# Patient Record
Sex: MALE | Race: BLACK OR AFRICAN AMERICAN | NOT HISPANIC OR LATINO | Employment: FULL TIME | URBAN - METROPOLITAN AREA
[De-identification: names, ages, dates, MRNs, and addresses within clinical notes are randomized per-mention and may not be internally consistent; named-entity substitution may affect disease eponyms.]

---

## 2019-08-12 ENCOUNTER — OFFICE VISIT (OUTPATIENT)
Dept: FAMILY MEDICINE CLINIC | Facility: CLINIC | Age: 44
End: 2019-08-12

## 2019-08-12 VITALS
SYSTOLIC BLOOD PRESSURE: 130 MMHG | BODY MASS INDEX: 30.54 KG/M2 | DIASTOLIC BLOOD PRESSURE: 70 MMHG | HEART RATE: 84 BPM | OXYGEN SATURATION: 96 % | WEIGHT: 194.6 LBS | TEMPERATURE: 97.4 F | HEIGHT: 67 IN

## 2019-08-12 DIAGNOSIS — E78.2 MULTIPLE-TYPE HYPERLIPIDEMIA: ICD-10-CM

## 2019-08-12 DIAGNOSIS — E11.42 TYPE 2 DIABETES MELLITUS WITH DIABETIC POLYNEUROPATHY, WITH LONG-TERM CURRENT USE OF INSULIN (HCC): Primary | ICD-10-CM

## 2019-08-12 DIAGNOSIS — N52.9 INABILITY TO ATTAIN ERECTION: ICD-10-CM

## 2019-08-12 DIAGNOSIS — Z79.4 TYPE 2 DIABETES MELLITUS WITH DIABETIC POLYNEUROPATHY, WITH LONG-TERM CURRENT USE OF INSULIN (HCC): Primary | ICD-10-CM

## 2019-08-12 DIAGNOSIS — I10 BENIGN ESSENTIAL HTN: ICD-10-CM

## 2019-08-12 DIAGNOSIS — G62.9 NEUROPATHY: ICD-10-CM

## 2019-08-12 PROBLEM — S04.60XA: Status: ACTIVE | Noted: 2019-08-12

## 2019-08-12 PROBLEM — E29.1 HYPOGONADISM IN MALE: Status: ACTIVE | Noted: 2019-08-12

## 2019-08-12 PROCEDURE — 99214 OFFICE O/P EST MOD 30 MIN: CPT | Performed by: FAMILY MEDICINE

## 2019-08-12 RX ORDER — LISINOPRIL 40 MG/1
40 TABLET ORAL DAILY
Refills: 1 | COMMUNITY
Start: 2019-06-15 | End: 2020-02-07 | Stop reason: SDUPTHER

## 2019-08-12 RX ORDER — METOPROLOL SUCCINATE 50 MG/1
50 TABLET, EXTENDED RELEASE ORAL DAILY
Qty: 90 TABLET | Refills: 3 | Status: SHIPPED | OUTPATIENT
Start: 2019-08-12 | End: 2020-02-07 | Stop reason: SDUPTHER

## 2019-08-12 RX ORDER — NEBIVOLOL HYDROCHLORIDE 5 MG/1
5 TABLET ORAL DAILY
Refills: 1 | COMMUNITY
Start: 2019-06-15 | End: 2019-08-12

## 2019-08-12 RX ORDER — ATORVASTATIN CALCIUM 40 MG/1
40 TABLET, FILM COATED ORAL DAILY
Qty: 90 TABLET | Refills: 3 | Status: SHIPPED | OUTPATIENT
Start: 2019-08-12 | End: 2020-02-07 | Stop reason: SDUPTHER

## 2019-08-12 NOTE — PROGRESS NOTES
"Subjective   Donnie Bishop is a 44 y.o. male.     Chief Complaint   Patient presents with   • Hypertension     follow up   • Diabetes     follow up        History of Present Illness   htn- doing well on meds, bystolic is too expensive.     hld- doing well on meds, no muscle aches    dm2- having some highs, fasting bs 250, taking insulin 70/30 45 units in the am, 15 units at lunch,  and 30 units at night. Sees endocrine and last saw them 2 months ago.     Hypogonadism/ed- stable off meds, pt not interested.     The following portions of the patient's history were reviewed and updated as appropriate: allergies, current medications, past family history, past medical history, past social history, past surgical history and problem list.    Past Medical History:   Diagnosis Date   • Diabetes mellitus (CMS/[facility])    • Hyperlipidemia    • Hypertension        No past surgical history on file.    No family history on file.    Social History     Socioeconomic History   • Marital status:      Spouse name: Not on file   • Number of children: Not on file   • Years of education: Not on file   • Highest education level: Not on file   Tobacco Use   • Smoking status: Never Smoker       Review of Systems   Respiratory: Negative for shortness of breath.    Cardiovascular: Negative for chest pain.       Objective   Visit Vitals  /70   Pulse 84   Temp 97.4 °F (36.3 °C) (Temporal)   Ht 170.2 cm (67\")   Wt 88.3 kg (194 lb 9.6 oz)   SpO2 96%   BMI 30.48 kg/m²     Body mass index is 30.48 kg/m².  Physical Exam   Constitutional: He is oriented to person, place, and time. He appears well-developed and well-nourished.   Cardiovascular: Normal rate, regular rhythm, normal heart sounds and intact distal pulses.   Pulmonary/Chest: Effort normal and breath sounds normal.   Musculoskeletal: Normal range of motion. He exhibits no edema.   Neurological: He is alert and oriented to person, place, and time.   Skin: Skin is warm and dry. "   Psychiatric: He has a normal mood and affect. His behavior is normal.         Assessment/Plan   Donnie was seen today for hypertension and diabetes.    Diagnoses and all orders for this visit:    Type 2 diabetes mellitus with diabetic polyneuropathy, with long-term current use of insulin (CMS/[facility])  -     Comprehensive Metabolic Panel  -     Lipid Panel  -     Hemoglobin A1c  -     Microalbumin / Creatinine Urine Ratio - Urine, Clean Catch  -     metFORMIN (GLUCOPHAGE) 1000 MG tablet; Take 1 tablet by mouth 2 (Two) Times a Day With Meals.    Neuropathy    Inability to attain erection    Benign essential HTN  -     metoprolol succinate XL (TOPROL XL) 50 MG 24 hr tablet; Take 1 tablet by mouth Daily.    Multiple-type hyperlipidemia  -     atorvastatin (LIPITOR) 40 MG tablet; Take 1 tablet by mouth Daily.             Normally follows with endocrine but having some high BS. Cont meds. R and B discussed. F/u in 3 month. Has 1.5 months of bystolic left.

## 2019-08-13 LAB
ALBUMIN SERPL-MCNC: 5.1 G/DL (ref 3.5–5.2)
ALBUMIN/CREAT UR: 10.8 MG/G CREAT (ref 0–30)
ALBUMIN/GLOB SERPL: 2.3 G/DL
ALP SERPL-CCNC: 49 U/L (ref 39–117)
ALT SERPL-CCNC: 17 U/L (ref 1–41)
AST SERPL-CCNC: 15 U/L (ref 1–40)
BILIRUB SERPL-MCNC: 1.4 MG/DL (ref 0.2–1.2)
BUN SERPL-MCNC: 11 MG/DL (ref 6–20)
BUN/CREAT SERPL: 11.3 (ref 7–25)
CALCIUM SERPL-MCNC: 9.8 MG/DL (ref 8.6–10.5)
CHLORIDE SERPL-SCNC: 93 MMOL/L (ref 98–107)
CHOLEST SERPL-MCNC: 150 MG/DL (ref 0–200)
CO2 SERPL-SCNC: 27.7 MMOL/L (ref 22–29)
CREAT SERPL-MCNC: 0.97 MG/DL (ref 0.76–1.27)
CREAT UR-MCNC: 40 MG/DL
GLOBULIN SER CALC-MCNC: 2.2 GM/DL
GLUCOSE SERPL-MCNC: 388 MG/DL (ref 65–99)
HBA1C MFR BLD: 8.4 % (ref 4.8–5.6)
HDLC SERPL-MCNC: 44 MG/DL (ref 40–60)
LDLC SERPL CALC-MCNC: 82 MG/DL (ref 0–100)
MICROALBUMIN UR-MCNC: 4.3 UG/ML
POTASSIUM SERPL-SCNC: 5.3 MMOL/L (ref 3.5–5.2)
PROT SERPL-MCNC: 7.3 G/DL (ref 6–8.5)
SODIUM SERPL-SCNC: 135 MMOL/L (ref 136–145)
TRIGL SERPL-MCNC: 118 MG/DL (ref 0–150)
VLDLC SERPL CALC-MCNC: 23.6 MG/DL

## 2019-08-16 ENCOUNTER — TELEPHONE (OUTPATIENT)
Dept: FAMILY MEDICINE CLINIC | Facility: CLINIC | Age: 44
End: 2019-08-16

## 2019-08-16 DIAGNOSIS — E11.42 TYPE 2 DIABETES MELLITUS WITH DIABETIC POLYNEUROPATHY, WITH LONG-TERM CURRENT USE OF INSULIN (HCC): Primary | ICD-10-CM

## 2019-08-16 DIAGNOSIS — G62.9 NEUROPATHY: ICD-10-CM

## 2019-08-16 DIAGNOSIS — Z79.4 TYPE 2 DIABETES MELLITUS WITH DIABETIC POLYNEUROPATHY, WITH LONG-TERM CURRENT USE OF INSULIN (HCC): Primary | ICD-10-CM

## 2019-08-16 RX ORDER — GABAPENTIN 400 MG/1
400 CAPSULE ORAL
Qty: 90 CAPSULE | Refills: 1 | Status: SHIPPED | OUTPATIENT
Start: 2019-08-16 | End: 2020-02-07 | Stop reason: SDUPTHER

## 2019-08-16 NOTE — TELEPHONE ENCOUNTER
Patient was in a week ago, he states you discussed refilling his Gabapentin 400 mg, he is taking it one time a day. it was not prescribed to [facility]. Please advise patient.

## 2020-02-07 ENCOUNTER — OFFICE VISIT (OUTPATIENT)
Dept: FAMILY MEDICINE CLINIC | Facility: CLINIC | Age: 45
End: 2020-02-07

## 2020-02-07 VITALS
DIASTOLIC BLOOD PRESSURE: 80 MMHG | WEIGHT: 203 LBS | SYSTOLIC BLOOD PRESSURE: 132 MMHG | HEART RATE: 99 BPM | BODY MASS INDEX: 31.86 KG/M2 | TEMPERATURE: 98.7 F | OXYGEN SATURATION: 95 % | HEIGHT: 67 IN

## 2020-02-07 DIAGNOSIS — Z79.4 TYPE 2 DIABETES MELLITUS WITH DIABETIC POLYNEUROPATHY, WITH LONG-TERM CURRENT USE OF INSULIN (HCC): Primary | ICD-10-CM

## 2020-02-07 DIAGNOSIS — E78.2 MULTIPLE-TYPE HYPERLIPIDEMIA: ICD-10-CM

## 2020-02-07 DIAGNOSIS — G62.9 NEUROPATHY: ICD-10-CM

## 2020-02-07 DIAGNOSIS — E29.1 HYPOGONADISM IN MALE: ICD-10-CM

## 2020-02-07 DIAGNOSIS — I10 BENIGN ESSENTIAL HTN: ICD-10-CM

## 2020-02-07 DIAGNOSIS — E11.42 TYPE 2 DIABETES MELLITUS WITH DIABETIC POLYNEUROPATHY, WITH LONG-TERM CURRENT USE OF INSULIN (HCC): Primary | ICD-10-CM

## 2020-02-07 PROCEDURE — 99214 OFFICE O/P EST MOD 30 MIN: CPT | Performed by: FAMILY MEDICINE

## 2020-02-07 RX ORDER — METOPROLOL SUCCINATE 50 MG/1
50 TABLET, EXTENDED RELEASE ORAL DAILY
Qty: 90 TABLET | Refills: 3 | Status: SHIPPED | OUTPATIENT
Start: 2020-02-07 | End: 2021-04-14

## 2020-02-07 RX ORDER — GABAPENTIN 400 MG/1
400 CAPSULE ORAL 3 TIMES DAILY
Qty: 270 CAPSULE | Refills: 1 | Status: SHIPPED | OUTPATIENT
Start: 2020-02-07 | End: 2020-10-09 | Stop reason: SDUPTHER

## 2020-02-07 RX ORDER — LISINOPRIL 40 MG/1
40 TABLET ORAL DAILY
Qty: 90 TABLET | Refills: 3 | Status: SHIPPED | OUTPATIENT
Start: 2020-02-07 | End: 2021-02-19

## 2020-02-07 RX ORDER — ATORVASTATIN CALCIUM 40 MG/1
40 TABLET, FILM COATED ORAL DAILY
Qty: 90 TABLET | Refills: 3 | Status: SHIPPED | OUTPATIENT
Start: 2020-02-07 | End: 2020-10-09 | Stop reason: SDUPTHER

## 2020-02-07 NOTE — PROGRESS NOTES
"Subjective   Donnie Bishop is a 44 y.o. male.     Chief Complaint   Patient presents with   • Diabetes   • Hypertension       History of Present Illness   htn- doing well on meds, bystolic is too expensive.     hld- doing well on meds, no muscle aches    dm2- having some highs, fasting bs 250, taking insulin 70/30 45 units in the am, 20 units at lunch,  and 50 units at night. Sees endocrine and will f/u with them soon. Needing gabapentin increased.     Hypogonadism/ed- stable off meds, pt not interested.     The following portions of the patient's history were reviewed and updated as appropriate: allergies, current medications, past family history, past medical history, past social history, past surgical history and problem list.    Past Medical History:   Diagnosis Date   • Acute maxillary sinusitis    • Acute otitis media    • Benign essential hypertension    • Bronchitis    • Cough    • Diabetes mellitus (CMS/HCC)    • Hyperlipidemia    • Hypertension    • Hypogonadism male    • Inability to attain erection    • Injury of acoustic nerve    • Myalgia    • Peripheral neuropathy    • Type II diabetes mellitus (CMS/HCC)    • Venous insufficiency    • Viral illness    • Vitamin D deficiency        No past surgical history on file.    Family History   Problem Relation Age of Onset   • Diabetes Mother        Social History     Socioeconomic History   • Marital status:      Spouse name: Not on file   • Number of children: Not on file   • Years of education: Not on file   • Highest education level: Not on file   Tobacco Use   • Smoking status: Never Smoker       Review of Systems   Respiratory: Negative for shortness of breath.    Cardiovascular: Negative for chest pain.       Objective   Visit Vitals  /80 (BP Location: Right arm, Patient Position: Sitting)   Pulse 99   Temp 98.7 °F (37.1 °C)   Ht 170.2 cm (67.01\")   Wt 92.1 kg (203 lb)   SpO2 95%   BMI 31.79 kg/m²     Body mass index is 31.79 kg/m².  Physical " Exam   Constitutional: He is oriented to person, place, and time. He appears well-developed and well-nourished.   Cardiovascular: Normal rate, regular rhythm, normal heart sounds and intact distal pulses.   Pulmonary/Chest: Effort normal and breath sounds normal.   Musculoskeletal: Normal range of motion. He exhibits no edema.   Neurological: He is alert and oriented to person, place, and time.   Skin: Skin is warm and dry.   Psychiatric: He has a normal mood and affect. His behavior is normal.         Assessment/Plan   Donnie was seen today for diabetes and hypertension.    Diagnoses and all orders for this visit:    Type 2 diabetes mellitus with diabetic polyneuropathy, with long-term current use of insulin (CMS/HCC)  -     gabapentin (NEURONTIN) 400 MG capsule; Take 1 capsule by mouth 3 (Three) Times a Day.    Hypogonadism in male    Benign essential HTN  -     metoprolol succinate XL (TOPROL XL) 50 MG 24 hr tablet; Take 1 tablet by mouth Daily.    Neuropathy  -     gabapentin (NEURONTIN) 400 MG capsule; Take 1 capsule by mouth 3 (Three) Times a Day.    Multiple-type hyperlipidemia  -     atorvastatin (LIPITOR) 40 MG tablet; Take 1 tablet by mouth Daily.    Other orders  -     lisinopril (PRINIVIL,ZESTRIL) 40 MG tablet; Take 1 tablet by mouth Daily.             f/u with endocrine. Cont meds. R and B discussed. F/u in 6 month. Increased gabapentin and can call in 2 weeks and if not better will give an extra pill at night.

## 2020-05-11 DIAGNOSIS — E11.42 TYPE 2 DIABETES MELLITUS WITH DIABETIC POLYNEUROPATHY, WITH LONG-TERM CURRENT USE OF INSULIN (HCC): ICD-10-CM

## 2020-05-11 DIAGNOSIS — Z79.4 TYPE 2 DIABETES MELLITUS WITH DIABETIC POLYNEUROPATHY, WITH LONG-TERM CURRENT USE OF INSULIN (HCC): ICD-10-CM

## 2020-05-26 ENCOUNTER — TELEMEDICINE (OUTPATIENT)
Dept: FAMILY MEDICINE CLINIC | Facility: CLINIC | Age: 45
End: 2020-05-26

## 2020-05-26 DIAGNOSIS — R05.9 COUGH: Primary | ICD-10-CM

## 2020-05-26 PROCEDURE — 99213 OFFICE O/P EST LOW 20 MIN: CPT | Performed by: FAMILY MEDICINE

## 2020-05-26 RX ORDER — AMOXICILLIN AND CLAVULANATE POTASSIUM 875; 125 MG/1; MG/1
1 TABLET, FILM COATED ORAL 2 TIMES DAILY
Qty: 20 TABLET | Refills: 0 | Status: SHIPPED | OUTPATIENT
Start: 2020-05-26 | End: 2020-10-09

## 2020-05-26 NOTE — PROGRESS NOTES
Subjective   Donnie Bishop is a 45 y.o. male.     Chief Complaint   Patient presents with   • URI       History of Present Illness   Mild cough and congestion with sinus pain and subj fever for one day. Good po.     The following portions of the patient's history were reviewed and updated as appropriate: allergies, current medications, past family history, past medical history, past social history, past surgical history and problem list.    Past Medical History:   Diagnosis Date   • Acute maxillary sinusitis    • Acute otitis media    • Benign essential hypertension    • Bronchitis    • Cough    • Diabetes mellitus (CMS/HCC)    • Hyperlipidemia    • Hypertension    • Hypogonadism male    • Inability to attain erection    • Injury of acoustic nerve    • Myalgia    • Peripheral neuropathy    • Type II diabetes mellitus (CMS/HCC)    • Venous insufficiency    • Viral illness    • Vitamin D deficiency        History reviewed. No pertinent surgical history.    Family History   Problem Relation Age of Onset   • Diabetes Mother        Social History     Socioeconomic History   • Marital status:      Spouse name: Not on file   • Number of children: Not on file   • Years of education: Not on file   • Highest education level: Not on file   Tobacco Use   • Smoking status: Never Smoker       Review of Systems   Respiratory: Negative for shortness of breath.        Objective   There were no vitals taken for this visit.  There is no height or weight on file to calculate BMI.  Physical Exam   Constitutional: He is oriented to person, place, and time. He appears well-developed and well-nourished. No distress.   Neurological: He is alert and oriented to person, place, and time.   Psychiatric: He has a normal mood and affect. His behavior is normal.         Assessment/Plan   Donnie was seen today for uri.    Diagnoses and all orders for this visit:    Cough  -     COVID-19,[facility] ROUTINE, NP/OP SWAB IN TRANSPORT MEDIA OR ESWAB 72  HR TAT - Swab, Nasopharynx; Future  -     amoxicillin-clavulanate (Augmentin) 875-125 MG per tablet; Take 1 tablet by mouth 2 (Two) Times a Day.        Consent to video and lasted 11 minutes.   Sinusitis vs. Covid. Discussed quarantine until results and Rest, fluids and follow up if worse or no better.

## 2020-10-09 ENCOUNTER — OFFICE VISIT (OUTPATIENT)
Dept: FAMILY MEDICINE CLINIC | Facility: CLINIC | Age: 45
End: 2020-10-09

## 2020-10-09 VITALS
OXYGEN SATURATION: 96 % | BODY MASS INDEX: 30.76 KG/M2 | TEMPERATURE: 98.7 F | SYSTOLIC BLOOD PRESSURE: 142 MMHG | DIASTOLIC BLOOD PRESSURE: 80 MMHG | HEIGHT: 67 IN | WEIGHT: 196 LBS | HEART RATE: 85 BPM

## 2020-10-09 DIAGNOSIS — E11.42 TYPE 2 DIABETES MELLITUS WITH DIABETIC POLYNEUROPATHY, WITH LONG-TERM CURRENT USE OF INSULIN (HCC): ICD-10-CM

## 2020-10-09 DIAGNOSIS — E11.40 TYPE 2 DIABETES MELLITUS WITH DIABETIC NEUROPATHY, WITH LONG-TERM CURRENT USE OF INSULIN (HCC): ICD-10-CM

## 2020-10-09 DIAGNOSIS — Z79.4 TYPE 2 DIABETES MELLITUS WITH DIABETIC NEUROPATHY, WITH LONG-TERM CURRENT USE OF INSULIN (HCC): ICD-10-CM

## 2020-10-09 DIAGNOSIS — Z79.4 TYPE 2 DIABETES MELLITUS WITH DIABETIC POLYNEUROPATHY, WITH LONG-TERM CURRENT USE OF INSULIN (HCC): ICD-10-CM

## 2020-10-09 DIAGNOSIS — G62.9 NEUROPATHY: ICD-10-CM

## 2020-10-09 DIAGNOSIS — I10 BENIGN ESSENTIAL HTN: Primary | ICD-10-CM

## 2020-10-09 DIAGNOSIS — E78.2 MULTIPLE-TYPE HYPERLIPIDEMIA: ICD-10-CM

## 2020-10-09 DIAGNOSIS — F41.9 ANXIETY: ICD-10-CM

## 2020-10-09 PROCEDURE — 99214 OFFICE O/P EST MOD 30 MIN: CPT | Performed by: FAMILY MEDICINE

## 2020-10-09 RX ORDER — METOPROLOL SUCCINATE 100 MG/1
100 TABLET, EXTENDED RELEASE ORAL DAILY
Qty: 90 TABLET | Refills: 1 | Status: CANCELLED | OUTPATIENT
Start: 2020-10-09

## 2020-10-09 RX ORDER — ERGOCALCIFEROL 1.25 MG/1
50000 CAPSULE ORAL WEEKLY
COMMUNITY
Start: 2020-09-07

## 2020-10-09 RX ORDER — ATORVASTATIN CALCIUM 40 MG/1
40 TABLET, FILM COATED ORAL DAILY
Qty: 90 TABLET | Refills: 3 | Status: SHIPPED | OUTPATIENT
Start: 2020-10-09 | End: 2021-03-08

## 2020-10-09 RX ORDER — GABAPENTIN 400 MG/1
400 CAPSULE ORAL 3 TIMES DAILY
Qty: 270 CAPSULE | Refills: 1 | Status: SHIPPED | OUTPATIENT
Start: 2020-10-09 | End: 2021-06-02

## 2020-10-09 NOTE — PROGRESS NOTES
Subjective   Donnie Bishop is a 45 y.o. male.     Chief Complaint   Patient presents with   • Hypertension       History of Present Illness   Hypertension- Taking meds and having some highs, thinks this is due to stress. Does not want to change medication now.     Hyperlipidemia- doing well on meds, no muscle aches    Diabetes- taking 45 units in am and 30 units at lunch, and 25 units before bed. Bs have been a little high. Has just seen endo and they have kept his medications the same. Rare lows.     Anxiety- started with separation from his wife. Not sleeping well. For one year. Trouble focusing.     The following portions of the patient's history were reviewed and updated as appropriate: allergies, current medications, past family history, past medical history, past social history, past surgical history and problem list.    Past Medical History:   Diagnosis Date   • Acute maxillary sinusitis    • Acute otitis media    • Benign essential hypertension    • Bronchitis    • Cough    • Diabetes mellitus (CMS/HCC)    • Hyperlipidemia    • Hypertension    • Hypogonadism male    • Inability to attain erection    • Injury of acoustic nerve    • Myalgia    • Peripheral neuropathy    • Type II diabetes mellitus (CMS/HCC)    • Venous insufficiency    • Viral illness    • Vitamin D deficiency        History reviewed. No pertinent surgical history.    Family History   Problem Relation Age of Onset   • Diabetes Mother        Social History     Socioeconomic History   • Marital status:      Spouse name: Not on file   • Number of children: Not on file   • Years of education: Not on file   • Highest education level: Not on file   Tobacco Use   • Smoking status: Never Smoker       Review of Systems   Constitutional: Negative for fever.   Respiratory: Negative for shortness of breath.        Objective   Visit Vitals  /80 (BP Location: Right arm, Patient Position: Sitting)   Pulse 85   Temp 98.7 °F (37.1 °C)   Ht 170.2 cm  "(67.01\")   Wt 88.9 kg (196 lb)   SpO2 96%   BMI 30.69 kg/m²     Body mass index is 30.69 kg/m².  Physical Exam  Constitutional:       Appearance: Normal appearance. He is well-developed.   Cardiovascular:      Rate and Rhythm: Normal rate and regular rhythm.      Heart sounds: Normal heart sounds.   Pulmonary:      Effort: Pulmonary effort is normal.      Breath sounds: Normal breath sounds.   Musculoskeletal: Normal range of motion.         General: No swelling.   Skin:     General: Skin is warm and dry.      Findings: No rash.   Neurological:      General: No focal deficit present.      Mental Status: He is alert and oriented to person, place, and time.   Psychiatric:         Mood and Affect: Mood normal.         Behavior: Behavior normal.           Assessment/Plan   Donnie was seen today for hypertension.    Diagnoses and all orders for this visit:    Benign essential HTN    Type 2 diabetes mellitus with diabetic neuropathy, with long-term current use of insulin (CMS/[facility])  -     Comprehensive Metabolic Panel  -     Lipid Panel  -     Hemoglobin A1c    Multiple-type hyperlipidemia  -     atorvastatin (LIPITOR) 40 MG tablet; Take 1 tablet by mouth Daily.    Anxiety  -     sertraline (Zoloft) 50 MG tablet; Take 1 tablet by mouth Daily.    Type 2 diabetes mellitus with diabetic polyneuropathy, with long-term current use of insulin (CMS/HCC)  -     gabapentin (NEURONTIN) 400 MG capsule; Take 1 capsule by mouth 3 (Three) Times a Day.    Neuropathy  -     gabapentin (NEURONTIN) 400 MG capsule; Take 1 capsule by mouth 3 (Three) Times a Day.        Risks and benefits discussed and f/u in 1 month. Cont kurt berrios.        "

## 2020-10-10 LAB
ALBUMIN SERPL-MCNC: 5 G/DL (ref 3.5–5.2)
ALBUMIN/GLOB SERPL: 2.9 G/DL
ALP SERPL-CCNC: 49 U/L (ref 39–117)
ALT SERPL-CCNC: 32 U/L (ref 1–41)
AST SERPL-CCNC: 31 U/L (ref 1–40)
BILIRUB SERPL-MCNC: 1.1 MG/DL (ref 0–1.2)
BUN SERPL-MCNC: 12 MG/DL (ref 6–20)
BUN/CREAT SERPL: 13.5 (ref 7–25)
CALCIUM SERPL-MCNC: 10.1 MG/DL (ref 8.6–10.5)
CHLORIDE SERPL-SCNC: 98 MMOL/L (ref 98–107)
CHOLEST SERPL-MCNC: 104 MG/DL (ref 0–200)
CO2 SERPL-SCNC: 28.4 MMOL/L (ref 22–29)
CREAT SERPL-MCNC: 0.89 MG/DL (ref 0.76–1.27)
GLOBULIN SER CALC-MCNC: 1.7 GM/DL
GLUCOSE SERPL-MCNC: 132 MG/DL (ref 65–99)
HBA1C MFR BLD: 8.8 % (ref 4.8–5.6)
HDLC SERPL-MCNC: 49 MG/DL (ref 40–60)
LDLC SERPL CALC-MCNC: 36 MG/DL (ref 0–100)
POTASSIUM SERPL-SCNC: 4 MMOL/L (ref 3.5–5.2)
PROT SERPL-MCNC: 6.7 G/DL (ref 6–8.5)
SODIUM SERPL-SCNC: 141 MMOL/L (ref 136–145)
TRIGL SERPL-MCNC: 103 MG/DL (ref 0–150)
VLDLC SERPL CALC-MCNC: 19 MG/DL (ref 5–40)

## 2020-11-06 ENCOUNTER — OFFICE VISIT (OUTPATIENT)
Dept: FAMILY MEDICINE CLINIC | Facility: CLINIC | Age: 45
End: 2020-11-06

## 2020-11-06 VITALS
HEART RATE: 87 BPM | HEIGHT: 67 IN | OXYGEN SATURATION: 98 % | SYSTOLIC BLOOD PRESSURE: 138 MMHG | WEIGHT: 190 LBS | BODY MASS INDEX: 29.82 KG/M2 | DIASTOLIC BLOOD PRESSURE: 84 MMHG | TEMPERATURE: 96.6 F

## 2020-11-06 DIAGNOSIS — I10 BENIGN ESSENTIAL HTN: Primary | ICD-10-CM

## 2020-11-06 DIAGNOSIS — F41.9 ANXIETY: ICD-10-CM

## 2020-11-06 PROCEDURE — 99213 OFFICE O/P EST LOW 20 MIN: CPT | Performed by: FAMILY MEDICINE

## 2020-11-06 NOTE — PROGRESS NOTES
Chief Complaint   Patient presents with   • Hypertension     follow up       Subjective   Donnie Bishop is a 45 y.o. male.     History of Present Illness   PT is here for follow up of anxiety and started zoloft last visit and states he is doing well with it.  He has no HI or SI or depression.  No SOB or palpitations.  Hypertension-no chest pain, blurry vision, headaches or palpitations.    The following portions of the patient's history were reviewed and updated as appropriate: allergies, current medications, past family history, past medical history, past social history, past surgical history and problem list.    Review of Systems   Constitutional: Negative for fatigue.   Eyes: Negative for blurred vision.   Respiratory: Negative for cough, chest tightness and shortness of breath.    Cardiovascular: Negative for chest pain, palpitations and leg swelling.   Neurological: Negative for dizziness, light-headedness and headache.       Patient Active Problem List   Diagnosis   • Multiple-type hyperlipidemia   • Type 2 diabetes mellitus with diabetic neuropathy, with long-term current use of insulin (CMS/[facility])   • Neuropathy   • Vitamin D deficiency   • Benign essential HTN   • Inability to attain erection   • Injury of acoustic nerve   • Hypogonadism in male   • Anxiety       No Known Allergies      Current Outpatient Medications:   •  atorvastatin (LIPITOR) 40 MG tablet, Take 1 tablet by mouth Daily., Disp: 90 tablet, Rfl: 3  •  gabapentin (NEURONTIN) 400 MG capsule, Take 1 capsule by mouth 3 (Three) Times a Day., Disp: 270 capsule, Rfl: 1  •  Insulin Aspart Prot & Aspart (NOVOLOG MIX 70/30 FLEXPEN SC), Inject  under the skin., Disp: , Rfl:   •  lisinopril (PRINIVIL,ZESTRIL) 40 MG tablet, Take 1 tablet by mouth Daily., Disp: 90 tablet, Rfl: 3  •  melatonin 5 MG tablet tablet, Take 5 mg by mouth., Disp: , Rfl:   •  metFORMIN (GLUCOPHAGE) 1000 MG tablet, TAKE 1 TABLET BY MOUTH TWO TIMES A DAY WITH MEALS, Disp: 180 tablet,  "Rfl: 0  •  metoprolol succinate XL (TOPROL XL) 50 MG 24 hr tablet, Take 1 tablet by mouth Daily., Disp: 90 tablet, Rfl: 3  •  sertraline (Zoloft) 50 MG tablet, Take 1 tablet by mouth Daily., Disp: 90 tablet, Rfl: 3  •  vitamin D (ERGOCALCIFEROL) 1.25 MG (12663 UT) capsule capsule, Take 50,000 Units by mouth 1 (One) Time Per Week., Disp: , Rfl:   •  RaNITidine HCl (RANITIDINE 75 PO), Take  by mouth., Disp: , Rfl:     Past Medical History:   Diagnosis Date   • Anxiety    • Cough    • Inability to attain erection    • Injury of acoustic nerve    • Myalgia    • Type II diabetes mellitus (CMS/HCC)    • Venous insufficiency    • Viral illness    • Vitamin D deficiency        History reviewed. No pertinent surgical history.    Family History   Problem Relation Age of Onset   • Diabetes Mother        Social History     Tobacco Use   • Smoking status: Never Smoker   • Smokeless tobacco: Never Used   Substance Use Topics   • Alcohol use: Never     Frequency: Never            Objective     Visit Vitals  /84 (BP Location: Right arm, Patient Position: Sitting, Cuff Size: Adult)   Pulse 87   Temp 96.6 °F (35.9 °C) (Infrared)   Ht 170.2 cm (67.01\")   Wt 86.2 kg (190 lb)   SpO2 98%   BMI 29.75 kg/m²       Physical Exam  Vitals signs and nursing note reviewed.   Constitutional:       Appearance: Normal appearance.   Cardiovascular:      Rate and Rhythm: Normal rate and regular rhythm.      Heart sounds: Normal heart sounds. No murmur. No friction rub.   Pulmonary:      Effort: Pulmonary effort is normal. No respiratory distress.      Breath sounds: Normal breath sounds. No stridor.   Neurological:      Mental Status: He is alert.         Lab Results   Component Value Date    BUN 12 10/09/2020    CREATININE 0.89 10/09/2020    EGFRIFNONA 93 10/09/2020    EGFRIFAFRI 113 10/09/2020    BCR 13.5 10/09/2020    K 4.0 10/09/2020    CO2 28.4 10/09/2020    CALCIUM 10.1 10/09/2020    PROTENTOTREF 6.7 10/09/2020    ALBUMIN 5.00 10/09/2020 "    LABIL2 2.9 10/09/2020    AST 31 10/09/2020    ALT 32 10/09/2020       No results found for: WBC, RBC, HGB, HCT, MCV, MCH, MCHC, RDW, RDWSD, MPV, PLT, NEUTRORELPCT, LYMPHORELPCT, MONORELPCT, EOSRELPCT, BASORELPCT, AUTOIGPER, NEUTROABS, LYMPHSABS, MONOSABS, EOSABS, BASOSABS, AUTOIGNUM, NRBC    Lab Results   Component Value Date    HGBA1C 8.80 (H) 10/09/2020       Lab Results   Component Value Date    LNSASPNR79 426 09/28/2020       No results found for: TSH    No results found for: CHOL  Lab Results   Component Value Date    TRIG 103 10/09/2020     Lab Results   Component Value Date    HDL 49 10/09/2020     Lab Results   Component Value Date    LDL 36 10/09/2020     Lab Results   Component Value Date    VLDL 19 10/09/2020     No results found for: LDLHDL      Procedures    Assessment/Plan   Problems Addressed this Visit        Cardiovascular and Mediastinum    Benign essential HTN - Primary       Other    Anxiety      Diagnoses       Codes Comments    Benign essential HTN    -  Primary ICD-10-CM: I10  ICD-9-CM: 401.1     Anxiety     ICD-10-CM: F41.9  ICD-9-CM: 300.00           No orders of the defined types were placed in this encounter.      Current Outpatient Medications   Medication Sig Dispense Refill   • atorvastatin (LIPITOR) 40 MG tablet Take 1 tablet by mouth Daily. 90 tablet 3   • gabapentin (NEURONTIN) 400 MG capsule Take 1 capsule by mouth 3 (Three) Times a Day. 270 capsule 1   • Insulin Aspart Prot & Aspart (NOVOLOG MIX 70/30 FLEXPEN SC) Inject  under the skin.     • lisinopril (PRINIVIL,ZESTRIL) 40 MG tablet Take 1 tablet by mouth Daily. 90 tablet 3   • melatonin 5 MG tablet tablet Take 5 mg by mouth.     • metFORMIN (GLUCOPHAGE) 1000 MG tablet TAKE 1 TABLET BY MOUTH TWO TIMES A DAY WITH MEALS 180 tablet 0   • metoprolol succinate XL (TOPROL XL) 50 MG 24 hr tablet Take 1 tablet by mouth Daily. 90 tablet 3   • sertraline (Zoloft) 50 MG tablet Take 1 tablet by mouth Daily. 90 tablet 3   • vitamin D  (ERGOCALCIFEROL) 1.25 MG (70890 UT) capsule capsule Take 50,000 Units by mouth 1 (One) Time Per Week.     • RaNITidine HCl (RANITIDINE 75 PO) Take  by mouth.       No current facility-administered medications for this visit.      Continue current plan and follow up as scheduled.    No follow-ups on file.    There are no Patient Instructions on file for this visit.

## 2021-01-03 DIAGNOSIS — E11.42 TYPE 2 DIABETES MELLITUS WITH DIABETIC POLYNEUROPATHY, WITH LONG-TERM CURRENT USE OF INSULIN (HCC): ICD-10-CM

## 2021-01-03 DIAGNOSIS — Z79.4 TYPE 2 DIABETES MELLITUS WITH DIABETIC POLYNEUROPATHY, WITH LONG-TERM CURRENT USE OF INSULIN (HCC): ICD-10-CM

## 2021-02-19 RX ORDER — LISINOPRIL 40 MG/1
TABLET ORAL
Qty: 90 TABLET | Refills: 3 | Status: SHIPPED | OUTPATIENT
Start: 2021-02-19 | End: 2021-06-04 | Stop reason: SDUPTHER

## 2021-03-06 DIAGNOSIS — E78.2 MULTIPLE-TYPE HYPERLIPIDEMIA: ICD-10-CM

## 2021-03-08 RX ORDER — ATORVASTATIN CALCIUM 40 MG/1
TABLET, FILM COATED ORAL
Qty: 90 TABLET | Refills: 3 | Status: SHIPPED | OUTPATIENT
Start: 2021-03-08

## 2021-04-02 ENCOUNTER — BULK ORDERING (OUTPATIENT)
Dept: CASE MANAGEMENT | Facility: OTHER | Age: 46
End: 2021-04-02

## 2021-04-02 DIAGNOSIS — Z23 IMMUNIZATION DUE: ICD-10-CM

## 2021-04-06 DIAGNOSIS — I10 BENIGN ESSENTIAL HTN: ICD-10-CM

## 2021-04-07 NOTE — TELEPHONE ENCOUNTER
LOV       11/6/20  NOV     No f/u on file  Labs     10/9/20    Last RF   2/7/21    HUB MAY RELAY MESSAGE TO PATIENT AND RESPOND.    PLEASE REPLY THAT PT HAS BEEN NOTIFIED.   Needs f/u for HTN / anxiety / DM  Left message to return call.

## 2021-04-14 RX ORDER — METOPROLOL SUCCINATE 50 MG/1
TABLET, EXTENDED RELEASE ORAL
Qty: 90 TABLET | Refills: 3 | Status: SHIPPED | OUTPATIENT
Start: 2021-04-14 | End: 2021-06-04 | Stop reason: SDUPTHER

## 2021-05-24 ENCOUNTER — HOSPITAL ENCOUNTER (EMERGENCY)
Facility: HOSPITAL | Age: 46
Discharge: HOME | End: 2021-05-24
Attending: EMERGENCY MEDICINE | Admitting: EMERGENCY MEDICINE

## 2021-05-24 VITALS
TEMPERATURE: 96.8 F | HEART RATE: 104 BPM | RESPIRATION RATE: 18 BRPM | OXYGEN SATURATION: 94 % | WEIGHT: 190 LBS | HEIGHT: 67 IN | BODY MASS INDEX: 29.82 KG/M2 | SYSTOLIC BLOOD PRESSURE: 151 MMHG | DIASTOLIC BLOOD PRESSURE: 90 MMHG

## 2021-05-24 DIAGNOSIS — Z86.39 HISTORY OF DIABETES MELLITUS: ICD-10-CM

## 2021-05-24 DIAGNOSIS — R11.2 NON-INTRACTABLE VOMITING WITH NAUSEA, UNSPECIFIED VOMITING TYPE: Primary | ICD-10-CM

## 2021-05-24 DIAGNOSIS — R51.9 ACUTE NONINTRACTABLE HEADACHE, UNSPECIFIED HEADACHE TYPE: ICD-10-CM

## 2021-05-24 LAB
ALBUMIN SERPL-MCNC: 5.1 G/DL (ref 3.5–5.2)
ALBUMIN/GLOB SERPL: 2 G/DL
ALP SERPL-CCNC: 49 U/L (ref 39–117)
ALT SERPL W P-5'-P-CCNC: 16 U/L (ref 1–41)
ANION GAP SERPL CALCULATED.3IONS-SCNC: 16.9 MMOL/L (ref 5–15)
AST SERPL-CCNC: 14 U/L (ref 1–40)
BASOPHILS # BLD AUTO: 0.05 10*3/MM3 (ref 0–0.2)
BASOPHILS NFR BLD AUTO: 0.4 % (ref 0–1.5)
BILIRUB SERPL-MCNC: 3.3 MG/DL (ref 0–1.2)
BILIRUB UR QL STRIP: NEGATIVE
BUN SERPL-MCNC: 11 MG/DL (ref 6–20)
BUN/CREAT SERPL: 12 (ref 7–25)
CALCIUM SPEC-SCNC: 10.2 MG/DL (ref 8.6–10.5)
CHLORIDE SERPL-SCNC: 95 MMOL/L (ref 98–107)
CLARITY UR: CLEAR
CO2 SERPL-SCNC: 22.1 MMOL/L (ref 22–29)
COLOR UR: YELLOW
CREAT SERPL-MCNC: 0.92 MG/DL (ref 0.76–1.27)
DEPRECATED RDW RBC AUTO: 37.9 FL (ref 37–54)
EOSINOPHIL # BLD AUTO: 0.03 10*3/MM3 (ref 0–0.4)
EOSINOPHIL NFR BLD AUTO: 0.2 % (ref 0.3–6.2)
ERYTHROCYTE [DISTWIDTH] IN BLOOD BY AUTOMATED COUNT: 11.7 % (ref 12.3–15.4)
GFR SERPL CREATININE-BSD FRML MDRD: 108 ML/MIN/1.73
GLOBULIN UR ELPH-MCNC: 2.5 GM/DL
GLUCOSE SERPL-MCNC: 129 MG/DL (ref 65–99)
GLUCOSE UR STRIP-MCNC: ABNORMAL MG/DL
HCT VFR BLD AUTO: 46.3 % (ref 37.5–51)
HGB BLD-MCNC: 16.3 G/DL (ref 13–17.7)
HGB UR QL STRIP.AUTO: NEGATIVE
HOLD SPECIMEN: NORMAL
HOLD SPECIMEN: NORMAL
IMM GRANULOCYTES # BLD AUTO: 0.07 10*3/MM3 (ref 0–0.05)
IMM GRANULOCYTES NFR BLD AUTO: 0.6 % (ref 0–0.5)
KETONES UR QL STRIP: ABNORMAL
LEUKOCYTE ESTERASE UR QL STRIP.AUTO: NEGATIVE
LIPASE SERPL-CCNC: 20 U/L (ref 13–60)
LYMPHOCYTES # BLD AUTO: 1.58 10*3/MM3 (ref 0.7–3.1)
LYMPHOCYTES NFR BLD AUTO: 12.6 % (ref 19.6–45.3)
MCH RBC QN AUTO: 31.5 PG (ref 26.6–33)
MCHC RBC AUTO-ENTMCNC: 35.2 G/DL (ref 31.5–35.7)
MCV RBC AUTO: 89.6 FL (ref 79–97)
MONOCYTES # BLD AUTO: 0.79 10*3/MM3 (ref 0.1–0.9)
MONOCYTES NFR BLD AUTO: 6.3 % (ref 5–12)
NEUTROPHILS NFR BLD AUTO: 10.06 10*3/MM3 (ref 1.7–7)
NEUTROPHILS NFR BLD AUTO: 79.9 % (ref 42.7–76)
NITRITE UR QL STRIP: NEGATIVE
NRBC BLD AUTO-RTO: 0 /100 WBC (ref 0–0.2)
PH UR STRIP.AUTO: <=5 [PH] (ref 5–8)
PLATELET # BLD AUTO: 431 10*3/MM3 (ref 140–450)
PMV BLD AUTO: 9.9 FL (ref 6–12)
POTASSIUM SERPL-SCNC: 4.5 MMOL/L (ref 3.5–5.2)
PROT SERPL-MCNC: 7.6 G/DL (ref 6–8.5)
PROT UR QL STRIP: ABNORMAL
RBC # BLD AUTO: 5.17 10*6/MM3 (ref 4.14–5.8)
SODIUM SERPL-SCNC: 134 MMOL/L (ref 136–145)
SP GR UR STRIP: 1.01 (ref 1–1.03)
UROBILINOGEN UR QL STRIP: ABNORMAL
WBC # BLD AUTO: 12.58 10*3/MM3 (ref 3.4–10.8)
WHOLE BLOOD HOLD SPECIMEN: NORMAL
WHOLE BLOOD HOLD SPECIMEN: NORMAL

## 2021-05-24 PROCEDURE — 99283 EMERGENCY DEPT VISIT LOW MDM: CPT

## 2021-05-24 PROCEDURE — 80053 COMPREHEN METABOLIC PANEL: CPT | Performed by: EMERGENCY MEDICINE

## 2021-05-24 PROCEDURE — 85025 COMPLETE CBC W/AUTO DIFF WBC: CPT | Performed by: EMERGENCY MEDICINE

## 2021-05-24 PROCEDURE — 96374 THER/PROPH/DIAG INJ IV PUSH: CPT

## 2021-05-24 PROCEDURE — 25010000002 KETOROLAC TROMETHAMINE PER 15 MG: Performed by: EMERGENCY MEDICINE

## 2021-05-24 PROCEDURE — 81003 URINALYSIS AUTO W/O SCOPE: CPT | Performed by: EMERGENCY MEDICINE

## 2021-05-24 PROCEDURE — 83690 ASSAY OF LIPASE: CPT | Performed by: EMERGENCY MEDICINE

## 2021-05-24 PROCEDURE — 96375 TX/PRO/DX INJ NEW DRUG ADDON: CPT

## 2021-05-24 PROCEDURE — 36415 COLL VENOUS BLD VENIPUNCTURE: CPT

## 2021-05-24 PROCEDURE — 25010000002 ONDANSETRON PER 1 MG: Performed by: EMERGENCY MEDICINE

## 2021-05-24 PROCEDURE — 96361 HYDRATE IV INFUSION ADD-ON: CPT

## 2021-05-24 RX ORDER — ONDANSETRON 2 MG/ML
4 INJECTION INTRAMUSCULAR; INTRAVENOUS ONCE
Status: COMPLETED | OUTPATIENT
Start: 2021-05-24 | End: 2021-05-24

## 2021-05-24 RX ORDER — KETOROLAC TROMETHAMINE 30 MG/ML
30 INJECTION, SOLUTION INTRAMUSCULAR; INTRAVENOUS ONCE
Status: COMPLETED | OUTPATIENT
Start: 2021-05-24 | End: 2021-05-24

## 2021-05-24 RX ORDER — ONDANSETRON 4 MG/1
4 TABLET, ORALLY DISINTEGRATING ORAL EVERY 8 HOURS PRN
Qty: 10 TABLET | Refills: 0 | Status: SHIPPED | OUTPATIENT
Start: 2021-05-24

## 2021-05-24 RX ORDER — SODIUM CHLORIDE 0.9 % (FLUSH) 0.9 %
10 SYRINGE (ML) INJECTION AS NEEDED
Status: DISCONTINUED | OUTPATIENT
Start: 2021-05-24 | End: 2021-05-25 | Stop reason: HOSPADM

## 2021-05-24 RX ADMIN — ONDANSETRON 4 MG: 2 INJECTION INTRAMUSCULAR; INTRAVENOUS at 19:52

## 2021-05-24 RX ADMIN — KETOROLAC TROMETHAMINE 30 MG: 30 INJECTION, SOLUTION INTRAMUSCULAR; INTRAVENOUS at 22:32

## 2021-05-24 RX ADMIN — SODIUM CHLORIDE 1000 ML: 9 INJECTION, SOLUTION INTRAVENOUS at 19:51

## 2021-05-24 NOTE — ED PROVIDER NOTES
EMERGENCY DEPARTMENT ENCOUNTER    Room Number:  17/17  Date seen:  5/25/2021  PCP: Juju Kennedy MD  Historian: Patient      HPI:  Chief Complaint: Vomiting  A complete HPI/ROS/PMH/PSH/SH/FH are unobtainable due to: Nothing  Context: Donnie Bishop is a 46 y.o. male who presents to the ED c/o vomiting that has been ongoing for the last 3 days.  Patient reports that anything he tries to eat or drink comes back up.  He reports some minimal lower abdominal pain that he attributes mostly to being hungry and not being able to eat.  He has been having regular bowel movements and passing gas.  He denies diarrhea.  No black or bloody stool.  He denies fever or chills.  He has had headache and also reports mild shortness of air.  He is a type II diabetic, diagnosed at age 20.  He denies prior diagnosis of gastroparesis.  He does not smoke or drink.  He denies history of prior abdominal surgeries.  He reports feeling dehydrated.            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      FAMILY HISTORY  Family History   Problem Relation Age of Onset   • Diabetes Mother          SOCIAL HISTORY  Social History     Socioeconomic History   • Marital status:      Spouse name: Not on file   • Number of children: Not on file    • Years of education: Not on file   • Highest education level: Not on file   Tobacco Use   • Smoking status: Never Smoker   • Smokeless tobacco: Never Used   Substance and Sexual Activity   • Alcohol use: Never   • Drug use: Never   • Sexual activity: Defer         ALLERGIES  Patient has no known allergies.        REVIEW OF SYSTEMS  Review of Systems   Review of all 14 systems is negative other than stated in the HPI above.      PHYSICAL EXAM  ED Triage Vitals [05/24/21 1708]   Temp Heart Rate Resp BP SpO2   96.8 °F (36 °C) 104 18 -- 94 %      Temp src Heart Rate Source Patient Position BP Location FiO2 (%)   -- Monitor -- -- --         GENERAL: Awake and alert, no acute distress  HENT: nares patent  EYES: no scleral icterus  CV: regular rhythm, normal rate  RESPIRATORY: normal effort  ABDOMEN: soft, nondistended, nontender throughout  MUSCULOSKELETAL: no deformity  NEURO: alert, moves all extremities, follows commands  PSYCH:  calm, cooperative  SKIN: warm, dry    Vital signs and nursing notes reviewed.          LAB RESULTS  Recent Results (from the past 24 hour(s))   Comprehensive Metabolic Panel    Collection Time: 05/24/21  6:38 PM    Specimen: Blood   Result Value Ref Range    Glucose 129 (H) 65 - 99 mg/dL    BUN 11 6 - 20 mg/dL    Creatinine 0.92 0.76 - 1.27 mg/dL    Sodium 134 (L) 136 - 145 mmol/L    Potassium 4.5 3.5 - 5.2 mmol/L    Chloride 95 (L) 98 - 107 mmol/L    CO2 22.1 22.0 - 29.0 mmol/L    Calcium 10.2 8.6 - 10.5 mg/dL    Total Protein 7.6 6.0 - 8.5 g/dL    Albumin 5.10 3.50 - 5.20 g/dL    ALT (SGPT) 16 1 - 41 U/L    AST (SGOT) 14 1 - 40 U/L    Alkaline Phosphatase 49 39 - 117 U/L    Total Bilirubin 3.3 (H) 0.0 - 1.2 mg/dL    eGFR  African Amer 108 >60 mL/min/1.73    Globulin 2.5 gm/dL    A/G Ratio 2.0 g/dL    BUN/Creatinine Ratio 12.0 7.0 - 25.0    Anion Gap 16.9 (H) 5.0 - 15.0 mmol/L   Lipase    Collection Time: 05/24/21  6:38 PM    Specimen: Blood   Result Value Ref Range    Lipase 20 13 - 60  U/L   Light Blue Top    Collection Time: 05/24/21  6:38 PM   Result Value Ref Range    Extra Tube hold for add-on    Green Top (Gel)    Collection Time: 05/24/21  6:38 PM   Result Value Ref Range    Extra Tube Hold for add-ons.    Lavender Top    Collection Time: 05/24/21  6:38 PM   Result Value Ref Range    Extra Tube hold for add-on    Gold Top - SST    Collection Time: 05/24/21  6:38 PM   Result Value Ref Range    Extra Tube Hold for add-ons.    CBC Auto Differential    Collection Time: 05/24/21  6:38 PM    Specimen: Blood   Result Value Ref Range    WBC 12.58 (H) 3.40 - 10.80 10*3/mm3    RBC 5.17 4.14 - 5.80 10*6/mm3    Hemoglobin 16.3 13.0 - 17.7 g/dL    Hematocrit 46.3 37.5 - 51.0 %    MCV 89.6 79.0 - 97.0 fL    MCH 31.5 26.6 - 33.0 pg    MCHC 35.2 31.5 - 35.7 g/dL    RDW 11.7 (L) 12.3 - 15.4 %    RDW-SD 37.9 37.0 - 54.0 fl    MPV 9.9 6.0 - 12.0 fL    Platelets 431 140 - 450 10*3/mm3    Neutrophil % 79.9 (H) 42.7 - 76.0 %    Lymphocyte % 12.6 (L) 19.6 - 45.3 %    Monocyte % 6.3 5.0 - 12.0 %    Eosinophil % 0.2 (L) 0.3 - 6.2 %    Basophil % 0.4 0.0 - 1.5 %    Immature Grans % 0.6 (H) 0.0 - 0.5 %    Neutrophils, Absolute 10.06 (H) 1.70 - 7.00 10*3/mm3    Lymphocytes, Absolute 1.58 0.70 - 3.10 10*3/mm3    Monocytes, Absolute 0.79 0.10 - 0.90 10*3/mm3    Eosinophils, Absolute 0.03 0.00 - 0.40 10*3/mm3    Basophils, Absolute 0.05 0.00 - 0.20 10*3/mm3    Immature Grans, Absolute 0.07 (H) 0.00 - 0.05 10*3/mm3    nRBC 0.0 0.0 - 0.2 /100 WBC   Urinalysis With Microscopic If Indicated (No Culture) - Urine, Clean Catch    Collection Time: 05/24/21  8:36 PM    Specimen: Urine, Clean Catch   Result Value Ref Range    Color, UA Yellow Yellow, Straw    Appearance, UA Clear Clear    pH, UA <=5.0 5.0 - 8.0    Specific Gravity, UA 1.015 1.005 - 1.030    Glucose,  mg/dL (1+) (A) Negative    Ketones, UA 80 mg/dL (3+) (A) Negative    Bilirubin, UA Negative Negative    Blood, UA Negative Negative    Protein, UA Trace (A)  Negative    Leuk Esterase, UA Negative Negative    Nitrite, UA Negative Negative    Urobilinogen, UA 1.0 E.U./dL 0.2 - 1.0 E.U./dL       Ordered the above labs and reviewed the results.        RADIOLOGY  No Radiology Exams Resulted Within Past 24 Hours    Ordered the above noted radiological studies. Reviewed by me in PACS.            PROCEDURES  Procedures              MEDICATIONS GIVEN IN ER  Medications   sodium chloride 0.9 % bolus 1,000 mL (0 mL Intravenous Stopped 5/24/21 2231)   ondansetron (ZOFRAN) injection 4 mg (4 mg Intravenous Given 5/24/21 1952)   ketorolac (TORADOL) injection 30 mg (30 mg Intravenous Given 5/24/21 2232)                   MEDICAL DECISION MAKING, PROGRESS, and CONSULTS    All labs have been independently reviewed by me.  All radiology studies have been reviewed by me and discussed with radiologist dictating the report.   EKG's independently viewed and interpreted by me.  Discussion below represents my analysis of pertinent findings related to patient's condition, differential diagnosis, treatment plan and final disposition.      Differential diagnosis includes but is not limited to:  Acute pancreatitis  Acute cholecystitis  Biliary colic  Gastroparesis  Small bowel obstruction  Gastroenteritis      ED Course as of May 25 0355   Mon May 24, 2021   1924 I have ordered labs including CBC, CMP, lipase for initial evaluation.  Also ordered normal saline bolus and IV Zofran.    [JR]   1946 ALT (SGPT): 16 [JR]   1946 AST (SGOT): 14 [JR]   1946 Anion Gap(!): 16.9 [JR]   1946 Total Bilirubin(!): 3.3 [JR]   1946 WBC(!): 12.58 [JR]   1946 Hemoglobin: 16.3 [JR]   1946 Lipase: 20 [JR]   2049 Patient reassessed.  He is resting comfortably, still complaining of mild headache.  I explained that his labs look reassuring at this time and considering his abdominal examination is benign, I do not necessarily think that a CT scan of the abdomen is warranted.  I discussed plan to provide Toradol for  headache, continue IV fluids, and to p.o. challenge.  Patient was provided a cup of ice water.    [JR]   2211 Patient reassessed.  He has tolerated p.o.  He has not yet received the Toradol but anticipate discharge home after Toradol administration with trial of Zofran as needed for nausea, oral hydration, close PCP follow-up.    [JR]   2257 Patient reassessed.  Headache has resolved after Toradol.  Return precautions again discussed.    [JR]      ED Course User Index  [JR] Rob Carbone MD            I wore an N95 mask, face shield, and gloves during this patient encounter.  Patient also wearing a surgical mask.  Hand hygeine performed before and after seeing the patient.    DIAGNOSIS  Final diagnoses:   Non-intractable vomiting with nausea, unspecified vomiting type   Acute nonintractable headache, unspecified headache type   History of diabetes mellitus         DISPOSITION  DISCHARGE    Patient discharged in stable condition.    Reviewed implications of results, diagnosis, meds, responsibility to follow up, warning signs and symptoms of possible worsening, potential complications and reasons to return to ER.    Patient/Family voiced understanding of above instructions.    Discussed plan for discharge, as there is no emergent indication for admission. Patient referred to primary care provider for BP management due to today's BP. Pt/family is agreeable and understands need for follow up and repeat testing.  Pt is aware that discharge does not mean that nothing is wrong but it indicates no emergency is present that requires admission and they must continue care with follow-up as given below or physician of their choice.     FOLLOW-UP  Juju Kennedy MD  [address] [id number] [phone]    Schedule an appointment as soon as possible for a visit            Medication List      New Prescriptions    ondansetron ODT 4 MG disintegrating tablet  Commonly known as:  ZOFRAN-ODT  Place 1 tablet on the tongue Every 8 (Eight) Hours As Needed for Nausea.           Where to Get Your Medications      These medications were sent to [id number] - [place], [place] - [address] - [phone]  - [phone] FX  [address], [place] [address]    Phone: [phone]   · ondansetron ODT 4 MG disintegrating tablet                   Latest Documented Vital Signs:  As of 03:55 EDT  BP- 151/90 HR- 104 Temp- 96.8 °F (36 °C) O2 sat- 94%        --    Please note that portions of this were completed with a voice recognition program.          Rob Carbone MD  05/25/21 0356

## 2021-05-24 NOTE — ED TRIAGE NOTES
Patient presents to er via private vehicle from home.  Patient is reporting vomiting for three days.  Patient was placed in face mask during first look triage.  Patient was wearing a face mask throughout encounter.  I wore personal protective equipment throughout the encounter.  Hand hygiene was performed before and after patient encounter.

## 2021-06-02 DIAGNOSIS — E11.42 TYPE 2 DIABETES MELLITUS WITH DIABETIC POLYNEUROPATHY, WITH LONG-TERM CURRENT USE OF INSULIN (HCC): ICD-10-CM

## 2021-06-02 DIAGNOSIS — G62.9 NEUROPATHY: ICD-10-CM

## 2021-06-02 DIAGNOSIS — Z79.4 TYPE 2 DIABETES MELLITUS WITH DIABETIC POLYNEUROPATHY, WITH LONG-TERM CURRENT USE OF INSULIN (HCC): ICD-10-CM

## 2021-06-02 RX ORDER — GABAPENTIN 400 MG/1
CAPSULE ORAL
Qty: 270 CAPSULE | Refills: 0 | Status: SHIPPED | OUTPATIENT
Start: 2021-06-02 | End: 2021-10-18

## 2021-06-04 ENCOUNTER — OFFICE VISIT (OUTPATIENT)
Dept: FAMILY MEDICINE CLINIC | Facility: CLINIC | Age: 46
End: 2021-06-04

## 2021-06-04 VITALS
TEMPERATURE: 97.9 F | BODY MASS INDEX: 26.06 KG/M2 | DIASTOLIC BLOOD PRESSURE: 78 MMHG | HEIGHT: 67 IN | WEIGHT: 166 LBS | SYSTOLIC BLOOD PRESSURE: 128 MMHG | OXYGEN SATURATION: 96 % | HEART RATE: 119 BPM

## 2021-06-04 DIAGNOSIS — F41.9 ANXIETY: ICD-10-CM

## 2021-06-04 DIAGNOSIS — I10 BENIGN ESSENTIAL HTN: Primary | ICD-10-CM

## 2021-06-04 DIAGNOSIS — E78.2 MULTIPLE-TYPE HYPERLIPIDEMIA: ICD-10-CM

## 2021-06-04 DIAGNOSIS — E11.40 TYPE 2 DIABETES MELLITUS WITH DIABETIC NEUROPATHY, WITH LONG-TERM CURRENT USE OF INSULIN (HCC): ICD-10-CM

## 2021-06-04 DIAGNOSIS — Z79.4 TYPE 2 DIABETES MELLITUS WITH DIABETIC NEUROPATHY, WITH LONG-TERM CURRENT USE OF INSULIN (HCC): ICD-10-CM

## 2021-06-04 PROCEDURE — 99214 OFFICE O/P EST MOD 30 MIN: CPT | Performed by: FAMILY MEDICINE

## 2021-06-04 RX ORDER — LISINOPRIL 40 MG/1
40 TABLET ORAL DAILY
Qty: 90 TABLET | Refills: 3 | Status: SHIPPED | OUTPATIENT
Start: 2021-06-04 | End: 2022-02-14 | Stop reason: SDUPTHER

## 2021-06-04 RX ORDER — VENLAFAXINE HYDROCHLORIDE 75 MG/1
75 CAPSULE, EXTENDED RELEASE ORAL DAILY
Qty: 90 CAPSULE | Refills: 3 | Status: SHIPPED | OUTPATIENT
Start: 2021-06-04 | End: 2022-02-11 | Stop reason: SDUPTHER

## 2021-06-04 RX ORDER — METOPROLOL SUCCINATE 50 MG/1
50 TABLET, EXTENDED RELEASE ORAL DAILY
Qty: 90 TABLET | Refills: 3 | Status: SHIPPED | OUTPATIENT
Start: 2021-06-04 | End: 2022-02-14 | Stop reason: SDUPTHER

## 2021-06-04 NOTE — PROGRESS NOTES
"Subjective   Donnie Bishop is a 46 y.o. male.     Chief Complaint   Patient presents with   • Hypertension     Blood pressure medicine has  and pharmacy is telling him he needs a new rx sent in.        hpi   Hypertension- Taking meds and doing well    Hyperlipidemia- doing well on meds, no muscle aches, gets labs with endo    Diabetes-  Has just seen endo and they have kept his medications the same. Rare lows. Has been working on weight loss.     Anxiety- stable. meds could help more and they cause him fatigue.     The following portions of the patient's history were reviewed and updated as appropriate: allergies, current medications, past family history, past medical history, past social history, past surgical history and problem list.    Past Medical History:   Diagnosis Date   • Anxiety    • Cough    • Inability to attain erection    • Injury of acoustic nerve    • Myalgia    • Type II diabetes mellitus (CMS/HCC)    • Venous insufficiency    • Viral illness    • Vitamin D deficiency        History reviewed. No pertinent surgical history.    Family History   Problem Relation Age of Onset   • Diabetes Mother        Social History     Socioeconomic History   • Marital status:      Spouse name: Not on file   • Number of children: Not on file   • Years of education: Not on file   • Highest education level: Not on file   Tobacco Use   • Smoking status: Never Smoker   • Smokeless tobacco: Never Used   Substance and Sexual Activity   • Alcohol use: Never   • Drug use: Never   • Sexual activity: Defer       Review of Systems   Constitutional: Negative for fever.   Respiratory: Negative for shortness of breath.        Objective   Visit Vitals  /78   Pulse 119   Temp 97.9 °F (36.6 °C)   Ht 170.2 cm (67\")   Wt 75.3 kg (166 lb)   SpO2 96%   BMI 26.00 kg/m²     Body mass index is 26 kg/m².  Physical Exam  Constitutional:       Appearance: Normal appearance. He is well-developed.   Cardiovascular:      Rate and " Rhythm: Normal rate and regular rhythm.      Heart sounds: Normal heart sounds.   Pulmonary:      Effort: Pulmonary effort is normal.      Breath sounds: Normal breath sounds.   Musculoskeletal:         General: No swelling. Normal range of motion.   Skin:     General: Skin is warm and dry.      Findings: No rash.   Neurological:      General: No focal deficit present.      Mental Status: He is alert and oriented to person, place, and time.   Psychiatric:         Mood and Affect: Mood normal.         Behavior: Behavior normal.           Assessment/Plan   Diagnoses and all orders for this visit:    1. Benign essential HTN (Primary)  -     metoprolol succinate XL (TOPROL-XL) 50 MG 24 hr tablet; Take 1 tablet by mouth Daily.  Dispense: 90 tablet; Refill: 3  -     lisinopril (PRINIVIL,ZESTRIL) 40 MG tablet; Take 1 tablet by mouth Daily.  Dispense: 90 tablet; Refill: 3    2. Multiple-type hyperlipidemia    3. Type 2 diabetes mellitus with diabetic neuropathy, with long-term current use of insulin (CMS/[facility])    4. Anxiety  -     venlafaxine XR (Effexor XR) 75 MG 24 hr capsule; Take 1 capsule by mouth Daily.  Dispense: 90 capsule; Refill: 3        Risks and benefits discussed and f/u in 1 month. Cont other meds and f/u with endo.

## 2021-06-15 ENCOUNTER — APPOINTMENT (OUTPATIENT)
Dept: GENERAL RADIOLOGY | Facility: HOSPITAL | Age: 46
End: 2021-06-15

## 2021-06-15 ENCOUNTER — HOSPITAL ENCOUNTER (EMERGENCY)
Facility: HOSPITAL | Age: 46
Discharge: HOME | End: 2021-06-15
Attending: EMERGENCY MEDICINE | Admitting: EMERGENCY MEDICINE

## 2021-06-15 VITALS
DIASTOLIC BLOOD PRESSURE: 78 MMHG | TEMPERATURE: 97.9 F | RESPIRATION RATE: 16 BRPM | HEART RATE: 93 BPM | OXYGEN SATURATION: 99 % | SYSTOLIC BLOOD PRESSURE: 114 MMHG

## 2021-06-15 DIAGNOSIS — R07.9 CHEST PAIN, UNSPECIFIED TYPE: Primary | ICD-10-CM

## 2021-06-15 LAB
ALBUMIN SERPL-MCNC: 4.2 G/DL (ref 3.5–5.2)
ALBUMIN/GLOB SERPL: 2.1 G/DL
ALP SERPL-CCNC: 41 U/L (ref 39–117)
ALT SERPL W P-5'-P-CCNC: 25 U/L (ref 1–41)
ANION GAP SERPL CALCULATED.3IONS-SCNC: 11.8 MMOL/L (ref 5–15)
AST SERPL-CCNC: 18 U/L (ref 1–40)
BASOPHILS # BLD AUTO: 0.05 10*3/MM3 (ref 0–0.2)
BASOPHILS NFR BLD AUTO: 0.4 % (ref 0–1.5)
BILIRUB SERPL-MCNC: 1.4 MG/DL (ref 0–1.2)
BUN SERPL-MCNC: 9 MG/DL (ref 6–20)
BUN/CREAT SERPL: 9.9 (ref 7–25)
CALCIUM SPEC-SCNC: 9.1 MG/DL (ref 8.6–10.5)
CHLORIDE SERPL-SCNC: 91 MMOL/L (ref 98–107)
CO2 SERPL-SCNC: 26.2 MMOL/L (ref 22–29)
CREAT SERPL-MCNC: 0.91 MG/DL (ref 0.76–1.27)
DEPRECATED RDW RBC AUTO: 38.4 FL (ref 37–54)
EOSINOPHIL # BLD AUTO: 0.03 10*3/MM3 (ref 0–0.4)
EOSINOPHIL NFR BLD AUTO: 0.2 % (ref 0.3–6.2)
ERYTHROCYTE [DISTWIDTH] IN BLOOD BY AUTOMATED COUNT: 11.5 % (ref 12.3–15.4)
GFR SERPL CREATININE-BSD FRML MDRD: 110 ML/MIN/1.73
GLOBULIN UR ELPH-MCNC: 2 GM/DL
GLUCOSE SERPL-MCNC: 339 MG/DL (ref 65–99)
HCT VFR BLD AUTO: 36.2 % (ref 37.5–51)
HGB BLD-MCNC: 12.1 G/DL (ref 13–17.7)
IMM GRANULOCYTES # BLD AUTO: 0.09 10*3/MM3 (ref 0–0.05)
IMM GRANULOCYTES NFR BLD AUTO: 0.7 % (ref 0–0.5)
LYMPHOCYTES # BLD AUTO: 1.55 10*3/MM3 (ref 0.7–3.1)
LYMPHOCYTES NFR BLD AUTO: 12.8 % (ref 19.6–45.3)
MCH RBC QN AUTO: 30.6 PG (ref 26.6–33)
MCHC RBC AUTO-ENTMCNC: 33.4 G/DL (ref 31.5–35.7)
MCV RBC AUTO: 91.6 FL (ref 79–97)
MONOCYTES # BLD AUTO: 0.72 10*3/MM3 (ref 0.1–0.9)
MONOCYTES NFR BLD AUTO: 5.9 % (ref 5–12)
NEUTROPHILS NFR BLD AUTO: 80 % (ref 42.7–76)
NEUTROPHILS NFR BLD AUTO: 9.67 10*3/MM3 (ref 1.7–7)
NRBC BLD AUTO-RTO: 0 /100 WBC (ref 0–0.2)
PLATELET # BLD AUTO: 346 10*3/MM3 (ref 140–450)
PMV BLD AUTO: 9.9 FL (ref 6–12)
POTASSIUM SERPL-SCNC: 4.5 MMOL/L (ref 3.5–5.2)
PROT SERPL-MCNC: 6.2 G/DL (ref 6–8.5)
QT INTERVAL: 350 MS
RBC # BLD AUTO: 3.95 10*6/MM3 (ref 4.14–5.8)
SODIUM SERPL-SCNC: 129 MMOL/L (ref 136–145)
TROPONIN T SERPL-MCNC: <0.01 NG/ML (ref 0–0.03)
TROPONIN T SERPL-MCNC: <0.01 NG/ML (ref 0–0.03)
WBC # BLD AUTO: 12.11 10*3/MM3 (ref 3.4–10.8)

## 2021-06-15 PROCEDURE — 85025 COMPLETE CBC W/AUTO DIFF WBC: CPT | Performed by: EMERGENCY MEDICINE

## 2021-06-15 PROCEDURE — 93005 ELECTROCARDIOGRAM TRACING: CPT | Performed by: EMERGENCY MEDICINE

## 2021-06-15 PROCEDURE — 84484 ASSAY OF TROPONIN QUANT: CPT | Performed by: PHYSICIAN ASSISTANT

## 2021-06-15 PROCEDURE — 93005 ELECTROCARDIOGRAM TRACING: CPT

## 2021-06-15 PROCEDURE — 71045 X-RAY EXAM CHEST 1 VIEW: CPT

## 2021-06-15 PROCEDURE — 93010 ELECTROCARDIOGRAM REPORT: CPT | Performed by: INTERNAL MEDICINE

## 2021-06-15 PROCEDURE — 80053 COMPREHEN METABOLIC PANEL: CPT | Performed by: EMERGENCY MEDICINE

## 2021-06-15 PROCEDURE — 84484 ASSAY OF TROPONIN QUANT: CPT | Performed by: EMERGENCY MEDICINE

## 2021-06-15 PROCEDURE — 99283 EMERGENCY DEPT VISIT LOW MDM: CPT

## 2021-06-15 NOTE — ED TRIAGE NOTES
Chest tightness and weakness and weakness x 40 minutes.  Hx anxiety    Patient was placed in face mask during first look triage.  Patient was wearing a face mask throughout encounter.  I wore personal protective equipment throughout the encounter.  Hand hygiene was performed before and after patient encounter.

## 2021-06-16 NOTE — ED NOTES
"Notes at 1730 he was watching tv, states \"I stood up and I just wasn't feeling right.\"  Patient noted chest tightness that lasted for about 40 minutes that was accompanied by shortness of breath.  Denies any chest pain at this time.  Denies any shortness of breath at rest.  Notes hx of anxiety.  ABC's intact.  VSS.  Ems gave sublingual nitro en route.  NSR.  Patient is alert and oriented x4. Notes increased stress, states \"I'm going through a divorce.\" Patient wearing mask, nurse wearing mask and protective eyewear during care and assessment.  Hand hygiene performed prior to and post care.      Rob Spann RN  06/15/21 2030    "

## 2021-06-16 NOTE — DISCHARGE INSTRUCTIONS
Return to the emergency department immediately should you have any further episodes of chest pain. Call [facility] first thing in the morning to schedule the first available appointment.

## 2021-06-16 NOTE — ED PROVIDER NOTES
EMERGENCY DEPARTMENT ENCOUNTER    Room Number:  43/43  Date of encounter:  6/17/2021  PCP: Juju Kennedy MD  Historian: Patient      HPI:  Chief Complaint: Chest pain  A complete HPI/ROS/PMH/PSH/SH/FH are unobtainable due to: Nothing    Context: Donnie Bishop is a very pleasant 46 y.o. male who presents to the ED c/o some chest pain that began at rest while sitting on his couch watching TV at approximately 5:30 PM this evening.  Patient states the pain was substernal described as a pressure that did not radiate.  He states he did feel somewhat short of breath with the episode of chest pain which lasted approximately 40 minutes but he denies nausea, vomiting, diaphoresis.  The pain was at its worse is described as a 5 out of 10 on the pain scale.  It started to get better when EMS arrived.  However, they did give the patient a nitro and he states that really seem to help with the shortness of breath and the pain completely subsided shortly thereafter.  He states he is going through divorce and is under a lot of stress and he states this could potentially just be a case of anxiety.    Reviewing patient's chart he has a past medical history of hyperlipidemia, diabetes mellitus, hypertension, anxiety.  He is a non-smoker and denies any family history of coronary artery disease.    Patient was last seen in this emergency department on 5/24/2021 for vomiting.      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      FAMILY HISTORY  Family History   Problem Relation Age of Onset   • Diabetes Mother          SOCIAL HISTORY  Social History     Socioeconomic History   • Marital status:      Spouse name: Not on file   • Number of children: Not on file   • Years of education: Not on file   • Highest education level: Not on file   Tobacco Use   • Smoking status: Never Smoker   • Smokeless tobacco: Never Used   Substance and Sexual Activity   • Alcohol use: Never   • Drug use: Never   • Sexual activity: Defer         ALLERGIES  Patient has no known allergies.        REVIEW OF SYSTEMS  Review of Systems   Constitutional: Negative.    HENT: Negative.    Respiratory: Positive for shortness of breath. Negative for cough.    Cardiovascular: Positive for chest pain. Negative for palpitations and leg swelling.   Gastrointestinal: Negative for abdominal pain.   Genitourinary: Negative.    Musculoskeletal: Negative.    Skin: Negative.    Neurological: Negative.         All systems reviewed and negative except for those discussed in HPI.       PHYSICAL EXAM    I have reviewed the triage vital signs and nursing notes.    ED Triage Vitals [06/15/21 1910]   Temp Heart Rate Resp BP SpO2   97.9 °F (36.6 °C) 100 16 145/87 97 %      Temp src Heart Rate Source Patient Position BP Location FiO2 (%)   Skin Monitor -- -- --       Physical Exam  GENERAL: WDWN male resting comfortably in no acute distress  HENT: nares patent  EYES: no scleral icterus  CV: regular rhythm, regular rate, no rubs or gallops, no unilateral leg swelling, palpable pedal pulses bilaterally  RESPIRATORY: normal effort, lungs CTA B  ABDOMEN: soft, nontender  MUSCULOSKELETAL: no deformity  NEURO: alert oriented x4, moves all extremities, follows commands  SKIN: warm, dry        LAB  RESULTS  No results found for this or any previous visit (from the past 24 hour(s)).    Ordered the above labs and independently reviewed the results.        RADIOLOGY  No Radiology Exams Resulted Within Past 24 Hours    I ordered the above noted radiological studies. Reviewed by me and discussed with radiologist.  See dictation for official radiology interpretation.      PROCEDURES    Procedures      MEDICATIONS GIVEN IN ER    Medications - No data to display      PROGRESS, DATA ANALYSIS, CONSULTS, AND MEDICAL DECISION MAKING    All labs have been independently reviewed by me.  All radiology studies have been reviewed by me and discussed with radiologist dictating the report.   EKG's independently viewed and interpreted by me.  Discussion below represents my analysis of pertinent findings related to patient's condition, differential diagnosis, treatment plan and final disposition.    DDx includes but is not limited to: ACS, PTX, PE, pneumonia, pneumomediastinum, chest wall pain, pleurisy, GI source of pain, anxiety.  Will order a CBC, CMP, troponin x2, chest x-ray to further evaluate the patient    ED Course as of Jun 17 0637   Tue Magdi 15, 2021   2133 EKG          EKG time: 1920  Rhythm/Rate: 93/Sinus  P waves and NC: Normal pr interval  QRS, axis: LVH   ST and T waves: Non-specific t wave cgs lateral leads     Interpreted Contemporaneously by me, independently viewed  -no prior ekg to compare    [RC]   2135 .heartscore    [RC]   2135  HEART SCORE    History Moderately suspicious (1)  ECG Nonspecific repol disturbance (1)  Age < or = 45 (0)  Risk factors > or = to 3 RF for atherosclerotic dx (2)  Troponin < or = Normal limit (0)    This patient's HEART score is 4    HEART Score Key:  Scores 0-3: 0.9-1.7% risk of adverse cardiac event. In the HEART Score study, these patients were discharged (0.99% in the retrospective study, 1.7% in the prospective study)  Scores 4-6: 12-16.6% risk of adverse cardiac event. In the  HEART Score study, these patients were admitted to the hospital. (11.6% retrospective, 16.6% prospective)  Scores ?7: 50-65% risk of adverse cardiac event. In the HEART Score study, these patients were candidates for early invasive measures. (65.2% retrospective, 50.1% prospective)       [RC]   2155 WBC(!): 12.11 [RC]   2156 RBC(!): 3.95 [RC]   2156 Hemoglobin(!): 12.1 [RC]   2156 Hematocrit(!): 36.2 [RC]   2156 Platelets: 346 [RC]   2156 Troponin T: <0.010 [RC]   2156 Glucose(!): 339 [RC]   2156 BUN: 9 [RC]   2156 Creatinine: 0.91 [RC]   2156 Sodium(!): 129 [RC]   2156 Potassium: 4.5 [RC]   2156 CO2: 26.2 [RC]   2156 Anion Gap: 11.8 [RC]   2228 The patient is very adamant about not being admitted at this time.  He states he would be more than willing to follow-up for further evaluation as soon as tomorrow or soon as cardiology can get him in.    Discussed the case with [facility] cardiology.  The patient has been pain-free since he has been in the emergency department.  If the second troponin is negative and the patient remains pain-free they agree to see the patient in close follow-up.  I will be sure to send a stat email to their office to ensure the patient seen in the next few days prior to discharge.  He is to return to the ER should he have any further chest pain.    [RC]   2308 Patient second troponin is negative and he remains pain-free. He wishes to go home and follow-up with cardiology at this time. He will return to the emergency department with any further episodes of chest pain.    [RC]      ED Course User Index  [RC] Juvenal Spring III, PA       Patient was placed in face mask in first look. Patient was wearing facemask when I entered the room and throughout our encounter. I wore full protective equipment throughout this patient encounter including a face mask, eye shield, gown and gloves. Hand hygiene was performed before donning protective equipment and after removal when leaving the  room.    AS OF 06:37 EDT VITALS:    BP - 114/78  HR - 93  TEMP - 97.9 °F (36.6 °C) (Skin)  O2 SATS - 99%        DIAGNOSIS  Final diagnoses:   Chest pain, unspecified type         DISPOSITION  DISCHARGE    Patient discharged in stable condition.    Reviewed implications of results, diagnosis, meds, responsibility to follow up, warning signs and symptoms of possible worsening, potential complications and reasons to return to ER.    Patient/Family voiced understanding of above instructions.    Discussed plan for discharge, as there is no emergent indication for admission. Patient referred to primary care provider for BP management due to today's BP. Pt/family is agreeable and understands need for follow up and repeat testing.  Pt is aware that discharge does not mean that nothing is wrong but it indicates no emergency is present that requires admission and they must continue care with follow-up as given below or physician of their choice.     FOLLOW-UP  [facility]  [address]  [place] [address] [phone]  Schedule an appointment as soon as possible for a visit   For further evaluation and treatment         Medication List      No changes were made to your prescriptions during this visit.                Juvenal Spring III, PA  06/16/21 2014       Juvenal Spring III, PA  06/17/21 0637

## 2021-06-16 NOTE — ED PROVIDER NOTES
Pt presents to the ED c/o  chest pain that began at rest earlier tonight described as a left-sided chest pressure without radiation, some minimal shortness of breath with it.  Pain is since resolved but no associated nausea, dizziness, diaphoresis.  EMS gave him a nitro but pain had already been improving by that point.  Admits to being under significant stress recently.  History of hyperlipidemia, diabetes, hypertension.     On exam,   General: Awake, alert, no acute distress, nontoxic, nondiaphoretic  HEENT: Mucous membranes moist, atraumatic, EOMI  Neck: Full ROM  Pulm: Symmetric chest rise, nonlabored, lungs CTAB  Cardiovascular: Regular rate and rhythm, intact distal pulses  GI: Soft, nontender, nondistended, no rebound, no guarding, bowel sounds present  MSK: Full ROM, no deformity, unable to reproduce chest pain with palpation to the chest wall  Skin: Warm, dry  Neuro: Alert and oriented x 3, GCS 15, moving all extremities, no focal deficits  Psych: Calm, cooperative      Surgical mask, protective eye goggles, and gloves used during this encounter. Patient in surgical mask.    EKG    EKG Time: 1920  Rhythm/Rate: Sinus rhythm with a rate of 93  Normal axis  Normal intervals  No acute ischemic changes  No STEMI     Interpreted Contemporaneously by me, independently viewed  No prior for comparison      Plan:   ED Course as of Magdi 15 2306   Tue Magdi 15, 2021   2133 EKG          EKG time: 1920  Rhythm/Rate: 93/Sinus  P waves and ME: Normal pr interval  QRS, axis: LVH   ST and T waves: Non-specific t wave cgs lateral leads     Interpreted Contemporaneously by me, independently viewed  -no prior ekg to compare    [RC]   2135 .heartscore    [RC]   2135  HEART SCORE    History Moderately suspicious (1)  ECG Nonspecific repol disturbance (1)  Age < or = 45 (0)  Risk factors > or = to 3 RF for atherosclerotic dx (2)  Troponin < or = Normal limit (0)    This patient's HEART score is 4    HEART Score Key:  Scores 0-3:  0.9-1.7% risk of adverse cardiac event. In the HEART Score study, these patients were discharged (0.99% in the retrospective study, 1.7% in the prospective study)  Scores 4-6: 12-16.6% risk of adverse cardiac event. In the HEART Score study, these patients were admitted to the hospital. (11.6% retrospective, 16.6% prospective)  Scores ?7: 50-65% risk of adverse cardiac event. In the HEART Score study, these patients were candidates for early invasive measures. (65.2% retrospective, 50.1% prospective)       [RC]   2155 WBC(!): 12.11 [RC]   2156 RBC(!): 3.95 [RC]   2156 Hemoglobin(!): 12.1 [RC]   2156 Hematocrit(!): 36.2 [RC]   2156 Platelets: 346 [RC]   2156 Troponin T: <0.010 [RC]   2156 Glucose(!): 339 [RC]   2156 BUN: 9 [RC]   2156 Creatinine: 0.91 [RC]   2156 Sodium(!): 129 [RC]   2156 Potassium: 4.5 [RC]   2156 CO2: 26.2 [RC]   2156 Anion Gap: 11.8 [RC]   2228 The patient is very adamant about not being admitted at this time.  He states he would be more than willing to follow-up for further evaluation as soon as tomorrow or soon as cardiology can get him in.    Discussed the case with [facility] cardiology.  The patient has been pain-free since he has been in the emergency department.  If the second troponin is negative and the patient remains pain-free they agree to see the patient in close follow-up.  I will be sure to send a stat email to their office to ensure the patient seen in the next few days prior to discharge.  He is to return to the ER should he have any further chest pain.    [RC]      ED Course User Index  [RC] Juvenal Spring III, PA     2 sets of troponin are negative, patient is pain-free, we have discussed with cardiology they are good with outpatient follow-up, patient is agreeable to this.  ED return for worsening symptoms as needed.     Attestation:  The ZACHARIAH and I have discussed this patient's history, physical exam, and treatment plan.  I have reviewed the documentation and  personally had a face to face interaction with the patient. I affirm the documentation and agree with the treatment and plan.  The attached note describes my personal findings.          Bassam Shah MD  06/15/21 2825

## 2021-07-02 DIAGNOSIS — Z79.4 TYPE 2 DIABETES MELLITUS WITH DIABETIC POLYNEUROPATHY, WITH LONG-TERM CURRENT USE OF INSULIN (HCC): ICD-10-CM

## 2021-07-02 DIAGNOSIS — E11.42 TYPE 2 DIABETES MELLITUS WITH DIABETIC POLYNEUROPATHY, WITH LONG-TERM CURRENT USE OF INSULIN (HCC): ICD-10-CM

## 2021-10-13 NOTE — ED NOTES
Pt unable to provide urine sample at this time.     Claudio Thomas  05/24/21 1914     normal appearance , without tenderness upon palpation , no deformities , trachea midline , Thyroid normal size , no thyroid nodules , no masses , no JVD , thyroid nontender

## 2021-10-17 DIAGNOSIS — E11.42 TYPE 2 DIABETES MELLITUS WITH DIABETIC POLYNEUROPATHY, WITH LONG-TERM CURRENT USE OF INSULIN (HCC): ICD-10-CM

## 2021-10-17 DIAGNOSIS — G62.9 NEUROPATHY: ICD-10-CM

## 2021-10-17 DIAGNOSIS — Z79.4 TYPE 2 DIABETES MELLITUS WITH DIABETIC POLYNEUROPATHY, WITH LONG-TERM CURRENT USE OF INSULIN (HCC): ICD-10-CM

## 2021-10-18 RX ORDER — GABAPENTIN 400 MG/1
CAPSULE ORAL
Qty: 270 CAPSULE | Refills: 0 | Status: SHIPPED | OUTPATIENT
Start: 2021-10-18 | End: 2022-02-11 | Stop reason: SDUPTHER

## 2022-02-09 ENCOUNTER — TELEPHONE (OUTPATIENT)
Dept: FAMILY MEDICINE CLINIC | Facility: CLINIC | Age: 47
End: 2022-02-09

## 2022-02-09 DIAGNOSIS — G62.9 NEUROPATHY: ICD-10-CM

## 2022-02-09 DIAGNOSIS — E11.42 TYPE 2 DIABETES MELLITUS WITH DIABETIC POLYNEUROPATHY, WITH LONG-TERM CURRENT USE OF INSULIN: ICD-10-CM

## 2022-02-09 DIAGNOSIS — F41.9 ANXIETY: ICD-10-CM

## 2022-02-09 DIAGNOSIS — Z79.4 TYPE 2 DIABETES MELLITUS WITH DIABETIC POLYNEUROPATHY, WITH LONG-TERM CURRENT USE OF INSULIN: ICD-10-CM

## 2022-02-09 NOTE — TELEPHONE ENCOUNTER
Caller: Charity Bishop    Relationship: Emergency Contact    Best call back number: [phone]     Requested Prescriptions:      gabapentin (NEURONTIN) 400 MG capsule      venlafaxine XR (Effexor XR) 75 MG 24 hr capsule     Pharmacy where request should be sent:    [facility]/pharmacy #[id number] - [place], [place] - [address] AT [facility] - [phone]  - [phone]   [phone]    Additional details provided by patient: PATIENT'S WIFE STATES PER PATIENT INSURANCE, PRESCRIPTIONS NEED TO BE 90 DAY SUPPLY.    Does the patient have less than a 3 day supply:  [x] Yes  [] No    Angely Adam, RegSched Rep   02/09/22 13:54 EST     PLEASE ADVISE.

## 2022-02-14 ENCOUNTER — OFFICE VISIT (OUTPATIENT)
Dept: FAMILY MEDICINE CLINIC | Facility: CLINIC | Age: 47
End: 2022-02-14

## 2022-02-14 ENCOUNTER — TELEPHONE (OUTPATIENT)
Dept: FAMILY MEDICINE CLINIC | Facility: CLINIC | Age: 47
End: 2022-02-14

## 2022-02-14 VITALS
OXYGEN SATURATION: 98 % | WEIGHT: 167.4 LBS | TEMPERATURE: 98.4 F | BODY MASS INDEX: 26.27 KG/M2 | HEIGHT: 67 IN | DIASTOLIC BLOOD PRESSURE: 70 MMHG | SYSTOLIC BLOOD PRESSURE: 110 MMHG

## 2022-02-14 DIAGNOSIS — F41.9 ANXIETY: ICD-10-CM

## 2022-02-14 DIAGNOSIS — E11.42 TYPE 2 DIABETES MELLITUS WITH DIABETIC POLYNEUROPATHY, WITH LONG-TERM CURRENT USE OF INSULIN: ICD-10-CM

## 2022-02-14 DIAGNOSIS — E55.9 VITAMIN D DEFICIENCY: ICD-10-CM

## 2022-02-14 DIAGNOSIS — E78.2 MULTIPLE-TYPE HYPERLIPIDEMIA: ICD-10-CM

## 2022-02-14 DIAGNOSIS — E11.40 TYPE 2 DIABETES MELLITUS WITH DIABETIC NEUROPATHY, WITH LONG-TERM CURRENT USE OF INSULIN: ICD-10-CM

## 2022-02-14 DIAGNOSIS — Z79.4 TYPE 2 DIABETES MELLITUS WITH DIABETIC POLYNEUROPATHY, WITH LONG-TERM CURRENT USE OF INSULIN: ICD-10-CM

## 2022-02-14 DIAGNOSIS — G62.9 NEUROPATHY: ICD-10-CM

## 2022-02-14 DIAGNOSIS — Z79.4 TYPE 2 DIABETES MELLITUS WITH DIABETIC NEUROPATHY, WITH LONG-TERM CURRENT USE OF INSULIN: ICD-10-CM

## 2022-02-14 DIAGNOSIS — I10 BENIGN ESSENTIAL HTN: Primary | ICD-10-CM

## 2022-02-14 DIAGNOSIS — M79.641 HAND PAIN, NOT ARTHRALGIA, RIGHT: ICD-10-CM

## 2022-02-14 PROCEDURE — 99214 OFFICE O/P EST MOD 30 MIN: CPT | Performed by: FAMILY MEDICINE

## 2022-02-14 RX ORDER — LISINOPRIL 40 MG/1
40 TABLET ORAL DAILY
Qty: 90 TABLET | Refills: 3 | Status: SHIPPED | OUTPATIENT
Start: 2022-02-14

## 2022-02-14 RX ORDER — METOPROLOL SUCCINATE 50 MG/1
50 TABLET, EXTENDED RELEASE ORAL DAILY
Qty: 90 TABLET | Refills: 3 | Status: SHIPPED | OUTPATIENT
Start: 2022-02-14

## 2022-02-14 RX ORDER — VENLAFAXINE HYDROCHLORIDE 75 MG/1
75 CAPSULE, EXTENDED RELEASE ORAL DAILY
Qty: 90 CAPSULE | Refills: 3 | Status: SHIPPED | OUTPATIENT
Start: 2022-02-14

## 2022-02-14 RX ORDER — GABAPENTIN 400 MG/1
400 CAPSULE ORAL 3 TIMES DAILY
Qty: 270 CAPSULE | Refills: 0 | Status: SHIPPED | OUTPATIENT
Start: 2022-02-14 | End: 2022-07-27

## 2022-02-14 NOTE — PROGRESS NOTES
"Subjective   Donnie Bishop is a 46 y.o. male.     Chief Complaint   Patient presents with   • Hypertension   • Med Refill   • Pain     right hand, thinks might be arthritis       hpi   Hypertension- Taking meds and doing well    Hyperlipidemia- doing well on meds, no muscle aches, gets labs with endo and sees them tomorrow    Diabetes-  Is seeing endo tomorrow. Rare lows. Has been working on weight loss. Meds with neuropathy    Anxiety- stable. meds help.     Right hand pain for a few months. No trauma. No numbness or tingling. Is not getting a good  2/2 the pain.     The following portions of the patient's history were reviewed and updated as appropriate: allergies, current medications, past family history, past medical history, past social history, past surgical history and problem list.    Past Medical History:   Diagnosis Date   • Anxiety    • Cough    • Inability to attain erection    • Injury of acoustic nerve    • Myalgia    • Type II diabetes mellitus (HCC)    • Venous insufficiency    • Viral illness    • Vitamin D deficiency        History reviewed. No pertinent surgical history.    Family History   Problem Relation Age of Onset   • Diabetes Mother        Social History     Socioeconomic History   • Marital status:    Tobacco Use   • Smoking status: Never Smoker   • Smokeless tobacco: Never Used   Substance and Sexual Activity   • Alcohol use: Never   • Drug use: Never   • Sexual activity: Defer       Review of Systems   Constitutional: Negative for fever.   Respiratory: Negative for shortness of breath.        Objective   Visit Vitals  /70 (BP Location: Left arm, Patient Position: Sitting)   Temp 98.4 °F (36.9 °C)   Ht 170.2 cm (67.01\")   Wt 75.9 kg (167 lb 6.4 oz)   BMI 26.21 kg/m²     Body mass index is 26.21 kg/m².  Physical Exam  Constitutional:       Appearance: Normal appearance. He is well-developed.   Cardiovascular:      Rate and Rhythm: Normal rate and regular rhythm.      Heart " sounds: Normal heart sounds.   Pulmonary:      Effort: Pulmonary effort is normal.      Breath sounds: Normal breath sounds.   Musculoskeletal:         General: No swelling. Normal range of motion.   Skin:     General: Skin is warm and dry.      Findings: No rash.   Neurological:      General: No focal deficit present.      Mental Status: He is alert and oriented to person, place, and time.   Psychiatric:         Mood and Affect: Mood normal.         Behavior: Behavior normal.           Assessment/Plan   Diagnoses and all orders for this visit:    1. Benign essential HTN (Primary)  -     lisinopril (PRINIVIL,ZESTRIL) 40 MG tablet; Take 1 tablet by mouth Daily.  Dispense: 90 tablet; Refill: 3  -     metoprolol succinate XL (TOPROL-XL) 50 MG 24 hr tablet; Take 1 tablet by mouth Daily.  Dispense: 90 tablet; Refill: 3    2. Multiple-type hyperlipidemia    3. Type 2 diabetes mellitus with diabetic neuropathy, with long-term current use of insulin ([facility])    4. Vitamin D deficiency    5. Anxiety    6. Neuropathy    7. Type 2 diabetes mellitus with diabetic polyneuropathy, with long-term current use of insulin (HCC)  -     metFORMIN (GLUCOPHAGE) 1000 MG tablet; Take 1 tablet by mouth 2 (Two) Times a Day With Meals.  Dispense: 180 tablet; Refill: 0    8. Hand pain, not arthralgia, right  -     Ambulatory Referral to Hand Surgery         Cont other meds and f/u with endo and in 6 months. Prashant.

## 2022-06-09 ENCOUNTER — TELEPHONE (OUTPATIENT)
Dept: FAMILY MEDICINE CLINIC | Facility: CLINIC | Age: 47
End: 2022-06-09

## 2022-06-09 NOTE — TELEPHONE ENCOUNTER
OK for HUB to read and schedule:    LMTCB-  Your provider will be out of the office on 8/15/22 & your appointment needs to be rescheduled.     Please call our office at [phone] to reschedule with one of the other providers or in October when Dr. Kennedy returns.

## 2022-07-26 DIAGNOSIS — E11.42 TYPE 2 DIABETES MELLITUS WITH DIABETIC POLYNEUROPATHY, WITH LONG-TERM CURRENT USE OF INSULIN: ICD-10-CM

## 2022-07-26 DIAGNOSIS — Z79.4 TYPE 2 DIABETES MELLITUS WITH DIABETIC POLYNEUROPATHY, WITH LONG-TERM CURRENT USE OF INSULIN: ICD-10-CM

## 2022-07-26 DIAGNOSIS — G62.9 NEUROPATHY: ICD-10-CM

## 2022-07-27 ENCOUNTER — TELEPHONE (OUTPATIENT)
Dept: FAMILY MEDICINE CLINIC | Facility: CLINIC | Age: 47
End: 2022-07-27

## 2022-07-27 RX ORDER — GABAPENTIN 400 MG/1
CAPSULE ORAL
Qty: 270 CAPSULE | Refills: 0 | Status: SHIPPED | OUTPATIENT
Start: 2022-07-27

## 2022-07-27 NOTE — TELEPHONE ENCOUNTER
OK for HUB to read and schedule:    LMTCB-  Please call our office to reschedule your upcoming appointment as your provider will not be in.

## 2022-07-27 NOTE — TELEPHONE ENCOUNTER
Rx Refill Note  Requested Prescriptions     Pending Prescriptions Disp Refills   • gabapentin (NEURONTIN) 400 MG capsule [Pharmacy Med Name: GABAPENTIN 400 MG CAPSULE] 270 capsule 0     Sig: TAKE 1 CAPSULE BY MOUTH THREE TIMES A DAY      Last office visit with prescribing clinician: 2/14/2022      Next office visit with prescribing clinician: 8/15/2022            Markel Hurtado, MARIBEL/FERCHO  07/27/22, 07:59 EDT

## 2022-11-22 ENCOUNTER — HOSPITAL ENCOUNTER (EMERGENCY)
Facility: HOSPITAL | Age: 47
Discharge: HOME | End: 2022-11-22
Attending: EMERGENCY MEDICINE | Admitting: EMERGENCY MEDICINE

## 2022-11-22 ENCOUNTER — APPOINTMENT (OUTPATIENT)
Dept: GENERAL RADIOLOGY | Facility: HOSPITAL | Age: 47
End: 2022-11-22

## 2022-11-22 VITALS
SYSTOLIC BLOOD PRESSURE: 123 MMHG | TEMPERATURE: 98.6 F | OXYGEN SATURATION: 98 % | RESPIRATION RATE: 16 BRPM | DIASTOLIC BLOOD PRESSURE: 82 MMHG | HEART RATE: 93 BPM
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
## 2022-11-22 DIAGNOSIS — S39.012A ACUTE MYOFASCIAL STRAIN OF LUMBAR REGION, INITIAL ENCOUNTER: Primary | ICD-10-CM

## 2022-11-22 DIAGNOSIS — S80.11XA CONTUSION OF RIGHT LOWER LEG, INITIAL ENCOUNTER: ICD-10-CM

## 2022-11-22 PROCEDURE — 99284 EMERGENCY DEPT VISIT MOD MDM: CPT

## 2022-11-22 PROCEDURE — 73590 X-RAY EXAM OF LOWER LEG: CPT

## 2022-11-22 PROCEDURE — 72110 X-RAY EXAM L-2 SPINE 4/>VWS: CPT

## 2022-11-22 RX ORDER — NAPROXEN 500 MG/1
500 TABLET ORAL 2 TIMES DAILY PRN
Qty: 20 TABLET | Refills: 0 | Status: SHIPPED | OUTPATIENT
Start: 2022-11-22

## 2022-11-22 RX ORDER — METAXALONE 800 MG/1
800 TABLET ORAL 3 TIMES DAILY
Qty: 15 TABLET | Refills: 0 | Status: SHIPPED | OUTPATIENT
Start: 2022-11-22

## 2022-11-22 RX ORDER — HYDROCODONE BITARTRATE AND ACETAMINOPHEN 7.5; 325 MG/1; MG/1
1 TABLET ORAL ONCE
Status: COMPLETED | OUTPATIENT
Start: 2022-11-22 | End: 2022-11-22

## 2022-11-22 RX ADMIN — HYDROCODONE BITARTRATE AND ACETAMINOPHEN 1 TABLET: 7.5; 325 TABLET ORAL at 08:12

## 2022-11-22 NOTE — DISCHARGE INSTRUCTIONS
Take medications as prescribed.  Return to the emergency department for worsening pain, chest pain, abdominal pain, trouble breathing, numbness/tingling/weakness in extremities, or other concern.
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
## 2023-02-24 DIAGNOSIS — F41.9 ANXIETY: ICD-10-CM

## 2023-02-27 RX ORDER — VENLAFAXINE HYDROCHLORIDE 75 MG/1
CAPSULE, EXTENDED RELEASE ORAL
Qty: 90 CAPSULE | Refills: 3 | OUTPATIENT
Start: 2023-02-27

## 2023-02-28 DIAGNOSIS — Z79.4 TYPE 2 DIABETES MELLITUS WITH DIABETIC POLYNEUROPATHY, WITH LONG-TERM CURRENT USE OF INSULIN: ICD-10-CM

## 2023-02-28 DIAGNOSIS — E11.42 TYPE 2 DIABETES MELLITUS WITH DIABETIC POLYNEUROPATHY, WITH LONG-TERM CURRENT USE OF INSULIN: ICD-10-CM

## 2023-02-28 DIAGNOSIS — G62.9 NEUROPATHY: ICD-10-CM

## 2023-02-28 RX ORDER — GABAPENTIN 400 MG/1
CAPSULE ORAL
Qty: 270 CAPSULE | OUTPATIENT
Start: 2023-02-28

## 2023-02-28 NOTE — TELEPHONE ENCOUNTER
LOV 2/14/2022    NOV NONE     LR 7/27/2022    PATIENT NEVER RETURNED FOR 6 MONTH FOLLOW UP IN AUGUST 2022

## 2023-09-12 ENCOUNTER — APPOINTMENT (OUTPATIENT)
Dept: GENERAL RADIOLOGY | Facility: HOSPITAL | Age: 48
End: 2023-09-12

## 2023-09-12 ENCOUNTER — HOSPITAL ENCOUNTER (INPATIENT)
Facility: HOSPITAL | Age: 48
LOS: 1 days | Discharge: HOME | End: 2023-09-15
Attending: EMERGENCY MEDICINE | Admitting: INTERNAL MEDICINE

## 2023-09-12 ENCOUNTER — APPOINTMENT (OUTPATIENT)
Dept: CARDIOLOGY | Facility: HOSPITAL | Age: 48
End: 2023-09-12

## 2023-09-12 ENCOUNTER — OFFICE VISIT (OUTPATIENT)
Dept: FAMILY MEDICINE CLINIC | Facility: CLINIC | Age: 48
End: 2023-09-12
Payer: COMMERCIAL

## 2023-09-12 VITALS
OXYGEN SATURATION: 97 % | SYSTOLIC BLOOD PRESSURE: 143 MMHG | TEMPERATURE: 97 F | HEART RATE: 116 BPM | DIASTOLIC BLOOD PRESSURE: 89 MMHG | WEIGHT: 175.2 LBS | BODY MASS INDEX: 27.43 KG/M2

## 2023-09-12 DIAGNOSIS — R77.8 ELEVATED TROPONIN: ICD-10-CM

## 2023-09-12 DIAGNOSIS — R73.9 HYPERGLYCEMIA: ICD-10-CM

## 2023-09-12 DIAGNOSIS — R07.9 CHEST PAIN, UNSPECIFIED TYPE: Primary | ICD-10-CM

## 2023-09-12 DIAGNOSIS — R79.89 ELEVATED TROPONIN: ICD-10-CM

## 2023-09-12 DIAGNOSIS — R07.89 ATYPICAL CHEST PAIN: ICD-10-CM

## 2023-09-12 DIAGNOSIS — I21.4 NSTEMI, INITIAL EPISODE OF CARE: ICD-10-CM

## 2023-09-12 DIAGNOSIS — R06.02 SHORTNESS OF BREATH: ICD-10-CM

## 2023-09-12 DIAGNOSIS — R05.9 COUGH, UNSPECIFIED TYPE: Primary | ICD-10-CM

## 2023-09-12 DIAGNOSIS — R93.89 ABNORMAL CXR: ICD-10-CM

## 2023-09-12 LAB
ALBUMIN SERPL-MCNC: 4.8 G/DL (ref 3.5–5.2)
ALBUMIN/GLOB SERPL: 2.4 G/DL
ALP SERPL-CCNC: 54 U/L (ref 39–117)
ALT SERPL W P-5'-P-CCNC: 25 U/L (ref 1–41)
AMPHET+METHAMPHET UR QL: NEGATIVE
ANION GAP SERPL CALCULATED.3IONS-SCNC: 14.5 MMOL/L (ref 5–15)
AORTIC DIMENSIONLESS INDEX: 0.8 (DI)
AST SERPL-CCNC: 22 U/L (ref 1–40)
BARBITURATES UR QL SCN: NEGATIVE
BASOPHILS # BLD AUTO: 0.04 10*3/MM3 (ref 0–0.2)
BASOPHILS NFR BLD AUTO: 0.5 % (ref 0–1.5)
BENZODIAZ UR QL SCN: NEGATIVE
BH CV ECHO LEFT VENTRICLE GLOBAL LONGITUDINAL STRAIN: -12.6 %
BH CV ECHO MEAS - AO MAX PG: 5.5 MMHG
BH CV ECHO MEAS - AO MEAN PG: 3 MMHG
BH CV ECHO MEAS - AO ROOT DIAM: 2.5 CM
BH CV ECHO MEAS - AO V2 MAX: 117 CM/SEC
BH CV ECHO MEAS - AO V2 VTI: 16.8 CM
BH CV ECHO MEAS - AVA(I,D): 2.7 CM2
BH CV ECHO MEAS - EDV(CUBED): 85.2 ML
BH CV ECHO MEAS - EDV(MOD-SP2): 153 ML
BH CV ECHO MEAS - EDV(MOD-SP4): 153 ML
BH CV ECHO MEAS - EF(MOD-BP): 35.3 %
BH CV ECHO MEAS - EF(MOD-SP2): 34.6 %
BH CV ECHO MEAS - EF(MOD-SP4): 36.6 %
BH CV ECHO MEAS - ESV(CUBED): 51.5 ML
BH CV ECHO MEAS - ESV(MOD-SP2): 100 ML
BH CV ECHO MEAS - ESV(MOD-SP4): 97 ML
BH CV ECHO MEAS - FS: 15.5 %
BH CV ECHO MEAS - IVS/LVPW: 1 CM
BH CV ECHO MEAS - IVSD: 1.5 CM
BH CV ECHO MEAS - LAT PEAK E' VEL: 6 CM/SEC
BH CV ECHO MEAS - LV DIASTOLIC VOL/BSA (35-75): 80.5 CM2
BH CV ECHO MEAS - LV MASS(C)D: 266.9 GRAMS
BH CV ECHO MEAS - LV MAX PG: 3.4 MMHG
BH CV ECHO MEAS - LV MEAN PG: 2 MMHG
BH CV ECHO MEAS - LV SYSTOLIC VOL/BSA (12-30): 51 CM2
BH CV ECHO MEAS - LV V1 MAX: 92.4 CM/SEC
BH CV ECHO MEAS - LV V1 VTI: 14 CM
BH CV ECHO MEAS - LVIDD: 4.4 CM
BH CV ECHO MEAS - LVIDS: 3.7 CM
BH CV ECHO MEAS - LVOT AREA: 3.2 CM2
BH CV ECHO MEAS - LVOT DIAM: 2.02 CM
BH CV ECHO MEAS - LVPWD: 1.5 CM
BH CV ECHO MEAS - MED PEAK E' VEL: 5.5 CM/SEC
BH CV ECHO MEAS - MR MAX PG: 115.6 MMHG
BH CV ECHO MEAS - MR MAX VEL: 537.5 CM/SEC
BH CV ECHO MEAS - MV A DUR: 0.06 SEC
BH CV ECHO MEAS - MV A MAX VEL: 57.4 CM/SEC
BH CV ECHO MEAS - MV DEC SLOPE: 747.6 CM/SEC2
BH CV ECHO MEAS - MV DEC TIME: 167 MSEC
BH CV ECHO MEAS - MV E MAX VEL: 84 CM/SEC
BH CV ECHO MEAS - MV E/A: 1.46
BH CV ECHO MEAS - MV MAX PG: 3.8 MMHG
BH CV ECHO MEAS - MV MEAN PG: 2.5 MMHG
BH CV ECHO MEAS - MV P1/2T: 40.2 MSEC
BH CV ECHO MEAS - MV V2 VTI: 14.4 CM
BH CV ECHO MEAS - MVA(P1/2T): 5.5 CM2
BH CV ECHO MEAS - MVA(VTI): 3.1 CM2
BH CV ECHO MEAS - PA ACC TIME: 0.09 SEC
BH CV ECHO MEAS - PA V2 MAX: 111.3 CM/SEC
BH CV ECHO MEAS - PI END-D VEL: 157.5 CM/SEC
BH CV ECHO MEAS - PULM A REVS DUR: 0.15 SEC
BH CV ECHO MEAS - PULM A REVS VEL: 35.5 CM/SEC
BH CV ECHO MEAS - PULM DIAS VEL: 60.7 CM/SEC
BH CV ECHO MEAS - PULM S/D: 1.17
BH CV ECHO MEAS - PULM SYS VEL: 70.9 CM/SEC
BH CV ECHO MEAS - RAP SYSTOLE: 3 MMHG
BH CV ECHO MEAS - RV MAX PG: 1.38 MMHG
BH CV ECHO MEAS - RV V1 MAX: 58.7 CM/SEC
BH CV ECHO MEAS - RV V1 VTI: 11.6 CM
BH CV ECHO MEAS - RVSP: 39.3 MMHG
BH CV ECHO MEAS - SI(MOD-SP2): 27.9 ML/M2
BH CV ECHO MEAS - SI(MOD-SP4): 29.5 ML/M2
BH CV ECHO MEAS - SV(LVOT): 45 ML
BH CV ECHO MEAS - SV(MOD-SP2): 53 ML
BH CV ECHO MEAS - SV(MOD-SP4): 56 ML
BH CV ECHO MEAS - TAPSE (>1.6): 1.75 CM
BH CV ECHO MEAS - TR MAX PG: 36.3 MMHG
BH CV ECHO MEAS - TR MAX VEL: 301.3 CM/SEC
BH CV ECHO MEASUREMENTS AVERAGE E/E' RATIO: 14.61
BH CV XLRA - RV BASE: 3.4 CM
BH CV XLRA - TDI S': 9.9 CM/SEC
BILIRUB SERPL-MCNC: 1.3 MG/DL (ref 0–1.2)
BUN SERPL-MCNC: 9 MG/DL (ref 6–20)
BUN/CREAT SERPL: 10.5 (ref 7–25)
CALCIUM SPEC-SCNC: 9.9 MG/DL (ref 8.6–10.5)
CANNABINOIDS SERPL QL: POSITIVE
CHLORIDE SERPL-SCNC: 97 MMOL/L (ref 98–107)
CHOLEST SERPL-MCNC: 184 MG/DL (ref 0–200)
CO2 SERPL-SCNC: 23.5 MMOL/L (ref 22–29)
COCAINE UR QL: NEGATIVE
CREAT SERPL-MCNC: 0.86 MG/DL (ref 0.76–1.27)
D DIMER PPP FEU-MCNC: <0.27 MCGFEU/ML (ref 0–0.5)
DEPRECATED RDW RBC AUTO: 38.4 FL (ref 37–54)
EGFRCR SERPLBLD CKD-EPI 2021: 108.1 ML/MIN/1.73
EOSINOPHIL # BLD AUTO: 0.04 10*3/MM3 (ref 0–0.4)
EOSINOPHIL NFR BLD AUTO: 0.5 % (ref 0.3–6.2)
ERYTHROCYTE [DISTWIDTH] IN BLOOD BY AUTOMATED COUNT: 11.6 % (ref 12.3–15.4)
EXPIRATION DATE: NORMAL
FENTANYL UR-MCNC: NEGATIVE NG/ML
FLUAV AG UPPER RESP QL IA.RAPID: NOT DETECTED
FLUBV AG UPPER RESP QL IA.RAPID: NOT DETECTED
GEN 5 2HR TROPONIN T REFLEX: 73 NG/L
GLOBULIN UR ELPH-MCNC: 2 GM/DL
GLUCOSE SERPL-MCNC: 283 MG/DL (ref 65–99)
HBA1C MFR BLD: 8.7 % (ref 4.8–5.6)
HCT VFR BLD AUTO: 38.7 % (ref 37.5–51)
HDLC SERPL-MCNC: 63 MG/DL (ref 40–60)
HGB BLD-MCNC: 12.9 G/DL (ref 13–17.7)
HOLD SPECIMEN: NORMAL
HOLD SPECIMEN: NORMAL
IMM GRANULOCYTES # BLD AUTO: 0.01 10*3/MM3 (ref 0–0.05)
IMM GRANULOCYTES NFR BLD AUTO: 0.1 % (ref 0–0.5)
INTERNAL CONTROL: NORMAL
LDLC SERPL CALC-MCNC: 99 MG/DL (ref 0–100)
LDLC/HDLC SERPL: 1.52 {RATIO}
LEFT ATRIUM VOLUME INDEX: 26.3 ML/M2
LYMPHOCYTES # BLD AUTO: 1.93 10*3/MM3 (ref 0.7–3.1)
LYMPHOCYTES NFR BLD AUTO: 25 % (ref 19.6–45.3)
Lab: NORMAL
MCH RBC QN AUTO: 30.4 PG (ref 26.6–33)
MCHC RBC AUTO-ENTMCNC: 33.3 G/DL (ref 31.5–35.7)
MCV RBC AUTO: 91.3 FL (ref 79–97)
METHADONE UR QL SCN: NEGATIVE
MONOCYTES # BLD AUTO: 0.42 10*3/MM3 (ref 0.1–0.9)
MONOCYTES NFR BLD AUTO: 5.4 % (ref 5–12)
NEUTROPHILS NFR BLD AUTO: 5.27 10*3/MM3 (ref 1.7–7)
NEUTROPHILS NFR BLD AUTO: 68.5 % (ref 42.7–76)
NRBC BLD AUTO-RTO: 0 /100 WBC (ref 0–0.2)
NT-PROBNP SERPL-MCNC: 1142 PG/ML (ref 0–450)
OPIATES UR QL: NEGATIVE
OXYCODONE UR QL SCN: NEGATIVE
PLATELET # BLD AUTO: 419 10*3/MM3 (ref 140–450)
PMV BLD AUTO: 10.2 FL (ref 6–12)
POTASSIUM SERPL-SCNC: 4.9 MMOL/L (ref 3.5–5.2)
PROT SERPL-MCNC: 6.8 G/DL (ref 6–8.5)
QT INTERVAL: 332 MS
QTC INTERVAL: 458 MS
RBC # BLD AUTO: 4.24 10*6/MM3 (ref 4.14–5.8)
SARS-COV-2 AG UPPER RESP QL IA.RAPID: NOT DETECTED
SODIUM SERPL-SCNC: 135 MMOL/L (ref 136–145)
TRIGL SERPL-MCNC: 126 MG/DL (ref 0–150)
TROPONIN T DELTA: 8 NG/L
TROPONIN T SERPL HS-MCNC: 65 NG/L
VLDLC SERPL-MCNC: 22 MG/DL (ref 5–40)
WBC NRBC COR # BLD: 7.71 10*3/MM3 (ref 3.4–10.8)
WHOLE BLOOD HOLD COAG: NORMAL
WHOLE BLOOD HOLD SPECIMEN: NORMAL

## 2023-09-12 PROCEDURE — 99214 OFFICE O/P EST MOD 30 MIN: CPT | Performed by: NURSE PRACTITIONER

## 2023-09-12 PROCEDURE — G0378 HOSPITAL OBSERVATION PER HR: HCPCS

## 2023-09-12 PROCEDURE — 93306 TTE W/DOPPLER COMPLETE: CPT

## 2023-09-12 PROCEDURE — 80307 DRUG TEST PRSMV CHEM ANLYZR: CPT | Performed by: NURSE PRACTITIONER

## 2023-09-12 PROCEDURE — 25510000001 PERFLUTREN (DEFINITY) 8.476 MG IN SODIUM CHLORIDE (PF) 0.9 % 10 ML INJECTION: Performed by: NURSE PRACTITIONER

## 2023-09-12 PROCEDURE — 93356 MYOCRD STRAIN IMG SPCKL TRCK: CPT | Performed by: INTERNAL MEDICINE

## 2023-09-12 PROCEDURE — 93356 MYOCRD STRAIN IMG SPCKL TRCK: CPT

## 2023-09-12 PROCEDURE — 84484 ASSAY OF TROPONIN QUANT: CPT | Performed by: EMERGENCY MEDICINE

## 2023-09-12 PROCEDURE — 87428 SARSCOV & INF VIR A&B AG IA: CPT | Performed by: NURSE PRACTITIONER

## 2023-09-12 PROCEDURE — 83880 ASSAY OF NATRIURETIC PEPTIDE: CPT | Performed by: EMERGENCY MEDICINE

## 2023-09-12 PROCEDURE — 80053 COMPREHEN METABOLIC PANEL: CPT | Performed by: EMERGENCY MEDICINE

## 2023-09-12 PROCEDURE — 25010000002 FUROSEMIDE PER 20 MG: Performed by: EMERGENCY MEDICINE

## 2023-09-12 PROCEDURE — 93000 ELECTROCARDIOGRAM COMPLETE: CPT | Performed by: NURSE PRACTITIONER

## 2023-09-12 PROCEDURE — 71045 X-RAY EXAM CHEST 1 VIEW: CPT

## 2023-09-12 PROCEDURE — 36415 COLL VENOUS BLD VENIPUNCTURE: CPT

## 2023-09-12 PROCEDURE — 85379 FIBRIN DEGRADATION QUANT: CPT | Performed by: EMERGENCY MEDICINE

## 2023-09-12 PROCEDURE — 93010 ELECTROCARDIOGRAM REPORT: CPT | Performed by: INTERNAL MEDICINE

## 2023-09-12 PROCEDURE — 80061 LIPID PANEL: CPT | Performed by: NURSE PRACTITIONER

## 2023-09-12 PROCEDURE — 93306 TTE W/DOPPLER COMPLETE: CPT | Performed by: INTERNAL MEDICINE

## 2023-09-12 PROCEDURE — 93005 ELECTROCARDIOGRAM TRACING: CPT | Performed by: EMERGENCY MEDICINE

## 2023-09-12 PROCEDURE — 93005 ELECTROCARDIOGRAM TRACING: CPT

## 2023-09-12 PROCEDURE — 99285 EMERGENCY DEPT VISIT HI MDM: CPT

## 2023-09-12 PROCEDURE — 85025 COMPLETE CBC W/AUTO DIFF WBC: CPT | Performed by: EMERGENCY MEDICINE

## 2023-09-12 PROCEDURE — 83036 HEMOGLOBIN GLYCOSYLATED A1C: CPT | Performed by: NURSE PRACTITIONER

## 2023-09-12 RX ORDER — SODIUM CHLORIDE 0.9 % (FLUSH) 0.9 %
10 SYRINGE (ML) INJECTION AS NEEDED
Status: DISCONTINUED | OUTPATIENT
Start: 2023-09-12 | End: 2023-09-15 | Stop reason: HOSPADM

## 2023-09-12 RX ORDER — NITROGLYCERIN 0.4 MG/1
0.4 TABLET SUBLINGUAL
Status: DISCONTINUED | OUTPATIENT
Start: 2023-09-12 | End: 2023-09-15 | Stop reason: HOSPADM

## 2023-09-12 RX ORDER — ASPIRIN 81 MG/1
81 TABLET ORAL DAILY
Status: DISCONTINUED | OUTPATIENT
Start: 2023-09-13 | End: 2023-09-15 | Stop reason: HOSPADM

## 2023-09-12 RX ORDER — BISACODYL 5 MG/1
5 TABLET, DELAYED RELEASE ORAL DAILY PRN
Status: DISCONTINUED | OUTPATIENT
Start: 2023-09-12 | End: 2023-09-15 | Stop reason: HOSPADM

## 2023-09-12 RX ORDER — LISINOPRIL 20 MG/1
40 TABLET ORAL DAILY
Status: DISCONTINUED | OUTPATIENT
Start: 2023-09-13 | End: 2023-09-13

## 2023-09-12 RX ORDER — INSULIN LISPRO 100 [IU]/ML
2-9 INJECTION, SOLUTION INTRAVENOUS; SUBCUTANEOUS
Status: DISCONTINUED | OUTPATIENT
Start: 2023-09-12 | End: 2023-09-15 | Stop reason: HOSPADM

## 2023-09-12 RX ORDER — NICOTINE POLACRILEX 4 MG
15 LOZENGE BUCCAL
Status: DISCONTINUED | OUTPATIENT
Start: 2023-09-12 | End: 2023-09-15 | Stop reason: HOSPADM

## 2023-09-12 RX ORDER — METOPROLOL SUCCINATE 50 MG/1
50 TABLET, EXTENDED RELEASE ORAL DAILY
Status: DISCONTINUED | OUTPATIENT
Start: 2023-09-13 | End: 2023-09-13

## 2023-09-12 RX ORDER — SODIUM CHLORIDE 9 MG/ML
40 INJECTION, SOLUTION INTRAVENOUS AS NEEDED
Status: DISCONTINUED | OUTPATIENT
Start: 2023-09-12 | End: 2023-09-13

## 2023-09-12 RX ORDER — ONDANSETRON 4 MG/1
4 TABLET, ORALLY DISINTEGRATING ORAL EVERY 8 HOURS PRN
Status: DISCONTINUED | OUTPATIENT
Start: 2023-09-12 | End: 2023-09-15 | Stop reason: HOSPADM

## 2023-09-12 RX ORDER — ASPIRIN 81 MG/1
324 TABLET, CHEWABLE ORAL ONCE
Status: COMPLETED | OUTPATIENT
Start: 2023-09-12 | End: 2023-09-12

## 2023-09-12 RX ORDER — FUROSEMIDE 10 MG/ML
80 INJECTION INTRAMUSCULAR; INTRAVENOUS ONCE
Status: COMPLETED | OUTPATIENT
Start: 2023-09-12 | End: 2023-09-12

## 2023-09-12 RX ORDER — ATORVASTATIN CALCIUM 20 MG/1
40 TABLET, FILM COATED ORAL DAILY
Status: DISCONTINUED | OUTPATIENT
Start: 2023-09-13 | End: 2023-09-15 | Stop reason: HOSPADM

## 2023-09-12 RX ORDER — VENLAFAXINE HYDROCHLORIDE 75 MG/1
75 CAPSULE, EXTENDED RELEASE ORAL DAILY
Status: DISCONTINUED | OUTPATIENT
Start: 2023-09-13 | End: 2023-09-15 | Stop reason: HOSPADM

## 2023-09-12 RX ORDER — ACETAMINOPHEN 325 MG/1
650 TABLET ORAL EVERY 6 HOURS PRN
Status: DISCONTINUED | OUTPATIENT
Start: 2023-09-12 | End: 2023-09-13 | Stop reason: SDUPTHER

## 2023-09-12 RX ORDER — ASPIRIN 81 MG/1
324 TABLET, CHEWABLE ORAL ONCE
Status: DISCONTINUED | OUTPATIENT
Start: 2023-09-12 | End: 2023-09-15 | Stop reason: HOSPADM

## 2023-09-12 RX ORDER — BISACODYL 10 MG
10 SUPPOSITORY, RECTAL RECTAL DAILY PRN
Status: DISCONTINUED | OUTPATIENT
Start: 2023-09-12 | End: 2023-09-15 | Stop reason: HOSPADM

## 2023-09-12 RX ORDER — METAXALONE 800 MG/1
800 TABLET ORAL 3 TIMES DAILY
Status: DISCONTINUED | OUTPATIENT
Start: 2023-09-12 | End: 2023-09-13

## 2023-09-12 RX ORDER — IBUPROFEN 600 MG/1
1 TABLET ORAL
Status: DISCONTINUED | OUTPATIENT
Start: 2023-09-12 | End: 2023-09-15 | Stop reason: HOSPADM

## 2023-09-12 RX ORDER — AMOXICILLIN 250 MG
2 CAPSULE ORAL 2 TIMES DAILY
Status: DISCONTINUED | OUTPATIENT
Start: 2023-09-12 | End: 2023-09-15 | Stop reason: HOSPADM

## 2023-09-12 RX ORDER — DEXTROSE MONOHYDRATE 25 G/50ML
25 INJECTION, SOLUTION INTRAVENOUS
Status: DISCONTINUED | OUTPATIENT
Start: 2023-09-12 | End: 2023-09-15 | Stop reason: HOSPADM

## 2023-09-12 RX ORDER — CHOLECALCIFEROL (VITAMIN D3) 125 MCG
5 CAPSULE ORAL NIGHTLY
Status: DISCONTINUED | OUTPATIENT
Start: 2023-09-12 | End: 2023-09-15 | Stop reason: HOSPADM

## 2023-09-12 RX ORDER — SODIUM CHLORIDE 0.9 % (FLUSH) 0.9 %
10 SYRINGE (ML) INJECTION EVERY 12 HOURS SCHEDULED
Status: DISCONTINUED | OUTPATIENT
Start: 2023-09-12 | End: 2023-09-15 | Stop reason: HOSPADM

## 2023-09-12 RX ORDER — POLYETHYLENE GLYCOL 3350 17 G/17G
17 POWDER, FOR SOLUTION ORAL DAILY PRN
Status: DISCONTINUED | OUTPATIENT
Start: 2023-09-12 | End: 2023-09-15 | Stop reason: HOSPADM

## 2023-09-12 RX ORDER — GABAPENTIN 400 MG/1
400 CAPSULE ORAL 3 TIMES DAILY
Status: DISCONTINUED | OUTPATIENT
Start: 2023-09-12 | End: 2023-09-13

## 2023-09-12 RX ADMIN — METAXALONE 800 MG: 800 TABLET ORAL at 20:06

## 2023-09-12 RX ADMIN — GABAPENTIN 400 MG: 400 CAPSULE ORAL at 20:05

## 2023-09-12 RX ADMIN — Medication 10 ML: at 20:07

## 2023-09-12 RX ADMIN — FUROSEMIDE 80 MG: 20 INJECTION, SOLUTION INTRAMUSCULAR; INTRAVENOUS at 14:43

## 2023-09-12 RX ADMIN — ASPIRIN 324 MG: 81 TABLET, CHEWABLE ORAL at 14:43

## 2023-09-12 RX ADMIN — ACETAMINOPHEN 650 MG: 325 TABLET ORAL at 20:05

## 2023-09-12 RX ADMIN — Medication 5 MG: at 20:05

## 2023-09-12 RX ADMIN — PERFLUTREN 2 ML: 6.52 INJECTION, SUSPENSION INTRAVENOUS at 19:31

## 2023-09-12 NOTE — ED PROVIDER NOTES
EMERGENCY DEPARTMENT ENCOUNTER  Room Number:    Date of encounter:  2023  PCP: Juju Kennedy MD  Patient Care Team:  Juju Kennedy MD as PCP - General (Family Medicine)     HPI:  Context: Donnie Bishop is a 48 y.o. male who presents to the ED c/o chief complaint of chest pain and shortness of breath.  Patient reports he has been having symptoms for last week and a half.  Patient reports shortness of breath is constant, worse with exertion.  Patient complains of diffuse chest pressure brought on by exertion, relieved with rest.  Patient denies any radiation to his neck or extremities, no associated nausea vomiting or diaphoresis.  Patient denies any abdominal pain, eating drinking normally, no cough or upper respiratory symptoms, no fevers.  Patient denies any cardiac history, does have a history of hypertension hyperlipidemia and diabetes, patient is non-smoker, denies family history of first-degree relative with MI but reports that his mother  of a stroke.No history of DVT or PE.  No recent hemoptysis.  No unilateral leg swelling.  The patient is not being treated for a malignancy.  Patient denies any recent major trauma, surgery, immobilization.    MEDICAL HISTORY REVIEW  Reviewed in EPIC    PAST MEDICAL HISTORY  Active Ambulatory Problems     Diagnosis Date Noted    Multiple-type hyperlipidemia     Type 2 diabetes mellitus with diabetic neuropathy, with long-term current use of insulin     Neuropathy 2014    Vitamin D deficiency 2015    Benign essential HTN 2014    Inability to attain erection 2019    Injury of acoustic nerve 2019    Hypogonadism in male 2019    Anxiety      Resolved Ambulatory Problems     Diagnosis Date Noted    No Resolved Ambulatory Problems     Past Medical History:   Diagnosis Date    Cough     Myalgia     Type II diabetes mellitus     Venous insufficiency     Viral illness        PAST SURGICAL HISTORY  No past surgical history on  file.    FAMILY HISTORY  Family History   Problem Relation Age of Onset    Diabetes Mother        SOCIAL HISTORY  Social History     Socioeconomic History    Marital status:    Tobacco Use    Smoking status: Never    Smokeless tobacco: Never   Substance and Sexual Activity    Alcohol use: Never    Drug use: Never    Sexual activity: Defer       ALLERGIES  Patient has no known allergies.    The patient's allergies have been reviewed    REVIEW OF SYSTEMS  All systems reviewed and negative except for those discussed in HPI.     PHYSICAL EXAM  I have reviewed the triage vital signs and nursing notes.  ED Triage Vitals [09/12/23 1301]   Temp Heart Rate Resp BP SpO2   97.6 °F (36.4 °C) 116 18 -- 98 %      Temp src Heart Rate Source Patient Position BP Location FiO2 (%)   Tympanic -- -- -- --       General: No acute distress.  HENT: NCAT, PERRL, Nares patent.  Eyes: no scleral icterus.  Neck: trachea midline, no ROM limitations.  CV: regular rhythm, regular rate.  Respiratory: normal effort, CTAB.  Abdomen: soft, nondistended, NTTP, no rebound tenderness, no guarding or rigidity.  Musculoskeletal: no deformity.  Neuro: alert, moves all extremities, follows commands.  Skin: warm, dry.Bilateral lower extremities: No edema, no redness warmth or skin changes, no palpable cords, negative Homans.      LAB RESULTS  Recent Results (from the past 24 hour(s))   POCT SARS-CoV-2 Antigen DALLIN + Flu    Collection Time: 09/12/23 12:04 PM    Specimen: Swab   Result Value Ref Range    SARS Antigen Not Detected Not Detected, Presumptive Negative    Influenza A Antigen DALLIN Not Detected Not Detected    Influenza B Antigen DALLIN Not Detected Not Detected    Internal Control Passed Passed    Lot Number [id number]     Expiration Date 04/18/2024    ECG 12 Lead Chest Pain    Collection Time: 09/12/23  1:08 PM   Result Value Ref Range    QT Interval 332 ms    QTC Interval 458 ms   D-dimer, Quantitative    Collection Time: 09/12/23  1:18 PM     Specimen: Blood   Result Value Ref Range    D-Dimer, Quantitative <0.27 0.00 - 0.50 MCGFEU/mL   Comprehensive Metabolic Panel    Collection Time: 09/12/23  1:18 PM    Specimen: Blood   Result Value Ref Range    Glucose 283 (H) 65 - 99 mg/dL    BUN 9 6 - 20 mg/dL    Creatinine 0.86 0.76 - 1.27 mg/dL    Sodium 135 (L) 136 - 145 mmol/L    Potassium 4.9 3.5 - 5.2 mmol/L    Chloride 97 (L) 98 - 107 mmol/L    CO2 23.5 22.0 - 29.0 mmol/L    Calcium 9.9 8.6 - 10.5 mg/dL    Total Protein 6.8 6.0 - 8.5 g/dL    Albumin 4.8 3.5 - 5.2 g/dL    ALT (SGPT) 25 1 - 41 U/L    AST (SGOT) 22 1 - 40 U/L    Alkaline Phosphatase 54 39 - 117 U/L    Total Bilirubin 1.3 (H) 0.0 - 1.2 mg/dL    Globulin 2.0 gm/dL    A/G Ratio 2.4 g/dL    BUN/Creatinine Ratio 10.5 7.0 - 25.0    Anion Gap 14.5 5.0 - 15.0 mmol/L    eGFR 108.1 >60.0 mL/min/1.73   High Sensitivity Troponin T    Collection Time: 09/12/23  1:18 PM    Specimen: Blood   Result Value Ref Range    HS Troponin T 65 (C) <15 ng/L   Green Top (Gel)    Collection Time: 09/12/23  1:18 PM   Result Value Ref Range    Extra Tube Hold for add-ons.    Lavender Top    Collection Time: 09/12/23  1:18 PM   Result Value Ref Range    Extra Tube hold for add-on    Gold Top - SST    Collection Time: 09/12/23  1:18 PM   Result Value Ref Range    Extra Tube Hold for add-ons.    Light Blue Top    Collection Time: 09/12/23  1:18 PM   Result Value Ref Range    Extra Tube Hold for add-ons.    CBC Auto Differential    Collection Time: 09/12/23  1:18 PM    Specimen: Blood   Result Value Ref Range    WBC 7.71 3.40 - 10.80 10*3/mm3    RBC 4.24 4.14 - 5.80 10*6/mm3    Hemoglobin 12.9 (L) 13.0 - 17.7 g/dL    Hematocrit 38.7 37.5 - 51.0 %    MCV 91.3 79.0 - 97.0 fL    MCH 30.4 26.6 - 33.0 pg    MCHC 33.3 31.5 - 35.7 g/dL    RDW 11.6 (L) 12.3 - 15.4 %    RDW-SD 38.4 37.0 - 54.0 fl    MPV 10.2 6.0 - 12.0 fL    Platelets 419 140 - 450 10*3/mm3    Neutrophil % 68.5 42.7 - 76.0 %    Lymphocyte % 25.0 19.6 - 45.3 %     Monocyte % 5.4 5.0 - 12.0 %    Eosinophil % 0.5 0.3 - 6.2 %    Basophil % 0.5 0.0 - 1.5 %    Immature Grans % 0.1 0.0 - 0.5 %    Neutrophils, Absolute 5.27 1.70 - 7.00 10*3/mm3    Lymphocytes, Absolute 1.93 0.70 - 3.10 10*3/mm3    Monocytes, Absolute 0.42 0.10 - 0.90 10*3/mm3    Eosinophils, Absolute 0.04 0.00 - 0.40 10*3/mm3    Basophils, Absolute 0.04 0.00 - 0.20 10*3/mm3    Immature Grans, Absolute 0.01 0.00 - 0.05 10*3/mm3    nRBC 0.0 0.0 - 0.2 /100 WBC       I ordered the above labs and reviewed the results.    RADIOLOGY  XR Chest 1 View    Result Date: 9/12/2023  XR CHEST 1 VW-  HISTORY: Male who is 48 years-old, chest pain  TECHNIQUE: Frontal view of the chest  COMPARISON: 6/15/2021  FINDINGS: The cardiac silhouette is enlarged suggesting cardiomegaly and/or pericardial effusion. Hazy bilateral opacities suggest edema and/or pneumonia, follow-up recommended. No large pleural effusion. No pneumothorax. No acute osseous process.      As described.  This report was finalized on 9/12/2023 2:27 PM by Dr. Hubert Reid M.D.       I ordered the above noted radiological studies. I reviewed the images and results. I agree with the radiologist interpretation.    PROCEDURES  Procedures    MEDICATIONS GIVEN IN ER  Medications   sodium chloride 0.9 % flush 10 mL (has no administration in time range)   aspirin chewable tablet 324 mg (has no administration in time range)   furosemide (LASIX) injection 80 mg (has no administration in time range)       PROGRESS, DATA ANALYSIS, CONSULTS, AND MEDICAL DECISION MAKING  A complete history and physical exam have been performed.  All available laboratory and imaging results have been reviewed by myself prior to disposition.    MDM    After the initial H&P, I discussed pertinent information from history and physical exam with patient/family.  Discussed differential diagnosis.  Discussed plan for ED evaluation/workup/treatment.  All questions answered.  Patient/family is  agreeable with plan.  ED Course as of 09/12/23 1437   Tue Sep 12, 2023   1306 My differential diagnosis for chest pain includes but is not limited to:  Muscle strain, costochondritis, myositis, pleurisy, rib fracture, intercostal neuritis, herpes zoster, tumor, myocardial infarction, coronary syndrome, unstable angina, angina, aortic dissection, mitral valve prolapse, pericarditis, palpitations, pulmonary embolus, pneumonia, pneumothorax, lung cancer, GERD, esophagitis, esophageal spasm     [JG]   1306 My differential diagnosis for dyspnea includes but is not limited to:  Asthma, COPD, pneumonia, pulmonary embolus, acute respiratory distress syndrome, pneumothorax, pleural effusion, pulmonary fibrosis, congestive heart failure, myocardial infarction, DKA, uremia, acidosis, sepsis, anemia, drug related, hyperventilation, CNS disease     [JG]   1313 EKG independently viewed and contemporaneously interpreted by ED physician. Time: 1308.  Rate 114.  Interpretation: Sinus tachycardia, normal axis, normal QRS, no acute ST changes. [JG]   1415 Reviewed chest x-ray in PACS, patient has pulmonary edema per my read. [JG]   1415 Wells low risk, dimer negative, PE ruled out. [JG]   1415 High-sensitivity troponin elevated at 65, EKG shows no acute findings.  Patient has been having symptoms for extended amount of time, this does not seem consistent with ACS, I am suspicious of new onset heart failure, obtaining repeat troponin to ensure it is not uptrending. [JG]   1416 HEART SCORE:    History #0   (Highly suspicious 2, Moderately suspicious 1, Slightly or non-suspicious 0)    ECG #0  (Significant ST depression 2,  Nonspecific repol disturbance 1, Normal 0)    Age #1  (> or = 65 2, 46-65 1,  < or = 45 0)    Risk factors #2  (hypercholesterolemia, HTN, DM, smoking, pos fam hx, obesity)  (> or = to 3 RF 2, 1 or 2 1, No risk factors 0)    Troponin #3  (> or = 3x normal limit 2, 1-3x normal limit 1, < or = Normal limit 0)    HEART  Score Key:  Scores 0-3: 0.9-1.7% risk of adverse cardiac event. In the HEART Score study, these patients were discharged (0.99% in the retrospective study, 1.7% in the prospective study)  Scores 4-6: 12-16.6% risk of adverse cardiac event. In the HEART Score study, these patients were admitted to the hospital. (11.6% retrospective, 16.6% prospective)  Scores =7: 50-65% risk of adverse cardiac event. In the HEART Score study, these patients were candidates for early invasive measures. (65.2% retrospective, 50.1% prospective)      This patient's HEART score is 6     [JG]   1420 I reviewed patient's primary care office visit note from today, patient was complaining of shortness of breath for weeks.  Patient was tested for COVID and flu which was negative, had EKG, sent to the emergency department for evaluation. [JG]   1425 Patient reassessed.  Discussed ED findings, differential diagnosis, and the need for admission for evaluation/treatment.  They are agreeable to admission and all questions were answered.     [JG]   1430 Phone call with [facility], ZACHARIAH with [employer].  Discussed the patient, relevant history, exam, diagnostics, ED findings/progress, and concerns. They agree to admit the patient to telemetry observation under Dr. Oconnor. Care assumed by the admitting physician at this time.     [JG]      ED Course User Index  [JG] Kunal Soriano MD       AS OF 14:37 EDT VITALS:    BP - 148/100  HR - 108  TEMP - 97.6 °F (36.4 °C) (Tympanic)  O2 SATS - 97%    DIAGNOSIS  Final diagnoses:   Chest pain, unspecified type   Elevated troponin   Abnormal CXR   Hyperglycemia         DISPOSITION  ADMISSION    Discussed treatment plan and reason for admission with pt/family and admitting physician.  Pt/family voiced understanding of the plan for admission for further testing/treatment as needed.        Kunal Soriano MD  09/12/23 8758

## 2023-09-12 NOTE — H&P
[facility]   HISTORY AND PHYSICAL    Patient Name: Donnie Bishop  : 1975  MRN: [id number]  Primary Care Physician:  Juju Kennedy MD  Date of admission: 2023    Subjective   Subjective     Chief Complaint:   Chief Complaint   Patient presents with    Chest Pain         HPI:    Donnie Bishop is a pleasant afebrile ambulatory 48 y.o. male with a past medical history of type 2 diabetes, hypertension, and hyperlipidemia.    He presents to the emergency department [facility] today with complaint of shortness of breath.  He has been admitted to the ED observation unit for further testing and evaluation.    Patient describes worsening shortness of breath for the last week.  He was seen by his primary care provider and sent to the ER for further testing and evaluation.  He denies any known history of congestive heart failure.  He denies ever being prescribed a diuretic.    Patient states he developed some chest pain today, this was alleviated after being given a dose of Lasix in the emergency department.  He endorses orthopnea and shortness of breath.  He denies any fevers or chills.  States he has been out of his lisinopril and gabapentin.      Review of Systems   All systems were reviewed and negative except for: Chest pain, shortness of breath    Personal History     Past Medical History:   Diagnosis Date    Anxiety     Cough     Inability to attain erection     Injury of acoustic nerve     Myalgia     Type II diabetes mellitus     Venous insufficiency     Viral illness     Vitamin D deficiency        History reviewed. No pertinent surgical history.    Family History: family history includes Diabetes in his mother. Otherwise pertinent FHx was reviewed and not pertinent to current issue.    Social History:  reports that he has never smoked. He has never used smokeless tobacco. He reports that he does not drink alcohol and does not use drugs.    Home Medications:  atorvastatin, gabapentin,  insulin aspart prot & aspart, lisinopril, melatonin, metFORMIN, metaxalone, metoprolol succinate XL, naproxen, ondansetron ODT, raNITIdine HCl, venlafaxine XR, and vitamin D    Allergies:  No Known Allergies    Objective   Objective     Vitals:   Temp:  [97 °F (36.1 °C)-97.6 °F (36.4 °C)] 97.6 °F (36.4 °C)  Heart Rate:  [100-116] 100  Resp:  [18] 18  BP: (143-171)/() 156/107  Physical Exam    Constitutional: Awake, alert   Eyes: PERRLA, sclerae anicteric, no conjunctival injection   HENT: NCAT, mucous membranes moist   Neck: Supple, no thyromegaly, no lymphadenopathy, trachea midline   Respiratory: Breath sounds diminished to bilateral lower lobes otherwise clear to auscultation bilaterally, nonlabored respirations    Cardiovascular: RRR, no murmurs, rubs, or gallops, palpable pedal pulses bilaterally   Gastrointestinal: Positive bowel sounds, soft, nontender, nondistended   Musculoskeletal: No bilateral ankle edema, no clubbing or cyanosis to extremities   Psychiatric: Appropriate affect, cooperative   Neurologic: Oriented x 3, strength symmetric in all extremities, Cranial Nerves grossly intact to confrontation, speech clear   Skin: No rashes     Result Review    Result Review:  I have personally reviewed the results from the time of this admission to 9/12/2023 16:49 EDT and agree with these findings:  [x]  Laboratory list / accordion  []  Microbiology  [x]  Radiology  [x]  EKG/Telemetry   []  Cardiology/Vascular   []  Pathology  []  Old records  []  Other:  Most notable findings include: High-sensitivity troponin 65, 73, proBNP 1142, blood glucose 283, chest x-ray shows cardiomegaly with hazy bilateral opacities suggesting edema without evidence of pleural effusion or pneumothorax.      Assessment & Plan   Assessment / Plan     Brief Patient Summary:  Donnie Bishop is a 48 y.o. male who is being evaluated for chest pain and shortness of breath    Active Hospital Problems:  Active Hospital Problems    Diagnosis      **Chest pain      Plan:     Chest pain  Consult to cardiology  High sensitivity troponin 65, 73  ProBNP 1,142  Echocardiogram pending  Cardiac diet, NPO after midnight  Negative D-dimer  Telemetry  Lasix 80mg IV X 1 in the ED    Hypertension  Vitals every 4 hours  Continue home medications    Type 2 Diabetes  Accuchecks QAC & QHS  Hold metformin  Moderate correctional insulin protocol initiated  A1c pending    DVT prophylaxis:  No DVT prophylaxis order currently exists.    CODE STATUS:    Level Of Support Discussed With: Patient  Code Status (Patient has no pulse and is not breathing): CPR (Attempt to Resuscitate)  Medical Interventions (Patient has pulse or is breathing): Full Support    Admission Status:  I believe this patient meets observation status.    Electronically signed by MICHELLE Crump, 09/12/23, 4:49 PM EDT.      79 minutes has been spent by [facility] providers in the care of this patient while under observation status      I have worn appropriate PPE during this patient encounter, sanitized my hands both with entering and exiting patient's room.

## 2023-09-12 NOTE — Clinical Note
Hemostasis started on the right radial artery. Manual pressure applied to vessel. Manual pressure was held by . Manual pressure was held for 5 min. Hemostasis achieved successfully. Closure device additional comment: Tensoplast.

## 2023-09-12 NOTE — PLAN OF CARE
Goal Outcome Evaluation: Patient admitted with chest pain and SOB.  No complaints of chest pain since arriving to the unit.  Patient received lasix in the ER and has had 1200mL output since arriving to Obs.  Cardiology consulted.  Currently getting ECHO.  Will continue to monitor.

## 2023-09-12 NOTE — PROGRESS NOTES
"Chief Complaint  Shortness of Breath (For the past 2 weeks after exertion or exercise) and Cough    Subjective        Donnie Bishop presents to [facility]  History of Present Illness  Patient states he has noticed he has been short of breath for a few weeks. Activity does worsen but has a mild amount of trouble at rest. Some noted mild chest pain at all times, doesn't worsen with activity. Denies radiating pain to jaw or left arm. Some dry cough. Denies fever. Denies swelling in the ankles. Does not remember a family history of heart attacks or CAD. Has never seen a heart doctor before.   Objective   Vital Signs:  /89 (BP Location: Left arm, Patient Position: Sitting, Cuff Size: Adult)   Pulse 116   Temp 97 °F (36.1 °C) (Temporal)   Wt 79.5 kg (175 lb 3.2 oz)   SpO2 97%   BMI 27.43 kg/m²   Estimated body mass index is 27.43 kg/m² as calculated from the following:    Height as of 7/12/23: 170.2 cm (67.01\").    Weight as of this encounter: 79.5 kg (175 lb 3.2 oz).             Physical Exam  Constitutional:       Appearance: Normal appearance.   HENT:      Head: Normocephalic.   Cardiovascular:      Rate and Rhythm: Regular rhythm. Tachycardia present.   Pulmonary:      Effort: Pulmonary effort is normal.      Breath sounds: Normal breath sounds.   Musculoskeletal:         General: Normal range of motion.      Cervical back: Normal range of motion.   Skin:     General: Skin is warm and dry.   Neurological:      General: No focal deficit present.      Mental Status: He is alert and oriented to person, place, and time.   Psychiatric:         Mood and Affect: Mood normal.      Result Review :              ECG 12 Lead    Date/Time: 9/12/2023 12:11 PM  Performed by: Katelyn Feng APRN  Authorized by: Katelyn Feng APRN   Comparison: not compared with previous ECG   Rhythm: sinus tachycardia  Rate: tachycardic  Other findings: non-specific ST-T wave changes    Clinical " impression: abnormal EKG  Comments: Sent to ER for further evaluation          Assessment and Plan   Diagnoses and all orders for this visit:    1. Cough, unspecified type (Primary)  -     POCT SARS-CoV-2 Antigen DALLIN + Flu    2. Shortness of breath    3. Atypical chest pain  -     ECG 12 Lead    Sent to ER for further evaluation.          Follow Up   Return if symptoms worsen or fail to improve.  Patient was given instructions and counseling regarding his condition or for health maintenance advice. Please see specific information pulled into the AVS if appropriate.

## 2023-09-13 PROBLEM — I21.4 NSTEMI, INITIAL EPISODE OF CARE: Status: ACTIVE | Noted: 2023-09-12

## 2023-09-13 PROBLEM — I50.9 NEW ONSET OF CONGESTIVE HEART FAILURE: Status: ACTIVE | Noted: 2023-09-13

## 2023-09-13 LAB
ANION GAP SERPL CALCULATED.3IONS-SCNC: 13 MMOL/L (ref 5–15)
BUN SERPL-MCNC: 14 MG/DL (ref 6–20)
BUN/CREAT SERPL: 16.5 (ref 7–25)
CALCIUM SPEC-SCNC: 9.6 MG/DL (ref 8.6–10.5)
CHLORIDE SERPL-SCNC: 98 MMOL/L (ref 98–107)
CO2 SERPL-SCNC: 26 MMOL/L (ref 22–29)
CREAT SERPL-MCNC: 0.85 MG/DL (ref 0.76–1.27)
DEPRECATED RDW RBC AUTO: 39.5 FL (ref 37–54)
EGFRCR SERPLBLD CKD-EPI 2021: 108.5 ML/MIN/1.73
ERYTHROCYTE [DISTWIDTH] IN BLOOD BY AUTOMATED COUNT: 11.8 % (ref 12.3–15.4)
GLUCOSE BLDC GLUCOMTR-MCNC: 137 MG/DL (ref 70–130)
GLUCOSE BLDC GLUCOMTR-MCNC: 148 MG/DL (ref 70–130)
GLUCOSE BLDC GLUCOMTR-MCNC: 168 MG/DL (ref 70–130)
GLUCOSE BLDC GLUCOMTR-MCNC: 316 MG/DL (ref 70–130)
GLUCOSE BLDC GLUCOMTR-MCNC: 402 MG/DL (ref 70–130)
GLUCOSE BLDC GLUCOMTR-MCNC: 409 MG/DL (ref 70–130)
GLUCOSE SERPL-MCNC: 179 MG/DL (ref 65–99)
HCT VFR BLD AUTO: 37.8 % (ref 37.5–51)
HGB BLD-MCNC: 12.7 G/DL (ref 13–17.7)
MAGNESIUM SERPL-MCNC: 2 MG/DL (ref 1.6–2.6)
MCH RBC QN AUTO: 30.6 PG (ref 26.6–33)
MCHC RBC AUTO-ENTMCNC: 33.6 G/DL (ref 31.5–35.7)
MCV RBC AUTO: 91.1 FL (ref 79–97)
PLATELET # BLD AUTO: 393 10*3/MM3 (ref 140–450)
PMV BLD AUTO: 10.6 FL (ref 6–12)
POTASSIUM SERPL-SCNC: 4.4 MMOL/L (ref 3.5–5.2)
QT INTERVAL: 341 MS
QTC INTERVAL: 447 MS
RBC # BLD AUTO: 4.15 10*6/MM3 (ref 4.14–5.8)
SODIUM SERPL-SCNC: 137 MMOL/L (ref 136–145)
TROPONIN T SERPL HS-MCNC: 60 NG/L
WBC NRBC COR # BLD: 7.6 10*3/MM3 (ref 3.4–10.8)

## 2023-09-13 PROCEDURE — 63710000001 INSULIN GLARGINE PER 5 UNITS: Performed by: INTERNAL MEDICINE

## 2023-09-13 PROCEDURE — 25010000002 HEPARIN (PORCINE) PER 1000 UNITS: Performed by: INTERNAL MEDICINE

## 2023-09-13 PROCEDURE — 82948 REAGENT STRIP/BLOOD GLUCOSE: CPT

## 2023-09-13 PROCEDURE — 99204 OFFICE O/P NEW MOD 45 MIN: CPT | Performed by: INTERNAL MEDICINE

## 2023-09-13 PROCEDURE — 80048 BASIC METABOLIC PNL TOTAL CA: CPT | Performed by: NURSE PRACTITIONER

## 2023-09-13 PROCEDURE — C1894 INTRO/SHEATH, NON-LASER: HCPCS | Performed by: INTERNAL MEDICINE

## 2023-09-13 PROCEDURE — G0378 HOSPITAL OBSERVATION PER HR: HCPCS

## 2023-09-13 PROCEDURE — 84484 ASSAY OF TROPONIN QUANT: CPT | Performed by: NURSE PRACTITIONER

## 2023-09-13 PROCEDURE — B2111ZZ FLUOROSCOPY OF MULTIPLE CORONARY ARTERIES USING LOW OSMOLAR CONTRAST: ICD-10-PCS | Performed by: INTERNAL MEDICINE

## 2023-09-13 PROCEDURE — C1769 GUIDE WIRE: HCPCS | Performed by: INTERNAL MEDICINE

## 2023-09-13 PROCEDURE — 83735 ASSAY OF MAGNESIUM: CPT | Performed by: NURSE PRACTITIONER

## 2023-09-13 PROCEDURE — 93458 L HRT ARTERY/VENTRICLE ANGIO: CPT | Performed by: INTERNAL MEDICINE

## 2023-09-13 PROCEDURE — 25510000001 IOPAMIDOL PER 1 ML: Performed by: INTERNAL MEDICINE

## 2023-09-13 PROCEDURE — 93010 ELECTROCARDIOGRAM REPORT: CPT | Performed by: INTERNAL MEDICINE

## 2023-09-13 PROCEDURE — B2151ZZ FLUOROSCOPY OF LEFT HEART USING LOW OSMOLAR CONTRAST: ICD-10-PCS | Performed by: INTERNAL MEDICINE

## 2023-09-13 PROCEDURE — 25010000002 KETOROLAC TROMETHAMINE PER 15 MG: Performed by: NURSE PRACTITIONER

## 2023-09-13 PROCEDURE — 4A023N7 MEASUREMENT OF CARDIAC SAMPLING AND PRESSURE, LEFT HEART, PERCUTANEOUS APPROACH: ICD-10-PCS | Performed by: INTERNAL MEDICINE

## 2023-09-13 PROCEDURE — 85027 COMPLETE CBC AUTOMATED: CPT | Performed by: NURSE PRACTITIONER

## 2023-09-13 PROCEDURE — 63710000001 INSULIN LISPRO (HUMAN) PER 5 UNITS: Performed by: INTERNAL MEDICINE

## 2023-09-13 PROCEDURE — 25010000002 FENTANYL CITRATE (PF) 50 MCG/ML SOLUTION: Performed by: INTERNAL MEDICINE

## 2023-09-13 PROCEDURE — 25010000002 MIDAZOLAM PER 1 MG: Performed by: INTERNAL MEDICINE

## 2023-09-13 PROCEDURE — 25010000002 FUROSEMIDE PER 20 MG: Performed by: INTERNAL MEDICINE

## 2023-09-13 RX ORDER — SPIRONOLACTONE 25 MG/1
25 TABLET ORAL DAILY
Status: DISCONTINUED | OUTPATIENT
Start: 2023-09-13 | End: 2023-09-15 | Stop reason: HOSPADM

## 2023-09-13 RX ORDER — FUROSEMIDE 10 MG/ML
80 INJECTION INTRAMUSCULAR; INTRAVENOUS EVERY 12 HOURS
Status: DISCONTINUED | OUTPATIENT
Start: 2023-09-13 | End: 2023-09-15 | Stop reason: HOSPADM

## 2023-09-13 RX ORDER — GABAPENTIN 400 MG/1
400 CAPSULE ORAL EVERY 12 HOURS SCHEDULED
Status: DISCONTINUED | OUTPATIENT
Start: 2023-09-13 | End: 2023-09-15 | Stop reason: HOSPADM

## 2023-09-13 RX ORDER — MIDAZOLAM HYDROCHLORIDE 1 MG/ML
INJECTION INTRAMUSCULAR; INTRAVENOUS
Status: DISCONTINUED | OUTPATIENT
Start: 2023-09-13 | End: 2023-09-13 | Stop reason: HOSPADM

## 2023-09-13 RX ORDER — ACETAMINOPHEN 325 MG/1
650 TABLET ORAL EVERY 4 HOURS PRN
Status: DISCONTINUED | OUTPATIENT
Start: 2023-09-13 | End: 2023-09-15 | Stop reason: HOSPADM

## 2023-09-13 RX ORDER — VERAPAMIL HYDROCHLORIDE 2.5 MG/ML
INJECTION, SOLUTION INTRAVENOUS
Status: DISCONTINUED | OUTPATIENT
Start: 2023-09-13 | End: 2023-09-13 | Stop reason: HOSPADM

## 2023-09-13 RX ORDER — CARVEDILOL 25 MG/1
25 TABLET ORAL EVERY 12 HOURS SCHEDULED
Status: DISCONTINUED | OUTPATIENT
Start: 2023-09-13 | End: 2023-09-15 | Stop reason: HOSPADM

## 2023-09-13 RX ORDER — FUROSEMIDE 40 MG/1
40 TABLET ORAL DAILY
Status: DISCONTINUED | OUTPATIENT
Start: 2023-09-13 | End: 2023-09-13

## 2023-09-13 RX ORDER — NITROGLYCERIN 0.4 MG/1
0.4 TABLET SUBLINGUAL
Status: DISCONTINUED | OUTPATIENT
Start: 2023-09-13 | End: 2023-09-15 | Stop reason: HOSPADM

## 2023-09-13 RX ORDER — SODIUM CHLORIDE 9 MG/ML
INJECTION, SOLUTION INTRAVENOUS
Status: COMPLETED | OUTPATIENT
Start: 2023-09-13 | End: 2023-09-13

## 2023-09-13 RX ORDER — FUROSEMIDE 40 MG/1
40 TABLET ORAL
Status: DISCONTINUED | OUTPATIENT
Start: 2023-09-13 | End: 2023-09-13

## 2023-09-13 RX ORDER — LIDOCAINE HYDROCHLORIDE 20 MG/ML
INJECTION, SOLUTION INFILTRATION; PERINEURAL
Status: DISCONTINUED | OUTPATIENT
Start: 2023-09-13 | End: 2023-09-13 | Stop reason: HOSPADM

## 2023-09-13 RX ORDER — FENTANYL CITRATE 50 UG/ML
INJECTION, SOLUTION INTRAMUSCULAR; INTRAVENOUS
Status: DISCONTINUED | OUTPATIENT
Start: 2023-09-13 | End: 2023-09-13 | Stop reason: HOSPADM

## 2023-09-13 RX ORDER — KETOROLAC TROMETHAMINE 15 MG/ML
15 INJECTION, SOLUTION INTRAMUSCULAR; INTRAVENOUS ONCE AS NEEDED
Status: COMPLETED | OUTPATIENT
Start: 2023-09-13 | End: 2023-09-13

## 2023-09-13 RX ORDER — HEPARIN SODIUM 1000 [USP'U]/ML
INJECTION, SOLUTION INTRAVENOUS; SUBCUTANEOUS
Status: DISCONTINUED | OUTPATIENT
Start: 2023-09-13 | End: 2023-09-13 | Stop reason: HOSPADM

## 2023-09-13 RX ADMIN — Medication 10 ML: at 08:56

## 2023-09-13 RX ADMIN — FUROSEMIDE 80 MG: 10 INJECTION, SOLUTION INTRAMUSCULAR; INTRAVENOUS at 22:16

## 2023-09-13 RX ADMIN — Medication 5 MG: at 22:16

## 2023-09-13 RX ADMIN — SENNOSIDES AND DOCUSATE SODIUM 2 TABLET: 50; 8.6 TABLET ORAL at 22:18

## 2023-09-13 RX ADMIN — FUROSEMIDE 80 MG: 10 INJECTION, SOLUTION INTRAMUSCULAR; INTRAVENOUS at 14:16

## 2023-09-13 RX ADMIN — CARVEDILOL 25 MG: 25 TABLET, FILM COATED ORAL at 22:17

## 2023-09-13 RX ADMIN — INSULIN GLARGINE 30 UNITS: 100 INJECTION, SOLUTION SUBCUTANEOUS at 22:18

## 2023-09-13 RX ADMIN — CARVEDILOL 25 MG: 25 TABLET, FILM COATED ORAL at 14:20

## 2023-09-13 RX ADMIN — LISINOPRIL 40 MG: 20 TABLET ORAL at 08:52

## 2023-09-13 RX ADMIN — INSULIN LISPRO 8 UNITS: 100 INJECTION, SOLUTION INTRAVENOUS; SUBCUTANEOUS at 16:09

## 2023-09-13 RX ADMIN — ACETAMINOPHEN 650 MG: 325 TABLET, FILM COATED ORAL at 22:42

## 2023-09-13 RX ADMIN — ASPIRIN 81 MG: 81 TABLET, COATED ORAL at 08:52

## 2023-09-13 RX ADMIN — GABAPENTIN 400 MG: 400 CAPSULE ORAL at 22:16

## 2023-09-13 RX ADMIN — METAXALONE 800 MG: 800 TABLET ORAL at 08:53

## 2023-09-13 RX ADMIN — VENLAFAXINE HYDROCHLORIDE 75 MG: 75 CAPSULE, EXTENDED RELEASE ORAL at 08:52

## 2023-09-13 RX ADMIN — ATORVASTATIN CALCIUM 40 MG: 20 TABLET, FILM COATED ORAL at 08:52

## 2023-09-13 RX ADMIN — GABAPENTIN 400 MG: 400 CAPSULE ORAL at 08:53

## 2023-09-13 RX ADMIN — KETOROLAC TROMETHAMINE 15 MG: 15 INJECTION, SOLUTION INTRAMUSCULAR; INTRAVENOUS at 05:18

## 2023-09-13 RX ADMIN — INSULIN LISPRO 7 UNITS: 100 INJECTION, SOLUTION INTRAVENOUS; SUBCUTANEOUS at 18:25

## 2023-09-13 RX ADMIN — Medication 10 ML: at 22:23

## 2023-09-13 RX ADMIN — SPIRONOLACTONE 25 MG: 25 TABLET ORAL at 14:20

## 2023-09-13 RX ADMIN — SENNOSIDES AND DOCUSATE SODIUM 2 TABLET: 50; 8.6 TABLET ORAL at 08:53

## 2023-09-13 NOTE — PROGRESS NOTES
.ED OBSERVATION PROGRESS/DISCHARGE SUMMARY    Date of Admission: 9/12/2023   LOS: 0 days   PCP: Juju Kennedy MD    Subjective   Resting comfortably throughout the night and in no acute distress.  Denies chest pain or palpitation    Hospital Outcome:   48-year-old male was seen and examined at bedside following admission to the observation unit due to chest pain.  Laboratory evaluation in the ED shows high-sensitivity troponin 65, 73 with a delta of 8, BNP 1142, creatinine 1.86, and negative dimer.EKG shows sinus tach without any evidence of ischemia.  Chest x-ray shows cardiomegaly and hazy bilateral opacities concerning for edema.    Patient received Lasix and aspirin in the ED.  Echocardiogram shows EF 35%, LV global longitudinal strain -12.6%, moderately decreased LV systolic function and grade 2 LV diastolic function.  Cardiology consulted and recommends heart cath today and will transfer care.       ROS:  General: no fevers, chills  Respiratory: no cough, dyspnea  Cardiovascular: no chest pain, palpitations  Abdomen: No abdominal pain, nausea, vomiting, or diarrhea  Neurologic: No focal weakness    Objective   Physical Exam:  I have reviewed the vital signs.  Temp:  [97 °F (36.1 °C)-98.4 °F (36.9 °C)] 98.4 °F (36.9 °C)  Heart Rate:  [100-116] 102  Resp:  [18] 18  BP: (125-171)/() 125/92  General Appearance:    Alert, cooperative, no distress  Head:    Normocephalic, atraumatic  Eyes:    Sclerae anicteric  Neck:   Supple, no mass  Lungs: Clear to auscultation bilaterally, respirations unlabored  Heart: Regular rate and rhythm, S1 and S2 normal, no murmur, rub or gallop  Abdomen:  Soft, non-tender, bowel sounds active, nondistended  Extremities: No clubbing, cyanosis, or edema to lower extremities  Pulses:  2+ and symmetric in distal lower extremities  Skin: No rashes   Neurologic: Oriented x3, Normal strength to extremities    Results Review:    I have reviewed the labs, radiology results and  diagnostic studies.    Results from last 7 days   Lab Units 09/12/23  1318   WBC 10*3/mm3 7.71   HEMOGLOBIN g/dL 12.9*   HEMATOCRIT % 38.7   PLATELETS 10*3/mm3 419     Results from last 7 days   Lab Units 09/12/23  1318   SODIUM mmol/L 135*   POTASSIUM mmol/L 4.9   CHLORIDE mmol/L 97*   CO2 mmol/L 23.5   BUN mg/dL 9   CREATININE mg/dL 0.86   CALCIUM mg/dL 9.9   BILIRUBIN mg/dL 1.3*   ALK PHOS U/L 54   ALT (SGPT) U/L 25   AST (SGOT) U/L 22   GLUCOSE mg/dL 283*     Imaging Results (Last 24 Hours)       Procedure Component Value Units Date/Time    XR Chest 1 View [[id number]] Collected: 09/12/23 1425     Updated: 09/12/23 1430    Narrative:      XR CHEST 1 VW-     HISTORY: Male who is 48 years-old, chest pain     TECHNIQUE: Frontal view of the chest     COMPARISON: 6/15/2021     FINDINGS: The cardiac silhouette is enlarged suggesting cardiomegaly  and/or pericardial effusion. Hazy bilateral opacities suggest edema  and/or pneumonia, follow-up recommended. No large pleural effusion. No  pneumothorax. No acute osseous process.       Impression:      As described.     This report was finalized on 9/12/2023 2:27 PM by Dr. Hubert Reid M.D.               I have reviewed the medications.  ---------------------------------------------------------------------------------------------  Assessment & Plan   Assessment/Problem List    Chest pain      Plan:  Chest pain  Consult to cardiology, to cath lab today  High sensitivity troponin 65, 73  ProBNP 1,142  Echocardiogram shows EF 35%, LV global longitudinal strain -12.6%, moderately decreased LV systolic function and grade 2 LV diastolic function,  Cardiac diet, NPO after midnight  Negative D-dimer  Telemetry  Lasix 80mg IV X 1 in the ED     Hypertension  Vitals every 4 hours  Continue home medications     Type 2 Diabetes  Accuchecks QAC & QHS  Hold metformin  Moderate correctional insulin protocol initiated  A1c 8.70       Disposition: admitted to cardiology    Follow-up  after Discharge: PCP and cardiology    This note will serve as a progress and transfer note.     Lisa Sanford, APRN 09/13/23 00:28 EDT    I have worn appropriate PPE during this patient encounter, sanitized my hands both with entering and exiting patient's room.      53 minutes has been spent by [facility] providers in the care of this patient while under observation status

## 2023-09-13 NOTE — PLAN OF CARE
Goal Outcome Evaluation:   Patient admitted for chest pain.   Outcome summary: Patient vitals stable overnight. AO x4, room air, up ad maria luisa, strict I&O. NPO since midnight. Troponin 65 then 73 then 60 this AM. Sinus tach. Cardiology to see today.

## 2023-09-13 NOTE — CONSULTS
Patient Name: Donnie Bishop  :1975  48 y.o.    Date of Admission: 2023  Date of Consultation:  23  Encounter Provider: Kaylee Kay MD  Place of Service: [facility] CARDIOLOGY  Referring Provider: Kel Oconnor MD  Patient Care Team:  Juju Kennedy MD as PCP - General (Family Medicine)      Chief complaint: chest pain shortness of breath cardiomyopathy    History of Present Illness:  48-year-old man with a history of hypertension,Hyperlipidemia, depression, insulin-dependent diabetes, poorly controlled last A1c 8.7%.  Presented to his PCP with 1 week of exertional dyspnea and chest heaviness.  Transferred to the emergency room.  EKG shows sinus tachycardia.  Troponins abnormal, 65/73/60.  BNP 1142.  X-ray of pulmonary edema.  This morning patient is comfortable.  Was given IV Lasix 80.  Ins and outs -1.5 L.  Breathing has improved.  No lower extremity edema.  Does complain of exertional dyspnea without orthopnea or PND.  Complains of heaviness with exertion in the chest which resolves with rest.  Symptoms all started in the last 7 to 10 days.  No preceding illness of diarrhea, GI upset, fevers or chills.  Does endorse a lot of stress as he is recently gone through divorce.  Has 2 children, ages 11 and 22.  He is a non-smoker.  Mother  of a stroke.  Echocardiogram was performed last night.  Shows systolic dysfunction, EF 30 to 35%, moderate LVH, global hypokinesis, grade 2 diastolic dysfunction, mildly reduced RV systolic function, no valvular disease.      Past Medical History:   Diagnosis Date    Anxiety     Cough     Inability to attain erection     Injury of acoustic nerve     Myalgia     Type II diabetes mellitus     Venous insufficiency     Viral illness     Vitamin D deficiency        History reviewed. No pertinent surgical history.      Prior to Admission medications    Medication Sig Start Date End Date Taking? Authorizing Provider   atorvastatin  (LIPITOR) 40 MG tablet TAKE 1 TABLET BY MOUTH EVERY DAY 3/8/21  Yes Juju Kennedy MD   gabapentin (NEURONTIN) 400 MG capsule TAKE 1 CAPSULE BY MOUTH THREE TIMES A DAY 7/27/22  Yes Cortez Baldwin MD   lisinopril (PRINIVIL,ZESTRIL) 40 MG tablet Take 1 tablet by mouth Daily. 7/12/23  Yes Juju Kennedy MD   melatonin 5 MG tablet tablet Take 1 tablet by mouth.   Yes Saud Rutherford MD   metaxalone (SKELAXIN) 800 MG tablet Take 1 tablet by mouth 3 (Three) Times a Day. 11/22/22  Yes Kel Oconnor MD   metFORMIN (GLUCOPHAGE) 1000 MG tablet Take 1 tablet by mouth 2 (Two) Times a Day With Meals. 7/12/23  Yes Juju Kennedy MD   metoprolol succinate XL (TOPROL-XL) 50 MG 24 hr tablet Take 1 tablet by mouth Daily. 2/14/22  Yes Juju Kennedy MD   naproxen (EC NAPROSYN) 500 MG EC tablet Take 1 tablet by mouth 2 (Two) Times a Day As Needed for Mild Pain. 11/22/22  Yes Kel Oconnor MD   ondansetron ODT (ZOFRAN-ODT) 4 MG disintegrating tablet Place 1 tablet on the tongue Every 8 (Eight) Hours As Needed for Nausea. 5/24/21  Yes Rob Carbone MD   RaNITidine HCl (RANITIDINE 75 PO) Take  by mouth.   Yes Saud Rutherford MD   venlafaxine XR (Effexor XR) 75 MG 24 hr capsule Take 1 capsule by mouth Daily. 7/12/23  Yes Juju Kennedy MD   vitamin D (ERGOCALCIFEROL) 1.25 MG (99993 UT) capsule capsule Take 1 capsule by mouth 1 (One) Time Per Week. 9/7/20  Yes Saud Rutherford MD   insulin aspart prot & aspart (NovoLOG Mix 70/30 FlexPen) (70-30) 100 UNIT/ML suspension pen-injector injection Inject 0.2 mL under the skin into the appropriate area as directed 2 (Two) Times a Day Before Meals. 7/14/23   Juju Kennedy MD       No Known Allergies    Social History     Socioeconomic History    Marital status:    Tobacco Use    Smoking status: Never    Smokeless tobacco: Never   Vaping Use    Vaping Use: Never used   Substance and Sexual Activity    Alcohol use: Never     Drug use: Never    Sexual activity: Defer       Family History   Problem Relation Age of Onset    Diabetes Mother        REVIEW OF SYSTEMS:   All systems reviewed.  Pertinent positives identified in HPI.  All other systems are negative.      Objective:     Vitals:    09/12/23 2018 09/12/23 2356 09/13/23 0438 09/13/23 0807   BP: (!) 145/101 125/92 (!) 149/115 (!) 146/109   BP Location: Left arm Right arm Left arm Right arm   Patient Position: Sitting Lying Sitting Lying   Pulse: 112 102 111 98   Resp: 18 18 18 16   Temp: 98.1 °F (36.7 °C) 98.4 °F (36.9 °C) 98.2 °F (36.8 °C) 97.6 °F (36.4 °C)   TempSrc: Oral Oral Oral Oral   SpO2: 100% 96% 99% 100%   Weight:       Height:         Body mass index is 27.1 kg/m².    General Appearance:    Alert, cooperative, in no acute distress.  Apple shaped   Head:    Normocephalic, without obvious abnormality, atraumatic   Eyes:            Lids and lashes normal, conjunctivae and sclerae normal, no icterus, no pallor, corneas clear, PERRLA   Ears:    Ears appear intact with no abnormalities noted   Throat:   No oral lesions, no thrush, oral mucosa moist   Neck:   No adenopathy, supple, trachea midline, no thyromegaly, no carotid bruit, no JVD   Back:     No kyphosis present, no scoliosis present, no skin lesions, erythema or scars, no tenderness to percussion or palpation, range of motion normal   Lungs:   Rales bilaterally respirations regular, even and unlabored    Heart:    Regular rhythm and normal rate, normal S1 and S2, no murmur, no gallop, no rub, no click   Chest Wall:    No abnormalities observed   Abdomen:     Normal bowel sounds, no masses, no organomegaly, soft, nontender, nondistended, no guarding, no rebound  tenderness   Extremities:   Moves all extremities well, no edema, no cyanosis, no redness   Pulses:   Pulses palpable and equal bilaterally. Normal radial, carotid, femoral, dorsalis pedis and posterior tibial pulses bilaterally. Normal abdominal aorta    Skin:  Psychiatric:   No bleeding, bruising or rash    Alert and oriented x 3, normal mood and affect   Lab Review:     Results from last 7 days   Lab Units 09/13/23  0437 09/12/23  1318   SODIUM mmol/L 137 135*   POTASSIUM mmol/L 4.4 4.9   CHLORIDE mmol/L 98 97*   CO2 mmol/L 26.0 23.5   BUN mg/dL 14 9   CREATININE mg/dL 0.85 0.86   CALCIUM mg/dL 9.6 9.9   BILIRUBIN mg/dL  --  1.3*   ALK PHOS U/L  --  54   ALT (SGPT) U/L  --  25   AST (SGOT) U/L  --  22   GLUCOSE mg/dL 179* 283*     Results from last 7 days   Lab Units 09/13/23  0437 09/12/23  1537 09/12/23  1318   HSTROP T ng/L 60* 73* 65*     Results from last 7 days   Lab Units 09/13/23  0437   WBC 10*3/mm3 7.60   HEMOGLOBIN g/dL 12.7*   HEMATOCRIT % 37.8   PLATELETS 10*3/mm3 393         Results from last 7 days   Lab Units 09/13/23  0437   MAGNESIUM mg/dL 2.0     Results from last 7 days   Lab Units 09/12/23  1318   CHOLESTEROL mg/dL 184   TRIGLYCERIDES mg/dL 126   HDL CHOL mg/dL 63*   LDL CHOL mg/dL 99               I personally viewed and interpreted the patient's EKG/Telemetry data.        Assessment and Plan:       1.  Non-STEMI, etiology unclear.  Type I versus type II due to cardiomyopathy and hypertension chest pain, minimally elevated troponins, EKG without ischemic changes.  Several risk factors, insulin-dependent diabetes, hypertension, hyperlipidemia, family history of vascular disease.  Cardiac catheterization today.  2.  New cardiomyopathy, etiology unclear.  Start GDMT today.  3.  Severe hypertension: Increase medications as needed.    Kaylee Kay MD  09/13/23  09:20 EDT

## 2023-09-13 NOTE — PLAN OF CARE
Goal Outcome Evaluation:  Plan of Care Reviewed With: patient        Progress: improving  Outcome Evaluation: Radial site is soft dry and intact. IV lasix given and coreg and aldactone added. Ambulating to bathroom with some SOA. On room air. Blood sugars high. [facility] consulted for management. Awaiting to be seen.

## 2023-09-14 LAB
ANION GAP SERPL CALCULATED.3IONS-SCNC: 12.9 MMOL/L (ref 5–15)
BUN SERPL-MCNC: 16 MG/DL (ref 6–20)
BUN/CREAT SERPL: 16.8 (ref 7–25)
CALCIUM SPEC-SCNC: 9.6 MG/DL (ref 8.6–10.5)
CHLORIDE SERPL-SCNC: 92 MMOL/L (ref 98–107)
CO2 SERPL-SCNC: 30.1 MMOL/L (ref 22–29)
CREAT SERPL-MCNC: 0.95 MG/DL (ref 0.76–1.27)
EGFRCR SERPLBLD CKD-EPI 2021: 100 ML/MIN/1.73
GLUCOSE BLDC GLUCOMTR-MCNC: 149 MG/DL (ref 70–130)
GLUCOSE BLDC GLUCOMTR-MCNC: 182 MG/DL (ref 70–130)
GLUCOSE BLDC GLUCOMTR-MCNC: 270 MG/DL (ref 70–130)
GLUCOSE BLDC GLUCOMTR-MCNC: 280 MG/DL (ref 70–130)
GLUCOSE SERPL-MCNC: 292 MG/DL (ref 65–99)
POTASSIUM SERPL-SCNC: 4.5 MMOL/L (ref 3.5–5.2)
SODIUM SERPL-SCNC: 135 MMOL/L (ref 136–145)
TSH SERPL DL<=0.05 MIU/L-ACNC: 1.02 UIU/ML (ref 0.27–4.2)

## 2023-09-14 PROCEDURE — 25010000002 FUROSEMIDE PER 20 MG: Performed by: INTERNAL MEDICINE

## 2023-09-14 PROCEDURE — 63710000001 INSULIN LISPRO (HUMAN) PER 5 UNITS: Performed by: INTERNAL MEDICINE

## 2023-09-14 PROCEDURE — 84443 ASSAY THYROID STIM HORMONE: CPT | Performed by: INTERNAL MEDICINE

## 2023-09-14 PROCEDURE — 82948 REAGENT STRIP/BLOOD GLUCOSE: CPT

## 2023-09-14 PROCEDURE — 80048 BASIC METABOLIC PNL TOTAL CA: CPT | Performed by: NURSE PRACTITIONER

## 2023-09-14 PROCEDURE — 63710000001 INSULIN GLARGINE PER 5 UNITS: Performed by: INTERNAL MEDICINE

## 2023-09-14 PROCEDURE — 99232 SBSQ HOSP IP/OBS MODERATE 35: CPT | Performed by: NURSE PRACTITIONER

## 2023-09-14 RX ADMIN — EMPAGLIFLOZIN 10 MG: 10 TABLET, FILM COATED ORAL at 09:01

## 2023-09-14 RX ADMIN — CARVEDILOL 25 MG: 25 TABLET, FILM COATED ORAL at 09:02

## 2023-09-14 RX ADMIN — Medication 10 ML: at 09:02

## 2023-09-14 RX ADMIN — ACETAMINOPHEN 650 MG: 325 TABLET, FILM COATED ORAL at 02:15

## 2023-09-14 RX ADMIN — FUROSEMIDE 80 MG: 10 INJECTION, SOLUTION INTRAMUSCULAR; INTRAVENOUS at 12:25

## 2023-09-14 RX ADMIN — CARVEDILOL 25 MG: 25 TABLET, FILM COATED ORAL at 21:29

## 2023-09-14 RX ADMIN — VENLAFAXINE HYDROCHLORIDE 75 MG: 75 CAPSULE, EXTENDED RELEASE ORAL at 12:37

## 2023-09-14 RX ADMIN — SPIRONOLACTONE 25 MG: 25 TABLET ORAL at 12:25

## 2023-09-14 RX ADMIN — SENNOSIDES AND DOCUSATE SODIUM 2 TABLET: 50; 8.6 TABLET ORAL at 09:01

## 2023-09-14 RX ADMIN — INSULIN LISPRO 6 UNITS: 100 INJECTION, SOLUTION INTRAVENOUS; SUBCUTANEOUS at 22:26

## 2023-09-14 RX ADMIN — FUROSEMIDE 80 MG: 10 INJECTION, SOLUTION INTRAMUSCULAR; INTRAVENOUS at 22:27

## 2023-09-14 RX ADMIN — INSULIN LISPRO 2 UNITS: 100 INJECTION, SOLUTION INTRAVENOUS; SUBCUTANEOUS at 16:55

## 2023-09-14 RX ADMIN — INSULIN GLARGINE 30 UNITS: 100 INJECTION, SOLUTION SUBCUTANEOUS at 22:27

## 2023-09-14 RX ADMIN — ACETAMINOPHEN 650 MG: 325 TABLET, FILM COATED ORAL at 12:37

## 2023-09-14 RX ADMIN — ATORVASTATIN CALCIUM 40 MG: 20 TABLET, FILM COATED ORAL at 09:01

## 2023-09-14 RX ADMIN — ASPIRIN 81 MG: 81 TABLET, COATED ORAL at 09:01

## 2023-09-14 RX ADMIN — GABAPENTIN 400 MG: 400 CAPSULE ORAL at 21:28

## 2023-09-14 RX ADMIN — INSULIN LISPRO 6 UNITS: 100 INJECTION, SOLUTION INTRAVENOUS; SUBCUTANEOUS at 12:25

## 2023-09-14 RX ADMIN — Medication 10 ML: at 22:27

## 2023-09-14 RX ADMIN — Medication 5 MG: at 21:29

## 2023-09-14 RX ADMIN — GABAPENTIN 400 MG: 400 CAPSULE ORAL at 09:01

## 2023-09-14 NOTE — PROGRESS NOTES
Name: Donnie Bishop ADMIT: 2023   : 1975  PCP: Juju Kennedy MD    MRN: [id number] LOS: 0 days   AGE/SEX: 48 y.o. male  ROOM: [facility]   Subjective   Chief Complaint   Patient presents with    Chest Pain     S/p LHC yesterday  No significant CAD  Dyspnea fair  BG 100s-200s  On insulin         Objective   Vital Signs  Temp:  [97.6 °F (36.4 °C)-98.7 °F (37.1 °C)] 97.6 °F (36.4 °C)  Heart Rate:  [81-99] 82  Resp:  [14-16] 16  BP: (105-134)/(81-96) 134/96  SpO2:  [95 %-100 %] 100 %  on   ;   Device (Oxygen Therapy): room air  Body mass index is 27.1 kg/m².    Physical Exam  HENT:      Head: Normocephalic and atraumatic.   Eyes:      General: No scleral icterus.  Cardiovascular:      Rate and Rhythm: Normal rate and regular rhythm.      Heart sounds: Normal heart sounds.   Pulmonary:      Effort: Pulmonary effort is normal. No respiratory distress.      Breath sounds: Normal breath sounds.   Abdominal:      General: There is no distension.      Palpations: Abdomen is soft.      Tenderness: There is no abdominal tenderness.   Musculoskeletal:      Cervical back: Neck supple.   Neurological:      Mental Status: He is alert.   Psychiatric:         Behavior: Behavior normal.       Results Review:       I reviewed the patient's new clinical results.  Results from last 7 days   Lab Units 23  0437 23  1318   WBC 10*3/mm3 7.60 7.71   HEMOGLOBIN g/dL 12.7* 12.9*   PLATELETS 10*3/mm3 393 419     Results from last 7 days   Lab Units 23  0900 23  0437 23  1318   SODIUM mmol/L 135* 137 135*   POTASSIUM mmol/L 4.5 4.4 4.9   CHLORIDE mmol/L 92* 98 97*   CO2 mmol/L 30.1* 26.0 23.5   BUN mg/dL 16 14 9   CREATININE mg/dL 0.95 0.85 0.86   GLUCOSE mg/dL 292* 179* 283*   Estimated Creatinine Clearance: 107.9 mL/min (by C-G formula based on SCr of 0.95 mg/dL).  Results from last 7 days   Lab Units 23  1318   ALBUMIN g/dL 4.8   BILIRUBIN mg/dL 1.3*   ALK PHOS U/L 54   AST (SGOT) U/L 22   ALT  (SGPT) U/L 25     Results from last 7 days   Lab Units 09/14/23  0900 09/13/23  0437 09/12/23  1318   CALCIUM mg/dL 9.6 9.6 9.9   ALBUMIN g/dL  --   --  4.8   MAGNESIUM mg/dL  --  2.0  --          Coag     HbA1C   Lab Results   Component Value Date    HGBA1C 8.70 (H) 09/12/2023    HGBA1C 10.40 (H) 07/12/2023    HGBA1C 8.2 (H) 02/01/2021     Infection     Radiology(recent) XR Chest 1 View    Result Date: 9/12/2023  As described.  This report was finalized on 9/12/2023 2:27 PM by Dr. Hubert Reid M.D.       HS Troponin T   Date Value Ref Range Status   09/13/2023 60 (C) <15 ng/L Final     No components found for: TSH;2    aspirin, 324 mg, Oral, Once   And  aspirin, 81 mg, Oral, Daily  atorvastatin, 40 mg, Oral, Daily  carvedilol, 25 mg, Oral, Q12H  empagliflozin, 10 mg, Oral, Daily  furosemide, 80 mg, Intravenous, Q12H  gabapentin, 400 mg, Oral, Q12H  insulin glargine, 30 Units, Subcutaneous, Nightly  insulin lispro, 2-9 Units, Subcutaneous, 4x Daily AC & at Bedtime  melatonin, 5 mg, Oral, Nightly  senna-docusate sodium, 2 tablet, Oral, BID  sodium chloride, 10 mL, Intravenous, Q12H  spironolactone, 25 mg, Oral, Daily  venlafaxine XR, 75 mg, Oral, Daily       Diet: Regular/House Diet, Diabetic Diets, Cardiac Diets; Healthy Heart (2-3 Na+); Consistent Carbohydrate; Texture: Regular Texture (IDDSI 7); Fluid Consistency: Thin (IDDSI 0)      Assessment & Plan      Active Hospital Problems    Diagnosis  POA    **Chest pain [R07.9]  Yes    New onset of congestive heart failure [I50.9]  Yes    NSTEMI, initial episode of care [I21.4]  Yes    Multiple-type hyperlipidemia [E78.2]  Yes    Type 2 diabetes mellitus with diabetic neuropathy, with long-term current use of insulin [E11.40, Z79.4]  Not Applicable    Neuropathy [G62.9]  Yes      Resolved Hospital Problems   No resolved problems to display.       DM- A1C 8.7. States was on metformin at home and 70/30 15 units qAM and 10  units qPM. DM educator to see. Likely will  plan on increasing this at discharge. On lantus 30 units and SSI here continue to monitor and adjust  Agents for HTN adjusted  Agents for NICM adjusted additionally by Cardiology. BMP in AM  On chronic gabapentin.       DW staff  Reviewed records       Lev Min MD  [facility]  09/14/23  13:23 EDT

## 2023-09-14 NOTE — NURSING NOTE
"Diabetes Education  Assessment/Teaching    Patient Name:  Donnie Bishop  YOB: 1975  MRN: [id number]  Admit Date:  9/12/2023      Assessment Date:  9/14/2023  Flowsheet Row Most Recent Value   General Information     Referral From: MD order. Meet with 47 y/o at bedside for initial assessment of DM ed needs.    Height 170.2 cm (67\")   Height Method Stated   Weight 78.5 kg (173 lb)   Weight Method Bed scale   Diabetes History    What type of diabetes do you have? Type 2   Have you had high blood sugar? (>140mg/dl) Yes. Current A1c 8.7   Do you have any diabetes complications? -- s/p mi.   Education Preferences    Barriers to Learning -- pt sitting up eating from lunch tray at this time.   Assessment Topics    Taking Medication - Assessment -- Pt reports taking 70/30 insulin via pen at home. Pt states that his MD ordered 70/30.    Healthy Coping - Assessment Competent   DM Goals    Contact Plan Follow-up medical care            Flowsheet Row Most Recent Value   DM Education Needs    Meter Has own   Medication Insulin via pen   Healthy Coping Appropriate   Discharge Plan Home, Follow-up with MD   Teaching Method Discussion   Patient Response Verbalized understanding        Other Comments:    Electronically signed by:  Makenna Randall, RN, BSN, [facility]  09/14/23 16:08 EDT  "

## 2023-09-14 NOTE — PLAN OF CARE
Goal Outcome Evaluation:  Plan of Care Reviewed With: patient        Progress: improving  Outcome Evaluation: Pt s/p cardiac catheterization, with no interventions. Right radial site to gauze & tegaderm, pulses palpable. No complaints of chest pain, IV Lasix, output documented in flowsheet. Received Lantus & slidding scale for hyperglycemia. Right eye droop appreciated, wears glasses. Pt is also hard of hearing, face him when talking. No other complaints at this time. Will continue to  monitor

## 2023-09-14 NOTE — PAYOR COMM NOTE
"Donnie Ornelas (48 y.o. Male)                             ATTENTION; INITIAL CLINICALS PENDING CASE REF #[id number]                            REPLY TO [facility] DEPT  [phone] OR CALL   ELAN GARRETT LPN                              OBSERVATION TO INPATIENT ADMISSION .               Date of Birth   1975    Social Security Number       Address   [address] [address]    Home Phone   [phone]    MRN   [id number]       Confucianist   Advent    Marital Status                               Admission Date   9/12/23    Admission Type   Emergency    Admitting Provider   Kaylee Kay MD    Attending Provider   Kaylee Kay MD    Department, Room/Bed   [facility], 3115/1       Discharge Date       Discharge Disposition       Discharge Destination                                 Attending Provider: Kaylee Kay MD    Allergies: No Known Allergies    Isolation: None   Infection: None   Code Status: CPR    Ht: 170.2 cm (67\")   Wt: 78.5 kg (173 lb)    Admission Cmt: None   Principal Problem: Chest pain [R07.9]                   Active Insurance as of 9/12/2023       Primary Coverage       Payor Plan Insurance Group Employer/Plan Group    [employer]HEM BLUE CROSS AN[employer]M BLUE CROSS BLUE SHIELD PPO [id number]       Payor Plan Address Payor Plan Phone Number Payor Plan Fax Number Effective Dates    [address] [phone]  1/1/2019 - None Entered    [place] [address]         Subscriber Name Subscriber Birth Date Member ID       DONNIE ORNELAS 1975 [id number]                     Emergency Contacts        (Rel.) Home Phone Work Phone Mobile Phone    [employer] (Brother) -- -- [phone]                 History & Physical        Randee Short APRN at 09/12/23 1649       Attestation signed by Kel Oconnor MD at 09/12/23 2021    MD ATTESTATION NOTE    The ZACHARIAH and I have discussed this patient's history, physical exam, and treatment plan.  I have " reviewed the documentation and personally had a face to face interaction with the patient. I affirm the documentation and agree with the treatment and plan.  The attached note describes my personal findings.      I provided a substantive portion of the care of the patient.  I personally performed the physical exam in its entirety, and below are my findings.      Brief HPI: Patient being admitted for further evaluation of chest pain and shortness of breath.  Patient complains of constant shortness of breath for the past several days.  Also complains of intermittent chest pain that is worse with exertion.  Chest pain does not radiate.  Denies nausea, vomiting, or sweating.  Patient has a history of hypertension, hyperlipidemia, and diabetes.  Denies history of CAD, tobacco use, PE, or family history of CAD.  ED work-up reviewed.  Chest x-ray showed bilateral opacities suggesting edema and/or pneumonia..  D-dimer is negative.  Troponin was 65.  proBNP was elevated EKG did not show any acute ischemic changes.    PHYSICAL EXAM  ED Triage Vitals   Temp Heart Rate Resp BP SpO2   09/12/23 1301 09/12/23 1301 09/12/23 1301 09/12/23 1309 09/12/23 1301   97.6 °F (36.4 °C) 116 18 (!) 171/119 98 %      Temp src Heart Rate Source Patient Position BP Location FiO2 (%)   09/12/23 1301 09/12/23 1627 09/12/23 1627 09/12/23 1627 --   Tympanic Monitor Lying Right arm        GENERAL: Awake, alert, oriented x3.  Well-developed, well-nourished male.  Resting comfortably in no acute distress  HENT: nares patent  EYES: no scleral icterus  CV: regular rhythm, normal rate  RESPIRATORY: normal effort, clear to auscultation bilaterally  ABDOMEN: soft, nontender  MUSCULOSKELETAL: Extremities are nontender.  No calf tenderness.  No pedal edema  NEURO: alert, moves all extremities, follows commands  PSYCH:  calm, cooperative  SKIN: warm, dry    Vital signs and nursing notes reviewed.        Plan:     Chest pain: Patient has multiple risk factors  for CAD.  He is currently chest pain-free.  Troponin is elevated.  He likely has some degree of heart failure as well.  He was given a dose of IV Lasix in the ED--> cardiac monitor.  Trend troponin.  Echocardiogram is pending.  Consult cardiology.                     [facility]   HISTORY AND PHYSICAL    Patient Name: Donnie Bishop  : 1975  MRN: [id number]  Primary Care Physician:  Juju Kennedy MD  Date of admission: 2023    Subjective   Subjective     Chief Complaint:   Chief Complaint   Patient presents with    Chest Pain         HPI:    Donnie Bishop is a pleasant afebrile ambulatory 48 y.o. male with a past medical history of type 2 diabetes, hypertension, and hyperlipidemia.    He presents to the emergency department [facility] today with complaint of shortness of breath.  He has been admitted to the ED observation unit for further testing and evaluation.    Patient describes worsening shortness of breath for the last week.  He was seen by his primary care provider and sent to the ER for further testing and evaluation.  He denies any known history of congestive heart failure.  He denies ever being prescribed a diuretic.    Patient states he developed some chest pain today, this was alleviated after being given a dose of Lasix in the emergency department.  He endorses orthopnea and shortness of breath.  He denies any fevers or chills.  States he has been out of his lisinopril and gabapentin.      Review of Systems   All systems were reviewed and negative except for: Chest pain, shortness of breath    Personal History     Past Medical History:   Diagnosis Date    Anxiety     Cough     Inability to attain erection     Injury of acoustic nerve     Myalgia     Type II diabetes mellitus     Venous insufficiency     Viral illness     Vitamin D deficiency        History reviewed. No pertinent surgical history.    Family History: family history includes Diabetes in his mother. Otherwise  pertinent FHx was reviewed and not pertinent to current issue.    Social History:  reports that he has never smoked. He has never used smokeless tobacco. He reports that he does not drink alcohol and does not use drugs.    Home Medications:  atorvastatin, gabapentin, insulin aspart prot & aspart, lisinopril, melatonin, metFORMIN, metaxalone, metoprolol succinate XL, naproxen, ondansetron ODT, raNITIdine HCl, venlafaxine XR, and vitamin D    Allergies:  No Known Allergies    Objective   Objective     Vitals:   Temp:  [97 °F (36.1 °C)-97.6 °F (36.4 °C)] 97.6 °F (36.4 °C)  Heart Rate:  [100-116] 100  Resp:  [18] 18  BP: (143-171)/() 156/107  Physical Exam    Constitutional: Awake, alert   Eyes: PERRLA, sclerae anicteric, no conjunctival injection   HENT: NCAT, mucous membranes moist   Neck: Supple, no thyromegaly, no lymphadenopathy, trachea midline   Respiratory: Breath sounds diminished to bilateral lower lobes otherwise clear to auscultation bilaterally, nonlabored respirations    Cardiovascular: RRR, no murmurs, rubs, or gallops, palpable pedal pulses bilaterally   Gastrointestinal: Positive bowel sounds, soft, nontender, nondistended   Musculoskeletal: No bilateral ankle edema, no clubbing or cyanosis to extremities   Psychiatric: Appropriate affect, cooperative   Neurologic: Oriented x 3, strength symmetric in all extremities, Cranial Nerves grossly intact to confrontation, speech clear   Skin: No rashes     Result Review    Result Review:  I have personally reviewed the results from the time of this admission to 9/12/2023 16:49 EDT and agree with these findings:  [x]  Laboratory list / accordion  []  Microbiology  [x]  Radiology  [x]  EKG/Telemetry   []  Cardiology/Vascular   []  Pathology  []  Old records  []  Other:  Most notable findings include: High-sensitivity troponin 65, 73, proBNP 1142, blood glucose 283, chest x-ray shows cardiomegaly with hazy bilateral opacities suggesting edema without  evidence of pleural effusion or pneumothorax.      Assessment & Plan   Assessment / Plan     Brief Patient Summary:  Donnie Bishop is a 48 y.o. male who is being evaluated for chest pain and shortness of breath    Active Hospital Problems:  Active Hospital Problems    Diagnosis     **Chest pain      Plan:     Chest pain  Consult to cardiology  High sensitivity troponin 65, 73  ProBNP 1,142  Echocardiogram pending  Cardiac diet, NPO after midnight  Negative D-dimer  Telemetry  Lasix 80mg IV X 1 in the ED    Hypertension  Vitals every 4 hours  Continue home medications    Type 2 Diabetes  Accuchecks QAC & QHS  Hold metformin  Moderate correctional insulin protocol initiated  A1c pending    DVT prophylaxis:  No DVT prophylaxis order currently exists.    CODE STATUS:    Level Of Support Discussed With: Patient  Code Status (Patient has no pulse and is not breathing): CPR (Attempt to Resuscitate)  Medical Interventions (Patient has pulse or is breathing): Full Support    Admission Status:  I believe this patient meets observation status.    Electronically signed by MICHELLE Crump, 09/12/23, 4:49 PM EDT.      79 minutes has been spent by [facility] providers in the care of this patient while under observation status      I have worn appropriate PPE during this patient encounter, sanitized my hands both with entering and exiting patient's room.             Electronically signed by Kel Oconnor MD at 09/12/23 2021          Emergency Department Notes        Kunal Soriano MD at 09/12/23 1306           EMERGENCY DEPARTMENT ENCOUNTER  Room Number:  14/14  Date of encounter:  9/12/2023  PCP: Juju Kennedy MD  Patient Care Team:  Juju Kennedy MD as PCP - General (Family Medicine)     HPI:  Context: Donnie Bishop is a 48 y.o. male who presents to the ED c/o chief complaint of chest pain and shortness of breath.  Patient reports he has been having symptoms for last week and a  half.  Patient reports shortness of breath is constant, worse with exertion.  Patient complains of diffuse chest pressure brought on by exertion, relieved with rest.  Patient denies any radiation to his neck or extremities, no associated nausea vomiting or diaphoresis.  Patient denies any abdominal pain, eating drinking normally, no cough or upper respiratory symptoms, no fevers.  Patient denies any cardiac history, does have a history of hypertension hyperlipidemia and diabetes, patient is non-smoker, denies family history of first-degree relative with MI but reports that his mother  of a stroke.No history of DVT or PE.  No recent hemoptysis.  No unilateral leg swelling.  The patient is not being treated for a malignancy.  Patient denies any recent major trauma, surgery, immobilization.    MEDICAL HISTORY REVIEW  Reviewed in EPIC    PAST MEDICAL HISTORY  Active Ambulatory Problems     Diagnosis Date Noted    Multiple-type hyperlipidemia     Type 2 diabetes mellitus with diabetic neuropathy, with long-term current use of insulin     Neuropathy 2014    Vitamin D deficiency 2015    Benign essential HTN 2014    Inability to attain erection 2019    Injury of acoustic nerve 2019    Hypogonadism in male 2019    Anxiety      Resolved Ambulatory Problems     Diagnosis Date Noted    No Resolved Ambulatory Problems     Past Medical History:   Diagnosis Date    Cough     Myalgia     Type II diabetes mellitus     Venous insufficiency     Viral illness        PAST SURGICAL HISTORY  No past surgical history on file.    FAMILY HISTORY  Family History   Problem Relation Age of Onset    Diabetes Mother        SOCIAL HISTORY  Social History     Socioeconomic History    Marital status:    Tobacco Use    Smoking status: Never    Smokeless tobacco: Never   Substance and Sexual Activity    Alcohol use: Never    Drug use: Never    Sexual activity: Defer       ALLERGIES  Patient has no known  allergies.    The patient's allergies have been reviewed    REVIEW OF SYSTEMS  All systems reviewed and negative except for those discussed in HPI.     PHYSICAL EXAM  I have reviewed the triage vital signs and nursing notes.  ED Triage Vitals [09/12/23 1301]   Temp Heart Rate Resp BP SpO2   97.6 °F (36.4 °C) 116 18 -- 98 %      Temp src Heart Rate Source Patient Position BP Location FiO2 (%)   Tympanic -- -- -- --       General: No acute distress.  HENT: NCAT, PERRL, Nares patent.  Eyes: no scleral icterus.  Neck: trachea midline, no ROM limitations.  CV: regular rhythm, regular rate.  Respiratory: normal effort, CTAB.  Abdomen: soft, nondistended, NTTP, no rebound tenderness, no guarding or rigidity.  Musculoskeletal: no deformity.  Neuro: alert, moves all extremities, follows commands.  Skin: warm, dry.Bilateral lower extremities: No edema, no redness warmth or skin changes, no palpable cords, negative Homans.      LAB RESULTS  Recent Results (from the past 24 hour(s))   POCT SARS-CoV-2 Antigen DALLIN + Flu    Collection Time: 09/12/23 12:04 PM    Specimen: Swab   Result Value Ref Range    SARS Antigen Not Detected Not Detected, Presumptive Negative    Influenza A Antigen DALLIN Not Detected Not Detected    Influenza B Antigen DALLIN Not Detected Not Detected    Internal Control Passed Passed    Lot Number [id number]     Expiration Date 04/18/2024    ECG 12 Lead Chest Pain    Collection Time: 09/12/23  1:08 PM   Result Value Ref Range    QT Interval 332 ms    QTC Interval 458 ms   D-dimer, Quantitative    Collection Time: 09/12/23  1:18 PM    Specimen: Blood   Result Value Ref Range    D-Dimer, Quantitative <0.27 0.00 - 0.50 MCGFEU/mL   Comprehensive Metabolic Panel    Collection Time: 09/12/23  1:18 PM    Specimen: Blood   Result Value Ref Range    Glucose 283 (H) 65 - 99 mg/dL    BUN 9 6 - 20 mg/dL    Creatinine 0.86 0.76 - 1.27 mg/dL    Sodium 135 (L) 136 - 145 mmol/L    Potassium 4.9 3.5 - 5.2 mmol/L    Chloride 97 (L)  98 - 107 mmol/L    CO2 23.5 22.0 - 29.0 mmol/L    Calcium 9.9 8.6 - 10.5 mg/dL    Total Protein 6.8 6.0 - 8.5 g/dL    Albumin 4.8 3.5 - 5.2 g/dL    ALT (SGPT) 25 1 - 41 U/L    AST (SGOT) 22 1 - 40 U/L    Alkaline Phosphatase 54 39 - 117 U/L    Total Bilirubin 1.3 (H) 0.0 - 1.2 mg/dL    Globulin 2.0 gm/dL    A/G Ratio 2.4 g/dL    BUN/Creatinine Ratio 10.5 7.0 - 25.0    Anion Gap 14.5 5.0 - 15.0 mmol/L    eGFR 108.1 >60.0 mL/min/1.73   High Sensitivity Troponin T    Collection Time: 09/12/23  1:18 PM    Specimen: Blood   Result Value Ref Range    HS Troponin T 65 (C) <15 ng/L   Green Top (Gel)    Collection Time: 09/12/23  1:18 PM   Result Value Ref Range    Extra Tube Hold for add-ons.    Lavender Top    Collection Time: 09/12/23  1:18 PM   Result Value Ref Range    Extra Tube hold for add-on    Gold Top - SST    Collection Time: 09/12/23  1:18 PM   Result Value Ref Range    Extra Tube Hold for add-ons.    Light Blue Top    Collection Time: 09/12/23  1:18 PM   Result Value Ref Range    Extra Tube Hold for add-ons.    CBC Auto Differential    Collection Time: 09/12/23  1:18 PM    Specimen: Blood   Result Value Ref Range    WBC 7.71 3.40 - 10.80 10*3/mm3    RBC 4.24 4.14 - 5.80 10*6/mm3    Hemoglobin 12.9 (L) 13.0 - 17.7 g/dL    Hematocrit 38.7 37.5 - 51.0 %    MCV 91.3 79.0 - 97.0 fL    MCH 30.4 26.6 - 33.0 pg    MCHC 33.3 31.5 - 35.7 g/dL    RDW 11.6 (L) 12.3 - 15.4 %    RDW-SD 38.4 37.0 - 54.0 fl    MPV 10.2 6.0 - 12.0 fL    Platelets 419 140 - 450 10*3/mm3    Neutrophil % 68.5 42.7 - 76.0 %    Lymphocyte % 25.0 19.6 - 45.3 %    Monocyte % 5.4 5.0 - 12.0 %    Eosinophil % 0.5 0.3 - 6.2 %    Basophil % 0.5 0.0 - 1.5 %    Immature Grans % 0.1 0.0 - 0.5 %    Neutrophils, Absolute 5.27 1.70 - 7.00 10*3/mm3    Lymphocytes, Absolute 1.93 0.70 - 3.10 10*3/mm3    Monocytes, Absolute 0.42 0.10 - 0.90 10*3/mm3    Eosinophils, Absolute 0.04 0.00 - 0.40 10*3/mm3    Basophils, Absolute 0.04 0.00 - 0.20 10*3/mm3    Immature  Grans, Absolute 0.01 0.00 - 0.05 10*3/mm3    nRBC 0.0 0.0 - 0.2 /100 WBC       I ordered the above labs and reviewed the results.    RADIOLOGY  XR Chest 1 View    Result Date: 9/12/2023  XR CHEST 1 VW-  HISTORY: Male who is 48 years-old, chest pain  TECHNIQUE: Frontal view of the chest  COMPARISON: 6/15/2021  FINDINGS: The cardiac silhouette is enlarged suggesting cardiomegaly and/or pericardial effusion. Hazy bilateral opacities suggest edema and/or pneumonia, follow-up recommended. No large pleural effusion. No pneumothorax. No acute osseous process.      As described.  This report was finalized on 9/12/2023 2:27 PM by Dr. Hubert Reid M.D.       I ordered the above noted radiological studies. I reviewed the images and results. I agree with the radiologist interpretation.    PROCEDURES  Procedures    MEDICATIONS GIVEN IN ER  Medications   sodium chloride 0.9 % flush 10 mL (has no administration in time range)   aspirin chewable tablet 324 mg (has no administration in time range)   furosemide (LASIX) injection 80 mg (has no administration in time range)       PROGRESS, DATA ANALYSIS, CONSULTS, AND MEDICAL DECISION MAKING  A complete history and physical exam have been performed.  All available laboratory and imaging results have been reviewed by myself prior to disposition.    MDM    After the initial H&P, I discussed pertinent information from history and physical exam with patient/family.  Discussed differential diagnosis.  Discussed plan for ED evaluation/workup/treatment.  All questions answered.  Patient/family is agreeable with plan.  ED Course as of 09/12/23 1437   Tue Sep 12, 2023   1306 My differential diagnosis for chest pain includes but is not limited to:  Muscle strain, costochondritis, myositis, pleurisy, rib fracture, intercostal neuritis, herpes zoster, tumor, myocardial infarction, coronary syndrome, unstable angina, angina, aortic dissection, mitral valve prolapse, pericarditis,  palpitations, pulmonary embolus, pneumonia, pneumothorax, lung cancer, GERD, esophagitis, esophageal spasm     [JG]   1306 My differential diagnosis for dyspnea includes but is not limited to:  Asthma, COPD, pneumonia, pulmonary embolus, acute respiratory distress syndrome, pneumothorax, pleural effusion, pulmonary fibrosis, congestive heart failure, myocardial infarction, DKA, uremia, acidosis, sepsis, anemia, drug related, hyperventilation, CNS disease     [JG]   1313 EKG independently viewed and contemporaneously interpreted by ED physician. Time: 1308.  Rate 114.  Interpretation: Sinus tachycardia, normal axis, normal QRS, no acute ST changes. [JG]   1415 Reviewed chest x-ray in PACS, patient has pulmonary edema per my read. [JG]   1415 Wells low risk, dimer negative, PE ruled out. [JG]   1415 High-sensitivity troponin elevated at 65, EKG shows no acute findings.  Patient has been having symptoms for extended amount of time, this does not seem consistent with ACS, I am suspicious of new onset heart failure, obtaining repeat troponin to ensure it is not uptrending. [JG]   1416 HEART SCORE:    History #0   (Highly suspicious 2, Moderately suspicious 1, Slightly or non-suspicious 0)    ECG #0  (Significant ST depression 2,  Nonspecific repol disturbance 1, Normal 0)    Age #1  (> or = 65 2, 46-65 1,  < or = 45 0)    Risk factors #2  (hypercholesterolemia, HTN, DM, smoking, pos fam hx, obesity)  (> or = to 3 RF 2, 1 or 2 1, No risk factors 0)    Troponin #3  (> or = 3x normal limit 2, 1-3x normal limit 1, < or = Normal limit 0)    HEART Score Key:  Scores 0-3: 0.9-1.7% risk of adverse cardiac event. In the HEART Score study, these patients were discharged (0.99% in the retrospective study, 1.7% in the prospective study)  Scores 4-6: 12-16.6% risk of adverse cardiac event. In the HEART Score study, these patients were admitted to the hospital. (11.6% retrospective, 16.6% prospective)  Scores =7: 50-65% risk of  adverse cardiac event. In the HEART Score study, these patients were candidates for early invasive measures. (65.2% retrospective, 50.1% prospective)      This patient's HEART score is 6     [JG]   1423 I reviewed patient's primary care office visit note from today, patient was complaining of shortness of breath for weeks.  Patient was tested for COVID and flu which was negative, had EKG, sent to the emergency department for evaluation. [JG]   1423 Patient reassessed.  Discussed ED findings, differential diagnosis, and the need for admission for evaluation/treatment.  They are agreeable to admission and all questions were answered.     [JG]   1437 Phone call with [employer], ZACHARIAH with [employer].  Discussed the patient, relevant history, exam, diagnostics, ED findings/progress, and concerns. They agree to admit the patient to telemetry observation under Dr. Oconnor. Care assumed by the admitting physician at this time.     [JG]      ED Course User Index  [JG] Kunal Soriano MD       AS OF 14:37 EDT VITALS:    BP - 148/100  HR - 108  TEMP - 97.6 °F (36.4 °C) (Tympanic)  O2 SATS - 97%    DIAGNOSIS  Final diagnoses:   Chest pain, unspecified type   Elevated troponin   Abnormal CXR   Hyperglycemia         DISPOSITION  ADMISSION    Discussed treatment plan and reason for admission with pt/family and admitting physician.  Pt/family voiced understanding of the plan for admission for further testing/treatment as needed.        Kunal Soriano MD  09/12/23 1437      Electronically signed by Kunal Soriano MD at 09/12/23 1437       Frankie Benson RN at 09/12/23 1301          Patient to ED c/o shortness of breath and chest pain x week and a half.     Electronically signed by Frankie Benson RN at 09/12/23 1301       Vital Signs (last 3 days)       Date/Time Temp Temp src Pulse Resp BP Patient Position SpO2    09/14/23 1237 -- -- 82 -- 134/96 -- --    09/14/23 1150 97.6 (36.4) Oral -- 16 134/96 Sitting --    09/14/23 0901 -- --  81 -- -- -- --    09/14/23 0755 98.5 (36.9) Oral -- 16 127/89 Sitting 100    09/14/23 0345 97.9 (36.6) Oral 81 16 105/81 Lying 95    09/13/23 2354 98.5 (36.9) Oral 88 14 127/95 Lying 97    09/13/23 2217 -- -- 90 -- 126/93 -- --    09/13/23 1955 98.7 (37.1) Oral 89 16 129/94 Lying 98    09/13/23 1619 98.2 (36.8) Oral 99 16 134/86 Sitting 98    09/13/23 1300 -- -- 101 -- 131/91 -- 90    09/13/23 1245 -- -- 101 -- 145/96 -- 90    09/13/23 1230 -- -- 102 -- 140/96 -- 92    09/13/23 1215 -- -- 98 -- 146/102 -- 92    09/13/23 1206 -- -- 101 -- 147/100 Lying --    09/13/23 1205 98 (36.7) Oral 104 16 150/101 Lying --    09/13/23 1145 -- -- 103 15 133/111 -- 100    09/13/23 1130 -- -- 99 16 144/112 -- 100    09/13/23 1115 -- -- 99 17 145/100 -- 99    09/13/23 1100 -- -- 98 14 146/102 -- 100    09/13/23 1045 -- -- 100 15 137/97 -- 85     SpO2: spo2 dipped to low 80s while patient sleeping, O2 applied and back in high 90's at 09/13/23 1045    09/13/23 1041 -- -- 101 16 142/98 -- 95    09/13/23 10:30:08 -- -- -- 15 -- -- --    09/13/23 10:25:10 -- -- -- 13 -- -- --    09/13/23 10:20:19 -- -- -- 10 -- -- --    09/13/23 10:14:43 -- -- -- 14 -- -- --    09/13/23 10:09:56 -- -- -- 12 -- -- --    09/13/23 10:04:54 -- -- -- 9 -- -- --    09/13/23 10:02:25 -- -- -- -- -- -- 90    09/13/23 10:00:05 -- -- -- 14 -- -- --    09/13/23 0955 -- -- -- 16 -- -- --    09/13/23 0807 97.6 (36.4) Oral 98 16 146/109  Lying 100    BP: rn notified at 09/13/23 0807    09/13/23 0438 98.2 (36.8) Oral 111 18 149/115 Sitting 99    09/12/23 2356 98.4 (36.9) Oral 102 18 125/92 Lying 96    09/12/23 2018 98.1 (36.7) Oral 112 18 145/101 Sitting 100    09/12/23 1627 97.6 (36.4) Oral 100 18 156/107 Lying 99    09/12/23 1332 -- -- 108 -- -- -- 97    09/12/23 1331 -- -- -- -- 148/100 -- --    09/12/23 1309 -- -- -- -- 171/119 -- --    09/12/23 1301 97.6 (36.4) Tympanic 116 18 -- -- 98          Oxygen Therapy (last 3 days)       Date/Time SpO2 Device (Oxygen  Therapy) Flow (L/min) Oxygen Concentration (%) ETCO2 (mmHg)    09/14/23 1234 -- room air -- -- --    09/14/23 1150 -- room air -- -- --    09/14/23 0901 -- room air -- -- --    09/14/23 0755 100 room air -- -- --    09/14/23 0403 -- room air -- -- --    09/14/23 0345 95 room air -- -- --    09/14/23 0003 -- room air -- -- --    09/13/23 2354 97 room air -- -- --    09/13/23 2007 -- room air -- -- --    09/13/23 1955 98 room air -- -- --    09/13/23 1619 98 -- -- -- --    09/13/23 1300 90 -- -- -- --    09/13/23 1245 90 -- -- -- --    09/13/23 1230 92 -- -- -- --    09/13/23 1215 92 -- -- -- --    09/13/23 1145 100 nasal cannula 2 -- --    09/13/23 1130 100 nasal cannula 2 -- --    09/13/23 1115 99 nasal cannula 2 -- --    09/13/23 1100 100 nasal cannula 2 -- --    09/13/23 1045 85 nasal cannula 2 -- --    09/13/23 1041 95 room air -- -- --    09/13/23 10:02:25 90 nasal cannula with ETCO2 3 -- --    09/13/23 0807 100 room air -- -- --    09/13/23 0438 99 room air -- -- --    09/13/23 0030 -- room air -- -- --    09/12/23 2356 96 room air -- -- --    09/12/23 2018 100 room air -- -- --    09/12/23 2011 -- room air -- -- --    09/12/23 1627 99 room air -- -- --    09/12/23 1332 97 -- -- -- --    09/12/23 1301 98 room air -- -- --          Facility-Administered Medications as of 9/14/2023   Medication Dose Route Frequency Provider Last Rate Last Admin    acetaminophen (TYLENOL) tablet 650 mg  650 mg Oral Q4H PRN Kaylee Kay MD   650 mg at 09/14/23 1237    [COMPLETED] aspirin chewable tablet 324 mg  324 mg Oral Once Kunal Soriano MD   324 mg at 09/12/23 1443    aspirin chewable tablet 324 mg  324 mg Oral Once Kaylee Kay MD        And    aspirin EC tablet 81 mg  81 mg Oral Daily Kaylee Kay MD   81 mg at 09/14/23 0901    atorvastatin (LIPITOR) tablet 40 mg  40 mg Oral Daily Kaylee Kay MD   40 mg at 09/14/23 0901    sennosides-docusate (PERICOLACE) 8.6-50 MG per tablet 2 tablet  2 tablet Oral BID Eliza  MD Kaylee   2 tablet at 09/14/23 0901    And    polyethylene glycol (MIRALAX) packet 17 g  17 g Oral Daily PRN Kaylee Kay MD        And    bisacodyl (DULCOLAX) EC tablet 5 mg  5 mg Oral Daily PRN Kaylee Kay MD        And    bisacodyl (DULCOLAX) suppository 10 mg  10 mg Rectal Daily PRN Kaylee Kay MD        carvedilol (COREG) tablet 25 mg  25 mg Oral Q12H Kaylee Kay MD   25 mg at 09/14/23 0902    dextrose (D50W) (25 g/50 mL) IV injection 25 g  25 g Intravenous Q15 Min PRN Kaylee Kay MD        dextrose (GLUTOSE) oral gel 15 g  15 g Oral Q15 Min PRN Kaylee Kay MD        empagliflozin (JARDIANCE) tablet 10 mg  10 mg Oral Daily Kaylee Kay MD   10 mg at 09/14/23 0901    [COMPLETED] furosemide (LASIX) injection 80 mg  80 mg Intravenous Once Kunal Soriano MD   80 mg at 09/12/23 1443    furosemide (LASIX) injection 80 mg  80 mg Intravenous Q12H Kaylee Kay MD   80 mg at 09/14/23 1225    gabapentin (NEURONTIN) capsule 400 mg  400 mg Oral Q12H Kaylee Kay MD   400 mg at 09/14/23 0901    glucagon (GLUCAGEN) injection 1 mg  1 mg Intramuscular Q15 Min PRN Kaylee Kay MD        insulin glargine (LANTUS, SEMGLEE) injection 30 Units  30 Units Subcutaneous Nightly Divina Flores MD   30 Units at 09/13/23 2218    insulin lispro (HUMALOG/ADMELOG) injection 2-9 Units  2-9 Units Subcutaneous 4x Daily AC & at Bedtime Kaylee Kay MD   6 Units at 09/14/23 1225    [COMPLETED] ketorolac (TORADOL) injection 15 mg  15 mg Intravenous Once PRN Lisa Sanford APRN   15 mg at 09/13/23 0518    melatonin tablet 5 mg  5 mg Oral Nightly Kaylee Kay MD   5 mg at 09/13/23 0664    nitroglycerin (NITROSTAT) SL tablet 0.4 mg  0.4 mg Sublingual Q5 Min PRN Kaylee Kay MD        nitroglycerin (NITROSTAT) SL tablet 0.4 mg  0.4 mg Sublingual Q5 Min PRN Kaylee Kay MD        ondansetron ODT (ZOFRAN-ODT) disintegrating tablet 4 mg  4 mg Oral Q8H PRN Kaylee Kay MD        [COMPLETED] perflutren  (DEFINITY) 8.476 mg in Sodium Chloride (PF) 0.9 % 10 mL injection  2 mL Intravenous Once in imaging Randee Short, APRN   2 mL at 09/12/23 1931    sodium chloride 0.9 % flush 10 mL  10 mL Intravenous PRN Kaylee Kay MD        sodium chloride 0.9 % flush 10 mL  10 mL Intravenous Q12H Kaylee Kay MD   10 mL at 09/14/23 0902    sodium chloride 0.9 % flush 10 mL  10 mL Intravenous PRN Kaylee Kay MD        [COMPLETED] sodium chloride 0.9 % infusion    Code / Trauma / Sedation Continuous Med Kaylee Kay MD   Stopped at 09/13/23 1645    spironolactone (ALDACTONE) tablet 25 mg  25 mg Oral Daily Kaylee Kay MD   25 mg at 09/14/23 1225    venlafaxine XR (EFFEXOR-XR) 24 hr capsule 75 mg  75 mg Oral Daily Kaylee Kay MD   75 mg at 09/14/23 1237     Orders (last 72 hrs)        Start     Ordered    09/15/23 0600  Basic Metabolic Panel  Morning Draw         09/14/23 1326    09/14/23 1319  Inpatient Admission  Once         09/14/23 1319    09/14/23 1058  POC Glucose Once  PROCEDURE ONCE        Comments: Complete no more than 45 minutes prior to patient eating      09/14/23 1051    09/14/23 0953  Basic Metabolic Panel  Once         09/14/23 0953    09/14/23 0951  Daily Weights  Daily       09/14/23 0950    09/14/23 0830  TSH Rfx On Abnormal To Free T4  Once         09/14/23 0829    09/14/23 0618  POC Glucose Once  PROCEDURE ONCE        Comments: Complete no more than 45 minutes prior to patient eating      09/14/23 0616    09/13/23 2100  insulin glargine (LANTUS, SEMGLEE) injection 30 Units  Nightly         09/13/23 1819 09/13/23 2031  POC Glucose Once  PROCEDURE ONCE        Comments: Complete no more than 45 minutes prior to patient eating      09/13/23 2029 09/13/23 1933  Diabetes Educator Consult  Once        Provider:  (Not yet assigned)    09/13/23 1933    09/13/23 1812  POC Glucose Once  PROCEDURE ONCE        Comments: Complete no more than 45 minutes prior to patient eating      09/13/23 5085     09/13/23 1800  furosemide (LASIX) tablet 40 mg  2 Times Daily (Diuretics),   Status:  Discontinued         09/13/23 0925    09/13/23 1607  Inpatient Hospitalist Consult  Once        Specialty:  Hospitalist  Provider:  Steven Rivera MD    09/13/23 1607    09/13/23 1605  gabapentin (NEURONTIN) capsule 400 mg  Every 12 Hours Scheduled         09/13/23 1605    09/13/23 1555  POC Glucose Once  PROCEDURE ONCE        Comments: Complete no more than 45 minutes prior to patient eating      09/13/23 1552    09/13/23 1555  POC Glucose Once  PROCEDURE ONCE        Comments: Complete no more than 45 minutes prior to patient eating      09/13/23 1553    09/13/23 1424  Diet: Regular/House Diet, Diabetic Diets, Cardiac Diets; Healthy Heart (2-3 Na+); Consistent Carbohydrate; Texture: Regular Texture (IDDSI 7); Fluid Consistency: Thin (IDDSI 0)  Diet Effective Now         09/13/23 1423    09/13/23 1200  Strict Intake & Output  Every 4 Hours       09/13/23 1035    09/13/23 1200  Incentive Spirometry  Every 2 Hours While Awake       09/13/23 1035    09/13/23 1100  empagliflozin (JARDIANCE) tablet 10 mg  Daily         09/13/23 0925    09/13/23 1037  furosemide (LASIX) injection 80 mg  Every 12 Hours         09/13/23 1035    09/13/23 1037  spironolactone (ALDACTONE) tablet 25 mg  Daily         09/13/23 1035    09/13/23 1035  Vital Signs, Site Check & Distal Extremity Check for Warmth, Color, Sensation & Pulses  Per Order Details         09/13/23 1035    09/13/23 1035  Continuous Cardiac Monitoring  Continuous        Comments: Follow Standing Orders As Outlined in Process Instructions (Open Order Report to View Full Instructions)    09/13/23 1035    09/13/23 1035  Telemetry - Maintain IV Access  Continuous         09/13/23 1035    09/13/23 1035  Telemetry - Place Orders & Notify Provider of Results When Patient Experiences Acute Chest Pain, Dysrhythmia or Respiratory Distress  Until Discontinued         09/13/23 1035    09/13/23 1035   May Be Off Telemetry for Tests  Continuous         09/13/23 1035    09/13/23 1035  Encourage Fluids  Until Discontinued         09/13/23 1035    09/13/23 1035  Continuous Pulse Oximetry  Continuous         09/13/23 1035    09/13/23 1035  Advance Diet As Tolerated -  Until Discontinued         09/13/23 1035    09/13/23 1035  Notify MD if platelet count is less than 100,000, is less than 1/2 baseline, or if Hgb drops by more than 3mg/dl.  Until Discontinued         09/13/23 1035    09/13/23 1035  Notify MD of hypotension (SBP less than 95), bleeding, or dysrythmia and follow Sheath Removal Policy if needed.  Continuous         09/13/23 1035    09/13/23 1035  Hold metFORMIN (GLUCOPHAGE) for 48 Hours  Continuous         09/13/23 1035    09/13/23 1035  Remove Radial Pressure Device / Dressing  Once         09/13/23 1035 09/13/23 1035  Vital Signs & Reverse Carlos Alberto's Test (While Radial Compression Device in Place)  Per Order Details        Comments: Every 15 Minutes x4, Every 30 Minutes x4, & Every 1 Hour x2    09/13/23 1035    09/13/23 1035  Keep Affected Arm Straight & Elevated  Continuous         09/13/23 1035    09/13/23 1035  Activity As Tolerated  Until Discontinued         09/13/23 1035 09/13/23 1035  Diet: Regular/House Diet; Texture: Regular Texture (IDDSI 7); Fluid Consistency: Thin (IDDSI 0)  Diet Effective Now,   Status:  Canceled         09/13/23 1035    09/13/23 1034  acetaminophen (TYLENOL) tablet 650 mg  Every 4 Hours PRN         09/13/23 1035    09/13/23 1034  nitroglycerin (NITROSTAT) SL tablet 0.4 mg  Every 5 Minutes PRN         09/13/23 1035    09/13/23 1026  iopamidol (ISOVUE-370) 76 % injection  Code / Trauma / Sedation Medication,   Status:  Discontinued         09/13/23 1026    09/13/23 1015  carvedilol (COREG) tablet 25 mg  Every 12 Hours Scheduled         09/13/23 0925 09/13/23 1013  nitroGLYCERIN 200 mcg/mL 30 mL syringe  Code / Trauma / Sedation Medication,   Status:  Discontinued          09/13/23 1013    09/13/23 1012  verapamil (ISOPTIN) injection  Code / Trauma / Sedation Medication,   Status:  Discontinued         09/13/23 1013    09/13/23 1012  heparin (porcine) injection  Code / Trauma / Sedation Medication,   Status:  Discontinued         09/13/23 1012    09/13/23 1011  lidocaine (XYLOCAINE) 2% injection  Code / Trauma / Sedation Medication,   Status:  Discontinued         09/13/23 1011    09/13/23 0958  fentaNYL citrate (PF) (SUBLIMAZE) injection  Code / Trauma / Sedation Medication,   Status:  Discontinued         09/13/23 0958    09/13/23 0958  midazolam (VERSED) injection  Code / Trauma / Sedation Medication,   Status:  Discontinued         09/13/23 0958    09/13/23 0956  sodium chloride 0.9 % infusion  Code / Trauma / Sedation Continuous Med         09/13/23 0957    09/13/23 0902  Cardiac Catheterization/Vascular Study  Once         09/13/23 0901    09/13/23 0900  aspirin EC tablet 81 mg  Daily        See Hyperspace for full Linked Orders Report.    09/12/23 1438    09/13/23 0900  atorvastatin (LIPITOR) tablet 40 mg  Daily         09/12/23 1746    09/13/23 0900  lisinopril (PRINIVIL,ZESTRIL) tablet 40 mg  Daily,   Status:  Discontinued         09/12/23 1746    09/13/23 0900  venlafaxine XR (EFFEXOR-XR) 24 hr capsule 75 mg  Daily         09/12/23 1746    09/13/23 0900  metoprolol succinate XL (TOPROL-XL) 24 hr tablet 50 mg  Daily,   Status:  Discontinued         09/12/23 1746    09/13/23 0900  furosemide (LASIX) tablet 40 mg  Daily,   Status:  Discontinued         09/13/23 0650    09/13/23 0844  Transfer Patient  Once         09/13/23 0843    09/13/23 0844  Initiate Observation Status  Once         09/13/23 0843    09/13/23 0842  Case Request Cath Lab: Left Heart Cath  Once         09/13/23 0841    09/13/23 0749  POC Glucose Once  PROCEDURE ONCE        Comments: Complete no more than 45 minutes prior to patient eating      09/13/23 0746    09/13/23 0600  Basic Metabolic Panel  Morning  Draw         09/12/23 1438    09/13/23 0600  CBC (No Diff)  Morning Draw         09/12/23 1438    09/13/23 0600  High Sensitivity Troponin T  Morning Draw         09/12/23 1438    09/13/23 0600  Magnesium  Morning Draw         09/12/23 1438    09/13/23 0500  ECG 12 Lead Chest Pain  Tomorrow AM,   Status:  Canceled         09/12/23 1438    09/13/23 0447  ketorolac (TORADOL) injection 15 mg  Once As Needed         09/13/23 0448    09/13/23 0033  POC Glucose Once  PROCEDURE ONCE        Comments: Complete no more than 45 minutes prior to patient eating      09/13/23 0031    09/13/23 0001  NPO Diet NPO Type: Strict NPO  Diet Effective Midnight,   Status:  Canceled         09/12/23 1438    09/13/23 0001  NPO Diet NPO Type: Sips with Meds  Diet Effective Midnight,   Status:  Canceled         09/12/23 1747    09/12/23 2200  POC Glucose 4x Daily Before Meals & at Bedtime  4 Times Daily Before Meals & at Bedtime      Comments: Complete no more than 45 minutes prior to patient eating      09/12/23 1746    09/12/23 2100  sennosides-docusate (PERICOLACE) 8.6-50 MG per tablet 2 tablet  2 Times Daily        See Hyperspace for full Linked Orders Report.    09/12/23 1438    09/12/23 2100  gabapentin (NEURONTIN) capsule 400 mg  3 Times Daily,   Status:  Discontinued         09/12/23 1746 09/12/23 2100  melatonin tablet 5 mg  Nightly         09/12/23 1746    09/12/23 2100  metaxalone (SKELAXIN) tablet 800 mg  3 Times Daily,   Status:  Discontinued         09/12/23 1746    09/12/23 2030  perflutren (DEFINITY) 8.476 mg in Sodium Chloride (PF) 0.9 % 10 mL injection  Once in Imaging         09/12/23 1931 09/12/23 1951  acetaminophen (TYLENOL) tablet 650 mg  Every 6 Hours PRN,   Status:  Discontinued         09/12/23 1951 09/12/23 1909  Urine Drug Screen - Urine, Clean Catch  Once         09/12/23 1908 09/12/23 1845  insulin lispro (HUMALOG/ADMELOG) injection 2-9 Units  4 Times Daily Before Meals & Nightly         09/12/23 5484     09/12/23 1749  Diet: Cardiac Diets, Fluid Restriction (240 mL/tray) Diets; Healthy Heart (2-3 Na+); 1500 mL/day; Texture: Regular Texture (IDDSI 7); Fluid Consistency: Thin (IDDSI 0)  Diet Effective Now,   Status:  Canceled         09/12/23 1748    09/12/23 1747  Hemoglobin A1c  Once         09/12/23 1746    09/12/23 1746  glucagon (GLUCAGEN) injection 1 mg  Every 15 Minutes PRN         09/12/23 1746    09/12/23 1745  dextrose (GLUTOSE) oral gel 15 g  Every 15 Minutes PRN         09/12/23 1746    09/12/23 1745  dextrose (D50W) (25 g/50 mL) IV injection 25 g  Every 15 Minutes PRN         09/12/23 1746    09/12/23 1743  ondansetron ODT (ZOFRAN-ODT) disintegrating tablet 4 mg  Every 8 Hours PRN         09/12/23 1746    09/12/23 1654  Code Status and Medical Interventions:  Continuous         09/12/23 1653    09/12/23 1600  Strict Intake & Output  Every Hour       09/12/23 1535    09/12/23 1518  High Sensitivity Troponin T 2Hr  PROCEDURE ONCE         09/12/23 1359    09/12/23 1454  sodium chloride 0.9 % flush 10 mL  Every 12 Hours Scheduled         09/12/23 1438    09/12/23 1454  aspirin chewable tablet 324 mg  Once        See Hyperspace for full Linked Orders Report.    09/12/23 1438    09/12/23 1439  Initiate ED Observation Status  Once         09/12/23 1438    09/12/23 1438  BNP  Once         09/12/23 1437    09/12/23 1438  Vital Signs Every 15 Minutes Until Stable, Then Every 4 Hours  Continuous         09/12/23 1438    09/12/23 1438  Continuous Cardiac Monitoring  Continuous,   Status:  Canceled        Comments: Follow Standing Orders As Outlined in Process Instructions (Open Order Report to View Full Instructions)    09/12/23 1438    09/12/23 1438  Telemetry - Maintain IV Access  Continuous,   Status:  Canceled         09/12/23 1438    09/12/23 1438  Telemetry - Place Orders & Notify Provider of Results When Patient Experiences Acute Chest Pain, Dysrhythmia or Respiratory Distress  Until Discontinued          09/12/23 1438    09/12/23 1438  May Be Off Telemetry for Tests  Continuous         09/12/23 1438    09/12/23 1438  Pulse Oximetry, Continuous  Continuous,   Status:  Canceled         09/12/23 1438    09/12/23 1438  ECG 12 Lead Chest Pain  Now Then Every 6 Hours,   Status:  Canceled       09/12/23 1438    09/12/23 1438  Adult Transthoracic Echo Complete W/ Cont if Necessary Per Protocol  Once         09/12/23 1438    09/12/23 1438  Notify Physician For Unrelieved Chest Pain  Until Discontinued         09/12/23 1438    09/12/23 1438  Intake & Output  Every Shift       09/12/23 1438    09/12/23 1438  Weigh Patient  Once         09/12/23 1438    09/12/23 1438  Lipid Panel  Once         09/12/23 1438    09/12/23 1438  Nurse to Order Troponin As Needed For Unrelieved or New Chest Pain  Continuous        Comments: Nurse to Order Troponin As Needed For Unrelieved or New Chest Pain    09/12/23 1438    09/12/23 1438  Inpatient Cardiology Consult  Once        Specialty:  Cardiology  Provider:  Sam Vasquez III, MD    09/12/23 1438    09/12/23 1438  Insert Peripheral IV  Once         09/12/23 1438    09/12/23 1438  Saline Lock & Maintain IV Access  Continuous,   Status:  Canceled         09/12/23 1438    09/12/23 1438  Diet: Cardiac Diets; Healthy Heart (2-3 Na+); Texture: Regular Texture (IDDSI 7); Fluid Consistency: Thin (IDDSI 0)  Diet Effective Now,   Status:  Canceled         09/12/23 1438    09/12/23 1437  sodium chloride 0.9 % flush 10 mL  As Needed         09/12/23 1438    09/12/23 1437  sodium chloride 0.9 % infusion 40 mL  As Needed,   Status:  Discontinued         09/12/23 1438    09/12/23 1437  polyethylene glycol (MIRALAX) packet 17 g  Daily PRN        See Hyperspace for full Linked Orders Report.    09/12/23 1438    09/12/23 1437  bisacodyl (DULCOLAX) EC tablet 5 mg  Daily PRN        See Hyperspace for full Linked Orders Report.    09/12/23 1438    09/12/23 1437  bisacodyl (DULCOLAX) suppository 10 mg  Daily PRN         [facility].    09/12/23 1438    09/12/23 1437  nitroglycerin (NITROSTAT) SL tablet 0.4 mg  Every 5 Minutes PRN         09/12/23 1438    09/12/23 1432  aspirin chewable tablet 324 mg  Once         09/12/23 1416    09/12/23 1432  furosemide (LASIX) injection 80 mg  Once         09/12/23 1416    09/12/23 1308  D-dimer, Quantitative  Once         09/12/23 1307    09/12/23 1308  Cardiac Monitoring  Continuous,   Status:  Canceled        Comments: Follow Standing Orders As Outlined in Process Instructions (Open Order Report to View Full Instructions)    09/12/23 1307    09/12/23 1308  Continuous Pulse Oximetry  Continuous,   Status:  Canceled         09/12/23 1307    09/12/23 1308  Vital Signs  Per Hospital Policy         09/12/23 1307    09/12/23 1308  ECG 12 Lead Chest Pain  Once         09/12/23 1307    09/12/23 1308  XR Chest 1 View  1 Time Imaging         09/12/23 1307    09/12/23 1308  Insert Peripheral IV  Once         09/12/23 1307    09/12/23 1308  [place] Draw  Once         09/12/23 1307    09/12/23 1308  CBC & Differential  Once         09/12/23 1307    09/12/23 1308  Comprehensive Metabolic Panel  Once         09/12/23 1307    09/12/23 1308  High Sensitivity Troponin T  Once         09/12/23 1307    09/12/23 1308  Green Top (Gel)  PROCEDURE ONCE         09/12/23 1307    09/12/23 1308  Lavender Top  PROCEDURE ONCE         09/12/23 1307    09/12/23 1308  Gold Top - SST  PROCEDURE ONCE         09/12/23 1307    09/12/23 1308  Light Blue Top  PROCEDURE ONCE         09/12/23 1307    09/12/23 1308  CBC Auto Differential  PROCEDURE ONCE         09/12/23 1307    09/12/23 1307  ECG 12 Lead Chest Pain  As Needed,   Status:  Canceled      Comments: Persistent, Unrelieved or Recurrent Chest Pain    09/12/23 1307    09/12/23 1307  sodium chloride 0.9 % flush 10 mL  As Needed         09/12/23 1307    09/12/23 1304  ECG 12 Lead Chest Pain  Once         09/12/23 1303    Unscheduled   Oxygen Therapy- Nasal Cannula; Titrate 1-6 LPM Per SpO2; 90 - 95%  Continuous PRN       23 1307    Unscheduled  ECG 12 Lead Chest Pain  As Needed      Comments: Nurse to Release ECG Order for Unrelieved or New Chest Pain    23 1438    Unscheduled  Follow Hypoglycemia Standing Orders For Blood Glucose <70 & Notify Provider of Treatment  As Needed      Comments: Follow Hypoglycemia Orders As Outlined in Process Instructions (Open Order Report to View Full Instructions)  Notify Provider Any Time Hypoglycemia Treatment is Administered    23 1746    Unscheduled  Change Site Dressing  As Needed       23 1035    Unscheduled  Oxygen Therapy- Nasal Cannula; Titrate 1-6 LPM Per SpO2; 90 - 95%  Continuous PRN       23 1035    Pending  Describe MI Type  Continuous         Pending    Pending  Describe MI Type  Continuous         Pending    Pending  Heart Failure Cause: Idiopathic Dilated Cardiomyopathy (DCM)  Once         Pending    --  SCANNED - TELEMETRY           23 0000    --  SCANNED - TELEMETRY           23 0000    --  SCANNED - TELEMETRY           23 0000    --  SCANNED - TELEMETRY           23 0000    --  SCANNED - TELEMETRY           23 0000                     Physician Progress Notes (last 7 days)        Lev Min MD at 23 1323              Name: Donnie Bishop ADMIT: 2023   : 1975  PCP: Juju Kennedy MD    MRN: [id number] LOS: 0 days   AGE/SEX: 48 y.o. male  ROOM: [facility]   Subjective   Chief Complaint   Patient presents with    Chest Pain     S/p LHC yesterday  No significant CAD  Dyspnea fair  BG 100s-200s  On insulin         Objective   Vital Signs  Temp:  [97.6 °F (36.4 °C)-98.7 °F (37.1 °C)] 97.6 °F (36.4 °C)  Heart Rate:  [81-99] 82  Resp:  [14-16] 16  BP: (105-134)/(81-96) 134/96  SpO2:  [95 %-100 %] 100 %  on   ;   Device (Oxygen Therapy): room air  Body mass index is 27.1 kg/m².    Physical Exam  HENT:      Head:  Normocephalic and atraumatic.   Eyes:      General: No scleral icterus.  Cardiovascular:      Rate and Rhythm: Normal rate and regular rhythm.      Heart sounds: Normal heart sounds.   Pulmonary:      Effort: Pulmonary effort is normal. No respiratory distress.      Breath sounds: Normal breath sounds.   Abdominal:      General: There is no distension.      Palpations: Abdomen is soft.      Tenderness: There is no abdominal tenderness.   Musculoskeletal:      Cervical back: Neck supple.   Neurological:      Mental Status: He is alert.   Psychiatric:         Behavior: Behavior normal.       Results Review:       I reviewed the patient's new clinical results.  Results from last 7 days   Lab Units 09/13/23  0437 09/12/23  1318   WBC 10*3/mm3 7.60 7.71   HEMOGLOBIN g/dL 12.7* 12.9*   PLATELETS 10*3/mm3 393 419     Results from last 7 days   Lab Units 09/14/23  0900 09/13/23  0437 09/12/23  1318   SODIUM mmol/L 135* 137 135*   POTASSIUM mmol/L 4.5 4.4 4.9   CHLORIDE mmol/L 92* 98 97*   CO2 mmol/L 30.1* 26.0 23.5   BUN mg/dL 16 14 9   CREATININE mg/dL 0.95 0.85 0.86   GLUCOSE mg/dL 292* 179* 283*   Estimated Creatinine Clearance: 107.9 mL/min (by C-G formula based on SCr of 0.95 mg/dL).  Results from last 7 days   Lab Units 09/12/23  1318   ALBUMIN g/dL 4.8   BILIRUBIN mg/dL 1.3*   ALK PHOS U/L 54   AST (SGOT) U/L 22   ALT (SGPT) U/L 25     Results from last 7 days   Lab Units 09/14/23  0900 09/13/23  0437 09/12/23  1318   CALCIUM mg/dL 9.6 9.6 9.9   ALBUMIN g/dL  --   --  4.8   MAGNESIUM mg/dL  --  2.0  --          Coag     HbA1C   Lab Results   Component Value Date    HGBA1C 8.70 (H) 09/12/2023    HGBA1C 10.40 (H) 07/12/2023    HGBA1C 8.2 (H) 02/01/2021     Infection     Radiology(recent) XR Chest 1 View    Result Date: 9/12/2023  As described.  This report was finalized on 9/12/2023 2:27 PM by Dr. Hubert Reid M.D.       HS Troponin T   Date Value Ref Range Status   09/13/2023 60 (C) <15 ng/L Final     No  components found for: TSH;2    aspirin, 324 mg, Oral, Once   And  aspirin, 81 mg, Oral, Daily  atorvastatin, 40 mg, Oral, Daily  carvedilol, 25 mg, Oral, Q12H  empagliflozin, 10 mg, Oral, Daily  furosemide, 80 mg, Intravenous, Q12H  gabapentin, 400 mg, Oral, Q12H  insulin glargine, 30 Units, Subcutaneous, Nightly  insulin lispro, 2-9 Units, Subcutaneous, 4x Daily AC & at Bedtime  melatonin, 5 mg, Oral, Nightly  senna-docusate sodium, 2 tablet, Oral, BID  sodium chloride, 10 mL, Intravenous, Q12H  spironolactone, 25 mg, Oral, Daily  venlafaxine XR, 75 mg, Oral, Daily       Diet: Regular/House Diet, Diabetic Diets, Cardiac Diets; Healthy Heart (2-3 Na+); Consistent Carbohydrate; Texture: Regular Texture (IDDSI 7); Fluid Consistency: Thin (IDDSI 0)      Assessment & Plan     Active Hospital Problems    Diagnosis  POA    **Chest pain [R07.9]  Yes    New onset of congestive heart failure [I50.9]  Yes    NSTEMI, initial episode of care [I21.4]  Yes    Multiple-type hyperlipidemia [E78.2]  Yes    Type 2 diabetes mellitus with diabetic neuropathy, with long-term current use of insulin [E11.40, Z79.4]  Not Applicable    Neuropathy [G62.9]  Yes      Resolved Hospital Problems   No resolved problems to display.       DM- A1C 8.7. States was on metformin at home and 70/30 15 units qAM and 10  units qPM. DM educator to see. Likely will plan on increasing this at discharge. On lantus 30 units and SSI here continue to monitor and adjust  Agents for HTN adjusted  Agents for NICM adjusted additionally by Cardiology. BMP in AM  On chronic gabapentin.        staff  Reviewed records       Lev Min MD  [facility]  23  13:23 EDT    Electronically signed by Lev Min MD at 23 1325       Heike Ward APRN at 23 1247              Hospital Follow Up    LOS:  LOS: 0 days   Patient Name: Donnie Bishop  Age/Sex: 48 y.o. male  : 1975  MRN: [id number]    Day of Service:  09/14/23   Length of Stay: 0  Encounter Provider: MICHELLE Lei  Place of Service: [facility] CARDIOLOGY  Patient Care Team:  Juju Kennedy MD as PCP - General (Family Medicine)    Subjective:     Chief Complaint: CHF/Elevated troponin- chest pain    Interval History: Breathing and feeling much better today    Objective:     Objective:  Temp:  [97.6 °F (36.4 °C)-98.7 °F (37.1 °C)] 97.6 °F (36.4 °C)  Heart Rate:  [] 82  Resp:  [14-16] 16  BP: (105-134)/(81-96) 134/96     Intake/Output Summary (Last 24 hours) at 9/14/2023 1247  Last data filed at 9/14/2023 1151  Gross per 24 hour   Intake 1047 ml   Output 3520 ml   Net -2473 ml     Body mass index is 27.1 kg/m².      09/12/23  1627 09/12/23  1930   Weight: 78.5 kg (173 lb) 78.5 kg (173 lb)     Weight change:     Physical Exam:   General Appearance:    Awake alert and oriented in no acute distress.   Color:  Skin:  Neuro:  HEENT:    Lungs:     Pink  Warm and dry  No focal, motor or sensory deficits  Neck supple, pupils equal, round and reactive. No JVD, No Bruit  Clear to auscultation,respirations regular, even and                  unlabored    Heart:    Regular rate and rhythm, S1 and S2, no murmur, no gallop, no rub. No edema, DP/PT pulses are 2+. Right radial cath site stable.   Chest Wall:    No abnormalities observed   Abdomen:     Normal bowel sounds, no masses, no organomegaly, soft        non-tender, non-distended, no guarding, no ascites noted   Extremities:   Moves all extremities well, no edema, no cyanosis, no redness       Lab Review:   Results from last 7 days   Lab Units 09/14/23  0900 09/13/23  0437 09/12/23  1318   SODIUM mmol/L 135* 137 135*   POTASSIUM mmol/L 4.5 4.4 4.9   CHLORIDE mmol/L 92* 98 97*   CO2 mmol/L 30.1* 26.0 23.5   BUN mg/dL 16 14 9   CREATININE mg/dL 0.95 0.85 0.86   GLUCOSE mg/dL 292* 179* 283*   CALCIUM mg/dL 9.6 9.6 9.9   AST (SGOT) U/L  --   --  22   ALT (SGPT) U/L  --   --  25      Results from last 7 days   Lab Units 09/13/23  0437 09/12/23  1537 09/12/23  1318   HSTROP T ng/L 60* 73* 65*     Results from last 7 days   Lab Units 09/13/23  0437 09/12/23  1318   WBC 10*3/mm3 7.60 7.71   HEMOGLOBIN g/dL 12.7* 12.9*   HEMATOCRIT % 37.8 38.7   PLATELETS 10*3/mm3 393 419         Results from last 7 days   Lab Units 09/13/23  0437   MAGNESIUM mg/dL 2.0     Results from last 7 days   Lab Units 09/12/23  1318   CHOLESTEROL mg/dL 184   TRIGLYCERIDES mg/dL 126   HDL CHOL mg/dL 63*     Results from last 7 days   Lab Units 09/12/23  1318   PROBNP pg/mL 1,142.0*     Results from last 7 days   Lab Units 09/14/23  0900   TSH uIU/mL 1.020     I reviewed the patient's new clinical results.  I personally viewed and interpreted the patient's EKG  Current Medications:   Scheduled Meds:aspirin, 324 mg, Oral, Once   And  aspirin, 81 mg, Oral, Daily  atorvastatin, 40 mg, Oral, Daily  carvedilol, 25 mg, Oral, Q12H  empagliflozin, 10 mg, Oral, Daily  furosemide, 80 mg, Intravenous, Q12H  gabapentin, 400 mg, Oral, Q12H  insulin glargine, 30 Units, Subcutaneous, Nightly  insulin lispro, 2-9 Units, Subcutaneous, 4x Daily AC & at Bedtime  melatonin, 5 mg, Oral, Nightly  senna-docusate sodium, 2 tablet, Oral, BID  sodium chloride, 10 mL, Intravenous, Q12H  spironolactone, 25 mg, Oral, Daily  venlafaxine XR, 75 mg, Oral, Daily      Continuous Infusions:     Allergies:  No Known Allergies    Assessment:   Elevated troponin- in the setting of CHF- Normal coronaries on cardiac cath  Nonischemic CM- On GDMT with Coreg and aldactone. Start entresto tomorrow to allow ACEI to clear.   Acute Systolic CHF due to dilated cardiomyopathy- volume status improving. Labs today are stable. LVEDP was elevated yesterday. Continue diuretics one more day.   Type 2 diabetes Mellitus- on insulin therapy in hospital. Oral metformin at home  Essential HTN- BP stable on current regimen      Plan:           Heike Ward,  MICHELLE  09/14/23  12:47 EDT  Electronically signed by MICHELLE Lei, 09/14/23, 12:47 PM EDT.      Electronically signed by Heike Ward APRN at 09/14/23 1252       Randee ShortMICHELLE at 09/13/23 0028       Attestation signed by Jonas Morales MD at 09/13/23 0857    I have reviewed this documentation and agree.  MD ATTESTATION NOTE    The ZACHARIAH and I have discussed this patient's history, physical exam, and treatment plan.  I have reviewed the documentation and personally had a face to face interaction with the patient. I affirm the documentation and agree with the treatment and plan.  The attached note describes my personal findings.      I personally performed the physical exam in its entirety, and below are my findings.      Brief HPI: This is a 48-year-old male with history of hypertension hyperlipidemia diabetes who presented to the emergency department yesterday with exertional shortness of breath that is been occurring for about a week and 1/2 to 2 weeks.  He is also had some episodes of chest discomfort.  He was admitted to the observation unit for further evaluation and work-up.  The pain is not definitively associated with exertion or with deep breathing.  He has had no nausea vomiting or diarrhea.  Patient has not been 100% compliant with all of his medicines including his blood pressure medicines.  He was started on Lasix in the emergency department and has had good urine output.  Throughout the evening he has felt better and he has had no orthopnea last night and is breathing better.  Denies any complaints this morning.    General : This is a male that appears chronically ill and frail and appears older than his stated age.  Patient is awake alert and oriented.  He is in no acute cardiovascular or respiratory distress.  HEENT: NCAT  CV: Heart is regular with subtle soft systolic murmur grade 1 out of 6.  Respiratory: No respiratory distress.  Patient has some decreased breath sounds in the  bases mildly.  Abd: Soft and nontender  Ext: No acute abnormalities.  Intact distal pulses that are equal strong symmetric  Skin: No rash  Neuro: No acute lateralizing deficits.  Psych: Normal mood and affect    I have reviewed the patient's vital signs, lab work, EKG and imaging.    Plan:  #1 exertional shortness of breath and chest pain: Patient has an elevation in his troponin but they have been relatively stable.  His delta has been 8.  He does have elevation in BNP and chest x-ray is concerning for congestive heart failure.  The echo has been reviewed and he does have a decreased EF.  Signs and symptoms very consistent with new onset congestive heart failure.  Cardiology has seen the patient and the patient's is going to go to cardiac cath in the near future.  He is remained asymptomatic throughout the night.  Informed the patient of the plan.  All questions answered at this time.        Note Disclaimer: At [facility], we believe that sharing information builds trust and better relationships. You are receiving this note because you recently visited [facility]. It is possible you will see health information before a provider has talked with you about it. This kind of information can be easy to misunderstand. To help you fully understand what it means for your health, we urge you to discuss this note with your provider.                  .ED OBSERVATION PROGRESS/DISCHARGE SUMMARY    Date of Admission: 9/12/2023   LOS: 0 days   PCP: Juju Kennedy MD    Subjective   Resting comfortably throughout the night and in no acute distress.  Denies chest pain or palpitation    Hospital Outcome:   48-year-old male was seen and examined at bedside following admission to the observation unit due to chest pain.  Laboratory evaluation in the ED shows high-sensitivity troponin 65, 73 with a delta of 8, BNP 1142, creatinine 1.86, and negative dimer.EKG shows sinus tach without any evidence of ischemia.  Chest x-ray  shows cardiomegaly and hazy bilateral opacities concerning for edema.    Patient received Lasix and aspirin in the ED.  Echocardiogram shows EF 35%, LV global longitudinal strain -12.6%, moderately decreased LV systolic function and grade 2 LV diastolic function.  Cardiology consulted and recommends heart cath today and will transfer care.       ROS:  General: no fevers, chills  Respiratory: no cough, dyspnea  Cardiovascular: no chest pain, palpitations  Abdomen: No abdominal pain, nausea, vomiting, or diarrhea  Neurologic: No focal weakness    Objective   Physical Exam:  I have reviewed the vital signs.  Temp:  [97 °F (36.1 °C)-98.4 °F (36.9 °C)] 98.4 °F (36.9 °C)  Heart Rate:  [100-116] 102  Resp:  [18] 18  BP: (125-171)/() 125/92  General Appearance:    Alert, cooperative, no distress  Head:    Normocephalic, atraumatic  Eyes:    Sclerae anicteric  Neck:   Supple, no mass  Lungs: Clear to auscultation bilaterally, respirations unlabored  Heart: Regular rate and rhythm, S1 and S2 normal, no murmur, rub or gallop  Abdomen:  Soft, non-tender, bowel sounds active, nondistended  Extremities: No clubbing, cyanosis, or edema to lower extremities  Pulses:  2+ and symmetric in distal lower extremities  Skin: No rashes   Neurologic: Oriented x3, Normal strength to extremities    Results Review:    I have reviewed the labs, radiology results and diagnostic studies.    Results from last 7 days   Lab Units 09/12/23  1318   WBC 10*3/mm3 7.71   HEMOGLOBIN g/dL 12.9*   HEMATOCRIT % 38.7   PLATELETS 10*3/mm3 419     Results from last 7 days   Lab Units 09/12/23  1318   SODIUM mmol/L 135*   POTASSIUM mmol/L 4.9   CHLORIDE mmol/L 97*   CO2 mmol/L 23.5   BUN mg/dL 9   CREATININE mg/dL 0.86   CALCIUM mg/dL 9.9   BILIRUBIN mg/dL 1.3*   ALK PHOS U/L 54   ALT (SGPT) U/L 25   AST (SGOT) U/L 22   GLUCOSE mg/dL 283*     Imaging Results (Last 24 Hours)       Procedure Component Value Units Date/Time    XR Chest 1 View [[id number]]  Collected: 09/12/23 1425     Updated: 09/12/23 1430    Narrative:      XR CHEST 1 VW-     HISTORY: Male who is 48 years-old, chest pain     TECHNIQUE: Frontal view of the chest     COMPARISON: 6/15/2021     FINDINGS: The cardiac silhouette is enlarged suggesting cardiomegaly  and/or pericardial effusion. Hazy bilateral opacities suggest edema  and/or pneumonia, follow-up recommended. No large pleural effusion. No  pneumothorax. No acute osseous process.       Impression:      As described.     This report was finalized on 9/12/2023 2:27 PM by Dr. Hubert Reid M.D.               I have reviewed the medications.  ---------------------------------------------------------------------------------------------  Assessment & Plan   Assessment/Problem List    Chest pain      Plan:  Chest pain  Consult to cardiology, to cath lab today  High sensitivity troponin 65, 73  ProBNP 1,142  Echocardiogram shows EF 35%, LV global longitudinal strain -12.6%, moderately decreased LV systolic function and grade 2 LV diastolic function,  Cardiac diet, NPO after midnight  Negative D-dimer  Telemetry  Lasix 80mg IV X 1 in the ED     Hypertension  Vitals every 4 hours  Continue home medications     Type 2 Diabetes  Accuchecks QAC & QHS  Hold metformin  Moderate correctional insulin protocol initiated  A1c 8.70       Disposition: admitted to cardiology    Follow-up after Discharge: PCP and cardiology    This note will serve as a progress and transfer note.     Lisa Sanford, MICHELLE 09/13/23 00:28 EDT    I have worn appropriate PPE during this patient encounter, sanitized my hands both with entering and exiting patient's room.      53 minutes has been spent by [facility] providers in the care of this patient while under observation status             Electronically signed by Jonas Morales MD at 09/13/23 0889            [facility] CATH LAB  [address]  [id number]  [phone]            Donnie Bishop  Cardiac Catheterization/Vascular Study  Order# [id number]  Reading physician: Kaylee Kay MD Ordering physician: Kaylee Kay MD Study date: 23      Procedures    Coronary angiography   Left Heart Cath        Patient Information    Patient Name  Donnie Bishop MRN  [id number] Legal Sex  Male  (Age)  1975 (48 y.o.)     Race Ethnicity Encounter Category   Black or  Not  or  Emergency        ISCV PACS Images     Show images for Cardiac Catheterization/Vascular Study       Printable Vessel Diagram    Printable Vessel Diagram       Physicians    Panel Physicians Referring Physician Case Authorizing Physician   Kaylee Kay MD (Primary) Kel Oconnor MD Ummat, Bianca, MD       Indications    NSTEMI, initial episode of care [I21.4 (ICD-10-CM)]             Conclusion         Normal coronary arteries    Substantially elevated LVEDP    Recommendations: Aggressive up titration of guideline directed medical therapy and diuresis.  Presumably cardiomyopathy is related to uncontrolled hypertension     INDICATION FOR PROCEDURE: Pleasant 48-year-old man with a week of exertional dyspnea and chest pressure.  Found to have new cardiomyopathy.     PROCEDURE PERFORMED:   1. Ultrasound guided arterial access,   2. Coronary Angiography  3. Left heart catheterization      PROCEDURE COMMENTS:   After informed consent was obtained, the patient was prepped and draped in the usual manner.  Moderate sedation was administered.  Lidocaine was infused in the right wrist for anesthesia.  Access was obtained in the right radial artery using micropuncture technique, ultrasound guidance and a 5/6 Slender Grenadian sheath was inserted.  Coronary angiography was performed using 5 Grenadian Tiger catheters.  Left heart catheterization was performed using a 5 Grenadian pigtail catheter.   A TR band was used for hemostasis     HEMODYNAMICS:  LV: 140/30  LVEDP:  25-30  AORTIC: 140/100        CORONARY ANGIOGRAPHY:  LEFT MAIN: Large-caliber, short, essentially 2 separate ostium.  LAD: The LAD is a large caliber vessel.  Normal in the proximal segment.  Gives rise to medium caliber, branching diagonal which is normal.  The LAD mid -distal is normal.  RAMUS: Absent  CIRCUMFLEX: Large-caliber, dominant circumflex.  Normal.  Gives rise to 3 small OM vessels which are normal.  Ongoing left PDA is small to medium caliber normal.  RCA: Small, nondominant, normal                      Procedure Narrative    Risks, benefits and alternatives were discussed with the patient and/or family. Plan is for moderate sedation. An immediate assessment was done prior to the administration of moderate sedation. Under my direct supervision, intravenous moderate sedation sedation was administered during the course of this procedure with continuous monitoring of hemodynamic parameters and level of consciousness by an independent trained observer. Less than 20 mL of estimated blood loss during the case. No specimen was collected during the case.       Time Under Physician Observation    Name Panel Role Time Period   Kaylee Kay MD Panel 1 Primary 9/13/2023 0957 - 9/13/2023 1026      Total Sedation Time    Moderate sedation event time was not documented.                     Vessel Access    A 6 fr sheath was  inserted with ultrasound guidance into the right radial artery. Sheath insertion not delayed.           Sheath Disposition    Hemostasis started on the right radial artery. Manual pressure applied to vessel. Manual pressure was held by . Manual pressure was held for 5 min. Hemostasis achieved successfully. Closure device additional comment: Tensoplast.                          Radiation     Event Details User   10:24 AM Radiation Tracking Panel 1: Kaylee Kay MD  Cumulative Air Kerma (mGy) = 311.000; Fluoro Time (min) = 5.4; DAP (mGy-cm2) = 68679.000 TJ        Total Contrast                  Consult Notes (last 7 days)        Kaylee Kay MD at 23 0920              Patient Name: Donnie Bishop  :1975  48 y.o.    Date of Admission: 2023  Date of Consultation:  23  Encounter Provider: Kaylee Kay MD  Place of Service: [facility] CARDIOLOGY  Referring Provider: Kel Oconnor MD  Patient Care Team:  Juju Kennedy MD as PCP - General (Family Medicine)      Chief complaint: chest pain shortness of breath cardiomyopathy    History of Present Illness:  48-year-old man with a history of hypertension,Hyperlipidemia, depression, insulin-dependent diabetes, poorly controlled last A1c 8.7%.  Presented to his PCP with 1 week of exertional dyspnea and chest heaviness.  Transferred to the emergency room.  EKG shows sinus tachycardia.  Troponins abnormal, 65/73/60.  BNP 1142.  X-ray of pulmonary edema.  This morning patient is comfortable.  Was given IV Lasix 80.  Ins and outs -1.5 L.  Breathing has improved.  No lower extremity edema.  Does complain of exertional dyspnea without orthopnea or PND.  Complains of heaviness with exertion in the chest which resolves with rest.  Symptoms all started in the last 7 to 10 days.  No preceding illness of diarrhea, GI upset, fevers or chills.  Does endorse a lot of stress as he is recently gone through divorce.  Has 2 children, ages 11 and 22.  He is a non-smoker.  Mother  of a stroke.  Echocardiogram was performed last night.  Shows systolic dysfunction, EF 30 to 35%, moderate LVH, global hypokinesis, grade 2 diastolic dysfunction, mildly reduced RV systolic function, no valvular disease.      Past Medical History:   Diagnosis Date    Anxiety     Cough     Inability to attain erection     Injury of acoustic nerve     Myalgia     Type II diabetes mellitus     Venous insufficiency     Viral illness     Vitamin D deficiency        History reviewed. No pertinent surgical history.      Prior to Admission medications     Medication Sig Start Date End Date Taking? Authorizing Provider   atorvastatin (LIPITOR) 40 MG tablet TAKE 1 TABLET BY MOUTH EVERY DAY 3/8/21  Yes Juju Kennedy MD   gabapentin (NEURONTIN) 400 MG capsule TAKE 1 CAPSULE BY MOUTH THREE TIMES A DAY 7/27/22  Yes Cortez Baldwin MD   lisinopril (PRINIVIL,ZESTRIL) 40 MG tablet Take 1 tablet by mouth Daily. 7/12/23  Yes Juju Kennedy MD   melatonin 5 MG tablet tablet Take 1 tablet by mouth.   Yes Saud Rutherford MD   metaxalone (SKELAXIN) 800 MG tablet Take 1 tablet by mouth 3 (Three) Times a Day. 11/22/22  Yes Kel Oconnor MD   metFORMIN (GLUCOPHAGE) 1000 MG tablet Take 1 tablet by mouth 2 (Two) Times a Day With Meals. 7/12/23  Yes Juju Kennedy MD   metoprolol succinate XL (TOPROL-XL) 50 MG 24 hr tablet Take 1 tablet by mouth Daily. 2/14/22  Yes Juju Kennedy MD   naproxen (EC NAPROSYN) 500 MG EC tablet Take 1 tablet by mouth 2 (Two) Times a Day As Needed for Mild Pain. 11/22/22  Yes Kel Oconnor MD   ondansetron ODT (ZOFRAN-ODT) 4 MG disintegrating tablet Place 1 tablet on the tongue Every 8 (Eight) Hours As Needed for Nausea. 5/24/21  Yes Rob Carbone MD   RaNITidine HCl (RANITIDINE 75 PO) Take  by mouth.   Yes Saud Rutherford MD   venlafaxine XR (Effexor XR) 75 MG 24 hr capsule Take 1 capsule by mouth Daily. 7/12/23  Yes Juju Kennedy MD   vitamin D (ERGOCALCIFEROL) 1.25 MG (94078 UT) capsule capsule Take 1 capsule by mouth 1 (One) Time Per Week. 9/7/20  Yes Saud Rutherford MD   insulin aspart prot & aspart (NovoLOG Mix 70/30 FlexPen) (70-30) 100 UNIT/ML suspension pen-injector injection Inject 0.2 mL under the skin into the appropriate area as directed 2 (Two) Times a Day Before Meals. 7/14/23   Juju Kennedy MD       No Known Allergies    Social History     Socioeconomic History    Marital status:    Tobacco Use    Smoking status: Never    Smokeless tobacco: Never   Vaping Use     Vaping Use: Never used   Substance and Sexual Activity    Alcohol use: Never    Drug use: Never    Sexual activity: Defer       Family History   Problem Relation Age of Onset    Diabetes Mother        REVIEW OF SYSTEMS:   All systems reviewed.  Pertinent positives identified in HPI.  All other systems are negative.      Objective:     Vitals:    09/12/23 2018 09/12/23 2356 09/13/23 0438 09/13/23 0807   BP: (!) 145/101 125/92 (!) 149/115 (!) 146/109   BP Location: Left arm Right arm Left arm Right arm   Patient Position: Sitting Lying Sitting Lying   Pulse: 112 102 111 98   Resp: 18 18 18 16   Temp: 98.1 °F (36.7 °C) 98.4 °F (36.9 °C) 98.2 °F (36.8 °C) 97.6 °F (36.4 °C)   TempSrc: Oral Oral Oral Oral   SpO2: 100% 96% 99% 100%   Weight:       Height:         Body mass index is 27.1 kg/m².    General Appearance:    Alert, cooperative, in no acute distress.  Apple shaped   Head:    Normocephalic, without obvious abnormality, atraumatic   Eyes:            Lids and lashes normal, conjunctivae and sclerae normal, no icterus, no pallor, corneas clear, PERRLA   Ears:    Ears appear intact with no abnormalities noted   Throat:   No oral lesions, no thrush, oral mucosa moist   Neck:   No adenopathy, supple, trachea midline, no thyromegaly, no carotid bruit, no JVD   Back:     No kyphosis present, no scoliosis present, no skin lesions, erythema or scars, no tenderness to percussion or palpation, range of motion normal   Lungs:   Rales bilaterally respirations regular, even and unlabored    Heart:    Regular rhythm and normal rate, normal S1 and S2, no murmur, no gallop, no rub, no click   Chest Wall:    No abnormalities observed   Abdomen:     Normal bowel sounds, no masses, no organomegaly, soft, nontender, nondistended, no guarding, no rebound  tenderness   Extremities:   Moves all extremities well, no edema, no cyanosis, no redness   Pulses:   Pulses palpable and equal bilaterally. Normal radial, carotid, femoral, dorsalis  pedis and posterior tibial pulses bilaterally. Normal abdominal aorta   Skin:  Psychiatric:   No bleeding, bruising or rash    Alert and oriented x 3, normal mood and affect   Lab Review:     Results from last 7 days   Lab Units 09/13/23  0437 09/12/23  1318   SODIUM mmol/L 137 135*   POTASSIUM mmol/L 4.4 4.9   CHLORIDE mmol/L 98 97*   CO2 mmol/L 26.0 23.5   BUN mg/dL 14 9   CREATININE mg/dL 0.85 0.86   CALCIUM mg/dL 9.6 9.9   BILIRUBIN mg/dL  --  1.3*   ALK PHOS U/L  --  54   ALT (SGPT) U/L  --  25   AST (SGOT) U/L  --  22   GLUCOSE mg/dL 179* 283*     Results from last 7 days   Lab Units 09/13/23  0437 09/12/23  1537 09/12/23  1318   HSTROP T ng/L 60* 73* 65*     Results from last 7 days   Lab Units 09/13/23  0437   WBC 10*3/mm3 7.60   HEMOGLOBIN g/dL 12.7*   HEMATOCRIT % 37.8   PLATELETS 10*3/mm3 393         Results from last 7 days   Lab Units 09/13/23  0437   MAGNESIUM mg/dL 2.0     Results from last 7 days   Lab Units 09/12/23  1318   CHOLESTEROL mg/dL 184   TRIGLYCERIDES mg/dL 126   HDL CHOL mg/dL 63*   LDL CHOL mg/dL 99               I personally viewed and interpreted the patient's EKG/Telemetry data.        Assessment and Plan:       1.  Non-STEMI, etiology unclear.  Type I versus type II due to cardiomyopathy and hypertension chest pain, minimally elevated troponins, EKG without ischemic changes.  Several risk factors, insulin-dependent diabetes, hypertension, hyperlipidemia, family history of vascular disease.  Cardiac catheterization today.  2.  New cardiomyopathy, etiology unclear.  Start GDMT today.  3.  Severe hypertension: Increase medications as needed.    Kaylee Kay MD  09/13/23  09:20 EDT                    Electronically signed by Kaylee Kay MD at 09/13/23 0924       Stingl, Divina Marinelli MD at 09/13/23 2029        Consult Orders    1. Inpatient Hospitalist Consult [[id number]] ordered by Kaylee Kay MD at 09/13/23 1607                 CONSULT NOTE    INTERNAL MEDICINE   [facility]    [facility]       Patient Identification:  Name: Donnie Bishop  Age: 48 y.o.  Sex: male  :  1975  MRN: [id number]             Date of Consultation:  23          Primary Care Physician: Juju Kennedy MD                               Requesting Physician: dr gabriel  Reason for Consultation:diabetes management    Chief Complaint:  48 year old gentleman with a history of diabetes; he had a cardiac cath done earlier today; we are asked to see him regarding medical management    History of Present Illness:   As above      Past Medical History:  Past Medical History:   Diagnosis Date    Anxiety     Cough     Inability to attain erection     Injury of acoustic nerve     Myalgia     Type II diabetes mellitus     Venous insufficiency     Viral illness     Vitamin D deficiency      Past Surgical History:  History reviewed. No pertinent surgical history.   Home Meds:  Medications Prior to Admission   Medication Sig Dispense Refill Last Dose    atorvastatin (LIPITOR) 40 MG tablet TAKE 1 TABLET BY MOUTH EVERY DAY 90 tablet 3 2023    gabapentin (NEURONTIN) 400 MG capsule TAKE 1 CAPSULE BY MOUTH THREE TIMES A  capsule 0 Past Month    lisinopril (PRINIVIL,ZESTRIL) 40 MG tablet Take 1 tablet by mouth Daily. 90 tablet 3 2023    melatonin 5 MG tablet tablet Take 1 tablet by mouth.   2023    metaxalone (SKELAXIN) 800 MG tablet Take 1 tablet by mouth 3 (Three) Times a Day. 15 tablet 0 2023    metFORMIN (GLUCOPHAGE) 1000 MG tablet Take 1 tablet by mouth 2 (Two) Times a Day With Meals. 180 tablet 3 2023    metoprolol succinate XL (TOPROL-XL) 50 MG 24 hr tablet Take 1 tablet by mouth Daily. 90 tablet 3 2023    naproxen (EC NAPROSYN) 500 MG EC tablet Take 1 tablet by mouth 2 (Two) Times a Day As Needed for Mild Pain. 20 tablet 0 Past Week    ondansetron ODT (ZOFRAN-ODT) 4 MG disintegrating tablet Place 1 tablet on the tongue Every 8 (Eight) Hours As Needed for Nausea. 10 tablet 0 Past  Week    RaNITidine HCl (RANITIDINE 75 PO) Take  by mouth.   9/12/2023    venlafaxine XR (Effexor XR) 75 MG 24 hr capsule Take 1 capsule by mouth Daily. 90 capsule 3 9/12/2023    vitamin D (ERGOCALCIFEROL) 1.25 MG (73970 UT) capsule capsule Take 1 capsule by mouth 1 (One) Time Per Week.   9/12/2023    insulin aspart prot & aspart (NovoLOG Mix 70/30 FlexPen) (70-30) 100 UNIT/ML suspension pen-injector injection Inject 0.2 mL under the skin into the appropriate area as directed 2 (Two) Times a Day Before Meals. 12 mL 1      Current Meds:     Current Facility-Administered Medications:     acetaminophen (TYLENOL) tablet 650 mg, 650 mg, Oral, Q4H PRN, Kaylee Kay MD    aspirin chewable tablet 324 mg, 324 mg, Oral, Once **AND** aspirin EC tablet 81 mg, 81 mg, Oral, Daily, Kaylee Kay MD, 81 mg at 09/13/23 0852    atorvastatin (LIPITOR) tablet 40 mg, 40 mg, Oral, Daily, Kaylee Kay MD, 40 mg at 09/13/23 0852    sennosides-docusate (PERICOLACE) 8.6-50 MG per tablet 2 tablet, 2 tablet, Oral, BID, 2 tablet at 09/13/23 0853 **AND** polyethylene glycol (MIRALAX) packet 17 g, 17 g, Oral, Daily PRN **AND** bisacodyl (DULCOLAX) EC tablet 5 mg, 5 mg, Oral, Daily PRN **AND** bisacodyl (DULCOLAX) suppository 10 mg, 10 mg, Rectal, Daily PRN, Kaylee Kay MD    carvedilol (COREG) tablet 25 mg, 25 mg, Oral, Q12H, Kaylee Kay MD, 25 mg at 09/13/23 1420    dextrose (D50W) (25 g/50 mL) IV injection 25 g, 25 g, Intravenous, Q15 Min PRN, Kaylee Kay MD    dextrose (GLUTOSE) oral gel 15 g, 15 g, Oral, Q15 Min PRN, Kaylee Kay MD    empagliflozin (JARDIANCE) tablet 10 mg, 10 mg, Oral, Daily, Kaylee Kay MD    furosemide (LASIX) injection 80 mg, 80 mg, Intravenous, Q12H, Kaylee Kay MD, 80 mg at 09/13/23 1416    gabapentin (NEURONTIN) capsule 400 mg, 400 mg, Oral, Q12H, Kaylee Kay MD    glucagon (GLUCAGEN) injection 1 mg, 1 mg, Intramuscular, Q15 Min PRN, Kaylee Kay MD    insulin glargine (LANTUS, SEMGLEE)  injection 30 Units, 30 Units, Subcutaneous, Nightly, StinglDivina MD    insulin lispro (HUMALOG/ADMELOG) injection 2-9 Units, 2-9 Units, Subcutaneous, 4x Daily AC & at Bedtime, Kaylee Kay MD, 7 Units at 09/13/23 1825    melatonin tablet 5 mg, 5 mg, Oral, Nightly, Kaylee Kay MD, 5 mg at 09/12/23 2005    nitroglycerin (NITROSTAT) SL tablet 0.4 mg, 0.4 mg, Sublingual, Q5 Min PRN, Kaylee Kay MD    nitroglycerin (NITROSTAT) SL tablet 0.4 mg, 0.4 mg, Sublingual, Q5 Min PRN, Kaylee Kay MD    ondansetron ODT (ZOFRAN-ODT) disintegrating tablet 4 mg, 4 mg, Oral, Q8H PRN, Kaylee Kay MD    sodium chloride 0.9 % flush 10 mL, 10 mL, Intravenous, PRN, Kaylee Kay MD    sodium chloride 0.9 % flush 10 mL, 10 mL, Intravenous, Q12H, Kaylee Kay MD, 10 mL at 09/13/23 0856    sodium chloride 0.9 % flush 10 mL, 10 mL, Intravenous, PRN, Kaylee Kay MD    spironolactone (ALDACTONE) tablet 25 mg, 25 mg, Oral, Daily, Kaylee Kay MD, 25 mg at 09/13/23 1420    venlafaxine XR (EFFEXOR-XR) 24 hr capsule 75 mg, 75 mg, Oral, Daily, Kaylee Kay MD, 75 mg at 09/13/23 0852  Allergies:  No Known Allergies  Social History:   Social History     Socioeconomic History    Marital status:    Tobacco Use    Smoking status: Never    Smokeless tobacco: Never   Vaping Use    Vaping Use: Never used   Substance and Sexual Activity    Alcohol use: Never    Drug use: Never    Sexual activity: Defer     Family History:  Family History   Problem Relation Age of Onset    Diabetes Mother           Review of Systems  See history of present illness and past medical history.  Patient denies headache, dizziness, syncope, falls, trauma, change in vision, change in hearing, change in taste, changes in weight, changes in appetite, focal weakness, numbness, or paresthesia.  Patient denies  PND, cough, sinus pressure, rhinorrhea, epistaxis, hemoptysis, nausea, vomiting,hematemesis, diarrhea, constipation or hematochezia.  "Denies cold or heat intolerance, polydipsia, polyuria, polyphagia. Denies hematuria, pyuria, dysuria, hesitancy, frequency or urgency. Denies consumption of raw and under cooked meats foods or change in water source.  Denies fever, chills, sweats, night sweats.   .      Vitals:   /94 (BP Location: Left arm, Patient Position: Lying)   Pulse 89   Temp 98.7 °F (37.1 °C) (Oral)   Resp 16   Ht 170.2 cm (67\")   Wt 78.5 kg (173 lb)   SpO2 98%   BMI 27.10 kg/m²   I/O:   Intake/Output Summary (Last 24 hours) at 9/13/2023 2029  Last data filed at 9/13/2023 1914  Gross per 24 hour   Intake 687 ml   Output 1225 ml   Net -538 ml     Exam:  General Appearance:    Alert, cooperative, no distress, appears stated age   Head:    Normocephalic, without obvious abnormality, atraumatic   Eyes:    PERRL, conjunctivae/corneas clear, EOM's intact, both eyes   Ears:    Normal external ear canals, both ears   Nose:   Nares normal, septum midline, mucosa normal, no drainage    or sinus tenderness   Throat:   Lips, tongue, gums normal; oral mucosa pink and moist   Neck:   Supple, symmetrical, trachea midline, no adenopathy;     thyroid:  no enlargement/tenderness/nodules; no carotid    bruit or JVD   Back:     Symmetric, no curvature, ROM normal, no CVA tenderness   Lungs:     Decreased breath sounds bilaterally, respirations unlabored   Chest Wall:    No tenderness or deformity    Heart:    Regular rate and rhythm, S1 and S2 normal, no murmur, rub   or gallop   Abdomen:     Soft, nontender, bowel sounds active all four quadrants,     no masses, no hepatomegaly, no splenomegaly   Extremities:   Extremities normal, atraumatic, no cyanosis or edema                Data Review:  Labs in chart were reviewed.  WBC   Date Value Ref Range Status   09/13/2023 7.60 3.40 - 10.80 10*3/mm3 Final     Hemoglobin   Date Value Ref Range Status   09/13/2023 12.7 (L) 13.0 - 17.7 g/dL Final     Hematocrit   Date Value Ref Range Status   09/13/2023 " 37.8 37.5 - 51.0 % Final     Platelets   Date Value Ref Range Status   09/13/2023 393 140 - 450 10*3/mm3 Final     Sodium   Date Value Ref Range Status   09/13/2023 137 136 - 145 mmol/L Final     Potassium   Date Value Ref Range Status   09/13/2023 4.4 3.5 - 5.2 mmol/L Final     Chloride   Date Value Ref Range Status   09/13/2023 98 98 - 107 mmol/L Final     CO2   Date Value Ref Range Status   09/13/2023 26.0 22.0 - 29.0 mmol/L Final     BUN   Date Value Ref Range Status   09/13/2023 14 6 - 20 mg/dL Final     Creatinine   Date Value Ref Range Status   09/13/2023 0.85 0.76 - 1.27 mg/dL Final     Glucose   Date Value Ref Range Status   09/13/2023 179 (H) 65 - 99 mg/dL Final         Results from last 7 days   Lab Units 09/12/23  1318   HEMOGLOBIN A1C % 8.70*       Imaging Results (Last 7 Days)       Procedure Component Value Units Date/Time    XR Chest 1 View [[id number]] Collected: 09/12/23 1425     Updated: 09/12/23 1430    Narrative:      XR CHEST 1 VW-     HISTORY: Male who is 48 years-old, chest pain     TECHNIQUE: Frontal view of the chest     COMPARISON: 6/15/2021     FINDINGS: The cardiac silhouette is enlarged suggesting cardiomegaly  and/or pericardial effusion. Hazy bilateral opacities suggest edema  and/or pneumonia, follow-up recommended. No large pleural effusion. No  pneumothorax. No acute osseous process.       Impression:      As described.     This report was finalized on 9/12/2023 2:27 PM by Dr. Hubert Reid M.D.             Past Medical History:   Diagnosis Date    Anxiety     Cough     Inability to attain erection     Injury of acoustic nerve     Myalgia     Type II diabetes mellitus     Venous insufficiency     Viral illness     Vitamin D deficiency        Assessment:  Active Hospital Problems    Diagnosis  POA    **Chest pain [R07.9]  Yes    New onset of congestive heart failure [I50.9]  Yes    NSTEMI, initial episode of care [I21.4]  Unknown      Resolved Hospital Problems   No resolved  problems to display.   Diabetes with hyperglycemia  Dyspnea      Plan:  Add lantus  Ask the diabetes educator to see him  Continue sliding scale  Hgba1c indicates suboptimal control  Dw patient and family  Notes and labs reviewed  Thanks for involving us in his care    Divina Flores MD   9/13/2023  20:29 EDT      Electronically signed by Divina Flores MD at 09/13/23 2034

## 2023-09-14 NOTE — PROGRESS NOTES
Hospital Follow Up    LOS:  LOS: 0 days   Patient Name: Donnie Bishop  Age/Sex: 48 y.o. male  : 1975  MRN: [id number]    Day of Service: 23   Length of Stay: 0  Encounter Provider: MICHELLE Lei  Place of Service: [facility] CARDIOLOGY  Patient Care Team:  Juju Kennedy MD as PCP - General (Family Medicine)    Subjective:     Chief Complaint: CHF/Elevated troponin- chest pain    Interval History: Breathing and feeling much better today    Objective:     Objective:  Temp:  [97.6 °F (36.4 °C)-98.7 °F (37.1 °C)] 97.6 °F (36.4 °C)  Heart Rate:  [] 82  Resp:  [14-16] 16  BP: (105-134)/(81-96) 134/96     Intake/Output Summary (Last 24 hours) at 2023 1247  Last data filed at 2023 1151  Gross per 24 hour   Intake 1047 ml   Output 3520 ml   Net -2473 ml     Body mass index is 27.1 kg/m².      23  1627 23  1930   Weight: 78.5 kg (173 lb) 78.5 kg (173 lb)     Weight change:     Physical Exam:   General Appearance:    Awake alert and oriented in no acute distress.   Color:  Skin:  Neuro:  HEENT:    Lungs:     Pink  Warm and dry  No focal, motor or sensory deficits  Neck supple, pupils equal, round and reactive. No JVD, No Bruit  Clear to auscultation,respirations regular, even and                  unlabored    Heart:    Regular rate and rhythm, S1 and S2, no murmur, no gallop, no rub. No edema, DP/PT pulses are 2+. Right radial cath site stable.   Chest Wall:    No abnormalities observed   Abdomen:     Normal bowel sounds, no masses, no organomegaly, soft        non-tender, non-distended, no guarding, no ascites noted   Extremities:   Moves all extremities well, no edema, no cyanosis, no redness       Lab Review:   Results from last 7 days   Lab Units 23  0900 23  0437 23  1318   SODIUM mmol/L 135* 137 135*   POTASSIUM mmol/L 4.5 4.4 4.9   CHLORIDE mmol/L 92* 98 97*   CO2 mmol/L 30.1* 26.0 23.5   BUN mg/dL 16 14 9   CREATININE  mg/dL 0.95 0.85 0.86   GLUCOSE mg/dL 292* 179* 283*   CALCIUM mg/dL 9.6 9.6 9.9   AST (SGOT) U/L  --   --  22   ALT (SGPT) U/L  --   --  25     Results from last 7 days   Lab Units 09/13/23  0437 09/12/23  1537 09/12/23  1318   HSTROP T ng/L 60* 73* 65*     Results from last 7 days   Lab Units 09/13/23  0437 09/12/23  1318   WBC 10*3/mm3 7.60 7.71   HEMOGLOBIN g/dL 12.7* 12.9*   HEMATOCRIT % 37.8 38.7   PLATELETS 10*3/mm3 393 419         Results from last 7 days   Lab Units 09/13/23  0437   MAGNESIUM mg/dL 2.0     Results from last 7 days   Lab Units 09/12/23  1318   CHOLESTEROL mg/dL 184   TRIGLYCERIDES mg/dL 126   HDL CHOL mg/dL 63*     Results from last 7 days   Lab Units 09/12/23  1318   PROBNP pg/mL 1,142.0*     Results from last 7 days   Lab Units 09/14/23  0900   TSH uIU/mL 1.020     I reviewed the patient's new clinical results.  I personally viewed and interpreted the patient's EKG  Current Medications:   Scheduled Meds:aspirin, 324 mg, Oral, Once   And  aspirin, 81 mg, Oral, Daily  atorvastatin, 40 mg, Oral, Daily  carvedilol, 25 mg, Oral, Q12H  empagliflozin, 10 mg, Oral, Daily  furosemide, 80 mg, Intravenous, Q12H  gabapentin, 400 mg, Oral, Q12H  insulin glargine, 30 Units, Subcutaneous, Nightly  insulin lispro, 2-9 Units, Subcutaneous, 4x Daily AC & at Bedtime  melatonin, 5 mg, Oral, Nightly  senna-docusate sodium, 2 tablet, Oral, BID  sodium chloride, 10 mL, Intravenous, Q12H  spironolactone, 25 mg, Oral, Daily  venlafaxine XR, 75 mg, Oral, Daily      Continuous Infusions:     Allergies:  No Known Allergies    Assessment:   Elevated troponin- in the setting of CHF- Normal coronaries on cardiac cath  Nonischemic CM- On GDMT with Coreg and aldactone. Start entresto tomorrow to allow ACEI to clear.   Acute Systolic CHF due to dilated cardiomyopathy- volume status improving. Labs today are stable. LVEDP was elevated yesterday. Continue diuretics one more day.   Type 2 diabetes Mellitus- on insulin therapy  in hospital. Oral metformin at home  Essential HTN- BP stable on current regimen      Plan:           MICHELLE Lei  09/14/23  12:47 EDT  Electronically signed by MICHELLE Lei, 09/14/23, 12:47 PM EDT.

## 2023-09-14 NOTE — PLAN OF CARE
Goal Outcome Evaluation:              Outcome Evaluation: NSR on monitor, room air, blood presures 118-140s/ 60-90s. IV lasix with adeqaute urine output. Fall percautions in place. No complaints of chest pain or shortness of air. Care ongoing.

## 2023-09-14 NOTE — CONSULTS
CONSULT NOTE    INTERNAL MEDICINE   [facility]       Patient Identification:  Name: Donnie Bihsop  Age: 48 y.o.  Sex: male  :  1975  MRN: [id number]             Date of Consultation:  23          Primary Care Physician: Juju Kennedy MD                               Requesting Physician: dr gabriel  Reason for Consultation:diabetes management    Chief Complaint:  48 year old gentleman with a history of diabetes; he had a cardiac cath done earlier today; we are asked to see him regarding medical management    History of Present Illness:   As above      Past Medical History:  Past Medical History:   Diagnosis Date    Anxiety     Cough     Inability to attain erection     Injury of acoustic nerve     Myalgia     Type II diabetes mellitus     Venous insufficiency     Viral illness     Vitamin D deficiency      Past Surgical History:  History reviewed. No pertinent surgical history.   Home Meds:  Medications Prior to Admission   Medication Sig Dispense Refill Last Dose    atorvastatin (LIPITOR) 40 MG tablet TAKE 1 TABLET BY MOUTH EVERY DAY 90 tablet 3 2023    gabapentin (NEURONTIN) 400 MG capsule TAKE 1 CAPSULE BY MOUTH THREE TIMES A  capsule 0 Past Month    lisinopril (PRINIVIL,ZESTRIL) 40 MG tablet Take 1 tablet by mouth Daily. 90 tablet 3 2023    melatonin 5 MG tablet tablet Take 1 tablet by mouth.   2023    metaxalone (SKELAXIN) 800 MG tablet Take 1 tablet by mouth 3 (Three) Times a Day. 15 tablet 0 2023    metFORMIN (GLUCOPHAGE) 1000 MG tablet Take 1 tablet by mouth 2 (Two) Times a Day With Meals. 180 tablet 3 2023    metoprolol succinate XL (TOPROL-XL) 50 MG 24 hr tablet Take 1 tablet by mouth Daily. 90 tablet 3 2023    naproxen (EC NAPROSYN) 500 MG EC tablet Take 1 tablet by mouth 2 (Two) Times a Day As Needed for Mild Pain. 20 tablet 0 Past Week    ondansetron ODT (ZOFRAN-ODT) 4 MG disintegrating tablet Place 1 tablet on the tongue Every 8  (Eight) Hours As Needed for Nausea. 10 tablet 0 Past Week    RaNITidine HCl (RANITIDINE 75 PO) Take  by mouth.   9/12/2023    venlafaxine XR (Effexor XR) 75 MG 24 hr capsule Take 1 capsule by mouth Daily. 90 capsule 3 9/12/2023    vitamin D (ERGOCALCIFEROL) 1.25 MG (02279 UT) capsule capsule Take 1 capsule by mouth 1 (One) Time Per Week.   9/12/2023    insulin aspart prot & aspart (NovoLOG Mix 70/30 FlexPen) (70-30) 100 UNIT/ML suspension pen-injector injection Inject 0.2 mL under the skin into the appropriate area as directed 2 (Two) Times a Day Before Meals. 12 mL 1      Current Meds:     Current Facility-Administered Medications:     acetaminophen (TYLENOL) tablet 650 mg, 650 mg, Oral, Q4H PRN, Kaylee Kay MD    aspirin chewable tablet 324 mg, 324 mg, Oral, Once **AND** aspirin EC tablet 81 mg, 81 mg, Oral, Daily, Kaylee Kay MD, 81 mg at 09/13/23 0852    atorvastatin (LIPITOR) tablet 40 mg, 40 mg, Oral, Daily, Kaylee Kay MD, 40 mg at 09/13/23 0852    sennosides-docusate (PERICOLACE) 8.6-50 MG per tablet 2 tablet, 2 tablet, Oral, BID, 2 tablet at 09/13/23 0853 **AND** polyethylene glycol (MIRALAX) packet 17 g, 17 g, Oral, Daily PRN **AND** bisacodyl (DULCOLAX) EC tablet 5 mg, 5 mg, Oral, Daily PRN **AND** bisacodyl (DULCOLAX) suppository 10 mg, 10 mg, Rectal, Daily PRN, Kaylee Kay MD    carvedilol (COREG) tablet 25 mg, 25 mg, Oral, Q12H, Kaylee Kay MD, 25 mg at 09/13/23 1420    dextrose (D50W) (25 g/50 mL) IV injection 25 g, 25 g, Intravenous, Q15 Min PRN, Kaylee Kay MD    dextrose (GLUTOSE) oral gel 15 g, 15 g, Oral, Q15 Min PRN, Kaylee Kay MD    empagliflozin (JARDIANCE) tablet 10 mg, 10 mg, Oral, Daily, Kaylee Kay MD    furosemide (LASIX) injection 80 mg, 80 mg, Intravenous, Q12H, Kaylee Kay MD, 80 mg at 09/13/23 1416    gabapentin (NEURONTIN) capsule 400 mg, 400 mg, Oral, Q12H, Kaylee Kay MD    glucagon (GLUCAGEN) injection 1 mg, 1 mg, Intramuscular, Q15 Min PRN, Eliza  MD Kaylee    insulin glargine (LANTUS, SEMGLEE) injection 30 Units, 30 Units, Subcutaneous, Nightly, StinglDivina MD    insulin lispro (HUMALOG/ADMELOG) injection 2-9 Units, 2-9 Units, Subcutaneous, 4x Daily AC & at Bedtime, Kaylee Kay MD, 7 Units at 09/13/23 1825    melatonin tablet 5 mg, 5 mg, Oral, Nightly, Kaylee Kay MD, 5 mg at 09/12/23 2005    nitroglycerin (NITROSTAT) SL tablet 0.4 mg, 0.4 mg, Sublingual, Q5 Min PRN, Kaylee Kay MD    nitroglycerin (NITROSTAT) SL tablet 0.4 mg, 0.4 mg, Sublingual, Q5 Min PRN, Kaylee Kay MD    ondansetron ODT (ZOFRAN-ODT) disintegrating tablet 4 mg, 4 mg, Oral, Q8H PRN, Kaylee Kay MD    sodium chloride 0.9 % flush 10 mL, 10 mL, Intravenous, PRN, Kaylee Kay MD    sodium chloride 0.9 % flush 10 mL, 10 mL, Intravenous, Q12H, Kaylee Kay MD, 10 mL at 09/13/23 0856    sodium chloride 0.9 % flush 10 mL, 10 mL, Intravenous, PRN, Kaylee Kay MD    spironolactone (ALDACTONE) tablet 25 mg, 25 mg, Oral, Daily, Kaylee Kay MD, 25 mg at 09/13/23 1420    venlafaxine XR (EFFEXOR-XR) 24 hr capsule 75 mg, 75 mg, Oral, Daily, Kaylee Kay MD, 75 mg at 09/13/23 0852  Allergies:  No Known Allergies  Social History:   Social History     Socioeconomic History    Marital status:    Tobacco Use    Smoking status: Never    Smokeless tobacco: Never   Vaping Use    Vaping Use: Never used   Substance and Sexual Activity    Alcohol use: Never    Drug use: Never    Sexual activity: Defer     Family History:  Family History   Problem Relation Age of Onset    Diabetes Mother           Review of Systems  See history of present illness and past medical history.  Patient denies headache, dizziness, syncope, falls, trauma, change in vision, change in hearing, change in taste, changes in weight, changes in appetite, focal weakness, numbness, or paresthesia.  Patient denies  PND, cough, sinus pressure, rhinorrhea, epistaxis, hemoptysis, nausea,  "vomiting,hematemesis, diarrhea, constipation or hematochezia. Denies cold or heat intolerance, polydipsia, polyuria, polyphagia. Denies hematuria, pyuria, dysuria, hesitancy, frequency or urgency. Denies consumption of raw and under cooked meats foods or change in water source.  Denies fever, chills, sweats, night sweats.   .      Vitals:   /94 (BP Location: Left arm, Patient Position: Lying)   Pulse 89   Temp 98.7 °F (37.1 °C) (Oral)   Resp 16   Ht 170.2 cm (67\")   Wt 78.5 kg (173 lb)   SpO2 98%   BMI 27.10 kg/m²   I/O:   Intake/Output Summary (Last 24 hours) at 9/13/2023 2029  Last data filed at 9/13/2023 1914  Gross per 24 hour   Intake 687 ml   Output 1225 ml   Net -538 ml     Exam:  General Appearance:    Alert, cooperative, no distress, appears stated age   Head:    Normocephalic, without obvious abnormality, atraumatic   Eyes:    PERRL, conjunctivae/corneas clear, EOM's intact, both eyes   Ears:    Normal external ear canals, both ears   Nose:   Nares normal, septum midline, mucosa normal, no drainage    or sinus tenderness   Throat:   Lips, tongue, gums normal; oral mucosa pink and moist   Neck:   Supple, symmetrical, trachea midline, no adenopathy;     thyroid:  no enlargement/tenderness/nodules; no carotid    bruit or JVD   Back:     Symmetric, no curvature, ROM normal, no CVA tenderness   Lungs:     Decreased breath sounds bilaterally, respirations unlabored   Chest Wall:    No tenderness or deformity    Heart:    Regular rate and rhythm, S1 and S2 normal, no murmur, rub   or gallop   Abdomen:     Soft, nontender, bowel sounds active all four quadrants,     no masses, no hepatomegaly, no splenomegaly   Extremities:   Extremities normal, atraumatic, no cyanosis or edema                Data Review:  Labs in chart were reviewed.  WBC   Date Value Ref Range Status   09/13/2023 7.60 3.40 - 10.80 10*3/mm3 Final     Hemoglobin   Date Value Ref Range Status   09/13/2023 12.7 (L) 13.0 - 17.7 g/dL " Final     Hematocrit   Date Value Ref Range Status   09/13/2023 37.8 37.5 - 51.0 % Final     Platelets   Date Value Ref Range Status   09/13/2023 393 140 - 450 10*3/mm3 Final     Sodium   Date Value Ref Range Status   09/13/2023 137 136 - 145 mmol/L Final     Potassium   Date Value Ref Range Status   09/13/2023 4.4 3.5 - 5.2 mmol/L Final     Chloride   Date Value Ref Range Status   09/13/2023 98 98 - 107 mmol/L Final     CO2   Date Value Ref Range Status   09/13/2023 26.0 22.0 - 29.0 mmol/L Final     BUN   Date Value Ref Range Status   09/13/2023 14 6 - 20 mg/dL Final     Creatinine   Date Value Ref Range Status   09/13/2023 0.85 0.76 - 1.27 mg/dL Final     Glucose   Date Value Ref Range Status   09/13/2023 179 (H) 65 - 99 mg/dL Final         Results from last 7 days   Lab Units 09/12/23  1318   HEMOGLOBIN A1C % 8.70*       Imaging Results (Last 7 Days)       Procedure Component Value Units Date/Time    XR Chest 1 View [[id number]] Collected: 09/12/23 1425     Updated: 09/12/23 1430    Narrative:      XR CHEST 1 VW-     HISTORY: Male who is 48 years-old, chest pain     TECHNIQUE: Frontal view of the chest     COMPARISON: 6/15/2021     FINDINGS: The cardiac silhouette is enlarged suggesting cardiomegaly  and/or pericardial effusion. Hazy bilateral opacities suggest edema  and/or pneumonia, follow-up recommended. No large pleural effusion. No  pneumothorax. No acute osseous process.       Impression:      As described.     This report was finalized on 9/12/2023 2:27 PM by Dr. Hubert Reid M.D.             Past Medical History:   Diagnosis Date    Anxiety     Cough     Inability to attain erection     Injury of acoustic nerve     Myalgia     Type II diabetes mellitus     Venous insufficiency     Viral illness     Vitamin D deficiency        Assessment:  Active Hospital Problems    Diagnosis  POA    **Chest pain [R07.9]  Yes    New onset of congestive heart failure [I50.9]  Yes    NSTEMI, initial episode of care  [I21.4]  Unknown      Resolved Hospital Problems   No resolved problems to display.   Diabetes with hyperglycemia  Dyspnea      Plan:  Add lantus  Ask the diabetes educator to see him  Continue sliding scale  Hgba1c indicates suboptimal control  Dw patient and family  Notes and labs reviewed  Thanks for involving us in his care    Divina Flores MD   9/13/2023  20:29 EDT

## 2023-09-15 ENCOUNTER — READMISSION MANAGEMENT (OUTPATIENT)
Dept: CALL CENTER | Facility: HOSPITAL | Age: 48
End: 2023-09-15
Payer: COMMERCIAL

## 2023-09-15 VITALS
OXYGEN SATURATION: 99 % | SYSTOLIC BLOOD PRESSURE: 95 MMHG | DIASTOLIC BLOOD PRESSURE: 66 MMHG | WEIGHT: 158 LBS | BODY MASS INDEX: 24.8 KG/M2 | RESPIRATION RATE: 20 BRPM | HEART RATE: 75 BPM | HEIGHT: 67 IN | TEMPERATURE: 97.8 F

## 2023-09-15 LAB
ANION GAP SERPL CALCULATED.3IONS-SCNC: 14 MMOL/L (ref 5–15)
BUN SERPL-MCNC: 22 MG/DL (ref 6–20)
BUN/CREAT SERPL: 18.6 (ref 7–25)
CALCIUM SPEC-SCNC: 10.1 MG/DL (ref 8.6–10.5)
CHLORIDE SERPL-SCNC: 89 MMOL/L (ref 98–107)
CO2 SERPL-SCNC: 32 MMOL/L (ref 22–29)
CREAT SERPL-MCNC: 1.18 MG/DL (ref 0.76–1.27)
EGFRCR SERPLBLD CKD-EPI 2021: 77.1 ML/MIN/1.73
GLUCOSE BLDC GLUCOMTR-MCNC: 260 MG/DL (ref 70–130)
GLUCOSE BLDC GLUCOMTR-MCNC: 288 MG/DL (ref 70–130)
GLUCOSE SERPL-MCNC: 285 MG/DL (ref 65–99)
POTASSIUM SERPL-SCNC: 3.9 MMOL/L (ref 3.5–5.2)
SODIUM SERPL-SCNC: 135 MMOL/L (ref 136–145)

## 2023-09-15 PROCEDURE — 99239 HOSP IP/OBS DSCHRG MGMT >30: CPT | Performed by: NURSE PRACTITIONER

## 2023-09-15 PROCEDURE — 25010000002 FUROSEMIDE PER 20 MG: Performed by: INTERNAL MEDICINE

## 2023-09-15 PROCEDURE — 63710000001 INSULIN LISPRO (HUMAN) PER 5 UNITS: Performed by: INTERNAL MEDICINE

## 2023-09-15 PROCEDURE — 80048 BASIC METABOLIC PNL TOTAL CA: CPT | Performed by: INTERNAL MEDICINE

## 2023-09-15 PROCEDURE — 82948 REAGENT STRIP/BLOOD GLUCOSE: CPT

## 2023-09-15 RX ORDER — INSULIN LISPRO 100 [IU]/ML
4 INJECTION, SOLUTION INTRAVENOUS; SUBCUTANEOUS
Status: DISCONTINUED | OUTPATIENT
Start: 2023-09-15 | End: 2023-09-15 | Stop reason: HOSPADM

## 2023-09-15 RX ORDER — CARVEDILOL 25 MG/1
25 TABLET ORAL EVERY 12 HOURS SCHEDULED
Qty: 60 TABLET | Refills: 11 | Status: SHIPPED | OUTPATIENT
Start: 2023-09-15

## 2023-09-15 RX ORDER — SPIRONOLACTONE 25 MG/1
25 TABLET ORAL DAILY
Qty: 30 TABLET | Refills: 11 | Status: SHIPPED | OUTPATIENT
Start: 2023-09-16

## 2023-09-15 RX ORDER — ASPIRIN 81 MG/1
324 TABLET, CHEWABLE ORAL ONCE
Qty: 4 TABLET | Refills: 0 | Status: SHIPPED | OUTPATIENT
Start: 2023-09-15 | End: 2023-09-15

## 2023-09-15 RX ORDER — ASPIRIN 81 MG/1
81 TABLET ORAL DAILY
Qty: 30 TABLET | Refills: 11 | Status: SHIPPED | OUTPATIENT
Start: 2023-09-16

## 2023-09-15 RX ADMIN — EMPAGLIFLOZIN 10 MG: 10 TABLET, FILM COATED ORAL at 09:25

## 2023-09-15 RX ADMIN — GABAPENTIN 400 MG: 400 CAPSULE ORAL at 09:24

## 2023-09-15 RX ADMIN — ASPIRIN 81 MG: 81 TABLET, COATED ORAL at 09:25

## 2023-09-15 RX ADMIN — INSULIN LISPRO 6 UNITS: 100 INJECTION, SOLUTION INTRAVENOUS; SUBCUTANEOUS at 06:33

## 2023-09-15 RX ADMIN — CARVEDILOL 25 MG: 25 TABLET, FILM COATED ORAL at 09:25

## 2023-09-15 RX ADMIN — FUROSEMIDE 80 MG: 10 INJECTION, SOLUTION INTRAMUSCULAR; INTRAVENOUS at 09:26

## 2023-09-15 RX ADMIN — ATORVASTATIN CALCIUM 40 MG: 20 TABLET, FILM COATED ORAL at 09:24

## 2023-09-15 RX ADMIN — SPIRONOLACTONE 25 MG: 25 TABLET ORAL at 09:24

## 2023-09-15 RX ADMIN — INSULIN LISPRO 4 UNITS: 100 INJECTION, SOLUTION INTRAVENOUS; SUBCUTANEOUS at 09:25

## 2023-09-15 NOTE — PROGRESS NOTES
Name: Donnie Bishop ADMIT: 2023   : 1975  PCP: Juju Kennedy MD    MRN: [id number] LOS: 1 days   AGE/SEX: 48 y.o. male  ROOM: [facility]   Subjective   Chief Complaint   Patient presents with    Chest Pain     S/p LHC 2 days ago  No significant CAD  Dyspnea improved  BG 200s  On insulin here as well as outpatient         Objective   Vital Signs  Temp:  [97.1 °F (36.2 °C)-98.3 °F (36.8 °C)] 97.9 °F (36.6 °C)  Heart Rate:  [74-82] 75  Resp:  [16-20] 20  BP: (108-134)/(61-96) 108/61  SpO2:  [95 %-99 %] 99 %  on   ;   Device (Oxygen Therapy): room air  Body mass index is 24.75 kg/m².    Physical Exam  HENT:      Head: Normocephalic and atraumatic.   Eyes:      General: No scleral icterus.  Cardiovascular:      Rate and Rhythm: Normal rate and regular rhythm.      Heart sounds: Normal heart sounds.   Pulmonary:      Effort: Pulmonary effort is normal. No respiratory distress.      Breath sounds: Normal breath sounds.   Abdominal:      General: There is no distension.      Palpations: Abdomen is soft.      Tenderness: There is no abdominal tenderness.   Musculoskeletal:      Cervical back: Neck supple.   Neurological:      Mental Status: He is alert.   Psychiatric:         Behavior: Behavior normal.       Results Review:       I reviewed the patient's new clinical results.  Results from last 7 days   Lab Units 23  0437 23  1318   WBC 10*3/mm3 7.60 7.71   HEMOGLOBIN g/dL 12.7* 12.9*   PLATELETS 10*3/mm3 393 419       Results from last 7 days   Lab Units 09/15/23  0310 23  0900 23  0437 23  1318   SODIUM mmol/L 135* 135* 137 135*   POTASSIUM mmol/L 3.9 4.5 4.4 4.9   CHLORIDE mmol/L 89* 92* 98 97*   CO2 mmol/L 32.0* 30.1* 26.0 23.5   BUN mg/dL 22* 16 14 9   CREATININE mg/dL 1.18 0.95 0.85 0.86   GLUCOSE mg/dL 285* 292* 179* 283*     Estimated Creatinine Clearance: 79.3 mL/min (by C-G formula based on SCr of 1.18 mg/dL).  Results from last 7 days   Lab Units 23  1318   ALBUMIN  g/dL 4.8   BILIRUBIN mg/dL 1.3*   ALK PHOS U/L 54   AST (SGOT) U/L 22   ALT (SGPT) U/L 25       Results from last 7 days   Lab Units 09/15/23  0310 09/14/23  0900 09/13/23  0437 09/12/23  1318   CALCIUM mg/dL 10.1 9.6 9.6 9.9   ALBUMIN g/dL  --   --   --  4.8   MAGNESIUM mg/dL  --   --  2.0  --            Coag     HbA1C   Lab Results   Component Value Date    HGBA1C 8.70 (H) 09/12/2023    HGBA1C 10.40 (H) 07/12/2023    HGBA1C 8.2 (H) 02/01/2021     Infection     Radiology(recent) No radiology results for the last day    HS Troponin T   Date Value Ref Range Status   09/13/2023 60 (C) <15 ng/L Final     No components found for: TSH;2    aspirin, 324 mg, Oral, Once   And  aspirin, 81 mg, Oral, Daily  atorvastatin, 40 mg, Oral, Daily  carvedilol, 25 mg, Oral, Q12H  empagliflozin, 10 mg, Oral, Daily  furosemide, 80 mg, Intravenous, Q12H  gabapentin, 400 mg, Oral, Q12H  insulin glargine, 30 Units, Subcutaneous, Nightly  insulin lispro, 2-9 Units, Subcutaneous, 4x Daily AC & at Bedtime  insulin lispro, 4 Units, Subcutaneous, TID With Meals  melatonin, 5 mg, Oral, Nightly  senna-docusate sodium, 2 tablet, Oral, BID  sodium chloride, 10 mL, Intravenous, Q12H  spironolactone, 25 mg, Oral, Daily  venlafaxine XR, 75 mg, Oral, Daily       Diet: Regular/House Diet, Diabetic Diets, Cardiac Diets; Healthy Heart (2-3 Na+); Consistent Carbohydrate; Texture: Regular Texture (IDDSI 7); Fluid Consistency: Thin (IDDSI 0)      Assessment & Plan      Active Hospital Problems    Diagnosis  POA    **Chest pain [R07.9]  Yes    New onset of congestive heart failure [I50.9]  Yes    NSTEMI, initial episode of care [I21.4]  Yes    Multiple-type hyperlipidemia [E78.2]  Yes    Type 2 diabetes mellitus with diabetic neuropathy, with long-term current use of insulin [E11.40, Z79.4]  Not Applicable    Neuropathy [G62.9]  Yes      Resolved Hospital Problems   No resolved problems to display.       DM- A1C 8.7. States was on metformin at home and 70/30  15 units qAM and 10  units qPM. DM educator has seen. will plan on increasing this at discharge (can message Dr. Min to increase on dc med rec when he is to be dcd). On lantus 30 units and SSI here, will add additional lispro with meals for the time being. continue to monitor and adjust  Agents for HTN adjusted  Agents for NICM adjusted additionally by Cardiology.   On chronic gabapentin.       DW staff  Reviewed records       Lev Min MD  [facility]  09/15/23  13:23 EDT

## 2023-09-15 NOTE — PROGRESS NOTES
[facility] Clinical Pharmacy Services: Pharmacy Education - Heart Failure Medications    Donnie Bishop was counseled over all medications related to their heart failure diagnosis.      Counseling points included the following:  Explained indication of each medication, and patient's need for these medications.  Went over dosing and frequency of each medication.  Discussed any special administration, storage, or monitoring instructions with medications.  Discussed all important drug interactions, including over-the-counter medications and supplements.  Explained possible side effects for each medication.  Instructed the patient not to begin or discontinue any medications without informing his/her physician/pharmacist.  Addressed any specific questions the patient had in regards to their medication.    Medications included aspirin, carvedilol, empagliflozin, Entresto, and spironolactone.      Donnie Bishop expressed understanding and had no further questions.    Katharine Arnold, Pharm.D., [facility]   Clinical Pharmacist   Phone Extension #[phone]

## 2023-09-15 NOTE — PLAN OF CARE
Goal Outcome Evaluation:  Plan of Care Reviewed With: patient        Progress: improving  Outcome Evaluation: A/O, no complaints of pain, will desat while sleeping, requires 2L NC, SR on the monitor, VSS, assessment ongoing.

## 2023-09-16 NOTE — OUTREACH NOTE
Prep Survey      Flowsheet Row Responses   [facility] facility patient discharged from? [place]   Is LACE score < 7 ? No   Eligibility [facility]   Date of Admission 09/12/23   Date of Discharge 09/15/23   Discharge Disposition Home or Self Care   Discharge diagnosis Chest pain-left heart cath   Does the patient have one of the following disease processes/diagnoses(primary or secondary)? Other   Does the patient have Home health ordered? No   Is there a DME ordered? No   Prep survey completed? Yes            ELIZ A - Registered Nurse

## 2023-09-17 NOTE — PAYOR COMM NOTE
"Donnie Ornelas (48 y.o. Male)                                     ATTENTION; DISCHARGE CASE REF # [id number]         Date of Birth   1975    Social Security Number       Address   [address] [place] [address]    Home Phone   [phone]    MRN   [id number]       Sikhism   Faith    Marital Status                               Admission Date   9/12/23    Admission Type   Emergency    Admitting Provider   Kaylee Lancaster MD    Attending Provider       [facility]       Discharge Date   9/15/2023    Discharge Disposition   Home or Self Care    Discharge Destination                                 Attending Provider: (none)   Allergies: No Known Allergies    Isolation: None   Infection: None   Code Status: Prior    Ht: 170.2 cm (67\")   Wt: 71.7 kg (158 lb)    Admission Cmt: None   Principal Problem: Chest pain [R07.9]                   Active Insurance as of 9/12/2023       Primary Coverage       Payor Plan Insurance Group Employer/Plan Group    ANTHEM BLUE CROSS ANTHEM BLUE CROSS BLUE SHIELD PPO [id number]       Payor Plan Address Payor Plan Phone Number Payor Plan Fax Number Effective Dates    [address] [phone]  1/1/2019 - None Entered    [place] [address]         Subscriber Name Subscriber Birth Date Member ID       DONNIE ORNELAS 1975 [id number]                     Emergency Contacts        (Rel.) Home Phone Work Phone Mobile Phone    Murphy Padilla (Brother) -- -- [phone]              Discharge Order (From admission, onward)       Start     Ordered    09/15/23 0945  Discharge patient  Once        Expected Discharge Date: 09/15/23   Discharge Disposition: Home or Self Care   Physician of Record for Attribution - Please select from Treatment Team: KAYLEE LANCASTER [[id number]]   Review needed by CMO to determine Physician of Record: No      Question Answer Comment   Physician of Record for Attribution - Please select " from Treatment Team DEE LANCASTER    Review needed by CMO to determine Physician of Record No        09/15/23 0948

## 2023-09-18 ENCOUNTER — TRANSITIONAL CARE MANAGEMENT TELEPHONE ENCOUNTER (OUTPATIENT)
Dept: CALL CENTER | Facility: HOSPITAL | Age: 48
End: 2023-09-18
Payer: COMMERCIAL

## 2023-09-18 NOTE — OUTREACH NOTE
Call Center TCM Note      Flowsheet Row Responses   [facility] patient discharged from? [place]   Does the patient have one of the following disease processes/diagnoses(primary or secondary)? Other   TCM attempt successful? Yes   Call start time 1227   Call end time 1235   Discharge diagnosis Chest pain-left heart cath   Person spoke with today (if not patient) and relationship Patient   Meds reviewed with patient/caregiver? Yes   Does the patient have all medications ordered at discharge? Yes   Is the patient taking all medications as directed (includes completed medication regime)? Yes   Comments PCP Dr Kennedy. Patient needs a one week HFU appt with PCP. Patient cancelled appt that was for today,  he reports that he had to work. Encouraged patient to contact office to reschedule,  no appts showing available with PCP by this nurse.   Does the patient have an appointment with their PCP within 7-14 days of discharge? No appointments available   Nursing Interventions Routed TCM call to PCP office, PCP office requested to make appointment - message sent   Has home health visited the patient within 72 hours of discharge? N/A   Psychosocial issues? No   Did the patient receive a copy of their discharge instructions? Yes   Nursing interventions Reviewed instructions with patient   What is the patient's perception of their health status since discharge? Improving   Is the patient/caregiver able to teach back signs and symptoms related to disease process for when to call PCP? Yes   Is the patient/caregiver able to teach back signs and symptoms related to disease process for when to call 911? Yes   Is the patient/caregiver able to teach back the hierarchy of who to call/visit for symptoms/problems? PCP, Specialist, Home health nurse, Urgent Care, ED, 911 Yes   If the patient is a current smoker, are they able to teach back resources for cessation? Not a smoker   TCM call completed? Yes   Call end time 1235   Would  this patient benefit from a Referral to [facility] Social Work? No   Is the patient interested in additional calls from an ambulatory ? No            Bere Miner RN    9/18/2023, 12:36 EDT

## 2023-09-19 NOTE — DISCHARGE SUMMARY
Patient Name: Donnie Bishop  :1975  48 y.o.    Date of Admit: 2023  Date of Discharge:  2023    Discharge Diagnosis:  Type 2 NSTEMI- in the setting of CHF- normal coronaries on cardiac cath  NICM on GDMT  Acute systolic CHF due to dilated cardiomyopathy  Type 2 diabetes mellitus on insulin therapy  Essential HTN      Hospital Course:   Donnie Bishop is a 48-year-old female with history of diabetes, hypertension and hyperlipidemia.  He came to the emergency room with chest pain and shortness of breath.  His diabetes has been poorly controlled and last A1c was 8.7.  EKG showed some sinus tachycardia he had some flat elevation in troponin.  Chest x-ray also showed pulmonary edema.  We gave him some Lasix echocardiogram showed a EF of 30 to 35% with moderate LVH, global hypokinesis, grade 2 diastolic dysfunction and mild RV dysfunction.  He was admitted for further evaluation.  Due to elevation in troponin, low EF and chest pain we took him for cardiac cath.  The left main was short with separate ostia.  The LAD was large and normal diagonal was normal.  The circumflex was large and dominant with 3 small OM vessels no coronary disease he had a small nondominant RCA.  His LVEDP was elevated at 25 to 30 mmHg and we put him on some scheduled diuretic therapy.  He had previously been on ACE inhibitor so we stopped that in anticipation to switch him over to Entresto.  He was put on guideline directed therapy with carvedilol Jardiance and Aldactone.  He tolerated this rated this well he diuresed well and was started on Entresto on discharge.  Also of note he was followed by the hospitalist for diabetes management.  He is going to follow-up with us in a week.      Procedures Performed  Procedure(s):  Left Heart Cath  Coronary angiography       Consults       Date and Time Order Name Status Description    2023  4:07 PM Inpatient Hospitalist Consult Completed             Pertinent Test Results:   Results from  last 7 days   Lab Units 09/15/23  0310 09/13/23  0437 09/12/23  1318   SODIUM mmol/L 135*   < > 135*   POTASSIUM mmol/L 3.9   < > 4.9   CHLORIDE mmol/L 89*   < > 97*   CO2 mmol/L 32.0*   < > 23.5   BUN mg/dL 22*   < > 9   CREATININE mg/dL 1.18   < > 0.86   CALCIUM mg/dL 10.1   < > 9.9   BILIRUBIN mg/dL  --   --  1.3*   ALK PHOS U/L  --   --  54   ALT (SGPT) U/L  --   --  25   AST (SGOT) U/L  --   --  22   GLUCOSE mg/dL 285*   < > 283*    < > = values in this interval not displayed.     Results from last 7 days   Lab Units 09/13/23  0437 09/12/23  1537 09/12/23  1318   HSTROP T ng/L 60* 73* 65*     @LABRCNT(bnp)@  Results from last 7 days   Lab Units 09/13/23  0437   WBC 10*3/mm3 7.60   HEMOGLOBIN g/dL 12.7*   HEMATOCRIT % 37.8   PLATELETS 10*3/mm3 393         Results from last 7 days   Lab Units 09/13/23  0437   MAGNESIUM mg/dL 2.0     Results from last 7 days   Lab Units 09/12/23  1318   CHOLESTEROL mg/dL 184   TRIGLYCERIDES mg/dL 126   HDL CHOL mg/dL 63*   LDL CHOL mg/dL 99       Condition on Discharge: stable    Discharge Medications     Discharge Medications        New Medications        Instructions Start Date   Aspirin Low Dose 81 MG EC tablet  Generic drug: aspirin   81 mg, Oral, Daily      carvedilol 25 MG tablet  Commonly known as: COREG   25 mg, Oral, Every 12 Hours Scheduled      Entresto 24-26 MG tablet  Generic drug: sacubitril-valsartan   1 tablet, Oral, 2 Times Daily      Jardiance 10 MG tablet tablet  Generic drug: empagliflozin   10 mg, Oral, Daily      spironolactone 25 MG tablet  Commonly known as: ALDACTONE   25 mg, Oral, Daily             Changes to Medications        Instructions Start Date   NovoLOG Mix 70/30 FlexPen (70-30) 100 UNIT/ML suspension pen-injector injection  Generic drug: insulin aspart prot & aspart  What changed:   how much to take  how to take this  when to take this  additional instructions   20 Units, Subcutaneous, 2 Times Daily Before Meals             Continue These  Medications        Instructions Start Date   atorvastatin 40 MG tablet  Commonly known as: LIPITOR   TAKE 1 TABLET BY MOUTH EVERY DAY      gabapentin 400 MG capsule  Commonly known as: NEURONTIN   TAKE 1 CAPSULE BY MOUTH THREE TIMES A DAY      melatonin 5 MG tablet tablet   5 mg, Oral      metFORMIN 1000 MG tablet  Commonly known as: GLUCOPHAGE   1,000 mg, Oral, 2 Times Daily With Meals      venlafaxine XR 75 MG 24 hr capsule  Commonly known as: Effexor XR   75 mg, Oral, Daily      vitamin D 1.25 MG (82536 UT) capsule capsule  Commonly known as: ERGOCALCIFEROL   50,000 Units, Oral, Weekly             Stop These Medications      lisinopril 40 MG tablet  Commonly known as: PRINIVIL,ZESTRIL     metaxalone 800 MG tablet  Commonly known as: SKELAXIN     metoprolol succinate XL 50 MG 24 hr tablet  Commonly known as: TOPROL-XL     naproxen 500 MG EC tablet  Commonly known as: EC NAPROSYN     ondansetron ODT 4 MG disintegrating tablet  Commonly known as: ZOFRAN-ODT     RANITIDINE 75 PO              Discharge Diet:   Diet Instructions    Diabetic diet           Activity at Discharge:   Activity Instructions      Activity as tolerated           Discharge disposition: home    Follow-up Appointments  Future Appointments   Date Time Provider Department Center   1/12/2024  1:45 PM Juju Kennedy MD [id number]         Test Results Pending at Discharge       MICHELLE Lei, [facility]  09/19/23  12:32 EDT    Time: Discharge 40 min  Electronically signed by MICHELLE Lei, 09/19/23, 12:32 PM EDT.

## 2023-09-26 ENCOUNTER — READMISSION MANAGEMENT (OUTPATIENT)
Dept: CALL CENTER | Facility: HOSPITAL | Age: 48
End: 2023-09-26
Payer: COMMERCIAL

## 2023-09-26 NOTE — OUTREACH NOTE
Medical Week 2 Survey      Flowsheet Row Responses   [facility] patient discharged from? [place]   Does the patient have one of the following disease processes/diagnoses(primary or secondary)? Other   Week 2 attempt successful? Yes   Call start time 1629   Discharge diagnosis Chest pain-left heart cath   Call end time 1632   Meds reviewed with patient/caregiver? Yes   Is the patient taking all medications as directed (includes completed medication regime)? Yes   Does the patient have a primary care provider?  Yes   Does the patient have an appointment with their PCP within 7 days of discharge? Greater than 7 days   Nursing Interventions Educated patient on importance of making appointment, Advised patient to make appointment   Has the patient kept scheduled appointments due by today? Yes   Comments States it is in a week with PCP.   Has home health visited the patient within 72 hours of discharge? N/A   Psychosocial issues? No   Did the patient receive a copy of their discharge instructions? Yes   Nursing interventions Reviewed instructions with patient   What is the patient's perception of their health status since discharge? Improving   Is the patient/caregiver able to teach back signs and symptoms related to disease process for when to call PCP? Yes   Is the patient/caregiver able to teach back signs and symptoms related to disease process for when to call 911? Yes   Is the patient/caregiver able to teach back the hierarchy of who to call/visit for symptoms/problems? PCP, Specialist, Home health nurse, Urgent Care, ED, 911 Yes   Week 2 Call Completed? Yes   Wrap up additional comments Advised Pt to speak with PCP about cardio referral.   Call end time 1632            TY VANEGAS - Registered Nurse

## 2023-10-05 ENCOUNTER — OFFICE VISIT (OUTPATIENT)
Age: 48
End: 2023-10-05
Payer: COMMERCIAL

## 2023-10-05 ENCOUNTER — READMISSION MANAGEMENT (OUTPATIENT)
Dept: CALL CENTER | Facility: HOSPITAL | Age: 48
End: 2023-10-05
Payer: COMMERCIAL

## 2023-10-05 VITALS
HEART RATE: 92 BPM | OXYGEN SATURATION: 98 % | HEIGHT: 67 IN | SYSTOLIC BLOOD PRESSURE: 139 MMHG | WEIGHT: 165 LBS | DIASTOLIC BLOOD PRESSURE: 82 MMHG | BODY MASS INDEX: 25.9 KG/M2

## 2023-10-05 DIAGNOSIS — I42.0 DILATED CARDIOMYOPATHY: Primary | ICD-10-CM

## 2023-10-05 RX ORDER — INSULIN ASPART 100 [IU]/ML
5 INJECTION, SOLUTION INTRAVENOUS; SUBCUTANEOUS 3 TIMES DAILY
COMMUNITY
Start: 2023-08-07

## 2023-10-05 NOTE — PROGRESS NOTES
Subjective:     Encounter Date:10/05/2023      Patient ID: Donnie Bishop is a 48 y.o. male.    Chief Complaint: NICM  HPI:   47 yo man with a history of NICM diagnosed in September 2023. He was started on guideline directed medical therapy for an echo of 35%  Today he returns for follow up. He has not had further dyspnea or orthopnea. LE edema has improved. He is not measuring vitals or weights at home. He does feel anxious, which he attributes to his ongoing divorce.   He works at [employer], feels slow but doesn't have a particularly taxing job physically.     The following portions of the patient's history were reviewed and updated as appropriate: allergies, current medications, past family history, past medical history, past social history, past surgical history and problem list.     REVIEW OF SYSTEMS:   All systems reviewed.  Pertinent positives identified in HPI.  All other systems are negative.    Past Medical History:   Diagnosis Date    Anxiety     Cough     Inability to attain erection     Injury of acoustic nerve     Myalgia     Type II diabetes mellitus     Venous insufficiency     Viral illness     Vitamin D deficiency        Family History   Problem Relation Age of Onset    Diabetes Mother        Social History     Socioeconomic History    Marital status:    Tobacco Use    Smoking status: Never    Smokeless tobacco: Never   Vaping Use    Vaping Use: Never used   Substance and Sexual Activity    Alcohol use: Never    Drug use: Never    Sexual activity: Defer       No Known Allergies    Past Surgical History:   Procedure Laterality Date    CARDIAC CATHETERIZATION N/A 9/13/2023    Procedure: Left Heart Cath;  Surgeon: Kaylee Kay MD;  Location: [facility] CATH INVASIVE LOCATION;  Service: Cardiology;  Laterality: N/A;    CARDIAC CATHETERIZATION N/A 9/13/2023    Procedure: Coronary angiography;  Surgeon: Kaylee Kay MD;  Location: [facility] CATH INVASIVE LOCATION;  Service: Cardiology;  Laterality: N/A;        Procedures       Objective:         PHYSICAL EXAM:  GEN: VSS, no distress,   Eyes: normal sclera, normal lids and lashes  HENT: moist mucus membranes,   Respiratory: CTAB, no rales or wheezes  CV: RRR, no murmurs, , +2 DP and 2+ carotid pulses b/l  GI: NABS, soft,  Nontender, nondistended  MSK: no edema, no scoliosis or kyphosis  Skin: no rash, warm, dry  Heme/Lymph: no bruising or bleeding  Psych: organized thought, normal behavior and affect  Neuro: Cranial nerves grossly intact, Alert and Oriented x 3.         Assessment:          Diagnosis Plan   1. Dilated cardiomyopathy               Plan:       NICM: ED 30-35%, unclear etiology. Uptitrate meds based on BP report next week. NYHA I-II symptoms. Weight today is about 165lb.     Dr. Kennedy, thank you very much for referring this kind patient to me. Please call me with any questions or concerns. I will see the patient again in the office in 3 months.        Kaylee Kay MD  10/05/23  [facility]    Outpatient Encounter Medications as of 10/5/2023   Medication Sig Dispense Refill    aspirin 81 MG EC tablet Take 1 tablet by mouth Daily. 30 tablet 11    atorvastatin (LIPITOR) 40 MG tablet TAKE 1 TABLET BY MOUTH EVERY DAY 90 tablet 3    carvedilol (COREG) 25 MG tablet Take 1 tablet by mouth Every 12 (Twelve) Hours. 60 tablet 11    empagliflozin (JARDIANCE) 10 MG tablet tablet Take 1 tablet by mouth Daily. 30 tablet 11    gabapentin (NEURONTIN) 400 MG capsule TAKE 1 CAPSULE BY MOUTH THREE TIMES A  capsule 0    insulin aspart prot & aspart (NovoLOG Mix 70/30 FlexPen) (70-30) 100 UNIT/ML suspension pen-injector injection Inject 0.2 mL under the skin into the appropriate area as directed 2 (Two) Times a Day Before Meals. 12 mL 1    melatonin 5 MG tablet tablet Take 1 tablet by mouth.      metFORMIN (GLUCOPHAGE) 1000 MG tablet Take 1 tablet by mouth 2 (Two) Times a Day With Meals. 180 tablet 3    sacubitril-valsartan (ENTRESTO) 24-26 MG  tablet Take 1 tablet by mouth 2 (Two) Times a Day. 60 tablet 11    spironolactone (ALDACTONE) 25 MG tablet Take 1 tablet by mouth Daily. 30 tablet 11    venlafaxine XR (Effexor XR) 75 MG 24 hr capsule Take 1 capsule by mouth Daily. 90 capsule 3    vitamin D (ERGOCALCIFEROL) 1.25 MG (79612 UT) capsule capsule Take 1 capsule by mouth 1 (One) Time Per Week.       No facility-administered encounter medications on file as of 10/5/2023.

## 2023-10-05 NOTE — OUTREACH NOTE
Medical Week 3 Survey      Flowsheet Row Responses   [facility] patient discharged from? [place]   Does the patient have one of the following disease processes/diagnoses(primary or secondary)? Other   Week 3 attempt successful? No   Unsuccessful attempts Attempt 1  [at an appt with cardiology]            Tsering NIEVES - Registered Nurse

## 2023-10-10 ENCOUNTER — READMISSION MANAGEMENT (OUTPATIENT)
Dept: CALL CENTER | Facility: HOSPITAL | Age: 48
End: 2023-10-10
Payer: COMMERCIAL

## 2023-10-10 NOTE — OUTREACH NOTE
Medical Week 3 Survey      Flowsheet Row Responses   [facility] patient discharged from? [place]   Does the patient have one of the following disease processes/diagnoses(primary or secondary)? Other   Week 3 attempt successful? No   Unsuccessful attempts Attempt 2            Fidel NESS - Registered Nurse

## 2023-12-17 ENCOUNTER — HOSPITAL ENCOUNTER (EMERGENCY)
Facility: HOSPITAL | Age: 48
Discharge: HOME | End: 2023-12-17
Attending: EMERGENCY MEDICINE | Admitting: EMERGENCY MEDICINE
Payer: COMMERCIAL

## 2023-12-17 ENCOUNTER — APPOINTMENT (OUTPATIENT)
Dept: GENERAL RADIOLOGY | Facility: HOSPITAL | Age: 48
End: 2023-12-17
Payer: COMMERCIAL

## 2023-12-17 ENCOUNTER — APPOINTMENT (OUTPATIENT)
Dept: CT IMAGING | Facility: HOSPITAL | Age: 48
End: 2023-12-17
Payer: COMMERCIAL

## 2023-12-17 VITALS
HEART RATE: 97 BPM | DIASTOLIC BLOOD PRESSURE: 81 MMHG | BODY MASS INDEX: 25.88 KG/M2 | RESPIRATION RATE: 18 BRPM | WEIGHT: 164.9 LBS | TEMPERATURE: 98.2 F | HEIGHT: 67 IN | OXYGEN SATURATION: 99 % | SYSTOLIC BLOOD PRESSURE: 148 MMHG

## 2023-12-17 DIAGNOSIS — R06.02 SHORTNESS OF BREATH: Primary | ICD-10-CM

## 2023-12-17 DIAGNOSIS — F41.9 ANXIETY: ICD-10-CM

## 2023-12-17 LAB
ALBUMIN SERPL-MCNC: 5.2 G/DL (ref 3.5–5.2)
ALBUMIN/GLOB SERPL: 2.4 G/DL
ALP SERPL-CCNC: 55 U/L (ref 39–117)
ALT SERPL W P-5'-P-CCNC: 14 U/L (ref 1–41)
ANION GAP SERPL CALCULATED.3IONS-SCNC: 17 MMOL/L (ref 5–15)
AST SERPL-CCNC: 17 U/L (ref 1–40)
B PARAPERT DNA SPEC QL NAA+PROBE: NOT DETECTED
B PERT DNA SPEC QL NAA+PROBE: NOT DETECTED
BASOPHILS # BLD AUTO: 0.03 10*3/MM3 (ref 0–0.2)
BASOPHILS NFR BLD AUTO: 0.3 % (ref 0–1.5)
BILIRUB SERPL-MCNC: 2.1 MG/DL (ref 0–1.2)
BUN SERPL-MCNC: 13 MG/DL (ref 6–20)
BUN/CREAT SERPL: 13 (ref 7–25)
C PNEUM DNA NPH QL NAA+NON-PROBE: NOT DETECTED
CALCIUM SPEC-SCNC: 10.4 MG/DL (ref 8.6–10.5)
CHLORIDE SERPL-SCNC: 97 MMOL/L (ref 98–107)
CO2 SERPL-SCNC: 25 MMOL/L (ref 22–29)
CREAT SERPL-MCNC: 1 MG/DL (ref 0.76–1.27)
DEPRECATED RDW RBC AUTO: 41 FL (ref 37–54)
EGFRCR SERPLBLD CKD-EPI 2021: 94 ML/MIN/1.73
EOSINOPHIL # BLD AUTO: 0.07 10*3/MM3 (ref 0–0.4)
EOSINOPHIL NFR BLD AUTO: 0.7 % (ref 0.3–6.2)
ERYTHROCYTE [DISTWIDTH] IN BLOOD BY AUTOMATED COUNT: 12.7 % (ref 12.3–15.4)
FLUAV SUBTYP SPEC NAA+PROBE: NOT DETECTED
FLUBV RNA ISLT QL NAA+PROBE: NOT DETECTED
GLOBULIN UR ELPH-MCNC: 2.2 GM/DL
GLUCOSE SERPL-MCNC: 200 MG/DL (ref 65–99)
HADV DNA SPEC NAA+PROBE: NOT DETECTED
HCOV 229E RNA SPEC QL NAA+PROBE: NOT DETECTED
HCOV HKU1 RNA SPEC QL NAA+PROBE: NOT DETECTED
HCOV NL63 RNA SPEC QL NAA+PROBE: NOT DETECTED
HCOV OC43 RNA SPEC QL NAA+PROBE: NOT DETECTED
HCT VFR BLD AUTO: 44.7 % (ref 37.5–51)
HGB BLD-MCNC: 14.7 G/DL (ref 13–17.7)
HMPV RNA NPH QL NAA+NON-PROBE: NOT DETECTED
HOLD SPECIMEN: NORMAL
HOLD SPECIMEN: NORMAL
HPIV1 RNA ISLT QL NAA+PROBE: NOT DETECTED
HPIV2 RNA SPEC QL NAA+PROBE: NOT DETECTED
HPIV3 RNA NPH QL NAA+PROBE: NOT DETECTED
HPIV4 P GENE NPH QL NAA+PROBE: NOT DETECTED
IMM GRANULOCYTES # BLD AUTO: 0.02 10*3/MM3 (ref 0–0.05)
IMM GRANULOCYTES NFR BLD AUTO: 0.2 % (ref 0–0.5)
LYMPHOCYTES # BLD AUTO: 2.15 10*3/MM3 (ref 0.7–3.1)
LYMPHOCYTES NFR BLD AUTO: 21.5 % (ref 19.6–45.3)
M PNEUMO IGG SER IA-ACNC: NOT DETECTED
MCH RBC QN AUTO: 29.2 PG (ref 26.6–33)
MCHC RBC AUTO-ENTMCNC: 32.9 G/DL (ref 31.5–35.7)
MCV RBC AUTO: 88.7 FL (ref 79–97)
MONOCYTES # BLD AUTO: 0.57 10*3/MM3 (ref 0.1–0.9)
MONOCYTES NFR BLD AUTO: 5.7 % (ref 5–12)
NEUTROPHILS NFR BLD AUTO: 7.18 10*3/MM3 (ref 1.7–7)
NEUTROPHILS NFR BLD AUTO: 71.6 % (ref 42.7–76)
NRBC BLD AUTO-RTO: 0 /100 WBC (ref 0–0.2)
NT-PROBNP SERPL-MCNC: 142 PG/ML (ref 0–450)
PLATELET # BLD AUTO: 423 10*3/MM3 (ref 140–450)
PMV BLD AUTO: 9.8 FL (ref 6–12)
POTASSIUM SERPL-SCNC: 4 MMOL/L (ref 3.5–5.2)
PROT SERPL-MCNC: 7.4 G/DL (ref 6–8.5)
RBC # BLD AUTO: 5.04 10*6/MM3 (ref 4.14–5.8)
RHINOVIRUS RNA SPEC NAA+PROBE: NOT DETECTED
RSV RNA NPH QL NAA+NON-PROBE: NOT DETECTED
SARS-COV-2 RNA NPH QL NAA+NON-PROBE: NOT DETECTED
SODIUM SERPL-SCNC: 139 MMOL/L (ref 136–145)
TROPONIN T SERPL HS-MCNC: 38 NG/L
TROPONIN T SERPL HS-MCNC: 44 NG/L
WBC NRBC COR # BLD AUTO: 10.02 10*3/MM3 (ref 3.4–10.8)
WHOLE BLOOD HOLD COAG: NORMAL
WHOLE BLOOD HOLD SPECIMEN: NORMAL

## 2023-12-17 PROCEDURE — 71045 X-RAY EXAM CHEST 1 VIEW: CPT

## 2023-12-17 PROCEDURE — 25810000003 SODIUM CHLORIDE 0.9 % SOLUTION: Performed by: PHYSICIAN ASSISTANT

## 2023-12-17 PROCEDURE — 93005 ELECTROCARDIOGRAM TRACING: CPT

## 2023-12-17 PROCEDURE — 80053 COMPREHEN METABOLIC PANEL: CPT

## 2023-12-17 PROCEDURE — 84484 ASSAY OF TROPONIN QUANT: CPT

## 2023-12-17 PROCEDURE — 85025 COMPLETE CBC W/AUTO DIFF WBC: CPT

## 2023-12-17 PROCEDURE — 0202U NFCT DS 22 TRGT SARS-COV-2: CPT | Performed by: PHYSICIAN ASSISTANT

## 2023-12-17 PROCEDURE — 83880 ASSAY OF NATRIURETIC PEPTIDE: CPT

## 2023-12-17 PROCEDURE — 71275 CT ANGIOGRAPHY CHEST: CPT

## 2023-12-17 PROCEDURE — 99285 EMERGENCY DEPT VISIT HI MDM: CPT

## 2023-12-17 PROCEDURE — 93005 ELECTROCARDIOGRAM TRACING: CPT | Performed by: EMERGENCY MEDICINE

## 2023-12-17 PROCEDURE — 36415 COLL VENOUS BLD VENIPUNCTURE: CPT

## 2023-12-17 PROCEDURE — 84484 ASSAY OF TROPONIN QUANT: CPT | Performed by: PHYSICIAN ASSISTANT

## 2023-12-17 PROCEDURE — 25510000001 IOPAMIDOL PER 1 ML: Performed by: EMERGENCY MEDICINE

## 2023-12-17 RX ORDER — SODIUM CHLORIDE 0.9 % (FLUSH) 0.9 %
10 SYRINGE (ML) INJECTION AS NEEDED
Status: DISCONTINUED | OUTPATIENT
Start: 2023-12-17 | End: 2023-12-18 | Stop reason: HOSPADM

## 2023-12-17 RX ADMIN — IOPAMIDOL 95 ML: 755 INJECTION, SOLUTION INTRAVENOUS at 20:59

## 2023-12-17 RX ADMIN — SODIUM CHLORIDE 500 ML: 9 INJECTION, SOLUTION INTRAVENOUS at 21:50

## 2023-12-18 LAB
QT INTERVAL: 327 MS
QTC INTERVAL: 445 MS

## 2023-12-18 NOTE — DISCHARGE INSTRUCTIONS
Continue with all your current medications.  Follow-up with your family physician next available appointment for repeat evaluation.  Follow-up with your cardiologist as needed.  Return to the ER with development of chest pain, fever, should she develop any new symptoms we did not address at today's visit, or should your shortness of breath worsen.

## 2023-12-18 NOTE — ED PROVIDER NOTES
MD ATTESTATION NOTE    The ZACHARIAH and I have discussed this patient's history, physical exam, and treatment plan.  I have reviewed the documentation and personally had a face to face interaction with the patient. I affirm the documentation and agree with the treatment and plan.  The attached note describes my personal findings.      I provided a substantive portion of the care of the patient.  I personally performed the physical exam in its entirety, and below are my findings.      Brief HPI: Patient presents for evaluation of chest tightness.  Patient has history of congestive heart failure.  Had cardiac catheterization in September that showed no coronary disease.  Patient states he has been feeling bad for the last couple days.  Has had no vomiting or diarrhea.  No fevers or chills.    PHYSICAL EXAM  ED Triage Vitals   Temp Heart Rate Resp BP SpO2   12/17/23 1755 12/17/23 1755 12/17/23 1755 12/17/23 1806 12/17/23 1755   98.2 °F (36.8 °C) (!) 132 20 152/99 100 %      Temp src Heart Rate Source Patient Position BP Location FiO2 (%)   -- 12/17/23 2030 12/17/23 1806 12/17/23 1806 --    Monitor Lying Left arm          GENERAL: no acute distress  HENT: nares patent  EYES: no scleral icterus  CV: regular rhythm, tachycardic  RESPIRATORY: normal effort  ABDOMEN: soft  MUSCULOSKELETAL: no deformity  NEURO: alert, moves all extremities, follows commands  PSYCH:  calm, cooperative  SKIN: warm, dry    Vital signs and nursing notes reviewed.        Plan: Lab evaluation, serial troponin       Sylvain Ventura MD  12/17/23 2041

## 2023-12-18 NOTE — ED PROVIDER NOTES
EMERGENCY DEPARTMENT ENCOUNTER    Room Number:  08/08  PCP: Juju Kennedy MD      HPI:  Chief Complaint: Shortness of breath  A complete HPI/ROS/PMH/PSH/SH/FH are unobtainable due to: Nothing  Context: Donnie Bishop is a 48 y.o. male with a significant past medical history of CHF, NSTEMI, insulin-dependent type 2 diabetes mellitus, NSAID who presents to the ED c/o shortness of breath.  Patient states he is felt generally fatigued all weekend long.  Today around 4 or 4:30 PM he began to feel more short of breath than normal.  He states he has a history of CHF and anxiety.  He is unsure if his shortness of breath is more anxiety related or if his symptoms are secondary to the CHF.  He does state he took a p.o. benzodiazepine just prior to arrival and is feeling somewhat better.  He denies chest pain, palpitation, fever, chills but again states he has felt excessively fatigued over the past 2 to 3 days.  Patient does appear to be taking 25 mg of spironolactone but does not have Lasix or loop diuretic listed in his medications.      Reviewing the patient's chart and a note dictated by MICHELLE Ward he appeared to be admitted to the hospital September 12 through September 15, 2023.  He was admitted for chest pain.  It is documented he had some flat elevated troponins.  Chest x-ray showed pulmonary edema.  He was treated with Lasix.  Echocardiogram showed an EF of 30 to 35%, moderate LVH, global hypokinesis, grade 2 days [place] dysfunction, mild RV dysfunction.  Patient had a cardiac cath.  The main was short with separate ostia, LAD was large and normal, circumflex large with 3 small OM vessels and no CAD.  There was a small nondominant RCA.  Patient's ACE inhibitor was stopped.  He was diuresed with Lasix.  He was to be switched to Entresto.  His diabetes is uncontrolled and he was to be placed on [place] carvedilol, Jardiance, Aldactone.  He tolerated treatment well and was discharged home.  At discharge his home  medications were to be Entresto 24-26 mg twice daily, Jardiance 10 mg daily, spironolactone 25 mg qd, carvedilol 25 mg twice daily, ASA 81 mg daily.    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    Hypogonadism in male 08/12/2019    Anxiety     Chest pain 09/12/2023    New onset of congestive heart failure 09/13/2023    NSTEMI, initial episode of care 09/12/2023     Resolved Ambulatory Problems     Diagnosis Date Noted    No Resolved Ambulatory Problems     Past Medical History:   Diagnosis Date    Cough     Myalgia     Type II diabetes mellitus     Venous insufficiency     Viral illness          PAST SURGICAL HISTORY  Past Surgical History:   Procedure Laterality Date    CARDIAC CATHETERIZATION N/A 9/13/2023    Procedure: Left Heart Cath;  Surgeon: Kaylee Kay MD;  Location: [facility] CATH INVASIVE LOCATION;  Service: Cardiology;  Laterality: N/A;    CARDIAC CATHETERIZATION N/A 9/13/2023    Procedure: Coronary angiography;  Surgeon: Kaylee Kay MD;  Location:  [facility] CATH INVASIVE LOCATION;  Service: Cardiology;  Laterality: N/A;         FAMILY HISTORY  Family History   Problem Relation Age of Onset    Diabetes Mother          SOCIAL HISTORY  Social History     Socioeconomic History    Marital status:    Tobacco Use    Smoking status: Never    Smokeless tobacco: Never   Vaping Use    Vaping Use: Never used   Substance and Sexual Activity    Alcohol use: Never    Drug use: Never    Sexual activity: Defer         ALLERGIES  Patient has no known allergies.        REVIEW OF SYSTEMS  Review of Systems     All systems reviewed and negative except for those discussed in HPI.        PHYSICAL EXAM  ED Triage Vitals   Temp Heart Rate Resp BP SpO2   12/17/23 1755 12/17/23 1755 12/17/23 1755 12/17/23 1806 12/17/23 1755   98.2 °F (36.8 °C) (!) 132 20 152/99 100 %      Temp src Heart Rate Source Patient Position BP Location FiO2 (%)   -- -- 12/17/23 1806 12/17/23 1806 --     Lying Left arm        Physical Exam      GENERAL: WDWN male, no acute distress  HENT: nares patent  EYES: no scleral icterus  CV: regular rhythm, normal rate, normal S1-S2, no unilateral leg swelling or pedal edema  RESPIRATORY: normal effort, lungs CTAB  ABDOMEN: soft  MUSCULOSKELETAL: no deformity  NEURO: alert, moves all extremities, follows commands  PSYCH:  calm, cooperative  SKIN: warm, dry    Vital signs and nursing notes reviewed.          LAB RESULTS  Recent Results (from the past 24 hour(s))   ECG 12 Lead ED Triage Standing Order; SOA    Collection Time: 12/17/23  5:59 PM   Result Value Ref Range    QT Interval 327 ms    QTC Interval 445 ms   Comprehensive Metabolic Panel    Collection Time: 12/17/23  6:07 PM    Specimen: Arm, Right; Blood   Result Value Ref Range    Glucose 200 (H) 65 - 99 mg/dL    BUN 13 6 - 20 mg/dL    Creatinine 1.00 0.76 - 1.27 mg/dL    Sodium 139 136 - 145 mmol/L    Potassium 4.0 3.5 - 5.2 mmol/L    Chloride 97 (L) 98 - 107 mmol/L    CO2 25.0 22.0 - 29.0 mmol/L    Calcium 10.4 8.6 - 10.5 mg/dL    Total Protein 7.4 6.0 - 8.5 g/dL    Albumin 5.2 3.5 - 5.2 g/dL    ALT (SGPT) 14 1 - 41 U/L    AST (SGOT) 17 1 - 40 U/L    Alkaline Phosphatase 55 39 - 117 U/L    Total Bilirubin 2.1 (H) 0.0 - 1.2 mg/dL    Globulin 2.2 gm/dL    A/G Ratio 2.4 g/dL    BUN/Creatinine Ratio 13.0 7.0 - 25.0    Anion Gap 17.0 (H) 5.0 - 15.0 mmol/L    eGFR 94.0 >60.0 mL/min/1.73   BNP    Collection Time: 12/17/23  6:07 PM    Specimen: Arm, Right; Blood   Result Value Ref Range    proBNP 142.0 0.0 - 450.0 pg/mL   Single High Sensitivity Troponin T    Collection Time: 12/17/23  6:07 PM    Specimen: Arm, Right; Blood   Result  Value Ref Range    HS Troponin T 44 (H) <22 ng/L   Green Top (Gel)    Collection Time: 12/17/23  6:07 PM   Result Value Ref Range    Extra Tube Hold for add-ons.    Lavender Top    Collection Time: 12/17/23  6:07 PM   Result Value Ref Range    Extra Tube hold for add-on    Gold Top - SST    Collection Time: 12/17/23  6:07 PM   Result Value Ref Range    Extra Tube Hold for add-ons.    Light Blue Top    Collection Time: 12/17/23  6:07 PM   Result Value Ref Range    Extra Tube Hold for add-ons.    CBC Auto Differential    Collection Time: 12/17/23  6:07 PM    Specimen: Arm, Right; Blood   Result Value Ref Range    WBC 10.02 3.40 - 10.80 10*3/mm3    RBC 5.04 4.14 - 5.80 10*6/mm3    Hemoglobin 14.7 13.0 - 17.7 g/dL    Hematocrit 44.7 37.5 - 51.0 %    MCV 88.7 79.0 - 97.0 fL    MCH 29.2 26.6 - 33.0 pg    MCHC 32.9 31.5 - 35.7 g/dL    RDW 12.7 12.3 - 15.4 %    RDW-SD 41.0 37.0 - 54.0 fl    MPV 9.8 6.0 - 12.0 fL    Platelets 423 140 - 450 10*3/mm3    Neutrophil % 71.6 42.7 - 76.0 %    Lymphocyte % 21.5 19.6 - 45.3 %    Monocyte % 5.7 5.0 - 12.0 %    Eosinophil % 0.7 0.3 - 6.2 %    Basophil % 0.3 0.0 - 1.5 %    Immature Grans % 0.2 0.0 - 0.5 %    Neutrophils, Absolute 7.18 (H) 1.70 - 7.00 10*3/mm3    Lymphocytes, Absolute 2.15 0.70 - 3.10 10*3/mm3    Monocytes, Absolute 0.57 0.10 - 0.90 10*3/mm3    Eosinophils, Absolute 0.07 0.00 - 0.40 10*3/mm3    Basophils, Absolute 0.03 0.00 - 0.20 10*3/mm3    Immature Grans, Absolute 0.02 0.00 - 0.05 10*3/mm3    nRBC 0.0 0.0 - 0.2 /100 WBC   Respiratory Panel PCR w/COVID-19(SARS-CoV-2) [facility] In-House, NP Swab in UTM/VTM, 2 HR TAT - Swab, Nasopharynx    Collection Time: 12/17/23  8:28 PM    Specimen: Nasopharynx; Swab   Result Value Ref Range    ADENOVIRUS, PCR Not Detected Not Detected    Coronavirus 229E Not Detected Not Detected    Coronavirus HKU1 Not Detected Not Detected    Coronavirus NL63 Not Detected Not Detected    Coronavirus OC43 Not Detected Not Detected     COVID19 Not Detected Not Detected - Ref. Range    Human Metapneumovirus Not Detected Not Detected    Human Rhinovirus/Enterovirus Not Detected Not Detected    Influenza A PCR Not Detected Not Detected    Influenza B PCR Not Detected Not Detected    Parainfluenza Virus 1 Not Detected Not Detected    Parainfluenza Virus 2 Not Detected Not Detected    Parainfluenza Virus 3 Not Detected Not Detected    Parainfluenza Virus 4 Not Detected Not Detected    RSV, PCR Not Detected Not Detected    Bordetella pertussis pcr Not Detected Not Detected    Bordetella parapertussis PCR Not Detected Not Detected    Chlamydophila pneumoniae PCR Not Detected Not Detected    Mycoplasma pneumo by PCR Not Detected Not Detected   High Sensitivity Troponin T    Collection Time: 12/17/23  8:34 PM    Specimen: Blood   Result Value Ref Range    HS Troponin T 38 (H) <22 ng/L       Ordered the above labs and reviewed the results.        RADIOLOGY  CT Angiogram Chest    Result Date: 12/17/2023  CT ANGIOGRAM CHEST-  Radiation dose reduction techniques were utilized, including automated exposure control and exposure modulation based on body size.  Clinical: Shortness of breath and tachycardia  COMPARISON: None  CT scan of the chest performed with intravenous contrast, pulmonary embolus protocol to include coronal, sagittal and three-dimensional reconstruction.   FINDINGS: 1. No aortic aneurysm, dissection or pulmonary embolus. Cardiac size within normal limits. No pericardial abnormality. The esophagus is satisfactory in course and caliber. No mediastinal or hilar mass/adenopathy.  2. No pleural effusion, pulmonary edema or lung consolidation/infiltrate. There is combination of scattered as well as clustered micronodules demonstrated along the periphery of the left upper lobe, see images 46 through 60. The configuration suggests a bronchiolar infectious/inflammatory process. Short-term follow-up is recommended in 6-8 weeks. The right lung is clear.   3. Limited images through the upper abdomen are unremarkable.    This report was finalized on 12/17/2023 9:21 PM by Dr. Xavier Lema M.D on Workstation: [id number]      XR Chest 1 View    Result Date: 12/17/2023  XR CHEST 1 VW-  Clinical: Shortness of breath  COMPARISON 9/12/2023  FINDINGS: Cardiac size within normal limits, no mediastinal or hilar abnormality seen. The lungs are clear.  CONCLUSION: No active disease of the chest  This report was finalized on 12/17/2023 7:35 PM by Dr. Xavier Lema M.D on Workstation: [id number]       Ordered the above noted radiological studies. Reviewed by me in PACS.          PROCEDURES  Procedures          MEDICATIONS GIVEN IN ER  Medications   sodium chloride 0.9 % flush 10 mL (has no administration in time range)   sodium chloride 0.9 % bolus 500 mL (500 mL Intravenous New Bag 12/17/23 2150)   iopamidol (ISOVUE-370) 76 % injection 100 mL (95 mL Intravenous Given 12/17/23 2059)         MEDICAL DECISION MAKING, PROGRESS, and CONSULTS    Discussion below represents my analysis of pertinent findings related to patient's condition, differential diagnosis, treatment plan and final disposition.      Orders placed during this visit:  Orders Placed This Encounter   Procedures    Respiratory Panel PCR w/COVID-19(SARS-CoV-2) [facility] In-House, NP Swab in UTM/VTM, 2 HR TAT - Swab, Nasopharynx    XR Chest 1 View    CT Angiogram Chest    [place] Draw    Comprehensive Metabolic Panel    BNP    Single High Sensitivity Troponin T    CBC Auto Differential    High Sensitivity Troponin T    High Sensitivity Troponin T 2Hr    NPO Diet NPO Type: Strict NPO    Undress & Gown    Vital Signs    Pulse Oximetry, Continuous    Oxygen Therapy- Nasal Cannula; Titrate 1-6 LPM Per SpO2; 90 - 95%    ECG 12 Lead ED Triage Standing Order; SOA    Insert Peripheral IV    CBC & Differential    Green Top (Gel)    Lavender Top    Gold Top - SST    Light Blue Top         Additional sources:  -  Discussed/obtained information from independent historians: None available  Additional information was obtained to confirm the patient's history.    - External (non-ED) record review: As above            - Chronic or social conditions impacting care: None        Differential diagnosis:    CHF, COVID-19, other viral illness, anxiety, ACS, PTX, PE.  Will obtain CBC, CMP, troponin x 2, BNP, respiratory panel, chest x-ray, EKG to initiate evaluation        Independent interpretation of labs, radiology studies, and discussions with consultants:  ED Course as of 12/17/23 2201   Sun Dec 17, 2023   2022 I viewed the patient's chest x-ray and see no acute process.  This is confirmed with the radiologist official read. [RC]   2027 HS Troponin T(!): 44  This appears chronic and lower than the patient's last several troponins.  Will however obtain a second to ensure stable. [RC]   2027 Glucose(!): 200 [RC]   2027 CO2: 25.0 [RC]   2027 Anion Gap(!): 17.0 [RC]   2027 BUN: 13 [RC]   2027 Creatinine: 1.00 [RC]   2027 eGFR: 94.0 [RC]   2028 proBNP: 142.0 [RC]   2157 My individual interpretation of the patient's CT angiogram of the chest is no saddle PE.  Official CT angiogram was read as follows:FINDINGS:  1. No aortic aneurysm, dissection or pulmonary embolus. Cardiac size  within normal limits. No pericardial abnormality. The esophagus is  satisfactory in course and caliber. No mediastinal or hilar  mass/adenopathy.     2. No pleural effusion, pulmonary edema or lung  consolidation/infiltrate. There is combination of scattered as well as  clustered micronodules demonstrated along the periphery of the left  upper lobe, see images 46 through 60. The configuration suggests a  bronchiolar infectious/inflammatory process. Short-term follow-up is  recommended in 6-8 weeks. The right lung is clear.     3. Limited images through the upper abdomen are unremarkable.           This report was finalized on 12/17/2023 9:21 PM by Dr. Park  AMBAR Lema on Workstation: [id number]      [RC]   8921 Patient well-appearing.  Patient states his symptoms improved with a p.o. benzodiazepine prior to arrival.  Suspect this was a panic attack.  Workup negative.  Will DC at this time and ensure the patient follows up closely with his PCP. [RC]      ED Course User Index  [RC] Juvenal Spring III, PA               DIAGNOSIS  Final diagnoses:   Shortness of breath   Anxiety         DISPOSITION  Discharge      Latest Documented Vital Signs:  As of 22:01 EST  BP- 148/81 HR- 99 Temp- 98.2 °F (36.8 °C) O2 sat- 100%      --    Please note that portions of this were completed with a voice recognition program.       Note Disclaimer: At [facility], we believe that sharing information builds trust and better relationships. You are receiving this note because you are receiving care at [facility] or recently visited. It is possible you will see health information before a provider has talked with you about it. This kind of information can be easy to misunderstand. To help you fully understand what it means for your health, we urge you to discuss this note with your provider.         Juvenal Spring III, PA  12/17/23 6184

## 2023-12-26 ENCOUNTER — APPOINTMENT (OUTPATIENT)
Dept: GENERAL RADIOLOGY | Facility: HOSPITAL | Age: 48
End: 2023-12-26
Payer: COMMERCIAL

## 2023-12-26 ENCOUNTER — APPOINTMENT (OUTPATIENT)
Dept: CT IMAGING | Facility: HOSPITAL | Age: 48
End: 2023-12-26
Payer: COMMERCIAL

## 2023-12-26 ENCOUNTER — HOSPITAL ENCOUNTER (OUTPATIENT)
Facility: HOSPITAL | Age: 48
Setting detail: OBSERVATION
Discharge: HOME | End: 2023-12-27
Attending: STUDENT IN AN ORGANIZED HEALTH CARE EDUCATION/TRAINING PROGRAM | Admitting: EMERGENCY MEDICINE
Payer: COMMERCIAL

## 2023-12-26 DIAGNOSIS — Z79.4 TYPE 2 DIABETES MELLITUS WITH DIABETIC POLYNEUROPATHY, WITH LONG-TERM CURRENT USE OF INSULIN: ICD-10-CM

## 2023-12-26 DIAGNOSIS — E11.42 TYPE 2 DIABETES MELLITUS WITH DIABETIC POLYNEUROPATHY, WITH LONG-TERM CURRENT USE OF INSULIN: ICD-10-CM

## 2023-12-26 DIAGNOSIS — R07.9 CHEST PAIN, UNSPECIFIED TYPE: Primary | ICD-10-CM

## 2023-12-26 DIAGNOSIS — R00.0 SINUS TACHYCARDIA: ICD-10-CM

## 2023-12-26 DIAGNOSIS — N17.9 AKI (ACUTE KIDNEY INJURY): ICD-10-CM

## 2023-12-26 DIAGNOSIS — G62.9 NEUROPATHY: ICD-10-CM

## 2023-12-26 DIAGNOSIS — E78.2 MULTIPLE-TYPE HYPERLIPIDEMIA: ICD-10-CM

## 2023-12-26 DIAGNOSIS — K52.9 GASTROENTERITIS: ICD-10-CM

## 2023-12-26 LAB
ALBUMIN SERPL-MCNC: 4.9 G/DL (ref 3.5–5.2)
ALBUMIN/GLOB SERPL: 2 G/DL
ALP SERPL-CCNC: 55 U/L (ref 39–117)
ALT SERPL W P-5'-P-CCNC: 9 U/L (ref 1–41)
ANION GAP SERPL CALCULATED.3IONS-SCNC: 21.4 MMOL/L (ref 5–15)
AST SERPL-CCNC: 11 U/L (ref 1–40)
B PARAPERT DNA SPEC QL NAA+PROBE: NOT DETECTED
B PERT DNA SPEC QL NAA+PROBE: NOT DETECTED
BASOPHILS # BLD AUTO: 0.04 10*3/MM3 (ref 0–0.2)
BASOPHILS NFR BLD AUTO: 0.4 % (ref 0–1.5)
BILIRUB SERPL-MCNC: 1.4 MG/DL (ref 0–1.2)
BUN SERPL-MCNC: 21 MG/DL (ref 6–20)
BUN/CREAT SERPL: 11.4 (ref 7–25)
C PNEUM DNA NPH QL NAA+NON-PROBE: NOT DETECTED
CALCIUM SPEC-SCNC: 10.5 MG/DL (ref 8.6–10.5)
CHLORIDE SERPL-SCNC: 91 MMOL/L (ref 98–107)
CO2 SERPL-SCNC: 21.6 MMOL/L (ref 22–29)
CREAT SERPL-MCNC: 1.85 MG/DL (ref 0.76–1.27)
DEPRECATED RDW RBC AUTO: 41.2 FL (ref 37–54)
EGFRCR SERPLBLD CKD-EPI 2021: 44.9 ML/MIN/1.73
EOSINOPHIL # BLD AUTO: 0.07 10*3/MM3 (ref 0–0.4)
EOSINOPHIL NFR BLD AUTO: 0.7 % (ref 0.3–6.2)
ERYTHROCYTE [DISTWIDTH] IN BLOOD BY AUTOMATED COUNT: 12.8 % (ref 12.3–15.4)
FLUAV SUBTYP SPEC NAA+PROBE: NOT DETECTED
FLUBV RNA ISLT QL NAA+PROBE: NOT DETECTED
GEN 5 2HR TROPONIN T REFLEX: 57 NG/L
GLOBULIN UR ELPH-MCNC: 2.4 GM/DL
GLUCOSE SERPL-MCNC: 56 MG/DL (ref 65–99)
HADV DNA SPEC NAA+PROBE: NOT DETECTED
HCOV 229E RNA SPEC QL NAA+PROBE: NOT DETECTED
HCOV HKU1 RNA SPEC QL NAA+PROBE: NOT DETECTED
HCOV NL63 RNA SPEC QL NAA+PROBE: NOT DETECTED
HCOV OC43 RNA SPEC QL NAA+PROBE: NOT DETECTED
HCT VFR BLD AUTO: 42.1 % (ref 37.5–51)
HGB BLD-MCNC: 14.8 G/DL (ref 13–17.7)
HMPV RNA NPH QL NAA+NON-PROBE: NOT DETECTED
HOLD SPECIMEN: NORMAL
HOLD SPECIMEN: NORMAL
HPIV1 RNA ISLT QL NAA+PROBE: NOT DETECTED
HPIV2 RNA SPEC QL NAA+PROBE: NOT DETECTED
HPIV3 RNA NPH QL NAA+PROBE: NOT DETECTED
HPIV4 P GENE NPH QL NAA+PROBE: NOT DETECTED
IMM GRANULOCYTES # BLD AUTO: 0.07 10*3/MM3 (ref 0–0.05)
IMM GRANULOCYTES NFR BLD AUTO: 0.7 % (ref 0–0.5)
LYMPHOCYTES # BLD AUTO: 3.72 10*3/MM3 (ref 0.7–3.1)
LYMPHOCYTES NFR BLD AUTO: 35.9 % (ref 19.6–45.3)
M PNEUMO IGG SER IA-ACNC: NOT DETECTED
MCH RBC QN AUTO: 31.1 PG (ref 26.6–33)
MCHC RBC AUTO-ENTMCNC: 35.2 G/DL (ref 31.5–35.7)
MCV RBC AUTO: 88.4 FL (ref 79–97)
MONOCYTES # BLD AUTO: 0.68 10*3/MM3 (ref 0.1–0.9)
MONOCYTES NFR BLD AUTO: 6.6 % (ref 5–12)
NEUTROPHILS NFR BLD AUTO: 5.77 10*3/MM3 (ref 1.7–7)
NEUTROPHILS NFR BLD AUTO: 55.7 % (ref 42.7–76)
NRBC BLD AUTO-RTO: 0 /100 WBC (ref 0–0.2)
PLATELET # BLD AUTO: 396 10*3/MM3 (ref 140–450)
PMV BLD AUTO: 9.8 FL (ref 6–12)
POTASSIUM SERPL-SCNC: 4.3 MMOL/L (ref 3.5–5.2)
PROT SERPL-MCNC: 7.3 G/DL (ref 6–8.5)
QT INTERVAL: 288 MS
QTC INTERVAL: 404 MS
RBC # BLD AUTO: 4.76 10*6/MM3 (ref 4.14–5.8)
RHINOVIRUS RNA SPEC NAA+PROBE: NOT DETECTED
RSV RNA NPH QL NAA+NON-PROBE: NOT DETECTED
SARS-COV-2 RNA NPH QL NAA+NON-PROBE: NOT DETECTED
SODIUM SERPL-SCNC: 134 MMOL/L (ref 136–145)
TROPONIN T DELTA: -12 NG/L
TROPONIN T SERPL HS-MCNC: 69 NG/L
WBC NRBC COR # BLD AUTO: 10.35 10*3/MM3 (ref 3.4–10.8)
WHOLE BLOOD HOLD COAG: NORMAL
WHOLE BLOOD HOLD SPECIMEN: NORMAL

## 2023-12-26 PROCEDURE — 0202U NFCT DS 22 TRGT SARS-COV-2: CPT | Performed by: STUDENT IN AN ORGANIZED HEALTH CARE EDUCATION/TRAINING PROGRAM

## 2023-12-26 PROCEDURE — 93010 ELECTROCARDIOGRAM REPORT: CPT | Performed by: INTERNAL MEDICINE

## 2023-12-26 PROCEDURE — 84484 ASSAY OF TROPONIN QUANT: CPT

## 2023-12-26 PROCEDURE — G0378 HOSPITAL OBSERVATION PER HR: HCPCS

## 2023-12-26 PROCEDURE — 85025 COMPLETE CBC W/AUTO DIFF WBC: CPT

## 2023-12-26 PROCEDURE — 71045 X-RAY EXAM CHEST 1 VIEW: CPT

## 2023-12-26 PROCEDURE — 36415 COLL VENOUS BLD VENIPUNCTURE: CPT

## 2023-12-26 PROCEDURE — 99285 EMERGENCY DEPT VISIT HI MDM: CPT

## 2023-12-26 PROCEDURE — 25510000001 IOPAMIDOL PER 1 ML: Performed by: STUDENT IN AN ORGANIZED HEALTH CARE EDUCATION/TRAINING PROGRAM

## 2023-12-26 PROCEDURE — 71275 CT ANGIOGRAPHY CHEST: CPT

## 2023-12-26 PROCEDURE — 93005 ELECTROCARDIOGRAM TRACING: CPT

## 2023-12-26 PROCEDURE — 25810000003 LACTATED RINGERS SOLUTION: Performed by: STUDENT IN AN ORGANIZED HEALTH CARE EDUCATION/TRAINING PROGRAM

## 2023-12-26 PROCEDURE — 80053 COMPREHEN METABOLIC PANEL: CPT

## 2023-12-26 PROCEDURE — 25810000003 LACTATED RINGERS PER 1000 ML

## 2023-12-26 RX ORDER — ONDANSETRON 4 MG/1
4 TABLET, ORALLY DISINTEGRATING ORAL EVERY 6 HOURS PRN
Status: DISCONTINUED | OUTPATIENT
Start: 2023-12-26 | End: 2023-12-27 | Stop reason: HOSPADM

## 2023-12-26 RX ORDER — AMOXICILLIN 250 MG
2 CAPSULE ORAL 2 TIMES DAILY
Status: DISCONTINUED | OUTPATIENT
Start: 2023-12-26 | End: 2023-12-27 | Stop reason: HOSPADM

## 2023-12-26 RX ORDER — VENLAFAXINE HYDROCHLORIDE 75 MG/1
75 CAPSULE, EXTENDED RELEASE ORAL DAILY
Status: DISCONTINUED | OUTPATIENT
Start: 2023-12-27 | End: 2023-12-27 | Stop reason: HOSPADM

## 2023-12-26 RX ORDER — ACETAMINOPHEN 325 MG/1
650 TABLET ORAL EVERY 4 HOURS PRN
Status: DISCONTINUED | OUTPATIENT
Start: 2023-12-26 | End: 2023-12-27 | Stop reason: HOSPADM

## 2023-12-26 RX ORDER — SODIUM CHLORIDE 0.9 % (FLUSH) 0.9 %
10 SYRINGE (ML) INJECTION AS NEEDED
Status: DISCONTINUED | OUTPATIENT
Start: 2023-12-26 | End: 2023-12-27 | Stop reason: HOSPADM

## 2023-12-26 RX ORDER — SODIUM CHLORIDE 9 MG/ML
40 INJECTION, SOLUTION INTRAVENOUS AS NEEDED
Status: DISCONTINUED | OUTPATIENT
Start: 2023-12-26 | End: 2023-12-27 | Stop reason: HOSPADM

## 2023-12-26 RX ORDER — NITROGLYCERIN 0.4 MG/1
0.4 TABLET SUBLINGUAL
Status: DISCONTINUED | OUTPATIENT
Start: 2023-12-26 | End: 2023-12-27 | Stop reason: HOSPADM

## 2023-12-26 RX ORDER — ASPIRIN 325 MG
325 TABLET ORAL ONCE
Status: DISCONTINUED | OUTPATIENT
Start: 2023-12-26 | End: 2023-12-27 | Stop reason: HOSPADM

## 2023-12-26 RX ORDER — ONDANSETRON 2 MG/ML
4 INJECTION INTRAMUSCULAR; INTRAVENOUS EVERY 6 HOURS PRN
Status: DISCONTINUED | OUTPATIENT
Start: 2023-12-26 | End: 2023-12-27 | Stop reason: HOSPADM

## 2023-12-26 RX ORDER — CHOLECALCIFEROL (VITAMIN D3) 125 MCG
5 CAPSULE ORAL NIGHTLY PRN
Status: DISCONTINUED | OUTPATIENT
Start: 2023-12-26 | End: 2023-12-27 | Stop reason: HOSPADM

## 2023-12-26 RX ORDER — SODIUM CHLORIDE, SODIUM LACTATE, POTASSIUM CHLORIDE, CALCIUM CHLORIDE 600; 310; 30; 20 MG/100ML; MG/100ML; MG/100ML; MG/100ML
50 INJECTION, SOLUTION INTRAVENOUS CONTINUOUS
Status: DISCONTINUED | OUTPATIENT
Start: 2023-12-26 | End: 2023-12-27 | Stop reason: HOSPADM

## 2023-12-26 RX ORDER — BISACODYL 5 MG/1
5 TABLET, DELAYED RELEASE ORAL DAILY PRN
Status: DISCONTINUED | OUTPATIENT
Start: 2023-12-26 | End: 2023-12-27 | Stop reason: HOSPADM

## 2023-12-26 RX ORDER — ATORVASTATIN CALCIUM 20 MG/1
40 TABLET, FILM COATED ORAL DAILY
Status: DISCONTINUED | OUTPATIENT
Start: 2023-12-27 | End: 2023-12-27 | Stop reason: HOSPADM

## 2023-12-26 RX ORDER — POLYETHYLENE GLYCOL 3350 17 G/17G
17 POWDER, FOR SOLUTION ORAL DAILY PRN
Status: DISCONTINUED | OUTPATIENT
Start: 2023-12-26 | End: 2023-12-27 | Stop reason: HOSPADM

## 2023-12-26 RX ORDER — GABAPENTIN 400 MG/1
400 CAPSULE ORAL 3 TIMES DAILY
Status: DISCONTINUED | OUTPATIENT
Start: 2023-12-27 | End: 2023-12-27 | Stop reason: HOSPADM

## 2023-12-26 RX ORDER — SODIUM CHLORIDE 0.9 % (FLUSH) 0.9 %
10 SYRINGE (ML) INJECTION EVERY 12 HOURS SCHEDULED
Status: DISCONTINUED | OUTPATIENT
Start: 2023-12-26 | End: 2023-12-27 | Stop reason: HOSPADM

## 2023-12-26 RX ORDER — BISACODYL 10 MG
10 SUPPOSITORY, RECTAL RECTAL DAILY PRN
Status: DISCONTINUED | OUTPATIENT
Start: 2023-12-26 | End: 2023-12-27 | Stop reason: HOSPADM

## 2023-12-26 RX ORDER — SPIRONOLACTONE 25 MG/1
25 TABLET ORAL DAILY
Status: DISCONTINUED | OUTPATIENT
Start: 2023-12-27 | End: 2023-12-27 | Stop reason: HOSPADM

## 2023-12-26 RX ADMIN — Medication 5 MG: at 23:46

## 2023-12-26 RX ADMIN — Medication 10 ML: at 23:22

## 2023-12-26 RX ADMIN — GABAPENTIN 400 MG: 400 CAPSULE ORAL at 23:46

## 2023-12-26 RX ADMIN — IOPAMIDOL 95 ML: 755 INJECTION, SOLUTION INTRAVENOUS at 19:37

## 2023-12-26 RX ADMIN — SODIUM CHLORIDE, POTASSIUM CHLORIDE, SODIUM LACTATE AND CALCIUM CHLORIDE 100 ML/HR: 600; 310; 30; 20 INJECTION, SOLUTION INTRAVENOUS at 23:21

## 2023-12-26 RX ADMIN — SODIUM CHLORIDE, POTASSIUM CHLORIDE, SODIUM LACTATE AND CALCIUM CHLORIDE 500 ML: 600; 310; 30; 20 INJECTION, SOLUTION INTRAVENOUS at 18:55

## 2023-12-26 NOTE — ED TRIAGE NOTES
Pt reports chest pain/soa that started today, describes pain as soreness, denies cough/congestion, soa is worse with exertion

## 2023-12-27 ENCOUNTER — READMISSION MANAGEMENT (OUTPATIENT)
Dept: CALL CENTER | Facility: HOSPITAL | Age: 48
End: 2023-12-27
Payer: COMMERCIAL

## 2023-12-27 VITALS
WEIGHT: 157 LBS | OXYGEN SATURATION: 100 % | HEIGHT: 67 IN | BODY MASS INDEX: 24.64 KG/M2 | RESPIRATION RATE: 18 BRPM | HEART RATE: 92 BPM | SYSTOLIC BLOOD PRESSURE: 118 MMHG | TEMPERATURE: 97.8 F | DIASTOLIC BLOOD PRESSURE: 64 MMHG

## 2023-12-27 LAB
ANION GAP SERPL CALCULATED.3IONS-SCNC: 15.8 MMOL/L (ref 5–15)
BUN SERPL-MCNC: 19 MG/DL (ref 6–20)
BUN/CREAT SERPL: 14.7 (ref 7–25)
CALCIUM SPEC-SCNC: 9.4 MG/DL (ref 8.6–10.5)
CHLORIDE SERPL-SCNC: 94 MMOL/L (ref 98–107)
CO2 SERPL-SCNC: 23.2 MMOL/L (ref 22–29)
CREAT SERPL-MCNC: 1.29 MG/DL (ref 0.76–1.27)
DEPRECATED RDW RBC AUTO: 41.9 FL (ref 37–54)
EGFRCR SERPLBLD CKD-EPI 2021: 69.3 ML/MIN/1.73
ERYTHROCYTE [DISTWIDTH] IN BLOOD BY AUTOMATED COUNT: 12.8 % (ref 12.3–15.4)
GLUCOSE BLDC GLUCOMTR-MCNC: 167 MG/DL (ref 70–130)
GLUCOSE BLDC GLUCOMTR-MCNC: 261 MG/DL (ref 70–130)
GLUCOSE SERPL-MCNC: 183 MG/DL (ref 65–99)
HBA1C MFR BLD: 9.8 % (ref 4.8–5.6)
HCT VFR BLD AUTO: 38.3 % (ref 37.5–51)
HGB BLD-MCNC: 13.2 G/DL (ref 13–17.7)
MCH RBC QN AUTO: 30.8 PG (ref 26.6–33)
MCHC RBC AUTO-ENTMCNC: 34.5 G/DL (ref 31.5–35.7)
MCV RBC AUTO: 89.5 FL (ref 79–97)
PLATELET # BLD AUTO: 349 10*3/MM3 (ref 140–450)
PMV BLD AUTO: 11.3 FL (ref 6–12)
POTASSIUM SERPL-SCNC: 4.2 MMOL/L (ref 3.5–5.2)
QT INTERVAL: 346 MS
QTC INTERVAL: 440 MS
RBC # BLD AUTO: 4.28 10*6/MM3 (ref 4.14–5.8)
SODIUM SERPL-SCNC: 133 MMOL/L (ref 136–145)
TROPONIN T SERPL HS-MCNC: 37 NG/L
WBC NRBC COR # BLD AUTO: 7.32 10*3/MM3 (ref 3.4–10.8)

## 2023-12-27 PROCEDURE — 63710000001 INSULIN ISOPHANE & REGULAR PER 5 UNITS

## 2023-12-27 PROCEDURE — 82948 REAGENT STRIP/BLOOD GLUCOSE: CPT

## 2023-12-27 PROCEDURE — 93010 ELECTROCARDIOGRAM REPORT: CPT | Performed by: INTERNAL MEDICINE

## 2023-12-27 PROCEDURE — 83036 HEMOGLOBIN GLYCOSYLATED A1C: CPT

## 2023-12-27 PROCEDURE — 80048 BASIC METABOLIC PNL TOTAL CA: CPT

## 2023-12-27 PROCEDURE — 93005 ELECTROCARDIOGRAM TRACING: CPT

## 2023-12-27 PROCEDURE — 85027 COMPLETE CBC AUTOMATED: CPT

## 2023-12-27 PROCEDURE — 99213 OFFICE O/P EST LOW 20 MIN: CPT | Performed by: STUDENT IN AN ORGANIZED HEALTH CARE EDUCATION/TRAINING PROGRAM

## 2023-12-27 PROCEDURE — 84484 ASSAY OF TROPONIN QUANT: CPT

## 2023-12-27 PROCEDURE — G0378 HOSPITAL OBSERVATION PER HR: HCPCS

## 2023-12-27 RX ORDER — NICOTINE POLACRILEX 4 MG
15 LOZENGE BUCCAL
Status: DISCONTINUED | OUTPATIENT
Start: 2023-12-27 | End: 2023-12-27 | Stop reason: HOSPADM

## 2023-12-27 RX ORDER — VENLAFAXINE HYDROCHLORIDE 75 MG/1
75 CAPSULE, EXTENDED RELEASE ORAL DAILY
Qty: 30 CAPSULE | Refills: 0 | Status: SHIPPED | OUTPATIENT
Start: 2023-12-28 | End: 2023-12-27

## 2023-12-27 RX ORDER — IBUPROFEN 600 MG/1
1 TABLET ORAL
Status: DISCONTINUED | OUTPATIENT
Start: 2023-12-27 | End: 2023-12-27 | Stop reason: HOSPADM

## 2023-12-27 RX ORDER — INSULIN LISPRO 100 [IU]/ML
2-7 INJECTION, SOLUTION INTRAVENOUS; SUBCUTANEOUS
Status: DISCONTINUED | OUTPATIENT
Start: 2023-12-27 | End: 2023-12-27 | Stop reason: HOSPADM

## 2023-12-27 RX ORDER — DEXTROSE MONOHYDRATE 25 G/50ML
25 INJECTION, SOLUTION INTRAVENOUS
Status: DISCONTINUED | OUTPATIENT
Start: 2023-12-27 | End: 2023-12-27 | Stop reason: HOSPADM

## 2023-12-27 RX ORDER — ATORVASTATIN CALCIUM 40 MG/1
40 TABLET, FILM COATED ORAL DAILY
Qty: 90 TABLET | Refills: 3 | Status: SHIPPED | OUTPATIENT
Start: 2023-12-27

## 2023-12-27 RX ORDER — ONDANSETRON 8 MG/1
8 TABLET, ORALLY DISINTEGRATING ORAL EVERY 8 HOURS PRN
Qty: 15 TABLET | Refills: 0 | Status: SHIPPED | OUTPATIENT
Start: 2023-12-27 | End: 2024-01-01

## 2023-12-27 RX ORDER — CHOLECALCIFEROL (VITAMIN D3) 125 MCG
5 CAPSULE ORAL NIGHTLY PRN
Qty: 30 TABLET | Refills: 0 | Status: SHIPPED | OUTPATIENT
Start: 2023-12-27

## 2023-12-27 RX ADMIN — SACUBITRIL AND VALSARTAN 1 TABLET: 24; 26 TABLET, FILM COATED ORAL at 10:00

## 2023-12-27 RX ADMIN — Medication 10 ML: at 10:01

## 2023-12-27 RX ADMIN — INSULIN HUMAN 20 UNITS: 100 INJECTION, SUSPENSION SUBCUTANEOUS at 10:00

## 2023-12-27 RX ADMIN — GABAPENTIN 400 MG: 400 CAPSULE ORAL at 10:02

## 2023-12-27 RX ADMIN — SACUBITRIL AND VALSARTAN 1 TABLET: 24; 26 TABLET, FILM COATED ORAL at 00:30

## 2023-12-27 RX ADMIN — EMPAGLIFLOZIN 10 MG: 10 TABLET, FILM COATED ORAL at 10:00

## 2023-12-27 RX ADMIN — VENLAFAXINE HYDROCHLORIDE 75 MG: 75 CAPSULE, EXTENDED RELEASE ORAL at 10:00

## 2023-12-27 RX ADMIN — ACETAMINOPHEN 650 MG: 325 TABLET, FILM COATED ORAL at 05:10

## 2023-12-27 NOTE — PLAN OF CARE
Goal Outcome Evaluation:      Patient admitted to the observation unit for treatment of chest pain and generalized fatigue. Vss. No complaints of chest pain overnight. Cardiology consulting. Will continue to monitor for any changes

## 2023-12-27 NOTE — ED NOTES
Nursing report ED to floor  Donnie Bishop  48 y.o.  male    HPI :   Chief Complaint   Patient presents with    Chest Pain       Admitting doctor:   Isaac Benson MD    Admitting diagnosis:   There were no encounter diagnoses.    Code status:   Current Code Status       Date Active Code Status Order ID Comments User Context       12/26/2023 2231 CPR (Attempt to Resuscitate) [id number]  Anyi Haas APRN ED        Question Answer    Code Status (Patient has no pulse and is not breathing) CPR (Attempt to Resuscitate)    Medical Interventions (Patient has pulse or is breathing) Full Support                    Allergies:   Patient has no known allergies.    Isolation:   Enhanced Droplet/Contact     Intake and Output    Intake/Output Summary (Last 24 hours) at 12/26/2023 2238  Last data filed at 12/26/2023 2010  Gross per 24 hour   Intake 500 ml   Output --   Net 500 ml       Weight:       12/26/23  1801   Weight: 79.4 kg (175 lb)       Most recent vitals:   Vitals:    12/26/23 2131 12/26/23 2132 12/26/23 2201 12/26/23 2202   BP: 98/56  103/63    Patient Position:       Pulse:  107  110   Resp:       Temp:       TempSrc:       SpO2:  95%  97%   Weight:       Height:           Active LDAs/IV Access:   Lines, Drains & Airways       Active LDAs       Name Placement date Placement time Site Days    Peripheral IV 12/26/23 1820 Right Antecubital 12/26/23  1820  Antecubital  less than 1                    Labs (abnormal labs have a star):   Labs Reviewed   COMPREHENSIVE METABOLIC PANEL - Abnormal; Notable for the following components:       Result Value    Glucose 56 (*)     BUN 21 (*)     Creatinine 1.85 (*)     Sodium 134 (*)     Chloride 91 (*)     CO2 21.6 (*)     Total Bilirubin 1.4 (*)     Anion Gap 21.4 (*)     eGFR 44.9 (*)     All other components within normal limits    Narrative:     GFR Normal >60  Chronic Kidney Disease <60  Kidney Failure <15     TROPONIN - Abnormal; Notable for the following components:    HS  Troponin T 69 (*)     All other components within normal limits    Narrative:     High Sensitive Troponin T Reference Range:  <14.0 ng/L- Negative Female for AMI  <22.0 ng/L- Negative Male for AMI  >=14 - Abnormal Female indicating possible myocardial injury.  >=22 - Abnormal Male indicating possible myocardial injury.   Clinicians would have to utilize clinical acumen, EKG, Troponin, and serial changes to determine if it is an Acute Myocardial Infarction or myocardial injury due to an underlying chronic condition.        CBC WITH AUTO DIFFERENTIAL - Abnormal; Notable for the following components:    Immature Grans % 0.7 (*)     Lymphocytes, Absolute 3.72 (*)     Immature Grans, Absolute 0.07 (*)     All other components within normal limits   HIGH SENSITIVITIY TROPONIN T 2HR - Abnormal; Notable for the following components:    HS Troponin T 57 (*)     Troponin T Delta -12 (*)     All other components within normal limits    Narrative:     High Sensitive Troponin T Reference Range:  <14.0 ng/L- Negative Female for AMI  <22.0 ng/L- Negative Male for AMI  >=14 - Abnormal Female indicating possible myocardial injury.  >=22 - Abnormal Male indicating possible myocardial injury.   Clinicians would have to utilize clinical acumen, EKG, Troponin, and serial changes to determine if it is an Acute Myocardial Infarction or myocardial injury due to an underlying chronic condition.        RESPIRATORY PANEL PCR W/ COVID-19 (SARS-COV-2), NP SWAB IN UTM/VTP, 2 HR TAT   RAINBOW DRAW    Narrative:     The following orders were created for panel order [place] Draw.  Procedure                               Abnormality         Status                     ---------                               -----------         ------                     Green Top (Gel)[[id number]]                                  Final result               Lavender Top[[id number]]                                     Final result               Gold Top - SST[[id number]]                                    Final result               Light Blue Top[[id number]]                                   Final result                 Please view results for these tests on the individual orders.   CBC (NO DIFF)   BASIC METABOLIC PANEL   TROPONIN   CBC AND DIFFERENTIAL    Narrative:     The following orders were created for panel order CBC & Differential.  Procedure                               Abnormality         Status                     ---------                               -----------         ------                     CBC Auto Differential[[id number]]        Abnormal            Final result                 Please view results for these tests on the individual orders.   GREEN TOP   LAVENDER TOP   GOLD TOP - SST   LIGHT BLUE TOP       EKG:   ECG 12 Lead ED Triage Standing Order; Chest Pain   Final Result   HEART RATE= 118  bpm   RR Interval= 508  ms   PA Interval= 118  ms   P Horizontal Axis= 45  deg   P Front Axis= 56  deg   QRSD Interval= 94  ms   QT Interval= 288  ms   QTcB= 404  ms   QRS Axis= 16  deg   T Wave Axis= 251  deg   - ABNORMAL ECG -   Sinus tachycardia   LVH with secondary repolarization abnormality   Since prior tracing  T ABNORMALITIES ARE MORE PRONOUNCED   Electronically Signed By: Troy Coats ([facility]) 26-Dec-2023 18:38:58   Date and Time of Study: 2023-12-26 16:43:35          Meds given in ED:   Medications   sodium chloride 0.9 % flush 10 mL (has no administration in time range)   aspirin tablet 325 mg (325 mg Oral Not Given 12/26/23 1922)   sodium chloride 0.9 % flush 10 mL (has no administration in time range)   sodium chloride 0.9 % flush 10 mL (has no administration in time range)   sodium chloride 0.9 % infusion 40 mL (has no administration in time range)   sennosides-docusate (PERICOLACE) 8.6-50 MG per tablet 2 tablet (has no administration in time range)     And   polyethylene glycol (MIRALAX) packet 17 g (has no administration in time range)     And   bisacodyl  (DULCOLAX) EC tablet 5 mg (has no administration in time range)     And   bisacodyl (DULCOLAX) suppository 10 mg (has no administration in time range)   ondansetron ODT (ZOFRAN-ODT) disintegrating tablet 4 mg (has no administration in time range)     Or   ondansetron (ZOFRAN) injection 4 mg (has no administration in time range)   nitroglycerin (NITROSTAT) SL tablet 0.4 mg (has no administration in time range)   acetaminophen (TYLENOL) tablet 650 mg (has no administration in time range)   lactated ringers infusion (has no administration in time range)   lactated ringers bolus 500 mL (0 mL Intravenous Stopped 12/26/23 2010)   iopamidol (ISOVUE-370) 76 % injection 100 mL (95 mL Intravenous Given 12/26/23 1937)       Imaging results:  No radiology results for the last day    Ambulatory status:   - standby assist    Social issues:   Social History     Socioeconomic History    Marital status:    Tobacco Use    Smoking status: Never    Smokeless tobacco: Never   Vaping Use    Vaping Use: Never used   Substance and Sexual Activity    Alcohol use: Never    Drug use: Never    Sexual activity: Defer       NIH Stroke Scale:       Edis León RN  12/26/23 22:38 EST

## 2023-12-27 NOTE — ED PROVIDER NOTES
EMERGENCY DEPARTMENT ENCOUNTER    Room Number:  106/1  PCP: Juju Kennedy MD  History obtained from: Patient      HPI:  Chief Complaint: Chest pain  A complete HPI/ROS/PMH/PSH/SH/FH are unobtainable due to: Not applicable  Context: Donnie Bishop is a 48 y.o. male who presents to the ED c/o chest pain.  Started earlier today at work.  Associated shortness of breath.  Vitals anxiety, took his medication and waited however pain did not improve.  Ongoing for several hours prior to arrival.  Feels like he may be dehydrated.  No nausea or vomiting.            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    Hypogonadism in male 08/12/2019    Anxiety     Chest pain 09/12/2023    New onset of congestive heart failure 09/13/2023    NSTEMI, initial episode of care 09/12/2023     Resolved Ambulatory Problems     Diagnosis Date Noted    No Resolved Ambulatory Problems     Past Medical History:   Diagnosis Date    Cough     Myalgia     Type II diabetes mellitus     Venous insufficiency     Viral illness          PAST SURGICAL HISTORY  Past Surgical History:   Procedure Laterality Date    CARDIAC CATHETERIZATION N/A 9/13/2023    Procedure: Left Heart Cath;  Surgeon: Kaylee Kay MD;  Location: [facility] CATH INVASIVE LOCATION;  Service: Cardiology;  Laterality: N/A;    CARDIAC CATHETERIZATION N/A 9/13/2023    Procedure: Coronary angiography;  Surgeon: Kaylee Kay MD;  Location: [facility] CATH INVASIVE LOCATION;  Service: Cardiology;  Laterality: N/A;         FAMILY HISTORY  Family History   Problem Relation Age of Onset    Diabetes Mother          SOCIAL HISTORY  Social History     Socioeconomic History     Marital status:    Tobacco Use    Smoking status: Never    Smokeless tobacco: Never   Vaping Use    Vaping Use: Never used   Substance and Sexual Activity    Alcohol use: Never    Drug use: Never    Sexual activity: Defer         ALLERGIES  Patient has no known allergies.        REVIEW OF SYSTEMS    As per HPI      PHYSICAL EXAM  ED Triage Vitals   Temp Heart Rate Resp BP SpO2   12/26/23 1637 12/26/23 1637 12/26/23 1637 12/26/23 1801 12/26/23 1637   97.1 °F (36.2 °C) (!) 134 24 (!) 89/65 97 %      Temp src Heart Rate Source Patient Position BP Location FiO2 (%)   12/26/23 1637 12/26/23 1637 12/26/23 1814 12/26/23 2316 --   Tympanic Monitor Lying Right arm        Physical Exam  Constitutional:       General: He is not in acute distress.  HENT:      Head: Normocephalic and atraumatic.   Cardiovascular:      Rate and Rhythm: Regular rhythm. Tachycardia present.   Pulmonary:      Effort: No respiratory distress.   Abdominal:      General: There is no distension.      Tenderness: There is no abdominal tenderness.   Musculoskeletal:         General: No swelling or deformity.   Skin:     General: Skin is warm and dry.   Neurological:      General: No focal deficit present.      Mental Status: He is alert. Mental status is at baseline.           Vital signs and nursing notes reviewed.          LAB RESULTS  Recent Results (from the past 24 hour(s))   ECG 12 Lead ED Triage Standing Order; Chest Pain    Collection Time: 12/26/23  4:43 PM   Result Value Ref Range    QT Interval 288 ms    QTC Interval 404 ms   Comprehensive Metabolic Panel    Collection Time: 12/26/23  4:47 PM    Specimen: Arm, Right; Blood   Result Value Ref Range    Glucose 56 (L) 65 - 99 mg/dL    BUN 21 (H) 6 - 20 mg/dL    Creatinine 1.85 (H) 0.76 - 1.27 mg/dL    Sodium 134 (L) 136 - 145 mmol/L    Potassium 4.3 3.5 - 5.2 mmol/L    Chloride 91 (L) 98 - 107 mmol/L    CO2 21.6 (L) 22.0 - 29.0 mmol/L    Calcium 10.5 8.6 - 10.5 mg/dL    Total Protein 7.3  6.0 - 8.5 g/dL    Albumin 4.9 3.5 - 5.2 g/dL    ALT (SGPT) 9 1 - 41 U/L    AST (SGOT) 11 1 - 40 U/L    Alkaline Phosphatase 55 39 - 117 U/L    Total Bilirubin 1.4 (H) 0.0 - 1.2 mg/dL    Globulin 2.4 gm/dL    A/G Ratio 2.0 g/dL    BUN/Creatinine Ratio 11.4 7.0 - 25.0    Anion Gap 21.4 (H) 5.0 - 15.0 mmol/L    eGFR 44.9 (L) >60.0 mL/min/1.73   High Sensitivity Troponin T    Collection Time: 12/26/23  4:47 PM    Specimen: Arm, Right; Blood   Result Value Ref Range    HS Troponin T 69 (C) <22 ng/L   Green Top (Gel)    Collection Time: 12/26/23  4:47 PM   Result Value Ref Range    Extra Tube Hold for add-ons.    Lavender Top    Collection Time: 12/26/23  4:47 PM   Result Value Ref Range    Extra Tube hold for add-on    Gold Top - SST    Collection Time: 12/26/23  4:47 PM   Result Value Ref Range    Extra Tube Hold for add-ons.    Light Blue Top    Collection Time: 12/26/23  4:47 PM   Result Value Ref Range    Extra Tube Hold for add-ons.    CBC Auto Differential    Collection Time: 12/26/23  4:47 PM    Specimen: Arm, Right; Blood   Result Value Ref Range    WBC 10.35 3.40 - 10.80 10*3/mm3    RBC 4.76 4.14 - 5.80 10*6/mm3    Hemoglobin 14.8 13.0 - 17.7 g/dL    Hematocrit 42.1 37.5 - 51.0 %    MCV 88.4 79.0 - 97.0 fL    MCH 31.1 26.6 - 33.0 pg    MCHC 35.2 31.5 - 35.7 g/dL    RDW 12.8 12.3 - 15.4 %    RDW-SD 41.2 37.0 - 54.0 fl    MPV 9.8 6.0 - 12.0 fL    Platelets 396 140 - 450 10*3/mm3    Neutrophil % 55.7 42.7 - 76.0 %    Lymphocyte % 35.9 19.6 - 45.3 %    Monocyte % 6.6 5.0 - 12.0 %    Eosinophil % 0.7 0.3 - 6.2 %    Basophil % 0.4 0.0 - 1.5 %    Immature Grans % 0.7 (H) 0.0 - 0.5 %    Neutrophils, Absolute 5.77 1.70 - 7.00 10*3/mm3    Lymphocytes, Absolute 3.72 (H) 0.70 - 3.10 10*3/mm3    Monocytes, Absolute 0.68 0.10 - 0.90 10*3/mm3    Eosinophils, Absolute 0.07 0.00 - 0.40 10*3/mm3    Basophils, Absolute 0.04 0.00 - 0.20 10*3/mm3    Immature Grans, Absolute 0.07 (H) 0.00 - 0.05 10*3/mm3    nRBC 0.0 0.0 - 0.2 /100  WBC   High Sensitivity Troponin T 2Hr    Collection Time: 12/26/23  6:51 PM    Specimen: Blood   Result Value Ref Range    HS Troponin T 57 (C) <22 ng/L    Troponin T Delta -12 (L) >=-4 - <+4 ng/L   Respiratory Panel PCR w/COVID-19(SARS-CoV-2) [facility] In-House, NP Swab in UTM/VTM, 2 HR TAT - Swab, Nasopharynx    Collection Time: 12/26/23 10:27 PM    Specimen: Nasopharynx; Swab   Result Value Ref Range    ADENOVIRUS, PCR Not Detected Not Detected    Coronavirus 229E Not Detected Not Detected    Coronavirus HKU1 Not Detected Not Detected    Coronavirus NL63 Not Detected Not Detected    Coronavirus OC43 Not Detected Not Detected    COVID19 Not Detected Not Detected - Ref. Range    Human Metapneumovirus Not Detected Not Detected    Human Rhinovirus/Enterovirus Not Detected Not Detected    Influenza A PCR Not Detected Not Detected    Influenza B PCR Not Detected Not Detected    Parainfluenza Virus 1 Not Detected Not Detected    Parainfluenza Virus 2 Not Detected Not Detected    Parainfluenza Virus 3 Not Detected Not Detected    Parainfluenza Virus 4 Not Detected Not Detected    RSV, PCR Not Detected Not Detected    Bordetella pertussis pcr Not Detected Not Detected    Bordetella parapertussis PCR Not Detected Not Detected    Chlamydophila pneumoniae PCR Not Detected Not Detected    Mycoplasma pneumo by PCR Not Detected Not Detected       Ordered the above labs and reviewed the results.        RADIOLOGY  CT Angiogram Chest    Result Date: 12/26/2023  CT ANGIOGRAM CHEST-  Radiation dose reduction techniques were utilized, including automated exposure control and exposure modulation based on body size.  CT scan of the chest performed with intravenous contrast, pulmonary embolus protocol to include coronal, sagittal and three-dimensional reconstruction. Clinical: Chest pain and shortness of breath  COMPARISON examination 12/17/2023   FINDINGS: 1. Contour and caliber of the aorta is normal, no dissection nor  aneurysm. No pulmonary embolus.  2. Cardiac size within normal limits no pericardial abnormality. The esophagus is satisfactory in appearance. No mediastinal or hilar mass/lymphadenopathy.  3. Again demonstrated left upper lobe micronodules without interval change. Follow-up as previously described. Lungs otherwise clear. No pleural effusion or vascular congestion seen. Limited images through the upper abdomen demonstrate a moderately distended fluid-filled stomach.  CONCLUSION: Stable CT scan of the chest similar to 12/17/2023.     This report was finalized on 12/26/2023 8:14 PM by Dr. Xavier Lema M.D on Workstation: [id number]      XR Chest 1 View    Result Date: 12/26/2023  XR CHEST 1 VW-  Clinical: Chest pain protocol  COMPARISON examination dated 12/17/2023  FINDINGS: Cardiac size within normal limits. The mediastinum is stable. Bilateral first rib and calcification again seen. Calcified benign granuloma right upper lobe. Likely calcified benign granuloma in the left suprahilar region, similar to previous examinations dating back to 2001. Lungs clear, no acute pulmonary process has developed. No effusion or edema seen.  CONCLUSION: No active disease of the chest  This report was finalized on 12/26/2023 6:10 PM by Dr. Xavier Lema M.D on Workstation: [id number]       Ordered the above noted radiological studies. Reviewed by me in PACS.              MEDICATIONS GIVEN IN ER  Medications   sodium chloride 0.9 % flush 10 mL (has no administration in time range)   aspirin tablet 325 mg (325 mg Oral Not Given 12/26/23 1922)   sodium chloride 0.9 % flush 10 mL (10 mL Intravenous Given 12/26/23 2322)   sodium chloride 0.9 % flush 10 mL (has no administration in time range)   sodium chloride 0.9 % infusion 40 mL (has no administration in time range)   sennosides-docusate (PERICOLACE) 8.6-50 MG per tablet 2 tablet (2 tablets Oral Not Given 12/26/23 2322)     And   polyethylene glycol (MIRALAX) packet 17 g (has no  administration in time range)     And   bisacodyl (DULCOLAX) EC tablet 5 mg (has no administration in time range)     And   bisacodyl (DULCOLAX) suppository 10 mg (has no administration in time range)   ondansetron ODT (ZOFRAN-ODT) disintegrating tablet 4 mg (has no administration in time range)     Or   ondansetron (ZOFRAN) injection 4 mg (has no administration in time range)   nitroglycerin (NITROSTAT) SL tablet 0.4 mg (has no administration in time range)   acetaminophen (TYLENOL) tablet 650 mg (has no administration in time range)   lactated ringers infusion (100 mL/hr Intravenous New Bag 12/26/23 2321)   atorvastatin (LIPITOR) tablet 40 mg (has no administration in time range)   empagliflozin (JARDIANCE) tablet 10 mg (has no administration in time range)   gabapentin (NEURONTIN) capsule 400 mg (has no administration in time range)   melatonin tablet 5 mg (has no administration in time range)   sacubitril-valsartan (ENTRESTO) 24-26 MG tablet 1 tablet (has no administration in time range)   spironolactone (ALDACTONE) tablet 25 mg (has no administration in time range)   venlafaxine XR (EFFEXOR-XR) 24 hr capsule 75 mg (has no administration in time range)   insulin NPH-insulin regular (humuLIN 70/30,novoLIN 70/30) injection 20 Units (has no administration in time range)   lactated ringers bolus 500 mL (0 mL Intravenous Stopped 12/26/23 2010)   iopamidol (ISOVUE-370) 76 % injection 100 mL (95 mL Intravenous Given 12/26/23 1937)               MEDICAL DECISION MAKING, PROGRESS, and CONSULTS    MDM: Patient presented emergency department with chest pain, unclear etiology.  Tachycardic on arrival, low blood pressure.  Improved with IV fluid resuscitation.  Labs otherwise significant for kidney injury, otherwise largely reassuring.  Troponin decreased on second test.  Previous coronary catheterization without evidence of obstructive coronary disease.  Suspect some symptoms may be secondary to overdiuresis, discussed with  cardiology, agree with rehydration and admission for observation.  Admitted to the observation unit.    All labs have been independently reviewed by me.  All radiology studies have been reviewed by me and I have also reviewed the radiology report.   EKG's independently viewed and interpreted by me.  Discussion below represents my analysis of pertinent findings related to patient's condition, differential diagnosis, treatment plan and final disposition.      Additional sources:  - Discussed/ obtained information from independent historians:      - External (non-ED) record review: Reviewed catheterization from September of this year, no obstructive coronary disease.    - Chronic or social conditions impacting care: Nonischemic cardiomyopathy    - Shared decision making: Discussed plan for admission, patient agrees      Orders placed during this visit:  Orders Placed This Encounter   Procedures    Respiratory Panel PCR w/COVID-19(SARS-CoV-2) [facility] In-House, NP Swab in UTM/VTM, 2 HR TAT - Swab, Nasopharynx    XR Chest 1 View    CT Angiogram Chest    [place] Draw    Comprehensive Metabolic Panel    High Sensitivity Troponin T    CBC Auto Differential    High Sensitivity Troponin T 2Hr    CBC (No Diff)    Basic Metabolic Panel    High Sensitivity Troponin T    NPO Diet NPO Type: Sips with Meds    Diet: Cardiac Diets; Healthy Heart (2-3 Na+); Texture: Regular Texture (IDDSI 7); Fluid Consistency: Thin (IDDSI 0)    Undress & Gown    Intake & Output    Weigh Patient    Oral Care    Telemetry - Maintain IV Access    Telemetry - Place Orders & Notify Provider of Results When Patient Experiences Acute Chest Pain, Dysrhythmia or Respiratory Distress    May Be Off Telemetry for Tests    Vital Signs    Pulse Oximetry, Continuous    Up With Assistance    Code Status and Medical Interventions:    Cardiology (on-call MD unless specified)    Inpatient Cardiology Consult    Oxygen Therapy- Nasal Cannula; Titrate 1-6  LPM Per SpO2; 90 - 95%    ECG 12 Lead ED Triage Standing Order; Chest Pain    ECG 12 Lead ED Triage Standing Order; Chest Pain    ECG 12 Lead Chest Pain    Insert Peripheral IV    Insert Peripheral IV    Initiate ED Observation Status    CBC & Differential    Green Top (Gel)    Lavender Top    Gold Top - SST    Light Blue Top         Additional orders considered but not ordered:  Considered additional cardiac testing however low suspicion for acute coronary syndrome        Differential diagnosis includes but is not limited to:    Acute coronary syndrome, vasospasm, heart failure exacerbation, shock, pulmonary embolism, aortic dissection      Independent interpretation of labs, radiology studies, and discussions with consultants:  ED Course as of 12/26/23 2328   Tue Dec 26, 2023   1816 HS Troponin T(!!): 69 [FS]   1816 Creatinine(!): 1.85 [FS]   1816 Heart Rate(!): 134 [FS]   1816 BP(!): 89/65 [FS]   1816 EKG interpreted myself:  Sinus tachycardia rate of 118, LVH with associated repolarization abnormality. [FS]   2022 CT PE interpreted myself:  No pulmonary embolism or aortic abnormality [FS]   2224 Heart Rate(!): 134 [FS]      ED Course User Index  [FS] Manohar Moreno MD           DIAGNOSIS  Final diagnoses:   Chest pain, unspecified type   TENISHA (acute kidney injury)   Sinus tachycardia         DISPOSITION  Admitted to observation        Latest Documented Vital Signs:  As of 23:28 EST  BP- 116/68 HR- 110 Temp- 98.4 °F (36.9 °C) (Oral) O2 sat- 98%              --    Please note that portions of this were completed with a voice recognition program.       Note Disclaimer: At [facility], we believe that sharing information builds trust and better relationships. You are receiving this note because you are receiving care at [facility] or recently visited. It is possible you will see health information before a provider has talked with you about it. This kind of information can be easy to misunderstand. To help  you fully understand what it means for your health, we urge you to discuss this note with your provider.             Manohar Moreno MD  12/26/23 [id number]

## 2023-12-27 NOTE — CONSULTS
Date of Hospital Visit: 23  Encounter Provider: Poncho Hope MD  Place of Service: [facility] CARDIOLOGY  Patient Name: Donnie Bishop  :1975  Referral Provider: Isaac Benson MD    Chief complaint  Chest pain    History of Present Illness    48-year-old man with diabetes, hypertension, hyperlipidemia, HFrEF secondary to nonischemic cardiomyopathy who presented with chest pain. On arrival to the ED, he was tachycardic and hypotensive to 89/65. High-sensitivity troponin 69 -> 57 -> 37 in the setting of TENISHA with creatinine 1.85 from baseline of 0.85-1.  CTA chest negative for PE. EKG normal sinus rhythm with no acute ST-T changes.  He received 500 cc LR and his creatinine has improved.  He no longer complains of chest pain.    Echocardiogram 2023 with EF 35%, grade 2 diastolic dysfunction, mild MR.  Left heart catheterization in September was normal.    Past Medical History:   Diagnosis Date    Anxiety     Cough     Inability to attain erection     Injury of acoustic nerve     Myalgia     Type II diabetes mellitus     Venous insufficiency     Viral illness     Vitamin D deficiency        Past Surgical History:   Procedure Laterality Date    CARDIAC CATHETERIZATION N/A 2023    Procedure: Left Heart Cath;  Surgeon: Kaylee Kay MD;  Location: [facility] CATH INVASIVE LOCATION;  Service: Cardiology;  Laterality: N/A;    CARDIAC CATHETERIZATION N/A 2023    Procedure: Coronary angiography;  Surgeon: Kaylee Kay MD;  Location:  [facility] CATH INVASIVE LOCATION;  Service: Cardiology;  Laterality: N/A;       Medications Prior to Admission   Medication Sig Dispense Refill Last Dose    atorvastatin (LIPITOR) 40 MG tablet TAKE 1 TABLET BY MOUTH EVERY DAY 90 tablet 3 2023 at 0800    carvedilol (COREG) 25 MG tablet Take 1 tablet by mouth Every 12 (Twelve) Hours. 60 tablet 11 2023 at 2100    empagliflozin (JARDIANCE) 10 MG tablet tablet Take 1 tablet by mouth Daily.  30 tablet 11 Past Month    gabapentin (NEURONTIN) 400 MG capsule TAKE 1 CAPSULE BY MOUTH THREE TIMES A  capsule 0 Past Month    insulin aspart (NovoLOG FlexPen) 100 UNIT/ML solution pen-injector sc pen Inject 5 Units under the skin into the appropriate area as directed 3 (Three) Times a Day.   2023 at 1700    insulin aspart prot & aspart (NovoLOG Mix 70/30 FlexPen) (70-30) 100 UNIT/ML suspension pen-injector injection Inject 0.2 mL under the skin into the appropriate area as directed 2 (Two) Times a Day Before Meals. 12 mL 1 2023    melatonin 5 MG tablet tablet Take 1 tablet by mouth.   Past Month    metFORMIN (GLUCOPHAGE) 1000 MG tablet Take 1 tablet by mouth 2 (Two) Times a Day With Meals. 180 tablet 3 2023 at 0800    [] ondansetron ODT (ZOFRAN-ODT) 8 MG disintegrating tablet Place 1 tablet on the tongue Every 8 (Eight) Hours As Needed for Nausea for up to 5 days. 15 tablet 0 Past Month    sacubitril-valsartan (ENTRESTO) 24-26 MG tablet Take 1 tablet by mouth 2 (Two) Times a Day. 60 tablet 11 2023 at 2100    spironolactone (ALDACTONE) 25 MG tablet Take 1 tablet by mouth Daily. 30 tablet 11 2023 at 2100    venlafaxine XR (Effexor XR) 75 MG 24 hr capsule Take 1 capsule by mouth Daily. 90 capsule 3 Past Month    vitamin D (ERGOCALCIFEROL) 1.25 MG (39918 UT) capsule capsule Take 1 capsule by mouth 1 (One) Time Per Week.   Past Week       Current Meds  Scheduled Meds:aspirin, 325 mg, Oral, Once  atorvastatin, 40 mg, Oral, Daily  empagliflozin, 10 mg, Oral, Daily  gabapentin, 400 mg, Oral, TID  insulin lispro, 2-7 Units, Subcutaneous, 4x Daily AC & at Bedtime  insulin NPH-insulin regular, 20 Units, Subcutaneous, BID With Meals  sacubitril-valsartan, 1 tablet, Oral, BID  senna-docusate sodium, 2 tablet, Oral, BID  sodium chloride, 10 mL, Intravenous, Q12H  [Held by provider] spironolactone, 25 mg, Oral, Daily  venlafaxine XR, 75 mg, Oral, Daily      Continuous  "Infusions:lactated ringers, 50 mL/hr, Last Rate: 50 mL/hr (12/27/23 0510)      PRN Meds:.  acetaminophen    senna-docusate sodium **AND** polyethylene glycol **AND** bisacodyl **AND** bisacodyl    dextrose    dextrose    glucagon (human recombinant)    melatonin    nitroglycerin    ondansetron ODT **OR** ondansetron    sodium chloride    sodium chloride    sodium chloride    Allergies as of 12/26/2023    (No Known Allergies)       Social History     Socioeconomic History    Marital status:    Tobacco Use    Smoking status: Never    Smokeless tobacco: Never   Vaping Use    Vaping Use: Never used   Substance and Sexual Activity    Alcohol use: Never    Drug use: Never    Sexual activity: Defer       Family History   Problem Relation Age of Onset    Diabetes Mother        REVIEW OF SYSTEMS:   12 point ROS was performed and is negative except as outlined in HPI          Objective:   Temp:  [97.1 °F (36.2 °C)-98.6 °F (37 °C)] 97.8 °F (36.6 °C)  Heart Rate:  [] 92  Resp:  [18-24] 18  BP: ()/(56-71) 118/64  Body mass index is 24.59 kg/m².  Flowsheet Rows      Flowsheet Row First Filed Value   Admission Height 170.2 cm (67\") Documented at 12/26/2023 1801   Admission Weight 79.4 kg (175 lb) Documented at 12/26/2023 1801          Vitals:    12/27/23 0811   BP: 118/64   Pulse: 92   Resp: 18   Temp: 97.8 °F (36.6 °C)   SpO2:        GEN: no distress, alert and oriented  HEENT: NACT, EOMI, moist mucous membranes  Lungs: CTAB, no wheezes, rales or rhonchi  CV: normal rate, regular rhythm, normal S1, S2, no murmurs, +2 radial pulses b/l, no carotid bruit  Abdomen: soft, nontender, nondistended, NABS  Extremities: no edema  Skin: no rash, warm, dry  Heme/Lymph: no bruising  Psych: organized thought, normal behavior and affect      Lab Review:   Results from last 7 days   Lab Units 12/27/23  0404 12/26/23  1647   SODIUM mmol/L 133* 134*   POTASSIUM mmol/L 4.2 4.3   CHLORIDE mmol/L 94* 91*   CO2 mmol/L 23.2 21.6* "   BUN mg/dL 19 21*   CREATININE mg/dL 1.29* 1.85*   CALCIUM mg/dL 9.4 10.5   BILIRUBIN mg/dL  --  1.4*   ALK PHOS U/L  --  55   ALT (SGPT) U/L  --  9   AST (SGOT) U/L  --  11   GLUCOSE mg/dL 183* 56*     Results from last 7 days   Lab Units 12/27/23  0404 12/26/23  1851 12/26/23  1647   HSTROP T ng/L 37* 57* 69*     Results from last 7 days   Lab Units 12/27/23  0404 12/26/23  1647   WBC 10*3/mm3 7.32 10.35   HEMOGLOBIN g/dL 13.2 14.8   HEMATOCRIT % 38.3 42.1   PLATELETS 10*3/mm3 349 396                 I personally viewed and interpreted the patient's EKG/Telemetry data)      Chest pain    Assessment and Plan:  #Chest pain  #Shortness of breath  #TENISHA  #Diabetes  #Hypertension  #Hyperlipidemia  #HFrEF secondary to nonischemic cardiomyopathy    48-year-old man with diabetes, hypertension, hyperlipidemia, HFrEF secondary to nonischemic cardiomyopathy who presented with chest pain, found to have elevated troponins in the setting of TENISHA.  He received IV fluids and his TENISHA has improved.  His symptoms have also improved.  Unclear etiology of his symptoms.  I do not think his LV troponins secondary to ACS.  He had a normal left heart catheterization 3 months ago.  Would not pursue further cardiac testing at this time.  With his back to his heart failure, he appears well compensated and is not overloaded at this time.  He can be discharged home with outpatient follow-up with his cardiologist.    Poncho Moreno MD, [facility]  12/27/23  08:17 EST.

## 2023-12-27 NOTE — PLAN OF CARE
Goal Outcome Evaluation:   Pt d/c via private vehicle. IV removed. Belongings returned. Follow up instructions given. No further questions/complaints at this time.

## 2023-12-27 NOTE — H&P
[facility]   HISTORY AND PHYSICAL    Patient Name: Donnie Bishop  : 1975  MRN: [id number]  Primary Care Physician:  Juju Kennedy MD  Date of admission: 2023    Subjective   Subjective     Chief Complaint:   Chief Complaint   Patient presents with    Chest Pain         HPI:    Donnie Bishop is a 48 y.o. male, with a past medical history including, but not limited to, anxiety, diabetes, venous insufficiency,  congestive heart failure, and NSTEMI, presented to the emergency department with a complaint of chest pain and shortness of breath.  He states he was at work today when he began having midsternal nonradiating chest pain accompanied by shortness of breath.  He states that he was seen at the urgent care on  and diagnosed with gastroenteritis and since that time he has not felt well and has not been eating, drinking, or taking his medication as he should.  In the emergency department high-sensitivity troponins were 69, 57, delta -12, BUN 21, creatinine 1.85, GFR 44.9.  EKG shows sinus tachycardia with a rate of 118.  Chest x-ray shows no active disease in the chest.  CTA chest shows a contour and caliber of the aorta is normal, no dissection or aneurysm, no pulmonary embolism.  Respiratory viral panel PCR is negative.  Patient remain n.p.o. after midnight for cardiology consult in the a.m.    Review of Systems   All systems were reviewed and negative except for: What was mentioned above in the HPI    Personal History     Past Medical History:   Diagnosis Date    Anxiety     Cough     Inability to attain erection     Injury of acoustic nerve     Myalgia     Type II diabetes mellitus     Venous insufficiency     Viral illness     Vitamin D deficiency        Past Surgical History:   Procedure Laterality Date    CARDIAC CATHETERIZATION N/A 2023    Procedure: Left Heart Cath;  Surgeon: Kaylee Kay MD;  Location: [facility] CATH INVASIVE LOCATION;  Service: Cardiology;  Laterality: N/A;     CARDIAC CATHETERIZATION N/A 9/13/2023    Procedure: Coronary angiography;  Surgeon: Kaylee Kay MD;  Location:  [facility] CATH INVASIVE LOCATION;  Service: Cardiology;  Laterality: N/A;       Family History: family history includes Diabetes in his mother. Otherwise pertinent FHx was reviewed and not pertinent to current issue.    Social History:  reports that he has never smoked. He has never used smokeless tobacco. He reports that he does not drink alcohol and does not use drugs.    Home Medications:  atorvastatin, carvedilol, empagliflozin, gabapentin, insulin aspart, insulin aspart prot & aspart, melatonin, metFORMIN, ondansetron ODT, sacubitril-valsartan, spironolactone, venlafaxine XR, and vitamin D    Allergies:  No Known Allergies    Objective   Objective     Vitals:   Temp:  [97.1 °F (36.2 °C)] 97.1 °F (36.2 °C)  Heart Rate:  [104-134] 110  Resp:  [24] 24  BP: ()/(56-71) 103/63  Physical Exam    Constitutional: Awake, alert   Eyes: PERRLA, sclerae anicteric, no conjunctival injection   HENT: NCAT, mucous membranes moist   Neck: Supple, no thyromegaly, no lymphadenopathy, trachea midline   Respiratory: Clear to auscultation bilaterally, nonlabored respirations    Cardiovascular: RRR, no murmurs, rubs, or gallops, palpable pedal pulses bilaterally   Gastrointestinal: Positive bowel sounds, soft, nontender, nondistended   Musculoskeletal: No bilateral ankle edema, no clubbing or cyanosis to extremities   Psychiatric: Appropriate affect, cooperative   Neurologic: Oriented x 3, strength symmetric in all extremities, speech clear       Result Review    Result Review:  I have personally reviewed the results from the time of this admission to 12/26/2023 22:31 EST and agree with these findings:  [x]  Laboratory list / accordion  []  Microbiology  [x]  Radiology  [x]  EKG/Telemetry   []  Cardiology/Vascular   []  Pathology  [x]  Old records  []  Other:      Assessment & Plan   Assessment / Plan     Brief Patient  Summary:  Donnie Bishop is a 48 y.o. male who was admitted to the observation unit for further evaluation and treatment of his chest pain.    Active Hospital Problems:  Active Hospital Problems    Diagnosis     **Chest pain      Plan:   Chest pain   -Cardiac monitoring  -Vital signs every 4 hours   -Cardiology consult   -High-sensitivity troponin 69, 57, delta -12  -EKG-sinus tachycardia  -N.p.o. after midnight   -Hold Coreg    TENISHA  -LR at 50 ML/hr  -Repeat labs in a.m.  -Avoid nephrotoxic medications of possible creatinine 1.85, baseline appears to be 1.0  -Hold spironolactone    Diabetes  -Accu-Cheks AC  -Adult subcutaneous insulin management-low dose  -Hemoglobin A1c -pending    Mechanical DVT prophylaxis orders are present.    CODE STATUS:    Code Status (Patient has no pulse and is not breathing): CPR (Attempt to Resuscitate)  Medical Interventions (Patient has pulse or is breathing): Full Support    Admission Status:  I believe this patient meets observation status.    78 minutes have been spent by [facility] providers in the care of this patient while under observation status.      Appropriate PPE worn during patient encounter.  Hand hygeine performed before and after seeing the patient.      Electronically signed by MICHELLE Diaz, 12/26/23, 10:31 PM EST.

## 2023-12-27 NOTE — DISCHARGE SUMMARY
ED OBSERVATION PROGRESS/DISCHARGE SUMMARY    Date of Admission: 12/26/2023   LOS: 0 days   PCP: Juju Kennedy MD    Final Diagnosis hypertension and chest pain       Subjective     Hospital Outcome: PT is a 48 yr old AA male that presented to ER yesterday for hypertension and chest pain.   Patient was at work when he had sudden onset of chest pain.  He described to be stabbing in center of his chest.  He also describes being diaphoretic.    He was admitted to the Obs unit for observation and additional workup. His troponin improved, upon arrival his troponin was 57 and decreased to 37.  Additionally his EKG showed no ST elevation or concerning blocks.  He had no chest pain overnight and no SOA. Cardiology saw pt and are comfortable with patient being discharged and following up with PCP.    Patient feels that his chest pain is contributed to stress and notices that it typically only occurs while at work.    ROS:  General: no fevers, chills  Respiratory: no cough, dyspnea  Cardiovascular: no chest pain, palpitations  Abdomen: No abdominal pain, nausea, vomiting, or diarrhea  Neurologic: No focal weakness    Objective   Physical Exam:  I have reviewed the vital signs.  Temp:  [97.1 °F (36.2 °C)-98.6 °F (37 °C)] 97.8 °F (36.6 °C)  Heart Rate:  [] 92  Resp:  [18-24] 18  BP: ()/(56-71) 118/64  General Appearance:    Alert, cooperative, no distress  Head:    Normocephalic, atraumatic  Eyes:    Sclerae anicteric  Neck:   Supple, no mass  Lungs: Clear to auscultation bilaterally, respirations unlabored  Heart: Regular rate and rhythm, S1 and S2 normal, no murmur, rub or gallop  Abdomen:  Soft, non-tender, bowel sounds active, nondistended  Extremities: No clubbing, cyanosis, or edema to lower extremities  Pulses:  2+ and symmetric in distal lower extremities  Skin: No rashes   Neurologic: Oriented x3, Normal strength to extremities    Results Review:    I have reviewed the labs, radiology results and  diagnostic studies.    Results from last 7 days   Lab Units 12/27/23  0404   WBC 10*3/mm3 7.32   HEMOGLOBIN g/dL 13.2   HEMATOCRIT % 38.3   PLATELETS 10*3/mm3 349     Results from last 7 days   Lab Units 12/27/23  0404 12/26/23  1647   SODIUM mmol/L 133* 134*   POTASSIUM mmol/L 4.2 4.3   CHLORIDE mmol/L 94* 91*   CO2 mmol/L 23.2 21.6*   BUN mg/dL 19 21*   CREATININE mg/dL 1.29* 1.85*   CALCIUM mg/dL 9.4 10.5   BILIRUBIN mg/dL  --  1.4*   ALK PHOS U/L  --  55   ALT (SGPT) U/L  --  9   AST (SGOT) U/L  --  11   GLUCOSE mg/dL 183* 56*     Imaging Results (Last 24 Hours)       Procedure Component Value Units Date/Time    CT Angiogram Chest [[id number]] Collected: 12/26/23 2008     Updated: 12/26/23 2017    Narrative:      CT ANGIOGRAM CHEST-     Radiation dose reduction techniques were utilized, including automated  exposure control and exposure modulation based on body size.     CT scan of the chest performed with intravenous contrast, pulmonary  embolus protocol to include coronal, sagittal and three-dimensional  reconstruction.  Clinical: Chest pain and shortness of breath     COMPARISON examination 12/17/2023        FINDINGS:  1. Contour and caliber of the aorta is normal, no dissection nor  aneurysm. No pulmonary embolus.     2. Cardiac size within normal limits no pericardial abnormality. The  esophagus is satisfactory in appearance. No mediastinal or hilar  mass/lymphadenopathy.     3. Again demonstrated left upper lobe micronodules without interval  change. Follow-up as previously described. Lungs otherwise clear. No  pleural effusion or vascular congestion seen. Limited images through the  upper abdomen demonstrate a moderately distended fluid-filled stomach.     CONCLUSION: Stable CT scan of the chest similar to 12/17/2023.              This report was finalized on 12/26/2023 8:14 PM by Dr. Xavier Lema M.D on Workstation: [id number]       XR Chest 1 View [[id number]] Collected: 12/26/23 1805     Updated:  12/26/23 1813    Narrative:      XR CHEST 1 VW-     Clinical: Chest pain protocol     COMPARISON examination dated 12/17/2023     FINDINGS: Cardiac size within normal limits. The mediastinum is stable.  Bilateral first rib and calcification again seen. Calcified benign  granuloma right upper lobe. Likely calcified benign granuloma in the  left suprahilar region, similar to previous examinations dating back to  2001. Lungs clear, no acute pulmonary process has developed. No effusion  or edema seen.     CONCLUSION: No active disease of the chest     This report was finalized on 12/26/2023 6:10 PM by Dr. Xavier Lema M.D on Workstation: [id number]               I have reviewed the medications.  ---------------------------------------------------------------------------------------------  Assessment & Plan   Assessment/Problem List    Chest pain      Plan: Pt observed over night and had no chest pain, SOA, and BP was within acceptable limits with most recent being 116/68. He was cleared by cardiology and plan is to d/c have follow up with PCP for BP control     Disposition: home    Follow-up after Discharge: Patient has to follow-up with his, Dr. Kennedy, and continue all previous home medications.     This note will serve as a discharge.     Bere Dawson, APRN 12/27/23 10:00 EST    I have worn appropriate PPE during this patient encounter, sanitized my hands both with entering and exiting patient's room.      40 minutes has been spent by [facility] providers in the care of this patient while under observation status

## 2023-12-27 NOTE — OUTREACH NOTE
Prep Survey      Flowsheet Row Responses   [facility] facility patient discharged from? [facility]   Is LACE score < 7 ? No   Eligibility [facility]   Date of Admission 12/26/23   Date of Discharge 12/27/23   Discharge Disposition Home or Self Care   Discharge diagnosis chest pain   Does the patient have one of the following disease processes/diagnoses(primary or secondary)? Other   Does the patient have Home health ordered? No   Is there a DME ordered? No   Prep survey completed? Yes            Dayan MORALES - Registered Nurse

## 2023-12-27 NOTE — PROGRESS NOTES
Pt admitted to the observation unit from the emergency department for chest tightness and shortness of breath that started yesterday.  Today feeling much improved without chest pain or shortness of breath.     On exam,   General: No acute distress, nontoxic  HEENT: Mucous membranes moist, atraumatic, EOMI  Neck: Full ROM  Pulm: Symmetric chest rise, nonlabored, lungs CTAB  Cardiovascular: Regular rate and rhythm, intact distal pulses, no peripheral edema  GI: Soft, nontender, nondistended, no rebound, no guarding, bowel sounds present  MSK: Full ROM, no deformity  Skin: Warm, dry  Neuro: Awake, alert, oriented x 4, GCS 15, moving all extremities, no focal deficits  Psych: Calm, cooperative          Plan: Labs today show improving troponin, renal insufficiency with creatinine 1.85 yesterday is improved to 1.29 today.  Suspect component of dehydration as he seems improved after fluid resuscitation.  CT of the chest performed yesterday in the ER was unremarkable with no emergent findings.  Cardiology has evaluated and are comfortable with discharge.  Plan for outpatient follow-up, ED return for worsening symptoms as needed.         Attestation:  The ZACHARIAH and I have discussed this patient's history, physical exam, and treatment plan.  I have reviewed the documentation and personally had a face to face interaction with the patient. I affirm the documentation and agree with the treatment and plan.  The attached note describes my personal findings.

## 2023-12-28 ENCOUNTER — TRANSITIONAL CARE MANAGEMENT TELEPHONE ENCOUNTER (OUTPATIENT)
Dept: CALL CENTER | Facility: HOSPITAL | Age: 48
End: 2023-12-28
Payer: COMMERCIAL

## 2023-12-28 NOTE — OUTREACH NOTE
Call Center TCM Note      Flowsheet Row Responses   [facility] patient discharged from? [facility]   Does the patient have one of the following disease processes/diagnoses(primary or secondary)? Other   TCM attempt successful? No   Unsuccessful attempts Attempt 1            Tsering Peter RN    12/28/2023, 13:27 EST

## 2023-12-28 NOTE — OUTREACH NOTE
Call Center TCM Note      Flowsheet Row Responses   [facility] patient discharged from? [place]   Does the patient have one of the following disease processes/diagnoses(primary or secondary)? Other   TCM attempt successful? No   Unsuccessful attempts Attempt 2            Tsering Peter RN    12/28/2023, 16:19 EST

## 2023-12-29 ENCOUNTER — TRANSITIONAL CARE MANAGEMENT TELEPHONE ENCOUNTER (OUTPATIENT)
Dept: CALL CENTER | Facility: HOSPITAL | Age: 48
End: 2023-12-29
Payer: COMMERCIAL

## 2023-12-29 NOTE — OUTREACH NOTE
Call Center TCM Note      Flowsheet Row Responses   [facility] patient discharged from? [facility]   Does the patient have one of the following disease processes/diagnoses(primary or secondary)? Other   TCM attempt successful? No   Unsuccessful attempts Attempt 3   Call Status Left message   Does the patient have an appointment with their PCP within 7-14 days of discharge? No   Nursing Interventions Routed TCM call to PCP office            Noelle Rm RN    12/29/2023, 12:11 EST

## 2024-01-12 ENCOUNTER — OFFICE VISIT (OUTPATIENT)
Dept: FAMILY MEDICINE CLINIC | Facility: CLINIC | Age: 49
End: 2024-01-12
Payer: COMMERCIAL

## 2024-01-12 VITALS
TEMPERATURE: 98.2 F | DIASTOLIC BLOOD PRESSURE: 90 MMHG | SYSTOLIC BLOOD PRESSURE: 142 MMHG | HEIGHT: 67 IN | HEART RATE: 101 BPM | OXYGEN SATURATION: 98 % | BODY MASS INDEX: 26.06 KG/M2 | WEIGHT: 166 LBS

## 2024-01-12 DIAGNOSIS — I10 BENIGN ESSENTIAL HTN: ICD-10-CM

## 2024-01-12 DIAGNOSIS — E78.2 MULTIPLE-TYPE HYPERLIPIDEMIA: Primary | ICD-10-CM

## 2024-01-12 DIAGNOSIS — I50.9 NEW ONSET OF CONGESTIVE HEART FAILURE: ICD-10-CM

## 2024-01-12 DIAGNOSIS — Z79.4 TYPE 2 DIABETES MELLITUS WITH DIABETIC POLYNEUROPATHY, WITH LONG-TERM CURRENT USE OF INSULIN: ICD-10-CM

## 2024-01-12 DIAGNOSIS — E11.40 TYPE 2 DIABETES MELLITUS WITH DIABETIC NEUROPATHY, WITH LONG-TERM CURRENT USE OF INSULIN: ICD-10-CM

## 2024-01-12 DIAGNOSIS — E11.42 TYPE 2 DIABETES MELLITUS WITH DIABETIC POLYNEUROPATHY, WITH LONG-TERM CURRENT USE OF INSULIN: ICD-10-CM

## 2024-01-12 DIAGNOSIS — R07.89 OTHER CHEST PAIN: ICD-10-CM

## 2024-01-12 DIAGNOSIS — Z79.4 TYPE 2 DIABETES MELLITUS WITH DIABETIC NEUROPATHY, WITH LONG-TERM CURRENT USE OF INSULIN: ICD-10-CM

## 2024-01-12 DIAGNOSIS — E55.9 VITAMIN D DEFICIENCY: ICD-10-CM

## 2024-01-12 DIAGNOSIS — F41.9 ANXIETY: ICD-10-CM

## 2024-01-12 RX ORDER — SPIRONOLACTONE 25 MG/1
25 TABLET ORAL DAILY
Qty: 30 TABLET | Refills: 11 | Status: SHIPPED | OUTPATIENT
Start: 2024-01-12

## 2024-01-12 RX ORDER — LISINOPRIL 40 MG/1
TABLET ORAL
Status: CANCELLED | OUTPATIENT
Start: 2024-01-12

## 2024-01-12 RX ORDER — HYDROXYZINE HYDROCHLORIDE 10 MG/1
10 TABLET, FILM COATED ORAL EVERY 8 HOURS PRN
Qty: 90 TABLET | Refills: 5 | Status: SHIPPED | OUTPATIENT
Start: 2024-01-12

## 2024-01-12 RX ORDER — INSULIN LISPRO 100 [IU]/ML
5 INJECTION, SOLUTION INTRAVENOUS; SUBCUTANEOUS 3 TIMES DAILY
COMMUNITY
Start: 2023-11-29

## 2024-01-12 RX ORDER — CARVEDILOL 25 MG/1
25 TABLET ORAL EVERY 12 HOURS SCHEDULED
Qty: 60 TABLET | Refills: 11 | Status: SHIPPED | OUTPATIENT
Start: 2024-01-12

## 2024-01-12 RX ORDER — VENLAFAXINE HYDROCHLORIDE 150 MG/1
150 CAPSULE, EXTENDED RELEASE ORAL DAILY
Qty: 90 CAPSULE | Refills: 1 | Status: SHIPPED | OUTPATIENT
Start: 2024-01-12

## 2024-01-12 RX ORDER — LISINOPRIL 40 MG/1
TABLET ORAL
COMMUNITY
Start: 2024-01-08 | End: 2024-01-12 | Stop reason: ALTCHOICE

## 2024-01-12 RX ORDER — ATORVASTATIN CALCIUM 40 MG/1
40 TABLET, FILM COATED ORAL DAILY
Qty: 90 TABLET | Refills: 3 | Status: SHIPPED | OUTPATIENT
Start: 2024-01-12

## 2024-01-12 NOTE — PROGRESS NOTES
"Subjective   Donnie Bishop is a 48 y.o. male.     Chief Complaint   Patient presents with    Hyperlipidemia    Hypertension    Med Refill     Patient states he's on gabapentin and needs a refill?       History of Present Illness   2 weeks ago patient went to the hospital for hypertension and chest pain.  He was admitted to the observational unit.  Records reviewed.  His troponin decreased from being initially elevated.  His EKG showed no ST elevation or concerning blocks.  His chest pain resolved.  Cardiology saw him and felt comfortable saying that his chest pain was contributed to stress and did not have to do with his heart or heart attack.  He has had a cardiac cath this year. He does have diagnosis of CHF now and follows with cardiology. He cannot remember their name.     Hypertension- Taking meds and doing well at home. Just had caffeine before he came in today.      Hyperlipidemia- doing well on meds, no muscle aches,      Diabetes-  Rare lows. Has been working on weight loss. Following with endocrine. BS \"not doing well.\" He has follow up with them but he is unsure when. Is getting gabapentin from them.      Anxiety- stable. meds help some but could be better.     Reviewed labs from [facility] and the hospital.     The following portions of the patient's history were reviewed and updated as appropriate: allergies, current medications, past family history, past medical history, past social history, past surgical history and problem list.    Past Medical History:   Diagnosis Date    Anxiety     Cough     Inability to attain erection     Injury of acoustic nerve     Myalgia     Type II diabetes mellitus     Venous insufficiency     Viral illness     Vitamin D deficiency        Past Surgical History:   Procedure Laterality Date    CARDIAC CATHETERIZATION N/A 9/13/2023    Procedure: Left Heart Cath;  Surgeon: Kaylee Kay MD;  Location: [facility] INVASIVE LOCATION;  Service: Cardiology;  Laterality: N/A;    CARDIAC " "CATHETERIZATION N/A 9/13/2023    Procedure: Coronary angiography;  Surgeon: Kaylee Kay MD;  Location: [facility] INVASIVE LOCATION;  Service: Cardiology;  Laterality: N/A;       Family History   Problem Relation Age of Onset    Diabetes Mother        Social History     Socioeconomic History    Marital status:    Tobacco Use    Smoking status: Never    Smokeless tobacco: Never   Vaping Use    Vaping Use: Never used   Substance and Sexual Activity    Alcohol use: Never    Drug use: Never    Sexual activity: Defer       Review of Systems   Constitutional:  Negative for fever.   Respiratory:  Negative for shortness of breath.    Cardiovascular:  Negative for chest pain.       Objective   Visit Vitals  /90 (BP Location: Left arm, Patient Position: Sitting, Cuff Size: Adult)   Pulse 101   Temp 98.2 °F (36.8 °C)   Ht 170.2 cm (67.01\")   Wt 75.3 kg (166 lb)   SpO2 98%   BMI 25.99 kg/m²     Body mass index is 25.99 kg/m².  Physical Exam  Constitutional:       Appearance: Normal appearance. He is well-developed.   Cardiovascular:      Rate and Rhythm: Normal rate and regular rhythm.      Heart sounds: Normal heart sounds.   Pulmonary:      Effort: Pulmonary effort is normal.      Breath sounds: Normal breath sounds.   Musculoskeletal:         General: No swelling. Normal range of motion.   Skin:     General: Skin is warm and dry.      Findings: No rash.   Neurological:      General: No focal deficit present.      Mental Status: He is alert and oriented to person, place, and time.   Psychiatric:         Mood and Affect: Mood normal.         Behavior: Behavior normal.           Assessment & Plan   Diagnoses and all orders for this visit:    1. Multiple-type hyperlipidemia (Primary)  -     atorvastatin (LIPITOR) 40 MG tablet; Take 1 tablet by mouth Daily.  Dispense: 90 tablet; Refill: 3    2. Benign essential HTN    3. Other chest pain  -     spironolactone (ALDACTONE) 25 MG tablet; Take 1 tablet by mouth " Daily.  Dispense: 30 tablet; Refill: 11  -     carvedilol (COREG) 25 MG tablet; Take 1 tablet by mouth Every 12 (Twelve) Hours.  Dispense: 60 tablet; Refill: 11  -     sacubitril-valsartan (ENTRESTO) 24-26 MG tablet; Take 1 tablet by mouth 2 (Two) Times a Day.  Dispense: 60 tablet; Refill: 11    4. New onset of congestive heart failure  -     spironolactone (ALDACTONE) 25 MG tablet; Take 1 tablet by mouth Daily.  Dispense: 30 tablet; Refill: 11  -     carvedilol (COREG) 25 MG tablet; Take 1 tablet by mouth Every 12 (Twelve) Hours.  Dispense: 60 tablet; Refill: 11  -     sacubitril-valsartan (ENTRESTO) 24-26 MG tablet; Take 1 tablet by mouth 2 (Two) Times a Day.  Dispense: 60 tablet; Refill: 11  -     empagliflozin (JARDIANCE) 10 MG tablet tablet; Take 1 tablet by mouth Daily.  Dispense: 30 tablet; Refill: 11    5. Type 2 diabetes mellitus with diabetic neuropathy, with long-term current use of insulin  -     empagliflozin (JARDIANCE) 10 MG tablet tablet; Take 1 tablet by mouth Daily.  Dispense: 30 tablet; Refill: 11    6. Vitamin D deficiency    7. Anxiety  -     venlafaxine XR (EFFEXOR-XR) 150 MG 24 hr capsule; Take 1 capsule by mouth Daily.  Dispense: 90 capsule; Refill: 1  -     hydrOXYzine (ATARAX) 10 MG tablet; Take 1 tablet by mouth Every 8 (Eight) Hours As Needed for Anxiety.  Dispense: 90 tablet; Refill: 5    8. Type 2 diabetes mellitus with diabetic polyneuropathy, with long-term current use of insulin  -     metFORMIN (GLUCOPHAGE) 1000 MG tablet; Take 1 tablet by mouth 2 (Two) Times a Day With Meals.  Dispense: 180 tablet; Refill: 3        F/U with cardiology and endocrine and discussed importance. Wrote out meds as he should take them for the pt. F/U in 1-2 months. Increased venlafaxine and added in hydroxyzine. Discussed risks and benefits. Discussed therapist but pt not ready to go.

## 2024-01-19 DIAGNOSIS — I50.9 NEW ONSET OF CONGESTIVE HEART FAILURE: ICD-10-CM

## 2024-01-19 DIAGNOSIS — R07.89 OTHER CHEST PAIN: ICD-10-CM

## 2024-01-19 DIAGNOSIS — E11.40 TYPE 2 DIABETES MELLITUS WITH DIABETIC NEUROPATHY, WITH LONG-TERM CURRENT USE OF INSULIN: ICD-10-CM

## 2024-01-19 DIAGNOSIS — Z79.4 TYPE 2 DIABETES MELLITUS WITH DIABETIC NEUROPATHY, WITH LONG-TERM CURRENT USE OF INSULIN: ICD-10-CM

## 2024-01-19 RX ORDER — CHOLECALCIFEROL (VITAMIN D3) 125 MCG
5 CAPSULE ORAL NIGHTLY PRN
Qty: 90 TABLET | Refills: 1 | Status: SHIPPED | OUTPATIENT
Start: 2024-01-19

## 2024-01-19 RX ORDER — SPIRONOLACTONE 25 MG/1
25 TABLET ORAL DAILY
Qty: 90 TABLET | Refills: 1 | Status: SHIPPED | OUTPATIENT
Start: 2024-01-19

## 2024-01-19 RX ORDER — CARVEDILOL 25 MG/1
25 TABLET ORAL EVERY 12 HOURS SCHEDULED
Qty: 180 TABLET | Refills: 1 | Status: SHIPPED | OUTPATIENT
Start: 2024-01-19

## 2024-02-09 ENCOUNTER — OFFICE VISIT (OUTPATIENT)
Dept: FAMILY MEDICINE CLINIC | Facility: CLINIC | Age: 49
End: 2024-02-09
Payer: COMMERCIAL

## 2024-02-09 VITALS
SYSTOLIC BLOOD PRESSURE: 110 MMHG | HEART RATE: 90 BPM | OXYGEN SATURATION: 100 % | TEMPERATURE: 96.6 F | WEIGHT: 166.8 LBS | HEIGHT: 67 IN | DIASTOLIC BLOOD PRESSURE: 82 MMHG | BODY MASS INDEX: 26.18 KG/M2

## 2024-02-09 DIAGNOSIS — Z12.11 COLON CANCER SCREENING: ICD-10-CM

## 2024-02-09 DIAGNOSIS — F41.9 ANXIETY: Primary | ICD-10-CM

## 2024-02-09 DIAGNOSIS — I10 BENIGN ESSENTIAL HTN: ICD-10-CM

## 2024-02-09 DIAGNOSIS — Z23 IMMUNIZATION DUE: ICD-10-CM

## 2024-02-09 DIAGNOSIS — R07.89 OTHER CHEST PAIN: ICD-10-CM

## 2024-02-09 NOTE — PROGRESS NOTES
Subjective   Donnie Bishop is a 48 y.o. male.     Chief Complaint   Patient presents with    Anxiety       History of Present Illness   Anxiety- one month ago we increased his venlafaxine and added in hydroxyzine. Pt was not ready to go to a therapist yet. He has barely had any anxiety since the last time he saw me. Moods are doing well. Sleeping well and energy is better. His work has noticed how much better he has been.     I could convince him to get some healthcare maintenance today.     Chest pains resolved now anxiety is treated.     Htn- well controlled now that anxiety is controlled.     The following portions of the patient's history were reviewed and updated as appropriate: allergies, current medications, past family history, past medical history, past social history, past surgical history and problem list.    Past Medical History:   Diagnosis Date    Anxiety     Cough     Inability to attain erection     Injury of acoustic nerve     Myalgia     Type II diabetes mellitus     Venous insufficiency     Viral illness     Vitamin D deficiency        Past Surgical History:   Procedure Laterality Date    CARDIAC CATHETERIZATION N/A 9/13/2023    Procedure: Left Heart Cath;  Surgeon: Kaylee Kay MD;  Location: [facility] INVASIVE LOCATION;  Service: Cardiology;  Laterality: N/A;    CARDIAC CATHETERIZATION N/A 9/13/2023    Procedure: Coronary angiography;  Surgeon: Kaylee Kay MD;  Location: [facility] INVASIVE LOCATION;  Service: Cardiology;  Laterality: N/A;       Family History   Problem Relation Age of Onset    Diabetes Mother        Social History     Socioeconomic History    Marital status:    Tobacco Use    Smoking status: Never    Smokeless tobacco: Never   Vaping Use    Vaping Use: Never used   Substance and Sexual Activity    Alcohol use: Never    Drug use: Never    Sexual activity: Defer       Review of Systems   Respiratory:  Negative for shortness of breath.    Cardiovascular:  Negative for  "chest pain.       Objective   Visit Vitals  /82 (BP Location: Left arm, Patient Position: Sitting, Cuff Size: Adult)   Pulse 90   Temp 96.6 °F (35.9 °C)   Ht 170.2 cm (67.01\")   Wt 75.7 kg (166 lb 12.8 oz)   SpO2 100%   BMI 26.12 kg/m²     Body mass index is 26.12 kg/m².  Physical Exam  Constitutional:       Appearance: Normal appearance. He is well-developed.   Cardiovascular:      Rate and Rhythm: Normal rate and regular rhythm.      Heart sounds: Normal heart sounds.   Pulmonary:      Effort: Pulmonary effort is normal.      Breath sounds: Normal breath sounds.   Musculoskeletal:         General: No swelling. Normal range of motion.   Skin:     General: Skin is warm and dry.      Findings: No rash.   Neurological:      General: No focal deficit present.      Mental Status: He is alert and oriented to person, place, and time.   Psychiatric:         Mood and Affect: Mood normal.         Behavior: Behavior normal.           Assessment & Plan   Diagnoses and all orders for this visit:    1. Anxiety (Primary)    2. Immunization due  -     COVID-19 F23 (Pfizer) 12yrs+ (COMIRNATY)  -     Fluzone (or Fluarix & Flulaval for VFC) >6mos  -     Pneumococcal Conjugate Vaccine 20-Valent All    3. Colon cancer screening  -     Ambulatory Referral For Screening Colonoscopy    4. Other chest pain    5. Benign essential HTN        Cont medication and f/u in 6 months.        "

## 2024-04-27 DIAGNOSIS — F41.9 ANXIETY: ICD-10-CM

## 2024-04-29 RX ORDER — HYDROXYZINE HYDROCHLORIDE 10 MG/1
10 TABLET, FILM COATED ORAL EVERY 8 HOURS PRN
Qty: 270 TABLET | Refills: 1 | Status: SHIPPED | OUTPATIENT
Start: 2024-04-29

## 2024-04-29 RX ORDER — VENLAFAXINE HYDROCHLORIDE 75 MG/1
75 CAPSULE, EXTENDED RELEASE ORAL DAILY
Qty: 90 CAPSULE | Refills: 3 | OUTPATIENT
Start: 2024-04-29

## 2024-04-29 RX ORDER — VENLAFAXINE HYDROCHLORIDE 150 MG/1
150 CAPSULE, EXTENDED RELEASE ORAL DAILY
Qty: 90 CAPSULE | Refills: 1 | Status: SHIPPED | OUTPATIENT
Start: 2024-04-29

## 2024-04-29 NOTE — TELEPHONE ENCOUNTER
LOV             2/9/2024   NOV             8/9/2024   Last RF        1/12/24  Protocol       NOT MET    BRANDON Darby/LMR

## 2024-05-01 RX ORDER — LISINOPRIL 40 MG/1
40 TABLET ORAL DAILY
Qty: 90 TABLET | Refills: 2 | OUTPATIENT
Start: 2024-05-01

## 2024-07-15 ENCOUNTER — OFFICE VISIT (OUTPATIENT)
Dept: FAMILY MEDICINE CLINIC | Facility: CLINIC | Age: 49
End: 2024-07-15
Payer: COMMERCIAL

## 2024-07-15 VITALS
SYSTOLIC BLOOD PRESSURE: 118 MMHG | WEIGHT: 166 LBS | HEIGHT: 67 IN | DIASTOLIC BLOOD PRESSURE: 78 MMHG | RESPIRATION RATE: 18 BRPM | OXYGEN SATURATION: 98 % | HEART RATE: 88 BPM | TEMPERATURE: 98 F | BODY MASS INDEX: 26.06 KG/M2

## 2024-07-15 DIAGNOSIS — E13.9 DIABETES MELLITUS TYPE 1.5, MANAGED AS TYPE 1: Primary | ICD-10-CM

## 2024-07-15 DIAGNOSIS — F41.9 ANXIETY: ICD-10-CM

## 2024-07-15 DIAGNOSIS — I50.9 NEW ONSET OF CONGESTIVE HEART FAILURE: ICD-10-CM

## 2024-07-15 DIAGNOSIS — I10 BENIGN ESSENTIAL HTN: ICD-10-CM

## 2024-07-15 DIAGNOSIS — G62.9 NEUROPATHY: ICD-10-CM

## 2024-07-15 PROCEDURE — 99213 OFFICE O/P EST LOW 20 MIN: CPT

## 2024-07-15 NOTE — PROGRESS NOTES
"Chief Complaint  Diabetes and Anxiety    Subjective        History of Present Illness    Dm/neuropathy  Chronic, ongoing, poorly controlled problem.  Current medication regimen metformin, jardiance, novolog, novolin.  Last saw Endocrinology 06/20/24; A1c 11.3    Anxiety  Chronic, ongoing, fairly controlled problem.  Current medication regimen Effexor, hydroxyzine.    Htn/HF  Chronic, ongoing, fairly controlled problem.  Current medication regimen coreg, entresto, aldactone.  Reports he does not see a cardiologist.  BP Readings from Last 3 Encounters:   07/15/24 118/78   06/05/24 114/76   02/09/24 110/82     Wt Readings from Last 3 Encounters:   07/15/24 75.3 kg (166 lb)   06/05/24 76.7 kg (169 lb)   02/09/24 75.7 kg (166 lb 12.8 oz)     Requesting intermittent FMLA for [employer] d/t his chronic conditions. 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 Increased physical work makes his blood sugars drop, he becomes lightheaded, and very weak. Unable to have frequent breaks when working. Sees PCP every 3-6 months, endocrinologist every 3-4.  Intermittent time away from work starting 07/09/24-07/09/25  Missing work 2 days or more a month.        Objective   Vital Signs:  /78 (BP Location: Right arm, Patient Position: Sitting, Cuff Size: Adult)   Pulse 88   Temp 98 °F (36.7 °C) (Temporal)   Resp 18   Ht 170.2 cm (67.01\")   Wt 75.3 kg (166 lb)   SpO2 98%   BMI 25.99 kg/m²   Estimated body mass index is 25.99 kg/m² as calculated from the following:    Height as of this encounter: 170.2 cm (67.01\").    Weight as of this encounter: 75.3 kg (166 lb).               Physical Exam  Vitals reviewed.   Constitutional:       General: He is not in acute distress.     Appearance: He is obese. He is ill-appearing.   HENT:      Head: Normocephalic and atraumatic.      Mouth/Throat:      Mouth: Mucous membranes are moist.      Pharynx: No oropharyngeal exudate or " posterior oropharyngeal erythema.   Eyes:      Conjunctiva/sclera: Conjunctivae normal.      Pupils: Pupils are equal, round, and reactive to light.   Cardiovascular:      Rate and Rhythm: Normal rate and regular rhythm.      Pulses: Normal pulses.      Heart sounds: No murmur heard.     No gallop.   Pulmonary:      Effort: Pulmonary effort is normal. No respiratory distress.      Breath sounds: Normal breath sounds. No wheezing.   Abdominal:      General: Bowel sounds are normal. There is no distension.      Palpations: Abdomen is soft.      Tenderness: There is no abdominal tenderness.   Musculoskeletal:         General: Normal range of motion.      Cervical back: Normal range of motion and neck supple. No tenderness.      Right lower leg: No edema.      Left lower leg: No edema.   Skin:     General: Skin is warm and dry.   Neurological:      Mental Status: He is alert and oriented to person, place, and time. Mental status is at baseline.   Psychiatric:         Mood and Affect: Mood normal.        Result Review :                     Assessment and Plan     Diagnoses and all orders for this visit:    1. Diabetes mellitus type 1.5, managed as type 1 (Primary)    2. Benign essential HTN    3. Anxiety    4. Neuropathy    5. New onset of congestive heart failure      Requesting intermittent FMLA for [employer] d/t his chronic conditions. 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 Increased physical work makes his blood sugars drop, he becomes lightheaded, and very weak. Unable to have frequent breaks when working. Sees PCP every 3-6 months, endocrinologist every 3-4.  Intermittent time away from work starting 07/09/24-07/09/25.  Missing work 2 days or more a month.  FMLA paperwork completed. Follow up with specialists as scheduled.  Encouraged to see his cardiologist.       Follow Up     Return if symptoms worsen or fail to improve.  Patient was given  instructions and counseling regarding his condition or for health maintenance advice. Please see specific information pulled into the AVS if appropriate.

## 2024-08-09 ENCOUNTER — OFFICE VISIT (OUTPATIENT)
Dept: FAMILY MEDICINE CLINIC | Facility: CLINIC | Age: 49
End: 2024-08-09
Payer: COMMERCIAL

## 2024-08-09 VITALS
WEIGHT: 171.6 LBS | TEMPERATURE: 97.5 F | OXYGEN SATURATION: 98 % | BODY MASS INDEX: 26.93 KG/M2 | DIASTOLIC BLOOD PRESSURE: 70 MMHG | SYSTOLIC BLOOD PRESSURE: 120 MMHG | HEART RATE: 98 BPM | HEIGHT: 67 IN

## 2024-08-09 DIAGNOSIS — I10 BENIGN ESSENTIAL HTN: ICD-10-CM

## 2024-08-09 DIAGNOSIS — E11.40 TYPE 2 DIABETES MELLITUS WITH DIABETIC NEUROPATHY, WITH LONG-TERM CURRENT USE OF INSULIN: ICD-10-CM

## 2024-08-09 DIAGNOSIS — Z79.4 TYPE 2 DIABETES MELLITUS WITH DIABETIC NEUROPATHY, WITH LONG-TERM CURRENT USE OF INSULIN: ICD-10-CM

## 2024-08-09 DIAGNOSIS — F41.9 ANXIETY: ICD-10-CM

## 2024-08-09 DIAGNOSIS — E78.2 MULTIPLE-TYPE HYPERLIPIDEMIA: Primary | ICD-10-CM

## 2024-08-09 DIAGNOSIS — E55.9 VITAMIN D DEFICIENCY: ICD-10-CM

## 2024-08-09 DIAGNOSIS — R07.89 OTHER CHEST PAIN: ICD-10-CM

## 2024-08-09 DIAGNOSIS — I50.9 NEW ONSET OF CONGESTIVE HEART FAILURE: ICD-10-CM

## 2024-08-09 PROCEDURE — 99214 OFFICE O/P EST MOD 30 MIN: CPT | Performed by: FAMILY MEDICINE

## 2024-08-09 RX ORDER — CARVEDILOL 25 MG/1
25 TABLET ORAL EVERY 12 HOURS SCHEDULED
Qty: 180 TABLET | Refills: 1 | Status: SHIPPED | OUTPATIENT
Start: 2024-08-09

## 2024-08-09 RX ORDER — ATORVASTATIN CALCIUM 40 MG/1
40 TABLET, FILM COATED ORAL DAILY
Qty: 90 TABLET | Refills: 3 | Status: SHIPPED | OUTPATIENT
Start: 2024-08-09

## 2024-08-09 RX ORDER — VENLAFAXINE HYDROCHLORIDE 150 MG/1
150 CAPSULE, EXTENDED RELEASE ORAL DAILY
Qty: 90 CAPSULE | Refills: 1 | Status: SHIPPED | OUTPATIENT
Start: 2024-08-09

## 2024-08-09 NOTE — PROGRESS NOTES
Subjective   Donnie Bishop is a 49 y.o. male.     Chief Complaint   Patient presents with    Follow-up       History of Present Illness   Anxiety- He has barely had any anxiety since the last time he saw me. Moods are doing well. Sleeping well and energy is better. His work has noticed how much better he has been. Pt is wanting a therapist.     Htn- well controlled now that anxiety is controlled.     Hld- taking meds, labs utd, no SE.     Dm2- taking meds, BS fasting have been running 100-120. Only has a low bs with physical activity. Following with endocrine. Starting to have tingling in his feet. It is not painful-just irritating.     The following portions of the patient's history were reviewed and updated as appropriate: allergies, current medications, past family history, past medical history, past social history, past surgical history and problem list.    Past Medical History:   Diagnosis Date    Anxiety     Cough     Inability to attain erection     Injury of acoustic nerve     Myalgia     Type II diabetes mellitus     Venous insufficiency     Viral illness     Vitamin D deficiency        Past Surgical History:   Procedure Laterality Date    CARDIAC CATHETERIZATION N/A 9/13/2023    Procedure: Left Heart Cath;  Surgeon: Kaylee Kay MD;  Location: [facility] INVASIVE LOCATION;  Service: Cardiology;  Laterality: N/A;    CARDIAC CATHETERIZATION N/A 9/13/2023    Procedure: Coronary angiography;  Surgeon: Kaylee Kay MD;  Location: [facility] INVASIVE LOCATION;  Service: Cardiology;  Laterality: N/A;       Family History   Problem Relation Age of Onset    Diabetes Mother        Social History     Socioeconomic History    Marital status:    Tobacco Use    Smoking status: Never    Smokeless tobacco: Never   Vaping Use    Vaping status: Never Used   Substance and Sexual Activity    Alcohol use: Never    Drug use: Never    Sexual activity: Defer       Review of Systems   Respiratory:  Negative for shortness  "of breath.    Cardiovascular:  Negative for chest pain.       Objective   Visit Vitals  /70 (BP Location: Left arm, Patient Position: Sitting, Cuff Size: Adult)   Pulse 98   Temp 97.5 °F (36.4 °C)   Ht 170.2 cm (67.01\")   Wt 77.8 kg (171 lb 9.6 oz)   SpO2 98%   BMI 26.87 kg/m²       Body mass index is 26.87 kg/m².  Physical Exam  Constitutional:       Appearance: Normal appearance. He is well-developed.   Cardiovascular:      Rate and Rhythm: Normal rate and regular rhythm.      Heart sounds: Normal heart sounds.   Pulmonary:      Effort: Pulmonary effort is normal.      Breath sounds: Normal breath sounds.   Musculoskeletal:         General: No swelling. Normal range of motion.   Skin:     General: Skin is warm and dry.      Findings: No rash.   Neurological:      General: No focal deficit present.      Mental Status: He is alert and oriented to person, place, and time.   Psychiatric:         Mood and Affect: Mood normal.         Behavior: Behavior normal.           Assessment & Plan   Diagnoses and all orders for this visit:    1. Multiple-type hyperlipidemia (Primary)  -     atorvastatin (LIPITOR) 40 MG tablet; Take 1 tablet by mouth Daily.  Dispense: 90 tablet; Refill: 3    2. Benign essential HTN    3. Vitamin D deficiency    4. Anxiety  -     venlafaxine XR (EFFEXOR-XR) 150 MG 24 hr capsule; Take 1 capsule by mouth Daily.  Dispense: 90 capsule; Refill: 1  -     Ambulatory Referral to Behavioral Health    5. Type 2 diabetes mellitus with diabetic neuropathy, with long-term current use of insulin    6. Other chest pain  -     carvedilol (COREG) 25 MG tablet; Take 1 tablet by mouth Every 12 (Twelve) Hours.  Dispense: 180 tablet; Refill: 1  -     sacubitril-valsartan (ENTRESTO) 24-26 MG tablet; Take 1 tablet by mouth 2 (Two) Times a Day.  Dispense: 180 tablet; Refill: 1    7. New onset of congestive heart failure  -     carvedilol (COREG) 25 MG tablet; Take 1 tablet by mouth Every 12 (Twelve) Hours.  " Dispense: 180 tablet; Refill: 1  -     sacubitril-valsartan (ENTRESTO) 24-26 MG tablet; Take 1 tablet by mouth 2 (Two) Times a Day.  Dispense: 180 tablet; Refill: 1          Cont medication and f/u in 6 months.

## 2024-08-31 ENCOUNTER — APPOINTMENT (OUTPATIENT)
Dept: GENERAL RADIOLOGY | Facility: HOSPITAL | Age: 49
End: 2024-08-31
Payer: COMMERCIAL

## 2024-08-31 ENCOUNTER — APPOINTMENT (OUTPATIENT)
Dept: CT IMAGING | Facility: HOSPITAL | Age: 49
End: 2024-08-31
Payer: COMMERCIAL

## 2024-08-31 ENCOUNTER — HOSPITAL ENCOUNTER (EMERGENCY)
Facility: HOSPITAL | Age: 49
Discharge: HOME | End: 2024-08-31
Attending: EMERGENCY MEDICINE
Payer: COMMERCIAL

## 2024-08-31 VITALS
OXYGEN SATURATION: 98 % | BODY MASS INDEX: 26.68 KG/M2 | HEART RATE: 92 BPM | SYSTOLIC BLOOD PRESSURE: 130 MMHG | TEMPERATURE: 97.6 F | RESPIRATION RATE: 15 BRPM | WEIGHT: 170 LBS | HEIGHT: 67 IN | DIASTOLIC BLOOD PRESSURE: 85 MMHG

## 2024-08-31 DIAGNOSIS — K29.00 ACUTE GASTRITIS, PRESENCE OF BLEEDING UNSPECIFIED, UNSPECIFIED GASTRITIS TYPE: Primary | ICD-10-CM

## 2024-08-31 LAB
ALBUMIN SERPL-MCNC: 4.7 G/DL (ref 3.5–5.2)
ALBUMIN/GLOB SERPL: 1.8 G/DL
ALP SERPL-CCNC: 53 U/L (ref 39–117)
ALT SERPL W P-5'-P-CCNC: 16 U/L (ref 1–41)
ANION GAP SERPL CALCULATED.3IONS-SCNC: 11.7 MMOL/L (ref 5–15)
AST SERPL-CCNC: 17 U/L (ref 1–40)
BASOPHILS # BLD AUTO: 0.05 10*3/MM3 (ref 0–0.2)
BASOPHILS NFR BLD AUTO: 0.4 % (ref 0–1.5)
BILIRUB SERPL-MCNC: 1.1 MG/DL (ref 0–1.2)
BILIRUB UR QL STRIP: NEGATIVE
BUN SERPL-MCNC: 12 MG/DL (ref 6–20)
BUN/CREAT SERPL: 10.3 (ref 7–25)
CALCIUM SPEC-SCNC: 10 MG/DL (ref 8.6–10.5)
CHLORIDE SERPL-SCNC: 101 MMOL/L (ref 98–107)
CLARITY UR: CLEAR
CO2 SERPL-SCNC: 26.3 MMOL/L (ref 22–29)
COLOR UR: YELLOW
CREAT SERPL-MCNC: 1.17 MG/DL (ref 0.76–1.27)
DEPRECATED RDW RBC AUTO: 41.3 FL (ref 37–54)
EGFRCR SERPLBLD CKD-EPI 2021: 77.4 ML/MIN/1.73
EOSINOPHIL # BLD AUTO: 0.09 10*3/MM3 (ref 0–0.4)
EOSINOPHIL NFR BLD AUTO: 0.7 % (ref 0.3–6.2)
ERYTHROCYTE [DISTWIDTH] IN BLOOD BY AUTOMATED COUNT: 12.1 % (ref 12.3–15.4)
GEN 5 2HR TROPONIN T REFLEX: 24 NG/L
GLOBULIN UR ELPH-MCNC: 2.6 GM/DL
GLUCOSE BLDC GLUCOMTR-MCNC: 231 MG/DL (ref 70–130)
GLUCOSE SERPL-MCNC: 241 MG/DL (ref 65–99)
GLUCOSE UR STRIP-MCNC: ABNORMAL MG/DL
HCT VFR BLD AUTO: 37.1 % (ref 37.5–51)
HGB BLD-MCNC: 12.6 G/DL (ref 13–17.7)
HGB UR QL STRIP.AUTO: NEGATIVE
HOLD SPECIMEN: NORMAL
HOLD SPECIMEN: NORMAL
IMM GRANULOCYTES # BLD AUTO: 0.05 10*3/MM3 (ref 0–0.05)
IMM GRANULOCYTES NFR BLD AUTO: 0.4 % (ref 0–0.5)
KETONES UR QL STRIP: NEGATIVE
LEUKOCYTE ESTERASE UR QL STRIP.AUTO: NEGATIVE
LIPASE SERPL-CCNC: 36 U/L (ref 13–60)
LYMPHOCYTES # BLD AUTO: 1.47 10*3/MM3 (ref 0.7–3.1)
LYMPHOCYTES NFR BLD AUTO: 12.2 % (ref 19.6–45.3)
MAGNESIUM SERPL-MCNC: 2 MG/DL (ref 1.6–2.6)
MCH RBC QN AUTO: 31.7 PG (ref 26.6–33)
MCHC RBC AUTO-ENTMCNC: 34 G/DL (ref 31.5–35.7)
MCV RBC AUTO: 93.5 FL (ref 79–97)
MONOCYTES # BLD AUTO: 0.48 10*3/MM3 (ref 0.1–0.9)
MONOCYTES NFR BLD AUTO: 4 % (ref 5–12)
NEUTROPHILS NFR BLD AUTO: 82.3 % (ref 42.7–76)
NEUTROPHILS NFR BLD AUTO: 9.9 10*3/MM3 (ref 1.7–7)
NITRITE UR QL STRIP: NEGATIVE
NRBC BLD AUTO-RTO: 0 /100 WBC (ref 0–0.2)
NT-PROBNP SERPL-MCNC: <36 PG/ML (ref 0–450)
PH UR STRIP.AUTO: 8 [PH] (ref 5–8)
PLATELET # BLD AUTO: 374 10*3/MM3 (ref 140–450)
PMV BLD AUTO: 9.8 FL (ref 6–12)
POTASSIUM SERPL-SCNC: 3.7 MMOL/L (ref 3.5–5.2)
PROT SERPL-MCNC: 7.3 G/DL (ref 6–8.5)
PROT UR QL STRIP: NEGATIVE
QT INTERVAL: 374 MS
QTC INTERVAL: 426 MS
RBC # BLD AUTO: 3.97 10*6/MM3 (ref 4.14–5.8)
SODIUM SERPL-SCNC: 139 MMOL/L (ref 136–145)
SP GR UR STRIP: 1.02 (ref 1–1.03)
TROPONIN T DELTA: -1 NG/L
TROPONIN T SERPL HS-MCNC: 25 NG/L
UROBILINOGEN UR QL STRIP: ABNORMAL
WBC NRBC COR # BLD AUTO: 12.04 10*3/MM3 (ref 3.4–10.8)
WHOLE BLOOD HOLD COAG: NORMAL
WHOLE BLOOD HOLD SPECIMEN: NORMAL

## 2024-08-31 PROCEDURE — 83735 ASSAY OF MAGNESIUM: CPT | Performed by: EMERGENCY MEDICINE

## 2024-08-31 PROCEDURE — 25810000003 SODIUM CHLORIDE 0.9 % SOLUTION: Performed by: EMERGENCY MEDICINE

## 2024-08-31 PROCEDURE — 93005 ELECTROCARDIOGRAM TRACING: CPT | Performed by: EMERGENCY MEDICINE

## 2024-08-31 PROCEDURE — 25010000002 MORPHINE PER 10 MG: Performed by: EMERGENCY MEDICINE

## 2024-08-31 PROCEDURE — 84484 ASSAY OF TROPONIN QUANT: CPT | Performed by: EMERGENCY MEDICINE

## 2024-08-31 PROCEDURE — 25010000002 HYDROMORPHONE PER 4 MG: Performed by: EMERGENCY MEDICINE

## 2024-08-31 PROCEDURE — 25010000002 ONDANSETRON PER 1 MG: Performed by: EMERGENCY MEDICINE

## 2024-08-31 PROCEDURE — 71045 X-RAY EXAM CHEST 1 VIEW: CPT

## 2024-08-31 PROCEDURE — 99285 EMERGENCY DEPT VISIT HI MDM: CPT

## 2024-08-31 PROCEDURE — 74177 CT ABD & PELVIS W/CONTRAST: CPT

## 2024-08-31 PROCEDURE — 36415 COLL VENOUS BLD VENIPUNCTURE: CPT

## 2024-08-31 PROCEDURE — 96375 TX/PRO/DX INJ NEW DRUG ADDON: CPT

## 2024-08-31 PROCEDURE — 25510000001 IOPAMIDOL PER 1 ML: Performed by: EMERGENCY MEDICINE

## 2024-08-31 PROCEDURE — 82948 REAGENT STRIP/BLOOD GLUCOSE: CPT

## 2024-08-31 PROCEDURE — 96374 THER/PROPH/DIAG INJ IV PUSH: CPT

## 2024-08-31 PROCEDURE — 81003 URINALYSIS AUTO W/O SCOPE: CPT | Performed by: EMERGENCY MEDICINE

## 2024-08-31 PROCEDURE — 93010 ELECTROCARDIOGRAM REPORT: CPT | Performed by: INTERNAL MEDICINE

## 2024-08-31 PROCEDURE — 83690 ASSAY OF LIPASE: CPT | Performed by: EMERGENCY MEDICINE

## 2024-08-31 PROCEDURE — 80053 COMPREHEN METABOLIC PANEL: CPT | Performed by: EMERGENCY MEDICINE

## 2024-08-31 PROCEDURE — 85025 COMPLETE CBC W/AUTO DIFF WBC: CPT | Performed by: EMERGENCY MEDICINE

## 2024-08-31 PROCEDURE — 83880 ASSAY OF NATRIURETIC PEPTIDE: CPT | Performed by: EMERGENCY MEDICINE

## 2024-08-31 PROCEDURE — 71275 CT ANGIOGRAPHY CHEST: CPT

## 2024-08-31 RX ORDER — ONDANSETRON 2 MG/ML
4 INJECTION INTRAMUSCULAR; INTRAVENOUS ONCE
Status: COMPLETED | OUTPATIENT
Start: 2024-08-31 | End: 2024-08-31

## 2024-08-31 RX ORDER — ASPIRIN 325 MG
325 TABLET ORAL ONCE
Status: DISCONTINUED | OUTPATIENT
Start: 2024-08-31 | End: 2024-08-31 | Stop reason: HOSPADM

## 2024-08-31 RX ORDER — IOPAMIDOL 755 MG/ML
100 INJECTION, SOLUTION INTRAVASCULAR
Status: COMPLETED | OUTPATIENT
Start: 2024-08-31 | End: 2024-08-31

## 2024-08-31 RX ORDER — SODIUM CHLORIDE 0.9 % (FLUSH) 0.9 %
10 SYRINGE (ML) INJECTION AS NEEDED
Status: DISCONTINUED | OUTPATIENT
Start: 2024-08-31 | End: 2024-08-31 | Stop reason: HOSPADM

## 2024-08-31 RX ORDER — MORPHINE SULFATE 2 MG/ML
4 INJECTION, SOLUTION INTRAMUSCULAR; INTRAVENOUS ONCE
Status: COMPLETED | OUTPATIENT
Start: 2024-08-31 | End: 2024-08-31

## 2024-08-31 RX ORDER — HYDROMORPHONE HYDROCHLORIDE 1 MG/ML
0.5 INJECTION, SOLUTION INTRAMUSCULAR; INTRAVENOUS; SUBCUTANEOUS ONCE
Status: COMPLETED | OUTPATIENT
Start: 2024-08-31 | End: 2024-08-31

## 2024-08-31 RX ADMIN — ONDANSETRON 4 MG: 2 INJECTION, SOLUTION INTRAMUSCULAR; INTRAVENOUS at 00:44

## 2024-08-31 RX ADMIN — MORPHINE SULFATE 4 MG: 2 INJECTION, SOLUTION INTRAMUSCULAR; INTRAVENOUS at 00:44

## 2024-08-31 RX ADMIN — SODIUM CHLORIDE 500 ML: 9 INJECTION, SOLUTION INTRAVENOUS at 00:43

## 2024-08-31 RX ADMIN — IOPAMIDOL 85 ML: 755 INJECTION, SOLUTION INTRAVENOUS at 02:51

## 2024-08-31 RX ADMIN — HYDROMORPHONE HYDROCHLORIDE 0.5 MG: 1 INJECTION, SOLUTION INTRAMUSCULAR; INTRAVENOUS; SUBCUTANEOUS at 02:19

## 2024-08-31 NOTE — ED PROVIDER NOTES
EMERGENCY DEPARTMENT ENCOUNTER    Room Number:  13/13  PCP: Juju Kennedy MD  Patient Care Team:  Juju Kennedy MD as PCP - General (Family Medicine)   Independent Historians: Patient    HPI:  Chief Complaint: Abdominal pain    A complete HPI/ROS/PMH/PSH/SH/FH are unobtainable due to: None    Chronic or social conditions impacting patient care (Social Determinants of Health): None  (Financial Resource Strain / Food Insecurity / Transportation Needs / Physical Activity / Stress / Social Connections / Intimate Partner Violence / Housing Stability)    Context: Donnie Bishop is a 49 y.o. male who presents to the ED c/o acute epigastric pain that started about 2 hours ago.  States radiates up into his chest.  States has takes his breath away.  Patient is very uncomfortable appearing.  Denies any similar pain in the past.  Also reports nausea but no vomiting.  No fevers or chills.    Review of prior external notes (non-ED) -and- Review of prior external test results outside of this encounter: Patient is cardiac catheterization 9/13/2023 normal coronary arteries with elevated LVEDP.    Prescription drug monitoring program review:         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     Resolved Ambulatory Problems     Diagnosis Date Noted    No Resolved Ambulatory Problems     Past Medical History:   Diagnosis Date    Cough     Myalgia     Type II diabetes  mellitus     Venous insufficiency     Viral illness          PAST SURGICAL HISTORY  Past Surgical History:   Procedure Laterality Date    CARDIAC CATHETERIZATION N/A 9/13/2023    Procedure: Left Heart Cath;  Surgeon: Kaylee Kay MD;  Location:  [facility];  Service: Cardiology;  Laterality: N/A;    CARDIAC CATHETERIZATION N/A 9/13/2023    Procedure: Coronary angiography;  Surgeon: Kaylee Kay MD;  Location:  [facility];  Service: Cardiology;  Laterality: N/A;         FAMILY HISTORY  Family History   Problem Relation Age of Onset    Diabetes Mother          SOCIAL HISTORY  Social History     Socioeconomic History    Marital status:    Tobacco Use    Smoking status: Never    Smokeless tobacco: Never   Vaping Use    Vaping status: Never Used   Substance and Sexual Activity    Alcohol use: Never    Drug use: Never    Sexual activity: Defer         ALLERGIES  Patient has no known allergies.        REVIEW OF SYSTEMS  Review of Systems  Included in HPI  All systems reviewed and negative except for those discussed in HPI.      PHYSICAL EXAM    I have reviewed the triage vital signs and nursing notes.    ED Triage Vitals [08/31/24 0022]   Temp Heart Rate Resp BP SpO2   -- 81 20 152/91 100 %      Temp src Heart Rate Source Patient Position BP Location FiO2 (%)   -- -- -- -- --       Physical Exam  GENERAL: alert, moderate acute distress  SKIN: Warm, dry  HENT: Normocephalic, atraumatic  EYES: no scleral icterus  CV: regular rhythm, regular rate  RESPIRATORY: normal effort, lungs clear  ABDOMEN: soft, tenderness with guarding over epigastrium no rebound, nondistended  MUSCULOSKELETAL: no deformity  NEURO: alert, moves all extremities, follows commands                                                               LAB RESULTS  Recent Results (from the past 24 hour(s))   ECG 12 Lead ED Triage Standing Order; Chest Pain    Collection Time: 08/31/24 12:23 AM   Result Value Ref Range     QT Interval 374 ms    QTC Interval 426 ms   Comprehensive Metabolic Panel    Collection Time: 08/31/24 12:29 AM    Specimen: Blood   Result Value Ref Range    Glucose 241 (H) 65 - 99 mg/dL    BUN 12 6 - 20 mg/dL    Creatinine 1.17 0.76 - 1.27 mg/dL    Sodium 139 136 - 145 mmol/L    Potassium 3.7 3.5 - 5.2 mmol/L    Chloride 101 98 - 107 mmol/L    CO2 26.3 22.0 - 29.0 mmol/L    Calcium 10.0 8.6 - 10.5 mg/dL    Total Protein 7.3 6.0 - 8.5 g/dL    Albumin 4.7 3.5 - 5.2 g/dL    ALT (SGPT) 16 1 - 41 U/L    AST (SGOT) 17 1 - 40 U/L    Alkaline Phosphatase 53 39 - 117 U/L    Total Bilirubin 1.1 0.0 - 1.2 mg/dL    Globulin 2.6 gm/dL    A/G Ratio 1.8 g/dL    BUN/Creatinine Ratio 10.3 7.0 - 25.0    Anion Gap 11.7 5.0 - 15.0 mmol/L    eGFR 77.4 >60.0 mL/min/1.73   High Sensitivity Troponin T    Collection Time: 08/31/24 12:29 AM    Specimen: Blood   Result Value Ref Range    HS Troponin T 25 (H) <22 ng/L   Green Top (Gel)    Collection Time: 08/31/24 12:29 AM   Result Value Ref Range    Extra Tube Hold for add-ons.    Lavender Top    Collection Time: 08/31/24 12:29 AM   Result Value Ref Range    Extra Tube hold for add-on    Gold Top - SST    Collection Time: 08/31/24 12:29 AM   Result Value Ref Range    Extra Tube Hold for add-ons.    Light Blue Top    Collection Time: 08/31/24 12:29 AM   Result Value Ref Range    Extra Tube Hold for add-ons.    CBC Auto Differential    Collection Time: 08/31/24 12:29 AM    Specimen: Blood   Result Value Ref Range    WBC 12.04 (H) 3.40 - 10.80 10*3/mm3    RBC 3.97 (L) 4.14 - 5.80 10*6/mm3    Hemoglobin 12.6 (L) 13.0 - 17.7 g/dL    Hematocrit 37.1 (L) 37.5 - 51.0 %    MCV 93.5 79.0 - 97.0 fL    MCH 31.7 26.6 - 33.0 pg    MCHC 34.0 31.5 - 35.7 g/dL    RDW 12.1 (L) 12.3 - 15.4 %    RDW-SD 41.3 37.0 - 54.0 fl    MPV 9.8 6.0 - 12.0 fL    Platelets 374 140 - 450 10*3/mm3    Neutrophil % 82.3 (H) 42.7 - 76.0 %    Lymphocyte % 12.2 (L) 19.6 - 45.3 %    Monocyte % 4.0 (L) 5.0 - 12.0 %    Eosinophil  % 0.7 0.3 - 6.2 %    Basophil % 0.4 0.0 - 1.5 %    Immature Grans % 0.4 0.0 - 0.5 %    Neutrophils, Absolute 9.90 (H) 1.70 - 7.00 10*3/mm3    Lymphocytes, Absolute 1.47 0.70 - 3.10 10*3/mm3    Monocytes, Absolute 0.48 0.10 - 0.90 10*3/mm3    Eosinophils, Absolute 0.09 0.00 - 0.40 10*3/mm3    Basophils, Absolute 0.05 0.00 - 0.20 10*3/mm3    Immature Grans, Absolute 0.05 0.00 - 0.05 10*3/mm3    nRBC 0.0 0.0 - 0.2 /100 WBC   Lipase    Collection Time: 08/31/24 12:29 AM    Specimen: Blood   Result Value Ref Range    Lipase 36 13 - 60 U/L   BNP    Collection Time: 08/31/24 12:29 AM    Specimen: Blood   Result Value Ref Range    proBNP <36.0 0.0 - 450.0 pg/mL   Magnesium    Collection Time: 08/31/24 12:29 AM    Specimen: Blood   Result Value Ref Range    Magnesium 2.0 1.6 - 2.6 mg/dL   POC Glucose Once    Collection Time: 08/31/24 12:30 AM    Specimen: Blood   Result Value Ref Range    Glucose 231 (H) 70 - 130 mg/dL   Urinalysis With Microscopic If Indicated (No Culture) - Urine, Clean Catch    Collection Time: 08/31/24  1:36 AM    Specimen: Urine, Clean Catch   Result Value Ref Range    Color, UA Yellow Yellow, Straw    Appearance, UA Clear Clear    pH, UA 8.0 5.0 - 8.0    Specific Gravity, UA 1.023 1.005 - 1.030    Glucose, UA >=1000 mg/dL (3+) (A) Negative    Ketones, UA Negative Negative    Bilirubin, UA Negative Negative    Blood, UA Negative Negative    Protein, UA Negative Negative    Leuk Esterase, UA Negative Negative    Nitrite, UA Negative Negative    Urobilinogen, UA 1.0 E.U./dL 0.2 - 1.0 E.U./dL   High Sensitivity Troponin T 2Hr    Collection Time: 08/31/24  2:28 AM    Specimen: Blood   Result Value Ref Range    HS Troponin T 24 (H) <22 ng/L    Troponin T Delta -1 >=-4 - <+4 ng/L       I ordered the above labs and independently reviewed the results.        RADIOLOGY  CT Angiogram Chest, CT Abdomen Pelvis With Contrast    Result Date: 8/31/2024  CT ANGIOGRAM OF THE CHEST; CT OF THE ABDOMEN/PELVIS WITH CONTRAST   HISTORY: Chest pain and shortness of air. Abdominal pain.  COMPARISON: December 26, 2023  TECHNIQUE: Axial CT imaging was obtained through the thorax. IV contrast was administered. Three-D reformatted images were obtained. Axial CT imaging was obtained through the abdomen and pelvis. IV contrast was administered.  FINDINGS: CT OF THE CHEST: No acute pulmonary thromboembolus is seen. Thoracic aorta is normal in caliber. There is no evidence of dissection. There is enlargement of the main pulmonary artery, which can be seen in the setting of pulmonary arterial hypertension. This was also present on the prior study. There are some minimal coronary artery calcifications. The thyroid gland and trachea appear within normal limits. There is a small hiatal hernia. Mediastinal lymph nodes do not appear pathologically enlarged. There is a 7 mm subpleural nodule within the left upper lobe which was present in December 2023, and appears unchanged. Follow-up exam in December 2025 is suggested to document a full 2 years of stability. There are some additional smaller reticulonodular infiltrates within this area, which are also unchanged when compared to December 2023. No acute osseous abnormalities are seen.  CT OF THE ABDOMEN PELVIS: No suspicious hepatic lesions are seen. There is a small duodenal diverticulum. Nodular appearance to the adrenal glands bilaterally is stable. The pancreas and spleen appear normal. There may be cholelithiasis. The patient's stomach appears somewhat thick walled. Correlation with any evidence gastritis is recommended. The kidneys enhance symmetrically. No hydronephrosis is seen. Dystrophic calcifications are noted within the prostate gland. There is no bowel obstruction. The appendix is normal. No acute osseous abnormalities are seen.       1. The patient has some reticulonodular infiltrates within the left upper lobe, similar to prior imaging. This is likely infectious or inflammatory. CT  follow-up in December 2025 is suggested to document a full 2 years of stability. 2. Stomach appears somewhat thick walled. Correlation with any evidence of gastritis is recommended. 3. Possible cholelithiasis.   Radiation dose reduction techniques were utilized, including automated exposure control and exposure modulation based on body size.   This report was finalized on 8/31/2024 3:43 AM by Dr. Maria Teresa Badillo M.D on Workstation: [email or web address]      XR Chest 1 View    Result Date: 8/31/2024  SINGLE VIEW OF THE CHEST  HISTORY: Chest pain  COMPARISON: December 26, 2023  FINDINGS: There is cardiomegaly. There may be some vascular congestion. No pneumothorax or pleural effusion is seen. No definite acute infiltrates are identified.      Cardiomegaly with suspected vascular congestion.  This report was finalized on 8/31/2024 1:01 AM by Dr. Maria Teresa Badillo M.D on Workstation: [email or web address]       I ordered the above noted radiological studies. Reviewed by me and discussed with radiologist.  See dictation for official radiology interpretation.      PROCEDURES    Procedures      MEDICATIONS GIVEN IN ER    Medications   morphine injection 4 mg (4 mg Intravenous Given 8/31/24 0044)   ondansetron (ZOFRAN) injection 4 mg (4 mg Intravenous Given 8/31/24 0044)   sodium chloride 0.9 % bolus 500 mL (0 mL Intravenous Stopped 8/31/24 0219)   HYDROmorphone (DILAUDID) injection 0.5 mg (0.5 mg Intravenous Given 8/31/24 0219)   iopamidol (ISOVUE-370) 76 % injection 100 mL (85 mL Intravenous Given by Other 8/31/24 0251)         ORDERS PLACED DURING THIS VISIT:  Orders Placed This Encounter   Procedures    XR Chest 1 View    CT Angiogram Chest    CT Abdomen Pelvis With Contrast    [place] Draw    Comprehensive Metabolic Panel    High Sensitivity Troponin T    CBC Auto Differential    Lipase    Urinalysis With Microscopic If Indicated (No Culture) - Urine, Clean Catch    BNP    Magnesium    High Sensitivity Troponin T 2Hr    Undress &  Gown    Continuous Pulse Oximetry    POC Glucose Once    ECG 12 Lead ED Triage Standing Order; Chest Pain    Telemetry Scan    Telemetry Scan    Telemetry Scan    Telemetry Scan    CBC & Differential    Green Top (Gel)    Lavender Top    Gold Top - SST    Light Blue Top         PROGRESS, DATA ANALYSIS, CONSULTS, AND MEDICAL DECISION MAKING    All labs have been independently interpreted by me.  All radiology studies have been reviewed by me and discussed with radiologist dictating the report.   EKG's independently viewed and interpreted by me.  Discussion below represents my analysis of pertinent findings related to patient's condition, differential diagnosis, treatment plan and final disposition.    Differential diagnosis includes but is not limited to:  Gastritis, gastroenteritis, peptic ulcer disease, GERD, esophageal perforation, acute appendicitis, mesenteric adenitis, Meckel’s diverticulum, epiploic appendagitis, diverticulitis, colon cancer, ulcerative colitis, Crohn’s disease, intussusception, small bowel obstruction, adhesions, ischemic bowel, perforated viscus, ileus, obstipation, biliary colic, cholecystitis, cholelithiasis, hepatitis, pancreatitis, common bile duct obstruction, cholangitis, bile leak, splenic trauma, splenic rupture, splenic infarction, splenic abscess, abdominal abscess, ascites, spontaneous bacterial peritonitis, hernia, UTI, cystitis, prostatitis, ureterolithiasis, urinary obstruction, testicular torsion, AAA, myocardial infarction, pneumonia, cancer, porphyria, DKA, medications, sickle cell, viral syndrome, zoster  .    Clinical Scores:              ED Course as of 08/31/24 0644   Sat Aug 31, 2024   0031 EKG          EKG time: 0023  Rhythm/Rate: Sinus rhythm rate 78  P waves and TX: Normal  QRS, axis: Narrow regular  ST and T waves: Normal limits    Interpreted Contemporaneously by me, independently viewed [TJ]   0058 WBC(!): 12.04 [TJ]   0058 Hemoglobin(!): 12.6 [TJ]   0058  Platelets: 374 [TJ]   0407 Reassessment: Patient is feeling better.  Workup has been negative.  Patient will be discharged home. [TJ]   0407 I have independently reviewed patient's abdominal CT; my interpretation is no free air [TJ]      ED Course User Index  [TJ] Gómez Ellis MD               PPE: The patient wore a mask and I wore an N95 mask throughout the entire patient encounter.       AS OF 06:44 EDT VITALS:    BP - 130/85  HR - 92  TEMP - 97.6 °F (36.4 °C) (Oral)  O2 SATS - 98%        DIAGNOSIS  Final diagnoses:   Acute gastritis, presence of bleeding unspecified, unspecified gastritis type         DISPOSITION  ED Disposition       ED Disposition   Discharge    Condition   Stable    Comment   --                  Note Disclaimer: At [facility], we believe that sharing information builds trust and better relationships. You are receiving this note because you recently visited [facility]. It is possible you will see health information before a provider has talked with you about it. This kind of information can be easy to misunderstand. To help you fully understand what it means for your health, we urge you to discuss this note with your provider.         Gómez Ellis MD  08/31/24 1895

## 2024-09-24 DIAGNOSIS — I50.9 NEW ONSET OF CONGESTIVE HEART FAILURE: ICD-10-CM

## 2024-09-24 DIAGNOSIS — R07.89 OTHER CHEST PAIN: ICD-10-CM

## 2024-09-24 RX ORDER — SPIRONOLACTONE 25 MG/1
25 TABLET ORAL DAILY
Qty: 90 TABLET | Refills: 1 | Status: SHIPPED | OUTPATIENT
Start: 2024-09-24

## 2024-10-22 ENCOUNTER — APPOINTMENT (OUTPATIENT)
Dept: CT IMAGING | Facility: HOSPITAL | Age: 49
DRG: 853 | End: 2024-10-22
Payer: COMMERCIAL

## 2024-10-22 ENCOUNTER — HOSPITAL ENCOUNTER (INPATIENT)
Facility: HOSPITAL | Age: 49
LOS: 21 days | Discharge: HOME | DRG: 853 | End: 2024-11-14
Attending: EMERGENCY MEDICINE | Admitting: STUDENT IN AN ORGANIZED HEALTH CARE EDUCATION/TRAINING PROGRAM
Payer: COMMERCIAL

## 2024-10-22 DIAGNOSIS — K29.00 ACUTE GASTRITIS WITHOUT HEMORRHAGE, UNSPECIFIED GASTRITIS TYPE: ICD-10-CM

## 2024-10-22 DIAGNOSIS — R10.13 EPIGASTRIC ABDOMINAL PAIN: Primary | ICD-10-CM

## 2024-10-22 DIAGNOSIS — K80.12 CALCULUS OF GALLBLADDER WITH ACUTE ON CHRONIC CHOLECYSTITIS WITHOUT OBSTRUCTION: ICD-10-CM

## 2024-10-22 DIAGNOSIS — F41.9 ANXIETY: ICD-10-CM

## 2024-10-22 PROBLEM — K29.70 GASTRITIS: Status: ACTIVE | Noted: 2024-10-22

## 2024-10-22 PROBLEM — R10.9 ABDOMINAL PAIN: Status: ACTIVE | Noted: 2024-10-22

## 2024-10-22 PROBLEM — I50.42 CHRONIC COMBINED SYSTOLIC AND DIASTOLIC HEART FAILURE: Status: ACTIVE | Noted: 2024-10-22

## 2024-10-22 LAB
ALBUMIN SERPL-MCNC: 4.6 G/DL (ref 3.5–5.2)
ALBUMIN/GLOB SERPL: 1.4 G/DL
ALP SERPL-CCNC: 60 U/L (ref 39–117)
ALT SERPL W P-5'-P-CCNC: 11 U/L (ref 1–41)
ANION GAP SERPL CALCULATED.3IONS-SCNC: 15.4 MMOL/L (ref 5–15)
AST SERPL-CCNC: 10 U/L (ref 1–40)
BASOPHILS # BLD AUTO: 0.07 10*3/MM3 (ref 0–0.2)
BASOPHILS NFR BLD AUTO: 0.5 % (ref 0–1.5)
BILIRUB SERPL-MCNC: 2.6 MG/DL (ref 0–1.2)
BILIRUB UR QL STRIP: NEGATIVE
BUN SERPL-MCNC: 9 MG/DL (ref 6–20)
BUN/CREAT SERPL: 8.4 (ref 7–25)
CALCIUM SPEC-SCNC: 10.5 MG/DL (ref 8.6–10.5)
CHLORIDE SERPL-SCNC: 96 MMOL/L (ref 98–107)
CLARITY UR: CLEAR
CO2 SERPL-SCNC: 24.6 MMOL/L (ref 22–29)
COLOR UR: YELLOW
CREAT SERPL-MCNC: 1.07 MG/DL (ref 0.76–1.27)
DEPRECATED RDW RBC AUTO: 40.2 FL (ref 37–54)
EGFRCR SERPLBLD CKD-EPI 2021: 86.1 ML/MIN/1.73
EOSINOPHIL # BLD AUTO: 0.01 10*3/MM3 (ref 0–0.4)
EOSINOPHIL NFR BLD AUTO: 0.1 % (ref 0.3–6.2)
ERYTHROCYTE [DISTWIDTH] IN BLOOD BY AUTOMATED COUNT: 12.3 % (ref 12.3–15.4)
GLOBULIN UR ELPH-MCNC: 3.2 GM/DL
GLUCOSE BLDC GLUCOMTR-MCNC: 177 MG/DL (ref 70–130)
GLUCOSE SERPL-MCNC: 183 MG/DL (ref 65–99)
GLUCOSE UR STRIP-MCNC: ABNORMAL MG/DL
HCT VFR BLD AUTO: 40.8 % (ref 37.5–51)
HGB BLD-MCNC: 14.2 G/DL (ref 13–17.7)
HGB UR QL STRIP.AUTO: NEGATIVE
HOLD SPECIMEN: NORMAL
HOLD SPECIMEN: NORMAL
IMM GRANULOCYTES # BLD AUTO: 0.03 10*3/MM3 (ref 0–0.05)
IMM GRANULOCYTES NFR BLD AUTO: 0.2 % (ref 0–0.5)
KETONES UR QL STRIP: ABNORMAL
LEUKOCYTE ESTERASE UR QL STRIP.AUTO: NEGATIVE
LIPASE SERPL-CCNC: 9 U/L (ref 13–60)
LYMPHOCYTES # BLD AUTO: 1.21 10*3/MM3 (ref 0.7–3.1)
LYMPHOCYTES NFR BLD AUTO: 8.8 % (ref 19.6–45.3)
MCH RBC QN AUTO: 31.3 PG (ref 26.6–33)
MCHC RBC AUTO-ENTMCNC: 34.8 G/DL (ref 31.5–35.7)
MCV RBC AUTO: 90.1 FL (ref 79–97)
MONOCYTES # BLD AUTO: 0.82 10*3/MM3 (ref 0.1–0.9)
MONOCYTES NFR BLD AUTO: 5.9 % (ref 5–12)
NEUTROPHILS NFR BLD AUTO: 11.65 10*3/MM3 (ref 1.7–7)
NEUTROPHILS NFR BLD AUTO: 84.5 % (ref 42.7–76)
NITRITE UR QL STRIP: NEGATIVE
NRBC BLD AUTO-RTO: 0 /100 WBC (ref 0–0.2)
PH UR STRIP.AUTO: 5.5 [PH] (ref 5–8)
PLATELET # BLD AUTO: 388 10*3/MM3 (ref 140–450)
PMV BLD AUTO: 9.1 FL (ref 6–12)
POTASSIUM SERPL-SCNC: 4.8 MMOL/L (ref 3.5–5.2)
PROT SERPL-MCNC: 7.8 G/DL (ref 6–8.5)
PROT UR QL STRIP: NEGATIVE
RBC # BLD AUTO: 4.53 10*6/MM3 (ref 4.14–5.8)
SODIUM SERPL-SCNC: 136 MMOL/L (ref 136–145)
SP GR UR STRIP: 1.03 (ref 1–1.03)
UROBILINOGEN UR QL STRIP: ABNORMAL
WBC NRBC COR # BLD AUTO: 13.79 10*3/MM3 (ref 3.4–10.8)
WHOLE BLOOD HOLD COAG: NORMAL
WHOLE BLOOD HOLD SPECIMEN: NORMAL

## 2024-10-22 PROCEDURE — 74177 CT ABD & PELVIS W/CONTRAST: CPT

## 2024-10-22 PROCEDURE — 63710000001 INSULIN LISPRO (HUMAN) PER 5 UNITS: Performed by: STUDENT IN AN ORGANIZED HEALTH CARE EDUCATION/TRAINING PROGRAM

## 2024-10-22 PROCEDURE — 99285 EMERGENCY DEPT VISIT HI MDM: CPT

## 2024-10-22 PROCEDURE — 25510000001 IOPAMIDOL 61 % SOLUTION: Performed by: EMERGENCY MEDICINE

## 2024-10-22 PROCEDURE — 25010000002 ONDANSETRON PER 1 MG: Performed by: STUDENT IN AN ORGANIZED HEALTH CARE EDUCATION/TRAINING PROGRAM

## 2024-10-22 PROCEDURE — G0378 HOSPITAL OBSERVATION PER HR: HCPCS

## 2024-10-22 PROCEDURE — 80053 COMPREHEN METABOLIC PANEL: CPT | Performed by: EMERGENCY MEDICINE

## 2024-10-22 PROCEDURE — 83690 ASSAY OF LIPASE: CPT | Performed by: EMERGENCY MEDICINE

## 2024-10-22 PROCEDURE — 85025 COMPLETE CBC W/AUTO DIFF WBC: CPT

## 2024-10-22 PROCEDURE — 25010000002 ONDANSETRON PER 1 MG: Performed by: EMERGENCY MEDICINE

## 2024-10-22 PROCEDURE — 25010000002 HYDROMORPHONE PER 4 MG: Performed by: EMERGENCY MEDICINE

## 2024-10-22 PROCEDURE — 25010000002 KETOROLAC TROMETHAMINE PER 15 MG: Performed by: EMERGENCY MEDICINE

## 2024-10-22 PROCEDURE — 25010000002 HYDROMORPHONE 1 MG/ML SOLUTION: Performed by: EMERGENCY MEDICINE

## 2024-10-22 PROCEDURE — 36415 COLL VENOUS BLD VENIPUNCTURE: CPT

## 2024-10-22 PROCEDURE — 82948 REAGENT STRIP/BLOOD GLUCOSE: CPT

## 2024-10-22 PROCEDURE — 81003 URINALYSIS AUTO W/O SCOPE: CPT | Performed by: EMERGENCY MEDICINE

## 2024-10-22 RX ORDER — ONDANSETRON 4 MG/1
4 TABLET, ORALLY DISINTEGRATING ORAL EVERY 6 HOURS PRN
Status: DISCONTINUED | OUTPATIENT
Start: 2024-10-22 | End: 2024-11-14 | Stop reason: HOSPADM

## 2024-10-22 RX ORDER — CARVEDILOL 25 MG/1
25 TABLET ORAL EVERY 12 HOURS SCHEDULED
Status: DISCONTINUED | OUTPATIENT
Start: 2024-10-23 | End: 2024-10-24

## 2024-10-22 RX ORDER — ATORVASTATIN CALCIUM 20 MG/1
40 TABLET, FILM COATED ORAL DAILY
Status: DISCONTINUED | OUTPATIENT
Start: 2024-10-23 | End: 2024-10-27

## 2024-10-22 RX ORDER — NICOTINE POLACRILEX 4 MG
15 LOZENGE BUCCAL
Status: DISCONTINUED | OUTPATIENT
Start: 2024-10-22 | End: 2024-11-14 | Stop reason: HOSPADM

## 2024-10-22 RX ORDER — NALOXONE HCL 0.4 MG/ML
0.4 VIAL (ML) INJECTION
Status: DISCONTINUED | OUTPATIENT
Start: 2024-10-22 | End: 2024-11-14 | Stop reason: HOSPADM

## 2024-10-22 RX ORDER — HYDROXYZINE HYDROCHLORIDE 10 MG/1
10 TABLET, FILM COATED ORAL EVERY 8 HOURS PRN
Status: DISCONTINUED | OUTPATIENT
Start: 2024-10-22 | End: 2024-11-14 | Stop reason: HOSPADM

## 2024-10-22 RX ORDER — HYDROCODONE BITARTRATE AND ACETAMINOPHEN 5; 325 MG/1; MG/1
1 TABLET ORAL EVERY 4 HOURS PRN
Status: DISCONTINUED | OUTPATIENT
Start: 2024-10-22 | End: 2024-10-23

## 2024-10-22 RX ORDER — SODIUM CHLORIDE 0.9 % (FLUSH) 0.9 %
10 SYRINGE (ML) INJECTION AS NEEDED
Status: DISCONTINUED | OUTPATIENT
Start: 2024-10-22 | End: 2024-11-14 | Stop reason: HOSPADM

## 2024-10-22 RX ORDER — SPIRONOLACTONE 25 MG/1
25 TABLET ORAL DAILY
Status: DISCONTINUED | OUTPATIENT
Start: 2024-10-23 | End: 2024-10-24

## 2024-10-22 RX ORDER — ONDANSETRON 2 MG/ML
4 INJECTION INTRAMUSCULAR; INTRAVENOUS ONCE
Status: COMPLETED | OUTPATIENT
Start: 2024-10-22 | End: 2024-10-22

## 2024-10-22 RX ORDER — PANTOPRAZOLE SODIUM 40 MG/10ML
40 INJECTION, POWDER, LYOPHILIZED, FOR SOLUTION INTRAVENOUS
Status: DISCONTINUED | OUTPATIENT
Start: 2024-10-23 | End: 2024-10-24

## 2024-10-22 RX ORDER — INSULIN LISPRO 100 [IU]/ML
2-7 INJECTION, SOLUTION INTRAVENOUS; SUBCUTANEOUS
Status: DISCONTINUED | OUTPATIENT
Start: 2024-10-22 | End: 2024-11-02

## 2024-10-22 RX ORDER — POLYETHYLENE GLYCOL 3350 17 G/17G
17 POWDER, FOR SOLUTION ORAL DAILY PRN
Status: DISCONTINUED | OUTPATIENT
Start: 2024-10-22 | End: 2024-10-27

## 2024-10-22 RX ORDER — PANTOPRAZOLE SODIUM 40 MG/10ML
40 INJECTION, POWDER, LYOPHILIZED, FOR SOLUTION INTRAVENOUS ONCE
Status: COMPLETED | OUTPATIENT
Start: 2024-10-22 | End: 2024-10-22

## 2024-10-22 RX ORDER — DEXTROSE MONOHYDRATE 25 G/50ML
25 INJECTION, SOLUTION INTRAVENOUS
Status: DISCONTINUED | OUTPATIENT
Start: 2024-10-22 | End: 2024-11-14 | Stop reason: HOSPADM

## 2024-10-22 RX ORDER — BISACODYL 5 MG/1
5 TABLET, DELAYED RELEASE ORAL DAILY PRN
Status: DISCONTINUED | OUTPATIENT
Start: 2024-10-22 | End: 2024-10-27

## 2024-10-22 RX ORDER — AMOXICILLIN 250 MG
2 CAPSULE ORAL 2 TIMES DAILY
Status: DISCONTINUED | OUTPATIENT
Start: 2024-10-22 | End: 2024-10-27

## 2024-10-22 RX ORDER — KETOROLAC TROMETHAMINE 15 MG/ML
15 INJECTION, SOLUTION INTRAMUSCULAR; INTRAVENOUS ONCE
Status: COMPLETED | OUTPATIENT
Start: 2024-10-22 | End: 2024-10-22

## 2024-10-22 RX ORDER — HYDROMORPHONE HYDROCHLORIDE 1 MG/ML
0.5 INJECTION, SOLUTION INTRAMUSCULAR; INTRAVENOUS; SUBCUTANEOUS ONCE
Status: COMPLETED | OUTPATIENT
Start: 2024-10-22 | End: 2024-10-22

## 2024-10-22 RX ORDER — BISACODYL 10 MG
10 SUPPOSITORY, RECTAL RECTAL DAILY PRN
Status: DISCONTINUED | OUTPATIENT
Start: 2024-10-22 | End: 2024-10-27

## 2024-10-22 RX ORDER — MORPHINE SULFATE 2 MG/ML
1 INJECTION, SOLUTION INTRAMUSCULAR; INTRAVENOUS EVERY 4 HOURS PRN
Status: DISCONTINUED | OUTPATIENT
Start: 2024-10-22 | End: 2024-10-23

## 2024-10-22 RX ORDER — IBUPROFEN 600 MG/1
1 TABLET ORAL
Status: DISCONTINUED | OUTPATIENT
Start: 2024-10-22 | End: 2024-11-14 | Stop reason: HOSPADM

## 2024-10-22 RX ORDER — ONDANSETRON 2 MG/ML
4 INJECTION INTRAMUSCULAR; INTRAVENOUS EVERY 6 HOURS PRN
Status: DISCONTINUED | OUTPATIENT
Start: 2024-10-22 | End: 2024-11-14 | Stop reason: HOSPADM

## 2024-10-22 RX ORDER — VENLAFAXINE HYDROCHLORIDE 75 MG/1
75 CAPSULE, EXTENDED RELEASE ORAL DAILY
Status: DISCONTINUED | OUTPATIENT
Start: 2024-10-23 | End: 2024-11-14 | Stop reason: HOSPADM

## 2024-10-22 RX ORDER — IOPAMIDOL 612 MG/ML
100 INJECTION, SOLUTION INTRAVASCULAR
Status: COMPLETED | OUTPATIENT
Start: 2024-10-22 | End: 2024-10-22

## 2024-10-22 RX ADMIN — SENNOSIDES AND DOCUSATE SODIUM 2 TABLET: 50; 8.6 TABLET ORAL at 21:40

## 2024-10-22 RX ADMIN — ONDANSETRON 4 MG: 2 INJECTION INTRAMUSCULAR; INTRAVENOUS at 21:28

## 2024-10-22 RX ADMIN — INSULIN LISPRO 2 UNITS: 100 INJECTION, SOLUTION INTRAVENOUS; SUBCUTANEOUS at 21:39

## 2024-10-22 RX ADMIN — HYDROMORPHONE HYDROCHLORIDE 0.5 MG: 1 INJECTION, SOLUTION INTRAMUSCULAR; INTRAVENOUS; SUBCUTANEOUS at 18:36

## 2024-10-22 RX ADMIN — IOPAMIDOL 85 ML: 612 INJECTION, SOLUTION INTRAVENOUS at 18:22

## 2024-10-22 RX ADMIN — KETOROLAC TROMETHAMINE 15 MG: 15 INJECTION, SOLUTION INTRAMUSCULAR; INTRAVENOUS at 18:36

## 2024-10-22 RX ADMIN — ONDANSETRON 4 MG: 2 INJECTION, SOLUTION INTRAMUSCULAR; INTRAVENOUS at 16:37

## 2024-10-22 RX ADMIN — HYDROCODONE BITARTRATE AND ACETAMINOPHEN 1 TABLET: 5; 325 TABLET ORAL at 21:27

## 2024-10-22 RX ADMIN — PANTOPRAZOLE SODIUM 40 MG: 40 INJECTION, POWDER, FOR SOLUTION INTRAVENOUS at 19:54

## 2024-10-22 RX ADMIN — HYDROMORPHONE HYDROCHLORIDE 1 MG: 1 INJECTION, SOLUTION INTRAMUSCULAR; INTRAVENOUS; SUBCUTANEOUS at 16:37

## 2024-10-22 NOTE — ED NOTES
"Pt to Er with c/o abd pain. Unable to specify where. Pt repeatedly crying out, moaning, stating, \"I'm going to die if you don't hurry up.\"  " [Negative] : Heme/Lymph

## 2024-10-22 NOTE — ED NOTES
"Nursing report ED to floor  Donnie Bishop  49 y.o.  male    HPI :  HPI  Stated Reason for Visit: abd pain  History Obtained From: patient    Chief Complaint  Chief Complaint   Patient presents with    Abdominal Pain       Admitting doctor:   James Alexandra MD    Admitting diagnosis:   The primary encounter diagnosis was Epigastric abdominal pain. A diagnosis of Acute gastritis without hemorrhage, unspecified gastritis type was also pertinent to this visit.    Code status:   Current Code Status       Date Active Code Status Order ID Comments User Context       Prior            Allergies:   Patient has no known allergies.    Isolation:   No active isolations    Intake and Output  No intake or output data in the 24 hours ending 10/22/24 1956    Weight:       10/22/24  1641   Weight: 76.2 kg (168 lb)       Most recent vitals:   Vitals:    10/22/24 1641 10/22/24 1701 10/22/24 1835 10/22/24 1931   BP:  155/98 147/89 105/54   Pulse:  93 93 94   Resp:       Temp:       TempSrc:       SpO2:  100% 99% 95%   Weight: 76.2 kg (168 lb)      Height: 170.2 cm (67\")          Active LDAs/IV Access:   Lines, Drains & Airways       Active LDAs       Name Placement date Placement time Site Days    Peripheral IV 10/22/24 1637 Anterior;Distal;Right;Upper Arm 10/22/24  1637  Arm  less than 1                    Labs (abnormal labs have a star):   Labs Reviewed   COMPREHENSIVE METABOLIC PANEL - Abnormal; Notable for the following components:       Result Value    Glucose 183 (*)     Chloride 96 (*)     Total Bilirubin 2.6 (*)     Anion Gap 15.4 (*)     All other components within normal limits    Narrative:     GFR Normal >60  Chronic Kidney Disease <60  Kidney Failure <15     LIPASE - Abnormal; Notable for the following components:    Lipase 9 (*)     All other components within normal limits   URINALYSIS W/ MICROSCOPIC IF INDICATED (NO CULTURE) - Abnormal; Notable for the following components:    Glucose, UA >=1000 mg/dL (3+) (*)     Ketones, " UA 40 mg/dL (2+) (*)     All other components within normal limits    Narrative:     Urine microscopic not indicated.   CBC WITH AUTO DIFFERENTIAL - Abnormal; Notable for the following components:    WBC 13.79 (*)     Neutrophil % 84.5 (*)     Lymphocyte % 8.8 (*)     Eosinophil % 0.1 (*)     Neutrophils, Absolute 11.65 (*)     All other components within normal limits   RAINBOW DRAW    Narrative:     The following orders were created for panel order [place] Draw.  Procedure                               Abnormality         Status                     ---------                               -----------         ------                     Green Top (Gel)[[id number]]                                  Final result               Lavender Top[[id number]]                                     Final result               Gold Top - SST[[id number]]                                   Final result               Light Blue Top[[id number]]                                   Final result                 Please view results for these tests on the individual orders.   CBC AND DIFFERENTIAL    Narrative:     The following orders were created for panel order CBC & Differential.  Procedure                               Abnormality         Status                     ---------                               -----------         ------                     CBC Auto Differential[[id number]]        Abnormal            Final result                 Please view results for these tests on the individual orders.   GREEN TOP   LAVENDER TOP   GOLD TOP - SST   LIGHT BLUE TOP       EKG:   No orders to display       Meds given in ED:   Medications   sodium chloride 0.9 % flush 10 mL (has no administration in time range)   HYDROmorphone (DILAUDID) injection 1 mg (1 mg Intravenous Given 10/22/24 1637)   ondansetron (ZOFRAN) injection 4 mg (4 mg Intravenous Given 10/22/24 1637)   HYDROmorphone (DILAUDID) injection 0.5 mg (0.5 mg Intravenous Given 10/22/24 1836)    ketorolac (TORADOL) injection 15 mg (15 mg Intravenous Given 10/22/24 1836)   iopamidol (ISOVUE-300) 61 % injection 100 mL (85 mL Intravenous Given 10/22/24 1822)   pantoprazole (PROTONIX) injection 40 mg (40 mg Intravenous Given 10/22/24 1954)       Imaging results:  CT Abdomen Pelvis With Contrast    Result Date: 10/22/2024   1. Gastric antral wall thickening with surrounding inflammation and some ill-defined fluid with inflammation extending to the gallbladder which appears partially contracted and extending into the pancreaticoduodenal groove. Finding may be related to gastritis or underlying peptic ulcer disease. Consider endoscopy evaluation. 2. Small amount of free fluid seen in the abdomen and pelvis  This report was finalized on 10/22/2024 6:37 PM by Dr. Connor Vasquez M.D on Workstation: [id number]       Ambulatory status:   - ax1    Social issues:   Social History     Socioeconomic History    Marital status:    Tobacco Use    Smoking status: Never    Smokeless tobacco: Never   Vaping Use    Vaping status: Never Used   Substance and Sexual Activity    Alcohol use: Never    Drug use: Never    Sexual activity: Defer       Peripheral Neurovascular  Peripheral Neurovascular (Adult)  Peripheral Neurovascular WDL: WDL    Neuro Cognitive  Neuro Cognitive (Adult)  Cognitive/Neuro/Behavioral WDL: WDL, orientation  Orientation: oriented x 4    Learning  Learning Assessment  Learning Readiness and Ability: no barriers identified    Respiratory  Respiratory WDL  Respiratory WDL: WDL    Abdominal Pain       Pain Assessments  Pain (Adult)  (0-10) Pain Rating: Rest: 10  (0-10) Pain Rating: Activity: 10  Pain Location: abdomen  Pain Description: aching  Nonverbal Indicators of Pain: moaning  Response to Pain Interventions: nonverbal indicators present, interventions effective per patient    NIH Stroke Scale       Sommer Mera RN  10/22/24 19:56 EDT

## 2024-10-22 NOTE — ED PROVIDER NOTES
EMERGENCY DEPARTMENT ENCOUNTER    Room Number:  12/12  PCP: Juju Kennedy MD  Historian: Patient      HPI:  Chief Complaint: Abdominal pain  A complete HPI/ROS/PMH/PSH/SH/FH are unobtainable due to: Pain  Context: Donnie Bishop is a 49 y.o. male who presents to the ED c/o abdominal pain.  Patient states symptoms started this morning.  Patient is difficult historian as he will not answer most questions.  Patient just calling out in pain.  Apparently has had pain similar to this in the past.  Patient states is mostly upper abdominal pain.  Has had no vomiting or diarrhea.  Is had no fevers or chills.  States has had no burning with urination or blood in the urine.            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     Resolved Ambulatory Problems     Diagnosis Date Noted    No Resolved Ambulatory Problems     Past Medical History:   Diagnosis Date    Cough     Myalgia     Type II diabetes mellitus     Venous insufficiency     Viral illness          PAST SURGICAL HISTORY  Past Surgical History:   Procedure Laterality Date    CARDIAC CATHETERIZATION N/A 9/13/2023    Procedure: Left Heart Cath;  Surgeon: Kaylee Kay MD;  Location: [facility] INVASIVE LOCATION;  Service: Cardiology;  Laterality: N/A;    CARDIAC CATHETERIZATION N/A 9/13/2023    Procedure: Coronary angiography;  Surgeon: Kaylee Kay MD;  Location: [facility] CATH INVASIVE LOCATION;  Service:  Cardiology;  Laterality: N/A;         FAMILY HISTORY  Family History   Problem Relation Age of Onset    Diabetes Mother          SOCIAL HISTORY  Social History     Socioeconomic History    Marital status:    Tobacco Use    Smoking status: Never    Smokeless tobacco: Never   Vaping Use    Vaping status: Never Used   Substance and Sexual Activity    Alcohol use: Never    Drug use: Never    Sexual activity: Defer         ALLERGIES  Patient has no known allergies.        REVIEW OF SYSTEMS  Review of Systems   Abdominal pain      PHYSICAL EXAM  ED Triage Vitals   Temp Heart Rate Resp BP SpO2   10/22/24 1550 10/22/24 1550 10/22/24 1617 -- 10/22/24 1550   98 °F (36.7 °C) 94 20  100 %      Temp src Heart Rate Source Patient Position BP Location FiO2 (%)   10/22/24 1550 -- -- -- --   Tympanic           Physical Exam      GENERAL: Patient uncomfortable yelling in pain  HENT: nares patent  EYES: no scleral icterus  CV: regular rhythm, normal rate  RESPIRATORY: normal effort  ABDOMEN: soft.  Tenderness bilateral upper abdomen  MUSCULOSKELETAL: no deformity  NEURO: alert, moves all extremities, follows commands  PSYCH:  calm, cooperative  SKIN: warm, dry    Vital signs and nursing notes reviewed.          LAB RESULTS  Recent Results (from the past 24 hours)   Comprehensive Metabolic Panel    Collection Time: 10/22/24  3:57 PM    Specimen: Arm, Right; Blood   Result Value Ref Range    Glucose 183 (H) 65 - 99 mg/dL    BUN 9 6 - 20 mg/dL    Creatinine 1.07 0.76 - 1.27 mg/dL    Sodium 136 136 - 145 mmol/L    Potassium 4.8 3.5 - 5.2 mmol/L    Chloride 96 (L) 98 - 107 mmol/L    CO2 24.6 22.0 - 29.0 mmol/L    Calcium 10.5 8.6 - 10.5 mg/dL    Total Protein 7.8 6.0 - 8.5 g/dL    Albumin 4.6 3.5 - 5.2 g/dL    ALT (SGPT) 11 1 - 41 U/L    AST (SGOT) 10 1 - 40 U/L    Alkaline Phosphatase 60 39 - 117 U/L    Total Bilirubin 2.6 (H) 0.0 - 1.2 mg/dL    Globulin 3.2 gm/dL    A/G Ratio 1.4 g/dL    BUN/Creatinine Ratio 8.4 7.0 - 25.0     Anion Gap 15.4 (H) 5.0 - 15.0 mmol/L    eGFR 86.1 >60.0 mL/min/1.73   Lipase    Collection Time: 10/22/24  3:57 PM    Specimen: Arm, Right; Blood   Result Value Ref Range    Lipase 9 (L) 13 - 60 U/L   Green Top (Gel)    Collection Time: 10/22/24  3:57 PM   Result Value Ref Range    Extra Tube Hold for add-ons.    Lavender Top    Collection Time: 10/22/24  3:57 PM   Result Value Ref Range    Extra Tube hold for add-on    Gold Top - SST    Collection Time: 10/22/24  3:57 PM   Result Value Ref Range    Extra Tube Hold for add-ons.    Light Blue Top    Collection Time: 10/22/24  3:57 PM   Result Value Ref Range    Extra Tube Hold for add-ons.    CBC Auto Differential    Collection Time: 10/22/24  3:57 PM    Specimen: Arm, Right; Blood   Result Value Ref Range    WBC 13.79 (H) 3.40 - 10.80 10*3/mm3    RBC 4.53 4.14 - 5.80 10*6/mm3    Hemoglobin 14.2 13.0 - 17.7 g/dL    Hematocrit 40.8 37.5 - 51.0 %    MCV 90.1 79.0 - 97.0 fL    MCH 31.3 26.6 - 33.0 pg    MCHC 34.8 31.5 - 35.7 g/dL    RDW 12.3 12.3 - 15.4 %    RDW-SD 40.2 37.0 - 54.0 fl    MPV 9.1 6.0 - 12.0 fL    Platelets 388 140 - 450 10*3/mm3    Neutrophil % 84.5 (H) 42.7 - 76.0 %    Lymphocyte % 8.8 (L) 19.6 - 45.3 %    Monocyte % 5.9 5.0 - 12.0 %    Eosinophil % 0.1 (L) 0.3 - 6.2 %    Basophil % 0.5 0.0 - 1.5 %    Immature Grans % 0.2 0.0 - 0.5 %    Neutrophils, Absolute 11.65 (H) 1.70 - 7.00 10*3/mm3    Lymphocytes, Absolute 1.21 0.70 - 3.10 10*3/mm3    Monocytes, Absolute 0.82 0.10 - 0.90 10*3/mm3    Eosinophils, Absolute 0.01 0.00 - 0.40 10*3/mm3    Basophils, Absolute 0.07 0.00 - 0.20 10*3/mm3    Immature Grans, Absolute 0.03 0.00 - 0.05 10*3/mm3    nRBC 0.0 0.0 - 0.2 /100 WBC   Urinalysis With Microscopic If Indicated (No Culture) - Urine, Clean Catch    Collection Time: 10/22/24  5:48 PM    Specimen: Urine, Clean Catch   Result Value Ref Range    Color, UA Yellow Yellow, Straw    Appearance, UA Clear Clear    pH, UA 5.5 5.0 - 8.0    Specific [place], UA 1.028  1.005 - 1.030    Glucose, UA >=1000 mg/dL (3+) (A) Negative    Ketones, UA 40 mg/dL (2+) (A) Negative    Bilirubin, UA Negative Negative    Blood, UA Negative Negative    Protein, UA Negative Negative    Leuk Esterase, UA Negative Negative    Nitrite, UA Negative Negative    Urobilinogen, UA 0.2 E.U./dL 0.2 - 1.0 E.U./dL       Ordered the above labs and reviewed the results.        RADIOLOGY  CT Abdomen Pelvis With Contrast    Result Date: 10/22/2024  CT ABDOMEN PELVIS W CONTRAST-  INDICATION: Abdominal pain  COMPARISON: CT abdomen pelvis August 31, 2024  TECHNIQUE: Routine CT abdomen/pelvis with IV contrast. Coronal and sagittal reformats. Radiation dose reduction techniques were utilized, including automated exposure control and exposure modulation based on body size.  FINDINGS:  Lung bases: Subsegmental atelectasis at the bases.  ABDOMEN: Low attenuating lesion seen at the left hepatic dome, segment 2, measures less than 1 cm and is too small to characterize, unchanged. Gallbladder appears partially contracted, with some increased enhancement in the gallbladder wall. No biliary ductal dilatation. Spleen is normal in size. No pancreatic mass or pancreatic ductal dilatation seen. Nodular thickening of the adrenal glands. No renal mass or hydronephrosis.  Pelvis: Prostate is normal in size. Normal bladder. No bladder calculus. Free intraperitoneal fluid seen in the rectovesicular pouch.  Bowel: Gastric antral wall thickening, with surrounding stranding/edema and some ill-defined fluid, extending to the gallbladder and into the pancreaticoduodenal groove. No extraluminal gas seen. No walled off fluid collection. Some nearby perihepatic free fluid, extends into the paracolic gutter. Incidental duodenal diverticulum. Moderate gas and stool in the colon. Normal appendix. Small amount of fluid in the mesenteric leaflets in the pelvis.  Abdominal wall: Small fat-containing umbilical hernia.  Retroperitoneum: No  lymphadenopathy.  Vasculature: Patent. No abdominal aortic aneurysm.  Osseous structures: No destructive osseous lesions.       1. Gastric antral wall thickening with surrounding inflammation and some ill-defined fluid with inflammation extending to the gallbladder which appears partially contracted and extending into the pancreaticoduodenal groove. Finding may be related to gastritis or underlying peptic ulcer disease. Consider endoscopy evaluation. 2. Small amount of free fluid seen in the abdomen and pelvis  This report was finalized on 10/22/2024 6:37 PM by Dr. Connor Vasquez M.D on Workstation: [id number]       Ordered the above noted radiological studies.  CT abdomen independent interpreted by me and does have thickened gastric wall          PROCEDURES  Procedures            MEDICATIONS GIVEN IN ER  Medications   sodium chloride 0.9 % flush 10 mL (has no administration in time range)   HYDROmorphone (DILAUDID) injection 1 mg (1 mg Intravenous Given 10/22/24 1637)   ondansetron (ZOFRAN) injection 4 mg (4 mg Intravenous Given 10/22/24 1637)   HYDROmorphone (DILAUDID) injection 0.5 mg (0.5 mg Intravenous Given 10/22/24 1836)   ketorolac (TORADOL) injection 15 mg (15 mg Intravenous Given 10/22/24 1836)   iopamidol (ISOVUE-300) 61 % injection 100 mL (85 mL Intravenous Given 10/22/24 1822)   pantoprazole (PROTONIX) injection 40 mg (40 mg Intravenous Given 10/22/24 1954)                   MEDICAL DECISION MAKING, PROGRESS, and CONSULTS    All labs have been independently reviewed by me.  All radiology studies have been reviewed by me and I have also reviewed the radiology report.   EKG's independently viewed and interpreted by me.  Discussion below represents my analysis of pertinent findings related to patient's condition, differential diagnosis, treatment plan and final disposition.      Additional sources:  - Discussed/ obtained information from independent historians: None    - External (non-ED) record  review: Epic reviewed patient seen by endocrinology 10/15/2024    - Chronic or social conditions impacting care: None    - Shared decision making: None      Orders placed during this visit:  Orders Placed This Encounter   Procedures    CT Abdomen Pelvis With Contrast    [place] Draw    Comprehensive Metabolic Panel    Lipase    Urinalysis With Microscopic If Indicated (No Culture) - Urine, Clean Catch    CBC Auto Differential    NPO Diet NPO Type: Strict NPO    Undress & Gown    [facility] (on-call MD unless specified) Details    Insert Peripheral IV    Initiate Observation Status    CBC & Differential    Green Top (Gel)    Lavender Top    Gold Top - SST    Light Blue Top         Additional orders considered but not ordered:  None        Differential diagnosis includes but is not limited to:    Gastritis versus peptic ulcer disease versus gastroenteritis      Independent interpretation of labs, radiology studies, and discussions with consultants:  ED Course as of 10/22/24 1959   Tue Oct 22, 2024   1946 19:46 EDT  Patient with epigastric abdominal pain.  Patient quite uncomfortable on arrival.  Patient symptoms improved after pain medication.  Patient CT scan shows thick-walled stomach that appears to be gastritis.  Patient has been given Protonix.  Discussed with Dr. Alexandra who will admit. [SL]      ED Course User Index  [SL] Sylvain Ventura MD                 DIAGNOSIS  Final diagnoses:   Epigastric abdominal pain   Acute gastritis without hemorrhage, unspecified gastritis type         DISPOSITION  admit            Latest Documented Vital Signs:  As of 19:59 EDT  BP- 105/54 HR- 94 Temp- 98 °F (36.7 °C) (Tympanic) O2 sat- 95%              --    Please note that portions of this were completed with a voice recognition program.       Note Disclaimer: At [facility], we believe that sharing information builds trust and better relationships. You are receiving this note because you are receiving care at [facility] or  recently visited. It is possible you will see health information before a provider has talked with you about it. This kind of information can be easy to misunderstand. To help you fully understand what it means for your health, we urge you to discuss this note with your provider.            Sylvain Ventura MD  10/22/24 2000

## 2024-10-23 PROBLEM — R10.13 EPIGASTRIC ABDOMINAL PAIN: Status: ACTIVE | Noted: 2024-10-22

## 2024-10-23 PROBLEM — D72.829 LEUKOCYTOSIS: Status: ACTIVE | Noted: 2024-10-23

## 2024-10-23 LAB
ANION GAP SERPL CALCULATED.3IONS-SCNC: 13.7 MMOL/L (ref 5–15)
BASOPHILS # BLD MANUAL: 0 10*3/MM3 (ref 0–0.2)
BASOPHILS NFR BLD MANUAL: 0 % (ref 0–1.5)
BUN SERPL-MCNC: 13 MG/DL (ref 6–20)
BUN/CREAT SERPL: 10.9 (ref 7–25)
CALCIUM SPEC-SCNC: 10.1 MG/DL (ref 8.6–10.5)
CHLORIDE SERPL-SCNC: 96 MMOL/L (ref 98–107)
CO2 SERPL-SCNC: 24.3 MMOL/L (ref 22–29)
CREAT SERPL-MCNC: 1.19 MG/DL (ref 0.76–1.27)
D-LACTATE SERPL-SCNC: 3.9 MMOL/L (ref 0.5–2)
D-LACTATE SERPL-SCNC: 4 MMOL/L (ref 0.5–2)
D-LACTATE SERPL-SCNC: 4.5 MMOL/L (ref 0.5–2)
DEPRECATED RDW RBC AUTO: 37.9 FL (ref 37–54)
DEPRECATED RDW RBC AUTO: 39.1 FL (ref 37–54)
EGFRCR SERPLBLD CKD-EPI 2021: 75.8 ML/MIN/1.73
EOSINOPHIL # BLD MANUAL: 0 10*3/MM3 (ref 0–0.4)
EOSINOPHIL NFR BLD MANUAL: 0 % (ref 0.3–6.2)
ERYTHROCYTE [DISTWIDTH] IN BLOOD BY AUTOMATED COUNT: 11.8 % (ref 12.3–15.4)
ERYTHROCYTE [DISTWIDTH] IN BLOOD BY AUTOMATED COUNT: 12 % (ref 12.3–15.4)
GLUCOSE BLDC GLUCOMTR-MCNC: 109 MG/DL (ref 70–130)
GLUCOSE BLDC GLUCOMTR-MCNC: 120 MG/DL (ref 70–130)
GLUCOSE BLDC GLUCOMTR-MCNC: 130 MG/DL (ref 70–130)
GLUCOSE BLDC GLUCOMTR-MCNC: 159 MG/DL (ref 70–130)
GLUCOSE BLDC GLUCOMTR-MCNC: 166 MG/DL (ref 70–130)
GLUCOSE BLDC GLUCOMTR-MCNC: 174 MG/DL (ref 70–130)
GLUCOSE SERPL-MCNC: 112 MG/DL (ref 65–99)
HCT VFR BLD AUTO: 42.6 % (ref 37.5–51)
HCT VFR BLD AUTO: 44.3 % (ref 37.5–51)
HGB BLD-MCNC: 14.5 G/DL (ref 13–17.7)
HGB BLD-MCNC: 15.1 G/DL (ref 13–17.7)
LYMPHOCYTES # BLD MANUAL: 0.13 10*3/MM3 (ref 0.7–3.1)
LYMPHOCYTES NFR BLD MANUAL: 2 % (ref 5–12)
MCH RBC QN AUTO: 30.5 PG (ref 26.6–33)
MCH RBC QN AUTO: 30.6 PG (ref 26.6–33)
MCHC RBC AUTO-ENTMCNC: 34 G/DL (ref 31.5–35.7)
MCHC RBC AUTO-ENTMCNC: 34.1 G/DL (ref 31.5–35.7)
MCV RBC AUTO: 89.5 FL (ref 79–97)
MCV RBC AUTO: 89.9 FL (ref 79–97)
METAMYELOCYTES NFR BLD MANUAL: 1 % (ref 0–0)
MICROCYTES BLD QL: ABNORMAL
MONOCYTES # BLD: 0.25 10*3/MM3 (ref 0.1–0.9)
NEUTROPHILS # BLD AUTO: 12.12 10*3/MM3 (ref 1.7–7)
NEUTROPHILS NFR BLD MANUAL: 96 % (ref 42.7–76)
NRBC BLD AUTO-RTO: 0 /100 WBC (ref 0–0.2)
PLAT MORPH BLD: NORMAL
PLATELET # BLD AUTO: 384 10*3/MM3 (ref 140–450)
PLATELET # BLD AUTO: 399 10*3/MM3 (ref 140–450)
PMV BLD AUTO: 10.5 FL (ref 6–12)
PMV BLD AUTO: 10.5 FL (ref 6–12)
POIKILOCYTOSIS BLD QL SMEAR: ABNORMAL
POLYCHROMASIA BLD QL SMEAR: ABNORMAL
POTASSIUM SERPL-SCNC: 4.8 MMOL/L (ref 3.5–5.2)
RBC # BLD AUTO: 4.74 10*6/MM3 (ref 4.14–5.8)
RBC # BLD AUTO: 4.95 10*6/MM3 (ref 4.14–5.8)
SODIUM SERPL-SCNC: 134 MMOL/L (ref 136–145)
VARIANT LYMPHS NFR BLD MANUAL: 1 % (ref 19.6–45.3)
WBC MORPH BLD: NORMAL
WBC NRBC COR # BLD AUTO: 12.59 10*3/MM3 (ref 3.4–10.8)
WBC NRBC COR # BLD AUTO: 12.62 10*3/MM3 (ref 3.4–10.8)

## 2024-10-23 PROCEDURE — 25010000002 PIPERACILLIN SOD-TAZOBACTAM PER 1 G: Performed by: NURSE PRACTITIONER

## 2024-10-23 PROCEDURE — 85025 COMPLETE CBC W/AUTO DIFF WBC: CPT | Performed by: NURSE PRACTITIONER

## 2024-10-23 PROCEDURE — 99214 OFFICE O/P EST MOD 30 MIN: CPT | Performed by: INTERNAL MEDICINE

## 2024-10-23 PROCEDURE — 25810000003 SODIUM CHLORIDE 0.9 % SOLUTION: Performed by: STUDENT IN AN ORGANIZED HEALTH CARE EDUCATION/TRAINING PROGRAM

## 2024-10-23 PROCEDURE — 87040 BLOOD CULTURE FOR BACTERIA: CPT | Performed by: NURSE PRACTITIONER

## 2024-10-23 PROCEDURE — 93010 ELECTROCARDIOGRAM REPORT: CPT | Performed by: INTERNAL MEDICINE

## 2024-10-23 PROCEDURE — 85007 BL SMEAR W/DIFF WBC COUNT: CPT | Performed by: NURSE PRACTITIONER

## 2024-10-23 PROCEDURE — 80048 BASIC METABOLIC PNL TOTAL CA: CPT | Performed by: STUDENT IN AN ORGANIZED HEALTH CARE EDUCATION/TRAINING PROGRAM

## 2024-10-23 PROCEDURE — G0378 HOSPITAL OBSERVATION PER HR: HCPCS

## 2024-10-23 PROCEDURE — 63710000001 INSULIN LISPRO (HUMAN) PER 5 UNITS: Performed by: STUDENT IN AN ORGANIZED HEALTH CARE EDUCATION/TRAINING PROGRAM

## 2024-10-23 PROCEDURE — 25010000002 MORPHINE PER 10 MG: Performed by: NURSE PRACTITIONER

## 2024-10-23 PROCEDURE — 25010000002 MORPHINE PER 10 MG: Performed by: STUDENT IN AN ORGANIZED HEALTH CARE EDUCATION/TRAINING PROGRAM

## 2024-10-23 PROCEDURE — 83605 ASSAY OF LACTIC ACID: CPT | Performed by: NURSE PRACTITIONER

## 2024-10-23 PROCEDURE — 82948 REAGENT STRIP/BLOOD GLUCOSE: CPT

## 2024-10-23 PROCEDURE — 25010000002 HYDROMORPHONE PER 4 MG: Performed by: NURSE PRACTITIONER

## 2024-10-23 PROCEDURE — 93005 ELECTROCARDIOGRAM TRACING: CPT | Performed by: NURSE PRACTITIONER

## 2024-10-23 PROCEDURE — 85027 COMPLETE CBC AUTOMATED: CPT | Performed by: STUDENT IN AN ORGANIZED HEALTH CARE EDUCATION/TRAINING PROGRAM

## 2024-10-23 RX ORDER — SODIUM CHLORIDE 9 MG/ML
100 INJECTION, SOLUTION INTRAVENOUS CONTINUOUS
Status: DISCONTINUED | OUTPATIENT
Start: 2024-10-23 | End: 2024-10-24

## 2024-10-23 RX ORDER — SUCRALFATE 1 G/1
1 TABLET ORAL
Status: DISCONTINUED | OUTPATIENT
Start: 2024-10-23 | End: 2024-10-24

## 2024-10-23 RX ORDER — HYDROMORPHONE HYDROCHLORIDE 1 MG/ML
0.5 INJECTION, SOLUTION INTRAMUSCULAR; INTRAVENOUS; SUBCUTANEOUS EVERY 4 HOURS PRN
Status: DISCONTINUED | OUTPATIENT
Start: 2024-10-23 | End: 2024-10-26

## 2024-10-23 RX ORDER — MORPHINE SULFATE 2 MG/ML
2 INJECTION, SOLUTION INTRAMUSCULAR; INTRAVENOUS ONCE
Status: COMPLETED | OUTPATIENT
Start: 2024-10-23 | End: 2024-10-23

## 2024-10-23 RX ORDER — HYDROCODONE BITARTRATE AND ACETAMINOPHEN 5; 325 MG/1; MG/1
1 TABLET ORAL EVERY 6 HOURS PRN
Status: DISCONTINUED | OUTPATIENT
Start: 2024-10-23 | End: 2024-10-27

## 2024-10-23 RX ADMIN — MORPHINE SULFATE 1 MG: 2 INJECTION, SOLUTION INTRAMUSCULAR; INTRAVENOUS at 08:32

## 2024-10-23 RX ADMIN — CARVEDILOL 25 MG: 25 TABLET, FILM COATED ORAL at 20:20

## 2024-10-23 RX ADMIN — INSULIN LISPRO 2 UNITS: 100 INJECTION, SOLUTION INTRAVENOUS; SUBCUTANEOUS at 21:04

## 2024-10-23 RX ADMIN — SACUBITRIL AND VALSARTAN 1 TABLET: 24; 26 TABLET, FILM COATED ORAL at 20:24

## 2024-10-23 RX ADMIN — SUCRALFATE 1 G: 1 TABLET ORAL at 16:32

## 2024-10-23 RX ADMIN — SENNOSIDES AND DOCUSATE SODIUM 2 TABLET: 50; 8.6 TABLET ORAL at 20:20

## 2024-10-23 RX ADMIN — HYDROXYZINE HYDROCHLORIDE 10 MG: 10 TABLET ORAL at 14:47

## 2024-10-23 RX ADMIN — MORPHINE SULFATE 1 MG: 2 INJECTION, SOLUTION INTRAMUSCULAR; INTRAVENOUS at 04:35

## 2024-10-23 RX ADMIN — HYDROCODONE BITARTRATE AND ACETAMINOPHEN 1 TABLET: 5; 325 TABLET ORAL at 02:34

## 2024-10-23 RX ADMIN — HYDROMORPHONE HYDROCHLORIDE 0.5 MG: 1 INJECTION, SOLUTION INTRAMUSCULAR; INTRAVENOUS; SUBCUTANEOUS at 10:33

## 2024-10-23 RX ADMIN — CARVEDILOL 25 MG: 25 TABLET, FILM COATED ORAL at 08:32

## 2024-10-23 RX ADMIN — PIPERACILLIN AND TAZOBACTAM 3.38 G: 3; .375 INJECTION, POWDER, FOR SOLUTION INTRAVENOUS at 16:26

## 2024-10-23 RX ADMIN — ATORVASTATIN CALCIUM 40 MG: 20 TABLET, FILM COATED ORAL at 08:32

## 2024-10-23 RX ADMIN — INSULIN LISPRO 2 UNITS: 100 INJECTION, SOLUTION INTRAVENOUS; SUBCUTANEOUS at 17:32

## 2024-10-23 RX ADMIN — PANTOPRAZOLE SODIUM 40 MG: 40 INJECTION, POWDER, FOR SOLUTION INTRAVENOUS at 09:30

## 2024-10-23 RX ADMIN — SODIUM CHLORIDE 100 ML/HR: 9 INJECTION, SOLUTION INTRAVENOUS at 16:26

## 2024-10-23 RX ADMIN — MORPHINE SULFATE 1 MG: 2 INJECTION, SOLUTION INTRAMUSCULAR; INTRAVENOUS at 00:22

## 2024-10-23 RX ADMIN — HYDROCODONE BITARTRATE AND ACETAMINOPHEN 1 TABLET: 5; 325 TABLET ORAL at 18:15

## 2024-10-23 RX ADMIN — MORPHINE SULFATE 2 MG: 2 INJECTION, SOLUTION INTRAMUSCULAR; INTRAVENOUS at 05:10

## 2024-10-23 RX ADMIN — PANTOPRAZOLE SODIUM 40 MG: 40 INJECTION, POWDER, FOR SOLUTION INTRAVENOUS at 16:32

## 2024-10-23 RX ADMIN — PIPERACILLIN AND TAZOBACTAM 3.38 G: 3; .375 INJECTION, POWDER, FOR SOLUTION INTRAVENOUS at 20:20

## 2024-10-23 RX ADMIN — HYDROMORPHONE HYDROCHLORIDE 0.5 MG: 1 INJECTION, SOLUTION INTRAMUSCULAR; INTRAVENOUS; SUBCUTANEOUS at 19:25

## 2024-10-23 NOTE — SIGNIFICANT NOTE
10/23/24 1423   OTHER   Discipline physical therapist   Rehab Time/Intention   Session Not Performed other (see comments)  (per RN pt not apporopriate right now for PT, in severe pain, nauseous, PT will follow up tomorrow)   Recommendation   PT - Next Appointment 10/24/24

## 2024-10-23 NOTE — CONSULTS
[facility]  Initial Inpatient Consult Note    Referring Provider: Dr. Carcamo    Reason for Consultation: abdominal pain    Subjective     History of present illness:    49 y.o. male, not previously known to our service, that we are asked to see for epigastric pain.  Patient was admitted last night with complaints of significant upper abdominal pain that began yesterday morning and progressively worsened throughout the day.  The pain awoke him from sleep and radiated to his right abdomen as well as his back.  He reports that has been somewhat nauseated.  He has not had a bowel movement since admission.  He has been NPO.  Nursing reports he has had a change of mental status here recently and is less responsive.  He reports that he needs something to eat.  He has been NPO.  His vitals are stable and his oxygen saturation is acceptable.  He has had 2 doses of narcotics but his last dose was 4 hours ago.  His blood sugar was 135.  He denies any NSAID use.    He had a mild leukocytosis at 13,000 on admission with a normal hemoglobin.  CT of the abdomen and pelvis with IV contrast, reviewed by me, shows significant gastric wall thickening involving the gastric antrum with adjacent fat stranding.    Past Medical History:  Past Medical History:   Diagnosis Date    Anxiety     CHF (congestive heart failure)     Cough     Inability to attain erection     Injury of acoustic nerve     Myalgia     Type II diabetes mellitus     Venous insufficiency     Viral illness     Vitamin D deficiency      Past Surgical History:  Past Surgical History:   Procedure Laterality Date    CARDIAC CATHETERIZATION N/A 9/13/2023    Procedure: Left Heart Cath;  Surgeon: Kaylee Kay MD;  Location: [facility] INVASIVE LOCATION;  Service: Cardiology;  Laterality: N/A;    CARDIAC CATHETERIZATION N/A 9/13/2023    Procedure: Coronary angiography;  Surgeon: Kaylee Kay MD;  Location: [facility] INVASIVE LOCATION;  Service:  Cardiology;  Laterality: N/A;      Social History:   Social History     Tobacco Use    Smoking status: Never    Smokeless tobacco: Never   Substance Use Topics    Alcohol use: Never      Family History:  Family History   Problem Relation Age of Onset    Diabetes Mother        Home Meds:  Medications Prior to Admission   Medication Sig Dispense Refill Last Dose/Taking    melatonin 5 MG tablet tablet Take 1 tablet by mouth At Night As Needed (insomnia). 90 tablet 1 10/22/2024    ondansetron (ZOFRAN) 8 MG tablet Take 1 tablet by mouth Every 8 (Eight) Hours As Needed for Nausea or Vomiting. 8 tablet 0 10/22/2024    spironolactone (ALDACTONE) 25 MG tablet TAKE 1 TABLET BY MOUTH EVERY DAY 90 tablet 1 10/22/2024    venlafaxine XR (EFFEXOR-XR) 150 MG 24 hr capsule Take 1 capsule by mouth Daily. (Patient taking differently: Take 75 mg by mouth Daily.) 90 capsule 1 10/22/2024    vitamin D (ERGOCALCIFEROL) 1.25 MG (87612 UT) capsule capsule Take 1 capsule by mouth 1 (One) Time Per Week.   10/22/2024    atorvastatin (LIPITOR) 40 MG tablet Take 1 tablet by mouth Daily. 90 tablet 3 Morning    carvedilol (COREG) 25 MG tablet Take 1 tablet by mouth Every 12 (Twelve) Hours. 180 tablet 1     empagliflozin (JARDIANCE) 10 MG tablet tablet Take 1 tablet by mouth Daily. 90 tablet 1     hydrOXYzine (ATARAX) 10 MG tablet TAKE 1 TABLET BY MOUTH EVERY 8 HOURS AS NEEDED FOR ANXIETY. 270 tablet 1     metFORMIN (GLUCOPHAGE) 1000 MG tablet Take 1 tablet by mouth 2 (Two) Times a Day With Meals. 180 tablet 3 Morning    NovoLOG FlexPen 100 UNIT/ML solution pen-injector sc pen INJECT 5 UNITS INTO THE SKIN 3 (THREE) TIMES DAILY WITH MEALS PLUS UP TO 15 UNITS SLIDING SCALE.   Morning    sacubitril-valsartan (ENTRESTO) 24-26 MG tablet Take 1 tablet by mouth 2 (Two) Times a Day. 180 tablet 1      Current Meds:   atorvastatin, 40 mg, Oral, Daily  carvedilol, 25 mg, Oral, Q12H  insulin lispro, 2-7 Units, Subcutaneous, 4x Daily AC & at  Bedtime  pantoprazole, 40 mg, Intravenous, BID AC  sacubitril-valsartan, 1 tablet, Oral, BID  senna-docusate sodium, 2 tablet, Oral, BID  spironolactone, 25 mg, Oral, Daily  venlafaxine XR, 75 mg, Oral, Daily      Allergies:  No Known Allergies  Review of Systems  Pertinent items are noted in HPI     Objective     Vital Signs  Temp:  [98 °F (36.7 °C)-99.1 °F (37.3 °C)] 98.3 °F (36.8 °C)  Heart Rate:  [] 105  Resp:  [17-20] 17  BP: ()/() 91/53  Physical Exam:  General Appearance:  Initially slow to respond but improved with stimulation and oral intake   Head:    Normocephalic, without obvious abnormality, atraumatic   Eyes:          conjunctivae and sclerae normal, no   icterus   Throat:   no thrush, oral mucosa moist   Neck:   Supple, no adenopathy   Lungs:     Clear to auscultation bilaterally    Heart:    Regular rhythm and normal rate    Chest Wall:    No abnormalities observed   Abdomen:     Soft, nondistended, tender in the upper abdomen without rebound or guarding; normal bowel sounds   Extremities:   no edema, no redness   Skin:   No bruising or rash       Results Review:   I reviewed the patient's new clinical results.    Results from last 7 days   Lab Units 10/23/24  0311 10/22/24  1557   WBC 10*3/mm3 12.59* 13.79*   HEMOGLOBIN g/dL 14.5 14.2   HEMATOCRIT % 42.6 40.8   PLATELETS 10*3/mm3 384 388     Results from last 7 days   Lab Units 10/23/24  0311 10/22/24  1557   SODIUM mmol/L 134* 136   POTASSIUM mmol/L 4.8 4.8   CHLORIDE mmol/L 96* 96*   CO2 mmol/L 24.3 24.6   BUN mg/dL 13 9   CREATININE mg/dL 1.19 1.07   CALCIUM mg/dL 10.1 10.5   BILIRUBIN mg/dL  --  2.6*   ALK PHOS U/L  --  60   ALT (SGPT) U/L  --  11   AST (SGOT) U/L  --  10   GLUCOSE mg/dL 112* 183*         Lab Results   Lab Value Date/Time    LIPASE 9 (L) 10/22/2024 1557    LIPASE 36 08/31/2024 0029    LIPASE 20 05/24/2021 1838       Radiology:  CT Abdomen Pelvis With Contrast   Final Result       1. Gastric antral wall  thickening with surrounding inflammation and some   ill-defined fluid with inflammation extending to the gallbladder which   appears partially contracted and extending into the pancreaticoduodenal   groove. Finding may be related to gastritis or underlying peptic ulcer   disease. Consider endoscopy evaluation.   2. Small amount of free fluid seen in the abdomen and pelvis       This report was finalized on 10/22/2024 6:37 PM by Dr. Connor Vasquez M.D on Workstation: [id number]              Assessment & Plan   Active Hospital Problems    Diagnosis     **Gastritis     Leukocytosis     Chronic combined systolic and diastolic heart failure     Abdominal pain     Anxiety     Multiple-type hyperlipidemia     Type 2 diabetes mellitus with diabetic neuropathy, with long-term current use of insulin     Benign essential HTN        Assessment:  Upper abdominal pain  Abnormal CT  Type 2 diabetes      Plan:  Certainly with the CT findings there is concern for peptic ulcer disease.  Recommend endoscopic evaluation which will be performed tomorrow for further evaluation of these findings and his symptoms.  Twice daily PPI in interim, add carafate  No evidence of GI bleeding-follow H&H  Okay for full liquid diet in interim until midnight      I discussed the patients findings and my recommendations with patient and nursing staff.          Elma Wiggins M.D.  [facility]  [phone]

## 2024-10-23 NOTE — PROGRESS NOTES
Name: Donnie Bishop ADMIT: 10/22/2024   : 1975  PCP: Juju Kennedy MD    MRN: [id number] LOS: 0 days   AGE/SEX: 49 y.o. male  ROOM: [facility]     Subjective   Subjective   He reports severe 10/10 generalized abdominal pain since yesterday that continues to worsen.  Is very difficult to communicate with Mr. Bishop, as he is flailing around in the bed crying in pain        Objective   Objective   Vital Signs  Temp:  [98 °F (36.7 °C)-99.1 °F (37.3 °C)] 98.1 °F (36.7 °C)  Heart Rate:  [] 116  Resp:  [17-20] 17  BP: ()/() 108/77  SpO2:  [95 %-100 %] 96 %  on   ;      Body mass index is 26.31 kg/m².  Physical Exam  Vitals and nursing note reviewed.   Constitutional:       Appearance: He is obese. He is ill-appearing. He is not diaphoretic.   HENT:      Head: Atraumatic.      Mouth/Throat:      Mouth: Mucous membranes are dry.   Cardiovascular:      Rate and Rhythm: Normal rate. Tachycardia present.      Pulses: Normal pulses.           Radial pulses are 2+ on the right side and 2+ on the left side.        Dorsalis pedis pulses are 2+ on the right side and 2+ on the left side.        Posterior tibial pulses are 2+ on the right side and 2+ on the left side.      Heart sounds: Normal heart sounds.      Comments: Trace edema   Pulmonary:      Effort: Pulmonary effort is normal.      Breath sounds: Normal breath sounds.   Abdominal:      General: Bowel sounds are normal.      Palpations: Abdomen is soft.      Tenderness: There is abdominal tenderness. There is guarding.      Comments: Severe tenderness with palpation, and auscultation.   Musculoskeletal:      Right lower leg: Edema present.      Left lower leg: Edema present.   Skin:     General: Skin is warm and dry.      Capillary Refill: Capillary refill takes less than 2 seconds.   Neurological:      General: No focal deficit present.      Mental Status: He is alert and oriented to person, place, and time.   Psychiatric:         Mood and Affect:  Mood normal.       Results Review     I reviewed the patient's new clinical results.  Results from last 7 days   Lab Units 10/23/24  0311 10/22/24  1557   WBC 10*3/mm3 12.59* 13.79*   HEMOGLOBIN g/dL 14.5 14.2   PLATELETS 10*3/mm3 384 388     Results from last 7 days   Lab Units 10/23/24  0311 10/22/24  1557   SODIUM mmol/L 134* 136   POTASSIUM mmol/L 4.8 4.8   CHLORIDE mmol/L 96* 96*   CO2 mmol/L 24.3 24.6   BUN mg/dL 13 9   CREATININE mg/dL 1.19 1.07   GLUCOSE mg/dL 112* 183*   EGFR mL/min/1.73 75.8 86.1     Results from last 7 days   Lab Units 10/22/24  1557   ALBUMIN g/dL 4.6   BILIRUBIN mg/dL 2.6*   ALK PHOS U/L 60   AST (SGOT) U/L 10   ALT (SGPT) U/L 11     Results from last 7 days   Lab Units 10/23/24  0311 10/22/24  1557   CALCIUM mg/dL 10.1 10.5   ALBUMIN g/dL  --  4.6       Glucose   Date/Time Value Ref Range Status   10/23/2024 0915 130 70 - 130 mg/dL Final   10/23/2024 0314 109 70 - 130 mg/dL Final   10/22/2024 2059 177 (H) 70 - 130 mg/dL Final       CT Abdomen Pelvis With Contrast    Result Date: 10/22/2024   1. Gastric antral wall thickening with surrounding inflammation and some ill-defined fluid with inflammation extending to the gallbladder which appears partially contracted and extending into the pancreaticoduodenal groove. Finding may be related to gastritis or underlying peptic ulcer disease. Consider endoscopy evaluation. 2. Small amount of free fluid seen in the abdomen and pelvis  This report was finalized on 10/22/2024 6:37 PM by Dr. Connor Vasquez M.D on Workstation: [id number]       I have personally reviewed all medications:  Scheduled Medications  atorvastatin, 40 mg, Oral, Daily  carvedilol, 25 mg, Oral, Q12H  insulin lispro, 2-7 Units, Subcutaneous, 4x Daily AC & at Bedtime  pantoprazole, 40 mg, Intravenous, BID AC  sacubitril-valsartan, 1 tablet, Oral, BID  senna-docusate sodium, 2 tablet, Oral, BID  spironolactone, 25 mg, Oral, Daily  venlafaxine XR, 75 mg, Oral,  Daily    Infusions   Diet  NPO Diet NPO Type: Strict NPO    I have personally reviewed:  [x]  Laboratory   [x]  Microbiology   [x]  Radiology   [x]  EKG/Telemetry  [x]  Cardiology/Vascular   []  Pathology    []  Records       Assessment/Plan     Active Hospital Problems    Diagnosis  POA    **Gastritis [K29.70]  Yes    Leukocytosis [D72.829]  Yes    Chronic combined systolic and diastolic heart failure [I50.42]  Yes    Abdominal pain [R10.9]  Yes    Anxiety [F41.9]  Yes    Multiple-type hyperlipidemia [E78.2]  Yes    Type 2 diabetes mellitus with diabetic neuropathy, with long-term current use of insulin [E11.40, Z79.4]  Not Applicable    Benign essential HTN [I10]  Yes      Resolved Hospital Problems   No resolved problems to display.       49 y.o. male admitted with:  Gastritis   Abdominal pain  Currently with severe pain -that is worse with movement, and palpation-  GI following   CT scan reviewed -endoscopy recommended gastritis versus peptic ulcer disease  Continue PPI  Changed as needed IV pain medications to assist in better pain control.  Continue antiemetics  Keep NPO for now until GI has evaluated    Leukocytosis  WBC trending down, will continue to monitor.    Chronic combined systolic and diastolic heart failure  Chronic  Last echocardiogram September 12, 2023 LVEF 35.3%  Continue home  Continue spironolactone, Coreg, and Entresto   Hold home Jardiance   He has very mild to trace bilateral lower extremity edema, but otherwise does not appear to be volume overloaded.    Benign essential HTN  Chronic   Blood pressures acceptable currently.  Continue home antihypertensives    Type 2 diabetes mellitus with diabetic neuropathy, with long-term current use of insulin  Chronic   Last hemoglobin A1c 9.80 on 12/27/2023  SSI ordered for hyperglycemia  Hold home Jardiance, and metformin  Hypoglycemia treatment per protocol    Anxiety  Chronic  Continue home Atarax, and Effexor          SCDs for DVT  prophylaxis.  Full code.  Discussed with patient, family, care team on multidisciplinary rounds, and Dr Carcamo .  Anticipate discharge home in 1-2 days.  Expected discharge date/ time has not been documented.      MICHELLE Shepherd  [facility]  10/23/24  10:27 EDT

## 2024-10-23 NOTE — CASE MANAGEMENT/SOCIAL WORK
Discharge Planning Assessment  [facility]     Patient Name: Donnie Bishop  MRN: [id number]  Today's Date: 10/23/2024    Admit Date: 10/22/2024    Plan: Home   Discharge Needs Assessment       Row Name 10/23/24 1050       Living Environment    People in Home alone    Current Living Arrangements home    Potentially Unsafe Housing Conditions none    Primary Care Provided by self    Provides Primary Care For no one    Family Caregiver if Needed sibling(s)    Quality of Family Relationships supportive;helpful    Able to Return to Prior Arrangements yes       Resource/Environmental Concerns    Resource/Environmental Concerns none    Transportation Concerns none       Transportation Needs    In the past 12 months, has lack of transportation kept you from medical appointments or from getting medications? no    In the past 12 months, has lack of transportation kept you from meetings, work, or from getting things needed for daily living? No       Transition Planning    Patient/Family Anticipates Transition to home    Patient/Family Anticipated Services at Transition none    Transportation Anticipated family or friend will provide;car, drives self       Discharge Needs Assessment    Readmission Within the Last 30 Days no previous admission in last 30 days    Equipment Currently Used at Home cane, straight    Concerns to be Addressed denies needs/concerns at this time    Do you want help finding or keeping work or a job? I do not need or want help    Do you want help with school or training? For example, starting or completing job training or getting a high school diploma, GED or equivalent No    Anticipated Changes Related to Illness none    Equipment Needed After Discharge none    Provided Post Acute Provider List? N/A    Provided Post Acute Provider Quality & Resource List? N/A                   Discharge Plan       Row Name 10/23/24 1051       Plan    Plan Home    Patient/Family in Agreement with Plan yes    Plan Comments  S/W pt at bedside.  Facesheet info confirmed.  Pt lives alone in a house, is IADLs and can drive.  He uses a cane for mobility when needed. Pt states his family checks on him often and can assist if needed.  No hx of HH or SNF.  Pt plans to return home upon DC and denies any DC needs at this time.  CCP will continue to follow and assist if needed. ...........Reba IVY/ TANNER                  Continued Care and Services - Admitted Since 10/22/2024    No active coordination exists for this encounter.          Demographic Summary       Row Name 10/23/24 1050       General Information    Admission Type observation    Arrived From home    Referral Source admission list    Reason for Consult discharge planning    Preferred Language English                   Functional Status       Row Name 10/23/24 1050       Functional Status    Usual Activity Tolerance good       Functional Status, IADL    Medications independent    Meal Preparation independent    Housekeeping independent    Laundry independent    Shopping independent       Mental Status    General Appearance WDL WDL       Mental Status Summary    Recent Changes in Mental Status/Cognitive Functioning no changes       Employment/    Employment Status employed full-time                               Reba Poe RN

## 2024-10-23 NOTE — H&P
Patient Name:  Donnie Bishop  YOB: 1975  MRN:  [id number]  Admit Date:  10/22/2024  Patient Care Team:  Juju Kennedy MD as PCP - General (Family Medicine)      Subjective   History Present Illness     Chief Complaint   Patient presents with    Abdominal Pain       Mr. Bishop is a 49 y.o. man with type 2 diabetes, hypertension, hyperlipidemia, chronic combined systolic and diastolic heart failure (non-ischemic), anxiety who presents with severe abdominal pain since this early this morning. He reports that the abdominal pain woke him from sleep and progressively worsened throughout the day. He states that it's epigastric in location and radiating to the right side of his abdomen as well as his back.     History of Present Illness  Review of Systems   Constitutional:  Positive for diaphoresis. Negative for chills and fever.   HENT:  Negative for postnasal drip and rhinorrhea.    Eyes: Negative.    Respiratory:  Positive for shortness of breath. Negative for cough.    Cardiovascular:  Negative for chest pain and palpitations.   Gastrointestinal:  Positive for abdominal pain and nausea. Negative for diarrhea and vomiting.   Endocrine: Negative.    Genitourinary:  Negative for dysuria, frequency and urgency.   Musculoskeletal:  Negative for arthralgias and myalgias.   Skin:  Negative for rash and wound.   Allergic/Immunologic: Negative.    Neurological:  Negative for light-headedness and headaches.   Hematological: Negative.    Psychiatric/Behavioral:  Negative for confusion and decreased concentration.         Personal History     Past Medical History:   Diagnosis Date    Anxiety     Cough     Inability to attain erection     Injury of acoustic nerve     Myalgia     Type II diabetes mellitus     Venous insufficiency     Viral illness     Vitamin D deficiency      Past Surgical History:   Procedure Laterality Date    CARDIAC CATHETERIZATION N/A 9/13/2023    Procedure: Left Heart Cath;  Surgeon: Eliza  MD Kaylee;  Location:  [facility];  Service: Cardiology;  Laterality: N/A;    CARDIAC CATHETERIZATION N/A 9/13/2023    Procedure: Coronary angiography;  Surgeon: Kaylee Kay MD;  Location: [facility] CATH INVASIVE LOC[facility];  Service: Cardiology;  Laterality: N/A;     Family History   Problem Relation Age of Onset    Diabetes Mother      Social History     Tobacco Use    Smoking status: Never    Smokeless tobacco: Never   Vaping Use    Vaping status: Never Used   Substance Use Topics    Alcohol use: Never    Drug use: Never     No current facility-administered medications on file prior to encounter.     Current Outpatient Medications on File Prior to Encounter   Medication Sig Dispense Refill    atorvastatin (LIPITOR) 40 MG tablet Take 1 tablet by mouth Daily. 90 tablet 3    carvedilol (COREG) 25 MG tablet Take 1 tablet by mouth Every 12 (Twelve) Hours. 180 tablet 1    empagliflozin (JARDIANCE) 10 MG tablet tablet Take 1 tablet by mouth Daily. 90 tablet 1    hydrOXYzine (ATARAX) 10 MG tablet TAKE 1 TABLET BY MOUTH EVERY 8 HOURS AS NEEDED FOR ANXIETY. 270 tablet 1    melatonin 5 MG tablet tablet Take 1 tablet by mouth At Night As Needed (insomnia). 90 tablet 1    metFORMIN (GLUCOPHAGE) 1000 MG tablet Take 1 tablet by mouth 2 (Two) Times a Day With Meals. 180 tablet 3    NovoLOG FlexPen 100 UNIT/ML solution pen-injector sc pen INJECT 5 UNITS INTO THE SKIN 3 (THREE) TIMES DAILY WITH MEALS PLUS UP TO 15 UNITS SLIDING SCALE.      ondansetron (ZOFRAN) 8 MG tablet Take 1 tablet by mouth Every 8 (Eight) Hours As Needed for Nausea or Vomiting. 8 tablet 0    sacubitril-valsartan (ENTRESTO) 24-26 MG tablet Take 1 tablet by mouth 2 (Two) Times a Day. 180 tablet 1    spironolactone (ALDACTONE) 25 MG tablet TAKE 1 TABLET BY MOUTH EVERY DAY 90 tablet 1    venlafaxine XR (EFFEXOR-XR) 150 MG 24 hr capsule Take 1 capsule by mouth Daily. (Patient taking differently: Take 75 mg by mouth Daily.) 90 capsule 1    vitamin  D (ERGOCALCIFEROL) 1.25 MG (89890 UT) capsule capsule Take 1 capsule by mouth 1 (One) Time Per Week.       No Known Allergies    Objective    Objective     Vital Signs  Temp:  [98 °F (36.7 °C)] 98 °F (36.7 °C)  Heart Rate:  [85-94] 94  Resp:  [20] 20  BP: (105-178)/() 105/54  SpO2:  [95 %-100 %] 95 %  on   ;      Body mass index is 26.31 kg/m².    Physical Exam  Vitals and nursing note reviewed.   Constitutional:       General: He is not in acute distress.     Appearance: He is not toxic-appearing.   HENT:      Head: Normocephalic and atraumatic.      Mouth/Throat:      Mouth: Mucous membranes are moist.      Pharynx: Oropharynx is clear.   Eyes:      Extraocular Movements: Extraocular movements intact.      Conjunctiva/sclera: Conjunctivae normal.      Pupils: Pupils are equal, round, and reactive to light.   Cardiovascular:      Rate and Rhythm: Normal rate and regular rhythm.      Heart sounds: Normal heart sounds.   Pulmonary:      Effort: Pulmonary effort is normal. No respiratory distress.      Breath sounds: Normal breath sounds. No wheezing, rhonchi or rales.   Abdominal:      General: Bowel sounds are normal. There is no distension.      Palpations: Abdomen is soft.      Tenderness: There is abdominal tenderness. There is no guarding or rebound.   Musculoskeletal:         General: No tenderness.      Cervical back: Normal range of motion and neck supple.      Right lower leg: No edema.      Left lower leg: No edema.   Skin:     General: Skin is warm and dry.      Findings: No erythema or rash.   Neurological:      Mental Status: He is alert.   Psychiatric:         Mood and Affect: Mood normal.         Behavior: Behavior normal.         Results Review:  I reviewed the patient's new clinical results.  I reviewed the patient's new imaging results and agree with the interpretation.  I reviewed the patient's other test results and agree with the interpretation  I personally viewed and interpreted the  patient's EKG/Telemetry data  Discussed with ED provider.    Lab Results (last 24 hours)       Procedure Component Value Units Date/Time    CBC & Differential [[id number]]  (Abnormal) Collected: 10/22/24 1557    Specimen: Blood from Arm, Right Updated: 10/22/24 1607    Narrative:      The following orders were created for panel order CBC & Differential.  Procedure                               Abnormality         Status                     ---------                               -----------         ------                     CBC Auto Differential[[id number]]        Abnormal            Final result                 Please view results for these tests on the individual orders.    Comprehensive Metabolic Panel [[id number]]  (Abnormal) Collected: 10/22/24 1557    Specimen: Blood from Arm, Right Updated: 10/22/24 1631     Glucose 183 mg/dL      BUN 9 mg/dL      Creatinine 1.07 mg/dL      Sodium 136 mmol/L      Potassium 4.8 mmol/L      Chloride 96 mmol/L      CO2 24.6 mmol/L      Calcium 10.5 mg/dL      Total Protein 7.8 g/dL      Albumin 4.6 g/dL      ALT (SGPT) 11 U/L      AST (SGOT) 10 U/L      Alkaline Phosphatase 60 U/L      Total Bilirubin 2.6 mg/dL      Globulin 3.2 gm/dL      A/G Ratio 1.4 g/dL      BUN/Creatinine Ratio 8.4     Anion Gap 15.4 mmol/L      eGFR 86.1 mL/min/1.73     Narrative:      GFR Normal >60  Chronic Kidney Disease <60  Kidney Failure <15      Lipase [[id number]]  (Abnormal) Collected: 10/22/24 1557    Specimen: Blood from Arm, Right Updated: 10/22/24 1631     Lipase 9 U/L     CBC Auto Differential [[id number]]  (Abnormal) Collected: 10/22/24 1557    Specimen: Blood from Arm, Right Updated: 10/22/24 1607     WBC 13.79 10*3/mm3      RBC 4.53 10*6/mm3      Hemoglobin 14.2 g/dL      Hematocrit 40.8 %      MCV 90.1 fL      MCH 31.3 pg      MCHC 34.8 g/dL      RDW 12.3 %      RDW-SD 40.2 fl      MPV 9.1 fL      Platelets 388 10*3/mm3      Neutrophil % 84.5 %      Lymphocyte % 8.8 %      Monocyte % 5.9 %       Eosinophil % 0.1 %      Basophil % 0.5 %      Immature Grans % 0.2 %      Neutrophils, Absolute 11.65 10*3/mm3      Lymphocytes, Absolute 1.21 10*3/mm3      Monocytes, Absolute 0.82 10*3/mm3      Eosinophils, Absolute 0.01 10*3/mm3      Basophils, Absolute 0.07 10*3/mm3      Immature Grans, Absolute 0.03 10*3/mm3      nRBC 0.0 /100 WBC     Urinalysis With Microscopic If Indicated (No Culture) - Urine, Clean Catch [[id number]]  (Abnormal) Collected: 10/22/24 1748    Specimen: Urine, Clean Catch Updated: 10/22/24 1801     Color, UA Yellow     Appearance, UA Clear     pH, UA 5.5     Specific Gravity, UA 1.028     Glucose, UA >=1000 mg/dL (3+)     Ketones, UA 40 mg/dL (2+)     Bilirubin, UA Negative     Blood, UA Negative     Protein, UA Negative     Leuk Esterase, UA Negative     Nitrite, UA Negative     Urobilinogen, UA 0.2 E.U./dL    Narrative:      Urine microscopic not indicated.            Imaging Results (Last 24 Hours)       Procedure Component Value Units Date/Time    CT Abdomen Pelvis With Contrast [[id number]] Collected: 10/22/24 1821     Updated: 10/22/24 1840    Narrative:      CT ABDOMEN PELVIS W CONTRAST-     INDICATION: Abdominal pain     COMPARISON: CT abdomen pelvis August 31, 2024     TECHNIQUE:  Routine CT abdomen/pelvis with IV contrast. Coronal and sagittal  reformats. Radiation dose reduction techniques were utilized, including  automated exposure control and exposure modulation based on body size.     FINDINGS:      Lung bases: Subsegmental atelectasis at the bases.     ABDOMEN: Low attenuating lesion seen at the left hepatic dome, segment  2, measures less than 1 cm and is too small to characterize, unchanged.  Gallbladder appears partially contracted, with some increased  enhancement in the gallbladder wall. No biliary ductal dilatation.  Spleen is normal in size. No pancreatic mass or pancreatic ductal  dilatation seen. Nodular thickening of the adrenal glands. No renal mass  or  hydronephrosis.     Pelvis: Prostate is normal in size. Normal bladder. No bladder calculus.  Free intraperitoneal fluid seen in the rectovesicular pouch.     Bowel: Gastric antral wall thickening, with surrounding stranding/edema  and some ill-defined fluid, extending to the gallbladder and into the  pancreaticoduodenal groove. No extraluminal gas seen. No walled off  fluid collection. Some nearby perihepatic free fluid, extends into the  paracolic gutter. Incidental duodenal diverticulum. Moderate gas and  stool in the colon. Normal appendix. Small amount of fluid in the  mesenteric leaflets in the pelvis.     Abdominal wall: Small fat-containing umbilical hernia.     Retroperitoneum: No lymphadenopathy.     Vasculature: Patent. No abdominal aortic aneurysm.     Osseous structures: No destructive osseous lesions.       Impression:         1. Gastric antral wall thickening with surrounding inflammation and some  ill-defined fluid with inflammation extending to the gallbladder which  appears partially contracted and extending into the pancreaticoduodenal  groove. Finding may be related to gastritis or underlying peptic ulcer  disease. Consider endoscopy evaluation.  2. Small amount of free fluid seen in the abdomen and pelvis     This report was finalized on 10/22/2024 6:37 PM by Dr. Connor Vasquez M.D on Workstation: [id number]               Results for orders placed during the hospital encounter of 09/12/23    Adult Transthoracic Echo Complete W/ Cont if Necessary Per Protocol    Interpretation Summary    Left ventricular systolic function is moderately decreased. Calculated left ventricular EF = 35.3% Abnormal global longitudinal LV strain (GLS) = -12.6%. Left ventricle strain data was reviewed by the physician and found to be accurate. Normal left ventricular cavity size noted. Left ventricular wall thickness is consistent with moderate concentric hypertrophy. There is left ventricular global hypokinesis  noted. Left ventricular diastolic function is consistent with (grade II w/high LAP) pseudonormalization.    Mildly reduced right ventricular systolic function noted.    Trace mitral valve regurgitation is present.    Mild tricuspid valve regurgitation is present. Estimated right ventricular systolic pressure from tricuspid regurgitation is mildly elevated (35-45 mmHg). Calculated right ventricular systolic pressure from tricuspid regurgitation is 39.3 mmHg.    There is a trivial pericardial effusion. There is no evidence of cardiac tamponade.      No orders to display        Assessment/Plan     Active Hospital Problems    Diagnosis  POA    **Gastritis [K29.70]  Yes    Chronic combined systolic and diastolic heart failure [I50.42]  Yes    Abdominal pain [R10.9]  Yes    Anxiety [F41.9]  Yes    Multiple-type hyperlipidemia [E78.2]  Yes    Type 2 diabetes mellitus with diabetic neuropathy, with long-term current use of insulin [E11.40, Z79.4]  Not Applicable    Benign essential HTN [I10]  Yes      Resolved Hospital Problems   No resolved problems to display.       Mr. Bishop is a 49 y.o. man with type 2 diabetes, hypertension, hyperlipidemia, chronic combined systolic and diastolic heart failure (non-ischemic), anxiety who presents with severe abdominal pain since this early this morning. He reports that the abdominal pain woke him from sleep and progressively worsened throughout the day. He states that it's epigastric in location and radiating to the right side of his abdomen as well as his back. His CT shows gastritis vs. PUD.     Abdominal pain-CT shows evidence of gastritis vs. Peptic ulcer disease. He's been started on IV PPI BID, which I'll continue. Will ask GI to consult. IV analgesics and antiemetics. CLD for now. NPO after midnight.  Type 2 diabetes-start sliding scale. Hold home medications.   Essential hypertension  Hyperlipidemia  Chronic combined systolic and diastolic heart failure  Anxiety  Restart home  medications once reconciled  I discussed the patient's findings and my recommendations with patient and ED provider.    VTE Prophylaxis - SCDs.  Code Status - Full code.       James Alexandra MD  [facility]  10/22/24  20:27 EDT

## 2024-10-23 NOTE — PLAN OF CARE
Problem: Adult Inpatient Plan of Care  Goal: Plan of Care Review  10/23/2024 0709 by Alfonso Clinton Jr., RN  Outcome: Progressing  10/23/2024 0133 by Alfonso Clinton Jr., RN  Outcome: Progressing  10/23/2024 0013 by Alfonso Clinton Jr., RN  Outcome: Progressing   Goal Outcome Evaluation:               Pt alert and oriented x 4, VSS, and afebrile. Patient is still reporting upper right gastric pain, attempted to pass stool but unable. Stool softener medication was given to give his abdomen some rest. Morphine is on EMAR for pain relief, according to patient medication isn't very effective. Patient to remain NPO until Dr has assessed patient.

## 2024-10-24 ENCOUNTER — ANESTHESIA (OUTPATIENT)
Dept: GASTROENTEROLOGY | Facility: HOSPITAL | Age: 49
End: 2024-10-24
Payer: COMMERCIAL

## 2024-10-24 ENCOUNTER — ANESTHESIA EVENT (OUTPATIENT)
Dept: GASTROENTEROLOGY | Facility: HOSPITAL | Age: 49
End: 2024-10-24
Payer: COMMERCIAL

## 2024-10-24 LAB
ANION GAP SERPL CALCULATED.3IONS-SCNC: 16 MMOL/L (ref 5–15)
ANION GAP SERPL CALCULATED.3IONS-SCNC: 16.8 MMOL/L (ref 5–15)
ARTERIAL PATENCY WRIST A: POSITIVE
ATMOSPHERIC PRESS: 751.1 MMHG
BACTERIA UR QL AUTO: ABNORMAL /HPF
BASE EXCESS BLDA CALC-SCNC: -6.1 MMOL/L (ref 0–2)
BASOPHILS # BLD MANUAL: 0 10*3/MM3 (ref 0–0.2)
BASOPHILS # BLD MANUAL: 0.1 10*3/MM3 (ref 0–0.2)
BASOPHILS NFR BLD MANUAL: 0 % (ref 0–1.5)
BASOPHILS NFR BLD MANUAL: 1 % (ref 0–1.5)
BDY SITE: ABNORMAL
BILIRUB UR QL STRIP: NEGATIVE
BUN SERPL-MCNC: 30 MG/DL (ref 6–20)
BUN SERPL-MCNC: 35 MG/DL (ref 6–20)
BUN/CREAT SERPL: 21.1 (ref 7–25)
BUN/CREAT SERPL: 22.3 (ref 7–25)
BURR CELLS BLD QL SMEAR: ABNORMAL
BURR CELLS BLD QL SMEAR: ABNORMAL
CALCIUM SPEC-SCNC: 8.5 MG/DL (ref 8.6–10.5)
CALCIUM SPEC-SCNC: 8.8 MG/DL (ref 8.6–10.5)
CHLORIDE SERPL-SCNC: 95 MMOL/L (ref 98–107)
CHLORIDE SERPL-SCNC: 98 MMOL/L (ref 98–107)
CLARITY UR: ABNORMAL
CO2 BLDA-SCNC: 19.1 MMOL/L (ref 23–27)
CO2 SERPL-SCNC: 16.2 MMOL/L (ref 22–29)
CO2 SERPL-SCNC: 18 MMOL/L (ref 22–29)
COLOR UR: YELLOW
CREAT SERPL-MCNC: 1.42 MG/DL (ref 0.76–1.27)
CREAT SERPL-MCNC: 1.57 MG/DL (ref 0.76–1.27)
D-LACTATE SERPL-SCNC: 3.3 MMOL/L (ref 0.5–2)
D-LACTATE SERPL-SCNC: 3.6 MMOL/L (ref 0.5–2)
D-LACTATE SERPL-SCNC: 3.8 MMOL/L (ref 0.5–2)
DEPRECATED RDW RBC AUTO: 39.5 FL (ref 37–54)
DEPRECATED RDW RBC AUTO: 40.6 FL (ref 37–54)
EGFRCR SERPLBLD CKD-EPI 2021: 54.4 ML/MIN/1.73
EGFRCR SERPLBLD CKD-EPI 2021: 61.3 ML/MIN/1.73
EOSINOPHIL # BLD MANUAL: 0 10*3/MM3 (ref 0–0.4)
EOSINOPHIL # BLD MANUAL: 0 10*3/MM3 (ref 0–0.4)
EOSINOPHIL NFR BLD MANUAL: 0 % (ref 0.3–6.2)
EOSINOPHIL NFR BLD MANUAL: 0 % (ref 0.3–6.2)
ERYTHROCYTE [DISTWIDTH] IN BLOOD BY AUTOMATED COUNT: 11.8 % (ref 12.3–15.4)
ERYTHROCYTE [DISTWIDTH] IN BLOOD BY AUTOMATED COUNT: 11.9 % (ref 12.3–15.4)
GLUCOSE BLDC GLUCOMTR-MCNC: 224 MG/DL (ref 70–130)
GLUCOSE BLDC GLUCOMTR-MCNC: 243 MG/DL (ref 70–130)
GLUCOSE BLDC GLUCOMTR-MCNC: 255 MG/DL (ref 70–130)
GLUCOSE BLDC GLUCOMTR-MCNC: 259 MG/DL (ref 70–130)
GLUCOSE BLDC GLUCOMTR-MCNC: 263 MG/DL (ref 70–130)
GLUCOSE BLDC GLUCOMTR-MCNC: 286 MG/DL (ref 70–130)
GLUCOSE SERPL-MCNC: 255 MG/DL (ref 65–99)
GLUCOSE SERPL-MCNC: 264 MG/DL (ref 65–99)
GLUCOSE UR STRIP-MCNC: ABNORMAL MG/DL
HBA1C MFR BLD: 9.4 % (ref 4.8–5.6)
HCO3 BLDA-SCNC: 18.1 MMOL/L (ref 22–28)
HCT VFR BLD AUTO: 31.2 % (ref 37.5–51)
HCT VFR BLD AUTO: 32.9 % (ref 37.5–51)
HEMODILUTION: NO
HGB BLD-MCNC: 10.6 G/DL (ref 13–17.7)
HGB BLD-MCNC: 10.7 G/DL (ref 13–17.7)
HGB UR QL STRIP.AUTO: NEGATIVE
HYALINE CASTS UR QL AUTO: ABNORMAL /LPF
KETONES UR QL STRIP: ABNORMAL
LEUKOCYTE ESTERASE UR QL STRIP.AUTO: NEGATIVE
LYMPHOCYTES # BLD MANUAL: 0.21 10*3/MM3 (ref 0.7–3.1)
LYMPHOCYTES # BLD MANUAL: 0.59 10*3/MM3 (ref 0.7–3.1)
LYMPHOCYTES NFR BLD MANUAL: 5.1 % (ref 5–12)
LYMPHOCYTES NFR BLD MANUAL: 9 % (ref 5–12)
MCH RBC QN AUTO: 30.3 PG (ref 26.6–33)
MCH RBC QN AUTO: 31.3 PG (ref 26.6–33)
MCHC RBC AUTO-ENTMCNC: 32.5 G/DL (ref 31.5–35.7)
MCHC RBC AUTO-ENTMCNC: 34 G/DL (ref 31.5–35.7)
MCV RBC AUTO: 92 FL (ref 79–97)
MCV RBC AUTO: 93.2 FL (ref 79–97)
MODALITY: ABNORMAL
MONOCYTES # BLD: 0.52 10*3/MM3 (ref 0.1–0.9)
MONOCYTES # BLD: 0.89 10*3/MM3 (ref 0.1–0.9)
NEUTROPHILS # BLD AUTO: 8.39 10*3/MM3 (ref 1.7–7)
NEUTROPHILS # BLD AUTO: 9.44 10*3/MM3 (ref 1.7–7)
NEUTROPHILS NFR BLD MANUAL: 85 % (ref 42.7–76)
NEUTROPHILS NFR BLD MANUAL: 91.9 % (ref 42.7–76)
NITRITE UR QL STRIP: NEGATIVE
NRBC BLD AUTO-RTO: 0 /100 WBC (ref 0–0.2)
OSMOLALITY UR: 489 MOSM/KG
PCO2 BLDA: 31 MM HG (ref 35–45)
PH BLDA: 7.37 PH UNITS (ref 7.35–7.45)
PH UR STRIP.AUTO: <=5 [PH] (ref 5–8)
PLAT MORPH BLD: NORMAL
PLAT MORPH BLD: NORMAL
PLATELET # BLD AUTO: 266 10*3/MM3 (ref 140–450)
PLATELET # BLD AUTO: 273 10*3/MM3 (ref 140–450)
PMV BLD AUTO: 10.1 FL (ref 6–12)
PMV BLD AUTO: 10.5 FL (ref 6–12)
PO2 BLDA: 59.5 MM HG (ref 80–100)
POIKILOCYTOSIS BLD QL SMEAR: ABNORMAL
POIKILOCYTOSIS BLD QL SMEAR: ABNORMAL
POLYCHROMASIA BLD QL SMEAR: ABNORMAL
POTASSIUM SERPL-SCNC: 4.6 MMOL/L (ref 3.5–5.2)
POTASSIUM SERPL-SCNC: 4.9 MMOL/L (ref 3.5–5.2)
PROT UR QL STRIP: ABNORMAL
QT INTERVAL: 320 MS
QTC INTERVAL: 417 MS
RBC # BLD AUTO: 3.39 10*6/MM3 (ref 4.14–5.8)
RBC # BLD AUTO: 3.53 10*6/MM3 (ref 4.14–5.8)
RBC # UR STRIP: ABNORMAL /HPF
REF LAB TEST METHOD: ABNORMAL
SAO2 % BLDCOA: 90.2 % (ref 92–98.5)
SODIUM SERPL-SCNC: 128 MMOL/L (ref 136–145)
SODIUM SERPL-SCNC: 132 MMOL/L (ref 136–145)
SODIUM UR-SCNC: <20 MMOL/L
SP GR UR STRIP: 1.03 (ref 1–1.03)
SQUAMOUS #/AREA URNS HPF: ABNORMAL /HPF
TOTAL RATE: 20 BREATHS/MINUTE
TSH SERPL DL<=0.05 MIU/L-ACNC: 0.97 UIU/ML (ref 0.27–4.2)
URATE SERPL-MCNC: 5.4 MG/DL (ref 3.4–7)
UROBILINOGEN UR QL STRIP: ABNORMAL
VARIANT LYMPHS NFR BLD MANUAL: 2 % (ref 19.6–45.3)
VARIANT LYMPHS NFR BLD MANUAL: 6 % (ref 19.6–45.3)
WBC # UR STRIP: ABNORMAL /HPF
WBC MORPH BLD: NORMAL
WBC MORPH BLD: NORMAL
WBC NRBC COR # BLD AUTO: 10.27 10*3/MM3 (ref 3.4–10.8)
WBC NRBC COR # BLD AUTO: 9.87 10*3/MM3 (ref 3.4–10.8)

## 2024-10-24 PROCEDURE — 85025 COMPLETE CBC W/AUTO DIFF WBC: CPT | Performed by: HOSPITALIST

## 2024-10-24 PROCEDURE — 82010 KETONE BODYS QUAN: CPT | Performed by: HOSPITALIST

## 2024-10-24 PROCEDURE — 84300 ASSAY OF URINE SODIUM: CPT | Performed by: STUDENT IN AN ORGANIZED HEALTH CARE EDUCATION/TRAINING PROGRAM

## 2024-10-24 PROCEDURE — 43239 EGD BIOPSY SINGLE/MULTIPLE: CPT | Performed by: INTERNAL MEDICINE

## 2024-10-24 PROCEDURE — 63710000001 INSULIN REGULAR HUMAN PER 5 UNITS: Performed by: NURSE PRACTITIONER

## 2024-10-24 PROCEDURE — 83036 HEMOGLOBIN GLYCOSYLATED A1C: CPT | Performed by: STUDENT IN AN ORGANIZED HEALTH CARE EDUCATION/TRAINING PROGRAM

## 2024-10-24 PROCEDURE — 85025 COMPLETE CBC W/AUTO DIFF WBC: CPT | Performed by: STUDENT IN AN ORGANIZED HEALTH CARE EDUCATION/TRAINING PROGRAM

## 2024-10-24 PROCEDURE — 25810000003 SODIUM CHLORIDE 0.9 % SOLUTION: Performed by: NURSE PRACTITIONER

## 2024-10-24 PROCEDURE — 25010000002 HYDROMORPHONE PER 4 MG: Performed by: NURSE PRACTITIONER

## 2024-10-24 PROCEDURE — 25010000002 PROPOFOL 200 MG/20ML EMULSION: Performed by: ANESTHESIOLOGY

## 2024-10-24 PROCEDURE — 85007 BL SMEAR W/DIFF WBC COUNT: CPT | Performed by: HOSPITALIST

## 2024-10-24 PROCEDURE — 25010000002 PROPOFOL 1000 MG/100ML EMULSION: Performed by: ANESTHESIOLOGY

## 2024-10-24 PROCEDURE — 80048 BASIC METABOLIC PNL TOTAL CA: CPT | Performed by: NURSE PRACTITIONER

## 2024-10-24 PROCEDURE — 82948 REAGENT STRIP/BLOOD GLUCOSE: CPT

## 2024-10-24 PROCEDURE — 36600 WITHDRAWAL OF ARTERIAL BLOOD: CPT

## 2024-10-24 PROCEDURE — 84550 ASSAY OF BLOOD/URIC ACID: CPT | Performed by: STUDENT IN AN ORGANIZED HEALTH CARE EDUCATION/TRAINING PROGRAM

## 2024-10-24 PROCEDURE — 25810000003 LACTATED RINGERS PER 1000 ML: Performed by: ANESTHESIOLOGY

## 2024-10-24 PROCEDURE — 84443 ASSAY THYROID STIM HORMONE: CPT | Performed by: STUDENT IN AN ORGANIZED HEALTH CARE EDUCATION/TRAINING PROGRAM

## 2024-10-24 PROCEDURE — 25010000002 GLYCOPYRROLATE 0.2 MG/ML SOLUTION: Performed by: ANESTHESIOLOGY

## 2024-10-24 PROCEDURE — 80048 BASIC METABOLIC PNL TOTAL CA: CPT | Performed by: STUDENT IN AN ORGANIZED HEALTH CARE EDUCATION/TRAINING PROGRAM

## 2024-10-24 PROCEDURE — 82803 BLOOD GASES ANY COMBINATION: CPT

## 2024-10-24 PROCEDURE — 83605 ASSAY OF LACTIC ACID: CPT | Performed by: NURSE PRACTITIONER

## 2024-10-24 PROCEDURE — 63710000001 INSULIN GLARGINE PER 5 UNITS: Performed by: STUDENT IN AN ORGANIZED HEALTH CARE EDUCATION/TRAINING PROGRAM

## 2024-10-24 PROCEDURE — 63710000001 INSULIN LISPRO (HUMAN) PER 5 UNITS: Performed by: STUDENT IN AN ORGANIZED HEALTH CARE EDUCATION/TRAINING PROGRAM

## 2024-10-24 PROCEDURE — 81001 URINALYSIS AUTO W/SCOPE: CPT | Performed by: HOSPITALIST

## 2024-10-24 PROCEDURE — 0DB68ZX EXCISION OF STOMACH, VIA NATURAL OR ARTIFICIAL OPENING ENDOSCOPIC, DIAGNOSTIC: ICD-10-PCS | Performed by: INTERNAL MEDICINE

## 2024-10-24 PROCEDURE — 25810000003 SODIUM CHLORIDE 0.9 % SOLUTION: Performed by: HOSPITALIST

## 2024-10-24 PROCEDURE — 83935 ASSAY OF URINE OSMOLALITY: CPT | Performed by: STUDENT IN AN ORGANIZED HEALTH CARE EDUCATION/TRAINING PROGRAM

## 2024-10-24 PROCEDURE — 25810000003 SODIUM CHLORIDE 0.9 % SOLUTION: Performed by: STUDENT IN AN ORGANIZED HEALTH CARE EDUCATION/TRAINING PROGRAM

## 2024-10-24 PROCEDURE — 88305 TISSUE EXAM BY PATHOLOGIST: CPT | Performed by: INTERNAL MEDICINE

## 2024-10-24 PROCEDURE — 25010000002 LIDOCAINE 2% SOLUTION: Performed by: ANESTHESIOLOGY

## 2024-10-24 PROCEDURE — 85007 BL SMEAR W/DIFF WBC COUNT: CPT | Performed by: STUDENT IN AN ORGANIZED HEALTH CARE EDUCATION/TRAINING PROGRAM

## 2024-10-24 PROCEDURE — 25010000002 PIPERACILLIN SOD-TAZOBACTAM PER 1 G: Performed by: NURSE PRACTITIONER

## 2024-10-24 RX ORDER — PROPOFOL 10 MG/ML
INJECTION, EMULSION INTRAVENOUS CONTINUOUS PRN
Status: DISCONTINUED | OUTPATIENT
Start: 2024-10-24 | End: 2024-10-24 | Stop reason: SURG

## 2024-10-24 RX ORDER — CARVEDILOL 6.25 MG/1
6.25 TABLET ORAL EVERY 12 HOURS SCHEDULED
Status: DISCONTINUED | OUTPATIENT
Start: 2024-10-24 | End: 2024-10-30

## 2024-10-24 RX ORDER — LIDOCAINE HYDROCHLORIDE 20 MG/ML
INJECTION, SOLUTION INFILTRATION; PERINEURAL AS NEEDED
Status: DISCONTINUED | OUTPATIENT
Start: 2024-10-24 | End: 2024-10-24 | Stop reason: SURG

## 2024-10-24 RX ORDER — PROPOFOL 10 MG/ML
INJECTION, EMULSION INTRAVENOUS AS NEEDED
Status: DISCONTINUED | OUTPATIENT
Start: 2024-10-24 | End: 2024-10-24 | Stop reason: SURG

## 2024-10-24 RX ORDER — SODIUM CHLORIDE 9 MG/ML
100 INJECTION, SOLUTION INTRAVENOUS CONTINUOUS
Status: ACTIVE | OUTPATIENT
Start: 2024-10-24 | End: 2024-10-25

## 2024-10-24 RX ORDER — CARVEDILOL 12.5 MG/1
12.5 TABLET ORAL EVERY 12 HOURS SCHEDULED
Status: DISCONTINUED | OUTPATIENT
Start: 2024-10-24 | End: 2024-10-24

## 2024-10-24 RX ORDER — PANTOPRAZOLE SODIUM 40 MG/1
40 TABLET, DELAYED RELEASE ORAL
Status: DISCONTINUED | OUTPATIENT
Start: 2024-10-24 | End: 2024-11-14 | Stop reason: HOSPADM

## 2024-10-24 RX ORDER — GLYCOPYRROLATE 0.2 MG/ML
INJECTION INTRAMUSCULAR; INTRAVENOUS AS NEEDED
Status: DISCONTINUED | OUTPATIENT
Start: 2024-10-24 | End: 2024-10-24 | Stop reason: SURG

## 2024-10-24 RX ORDER — SODIUM CHLORIDE, SODIUM LACTATE, POTASSIUM CHLORIDE, CALCIUM CHLORIDE 600; 310; 30; 20 MG/100ML; MG/100ML; MG/100ML; MG/100ML
INJECTION, SOLUTION INTRAVENOUS CONTINUOUS PRN
Status: DISCONTINUED | OUTPATIENT
Start: 2024-10-24 | End: 2024-10-24 | Stop reason: SURG

## 2024-10-24 RX ORDER — INSULIN LISPRO 100 [IU]/ML
3 INJECTION, SOLUTION INTRAVENOUS; SUBCUTANEOUS
Status: DISCONTINUED | OUTPATIENT
Start: 2024-10-24 | End: 2024-10-24

## 2024-10-24 RX ADMIN — INSULIN LISPRO 3 UNITS: 100 INJECTION, SOLUTION INTRAVENOUS; SUBCUTANEOUS at 11:22

## 2024-10-24 RX ADMIN — SODIUM CHLORIDE 100 ML/HR: 9 INJECTION, SOLUTION INTRAVENOUS at 03:41

## 2024-10-24 RX ADMIN — HYDROMORPHONE HYDROCHLORIDE 0.5 MG: 1 INJECTION, SOLUTION INTRAMUSCULAR; INTRAVENOUS; SUBCUTANEOUS at 20:09

## 2024-10-24 RX ADMIN — PIPERACILLIN AND TAZOBACTAM 3.38 G: 3; .375 INJECTION, POWDER, FOR SOLUTION INTRAVENOUS at 21:51

## 2024-10-24 RX ADMIN — HYDROCODONE BITARTRATE AND ACETAMINOPHEN 1 TABLET: 5; 325 TABLET ORAL at 21:42

## 2024-10-24 RX ADMIN — PROPOFOL 300 MCG/KG/MIN: 10 INJECTION, EMULSION INTRAVENOUS at 12:53

## 2024-10-24 RX ADMIN — HYDROMORPHONE HYDROCHLORIDE 0.5 MG: 1 INJECTION, SOLUTION INTRAMUSCULAR; INTRAVENOUS; SUBCUTANEOUS at 14:22

## 2024-10-24 RX ADMIN — INSULIN HUMAN 10 UNITS: 100 INJECTION, SOLUTION PARENTERAL at 17:33

## 2024-10-24 RX ADMIN — LIDOCAINE HYDROCHLORIDE 60 MG: 20 INJECTION, SOLUTION INFILTRATION; PERINEURAL at 12:53

## 2024-10-24 RX ADMIN — SENNOSIDES AND DOCUSATE SODIUM 2 TABLET: 50; 8.6 TABLET ORAL at 21:42

## 2024-10-24 RX ADMIN — HYDROCODONE BITARTRATE AND ACETAMINOPHEN 1 TABLET: 5; 325 TABLET ORAL at 03:40

## 2024-10-24 RX ADMIN — PANTOPRAZOLE SODIUM 40 MG: 40 TABLET, DELAYED RELEASE ORAL at 17:58

## 2024-10-24 RX ADMIN — INSULIN LISPRO 4 UNITS: 100 INJECTION, SOLUTION INTRAVENOUS; SUBCUTANEOUS at 21:42

## 2024-10-24 RX ADMIN — SODIUM CHLORIDE 50 ML/HR: 9 INJECTION, SOLUTION INTRAVENOUS at 12:50

## 2024-10-24 RX ADMIN — SODIUM CHLORIDE 500 ML: 9 INJECTION, SOLUTION INTRAVENOUS at 02:39

## 2024-10-24 RX ADMIN — INSULIN LISPRO 4 UNITS: 100 INJECTION, SOLUTION INTRAVENOUS; SUBCUTANEOUS at 08:22

## 2024-10-24 RX ADMIN — CARVEDILOL 6.25 MG: 6.25 TABLET, FILM COATED ORAL at 21:42

## 2024-10-24 RX ADMIN — PIPERACILLIN AND TAZOBACTAM 3.38 G: 3; .375 INJECTION, POWDER, FOR SOLUTION INTRAVENOUS at 13:04

## 2024-10-24 RX ADMIN — PANTOPRAZOLE SODIUM 40 MG: 40 INJECTION, POWDER, FOR SOLUTION INTRAVENOUS at 06:41

## 2024-10-24 RX ADMIN — SODIUM CHLORIDE 100 ML/HR: 9 INJECTION, SOLUTION INTRAVENOUS at 20:09

## 2024-10-24 RX ADMIN — ATORVASTATIN CALCIUM 40 MG: 20 TABLET, FILM COATED ORAL at 08:22

## 2024-10-24 RX ADMIN — INSULIN GLARGINE 10 UNITS: 100 INJECTION, SOLUTION SUBCUTANEOUS at 21:43

## 2024-10-24 RX ADMIN — SUCRALFATE 1 G: 1 TABLET ORAL at 11:49

## 2024-10-24 RX ADMIN — PROPOFOL INJECTABLE EMULSION 120 MG: 10 INJECTION, EMULSION INTRAVENOUS at 12:53

## 2024-10-24 RX ADMIN — CARVEDILOL 25 MG: 25 TABLET, FILM COATED ORAL at 08:22

## 2024-10-24 RX ADMIN — SODIUM CHLORIDE 500 ML: 9 INJECTION, SOLUTION INTRAVENOUS at 11:22

## 2024-10-24 RX ADMIN — PIPERACILLIN AND TAZOBACTAM 3.38 G: 3; .375 INJECTION, POWDER, FOR SOLUTION INTRAVENOUS at 04:16

## 2024-10-24 RX ADMIN — SODIUM CHLORIDE, POTASSIUM CHLORIDE, SODIUM LACTATE AND CALCIUM CHLORIDE: 600; 310; 30; 20 INJECTION, SOLUTION INTRAVENOUS at 12:47

## 2024-10-24 RX ADMIN — VENLAFAXINE HYDROCHLORIDE 75 MG: 75 CAPSULE, EXTENDED RELEASE ORAL at 08:22

## 2024-10-24 RX ADMIN — GLYCOPYRROLATE 0.2 MG: 0.2 INJECTION INTRAMUSCULAR; INTRAVENOUS at 12:47

## 2024-10-24 RX ADMIN — SUCRALFATE 1 G: 1 TABLET ORAL at 06:41

## 2024-10-24 NOTE — ANESTHESIA PREPROCEDURE EVALUATION
Anesthesia Evaluation     NPO Solid Status: > 8 hours             Airway   Mallampati: II  TM distance: >3 FB  Neck ROM: full  no difficulty expected  Dental - normal exam     Pulmonary - normal exam   Cardiovascular - normal exam    (+) hypertension, past MI , CHF       Neuro/Psych  (+) psychiatric history  GI/Hepatic/Renal/Endo    (+) diabetes mellitus using insulin    Musculoskeletal     Abdominal    Substance History      OB/GYN          Other                    Anesthesia Plan    ASA 3     MAC       Anesthetic plan, risks, benefits, and alternatives have been provided, discussed and informed consent has been obtained with: patient.    CODE STATUS:    Level Of Support Discussed With: Patient  Code Status (Patient has no pulse and is not breathing): CPR (Attempt to Resuscitate)  Medical Interventions (Patient has pulse or is breathing): Full Support

## 2024-10-24 NOTE — PLAN OF CARE
Goal Outcome Evaluation:         This patient is being transferred to [facility] with possible DKA exacerbation. Report called to Vickie VALDES.

## 2024-10-24 NOTE — ANESTHESIA POSTPROCEDURE EVALUATION
Patient: Donnie Bishop    Procedure Summary       Date: 10/24/24 Room / Location:  [facility]    Anesthesia Start: 1251 Anesthesia Stop: 1308    Procedure: ESOPHAGOGASTRODUODENOSCOPY with biopsies (Esophagus) Diagnosis:       Epigastric abdominal pain      (Epigastric abdominal pain [R10.13])    Surgeons: Elma Wiggins MD Provider: Juancho Neff MD    Anesthesia Type: MAC ASA Status: 3            Anesthesia Type: MAC    Vitals  Vitals Value Taken Time   BP 92/57 10/24/24 1327   Temp     Pulse 95 10/24/24 1328   Resp 24 10/24/24 1325   SpO2 96 % 10/24/24 1328   Vitals shown include unfiled device data.        Post Anesthesia Care and Evaluation    Patient location during evaluation: bedside  Patient participation: complete - patient participated  Level of consciousness: awake  Pain management: adequate    Airway patency: patent  Anesthetic complications: No anesthetic complications  PONV Status: controlled  Cardiovascular status: acceptable  Respiratory status: acceptable  Hydration status: acceptable    Comments: --------------------            10/24/24               1325     --------------------   BP:       92/57      Pulse:      96       Resp:       24       Temp:                SpO2:      96%      --------------------

## 2024-10-24 NOTE — CONSULTS
[facility] Consult Note      Patient Name: Donnie Bishop  : 1975  MRN: [id number]  Primary Care Physician:  Juju Kennedy MD  Referring Physician: Sylvain Ventura MD  Date of admission: 10/22/2024    Subjective     Reason for Consult: Acute kidney injury    HPI:   Donnie Bishop is a 49 y.o. male known to have history of type 2 diabetes mellitus, hypertension, hyperlipidemia, chronic systolic and diastolic heart failure with EF of 35% who presented to hospital abdominal pain.  CT scan of the abdomen pelvis performed showed finding of possible gastritis and the patient was evaluated by GI.  He was started on PPI drip and underwent EGD that showed normal gastric mucosa biopsies were taken.  During his hospital stay he was noted to have worsening creatinine with creatinine up to 1.4 today from 1.1 as baseline.  He was also noted to have worsening metabolic acidosis with bicarb down to 16 with high anion gap.  Lactic acid was elevated at 3.8 with hemoglobin down to 10.7 from 15 yesterday.  Sodium was noted to be 128, potassium 4.6.  Review of Systems:   14 point review of systems is otherwise negative except for mentioned above on HPI    Personal History     Past Medical History:   Diagnosis Date    Anxiety     CHF (congestive heart failure)     Cough     Inability to attain erection     Injury of acoustic nerve     Myalgia     Type II diabetes mellitus     Venous insufficiency     Viral illness     Vitamin D deficiency        Past Surgical History:   Procedure Laterality Date    CARDIAC CATHETERIZATION N/A 2023    Procedure: Left Heart Cath;  Surgeon: Kaylee Kay MD;  Location: [facility] INVASIVE LOCATION;  Service: Cardiology;  Laterality: N/A;    CARDIAC CATHETERIZATION N/A 2023    Procedure: Coronary angiography;  Surgeon: Kaylee Kay MD;  Location: [facility] CATH INVASIVE LOCATION;  Service: Cardiology;  Laterality: N/A;       Family History: family history includes  Diabetes in his mother.    Social History:  reports that he has never smoked. He has never used smokeless tobacco. He reports that he does not drink alcohol and does not use drugs.    Home Medications:  Prior to Admission medications    Medication Sig Start Date End Date Taking? Authorizing Provider   melatonin 5 MG tablet tablet Take 1 tablet by mouth At Night As Needed (insomnia). 1/19/24  Yes Alexandra Ward MD   ondansetron (ZOFRAN) 8 MG tablet Take 1 tablet by mouth Every 8 (Eight) Hours As Needed for Nausea or Vomiting. 6/5/24  Yes Kanwal Pearson APRN   spironolactone (ALDACTONE) 25 MG tablet TAKE 1 TABLET BY MOUTH EVERY DAY 9/24/24  Yes Alexandra Ward MD   venlafaxine XR (EFFEXOR-XR) 150 MG 24 hr capsule Take 1 capsule by mouth Daily.  Patient taking differently: Take 75 mg by mouth Daily. 8/9/24  Yes Juju Kennedy MD   vitamin D (ERGOCALCIFEROL) 1.25 MG (58199 UT) capsule capsule Take 1 capsule by mouth 1 (One) Time Per Week. 9/7/20  Yes ProviderSaud MD   atorvastatin (LIPITOR) 40 MG tablet Take 1 tablet by mouth Daily. 8/9/24   Juju Kennedy MD   carvedilol (COREG) 25 MG tablet Take 1 tablet by mouth Every 12 (Twelve) Hours. 8/9/24   Juju Kennedy MD   empagliflozin (JARDIANCE) 10 MG tablet tablet Take 1 tablet by mouth Daily. 1/19/24   Alexandra Ward MD   hydrOXYzine (ATARAX) 10 MG tablet TAKE 1 TABLET BY MOUTH EVERY 8 HOURS AS NEEDED FOR ANXIETY. 4/29/24   Juju Kennedy MD   metFORMIN (GLUCOPHAGE) 1000 MG tablet Take 1 tablet by mouth 2 (Two) Times a Day With Meals. 1/12/24   Juju Kennedy MD   NovoLOG FlexPen 100 UNIT/ML solution pen-injector sc pen INJECT 5 UNITS INTO THE SKIN 3 (THREE) TIMES DAILY WITH MEALS PLUS UP TO 15 UNITS SLIDING SCALE.    ProviderSaud MD   sacubitril-valsartan (ENTRESTO) 24-26 MG tablet Take 1 tablet by mouth 2 (Two) Times a Day. 8/9/24   Juju Kennedy MD       Allergies:  No Known Allergies    Objective     Vitals:    Temp:  [97.7 °F (36.5 °C)-99.1 °F (37.3 °C)] 99.1 °F (37.3 °C)  Heart Rate:  [] 96  Resp:  [16-24] 24  BP: ()/(48-90) 92/57  Flow (L/min) (Oxygen Therapy):  [2-10] 10    Intake/Output Summary (Last 24 hours) at 10/24/2024 1750  Last data filed at 10/24/2024 1301  Gross per 24 hour   Intake 600 ml   Output 400 ml   Net 200 ml       Physical Exam:   Constitutional: Awake, alert, no acute distress.  HEENT: Sclera anicteric, no conjunctival injection  Neck: Supple, no thyromegaly, no lymphadenopathy, trachea at midline, no JVD  Respiratory: Clear to auscultation bilaterally, nonlabored respiration  Cardiovascular: RRR, no murmurs, no rubs or gallops, no carotid bruit  Gastrointestinal: Positive bowel sounds, abdomen is soft, nontender and nondistended  : No palpable bladder  Musculoskeletal: No edema, no clubbing or cyanosis  Psychiatric: Appropriate affect, cooperative  Neurologic: Oriented x3, moving all extremities, normal speech and mental status  Skin: Warm and dry       Scheduled Meds:     atorvastatin, 40 mg, Oral, Daily  carvedilol, 25 mg, Oral, Q12H  insulin glargine, 10 Units, Subcutaneous, Nightly  insulin lispro, 2-7 Units, Subcutaneous, 4x Daily AC & at Bedtime  insulin regular, 10 Units, Subcutaneous, TID AC  pantoprazole, 40 mg, Oral, BID AC  piperacillin-tazobactam, 3.375 g, Intravenous, Q8H  [Held by provider] sacubitril-valsartan, 1 tablet, Oral, BID  senna-docusate sodium, 2 tablet, Oral, BID  [Held by provider] spironolactone, 25 mg, Oral, Daily  venlafaxine XR, 75 mg, Oral, Daily      IV Meds:        Results Reviewed:   I have personally reviewed the results from the time of this admission to 10/24/2024 17:50 EDT     Lab Results   Component Value Date    GLUCOSE 264 (H) 10/24/2024    CALCIUM 8.8 10/24/2024     (L) 10/24/2024    K 4.6 10/24/2024    CO2 16.2 (L) 10/24/2024    CL 95 (L) 10/24/2024    BUN 30 (H) 10/24/2024    CREATININE 1.42 (H) 10/24/2024    EGFRIFAFRI >60  08/26/2022    EGFRIFNONA 93 10/09/2020    BCR 21.1 10/24/2024    ANIONGAP 16.8 (H) 10/24/2024      Lab Results   Component Value Date    MG 2.0 08/31/2024    ALBUMIN 4.6 10/22/2024           Assessment / Plan     ASSESSMENT:  Acute kidney injury: Multifactorial secondary to prerenal/ATN due to hypovolemia/hypotension in the setting of Entresto, Aldactone and Jardiance use in addition to contribution from post contrast-induced nephropathy.  Patient also received 1 dose of Toradol that could be contributing.  Currently hypotensive received couple boluses of IV fluids.  Hyponatremia likely hypovolemic with contribution from poor solute intake  High anion gap metabolic acidosis secondary to acute kidney injury and lactic acidosis.  Cannot rule out aggressively DKA due to Jardiance use  History of congestive heart failure ejection fraction 35%  Severe anemia with hemoglobin dropping from 15 milligram per deciliter to 10.7 mg/dL.  History of hypertension currently hypotensive  Lactic acidosis secondary to poor tissue perfusion.  The patient is also on metformin      PLAN:  I believe the patient is hypovolemic  Given another bolus of IV fluids.  Will stop Entresto, Jardiance and Aldactone  Recheck H&H  Strict intake and output  Check urinalysis, urine sodium Ikorel urine osmolality  Labs in a.m.      Thank you for involving us in the care of Donnie Bishop.  Please feel free to call with any questions.    Evan Pat MD  10/24/24  17:50 EDT    [facility]  [phone]    Parts of this note may be an electronic transcription/translation of spoken language to printed text using the Dragon dictation system.

## 2024-10-24 NOTE — SIGNIFICANT NOTE
10/24/24 1620   OTHER   Discipline physical therapist   Rehab Time/Intention   Session Not Performed other (see comments)  (pt had an EGD earlier today, has low BP and elevated lactic acid that was just reported to RN, PT will follow up tomorrow)   Recommendation   PT - Next Appointment 10/25/24

## 2024-10-24 NOTE — PROGRESS NOTES
Name: Donnie Bishop ADMIT: 10/22/2024   : 1975  PCP: Juju Kennedy MD    MRN: [id number] LOS: 0 days   AGE/SEX: 49 y.o. male  ROOM: 03/     Subjective   Subjective   He is resting much more comfortably than he was yesterday.  He does report continued abdominal pain, that is worse with palpation.  He is requesting something to drink.  He is n.p.o. secondary to EGD D planned with GI for today continues to have soft blood pressures,-Entresto, and spironolactone are on hold.         Objective   Objective   Vital Signs  Temp:  [97.7 °F (36.5 °C)-99.1 °F (37.3 °C)] 99 °F (37.2 °C)  Heart Rate:  [] 93  Resp:  [16-17] 16  BP: ()/(52-90) 80/53  SpO2:  [98 %-100 %] 98 %  on  Flow (L/min) (Oxygen Therapy):  [2] 2;   Device (Oxygen Therapy): nasal cannula  Body mass index is 27.97 kg/m².  Physical Exam  Vitals and nursing note reviewed.   Constitutional:       Appearance: He is obese. He is ill-appearing. He is not diaphoretic.   HENT:      Head: Atraumatic.      Mouth/Throat:      Mouth: Mucous membranes are dry.   Cardiovascular:      Rate and Rhythm: Tachycardia present.      Pulses: Normal pulses.           Radial pulses are 2+ on the right side and 2+ on the left side.        Dorsalis pedis pulses are 2+ on the right side and 2+ on the left side.        Posterior tibial pulses are 2+ on the right side and 2+ on the left side.      Heart sounds: Normal heart sounds.      Comments: Trace edema   Pulmonary:      Effort: Pulmonary effort is normal.      Breath sounds: Normal breath sounds.   Abdominal:      General: Bowel sounds are normal.      Palpations: Abdomen is soft.      Tenderness: There is abdominal tenderness. There is guarding.      Comments: Severe tenderness with palpation, and auscultation.   Musculoskeletal:      Right lower leg: Edema present.      Left lower leg: Edema present.   Skin:     General: Skin is warm and dry.      Capillary Refill: Capillary refill takes less than 2  seconds.   Neurological:      General: No focal deficit present.      Mental Status: He is alert and oriented to person, place, and time.   Psychiatric:         Mood and Affect: Mood normal.       Results Review     I reviewed the patient's new clinical results.  Results from last 7 days   Lab Units 10/24/24  0617 10/23/24  1433 10/23/24  0311 10/22/24  1557   WBC 10*3/mm3 10.27 12.62* 12.59* 13.79*   HEMOGLOBIN g/dL 10.7* 15.1 14.5 14.2   PLATELETS 10*3/mm3 266 399 384 388     Results from last 7 days   Lab Units 10/24/24  0617 10/23/24  0311 10/22/24  1557   SODIUM mmol/L 128* 134* 136   POTASSIUM mmol/L 4.6 4.8 4.8   CHLORIDE mmol/L 95* 96* 96*   CO2 mmol/L 16.2* 24.3 24.6   BUN mg/dL 30* 13 9   CREATININE mg/dL 1.42* 1.19 1.07   GLUCOSE mg/dL 264* 112* 183*   EGFR mL/min/1.73 61.3 75.8 86.1     Results from last 7 days   Lab Units 10/22/24  1557   ALBUMIN g/dL 4.6   BILIRUBIN mg/dL 2.6*   ALK PHOS U/L 60   AST (SGOT) U/L 10   ALT (SGPT) U/L 11     Results from last 7 days   Lab Units 10/24/24  0617 10/23/24  0311 10/22/24  1557   CALCIUM mg/dL 8.8 10.1 10.5   ALBUMIN g/dL  --   --  4.6     Results from last 7 days   Lab Units 10/24/24  0617 10/24/24  0012 10/23/24  2104 10/23/24  1746   LACTATE mmol/L 3.3* 3.6* 4.5* 4.0*     Hemoglobin A1C   Date/Time Value Ref Range Status   10/24/2024 0617 9.40 (H) 4.80 - 5.60 % Final     Glucose   Date/Time Value Ref Range Status   10/24/2024 0558 263 (H) 70 - 130 mg/dL Final   10/23/2024 2051 174 (H) 70 - 130 mg/dL Final   10/23/2024 1532 159 (H) 70 - 130 mg/dL Final   10/23/2024 1341 120 70 - 130 mg/dL Final   10/23/2024 1128 166 (H) 70 - 130 mg/dL Final   10/23/2024 0915 130 70 - 130 mg/dL Final   10/23/2024 0314 109 70 - 130 mg/dL Final       CT Abdomen Pelvis With Contrast    Result Date: 10/22/2024   1. Gastric antral wall thickening with surrounding inflammation and some ill-defined fluid with inflammation extending to the gallbladder which appears partially contracted  and extending into the pancreaticoduodenal groove. Finding may be related to gastritis or underlying peptic ulcer disease. Consider endoscopy evaluation. 2. Small amount of free fluid seen in the abdomen and pelvis  This report was finalized on 10/22/2024 6:37 PM by Dr. Connor Vasquez M.D on Workstation: [id number]       I have personally reviewed all medications:  Scheduled Medications  atorvastatin, 40 mg, Oral, Daily  carvedilol, 25 mg, Oral, Q12H  insulin lispro, 2-7 Units, Subcutaneous, 4x Daily AC & at Bedtime  pantoprazole, 40 mg, Intravenous, BID AC  piperacillin-tazobactam, 3.375 g, Intravenous, Q8H  [Held by provider] sacubitril-valsartan, 1 tablet, Oral, BID  senna-docusate sodium, 2 tablet, Oral, BID  [Held by provider] spironolactone, 25 mg, Oral, Daily  sucralfate, 1 g, Oral, TID AC  venlafaxine XR, 75 mg, Oral, Daily    Infusions  sodium chloride, 100 mL/hr, Last Rate: 100 mL/hr (10/24/24 0341)    Diet  NPO Diet NPO Type: Strict NPO    I have personally reviewed:  [x]  Laboratory   [x]  Microbiology   [x]  Radiology   [x]  EKG/Telemetry  [x]  Cardiology/Vascular   []  Pathology    []  Records       Assessment/Plan     Active Hospital Problems    Diagnosis  POA    **Gastritis [K29.70]  Yes    Leukocytosis [D72.829]  Yes    Chronic combined systolic and diastolic heart failure [I50.42]  Yes    Abdominal pain [R10.9]  Yes    Epigastric abdominal pain [R10.13]  Unknown    Anxiety [F41.9]  Yes    Multiple-type hyperlipidemia [E78.2]  Yes    Type 2 diabetes mellitus with diabetic neuropathy, with long-term current use of insulin [E11.40, Z79.4]  Not Applicable    Benign essential HTN [I10]  Yes      Resolved Hospital Problems   No resolved problems to display.       49 y.o. male admitted with:  Gastritis   Abdominal pain  Currently with severe pain -that is worse with movement, and palpation-  GI following -plan for EGD later today.  CT scan reviewed -endoscopy recommended gastritis versus peptic ulcer  disease  Continue PPI  Changed as needed IV pain medications to assist in better pain control.-Frequency changed-could also be playing a part in hypotension   Continue antiemetics  Keep NPO       Leukocytosis/ Sepsis   WBC trending down, will continue to monitor.  Lactic acid elevated at check yesterday,-will continue to trend  Started on Zosyn yesterday  Blood cultures are pending will need to follow  Blood pressure is soft, 1 normal saline bolus initiated, will add another, and continue normal saline infusion.      Chronic combined systolic and diastolic heart failure  Chronic  Last echocardiogram September 12, 2023 LVEF 35.3%  Continue home  Continue spironolactone, Coreg, and Entresto   Hold home Jardiance   He has very mild to trace bilateral lower extremity edema, but otherwise does not appear to be volume overloaded.    Benign essential HTN  Chronic   Blood pressures acceptable currently.  Continue home antihypertensives    Type 2 diabetes mellitus with diabetic neuropathy, with long-term current use of insulin  Chronic   Last hemoglobin A1c 9.80 on 12/27/2023  SSI ordered for hyperglycemia  Hold home Jardiance, and metformin  Hypoglycemia treatment per protocol    Anxiety  Chronic  Continue home Atarax, and Effexor          SCDs for DVT prophylaxis.  Full code.  Discussed with patient, family, care team on multidisciplinary rounds, and Dr Carcamo .  Anticipate discharge home in 1-2 days.  Expected Discharge Date: 10/25/2024; Expected Discharge Time:       MICHELLE Shepherd  [facility]  10/24/24  10:12 EDT

## 2024-10-24 NOTE — PLAN OF CARE
Goal Outcome Evaluation:  Plan of Care Reviewed With: patient     Problem: Adult Inpatient Plan of Care  Goal: Plan of Care Review  Outcome: Not Progressing  Flowsheets (Taken 10/24/2024 0611)  Progress: no change  Plan of Care Reviewed With: patient        Progress: no change

## 2024-10-25 ENCOUNTER — APPOINTMENT (OUTPATIENT)
Dept: NUCLEAR MEDICINE | Facility: HOSPITAL | Age: 49
DRG: 853 | End: 2024-10-25
Payer: COMMERCIAL

## 2024-10-25 LAB
ALBUMIN SERPL-MCNC: 2.8 G/DL (ref 3.5–5.2)
ALBUMIN SERPL-MCNC: 3.2 G/DL (ref 3.5–5.2)
ALP SERPL-CCNC: 72 U/L (ref 39–117)
ALT SERPL W P-5'-P-CCNC: 77 U/L (ref 1–41)
ANION GAP SERPL CALCULATED.3IONS-SCNC: 13.1 MMOL/L (ref 5–15)
ANION GAP SERPL CALCULATED.3IONS-SCNC: 14 MMOL/L (ref 5–15)
AST SERPL-CCNC: 148 U/L (ref 1–40)
B PARAPERT DNA SPEC QL NAA+PROBE: NOT DETECTED
B PERT DNA SPEC QL NAA+PROBE: NOT DETECTED
BASOPHILS # BLD MANUAL: 0 10*3/MM3 (ref 0–0.2)
BASOPHILS NFR BLD MANUAL: 0 % (ref 0–1.5)
BILIRUB CONJ SERPL-MCNC: 0.6 MG/DL (ref 0–0.3)
BILIRUB INDIRECT SERPL-MCNC: 1.2 MG/DL
BILIRUB SERPL-MCNC: 1.8 MG/DL (ref 0–1.2)
BUN SERPL-MCNC: 37 MG/DL (ref 6–20)
BUN SERPL-MCNC: 38 MG/DL (ref 6–20)
BUN/CREAT SERPL: 21.7 (ref 7–25)
BUN/CREAT SERPL: 26.8 (ref 7–25)
C PNEUM DNA NPH QL NAA+NON-PROBE: NOT DETECTED
CALCIUM SPEC-SCNC: 8.6 MG/DL (ref 8.6–10.5)
CALCIUM SPEC-SCNC: 9 MG/DL (ref 8.6–10.5)
CHLORIDE SERPL-SCNC: 98 MMOL/L (ref 98–107)
CHLORIDE SERPL-SCNC: 99 MMOL/L (ref 98–107)
CO2 SERPL-SCNC: 16.9 MMOL/L (ref 22–29)
CO2 SERPL-SCNC: 20 MMOL/L (ref 22–29)
CORTIS SERPL-MCNC: 18.2 MCG/DL
CREAT SERPL-MCNC: 1.38 MG/DL (ref 0.76–1.27)
CREAT SERPL-MCNC: 1.75 MG/DL (ref 0.76–1.27)
CYTO UR: NORMAL
DEPRECATED RDW RBC AUTO: 39.5 FL (ref 37–54)
EGFRCR SERPLBLD CKD-EPI 2021: 47.7 ML/MIN/1.73
EGFRCR SERPLBLD CKD-EPI 2021: 63.5 ML/MIN/1.73
EOSINOPHIL # BLD MANUAL: 0 10*3/MM3 (ref 0–0.4)
EOSINOPHIL NFR BLD MANUAL: 0 % (ref 0.3–6.2)
ERYTHROCYTE [DISTWIDTH] IN BLOOD BY AUTOMATED COUNT: 11.7 % (ref 12.3–15.4)
FERRITIN SERPL-MCNC: 943 NG/ML (ref 30–400)
FLUAV SUBTYP SPEC NAA+PROBE: NOT DETECTED
FLUBV RNA ISLT QL NAA+PROBE: NOT DETECTED
GLUCOSE BLDC GLUCOMTR-MCNC: 155 MG/DL (ref 70–130)
GLUCOSE BLDC GLUCOMTR-MCNC: 206 MG/DL (ref 70–130)
GLUCOSE BLDC GLUCOMTR-MCNC: 230 MG/DL (ref 70–130)
GLUCOSE SERPL-MCNC: 211 MG/DL (ref 65–99)
GLUCOSE SERPL-MCNC: 213 MG/DL (ref 65–99)
HADV DNA SPEC NAA+PROBE: NOT DETECTED
HCOV 229E RNA SPEC QL NAA+PROBE: NOT DETECTED
HCOV HKU1 RNA SPEC QL NAA+PROBE: NOT DETECTED
HCOV NL63 RNA SPEC QL NAA+PROBE: NOT DETECTED
HCOV OC43 RNA SPEC QL NAA+PROBE: NOT DETECTED
HCT VFR BLD AUTO: 32.4 % (ref 37.5–51)
HGB BLD-MCNC: 10.9 G/DL (ref 13–17.7)
HMPV RNA NPH QL NAA+NON-PROBE: NOT DETECTED
HPIV1 RNA ISLT QL NAA+PROBE: NOT DETECTED
HPIV2 RNA SPEC QL NAA+PROBE: NOT DETECTED
HPIV3 RNA NPH QL NAA+PROBE: NOT DETECTED
HPIV4 P GENE NPH QL NAA+PROBE: NOT DETECTED
IRON 24H UR-MRATE: 11 MCG/DL (ref 59–158)
IRON SATN MFR SERPL: 5 % (ref 20–50)
LAB AP CASE REPORT: NORMAL
LYMPHOCYTES # BLD MANUAL: 0.56 10*3/MM3 (ref 0.7–3.1)
LYMPHOCYTES NFR BLD MANUAL: 12.2 % (ref 5–12)
M PNEUMO IGG SER IA-ACNC: NOT DETECTED
MCH RBC QN AUTO: 31.2 PG (ref 26.6–33)
MCHC RBC AUTO-ENTMCNC: 33.6 G/DL (ref 31.5–35.7)
MCV RBC AUTO: 92.8 FL (ref 79–97)
MONOCYTES # BLD: 0.97 10*3/MM3 (ref 0.1–0.9)
NEUTROPHILS # BLD AUTO: 6.39 10*3/MM3 (ref 1.7–7)
NEUTROPHILS NFR BLD MANUAL: 80.6 % (ref 42.7–76)
NRBC BLD AUTO-RTO: 0.3 /100 WBC (ref 0–0.2)
NRBC SPEC MANUAL: 1 /100 WBC (ref 0–0.2)
PATH REPORT.FINAL DX SPEC: NORMAL
PATH REPORT.GROSS SPEC: NORMAL
PHOSPHATE SERPL-MCNC: 3.8 MG/DL (ref 2.5–4.5)
PLAT MORPH BLD: NORMAL
PLATELET # BLD AUTO: 278 10*3/MM3 (ref 140–450)
PMV BLD AUTO: 10.1 FL (ref 6–12)
POIKILOCYTOSIS BLD QL SMEAR: ABNORMAL
POTASSIUM SERPL-SCNC: 4.4 MMOL/L (ref 3.5–5.2)
POTASSIUM SERPL-SCNC: 4.5 MMOL/L (ref 3.5–5.2)
PROCALCITONIN SERPL-MCNC: 78.1 NG/ML (ref 0–0.25)
PROT SERPL-MCNC: 6.2 G/DL (ref 6–8.5)
RBC # BLD AUTO: 3.49 10*6/MM3 (ref 4.14–5.8)
RHINOVIRUS RNA SPEC NAA+PROBE: NOT DETECTED
RSV RNA NPH QL NAA+NON-PROBE: NOT DETECTED
SARS-COV-2 RNA NPH QL NAA+NON-PROBE: NOT DETECTED
SODIUM SERPL-SCNC: 129 MMOL/L (ref 136–145)
SODIUM SERPL-SCNC: 132 MMOL/L (ref 136–145)
TIBC SERPL-MCNC: 203 MCG/DL (ref 298–536)
TRANSFERRIN SERPL-MCNC: 136 MG/DL (ref 200–360)
VARIANT LYMPHS NFR BLD MANUAL: 7.1 % (ref 19.6–45.3)
WBC MORPH BLD: NORMAL
WBC NRBC COR # BLD AUTO: 7.93 10*3/MM3 (ref 3.4–10.8)

## 2024-10-25 PROCEDURE — 25010000002 HEPARIN (PORCINE) PER 1000 UNITS: Performed by: STUDENT IN AN ORGANIZED HEALTH CARE EDUCATION/TRAINING PROGRAM

## 2024-10-25 PROCEDURE — 82948 REAGENT STRIP/BLOOD GLUCOSE: CPT

## 2024-10-25 PROCEDURE — 80053 COMPREHEN METABOLIC PANEL: CPT | Performed by: INTERNAL MEDICINE

## 2024-10-25 PROCEDURE — 82248 BILIRUBIN DIRECT: CPT | Performed by: STUDENT IN AN ORGANIZED HEALTH CARE EDUCATION/TRAINING PROGRAM

## 2024-10-25 PROCEDURE — 25010000002 HYDROMORPHONE PER 4 MG: Performed by: INTERNAL MEDICINE

## 2024-10-25 PROCEDURE — 82728 ASSAY OF FERRITIN: CPT | Performed by: STUDENT IN AN ORGANIZED HEALTH CARE EDUCATION/TRAINING PROGRAM

## 2024-10-25 PROCEDURE — 0202U NFCT DS 22 TRGT SARS-COV-2: CPT | Performed by: NURSE PRACTITIONER

## 2024-10-25 PROCEDURE — 83540 ASSAY OF IRON: CPT | Performed by: STUDENT IN AN ORGANIZED HEALTH CARE EDUCATION/TRAINING PROGRAM

## 2024-10-25 PROCEDURE — 82533 TOTAL CORTISOL: CPT | Performed by: INTERNAL MEDICINE

## 2024-10-25 PROCEDURE — 25010000002 SINCALIDE PER 5 MCG: Performed by: INTERNAL MEDICINE

## 2024-10-25 PROCEDURE — 25010000002 PIPERACILLIN SOD-TAZOBACTAM PER 1 G: Performed by: INTERNAL MEDICINE

## 2024-10-25 PROCEDURE — 63710000001 INSULIN LISPRO (HUMAN) PER 5 UNITS: Performed by: INTERNAL MEDICINE

## 2024-10-25 PROCEDURE — 99232 SBSQ HOSP IP/OBS MODERATE 35: CPT | Performed by: INTERNAL MEDICINE

## 2024-10-25 PROCEDURE — A9537 TC99M MEBROFENIN: HCPCS | Performed by: STUDENT IN AN ORGANIZED HEALTH CARE EDUCATION/TRAINING PROGRAM

## 2024-10-25 PROCEDURE — 85025 COMPLETE CBC W/AUTO DIFF WBC: CPT | Performed by: STUDENT IN AN ORGANIZED HEALTH CARE EDUCATION/TRAINING PROGRAM

## 2024-10-25 PROCEDURE — 0 TECHNETIUM TC 99M MEBROFENIN KIT: Performed by: STUDENT IN AN ORGANIZED HEALTH CARE EDUCATION/TRAINING PROGRAM

## 2024-10-25 PROCEDURE — 99232 SBSQ HOSP IP/OBS MODERATE 35: CPT | Performed by: STUDENT IN AN ORGANIZED HEALTH CARE EDUCATION/TRAINING PROGRAM

## 2024-10-25 PROCEDURE — 25810000003 SODIUM CHLORIDE 0.9 % SOLUTION: Performed by: HOSPITALIST

## 2024-10-25 PROCEDURE — 85007 BL SMEAR W/DIFF WBC COUNT: CPT | Performed by: STUDENT IN AN ORGANIZED HEALTH CARE EDUCATION/TRAINING PROGRAM

## 2024-10-25 PROCEDURE — 84145 PROCALCITONIN (PCT): CPT | Performed by: INTERNAL MEDICINE

## 2024-10-25 PROCEDURE — 84466 ASSAY OF TRANSFERRIN: CPT | Performed by: STUDENT IN AN ORGANIZED HEALTH CARE EDUCATION/TRAINING PROGRAM

## 2024-10-25 PROCEDURE — 84100 ASSAY OF PHOSPHORUS: CPT | Performed by: STUDENT IN AN ORGANIZED HEALTH CARE EDUCATION/TRAINING PROGRAM

## 2024-10-25 PROCEDURE — 63710000001 INSULIN REGULAR HUMAN PER 5 UNITS: Performed by: STUDENT IN AN ORGANIZED HEALTH CARE EDUCATION/TRAINING PROGRAM

## 2024-10-25 PROCEDURE — 78227 HEPATOBIL SYST IMAGE W/DRUG: CPT

## 2024-10-25 PROCEDURE — 63710000001 INSULIN GLARGINE PER 5 UNITS: Performed by: STUDENT IN AN ORGANIZED HEALTH CARE EDUCATION/TRAINING PROGRAM

## 2024-10-25 PROCEDURE — 25810000003 SODIUM CHLORIDE 0.9 % SOLUTION: Performed by: NURSE PRACTITIONER

## 2024-10-25 RX ORDER — KIT FOR THE PREPARATION OF TECHNETIUM TC 99M MEBROFENIN 45 MG/10ML
1 INJECTION, POWDER, LYOPHILIZED, FOR SOLUTION INTRAVENOUS
Status: COMPLETED | OUTPATIENT
Start: 2024-10-25 | End: 2024-10-25

## 2024-10-25 RX ORDER — MIDODRINE HYDROCHLORIDE 5 MG/1
10 TABLET ORAL
Status: DISCONTINUED | OUTPATIENT
Start: 2024-10-25 | End: 2024-10-27

## 2024-10-25 RX ORDER — SODIUM CHLORIDE 9 MG/ML
100 INJECTION, SOLUTION INTRAVENOUS CONTINUOUS
Status: ACTIVE | OUTPATIENT
Start: 2024-10-25 | End: 2024-10-25

## 2024-10-25 RX ORDER — HEPARIN SODIUM 5000 [USP'U]/ML
5000 INJECTION, SOLUTION INTRAVENOUS; SUBCUTANEOUS EVERY 12 HOURS SCHEDULED
Status: DISCONTINUED | OUTPATIENT
Start: 2024-10-25 | End: 2024-10-27

## 2024-10-25 RX ADMIN — VENLAFAXINE HYDROCHLORIDE 75 MG: 75 CAPSULE, EXTENDED RELEASE ORAL at 10:18

## 2024-10-25 RX ADMIN — MIDODRINE HYDROCHLORIDE 10 MG: 5 TABLET ORAL at 11:38

## 2024-10-25 RX ADMIN — HEPARIN SODIUM 5000 UNITS: 5000 INJECTION INTRAVENOUS; SUBCUTANEOUS at 21:33

## 2024-10-25 RX ADMIN — ATORVASTATIN CALCIUM 40 MG: 20 TABLET, FILM COATED ORAL at 10:18

## 2024-10-25 RX ADMIN — INSULIN HUMAN 5 UNITS: 100 INJECTION, SOLUTION PARENTERAL at 13:06

## 2024-10-25 RX ADMIN — SENNOSIDES AND DOCUSATE SODIUM 2 TABLET: 50; 8.6 TABLET ORAL at 21:34

## 2024-10-25 RX ADMIN — INSULIN LISPRO 3 UNITS: 100 INJECTION, SOLUTION INTRAVENOUS; SUBCUTANEOUS at 11:40

## 2024-10-25 RX ADMIN — SODIUM CHLORIDE 100 ML/HR: 9 INJECTION, SOLUTION INTRAVENOUS at 10:55

## 2024-10-25 RX ADMIN — MIDODRINE HYDROCHLORIDE 10 MG: 5 TABLET ORAL at 10:18

## 2024-10-25 RX ADMIN — HYDROCODONE BITARTRATE AND ACETAMINOPHEN 1 TABLET: 5; 325 TABLET ORAL at 10:18

## 2024-10-25 RX ADMIN — INSULIN LISPRO 2 UNITS: 100 INJECTION, SOLUTION INTRAVENOUS; SUBCUTANEOUS at 21:33

## 2024-10-25 RX ADMIN — PIPERACILLIN AND TAZOBACTAM 3.38 G: 3; .375 INJECTION, POWDER, FOR SOLUTION INTRAVENOUS at 21:33

## 2024-10-25 RX ADMIN — HYDROMORPHONE HYDROCHLORIDE 0.5 MG: 1 INJECTION, SOLUTION INTRAMUSCULAR; INTRAVENOUS; SUBCUTANEOUS at 11:36

## 2024-10-25 RX ADMIN — INSULIN LISPRO 3 UNITS: 100 INJECTION, SOLUTION INTRAVENOUS; SUBCUTANEOUS at 16:40

## 2024-10-25 RX ADMIN — INSULIN HUMAN 5 UNITS: 100 INJECTION, SOLUTION PARENTERAL at 16:40

## 2024-10-25 RX ADMIN — SODIUM CHLORIDE 500 ML: 9 INJECTION, SOLUTION INTRAVENOUS at 10:55

## 2024-10-25 RX ADMIN — INSULIN GLARGINE 10 UNITS: 100 INJECTION, SOLUTION SUBCUTANEOUS at 21:33

## 2024-10-25 RX ADMIN — SENNOSIDES AND DOCUSATE SODIUM 2 TABLET: 50; 8.6 TABLET ORAL at 10:18

## 2024-10-25 RX ADMIN — HYDROMORPHONE HYDROCHLORIDE 0.5 MG: 1 INJECTION, SOLUTION INTRAMUSCULAR; INTRAVENOUS; SUBCUTANEOUS at 21:53

## 2024-10-25 RX ADMIN — SINCALIDE 1.6 MCG: 5 INJECTION, POWDER, LYOPHILIZED, FOR SOLUTION INTRAVENOUS at 07:45

## 2024-10-25 RX ADMIN — MEBROFENIN 1 DOSE: 45 INJECTION, POWDER, LYOPHILIZED, FOR SOLUTION INTRAVENOUS at 06:48

## 2024-10-25 RX ADMIN — MUPIROCIN 1 APPLICATION: 20 OINTMENT TOPICAL at 21:34

## 2024-10-25 RX ADMIN — SODIUM CHLORIDE 500 ML: 9 INJECTION, SOLUTION INTRAVENOUS at 02:25

## 2024-10-25 RX ADMIN — MIDODRINE HYDROCHLORIDE 10 MG: 5 TABLET ORAL at 16:40

## 2024-10-25 RX ADMIN — PIPERACILLIN AND TAZOBACTAM 3.38 G: 3; .375 INJECTION, POWDER, FOR SOLUTION INTRAVENOUS at 13:08

## 2024-10-25 RX ADMIN — PANTOPRAZOLE SODIUM 40 MG: 40 TABLET, DELAYED RELEASE ORAL at 16:41

## 2024-10-25 RX ADMIN — PANTOPRAZOLE SODIUM 40 MG: 40 TABLET, DELAYED RELEASE ORAL at 10:18

## 2024-10-25 RX ADMIN — PIPERACILLIN AND TAZOBACTAM 3.38 G: 3; .375 INJECTION, POWDER, FOR SOLUTION INTRAVENOUS at 06:12

## 2024-10-25 NOTE — PAYOR COMM NOTE
"Donnie Ornelas (49 y.o. Male)     PLEASE SEE ATTACHED FOR INPT AUTH     PT WAS OBS ON 10/22/24 AND CHANGED TO INPT   ON 10/24/24    REF # [id number]    PLEASE CALL BRANDI ALMAZAN RN/ DEPT @ [phone]  OR FAX  DEPARTMENT @  [phone]    THANK YOU   BRANDI ALMAZAN RN  [facility]          Date of Birth   1975    Social Security Number       Address   [address] [place] [address]    Home Phone   [phone]    MRN   [id number]       Gnosticist   Taoist    Marital Status                               Admission Date   10/22/24 OBS  10/24/24 INPT     Admission Type   Emergency    Admitting Provider   James Alexandra MD    Attending Provider   Yaritza Hurtado MD    Department, Room/Bed   [facility] CORONARY CARE, N339/1       Discharge Date       Discharge Disposition       Discharge Destination                                 Attending Provider: Yaritza Hurtado MD    Allergies: No Known Allergies    Isolation: None   Infection: None   Code Status: CPR    Ht: 170.2 cm (67\")   Wt: 81 kg (178 lb 9.6 oz)    Admission Cmt: None   Principal Problem: Gastritis [K29.70]                   Active Insurance as of 10/22/2024       Primary Coverage       Payor Plan Insurance Group Employer/Plan Group    ANTH [employer] [facility] [employer] BLUE [facility] PPO [id number]       Payor Plan Address Payor Plan Phone Number Payor Plan Fax Number Effective Dates    [address] [phone]  1/1/2019 - None Entered    [place] [address]         Subscriber Name Subscriber Birth Date Member ID       DONNIE ORNELAS 1975 [id number]                     Emergency Contacts        (Rel.) Home Phone Work Phone Mobile Phone    Murphy Padilla (Brother) -- -- [phone]    DarioCharity (Spouse) [phone] -- [phone]    joie montero (Relative) -- -- [phone]              [place]: NPI [id number]  Tax ID [id number]     History & Physical        James Alexandra MD " at 10/22/24 2027              Patient Name:  Donnie Bishop  YOB: 1975  MRN:  [id number]  Admit Date:  10/22/2024  Patient Care Team:  Juju Kennedy MD as PCP - General (Family Medicine)      Subjective  History Present Illness     Chief Complaint   Patient presents with    Abdominal Pain       Mr. Bishop is a 49 y.o. man with type 2 diabetes, hypertension, hyperlipidemia, chronic combined systolic and diastolic heart failure (non-ischemic), anxiety who presents with severe abdominal pain since this early this morning. He reports that the abdominal pain woke him from sleep and progressively worsened throughout the day. He states that it's epigastric in location and radiating to the right side of his abdomen as well as his back.     History of Present Illness  Review of Systems   Constitutional:  Positive for diaphoresis. Negative for chills and fever.   HENT:  Negative for postnasal drip and rhinorrhea.    Eyes: Negative.    Respiratory:  Positive for shortness of breath. Negative for cough.    Cardiovascular:  Negative for chest pain and palpitations.   Gastrointestinal:  Positive for abdominal pain and nausea. Negative for diarrhea and vomiting.   Endocrine: Negative.    Genitourinary:  Negative for dysuria, frequency and urgency.   Musculoskeletal:  Negative for arthralgias and myalgias.   Skin:  Negative for rash and wound.   Allergic/Immunologic: Negative.    Neurological:  Negative for light-headedness and headaches.   Hematological: Negative.    Psychiatric/Behavioral:  Negative for confusion and decreased concentration.         Personal History     Past Medical History:   Diagnosis Date    Anxiety     Cough     Inability to attain erection     Injury of acoustic nerve     Myalgia     Type II diabetes mellitus     Venous insufficiency     Viral illness     Vitamin D deficiency      Past Surgical History:   Procedure Laterality Date    CARDIAC CATHETERIZATION N/A 9/13/2023    Procedure: Left  Heart Cath;  Surgeon: Kaylee Kay MD;  Location:  [facility];  Service: Cardiology;  Laterality: N/A;    CARDIAC CATHETERIZATION N/A 9/13/2023    Procedure: Coronary angiography;  Surgeon: Kaylee Kay MD;  Location:  [facility];  Service: Cardiology;  Laterality: N/A;     Family History   Problem Relation Age of Onset    Diabetes Mother      Social History     Tobacco Use    Smoking status: Never    Smokeless tobacco: Never   Vaping Use    Vaping status: Never Used   Substance Use Topics    Alcohol use: Never    Drug use: Never     No current facility-administered medications on file prior to encounter.     Current Outpatient Medications on File Prior to Encounter   Medication Sig Dispense Refill    atorvastatin (LIPITOR) 40 MG tablet Take 1 tablet by mouth Daily. 90 tablet 3    carvedilol (COREG) 25 MG tablet Take 1 tablet by mouth Every 12 (Twelve) Hours. 180 tablet 1    empagliflozin (JARDIANCE) 10 MG tablet tablet Take 1 tablet by mouth Daily. 90 tablet 1    hydrOXYzine (ATARAX) 10 MG tablet TAKE 1 TABLET BY MOUTH EVERY 8 HOURS AS NEEDED FOR ANXIETY. 270 tablet 1    melatonin 5 MG tablet tablet Take 1 tablet by mouth At Night As Needed (insomnia). 90 tablet 1    metFORMIN (GLUCOPHAGE) 1000 MG tablet Take 1 tablet by mouth 2 (Two) Times a Day With Meals. 180 tablet 3    NovoLOG FlexPen 100 UNIT/ML solution pen-injector sc pen INJECT 5 UNITS INTO THE SKIN 3 (THREE) TIMES DAILY WITH MEALS PLUS UP TO 15 UNITS SLIDING SCALE.      ondansetron (ZOFRAN) 8 MG tablet Take 1 tablet by mouth Every 8 (Eight) Hours As Needed for Nausea or Vomiting. 8 tablet 0    sacubitril-valsartan (ENTRESTO) 24-26 MG tablet Take 1 tablet by mouth 2 (Two) Times a Day. 180 tablet 1    spironolactone (ALDACTONE) 25 MG tablet TAKE 1 TABLET BY MOUTH EVERY DAY 90 tablet 1    venlafaxine XR (EFFEXOR-XR) 150 MG 24 hr capsule Take 1 capsule by mouth Daily. (Patient taking differently: Take 75 mg by mouth  Daily.) 90 capsule 1    vitamin D (ERGOCALCIFEROL) 1.25 MG (40422 UT) capsule capsule Take 1 capsule by mouth 1 (One) Time Per Week.       No Known Allergies    Objective   Objective     Vital Signs  Temp:  [98 °F (36.7 °C)] 98 °F (36.7 °C)  Heart Rate:  [85-94] 94  Resp:  [20] 20  BP: (105-178)/() 105/54  SpO2:  [95 %-100 %] 95 %  on   ;      Body mass index is 26.31 kg/m².    Physical Exam  Vitals and nursing note reviewed.   Constitutional:       General: He is not in acute distress.     Appearance: He is not toxic-appearing.   HENT:      Head: Normocephalic and atraumatic.      Mouth/Throat:      Mouth: Mucous membranes are moist.      Pharynx: Oropharynx is clear.   Eyes:      Extraocular Movements: Extraocular movements intact.      Conjunctiva/sclera: Conjunctivae normal.      Pupils: Pupils are equal, round, and reactive to light.   Cardiovascular:      Rate and Rhythm: Normal rate and regular rhythm.      Heart sounds: Normal heart sounds.   Pulmonary:      Effort: Pulmonary effort is normal. No respiratory distress.      Breath sounds: Normal breath sounds. No wheezing, rhonchi or rales.   Abdominal:      General: Bowel sounds are normal. There is no distension.      Palpations: Abdomen is soft.      Tenderness: There is abdominal tenderness. There is no guarding or rebound.   Musculoskeletal:         General: No tenderness.      Cervical back: Normal range of motion and neck supple.      Right lower leg: No edema.      Left lower leg: No edema.   Skin:     General: Skin is warm and dry.      Findings: No erythema or rash.   Neurological:      Mental Status: He is alert.   Psychiatric:         Mood and Affect: Mood normal.         Behavior: Behavior normal.         Results Review:  I reviewed the patient's new clinical results.  I reviewed the patient's new imaging results and agree with the interpretation.  I reviewed the patient's other test results and agree with the interpretation  I personally  viewed and interpreted the patient's EKG/Telemetry data  Discussed with ED provider.    Lab Results (last 24 hours)       Procedure Component Value Units Date/Time    CBC & Differential [[id number]]  (Abnormal) Collected: 10/22/24 1557    Specimen: Blood from Arm, Right Updated: 10/22/24 1607    Narrative:      The following orders were created for panel order CBC & Differential.  Procedure                               Abnormality         Status                     ---------                               -----------         ------                     CBC Auto Differential[[id number]]        Abnormal            Final result                 Please view results for these tests on the individual orders.    Comprehensive Metabolic Panel [[id number]]  (Abnormal) Collected: 10/22/24 1557    Specimen: Blood from Arm, Right Updated: 10/22/24 1631     Glucose 183 mg/dL      BUN 9 mg/dL      Creatinine 1.07 mg/dL      Sodium 136 mmol/L      Potassium 4.8 mmol/L      Chloride 96 mmol/L      CO2 24.6 mmol/L      Calcium 10.5 mg/dL      Total Protein 7.8 g/dL      Albumin 4.6 g/dL      ALT (SGPT) 11 U/L      AST (SGOT) 10 U/L      Alkaline Phosphatase 60 U/L      Total Bilirubin 2.6 mg/dL      Globulin 3.2 gm/dL      A/G Ratio 1.4 g/dL      BUN/Creatinine Ratio 8.4     Anion Gap 15.4 mmol/L      eGFR 86.1 mL/min/1.73     Narrative:      GFR Normal >60  Chronic Kidney Disease <60  Kidney Failure <15      Lipase [[id number]]  (Abnormal) Collected: 10/22/24 1557    Specimen: Blood from Arm, Right Updated: 10/22/24 1631     Lipase 9 U/L     CBC Auto Differential [[id number]]  (Abnormal) Collected: 10/22/24 1557    Specimen: Blood from Arm, Right Updated: 10/22/24 1607     WBC 13.79 10*3/mm3      RBC 4.53 10*6/mm3      Hemoglobin 14.2 g/dL      Hematocrit 40.8 %      MCV 90.1 fL      MCH 31.3 pg      MCHC 34.8 g/dL      RDW 12.3 %      RDW-SD 40.2 fl      MPV 9.1 fL      Platelets 388 10*3/mm3      Neutrophil % 84.5 %      Lymphocyte %  8.8 %      Monocyte % 5.9 %      Eosinophil % 0.1 %      Basophil % 0.5 %      Immature Grans % 0.2 %      Neutrophils, Absolute 11.65 10*3/mm3      Lymphocytes, Absolute 1.21 10*3/mm3      Monocytes, Absolute 0.82 10*3/mm3      Eosinophils, Absolute 0.01 10*3/mm3      Basophils, Absolute 0.07 10*3/mm3      Immature Grans, Absolute 0.03 10*3/mm3      nRBC 0.0 /100 WBC     Urinalysis With Microscopic If Indicated (No Culture) - Urine, Clean Catch [[id number]]  (Abnormal) Collected: 10/22/24 1748    Specimen: Urine, Clean Catch Updated: 10/22/24 1801     Color, UA Yellow     Appearance, UA Clear     pH, UA 5.5     Specific Gravity, UA 1.028     Glucose, UA >=1000 mg/dL (3+)     Ketones, UA 40 mg/dL (2+)     Bilirubin, UA Negative     Blood, UA Negative     Protein, UA Negative     Leuk Esterase, UA Negative     Nitrite, UA Negative     Urobilinogen, UA 0.2 E.U./dL    Narrative:      Urine microscopic not indicated.            Imaging Results (Last 24 Hours)       Procedure Component Value Units Date/Time    CT Abdomen Pelvis With Contrast [[id number]] Collected: 10/22/24 1821     Updated: 10/22/24 1840    Narrative:      CT ABDOMEN PELVIS W CONTRAST-     INDICATION: Abdominal pain     COMPARISON: CT abdomen pelvis August 31, 2024     TECHNIQUE:  Routine CT abdomen/pelvis with IV contrast. Coronal and sagittal  reformats. Radiation dose reduction techniques were utilized, including  automated exposure control and exposure modulation based on body size.     FINDINGS:      Lung bases: Subsegmental atelectasis at the bases.     ABDOMEN: Low attenuating lesion seen at the left hepatic dome, segment  2, measures less than 1 cm and is too small to characterize, unchanged.  Gallbladder appears partially contracted, with some increased  enhancement in the gallbladder wall. No biliary ductal dilatation.  Spleen is normal in size. No pancreatic mass or pancreatic ductal  dilatation seen. Nodular thickening of the adrenal glands.  No renal mass  or hydronephrosis.     Pelvis: Prostate is normal in size. Normal bladder. No bladder calculus.  Free intraperitoneal fluid seen in the rectovesicular pouch.     Bowel: Gastric antral wall thickening, with surrounding stranding/edema  and some ill-defined fluid, extending to the gallbladder and into the  pancreaticoduodenal groove. No extraluminal gas seen. No walled off  fluid collection. Some nearby perihepatic free fluid, extends into the  paracolic gutter. Incidental duodenal diverticulum. Moderate gas and  stool in the colon. Normal appendix. Small amount of fluid in the  mesenteric leaflets in the pelvis.     Abdominal wall: Small fat-containing umbilical hernia.     Retroperitoneum: No lymphadenopathy.     Vasculature: Patent. No abdominal aortic aneurysm.     Osseous structures: No destructive osseous lesions.       Impression:         1. Gastric antral wall thickening with surrounding inflammation and some  ill-defined fluid with inflammation extending to the gallbladder which  appears partially contracted and extending into the pancreaticoduodenal  groove. Finding may be related to gastritis or underlying peptic ulcer  disease. Consider endoscopy evaluation.  2. Small amount of free fluid seen in the abdomen and pelvis     This report was finalized on 10/22/2024 6:37 PM by Dr. Connor Vasquez M.D on Workstation: [id number]               Results for orders placed during the hospital encounter of 09/12/23    Adult Transthoracic Echo Complete W/ Cont if Necessary Per Protocol    Interpretation Summary    Left ventricular systolic function is moderately decreased. Calculated left ventricular EF = 35.3% Abnormal global longitudinal LV strain (GLS) = -12.6%. Left ventricle strain data was reviewed by the physician and found to be accurate. Normal left ventricular cavity size noted. Left ventricular wall thickness is consistent with moderate concentric hypertrophy. There is left ventricular  global hypokinesis noted. Left ventricular diastolic function is consistent with (grade II w/high LAP) pseudonormalization.    Mildly reduced right ventricular systolic function noted.    Trace mitral valve regurgitation is present.    Mild tricuspid valve regurgitation is present. Estimated right ventricular systolic pressure from tricuspid regurgitation is mildly elevated (35-45 mmHg). Calculated right ventricular systolic pressure from tricuspid regurgitation is 39.3 mmHg.    There is a trivial pericardial effusion. There is no evidence of cardiac tamponade.      No orders to display        Assessment/Plan     Active Hospital Problems    Diagnosis  POA    **Gastritis [K29.70]  Yes    Chronic combined systolic and diastolic heart failure [I50.42]  Yes    Abdominal pain [R10.9]  Yes    Anxiety [F41.9]  Yes    Multiple-type hyperlipidemia [E78.2]  Yes    Type 2 diabetes mellitus with diabetic neuropathy, with long-term current use of insulin [E11.40, Z79.4]  Not Applicable    Benign essential HTN [I10]  Yes      Resolved Hospital Problems   No resolved problems to display.       Mr. Bishop is a 49 y.o. man with type 2 diabetes, hypertension, hyperlipidemia, chronic combined systolic and diastolic heart failure (non-ischemic), anxiety who presents with severe abdominal pain since this early this morning. He reports that the abdominal pain woke him from sleep and progressively worsened throughout the day. He states that it's epigastric in location and radiating to the right side of his abdomen as well as his back. His CT shows gastritis vs. PUD.     Abdominal pain-CT shows evidence of gastritis vs. Peptic ulcer disease. He's been started on IV PPI BID, which I'll continue. Will ask GI to consult. IV analgesics and antiemetics. CLD for now. NPO after midnight.  Type 2 diabetes-start sliding scale. Hold home medications.   Essential hypertension  Hyperlipidemia  Chronic combined systolic and diastolic heart  failure  Anxiety  Restart home medications once reconciled  I discussed the patient's findings and my recommendations with patient and ED provider.    VTE Prophylaxis - SCDs.  Code Status - Full code.       James Alexandra MD  [facility]  10/22/24  20:27 EDT     Electronically signed by James Alexandra MD at 10/22/24 7050       Medication Administration Report for Donnie Bishop as of 10/24/24 through 10/25/24    Given  Hold  Not Given  Due  Canceled Entry  Other Actions  Time  Time  (Time)  Time  Time-Action    Discontinued  Completed  Future  MAR Hold  Linked    Medications  10/24/24  10/25/24    atorvastatin (LIPITOR) tablet 40 mg  Dose: 40 mg  Freq: Daily Route: PO  Start: 10/23/24 0900    Avoid grapefruit juice.  0822-Given  1018-Given    sennosides-docusate (PERICOLACE) 8.6-50 MG per tablet 2 tablet  Dose: 2 tablet  Freq: 2 Times Daily Route: PO  Start: 10/22/24 2100      Admin Instructions:  Use if no bowel movement after 12 hours. Mix in 6-8 ounces of water.  Use 4-8 ounces of water, tea, or juice for each 17 gram dose.  And      bisacodyl (DULCOLAX) EC tablet 5 mg  Dose: 5 mg  Freq: Daily PRN Route: PO  PRN Reason: Constipation  PRN Comment: Use if polyethylene glycol is ineffective  Start: 10/22/24 2026    Use if no bowel movement after 12 hours.  Swallow whole. Do not crush, split, or chew tablet.  And      bisacodyl (DULCOLAX) suppository 10 mg  Dose: 10 mg  Freq: Daily PRN Route: RE  PRN Reason: Constipation  PRN Comment: Use if bisacodyl oral is ineffective  Start: 10/22/24 2026  Admin Instructions:     Physician Progress Notes (last 72 hours)        Agapito Guallpa MD at 10/25/24 1041          Patient was already seen earlier today by Dr. Richard Brooks for his hypertension, he is on midodrine, he had some gentle hydration  Blood pressure seems to be doing better with the diastolic above 65 on the last 2 consecutive measures  We will continue to monitor, will intervene and consider  further action in case of recurrent hypotension.    Electronically signed by Agapito Guallpa MD at 10/25/24 1041       Delaney Rosario APRN at 10/24/24 0478       Attestation signed by Godwin Carcamo DO at 10/24/24 5585 (Updated)    I have seen and examined the patient, performing an independent face-to-face diagnostic evaluation with plan of care reviewed and developed with MICHELLE Dickinson.  The majority of medical decision making (>50%) for this encounter was completed by myself and discussed with this APRN.    Patient awake and resting in bed, still with ongoing generalized abdominal pain.    Constitutional: lethargic, ill appearing  HEENT: Normal conjunctivae, no scleral icterus  CV: tachycardic, regular rhythm  RESP: shallow respirations, no wheezes, no rhonchi  GI: soft, diffusely tender, no rebound or guarding  MSK: No gross deformities appreciated, no tenderness, no edema  Skin: Warm, dry, decreased skin turgor  Results Review:  I reviewed the patient's new clinical results.  I reviewed the patient's new imaging results and agree with the interpretation.  I reviewed the patient's other test results and agree with the interpretation  I personally viewed and interpreted the patient's EKG/Telemetry data    Assessment/Plan:  Acute abdominal pain  Gastritis, infectious  Leukocytosis  - Patient has leukocytosis, in setting of significant abdominal pain, CT evidence suggestive of gastritis, concerning for infectious etiology.   - Continue Zosyn, follow up infectious workup.  - GI following, Will continue to follow their plans/recommendations. Greatly appreciate their help.    Acute kidney injury  - Noted on labs, creatinine has increased >0.3 over past 48 hours, consistent with TENISHA.  - Etiology likely multifactorial in setting of hypotension, contrast exposure, decreased oral intake, impaired renal autoregulation in setting of Entresto, side effect of aldactone.  - Continue with fluids.  - Bladder scan  to check for retention.  - Nephrology consult.  - Repeat BMP in AM.    Hypotension  - Noted on vitals. Holding Entresto, Aldactone. Decrease Coreg dosage (but with hold parameters).  - Continue fluids, closely monitor.    Hyponatremia  - Noted on labs, worse from prior, likely due to hypovolemia.  - Check urine osm, urine sodium, TSH, uric acid.    Anion gap metabolic acidosis  Lactic acidosis  - Noted on labs, etiology likely 2/2 lactic acidosis, but patient previously on Jardiance and in setting of T2DM, need to closely monitor for evolving euglycemic DKA.  - Trend lactic acid.  - Check ABG.    Type 2 diabetes with hyperglycemia  - A1c 9.40%, glucose uncontrolled.  - Add Glargine 10 units nightly, Lispro 5 units TID with meals, continue SSI.    Anemia  - Hemoglobin has dropped quiet a bit since admission, suspect this is likely dilutional from IV fluids as patient was quiet volume depleted, but must closely monitor this.  - Repeat CBC in AM.    Chronic combined systolic and diastolic heart failure  - No evidence of decompensation, medications being held due to hypotension, TENISHA, volume depletion.      Addendum: Discussed with Dr. Gene Saini, agreed with plan as above, adjusting fluids, rechecking CBC to assess hemoglobin. Greatly appreciate his help with this patient.                    Name: Donnie Bishop ADMIT: 10/22/2024   : 1975  PCP: Juju Kennedy MD    MRN: [id number] LOS: 0 days   AGE/SEX: 49 y.o. male  ROOM: [facility]     Subjective   Subjective   He is resting much more comfortably than he was yesterday.  He does report continued abdominal pain, that is worse with palpation.  He is requesting something to drink.  He is n.p.o. secondary to EGD D planned with GI for today continues to have soft blood pressures,-Entresto, and spironolactone are on hold.        Objective   Objective   Vital Signs  Temp:  [97.7 °F (36.5 °C)-99.1 °F (37.3 °C)] 99 °F (37.2 °C)  Heart Rate:  [] 93  Resp:  [16-17]  16  BP: ()/(52-90) 80/53  SpO2:  [98 %-100 %] 98 %  on  Flow (L/min) (Oxygen Therapy):  [2] 2;   Device (Oxygen Therapy): nasal cannula  Body mass index is 27.97 kg/m².  Physical Exam  Vitals and nursing note reviewed.   Constitutional:       Appearance: He is obese. He is ill-appearing. He is not diaphoretic.   HENT:      Head: Atraumatic.      Mouth/Throat:      Mouth: Mucous membranes are dry.   Cardiovascular:      Rate and Rhythm: Tachycardia present.      Pulses: Normal pulses.           Radial pulses are 2+ on the right side and 2+ on the left side.        Dorsalis pedis pulses are 2+ on the right side and 2+ on the left side.        Posterior tibial pulses are 2+ on the right side and 2+ on the left side.      Heart sounds: Normal heart sounds.      Comments: Trace edema   Pulmonary:      Effort: Pulmonary effort is normal.      Breath sounds: Normal breath sounds.   Abdominal:      General: Bowel sounds are normal.      Palpations: Abdomen is soft.      Tenderness: There is abdominal tenderness. There is guarding.      Comments: Severe tenderness with palpation, and auscultation.   Musculoskeletal:      Right lower leg: Edema present.      Left lower leg: Edema present.   Skin:     General: Skin is warm and dry.      Capillary Refill: Capillary refill takes less than 2 seconds.   Neurological:      General: No focal deficit present.      Mental Status: He is alert and oriented to person, place, and time.   Psychiatric:         Mood and Affect: Mood normal.       Results Review     I reviewed the patient's new clinical results.  Results from last 7 days   Lab Units 10/24/24  0617 10/23/24  1433 10/23/24  0311 10/22/24  1557   WBC 10*3/mm3 10.27 12.62* 12.59* 13.79*   HEMOGLOBIN g/dL 10.7* 15.1 14.5 14.2   PLATELETS 10*3/mm3 266 399 384 388     Results from last 7 days   Lab Units 10/24/24  0617 10/23/24  0311 10/22/24  1557   SODIUM mmol/L 128* 134* 136   POTASSIUM mmol/L 4.6 4.8 4.8   CHLORIDE mmol/L  95* 96* 96*   CO2 mmol/L 16.2* 24.3 24.6   BUN mg/dL 30* 13 9   CREATININE mg/dL 1.42* 1.19 1.07   GLUCOSE mg/dL 264* 112* 183*   EGFR mL/min/1.73 61.3 75.8 86.1     Results from last 7 days   Lab Units 10/22/24  1557   ALBUMIN g/dL 4.6   BILIRUBIN mg/dL 2.6*   ALK PHOS U/L 60   AST (SGOT) U/L 10   ALT (SGPT) U/L 11     Results from last 7 days   Lab Units 10/24/24  0617 10/23/24  0311 10/22/24  1557   CALCIUM mg/dL 8.8 10.1 10.5   ALBUMIN g/dL  --   --  4.6     Results from last 7 days   Lab Units 10/24/24  0617 10/24/24  0012 10/23/24  2104 10/23/24  1746   LACTATE mmol/L 3.3* 3.6* 4.5* 4.0*     Hemoglobin A1C   Date/Time Value Ref Range Status   10/24/2024 0617 9.40 (H) 4.80 - 5.60 % Final     Glucose   Date/Time Value Ref Range Status   10/24/2024 0558 263 (H) 70 - 130 mg/dL Final   10/23/2024 2051 174 (H) 70 - 130 mg/dL Final   10/23/2024 1532 159 (H) 70 - 130 mg/dL Final   10/23/2024 1341 120 70 - 130 mg/dL Final   10/23/2024 1128 166 (H) 70 - 130 mg/dL Final   10/23/2024 0915 130 70 - 130 mg/dL Final   10/23/2024 0314 109 70 - 130 mg/dL Final       CT Abdomen Pelvis With Contrast    Result Date: 10/22/2024   1. Gastric antral wall thickening with surrounding inflammation and some ill-defined fluid with inflammation extending to the gallbladder which appears partially contracted and extending into the pancreaticoduodenal groove. Finding may be related to gastritis or underlying peptic ulcer disease. Consider endoscopy evaluation. 2. Small amount of free fluid seen in the abdomen and pelvis  This report was finalized on 10/22/2024 6:37 PM by Dr. Connor Vasquez M.D on Workstation: [id number]       I have personally reviewed all medications:  Scheduled Medications  atorvastatin, 40 mg, Oral, Daily  carvedilol, 25 mg, Oral, Q12H  insulin lispro, 2-7 Units, Subcutaneous, 4x Daily AC & at Bedtime  pantoprazole, 40 mg, Intravenous, BID AC  piperacillin-tazobactam, 3.375 g, Intravenous, Q8H  [Held by provider]  sacubitril-valsartan, 1 tablet, Oral, BID  senna-docusate sodium, 2 tablet, Oral, BID  [Held by provider] spironolactone, 25 mg, Oral, Daily  sucralfate, 1 g, Oral, TID AC  venlafaxine XR, 75 mg, Oral, Daily    Infusions  sodium chloride, 100 mL/hr, Last Rate: 100 mL/hr (10/24/24 0341)    Diet  NPO Diet NPO Type: Strict NPO    I have personally reviewed:  [x]  Laboratory   [x]  Microbiology   [x]  Radiology   [x]  EKG/Telemetry  [x]  Cardiology/Vascular   []  Pathology    []  Records      Assessment/Plan     Active Hospital Problems    Diagnosis  POA    **Gastritis [K29.70]  Yes    Leukocytosis [D72.829]  Yes    Chronic combined systolic and diastolic heart failure [I50.42]  Yes    Abdominal pain [R10.9]  Yes    Epigastric abdominal pain [R10.13]  Unknown    Anxiety [F41.9]  Yes    Multiple-type hyperlipidemia [E78.2]  Yes    Type 2 diabetes mellitus with diabetic neuropathy, with long-term current use of insulin [E11.40, Z79.4]  Not Applicable    Benign essential HTN [I10]  Yes      Resolved Hospital Problems   No resolved problems to display.       49 y.o. male admitted with:  Gastritis   Abdominal pain  Currently with severe pain -that is worse with movement, and palpation-  GI following -plan for EGD later today.  CT scan reviewed -endoscopy recommended gastritis versus peptic ulcer disease  Continue PPI  Changed as needed IV pain medications to assist in better pain control.-Frequency changed-could also be playing a part in hypotension   Continue antiemetics  Keep NPO       Leukocytosis/ Sepsis   WBC trending down, will continue to monitor.  Lactic acid elevated at check yesterday,-will continue to trend  Started on Zosyn yesterday  Blood cultures are pending will need to follow  Blood pressure is soft, 1 normal saline bolus initiated, will add another, and continue normal saline infusion.      Chronic combined systolic and diastolic heart failure  Chronic  Last echocardiogram September 12, 2023 LVEF  35.3%  Continue home  Continue spironolactone, Coreg, and Entresto   Hold home Jardiance   He has very mild to trace bilateral lower extremity edema, but otherwise does not appear to be volume overloaded.    Benign essential HTN  Chronic   Blood pressures acceptable currently.  Continue home antihypertensives    Type 2 diabetes mellitus with diabetic neuropathy, with long-term current use of insulin  Chronic   Last hemoglobin A1c 9.80 on 12/27/2023  SSI ordered for hyperglycemia  Hold home Jardiance, and metformin  Hypoglycemia treatment per protocol    Anxiety  Chronic  Continue home Atarax, and Effexor          SCDs for DVT prophylaxis.  Full code.  Discussed with patient, family, care team on multidisciplinary rounds, and Dr Carcamo .  Anticipate discharge home in 1-2 days.  Expected Discharge Date: 10/25/2024; Expected Discharge Time:       MICHELLE Shepherd  [facility]  10/24/24  10:12 EDT    Electronically signed by Godwin Carcamo DO at 10/24/24 1909       Delaney Rosario APRN at 10/23/24 1026       Attestation signed by Godwin Carcamo DO at 10/24/24 1914 (Updated)    I have seen and examined the patient, performing an independent face-to-face diagnostic evaluation with plan of care reviewed and developed with MICHELLE Dickinson.  The majority of medical decision making (>50%) for this encounter was completed by myself and discussed with this APRN. Patient seen,examined and note completed on 10/23, time changed due to attestation that had to be added at later time to reflect updated treatment.  Patient awake and resting in bed, appears uncomfortable, has ongoing significant generalized abdominal pain, constant at present.  Constitutional: ill appearing, uncomfortable  HEENT: Normal conjunctivae, no scleral icterus  CV: tachycardia, regular rhythm  RESP: FIO2 21, clear to auscultation B/L, normal effort  GI: soft, tender to palpation diffusely  Skin: Warm, dry. No rashes  Neuro:  Awake, alert, oriented x3, no appreciable focal neurological deficits.  Psych: Appropriate mood and affect.  Results Review:  I reviewed the patient's new clinical results.  I reviewed the patient's new imaging results and agree with the interpretation.  I reviewed the patient's other test results and agree with the interpretation  I personally viewed and interpreted the patient's EKG/Telemetry data    Assessment/Plan:  Acute abdominal pain  Gastritis, infectious  Leukocytosis  Lactic acidosis  - Patient has leukocytosis, in setting of significant abdominal pain, CT evidence suggestive of gastritis, concerning for infectious etiology. He is tachycardic as well. Lactic acid elevated, concerning for lactic acidosis.   - Start Zosyn, order blood cultures, start NS at 100 cc/hr. Closely monitor volume status as patient has history of heart failure, but given evolving picture for sepsis, he needs IV fluids at this time. PRN medications for pain.     Chronic combined systolic and diastolic heart failure  - No evidence of decompensation, continue medications as prescribed, but closely monitor volume status as patient is receiving IV fluids.    Hypertension  - BP soft, Entresto and Aldactone being stopped. Coreg hold parameters adjusted.    Type 2 diabetes with hyperglycemia  - A1c 9.80%; Glucose elevated, add SSI.  - Repeat A1c in AM.    Anxiety  - Continue treatments as prescribed.                    Name: Donnie Bishop ADMIT: 10/22/2024   : 1975  PCP: Juju Kennedy MD    MRN: [id number] LOS: 0 days   AGE/SEX: 49 y.o. male  ROOM: [facility]     Subjective   Subjective   He reports severe 10/10 generalized abdominal pain since yesterday that continues to worsen.  Is very difficult to communicate with Mr. Bishop, as he is flailing around in the bed crying in pain       Objective   Objective   Vital Signs  Temp:  [98 °F (36.7 °C)-99.1 °F (37.3 °C)] 98.1 °F (36.7 °C)  Heart Rate:  [] 116  Resp:  [17-20] 17  BP:  ()/() 108/77  SpO2:  [95 %-100 %] 96 %  on   ;      Body mass index is 26.31 kg/m².  Physical Exam  Vitals and nursing note reviewed.   Constitutional:       Appearance: He is obese. He is ill-appearing. He is not diaphoretic.   HENT:      Head: Atraumatic.      Mouth/Throat:      Mouth: Mucous membranes are dry.   Cardiovascular:      Rate and Rhythm: Normal rate. Tachycardia present.      Pulses: Normal pulses.           Radial pulses are 2+ on the right side and 2+ on the left side.        Dorsalis pedis pulses are 2+ on the right side and 2+ on the left side.        Posterior tibial pulses are 2+ on the right side and 2+ on the left side.      Heart sounds: Normal heart sounds.      Comments: Trace edema   Pulmonary:      Effort: Pulmonary effort is normal.      Breath sounds: Normal breath sounds.   Abdominal:      General: Bowel sounds are normal.      Palpations: Abdomen is soft.      Tenderness: There is abdominal tenderness. There is guarding.      Comments: Severe tenderness with palpation, and auscultation.   Musculoskeletal:      Right lower leg: Edema present.      Left lower leg: Edema present.   Skin:     General: Skin is warm and dry.      Capillary Refill: Capillary refill takes less than 2 seconds.   Neurological:      General: No focal deficit present.      Mental Status: He is alert and oriented to person, place, and time.   Psychiatric:         Mood and Affect: Mood normal.       Results Review     I reviewed the patient's new clinical results.  Results from last 7 days   Lab Units 10/23/24  0311 10/22/24  1557   WBC 10*3/mm3 12.59* 13.79*   HEMOGLOBIN g/dL 14.5 14.2   PLATELETS 10*3/mm3 384 388     Results from last 7 days   Lab Units 10/23/24  0311 10/22/24  1557   SODIUM mmol/L 134* 136   POTASSIUM mmol/L 4.8 4.8   CHLORIDE mmol/L 96* 96*   CO2 mmol/L 24.3 24.6   BUN mg/dL 13 9   CREATININE mg/dL 1.19 1.07   GLUCOSE mg/dL 112* 183*   EGFR mL/min/1.73 75.8 86.1     Results from  last 7 days   Lab Units 10/22/24  1557   ALBUMIN g/dL 4.6   BILIRUBIN mg/dL 2.6*   ALK PHOS U/L 60   AST (SGOT) U/L 10   ALT (SGPT) U/L 11     Results from last 7 days   Lab Units 10/23/24  0311 10/22/24  1557   CALCIUM mg/dL 10.1 10.5   ALBUMIN g/dL  --  4.6       Glucose   Date/Time Value Ref Range Status   10/23/2024 0915 130 70 - 130 mg/dL Final   10/23/2024 0314 109 70 - 130 mg/dL Final   10/22/2024 2059 177 (H) 70 - 130 mg/dL Final       CT Abdomen Pelvis With Contrast    Result Date: 10/22/2024   1. Gastric antral wall thickening with surrounding inflammation and some ill-defined fluid with inflammation extending to the gallbladder which appears partially contracted and extending into the pancreaticoduodenal groove. Finding may be related to gastritis or underlying peptic ulcer disease. Consider endoscopy evaluation. 2. Small amount of free fluid seen in the abdomen and pelvis  This report was finalized on 10/22/2024 6:37 PM by Dr. Connor Vasquez M.D on Workstation: [id number]       I have personally reviewed all medications:  Scheduled Medications  atorvastatin, 40 mg, Oral, Daily  carvedilol, 25 mg, Oral, Q12H  insulin lispro, 2-7 Units, Subcutaneous, 4x Daily AC & at Bedtime  pantoprazole, 40 mg, Intravenous, BID AC  sacubitril-valsartan, 1 tablet, Oral, BID  senna-docusate sodium, 2 tablet, Oral, BID  spironolactone, 25 mg, Oral, Daily  venlafaxine XR, 75 mg, Oral, Daily    Infusions   Diet  NPO Diet NPO Type: Strict NPO    I have personally reviewed:  [x]  Laboratory   [x]  Microbiology   [x]  Radiology   [x]  EKG/Telemetry  [x]  Cardiology/Vascular   []  Pathology    []  Records      Assessment/Plan     Active Hospital Problems    Diagnosis  POA    **Gastritis [K29.70]  Yes    Leukocytosis [D72.829]  Yes    Chronic combined systolic and diastolic heart failure [I50.42]  Yes    Abdominal pain [R10.9]  Yes    Anxiety [F41.9]  Yes    Multiple-type hyperlipidemia [E78.2]  Yes    Type 2 diabetes mellitus  with diabetic neuropathy, with long-term current use of insulin [E11.40, Z79.4]  Not Applicable    Benign essential HTN [I10]  Yes      Resolved Hospital Problems   No resolved problems to display.       49 y.o. male admitted with:  Gastritis   Abdominal pain  Currently with severe pain -that is worse with movement, and palpation-  GI following   CT scan reviewed -endoscopy recommended gastritis versus peptic ulcer disease  Continue PPI  Changed as needed IV pain medications to assist in better pain control.  Continue antiemetics  Keep NPO for now until GI has evaluated    Leukocytosis  WBC trending down, will continue to monitor.    Chronic combined systolic and diastolic heart failure  Chronic  Last echocardiogram September 12, 2023 LVEF 35.3%  Continue home  Continue spironolactone, Coreg, and Entresto   Hold home Jardiance   He has very mild to trace bilateral lower extremity edema, but otherwise does not appear to be volume overloaded.    Benign essential HTN  Chronic   Blood pressures acceptable currently.  Continue home antihypertensives    Type 2 diabetes mellitus with diabetic neuropathy, with long-term current use of insulin  Chronic   Last hemoglobin A1c 9.80 on 12/27/2023  SSI ordered for hyperglycemia  Hold home Jardiance, and metformin  Hypoglycemia treatment per protocol    Anxiety  Chronic  Continue home Atarax, and Effexor          SCDs for DVT prophylaxis.  Full code.  Discussed with patient, family, care team on multidisciplinary rounds, and Dr Carcamo .  Anticipate discharge home in 1-2 days.  Expected discharge date/ time has not been documented.      MICHELLE Shepherd  [facility]  10/23/24  10:27 EDT    Electronically signed by Godwin Carcamo DO at 10/24/24 9295          Consult Notes (last 72 hours)        Richard Ugalde MD at 10/25/24 0215        Consult Orders    1. Inpatient Pulmonology Consult [[id number]] ordered by Thi Watson APRN at 10/25/24 6083                  [facility]    Reason for consult: Hypotension    HPI:  Mr. Bishop is a 48yo male with DM and chronic congestive heart failure with EF 35%.  He was admitted 10/22 with abdominal pain.  Since admission he has had low blood pressures.  He underwent EGD today which was fairly unremarkable, he has continue abdominal pain, he is scheudled for HIDA scan in the morning. He was evaluated by nephrology earlier and was thought to be dehydrated and iv fluids ordered. Mr. Bishop says she is dehyrdated and is begging for water.  He feels generally unwell but not lightheaded and not having any presyncope.  BP is actually over systolic 100 now.  Lactic is fairly flat and mildly elevated. He has minor gap and minor decrease in bicarb but normal ph on blood gas.  He is not tachycardic. He does not appear distressed but does appear ill.     Past Medical History:   Diagnosis Date    Anxiety     CHF (congestive heart failure)     Cough     Inability to attain erection     Injury of acoustic nerve     Myalgia     Type II diabetes mellitus     Venous insufficiency     Viral illness     Vitamin D deficiency      Social History     Socioeconomic History    Marital status:    Tobacco Use    Smoking status: Never    Smokeless tobacco: Never   Vaping Use    Vaping status: Never Used   Substance and Sexual Activity    Alcohol use: Never    Drug use: Never    Sexual activity: Defer     Family History   Problem Relation Age of Onset    Diabetes Mother      MEDS: reviewed as per chart  ALL: nkda  rOS: 12 point negative except as in HPI    Vital Sign Min/Max for last 24 hours  Temp  Min: 97.7 °F (36.5 °C)  Max: 99.1 °F (37.3 °C)   BP  Min: 73/48  Max: 102/60   Pulse  Min: 92  Max: 126   Resp  Min: 16  Max: 24   SpO2  Min: 92 %  Max: 100 %   Flow (L/min) (Oxygen Therapy)  Min: 2  Max: 10   Weight  Min: 81 kg (178 lb 9.6 oz)  Max: 81 kg (178 lb 9.6 oz)     GEN:   appears ill, A&O x3  HEENT: PERRL, EOMI, no icterus, mmm,  no JVD, trachea midline, neck supple  CHEST: CTA bilat, no wheezes, no crackles, no use of accessory muscles  CV: RRR, no m/g/r  ABD: soft,somewhat tender, nd +bs, no hepatosplenomegaly, no rebound no gaurding  EXT: no c/c/e  SKIN: no rashes, no xanthomas, decreasedl turgor, warm, dry  LYMPH: no palpable cervical or supraclavicular lymphadenopathy  NEURO: CN 2-12 intact and symmetric bilaterally  PSYCH: nl affect, nl orientation, nl judgment, nl mood  MSK: no kyphoscoliosis, 5/5 strength ue and le bilaterlaly    Labs: 10/24: reviewed:  Wbc 9.8  Hgb 10.6  Plts 273  7.37/32/59  Glucose 2155  Bun 35  Cr 1.57  Bicarb 18 (was 16)    A/P:  Hypotension -- patient relatively asymptomatic. Agree with iv fluids, will start some midodrine as well. Check cortisol. Can check orthostatics.   Abdominal pain -- per primary/GI  -- HIDA scan in AM  TENISHA - nephrology following  Anion gp metabolic acidosis -- minor, suspect due to lactic acidosis -- agree can't rule out euglycemic dka with jardiance, but seems less likely, observe for now.  Lactic acidosis -- was on metformin prior to admission, possibly some underperfusion as well  Anemia        Electronically signed by Richard Ugalde MD at 10/25/24 0229       Evan Pat MD at 10/24/24 1750        Consult Orders    1. Inpatient Nephrology Consult [[id number]] ordered by Godwin Carcamo DO at 10/24/24 1652                   [facility] Consult Note      Patient Name: Donnie Bishop  : 1975  MRN: [id number]  Primary Care Physician:  Juju Kennedy MD  Referring Physician: Sylvain Ventura MD  Date of admission: 10/22/2024    Subjective     Reason for Consult: Acute kidney injury    HPI:   Donnie Bishop is a 49 y.o. male known to have history of type 2 diabetes mellitus, hypertension, hyperlipidemia, chronic systolic and diastolic heart failure with EF of 35% who presented to hospital abdominal pain.  CT scan of the abdomen pelvis performed showed  finding of possible gastritis and the patient was evaluated by GI.  He was started on PPI drip and underwent EGD that showed normal gastric mucosa biopsies were taken.  During his hospital stay he was noted to have worsening creatinine with creatinine up to 1.4 today from 1.1 as baseline.  He was also noted to have worsening metabolic acidosis with bicarb down to 16 with high anion gap.  Lactic acid was elevated at 3.8 with hemoglobin down to 10.7 from 15 yesterday.  Sodium was noted to be 128, potassium 4.6.  Review of Systems:   14 point review of systems is otherwise negative except for mentioned above on HPI    Personal History     Past Medical History:   Diagnosis Date    Anxiety     CHF (congestive heart failure)     Cough     Inability to attain erection     Injury of acoustic nerve     Myalgia     Type II diabetes mellitus     Venous insufficiency     Viral illness     Vitamin D deficiency        Past Surgical History:   Procedure Laterality Date    CARDIAC CATHETERIZATION N/A 9/13/2023    Procedure: Left Heart Cath;  Surgeon: Kaylee Kay MD;  Location: [facility] CATH INVASIVE LOCATION;  Service: Cardiology;  Laterality: N/A;    CARDIAC CATHETERIZATION N/A 9/13/2023    Procedure: Coronary angiography;  Surgeon: Kaylee Kay MD;  Location:  [facility] CATH INVASIVE LOCATION;  Service: Cardiology;  Laterality: N/A;       Family History: family history includes Diabetes in his mother.    Social History:  reports that he has never smoked. He has never used smokeless tobacco. He reports that he does not drink alcohol and does not use drugs.    Home Medications:  Prior to Admission medications    Medication Sig Start Date End Date Taking? Authorizing Provider   melatonin 5 MG tablet tablet Take 1 tablet by mouth At Night As Needed (insomnia). 1/19/24  Yes Alexandra Ward MD   ondansetron (ZOFRAN) 8 MG tablet Take 1 tablet by mouth Every 8 (Eight) Hours As Needed for Nausea or Vomiting. 6/5/24  Yes Kanwal Pearson  R, APRN   spironolactone (ALDACTONE) 25 MG tablet TAKE 1 TABLET BY MOUTH EVERY DAY 9/24/24  Yes Alexandra Ward MD   venlafaxine XR (EFFEXOR-XR) 150 MG 24 hr capsule Take 1 capsule by mouth Daily.  Patient taking differently: Take 75 mg by mouth Daily. 8/9/24  Yes Juju Kennedy MD   vitamin D (ERGOCALCIFEROL) 1.25 MG (31521 UT) capsule capsule Take 1 capsule by mouth 1 (One) Time Per Week. 9/7/20  Yes ProviderSaud MD   atorvastatin (LIPITOR) 40 MG tablet Take 1 tablet by mouth Daily. 8/9/24   Juju Kennedy MD   carvedilol (COREG) 25 MG tablet Take 1 tablet by mouth Every 12 (Twelve) Hours. 8/9/24   Juju Kennedy MD   empagliflozin (JARDIANCE) 10 MG tablet tablet Take 1 tablet by mouth Daily. 1/19/24   Alexandra Ward MD   hydrOXYzine (ATARAX) 10 MG tablet TAKE 1 TABLET BY MOUTH EVERY 8 HOURS AS NEEDED FOR ANXIETY. 4/29/24   Juju Kennedy MD   metFORMIN (GLUCOPHAGE) 1000 MG tablet Take 1 tablet by mouth 2 (Two) Times a Day With Meals. 1/12/24   Juju Kennedy MD   NovoLOG FlexPen 100 UNIT/ML solution pen-injector sc pen INJECT 5 UNITS INTO THE SKIN 3 (THREE) TIMES DAILY WITH MEALS PLUS UP TO 15 UNITS SLIDING SCALE.    Provider, MD Saud   sacubitril-valsartan (ENTRESTO) 24-26 MG tablet Take 1 tablet by mouth 2 (Two) Times a Day. 8/9/24   Juju Kennedy MD       Allergies:  No Known Allergies    Objective     Vitals:   Temp:  [97.7 °F (36.5 °C)-99.1 °F (37.3 °C)] 99.1 °F (37.3 °C)  Heart Rate:  [] 96  Resp:  [16-24] 24  BP: ()/(48-90) 92/57  Flow (L/min) (Oxygen Therapy):  [2-10] 10    Intake/Output Summary (Last 24 hours) at 10/24/2024 1750  Last data filed at 10/24/2024 1301  Gross per 24 hour   Intake 600 ml   Output 400 ml   Net 200 ml       Physical Exam:   Constitutional: Awake, alert, no acute distress.  HEENT: Sclera anicteric, no conjunctival injection  Neck: Supple, no thyromegaly, no lymphadenopathy, trachea at midline, no JVD  Respiratory: Clear  to auscultation bilaterally, nonlabored respiration  Cardiovascular: RRR, no murmurs, no rubs or gallops, no carotid bruit  Gastrointestinal: Positive bowel sounds, abdomen is soft, nontender and nondistended  : No palpable bladder  Musculoskeletal: No edema, no clubbing or cyanosis  Psychiatric: Appropriate affect, cooperative  Neurologic: Oriented x3, moving all extremities, normal speech and mental status  Skin: Warm and dry       Scheduled Meds:     atorvastatin, 40 mg, Oral, Daily  carvedilol, 25 mg, Oral, Q12H  insulin glargine, 10 Units, Subcutaneous, Nightly  insulin lispro, 2-7 Units, Subcutaneous, 4x Daily AC & at Bedtime  insulin regular, 10 Units, Subcutaneous, TID AC  pantoprazole, 40 mg, Oral, BID AC  piperacillin-tazobactam, 3.375 g, Intravenous, Q8H  [Held by provider] sacubitril-valsartan, 1 tablet, Oral, BID  senna-docusate sodium, 2 tablet, Oral, BID  [Held by provider] spironolactone, 25 mg, Oral, Daily  venlafaxine XR, 75 mg, Oral, Daily      IV Meds:        Results Reviewed:   I have personally reviewed the results from the time of this admission to 10/24/2024 17:50 EDT     Lab Results   Component Value Date    GLUCOSE 264 (H) 10/24/2024    CALCIUM 8.8 10/24/2024     (L) 10/24/2024    K 4.6 10/24/2024    CO2 16.2 (L) 10/24/2024    CL 95 (L) 10/24/2024    BUN 30 (H) 10/24/2024    CREATININE 1.42 (H) 10/24/2024    EGFRIFAFRI >60 08/26/2022    EGFRIFNONA 93 10/09/2020    BCR 21.1 10/24/2024    ANIONGAP 16.8 (H) 10/24/2024      Lab Results   Component Value Date    MG 2.0 08/31/2024    ALBUMIN 4.6 10/22/2024           Assessment / Plan     ASSESSMENT:  Acute kidney injury: Multifactorial secondary to prerenal/ATN due to hypovolemia/hypotension in the setting of Entresto, Aldactone and Jardiance use in addition to contribution from post contrast-induced nephropathy.  Patient also received 1 dose of Toradol that could be contributing.  Currently hypotensive received couple boluses of IV  fluids.  Hyponatremia likely hypovolemic with contribution from poor solute intake  High anion gap metabolic acidosis secondary to acute kidney injury and lactic acidosis.  Cannot rule out aggressively DKA due to Jardiance use  History of congestive heart failure ejection fraction 35%  Severe anemia with hemoglobin dropping from 15 milligram per deciliter to 10.7 mg/dL.  History of hypertension currently hypotensive  Lactic acidosis secondary to poor tissue perfusion.  The patient is also on metformin      PLAN:  I believe the patient is hypovolemic  Given another bolus of IV fluids.  Will stop Entresto, Jardiance and Aldactone  Recheck H&H  Strict intake and output  Check urinalysis, urine sodium Ikorel urine osmolality  Labs in a.m.      Thank you for involving us in the care of Donnie Bishop.  Please feel free to call with any questions.    Evan Pat MD  10/24/24  17:50 EDT    [facility]  [phone]    Parts of this note may be an electronic transcription/translation of spoken language to printed text using the Dragon dictation system.      Electronically signed by Evan Pat MD at 10/24/24 1803       Elma Wiggins MD at 10/23/24 1342        Consult Orders    1. Inpatient Gastroenterology Consult [[id number]] ordered by James Alexandra MD at 10/22/24 2026                 [facility] of [facility]  Initial Inpatient Consult Note    Referring Provider: Dr. Carcamo    Reason for Consultation: abdominal pain    Subjective     History of present illness:    49 y.o. male, not previously known to our service, that we are asked to see for epigastric pain.  Patient was admitted last night with complaints of significant upper abdominal pain that began yesterday morning and progressively worsened throughout the day.  The pain awoke him from sleep and radiated to his right abdomen as well as his back.  He reports that has been somewhat nauseated.  He has not had a bowel  movement since admission.  He has been NPO.  Nursing reports he has had a change of mental status here recently and is less responsive.  He reports that he needs something to eat.  He has been NPO.  His vitals are stable and his oxygen saturation is acceptable.  He has had 2 doses of narcotics but his last dose was 4 hours ago.  His blood sugar was 135.  He denies any NSAID use.    He had a mild leukocytosis at 13,000 on admission with a normal hemoglobin.  CT of the abdomen and pelvis with IV contrast, reviewed by me, shows significant gastric wall thickening involving the gastric antrum with adjacent fat stranding.    Past Medical History:  Past Medical History:   Diagnosis Date    Anxiety     CHF (congestive heart failure)     Cough     Inability to attain erection     Injury of acoustic nerve     Myalgia     Type II diabetes mellitus     Venous insufficiency     Viral illness     Vitamin D deficiency      Past Surgical History:  Past Surgical History:   Procedure Laterality Date    CARDIAC CATHETERIZATION N/A 9/13/2023    Procedure: Left Heart Cath;  Surgeon: Kaylee Kay MD;  Location:  [facility];  Service: Cardiology;  Laterality: N/A;    CARDIAC CATHETERIZATION N/A 9/13/2023    Procedure: Coronary angiography;  Surgeon: Kaylee Kay MD;  Location:  [facility];  Service: Cardiology;  Laterality: N/A;      Social History:   Social History     Tobacco Use    Smoking status: Never    Smokeless tobacco: Never   Substance Use Topics    Alcohol use: Never      Family History:  Family History   Problem Relation Age of Onset    Diabetes Mother        Home Meds:  Medications Prior to Admission   Medication Sig Dispense Refill Last Dose/Taking    melatonin 5 MG tablet tablet Take 1 tablet by mouth At Night As Needed (insomnia). 90 tablet 1 10/22/2024    ondansetron (ZOFRAN) 8 MG tablet Take 1 tablet by mouth Every 8 (Eight) Hours As Needed for Nausea or Vomiting. 8 tablet 0  10/22/2024    spironolactone (ALDACTONE) 25 MG tablet TAKE 1 TABLET BY MOUTH EVERY DAY 90 tablet 1 10/22/2024    venlafaxine XR (EFFEXOR-XR) 150 MG 24 hr capsule Take 1 capsule by mouth Daily. (Patient taking differently: Take 75 mg by mouth Daily.) 90 capsule 1 10/22/2024    vitamin D (ERGOCALCIFEROL) 1.25 MG (55426 UT) capsule capsule Take 1 capsule by mouth 1 (One) Time Per Week.   10/22/2024    atorvastatin (LIPITOR) 40 MG tablet Take 1 tablet by mouth Daily. 90 tablet 3 Morning    carvedilol (COREG) 25 MG tablet Take 1 tablet by mouth Every 12 (Twelve) Hours. 180 tablet 1     empagliflozin (JARDIANCE) 10 MG tablet tablet Take 1 tablet by mouth Daily. 90 tablet 1     hydrOXYzine (ATARAX) 10 MG tablet TAKE 1 TABLET BY MOUTH EVERY 8 HOURS AS NEEDED FOR ANXIETY. 270 tablet 1     metFORMIN (GLUCOPHAGE) 1000 MG tablet Take 1 tablet by mouth 2 (Two) Times a Day With Meals. 180 tablet 3 Morning    NovoLOG FlexPen 100 UNIT/ML solution pen-injector sc pen INJECT 5 UNITS INTO THE SKIN 3 (THREE) TIMES DAILY WITH MEALS PLUS UP TO 15 UNITS SLIDING SCALE.   Morning    sacubitril-valsartan (ENTRESTO) 24-26 MG tablet Take 1 tablet by mouth 2 (Two) Times a Day. 180 tablet 1      Current Meds:   atorvastatin, 40 mg, Oral, Daily  carvedilol, 25 mg, Oral, Q12H  insulin lispro, 2-7 Units, Subcutaneous, 4x Daily AC & at Bedtime  pantoprazole, 40 mg, Intravenous, BID AC  sacubitril-valsartan, 1 tablet, Oral, BID  senna-docusate sodium, 2 tablet, Oral, BID  spironolactone, 25 mg, Oral, Daily  venlafaxine XR, 75 mg, Oral, Daily      Allergies:  No Known Allergies  Review of Systems  Pertinent items are noted in HPI     Objective     Vital Signs  Temp:  [98 °F (36.7 °C)-99.1 °F (37.3 °C)] 98.3 °F (36.8 °C)  Heart Rate:  [] 105  Resp:  [17-20] 17  BP: ()/() 91/53  Physical Exam:  General Appearance:  Initially slow to respond but improved with stimulation and oral intake   Head:    Normocephalic, without obvious  abnormality, atraumatic   Eyes:          conjunctivae and sclerae normal, no   icterus   Throat:   no thrush, oral mucosa moist   Neck:   Supple, no adenopathy   Lungs:     Clear to auscultation bilaterally    Heart:    Regular rhythm and normal rate    Chest Wall:    No abnormalities observed   Abdomen:     Soft, nondistended, tender in the upper abdomen without rebound or guarding; normal bowel sounds   Extremities:   no edema, no redness   Skin:   No bruising or rash       Results Review:   I reviewed the patient's new clinical results.    Results from last 7 days   Lab Units 10/23/24  0311 10/22/24  1557   WBC 10*3/mm3 12.59* 13.79*   HEMOGLOBIN g/dL 14.5 14.2   HEMATOCRIT % 42.6 40.8   PLATELETS 10*3/mm3 384 388     Results from last 7 days   Lab Units 10/23/24  0311 10/22/24  1557   SODIUM mmol/L 134* 136   POTASSIUM mmol/L 4.8 4.8   CHLORIDE mmol/L 96* 96*   CO2 mmol/L 24.3 24.6   BUN mg/dL 13 9   CREATININE mg/dL 1.19 1.07   CALCIUM mg/dL 10.1 10.5   BILIRUBIN mg/dL  --  2.6*   ALK PHOS U/L  --  60   ALT (SGPT) U/L  --  11   AST (SGOT) U/L  --  10   GLUCOSE mg/dL 112* 183*         Lab Results   Lab Value Date/Time    LIPASE 9 (L) 10/22/2024 1557    LIPASE 36 08/31/2024 0029    LIPASE 20 05/24/2021 1838       Radiology:  CT Abdomen Pelvis With Contrast   Final Result       1. Gastric antral wall thickening with surrounding inflammation and some   ill-defined fluid with inflammation extending to the gallbladder which   appears partially contracted and extending into the pancreaticoduodenal   groove. Finding may be related to gastritis or underlying peptic ulcer   disease. Consider endoscopy evaluation.   2. Small amount of free fluid seen in the abdomen and pelvis       This report was finalized on 10/22/2024 6:37 PM by Dr. Connor Vasquez M.D on Workstation: [id number]              Assessment & Plan   Active Hospital Problems    Diagnosis     **Gastritis     Leukocytosis     Chronic combined systolic and  diastolic heart failure     Abdominal pain     Anxiety     Multiple-type hyperlipidemia     Type 2 diabetes mellitus with diabetic neuropathy, with long-term current use of insulin     Benign essential HTN        Assessment:  Upper abdominal pain  Abnormal CT  Type 2 diabetes      Plan:  Certainly with the CT findings there is concern for peptic ulcer disease.  Recommend endoscopic evaluation which will be performed tomorrow for further evaluation of these findings and his symptoms.  Twice daily PPI in interim, add carafate  No evidence of GI bleeding-follow H&H  Okay for full liquid diet in interim until midnight      I discussed the patients findings and my recommendations with patient and nursing staff.          Elma Wiggins M.D.  [facility]  [phone]          Electronically signed by Elma Wiggins MD at 10/23/24 8666

## 2024-10-25 NOTE — PLAN OF CARE
Goal Outcome Evaluation:              Outcome Evaluation: AxOx4, still drowsy at times, PRN pain meds given for abdominal pain.Did HIDA scan today, seen by GI today and plan for duplex mesenteric tomorrow and needs to be strict NPO midnight.Continues on IV ABT and IV fluids.Needs attended, will continue to monitor.

## 2024-10-25 NOTE — PROGRESS NOTES
Patient was already seen earlier today by Dr. Richard Brooks for his hypertension, he is on midodrine, he had some gentle hydration  Blood pressure seems to be doing better with the diastolic above 65 on the last 2 consecutive measures  We will continue to monitor, will intervene and consider further action in case of recurrent hypotension.

## 2024-10-25 NOTE — SIGNIFICANT NOTE
10/25/24 0900   OTHER   Discipline physical therapist   Rehab Time/Intention   Session Not Performed patient/family declined evaluation  (pt declined to participate in PT this morning, states he is in too much pain. notified LUCIO Johnston. PT will check back tomorrow)   Recommendation   PT - Next Appointment 10/26/24

## 2024-10-25 NOTE — PROGRESS NOTES
[facility]  Inpatient Progress Note    Reason for Follow Up:  epigastric pain    Subjective     Interval History:   Transferred to icu due to hypotension  Continues to complain of abdominal pain-generalized.  No nausea or vomiting    Current Facility-Administered Medications:     atorvastatin (LIPITOR) tablet 40 mg, 40 mg, Oral, Daily, Elma Wiggins MD, 40 mg at 10/24/24 0822    sennosides-docusate (PERICOLACE) 8.6-50 MG per tablet 2 tablet, 2 tablet, Oral, BID, 2 tablet at 10/24/24 2142 **AND** polyethylene glycol (MIRALAX) packet 17 g, 17 g, Oral, Daily PRN **AND** bisacodyl (DULCOLAX) EC tablet 5 mg, 5 mg, Oral, Daily PRN **AND** bisacodyl (DULCOLAX) suppository 10 mg, 10 mg, Rectal, Daily PRN, Elma Wiggins MD    Calcium Replacement - Follow Nurse / BPA Driven Protocol, , Does not apply, PRN, Elma Wiggins MD    carvedilol (COREG) tablet 6.25 mg, 6.25 mg, Oral, Q12H, Godwin Carcamo, DO, 6.25 mg at 10/24/24 2142    dextrose (D50W) (25 g/50 mL) IV injection 25 g, 25 g, Intravenous, Q15 Min PRN, Elma Wiggins MD    dextrose (GLUTOSE) oral gel 15 g, 15 g, Oral, Q15 Min PRN, Elma Wiggins MD    glucagon (GLUCAGEN) injection 1 mg, 1 mg, Intramuscular, Q15 Min PRN, Elma Wiggins MD    HYDROcodone-acetaminophen (NORCO) 5-325 MG per tablet 1 tablet, 1 tablet, Oral, Q6H PRN, Elma Wiggins MD, 1 tablet at 10/24/24 2142    HYDROmorphone (DILAUDID) injection 0.5 mg, 0.5 mg, Intravenous, Q4H PRN, Elma Wiggins MD, 0.5 mg at 10/24/24 2009    hydrOXYzine (ATARAX) tablet 10 mg, 10 mg, Oral, Q8H PRN, Elma Wiggins MD, 10 mg at 10/23/24 1447    influenza virus vacc split PF FLUZONE 0.5 mL, 0.5 mL, Intramuscular, During Hospitalization, Elma Wiggins MD    insulin glargine (LANTUS, SEMGLEE) injection 10 Units, 10 Units, Subcutaneous, Nightly, Godwin Carcamo, , 10 Units at 10/24/24 2660    insulin lispro (HUMALOG/ADMELOG) injection 2-7 Units, 2-7 Units,  Subcutaneous, 4x Daily AC & at Bedtime, Elma Wiggins MD, 4 Units at 10/24/24 2142    insulin regular (humuLIN R,novoLIN R) injection 5 Units, 5 Units, Subcutaneous, TID ACDona Jimmy,     Magnesium Standard Dose Replacement - Follow Nurse / BPA Driven Protocol, , Does not apply, Rosalie DOWD Lauren C., MD    melatonin tablet 2.5 mg, 2.5 mg, Oral, Nightly PRN, Elma Wiggins MD    midodrine (PROAMATINE) tablet 10 mg, 10 mg, Oral, TID Tram BOWIE Scott P, MD    [DISCONTINUED] morphine injection 1 mg, 1 mg, Intravenous, Q4H PRN, 1 mg at 10/23/24 0832 **AND** naloxone (NARCAN) injection 0.4 mg, 0.4 mg, Intravenous, Q5 Min PRN, Elma Wiggins MD    ondansetron ODT (ZOFRAN-ODT) disintegrating tablet 4 mg, 4 mg, Oral, Q6H PRN **OR** ondansetron (ZOFRAN) injection 4 mg, 4 mg, Intravenous, Q6H PRN, Elma Wiggins MD, 4 mg at 10/22/24 2128    pantoprazole (PROTONIX) EC tablet 40 mg, 40 mg, Oral, BID AC, Elma Wiggins MD, 40 mg at 10/24/24 1758    Phosphorus Replacement - Follow Nurse / BPA Driven Protocol, , Does not apply, Rosalie DOWD Lauren C., MD    piperacillin-tazobactam (ZOSYN) 3.375 g IVPB in 100 mL NS MBP (CD), 3.375 g, Intravenous, Q8H, Elma Wiggins MD, 3.375 g at 10/25/24 0612    Potassium Replacement - Follow Nurse / BPA Driven Protocol, , Does not apply, Rosalie DOWD Lauren C., MD    sodium chloride 0.9 % flush 10 mL, 10 mL, Intravenous, PRN, Elma Wiggins MD    sodium chloride 0.9 % infusion, 100 mL/hr, Intravenous, Continuous, Evan Pat MD, Last Rate: 100 mL/hr at 10/24/24 2009, 100 mL/hr at 10/24/24 2009    venlafaxine XR (EFFEXOR-XR) 24 hr capsule 75 mg, 75 mg, Oral, Daily, Elma Wiggins MD, 75 mg at 10/24/24 0822  Review of Systems:    + abd pain, no n/v, no f/c    Objective     Vital Signs  Temp:  [98 °F (36.7 °C)-99.1 °F (37.3 °C)] 98.6 °F (37 °C)  Heart Rate:  [] 83  Resp:  [16-24] 22  BP: ()/(48-77) 98/66  Body mass index is 27.97  kg/m².    Intake/Output Summary (Last 24 hours) at 10/25/2024 0625  Last data filed at 10/25/2024 0500  Gross per 24 hour   Intake 600 ml   Output 1100 ml   Net -500 ml     I/O this shift:  In: -   Out: 600 [Urine:600]     Physical Exam:   General: patient awake, slightly agitated   Eyes: Normal lids and lashes, no scleral icterus   Neck: supple, normal ROM   Skin: warm and dry, not jaundiced   Cardiovascular: regular rhythm and rate    Pulm: regular and unlabored   Abdomen: soft, mild diffuse tenderness-no rebound or guarding, his complaints of pain are out of proportion to exam, nondistended; normal bowel sounds   Psychiatric: Normal mood and behavior; memory intact     Results Review:     I reviewed the patient's new clinical results.    Results from last 7 days   Lab Units 10/25/24  0426 10/24/24  2126 10/24/24  0617   WBC 10*3/mm3 7.93 9.87 10.27   HEMOGLOBIN g/dL 10.9* 10.6* 10.7*   HEMATOCRIT % 32.4* 31.2* 32.9*   PLATELETS 10*3/mm3 278 273 266     Results from last 7 days   Lab Units 10/25/24  0426 10/24/24  1715 10/24/24  0617 10/23/24  0311 10/22/24  1557   SODIUM mmol/L 132* 132* 128*   < > 136   POTASSIUM mmol/L 4.5 4.9 4.6   < > 4.8   CHLORIDE mmol/L 98 98 95*   < > 96*   CO2 mmol/L 20.0* 18.0* 16.2*   < > 24.6   BUN mg/dL 38* 35* 30*   < > 9   CREATININE mg/dL 1.75* 1.57* 1.42*   < > 1.07   CALCIUM mg/dL 8.6 8.5* 8.8   < > 10.5   BILIRUBIN mg/dL  --   --   --   --  2.6*   ALK PHOS U/L  --   --   --   --  60   ALT (SGPT) U/L  --   --   --   --  11   AST (SGOT) U/L  --   --   --   --  10   GLUCOSE mg/dL 213* 255* 264*   < > 183*    < > = values in this interval not displayed.         Lab Results   Lab Value Date/Time    LIPASE 9 (L) 10/22/2024 1557    LIPASE 36 08/31/2024 0029    LIPASE 20 05/24/2021 1838       Radiology:  CT Abdomen Pelvis With Contrast   Final Result       1. Gastric antral wall thickening with surrounding inflammation and some   ill-defined fluid with inflammation extending to the  gallbladder which   appears partially contracted and extending into the pancreaticoduodenal   groove. Finding may be related to gastritis or underlying peptic ulcer   disease. Consider endoscopy evaluation.   2. Small amount of free fluid seen in the abdomen and pelvis       This report was finalized on 10/22/2024 6:37 PM by Dr. Connor Vasquez M.D on Workstation: [id number]          NM HIDA SCAN WITH PHARMACOLOGICAL INTERVENTION    (Results Pending)       Assessment & Plan     Active Hospital Problems    Diagnosis     **Gastritis     Leukocytosis     Chronic combined systolic and diastolic heart failure     Abdominal pain     Epigastric abdominal pain     Anxiety     Multiple-type hyperlipidemia     Type 2 diabetes mellitus with diabetic neuropathy, with long-term current use of insulin     Benign essential HTN        Assessment:  Upper abdominal pain  Abnormal CT  Type 2 diabetes       Plan:  EGD yesterday with no significant inflammation or ulceration.  No findings to account for his discomfort.  HIDA scan completed, awaiting read.  Given hypotension and cardiomyopathy will also check mesenteric Doppler to rule out ischemia  Continue daily PPI  Consistent carbohydrate diet as tolerated    I discussed the patients findings and my recommendations with patient and nursing staff.            Elma Wiggins M.D.  [facility]  [phone]

## 2024-10-25 NOTE — PROGRESS NOTES
Name: Donnie Bishop ADMIT: 10/22/2024   : 1975  PCP: Juju Kennedy MD    MRN: [id number] LOS: 1 days   AGE/SEX: 49 y.o. male  ROOM: [facility]/     Subjective   Subjective   Patient transferred out of observation unit last night, after found to have hypotension, tachycardia.  He does confirm continued abdominal pain, and feeling thirsty.          Objective   Objective   Vital Signs  Temp:  [98 °F (36.7 °C)-98.8 °F (37.1 °C)] 98.5 °F (36.9 °C)  Heart Rate:  [] 83  Resp:  [22-24] 22  BP: ()/(60-77) 98/66  SpO2:  [92 %-96 %] 96 %  on  Flow (L/min) (Oxygen Therapy):  [3-4] 3;   Device (Oxygen Therapy): nasal cannula  Body mass index is 27.97 kg/m².  Physical Exam  Vitals and nursing note reviewed.   Constitutional:       Appearance: He is obese. He is ill-appearing. He is not diaphoretic.   HENT:      Head: Atraumatic.      Mouth/Throat:      Mouth: Mucous membranes are dry.   Cardiovascular:      Rate and Rhythm: Tachycardia present.      Pulses: Normal pulses.           Radial pulses are 2+ on the right side and 2+ on the left side.        Dorsalis pedis pulses are 2+ on the right side and 2+ on the left side.        Posterior tibial pulses are 2+ on the right side and 2+ on the left side.      Heart sounds: Normal heart sounds.      Comments: Trace edema   Pulmonary:      Effort: Pulmonary effort is normal.      Breath sounds: Normal breath sounds.   Abdominal:      General: Bowel sounds are normal.      Palpations: Abdomen is soft.      Tenderness: There is abdominal tenderness. There is guarding.      Comments: Severe tenderness with palpation, and auscultation.   Musculoskeletal:      Right lower leg: Edema present.      Left lower leg: Edema present.   Skin:     General: Skin is warm and dry.      Capillary Refill: Capillary refill takes less than 2 seconds.   Neurological:      General: No focal deficit present.      Mental Status: He is alert and oriented to person, place, and time.    Psychiatric:         Mood and Affect: Mood normal.       Results Review     I reviewed the patient's new clinical results.  Results from last 7 days   Lab Units 10/25/24  0426 10/24/24  2126 10/24/24  0617 10/23/24  1433   WBC 10*3/mm3 7.93 9.87 10.27 12.62*   HEMOGLOBIN g/dL 10.9* 10.6* 10.7* 15.1   PLATELETS 10*3/mm3 278 273 266 399     Results from last 7 days   Lab Units 10/25/24  0426 10/24/24  1715 10/24/24  0617 10/23/24  0311   SODIUM mmol/L 132* 132* 128* 134*   POTASSIUM mmol/L 4.5 4.9 4.6 4.8   CHLORIDE mmol/L 98 98 95* 96*   CO2 mmol/L 20.0* 18.0* 16.2* 24.3   BUN mg/dL 38* 35* 30* 13   CREATININE mg/dL 1.75* 1.57* 1.42* 1.19   GLUCOSE mg/dL 213* 255* 264* 112*   EGFR mL/min/1.73 47.7* 54.4* 61.3 75.8     Results from last 7 days   Lab Units 10/25/24  0426 10/22/24  1557   ALBUMIN g/dL 3.2* 4.6   BILIRUBIN mg/dL 1.8* 2.6*   ALK PHOS U/L 72 60   AST (SGOT) U/L 148* 10   ALT (SGPT) U/L 77* 11     Results from last 7 days   Lab Units 10/25/24  0426 10/24/24  1715 10/24/24  0617 10/23/24  0311 10/22/24  1557   CALCIUM mg/dL 8.6 8.5* 8.8 10.1 10.5   ALBUMIN g/dL 3.2*  --   --   --  4.6     Results from last 7 days   Lab Units 10/25/24  0426 10/24/24  1404 10/24/24  0617 10/24/24  0012 10/23/24  2104   PROCALCITONIN ng/mL 78.10*  --   --   --   --    LACTATE mmol/L  --  3.8* 3.3* 3.6* 4.5*     Hemoglobin A1C   Date/Time Value Ref Range Status   10/24/2024 0617 9.40 (H) 4.80 - 5.60 % Final     Glucose   Date/Time Value Ref Range Status   10/25/2024 1149 230 (H) 70 - 130 mg/dL Final   10/24/2024 2132 259 (H) 70 - 130 mg/dL Final   10/24/2024 2005 286 (H) 70 - 130 mg/dL Final   10/24/2024 1722 255 (H) 70 - 130 mg/dL Final   10/24/2024 1603 243 (H) 70 - 130 mg/dL Final   10/24/2024 1114 224 (H) 70 - 130 mg/dL Final   10/24/2024 0558 263 (H) 70 - 130 mg/dL Final       NM HIDA SCAN WITH PHARMACOLOGICAL INTERVENTION    Result Date: 10/25/2024   1. Patent cystic duct. 2. Patent CBD. 3. Ejection fraction is  diminished which can be seen with chronic cholecystitis or functional gallbladder disorder.     This report was finalized on 10/25/2024 9:36 AM by Dr. Connor Vasquez M.D on Workstation: [id number]       I have personally reviewed all medications:  Scheduled Medications  atorvastatin, 40 mg, Oral, Daily  [Held by provider] carvedilol, 6.25 mg, Oral, Q12H  insulin glargine, 10 Units, Subcutaneous, Nightly  insulin lispro, 2-7 Units, Subcutaneous, 4x Daily AC & at Bedtime  insulin regular, 5 Units, Subcutaneous, TID AC  midodrine, 10 mg, Oral, TID AC  mupirocin, 1 Application, Each Nare, BID  pantoprazole, 40 mg, Oral, BID AC  piperacillin-tazobactam, 3.375 g, Intravenous, Q8H  senna-docusate sodium, 2 tablet, Oral, BID  venlafaxine XR, 75 mg, Oral, Daily    Infusions  sodium chloride, 100 mL/hr, Last Rate: 100 mL/hr (10/25/24 1055)    Diet  Diet: Diabetic; Consistent Carbohydrate; Fluid Consistency: Thin (IDDSI 0)  NPO Diet NPO Type: Strict NPO    I have personally reviewed:  [x]  Laboratory   [x]  Microbiology   [x]  Radiology   [x]  EKG/Telemetry  [x]  Cardiology/Vascular   []  Pathology    []  Records       Assessment/Plan     Active Hospital Problems    Diagnosis  POA    **Gastritis [K29.70]  Yes    Leukocytosis [D72.829]  Yes    Chronic combined systolic and diastolic heart failure [I50.42]  Yes    Abdominal pain [R10.9]  Yes    Epigastric abdominal pain [R10.13]  Unknown    Anxiety [F41.9]  Yes    Multiple-type hyperlipidemia [E78.2]  Yes    Type 2 diabetes mellitus with diabetic neuropathy, with long-term current use of insulin [E11.40, Z79.4]  Not Applicable    Benign essential HTN [I10]  Yes      Resolved Hospital Problems   No resolved problems to display.       49 y.o. male admitted with:  Gastritis   Abdominal pain  Currently with severe but improving pain   GI following -plan for EGD today-No findings to explain abdominal discomfort  HIDA scan pending  CT scan reviewed -endoscopy recommended gastritis  versus peptic ulcer disease the emergency room.  Continue antiemetics  Appreciate gastroenterology's assistance and recommendations.      Leukocytosis/ Sepsis -possibly source  Metabolic acidosis with increasing anion gap-yesterday  Acute kidney injury-injury to sepsis, hypotension-improvement in renal function with good perfusion.  Lactic acid,  Worsening renal function-  Started on Zosyn yesterday  Blood cultures are pending will need to follow  Hypotension-despite fluid boluses, fluid infusions.  Noted metabolic acidosis on ABG-patient's lactic trending up yesterday afternoon-defer to higher level of care and close monitoring -as per pulmonology/intensivist consulted-patient with her recommendations and assistance.  Neurology consulted for TENISHA-appreciate their recommendations and assistance.    Chronic combined systolic and diastolic heart failure  Chronic  Last echocardiogram September 12, 2023 LVEF 35.3%  Home Entresto, and spironolactone on hold-of hypotension  Hold home Jardiance   He has very mild to trace bilateral lower extremity edema, but otherwise does not appear to be volume overloaded.    Benign essential HTN  Chronic   Blood pressures acceptable currently.  Continue home antihypertensives    Type 2 diabetes mellitus with diabetic neuropathy, with long-term current use of insulin  Chronic   Last hemoglobin A1c 9.80 on 12/27/2023  SSI ordered for hyperglycemia  Hold home Jardiance, and metformin  Hypoglycemia treatment per protocol  Continue to monitor and treat hyperglycemia    Anxiety  Chronic  Continue home Atarax, and Effexor          SCDs for DVT prophylaxis.  Full code.  Discussed with patient, family, care team on multidisciplinary rounds, and   .  Anticipate discharge home in 1-2 days.  Expected Discharge Date: 10/25/2024; Expected Discharge Time:       MICHELLE Shepherd  [facility]  10/25/24  14:46 EDT

## 2024-10-25 NOTE — PROGRESS NOTES
[facility] Progress Note      Patient Name: Donnie Bishop  : 1975  MRN: [id number]  Primary Care Physician:  Jjuu Kennedy MD  Date of admission: 10/22/2024    Subjective     Interval History:   The patient was seen and examined today for follow-up on TENISHA   Sleeping this morning.  Slow to respond.  Continues to complain of abdominal pain  Continues to be hypotensive  Review of Systems:   As noted above    Objective     Vitals:   Temp:  [98 °F (36.7 °C)-98.8 °F (37.1 °C)] 98.5 °F (36.9 °C)  Heart Rate:  [] 83  Resp:  [22-24] 22  BP: ()/(60-77) 98/66  Flow (L/min) (Oxygen Therapy):  [3-4] 3    Intake/Output Summary (Last 24 hours) at 10/25/2024 1523  Last data filed at 10/25/2024 1300  Gross per 24 hour   Intake 100 ml   Output 1100 ml   Net -1000 ml       Physical Exam:    General Appearance: alert, oriented x 3, no acute distress   Skin: warm and dry  HEENT: oral mucosa normal, nonicteric sclera  Neck: supple, no JVD  Lungs: CTA  Heart: RRR, normal S1 and S2  Abdomen: soft, nontender, nondistended  : no palpable bladder  Extremities: no edema, cyanosis or clubbing  Neuro: normal speech and mental status     Scheduled Meds:     atorvastatin, 40 mg, Oral, Daily  [Held by provider] carvedilol, 6.25 mg, Oral, Q12H  insulin glargine, 10 Units, Subcutaneous, Nightly  insulin lispro, 2-7 Units, Subcutaneous, 4x Daily AC & at Bedtime  insulin regular, 5 Units, Subcutaneous, TID AC  midodrine, 10 mg, Oral, TID AC  mupirocin, 1 Application, Each Nare, BID  pantoprazole, 40 mg, Oral, BID AC  piperacillin-tazobactam, 3.375 g, Intravenous, Q8H  senna-docusate sodium, 2 tablet, Oral, BID  venlafaxine XR, 75 mg, Oral, Daily      IV Meds:   sodium chloride, 100 mL/hr, Last Rate: 100 mL/hr (10/25/24 1055)        Results Reviewed:   I have personally reviewed the results from the time of this admission to 10/25/2024 15:23 EDT     Results from last 7 days   Lab Units 10/25/24  0423  10/24/24  1715 10/24/24  0617 10/23/24  0311 10/22/24  1557   SODIUM mmol/L 132* 132* 128*   < > 136   POTASSIUM mmol/L 4.5 4.9 4.6   < > 4.8   CHLORIDE mmol/L 98 98 95*   < > 96*   CO2 mmol/L 20.0* 18.0* 16.2*   < > 24.6   BUN mg/dL 38* 35* 30*   < > 9   CREATININE mg/dL 1.75* 1.57* 1.42*   < > 1.07   CALCIUM mg/dL 8.6 8.5* 8.8   < > 10.5   BILIRUBIN mg/dL 1.8*  --   --   --  2.6*   ALK PHOS U/L 72  --   --   --  60   ALT (SGPT) U/L 77*  --   --   --  11   AST (SGOT) U/L 148*  --   --   --  10   GLUCOSE mg/dL 213* 255* 264*   < > 183*    < > = values in this interval not displayed.       Estimated Creatinine Clearance: 53.2 mL/min (A) (by C-G formula based on SCr of 1.75 mg/dL (H)).          Results from last 7 days   Lab Units 10/24/24  0617   URIC ACID mg/dL 5.4       Results from last 7 days   Lab Units 10/25/24  0426 10/24/24  2126 10/24/24  0617 10/23/24  1433 10/23/24  0311   WBC 10*3/mm3 7.93 9.87 10.27 12.62* 12.59*   HEMOGLOBIN g/dL 10.9* 10.6* 10.7* 15.1 14.5   PLATELETS 10*3/mm3 278 273 266 399 384             Assessment / Plan     ASSESSMENT:  Acute kidney injury: Multifactorial secondary to prerenal/ATN due to hypovolemia/hypotension in the setting of Entresto, Aldactone and Jardiance use in addition to contribution from post contrast-induced nephropathy.  Patient also received 1 dose of Toradol that could be contributing.  Currently hypotensive received couple boluses of IV fluids.  Urine sodium less than 20 in favor of prerenal state.  No signs of obstruction on CT scan of the abdomen pelvis  Hyponatremia likely hypovolemic with contribution from poor solute intake.  Improving with normal saline  High anion gap metabolic acidosis secondary to acute kidney injury and lactic acidosis.   History of congestive heart failure ejection fraction 35%.  Currently compensated  Severe anemia   History of hypertension currently hypotensive  Lactic acidosis secondary to poor tissue perfusion.  The patient is also  on metformin cannot rule out midodrine ischemia        PLAN:  I believe that the patient continues to be hypovolemic.  He is hypotensive this morning  We will give 1 bolus of fluid and continue gentle IV hydration with normal saline  Agree with checking morning cortisol and starting midodrine  Labs in a.m.      Thank you for involving us in the care of Donnie Bishop.  Please feel free to call with any questions.    Evan Pat MD  10/25/24  15:23 EDT    [facility]  [phone]    Parts of this note may be an electronic transcription/translation of spoken language to printed text using the Dragon dictation system.

## 2024-10-25 NOTE — CONSULTS
[facility]    Reason for consult: Hypotension    HPI:  Mr. Bishop is a 50yo male with DM and chronic congestive heart failure with EF 35%.  He was admitted 10/22 with abdominal pain.  Since admission he has had low blood pressures.  He underwent EGD today which was fairly unremarkable, he has continue abdominal pain, he is scheudled for HIDA scan in the morning. He was evaluated by nephrology earlier and was thought to be dehydrated and iv fluids ordered. Mr. Bishop says she is dehyrdated and is begging for water.  He feels generally unwell but not lightheaded and not having any presyncope.  BP is actually over systolic 100 now.  Lactic is fairly flat and mildly elevated. He has minor gap and minor decrease in bicarb but normal ph on blood gas.  He is not tachycardic. He does not appear distressed but does appear ill.     Past Medical History:   Diagnosis Date    Anxiety     CHF (congestive heart failure)     Cough     Inability to attain erection     Injury of acoustic nerve     Myalgia     Type II diabetes mellitus     Venous insufficiency     Viral illness     Vitamin D deficiency      Social History     Socioeconomic History    Marital status:    Tobacco Use    Smoking status: Never    Smokeless tobacco: Never   Vaping Use    Vaping status: Never Used   Substance and Sexual Activity    Alcohol use: Never    Drug use: Never    Sexual activity: Defer     Family History   Problem Relation Age of Onset    Diabetes Mother      MEDS: reviewed as per chart  ALL: nkda  rOS: 12 point negative except as in HPI    Vital Sign Min/Max for last 24 hours  Temp  Min: 97.7 °F (36.5 °C)  Max: 99.1 °F (37.3 °C)   BP  Min: 73/48  Max: 102/60   Pulse  Min: 92  Max: 126   Resp  Min: 16  Max: 24   SpO2  Min: 92 %  Max: 100 %   Flow (L/min) (Oxygen Therapy)  Min: 2  Max: 10   Weight  Min: 81 kg (178 lb 9.6 oz)  Max: 81 kg (178 lb 9.6 oz)     GEN:   appears ill, A&O x3  HEENT: PERRL, EOMI, no icterus, mmm, no JVD,  trachea midline, neck supple  CHEST: CTA bilat, no wheezes, no crackles, no use of accessory muscles  CV: RRR, no m/g/r  ABD: soft,somewhat tender, nd +bs, no hepatosplenomegaly, no rebound no gaurding  EXT: no c/c/e  SKIN: no rashes, no xanthomas, decreasedl turgor, warm, dry  LYMPH: no palpable cervical or supraclavicular lymphadenopathy  NEURO: CN 2-12 intact and symmetric bilaterally  PSYCH: nl affect, nl orientation, nl judgment, nl mood  MSK: no kyphoscoliosis, 5/5 strength ue and le bilaterlaly    Labs: 10/24: reviewed:  Wbc 9.8  Hgb 10.6  Plts 273  7.37/32/59  Glucose 2155  Bun 35  Cr 1.57  Bicarb 18 (was 16)    A/P:  Hypotension -- patient relatively asymptomatic. Agree with iv fluids, will start some midodrine as well. Check cortisol. Can check orthostatics.   Abdominal pain -- per primary/GI  -- HIDA scan in AM  TENISHA - nephrology following  Anion gp metabolic acidosis -- minor, suspect due to lactic acidosis -- agree can't rule out euglycemic dka with jardiance, but seems less likely, observe for now.  Lactic acidosis -- was on metformin prior to admission, possibly some underperfusion as well  Anemia

## 2024-10-25 NOTE — CONSULTS
"General Surgery Consultation    Consulting Physician: Manuela Wisdom MD  Referring: Yaritza Hurtado MD     Reason for consultation:   Right upper quadrant abdominal pain.  Chronic cholecystitis on HIDA scan.    CC:   Abdominal pain    HPI:   The patient is a 49 y.o. male who presented to the hospital with abdominal pain in the right upper quadrant radiating to his back.  He underwent CT demonstrating evidence of possible antral gastritis versus ulcer.  He underwent EGD by Dr. Wiggins on 10/24/2024 which demonstrated normal findings.  He had a HIDA scan that was performed today which demonstrated filling of the gallbladder with patent cystic duct and common bile duct with ejection fraction 19% concerning for possible chronic cholecystitis.  The patient has a history of combined systolic and diastolic heart failure, type 2 diabetes mellitus, hypertension, hyperlipidemia, and anxiety.  He is unable to provide much history to me during my encounter with him as he appears somnolent and falls asleep easily and when he awakens answers very slowly and mutters mostly incomprehensible words, but with prompting he is able to verbalize the words \" no surgery\" when I tell him I am here to discuss his gallbladder.  His history is gleaned from the chart and from discussion with nursing and attending.    Past Medical History:  Past Medical History:   Diagnosis Date    Anxiety     CHF (congestive heart failure)     Cough     Inability to attain erection     Injury of acoustic nerve     Myalgia     Type II diabetes mellitus     Venous insufficiency     Viral illness     Vitamin D deficiency        Past Surgical History:  Past Surgical History:   Procedure Laterality Date    CARDIAC CATHETERIZATION N/A 9/13/2023    Procedure: Left Heart Cath;  Surgeon: Kaylee Kay MD;  Location: [facility] INVASIVE LOCATION;  Service: Cardiology;  Laterality: N/A;    CARDIAC CATHETERIZATION N/A 9/13/2023    Procedure: Coronary " angiography;  Surgeon: Kaylee Kay MD;  Location: [facility] CATH INVASIVE LOCATION;  Service: Cardiology;  Laterality: N/A;    ENDOSCOPY N/A 10/24/2024    Procedure: ESOPHAGOGASTRODUODENOSCOPY with biopsies;  Surgeon: Elma Wiggins MD;  Location: [facility] ENDOSCOPY;  Service: Gastroenterology;  Laterality: N/A;  pre-abdominal pain  post-normal       Medications:  Medications Prior to Admission   Medication Sig Dispense Refill Last Dose/Taking    melatonin 5 MG tablet tablet Take 1 tablet by mouth At Night As Needed (insomnia). 90 tablet 1 10/22/2024    ondansetron (ZOFRAN) 8 MG tablet Take 1 tablet by mouth Every 8 (Eight) Hours As Needed for Nausea or Vomiting. 8 tablet 0 10/22/2024    spironolactone (ALDACTONE) 25 MG tablet TAKE 1 TABLET BY MOUTH EVERY DAY 90 tablet 1 10/22/2024    venlafaxine XR (EFFEXOR-XR) 150 MG 24 hr capsule Take 1 capsule by mouth Daily. (Patient taking differently: Take 75 mg by mouth Daily.) 90 capsule 1 10/22/2024    vitamin D (ERGOCALCIFEROL) 1.25 MG (45881 UT) capsule capsule Take 1 capsule by mouth 1 (One) Time Per Week.   10/22/2024    atorvastatin (LIPITOR) 40 MG tablet Take 1 tablet by mouth Daily. 90 tablet 3 Morning    carvedilol (COREG) 25 MG tablet Take 1 tablet by mouth Every 12 (Twelve) Hours. 180 tablet 1     empagliflozin (JARDIANCE) 10 MG tablet tablet Take 1 tablet by mouth Daily. 90 tablet 1     hydrOXYzine (ATARAX) 10 MG tablet TAKE 1 TABLET BY MOUTH EVERY 8 HOURS AS NEEDED FOR ANXIETY. 270 tablet 1     metFORMIN (GLUCOPHAGE) 1000 MG tablet Take 1 tablet by mouth 2 (Two) Times a Day With Meals. 180 tablet 3 Morning    NovoLOG FlexPen 100 UNIT/ML solution pen-injector sc pen INJECT 5 UNITS INTO THE SKIN 3 (THREE) TIMES DAILY WITH MEALS PLUS UP TO 15 UNITS SLIDING SCALE.   Morning    sacubitril-valsartan (ENTRESTO) 24-26 MG tablet Take 1 tablet by mouth 2 (Two) Times a Day. 180 tablet 1        Current Facility-Administered Medications:     atorvastatin (LIPITOR) tablet  40 mg, 40 mg, Oral, Daily, Elma Wiggins MD, 40 mg at 10/25/24 1018    sennosides-docusate (PERICOLACE) 8.6-50 MG per tablet 2 tablet, 2 tablet, Oral, BID, 2 tablet at 10/25/24 1018 **AND** polyethylene glycol (MIRALAX) packet 17 g, 17 g, Oral, Daily PRN **AND** bisacodyl (DULCOLAX) EC tablet 5 mg, 5 mg, Oral, Daily PRN **AND** bisacodyl (DULCOLAX) suppository 10 mg, 10 mg, Rectal, Daily PRN, Elma Wiggins MD    Calcium Replacement - Follow Nurse / BPA Driven Protocol, , Does not apply, PRN, Elma Wiggins MD    [Held by provider] carvedilol (COREG) tablet 6.25 mg, 6.25 mg, Oral, Q12H, Godwin Carcamo, DO, 6.25 mg at 10/24/24 2142    dextrose (D50W) (25 g/50 mL) IV injection 25 g, 25 g, Intravenous, Q15 Min PRN, Elma Wiggins MD    dextrose (GLUTOSE) oral gel 15 g, 15 g, Oral, Q15 Min PRN, Elma Wiggins MD    glucagon (GLUCAGEN) injection 1 mg, 1 mg, Intramuscular, Q15 Min PRN, Elma Wiggins MD    HYDROcodone-acetaminophen (NORCO) 5-325 MG per tablet 1 tablet, 1 tablet, Oral, Q6H PRN, Elma Wiggins MD, 1 tablet at 10/25/24 1018    HYDROmorphone (DILAUDID) injection 0.5 mg, 0.5 mg, Intravenous, Q4H PRN, Elma Wiggins MD, 0.5 mg at 10/25/24 1136    hydrOXYzine (ATARAX) tablet 10 mg, 10 mg, Oral, Q8H PRN, Elma Wiggins MD, 10 mg at 10/23/24 1447    influenza virus vacc split PF FLUZONE 0.5 mL, 0.5 mL, Intramuscular, During Hospitalization, Elma Wiggins MD    insulin glargine (LANTUS, SEMGLEE) injection 10 Units, 10 Units, Subcutaneous, Nightly, Godwin Carcamo DO, 10 Units at 10/24/24 2143    insulin lispro (HUMALOG/ADMELOG) injection 2-7 Units, 2-7 Units, Subcutaneous, 4x Daily AC & at Bedtime, Elma Wiggins MD, 3 Units at 10/25/24 1140    insulin regular (humuLIN R,novoLIN R) injection 5 Units, 5 Units, Subcutaneous, TID AC, Godwin Carcamo DO, 5 Units at 10/25/24 1306    Magnesium Standard Dose Replacement - Follow Nurse / BPA Driven Protocol, , Does not apply, PRN,  Elma Wiggins MD    melatonin tablet 2.5 mg, 2.5 mg, Oral, Nightly PRN, Elma Wiggins MD    midodrine (PROAMATINE) tablet 10 mg, 10 mg, Oral, TID Tram BOWIE Scott P, MD, 10 mg at 10/25/24 1138    mupirocin (BACTROBAN) 2 % nasal ointment 1 Application, 1 Application, Each Nare, BID, Delaney Rosario APRN    [DISCONTINUED] morphine injection 1 mg, 1 mg, Intravenous, Q4H PRN, 1 mg at 10/23/24 0832 **AND** naloxone (NARCAN) injection 0.4 mg, 0.4 mg, Intravenous, Q5 Min PRN, Elma Wiggins MD    ondansetron ODT (ZOFRAN-ODT) disintegrating tablet 4 mg, 4 mg, Oral, Q6H PRN **OR** ondansetron (ZOFRAN) injection 4 mg, 4 mg, Intravenous, Q6H PRN, Elma Wiggins MD, 4 mg at 10/22/24 2128    pantoprazole (PROTONIX) EC tablet 40 mg, 40 mg, Oral, BID AC, Elma Wiggins MD, 40 mg at 10/25/24 1018    Phosphorus Replacement - Follow Nurse / BPA Driven Protocol, , Does not apply, Rosalie DOWD Lauren C., MD    piperacillin-tazobactam (ZOSYN) 3.375 g IVPB in 100 mL NS MBP (CD), 3.375 g, Intravenous, Q8H, Elma Wiggins MD, 3.375 g at 10/25/24 1308    Potassium Replacement - Follow Nurse / BPA Driven Protocol, , Does not apply, PRNRosalie Lauren C., MD    sodium chloride 0.9 % flush 10 mL, 10 mL, Intravenous, PRNRosalie Lauren C., MD    sodium chloride 0.9 % infusion, 100 mL/hr, Intravenous, Continuous, Evan Pat MD, Last Rate: 100 mL/hr at 10/25/24 1055, 100 mL/hr at 10/25/24 1055    venlafaxine XR (EFFEXOR-XR) 24 hr capsule 75 mg, 75 mg, Oral, Daily, Elma Wiggins MD, 75 mg at 10/25/24 1018    Allergies:   No Known Allergies    Social History:   Social History     Socioeconomic History    Marital status:    Tobacco Use    Smoking status: Never    Smokeless tobacco: Never   Vaping Use    Vaping status: Never Used   Substance and Sexual Activity    Alcohol use: Never    Drug use: Never    Sexual activity: Defer       Family History:   Family History   Problem Relation Age of  Onset    Diabetes Mother        Review of Systems:  +abdominal pain  Unable to obtain secondary to patient mental status     Physical Exam:   Vitals:    10/25/24 1100   BP:    Pulse:    Resp:    Temp: 98.5 °F (36.9 °C)   SpO2:      GENERAL: Somnolent, arousable, however falls asleep easily and when answering questions motors mostly incomprehensible sounds  NECK: Supple  RESPIRATORY: Unlabored breathing on room air  CARDIOVASCULAR: Heart rate 90s, regular, no pedal edema  GASTROINTESTINAL: Abdomen soft, mildly diffusely tender without rebound or guarding, no Ferro sign, no scars noted  MUSCULOSKELETAL: MOCK  SKIN: warm, no rash    Diagnostic workup:     Pertinent labs:   Results from last 7 days   Lab Units 10/25/24  0426 10/24/24  2126 10/24/24  0617 10/23/24  1433 10/23/24  0311 10/22/24  1557   WBC 10*3/mm3 7.93 9.87 10.27 12.62* 12.59* 13.79*   HEMOGLOBIN g/dL 10.9* 10.6* 10.7* 15.1 14.5 14.2   HEMATOCRIT % 32.4* 31.2* 32.9* 44.3 42.6 40.8   PLATELETS 10*3/mm3 278 273 266 399 384 388     Results from last 7 days   Lab Units 10/25/24  0426 10/24/24  1715 10/24/24  0617 10/23/24  0311 10/22/24  1557   SODIUM mmol/L 132* 132* 128*   < > 136   POTASSIUM mmol/L 4.5 4.9 4.6   < > 4.8   CHLORIDE mmol/L 98 98 95*   < > 96*   CO2 mmol/L 20.0* 18.0* 16.2*   < > 24.6   BUN mg/dL 38* 35* 30*   < > 9   CREATININE mg/dL 1.75* 1.57* 1.42*   < > 1.07   CALCIUM mg/dL 8.6 8.5* 8.8   < > 10.5   BILIRUBIN mg/dL 1.8*  --   --   --  2.6*   ALK PHOS U/L 72  --   --   --  60   ALT (SGPT) U/L 77*  --   --   --  11   AST (SGOT) U/L 148*  --   --   --  10   GLUCOSE mg/dL 213* 255* 264*   < > 183*    < > = values in this interval not displayed.       Imaging:  CT abdomen pelvis images and report reviewed, there is a contracted gallbladder with mild hyperenhancing wall, I do not see any stones, there is abdominal ascites and evidence of constipation  EGD report and images 10/24/2024 reviewed, negative for abnormalities  HIDA scan 10/25/2024  images and report reviewed, EF is 19% with positive gallbladder filling    Assessment and plan:   The patient is a 49 y.o. male with multiple medical problems including CHF with EF of 35%, hypertension, hyperlipidemia, type 2 diabetes mellitus with A1c 9.4, who came into the hospital complaining of abdominal pain.  He has undergone workup for possible cholecystitis with recent CT demonstrating contracted gallbladder with mild hyperenhancing wall, EGD negative for now abnormalities, and HIDA scan demonstrating filling but with decreased ejection fraction concerning for possible biliary dyskinesia vs chronic cholecystitis.  His WBC was 13 on admission and has normalized while on zosyn, with labs appearing consistent with dehydration/hypovolemia.  There is abdominal ascites on CT.  His LFTs demonstrate downtrending hyperbilirubinemia with bilirubin 1.8, normal alkaline phosphatase, and ALT and AST normal at admission and mildly elevated today.  On exam he has mild diffuse tenderness without peritoneal or Ferro's signs.  Given his gallbladder filling on HIDA, I suspect congestive hepatopathy as the source of his symptoms. He may have an element of chronic cholecystitis but it is difficult to ascertain his history with his current mental status, and as there is no acute cholecystitis he does not require surgery at this time.      Manuela Wisdom M.D.  General, Robotic, and Endoscopic Surgery  [facility]    [address], [address]  [place], [place], [address]  P: [phone]  F: [phone]

## 2024-10-26 ENCOUNTER — APPOINTMENT (OUTPATIENT)
Dept: CT IMAGING | Facility: HOSPITAL | Age: 49
DRG: 853 | End: 2024-10-26
Payer: COMMERCIAL

## 2024-10-26 ENCOUNTER — APPOINTMENT (OUTPATIENT)
Dept: CARDIOLOGY | Facility: HOSPITAL | Age: 49
DRG: 853 | End: 2024-10-26
Payer: COMMERCIAL

## 2024-10-26 ENCOUNTER — INPATIENT HOSPITAL (OUTPATIENT)
Dept: URBAN - METROPOLITAN AREA HOSPITAL 113 | Facility: HOSPITAL | Age: 49
End: 2024-10-26
Payer: COMMERCIAL

## 2024-10-26 ENCOUNTER — INPATIENT HOSPITAL (OUTPATIENT)
Age: 49
End: 2024-10-26

## 2024-10-26 ENCOUNTER — APPOINTMENT (OUTPATIENT)
Dept: GENERAL RADIOLOGY | Facility: HOSPITAL | Age: 49
DRG: 853 | End: 2024-10-26
Payer: COMMERCIAL

## 2024-10-26 DIAGNOSIS — K29.70 GASTRITIS, UNSPECIFIED, WITHOUT BLEEDING: ICD-10-CM

## 2024-10-26 DIAGNOSIS — R10.11 RIGHT UPPER QUADRANT PAIN: ICD-10-CM

## 2024-10-26 DIAGNOSIS — R10.31 RIGHT LOWER QUADRANT PAIN: ICD-10-CM

## 2024-10-26 DIAGNOSIS — I95.9 HYPOTENSION, UNSPECIFIED: ICD-10-CM

## 2024-10-26 LAB
ALBUMIN SERPL-MCNC: 3 G/DL (ref 3.5–5.2)
ALBUMIN/GLOB SERPL: 0.8 G/DL
ALP SERPL-CCNC: 99 U/L (ref 39–117)
ALT SERPL W P-5'-P-CCNC: 92 U/L (ref 1–41)
ANION GAP SERPL CALCULATED.3IONS-SCNC: 15 MMOL/L (ref 5–15)
AORTIC DIMENSIONLESS INDEX: 0.8 (DI)
ASCENDING AORTA: 2.4 CM
AST SERPL-CCNC: 108 U/L (ref 1–40)
BASOPHILS # BLD AUTO: 0.07 10*3/MM3 (ref 0–0.2)
BASOPHILS NFR BLD AUTO: 0.7 % (ref 0–1.5)
BH CV ECHO MEAS - ACS: 1.56 CM
BH CV ECHO MEAS - AO MAX PG: 6.4 MMHG
BH CV ECHO MEAS - AO MEAN PG: 3.5 MMHG
BH CV ECHO MEAS - AO ROOT DIAM: 3.1 CM
BH CV ECHO MEAS - AO V2 MAX: 127 CM/SEC
BH CV ECHO MEAS - AO V2 VTI: 21.9 CM
BH CV ECHO MEAS - AVA(I,D): 2.44 CM2
BH CV ECHO MEAS - EDV(CUBED): 106 ML
BH CV ECHO MEAS - EDV(MOD-SP2): 115 ML
BH CV ECHO MEAS - EDV(MOD-SP4): 129 ML
BH CV ECHO MEAS - EF(MOD-BP): 49.7 %
BH CV ECHO MEAS - EF(MOD-SP2): 52.2 %
BH CV ECHO MEAS - EF(MOD-SP4): 51.2 %
BH CV ECHO MEAS - ESV(CUBED): 37.5 ML
BH CV ECHO MEAS - ESV(MOD-SP2): 55 ML
BH CV ECHO MEAS - ESV(MOD-SP4): 63 ML
BH CV ECHO MEAS - FS: 29.3 %
BH CV ECHO MEAS - IVS/LVPW: 1 CM
BH CV ECHO MEAS - IVSD: 0.97 CM
BH CV ECHO MEAS - LAT PEAK E' VEL: 13.8 CM/SEC
BH CV ECHO MEAS - LV MASS(C)D: 159 GRAMS
BH CV ECHO MEAS - LV MAX PG: 4.1 MMHG
BH CV ECHO MEAS - LV MEAN PG: 2.31 MMHG
BH CV ECHO MEAS - LV V1 MAX: 100.7 CM/SEC
BH CV ECHO MEAS - LV V1 VTI: 16.9 CM
BH CV ECHO MEAS - LVIDD: 4.7 CM
BH CV ECHO MEAS - LVIDS: 3.3 CM
BH CV ECHO MEAS - LVOT AREA: 3.2 CM2
BH CV ECHO MEAS - LVOT DIAM: 2.01 CM
BH CV ECHO MEAS - LVPWD: 0.97 CM
BH CV ECHO MEAS - MED PEAK E' VEL: 5.9 CM/SEC
BH CV ECHO MEAS - MR MAX PG: 80.2 MMHG
BH CV ECHO MEAS - MR MAX VEL: 447.7 CM/SEC
BH CV ECHO MEAS - MV A DUR: 0.12 SEC
BH CV ECHO MEAS - MV A MAX VEL: 78 CM/SEC
BH CV ECHO MEAS - MV DEC SLOPE: 358.6 CM/SEC2
BH CV ECHO MEAS - MV DEC TIME: 0.15 SEC
BH CV ECHO MEAS - MV E MAX VEL: 81.4 CM/SEC
BH CV ECHO MEAS - MV E/A: 1.04
BH CV ECHO MEAS - MV MAX PG: 4.5 MMHG
BH CV ECHO MEAS - MV MEAN PG: 1.7 MMHG
BH CV ECHO MEAS - MV P1/2T: 66.5 MSEC
BH CV ECHO MEAS - MV V2 VTI: 20.5 CM
BH CV ECHO MEAS - MVA(P1/2T): 3.3 CM2
BH CV ECHO MEAS - MVA(VTI): 2.6 CM2
BH CV ECHO MEAS - PA ACC TIME: 0.16 SEC
BH CV ECHO MEAS - PA V2 MAX: 122.2 CM/SEC
BH CV ECHO MEAS - QP/QS: 0.97
BH CV ECHO MEAS - RAP SYSTOLE: 3 MMHG
BH CV ECHO MEAS - RV MAX PG: 2.25 MMHG
BH CV ECHO MEAS - RV V1 MAX: 75 CM/SEC
BH CV ECHO MEAS - RV V1 VTI: 16.3 CM
BH CV ECHO MEAS - RVOT DIAM: 2 CM
BH CV ECHO MEAS - RVSP: 40 MMHG
BH CV ECHO MEAS - SV(LVOT): 53.4 ML
BH CV ECHO MEAS - SV(MOD-SP2): 60 ML
BH CV ECHO MEAS - SV(MOD-SP4): 66 ML
BH CV ECHO MEAS - SV(RVOT): 51.6 ML
BH CV ECHO MEAS - TR MAX PG: 36.6 MMHG
BH CV ECHO MEAS - TR MAX VEL: 302.4 CM/SEC
BH CV ECHO MEASUREMENTS AVERAGE E/E' RATIO: 8.26
BH CV XLRA - RV BASE: 3.3 CM
BH CV XLRA - RV LENGTH: 8.2 CM
BH CV XLRA - RV MID: 3.8 CM
BH CV XLRA - TDI S': 15.3 CM/SEC
BILIRUB SERPL-MCNC: 1.1 MG/DL (ref 0–1.2)
BUN SERPL-MCNC: 36 MG/DL (ref 6–20)
BUN/CREAT SERPL: 29.5 (ref 7–25)
CALCIUM SPEC-SCNC: 9.5 MG/DL (ref 8.6–10.5)
CHLORIDE SERPL-SCNC: 103 MMOL/L (ref 98–107)
CO2 SERPL-SCNC: 19 MMOL/L (ref 22–29)
CREAT SERPL-MCNC: 1.22 MG/DL (ref 0.76–1.27)
DEPRECATED RDW RBC AUTO: 40.4 FL (ref 37–54)
EGFRCR SERPLBLD CKD-EPI 2021: 73.6 ML/MIN/1.73
EOSINOPHIL # BLD AUTO: 0 10*3/MM3 (ref 0–0.4)
EOSINOPHIL NFR BLD AUTO: 0 % (ref 0.3–6.2)
ERYTHROCYTE [DISTWIDTH] IN BLOOD BY AUTOMATED COUNT: 12 % (ref 12.3–15.4)
FOLATE SERPL-MCNC: 13.2 NG/ML (ref 4.78–24.2)
GLOBULIN UR ELPH-MCNC: 3.9 GM/DL
GLUCOSE BLDC GLUCOMTR-MCNC: 130 MG/DL (ref 70–130)
GLUCOSE BLDC GLUCOMTR-MCNC: 156 MG/DL (ref 70–130)
GLUCOSE BLDC GLUCOMTR-MCNC: 222 MG/DL (ref 70–130)
GLUCOSE BLDC GLUCOMTR-MCNC: 234 MG/DL (ref 70–130)
GLUCOSE SERPL-MCNC: 133 MG/DL (ref 65–99)
HCT VFR BLD AUTO: 36.7 % (ref 37.5–51)
HGB BLD-MCNC: 11.9 G/DL (ref 13–17.7)
IMM GRANULOCYTES # BLD AUTO: 0.1 10*3/MM3 (ref 0–0.05)
IMM GRANULOCYTES NFR BLD AUTO: 1.1 % (ref 0–0.5)
LEFT ATRIUM VOLUME INDEX: 16.7 ML/M2
LYMPHOCYTES # BLD AUTO: 0.41 10*3/MM3 (ref 0.7–3.1)
LYMPHOCYTES NFR BLD AUTO: 4.3 % (ref 19.6–45.3)
MAGNESIUM SERPL-MCNC: 2.4 MG/DL (ref 1.6–2.6)
MCH RBC QN AUTO: 29.8 PG (ref 26.6–33)
MCHC RBC AUTO-ENTMCNC: 32.4 G/DL (ref 31.5–35.7)
MCV RBC AUTO: 92 FL (ref 79–97)
MONOCYTES # BLD AUTO: 1.26 10*3/MM3 (ref 0.1–0.9)
MONOCYTES NFR BLD AUTO: 13.3 % (ref 5–12)
NEUTROPHILS NFR BLD AUTO: 7.6 10*3/MM3 (ref 1.7–7)
NEUTROPHILS NFR BLD AUTO: 80.6 % (ref 42.7–76)
NT-PROBNP SERPL-MCNC: 393 PG/ML (ref 0–450)
PHOSPHATE SERPL-MCNC: 3.4 MG/DL (ref 2.5–4.5)
PLATELET # BLD AUTO: 297 10*3/MM3 (ref 140–450)
PMV BLD AUTO: 10.4 FL (ref 6–12)
POTASSIUM SERPL-SCNC: 4.7 MMOL/L (ref 3.5–5.2)
PROCALCITONIN SERPL-MCNC: 30.3 NG/ML (ref 0–0.25)
PROT SERPL-MCNC: 6.9 G/DL (ref 6–8.5)
RBC # BLD AUTO: 3.99 10*6/MM3 (ref 4.14–5.8)
SINUS: 2.6 CM
SODIUM SERPL-SCNC: 137 MMOL/L (ref 136–145)
STJ: 2.27 CM
VIT B12 BLD-MCNC: 869 PG/ML (ref 211–946)
WBC NRBC COR # BLD AUTO: 9.44 10*3/MM3 (ref 3.4–10.8)

## 2024-10-26 PROCEDURE — 94799 UNLISTED PULMONARY SVC/PX: CPT

## 2024-10-26 PROCEDURE — 97530 THERAPEUTIC ACTIVITIES: CPT

## 2024-10-26 PROCEDURE — 82948 REAGENT STRIP/BLOOD GLUCOSE: CPT

## 2024-10-26 PROCEDURE — 25810000003 SODIUM CHLORIDE 0.9 % SOLUTION: Performed by: INTERNAL MEDICINE

## 2024-10-26 PROCEDURE — 93306 TTE W/DOPPLER COMPLETE: CPT

## 2024-10-26 PROCEDURE — 74174 CTA ABD&PLVS W/CONTRAST: CPT

## 2024-10-26 PROCEDURE — 25010000002 HYDROMORPHONE PER 4 MG: Performed by: INTERNAL MEDICINE

## 2024-10-26 PROCEDURE — 25510000001 IOPAMIDOL PER 1 ML: Performed by: STUDENT IN AN ORGANIZED HEALTH CARE EDUCATION/TRAINING PROGRAM

## 2024-10-26 PROCEDURE — 25010000002 HEPARIN (PORCINE) PER 1000 UNITS: Performed by: STUDENT IN AN ORGANIZED HEALTH CARE EDUCATION/TRAINING PROGRAM

## 2024-10-26 PROCEDURE — 84100 ASSAY OF PHOSPHORUS: CPT | Performed by: STUDENT IN AN ORGANIZED HEALTH CARE EDUCATION/TRAINING PROGRAM

## 2024-10-26 PROCEDURE — 71045 X-RAY EXAM CHEST 1 VIEW: CPT

## 2024-10-26 PROCEDURE — 63710000001 INSULIN GLARGINE PER 5 UNITS: Performed by: STUDENT IN AN ORGANIZED HEALTH CARE EDUCATION/TRAINING PROGRAM

## 2024-10-26 PROCEDURE — 80053 COMPREHEN METABOLIC PANEL: CPT | Performed by: STUDENT IN AN ORGANIZED HEALTH CARE EDUCATION/TRAINING PROGRAM

## 2024-10-26 PROCEDURE — 83880 ASSAY OF NATRIURETIC PEPTIDE: CPT | Performed by: STUDENT IN AN ORGANIZED HEALTH CARE EDUCATION/TRAINING PROGRAM

## 2024-10-26 PROCEDURE — 85025 COMPLETE CBC W/AUTO DIFF WBC: CPT | Performed by: INTERNAL MEDICINE

## 2024-10-26 PROCEDURE — 99233 SBSQ HOSP IP/OBS HIGH 50: CPT | Performed by: INTERNAL MEDICINE

## 2024-10-26 PROCEDURE — 94761 N-INVAS EAR/PLS OXIMETRY MLT: CPT

## 2024-10-26 PROCEDURE — 25010000002 PIPERACILLIN SOD-TAZOBACTAM PER 1 G: Performed by: INTERNAL MEDICINE

## 2024-10-26 PROCEDURE — 99232 SBSQ HOSP IP/OBS MODERATE 35: CPT | Performed by: STUDENT IN AN ORGANIZED HEALTH CARE EDUCATION/TRAINING PROGRAM

## 2024-10-26 PROCEDURE — 93975 VASCULAR STUDY: CPT | Performed by: SURGERY

## 2024-10-26 PROCEDURE — 84145 PROCALCITONIN (PCT): CPT | Performed by: STUDENT IN AN ORGANIZED HEALTH CARE EDUCATION/TRAINING PROGRAM

## 2024-10-26 PROCEDURE — 63710000001 INSULIN LISPRO (HUMAN) PER 5 UNITS: Performed by: INTERNAL MEDICINE

## 2024-10-26 PROCEDURE — 63710000001 INSULIN LISPRO (HUMAN) PER 5 UNITS: Performed by: STUDENT IN AN ORGANIZED HEALTH CARE EDUCATION/TRAINING PROGRAM

## 2024-10-26 PROCEDURE — 93306 TTE W/DOPPLER COMPLETE: CPT | Performed by: INTERNAL MEDICINE

## 2024-10-26 PROCEDURE — 99222 1ST HOSP IP/OBS MODERATE 55: CPT

## 2024-10-26 PROCEDURE — 82746 ASSAY OF FOLIC ACID SERUM: CPT | Performed by: STUDENT IN AN ORGANIZED HEALTH CARE EDUCATION/TRAINING PROGRAM

## 2024-10-26 PROCEDURE — 97162 PT EVAL MOD COMPLEX 30 MIN: CPT

## 2024-10-26 PROCEDURE — 25010000002 PIPERACILLIN SOD-TAZOBACTAM PER 1 G: Performed by: STUDENT IN AN ORGANIZED HEALTH CARE EDUCATION/TRAINING PROGRAM

## 2024-10-26 PROCEDURE — 82607 VITAMIN B-12: CPT | Performed by: STUDENT IN AN ORGANIZED HEALTH CARE EDUCATION/TRAINING PROGRAM

## 2024-10-26 PROCEDURE — 93975 VASCULAR STUDY: CPT

## 2024-10-26 PROCEDURE — 25510000001 PERFLUTREN 6.52 MG/ML SUSPENSION 2 ML VIAL: Performed by: STUDENT IN AN ORGANIZED HEALTH CARE EDUCATION/TRAINING PROGRAM

## 2024-10-26 PROCEDURE — 83735 ASSAY OF MAGNESIUM: CPT | Performed by: STUDENT IN AN ORGANIZED HEALTH CARE EDUCATION/TRAINING PROGRAM

## 2024-10-26 PROCEDURE — 25010000002 HYDROMORPHONE PER 4 MG: Performed by: STUDENT IN AN ORGANIZED HEALTH CARE EDUCATION/TRAINING PROGRAM

## 2024-10-26 RX ORDER — INSULIN LISPRO 100 [IU]/ML
2 INJECTION, SOLUTION INTRAVENOUS; SUBCUTANEOUS
Status: DISCONTINUED | OUTPATIENT
Start: 2024-10-26 | End: 2024-10-27

## 2024-10-26 RX ORDER — IOPAMIDOL 755 MG/ML
100 INJECTION, SOLUTION INTRAVASCULAR
Status: COMPLETED | OUTPATIENT
Start: 2024-10-26 | End: 2024-10-26

## 2024-10-26 RX ORDER — SODIUM CHLORIDE 9 MG/ML
100 INJECTION, SOLUTION INTRAVENOUS CONTINUOUS
Status: ACTIVE | OUTPATIENT
Start: 2024-10-26 | End: 2024-10-27

## 2024-10-26 RX ORDER — HYDROMORPHONE HYDROCHLORIDE 1 MG/ML
0.5 INJECTION, SOLUTION INTRAMUSCULAR; INTRAVENOUS; SUBCUTANEOUS
Status: DISCONTINUED | OUTPATIENT
Start: 2024-10-26 | End: 2024-10-28

## 2024-10-26 RX ADMIN — SODIUM CHLORIDE 100 ML/HR: 9 INJECTION, SOLUTION INTRAVENOUS at 18:25

## 2024-10-26 RX ADMIN — INSULIN GLARGINE 10 UNITS: 100 INJECTION, SOLUTION SUBCUTANEOUS at 21:50

## 2024-10-26 RX ADMIN — SENNOSIDES AND DOCUSATE SODIUM 2 TABLET: 50; 8.6 TABLET ORAL at 11:48

## 2024-10-26 RX ADMIN — PIPERACILLIN AND TAZOBACTAM 3.38 G: 3; .375 INJECTION, POWDER, FOR SOLUTION INTRAVENOUS at 12:07

## 2024-10-26 RX ADMIN — PANTOPRAZOLE SODIUM 40 MG: 40 TABLET, DELAYED RELEASE ORAL at 16:42

## 2024-10-26 RX ADMIN — PANTOPRAZOLE SODIUM 40 MG: 40 TABLET, DELAYED RELEASE ORAL at 11:48

## 2024-10-26 RX ADMIN — INSULIN LISPRO 2 UNITS: 100 INJECTION, SOLUTION INTRAVENOUS; SUBCUTANEOUS at 12:14

## 2024-10-26 RX ADMIN — INSULIN LISPRO 2 UNITS: 100 INJECTION, SOLUTION INTRAVENOUS; SUBCUTANEOUS at 16:11

## 2024-10-26 RX ADMIN — VENLAFAXINE HYDROCHLORIDE 75 MG: 75 CAPSULE, EXTENDED RELEASE ORAL at 11:49

## 2024-10-26 RX ADMIN — PERFLUTREN 2 ML: 6.52 INJECTION, SUSPENSION INTRAVENOUS at 11:26

## 2024-10-26 RX ADMIN — HYDROCODONE BITARTRATE AND ACETAMINOPHEN 1 TABLET: 5; 325 TABLET ORAL at 16:10

## 2024-10-26 RX ADMIN — HYDROMORPHONE HYDROCHLORIDE 0.5 MG: 1 INJECTION, SOLUTION INTRAMUSCULAR; INTRAVENOUS; SUBCUTANEOUS at 02:52

## 2024-10-26 RX ADMIN — MIDODRINE HYDROCHLORIDE 10 MG: 5 TABLET ORAL at 11:49

## 2024-10-26 RX ADMIN — INSULIN LISPRO 3 UNITS: 100 INJECTION, SOLUTION INTRAVENOUS; SUBCUTANEOUS at 21:50

## 2024-10-26 RX ADMIN — SENNOSIDES AND DOCUSATE SODIUM 2 TABLET: 50; 8.6 TABLET ORAL at 21:49

## 2024-10-26 RX ADMIN — ATORVASTATIN CALCIUM 40 MG: 20 TABLET, FILM COATED ORAL at 11:48

## 2024-10-26 RX ADMIN — MUPIROCIN 1 APPLICATION: 20 OINTMENT TOPICAL at 21:53

## 2024-10-26 RX ADMIN — PIPERACILLIN AND TAZOBACTAM 3.38 G: 3; .375 INJECTION, POWDER, FOR SOLUTION INTRAVENOUS at 05:09

## 2024-10-26 RX ADMIN — PIPERACILLIN AND TAZOBACTAM 3.38 G: 3; .375 INJECTION, POWDER, FOR SOLUTION INTRAVENOUS at 21:49

## 2024-10-26 RX ADMIN — INSULIN LISPRO 2 UNITS: 100 INJECTION, SOLUTION INTRAVENOUS; SUBCUTANEOUS at 16:10

## 2024-10-26 RX ADMIN — HYDROMORPHONE HYDROCHLORIDE 0.5 MG: 1 INJECTION, SOLUTION INTRAMUSCULAR; INTRAVENOUS; SUBCUTANEOUS at 18:34

## 2024-10-26 RX ADMIN — MIDODRINE HYDROCHLORIDE 10 MG: 5 TABLET ORAL at 16:42

## 2024-10-26 RX ADMIN — HYDROMORPHONE HYDROCHLORIDE 0.5 MG: 1 INJECTION, SOLUTION INTRAMUSCULAR; INTRAVENOUS; SUBCUTANEOUS at 21:49

## 2024-10-26 RX ADMIN — HEPARIN SODIUM 5000 UNITS: 5000 INJECTION INTRAVENOUS; SUBCUTANEOUS at 21:49

## 2024-10-26 RX ADMIN — HYDROMORPHONE HYDROCHLORIDE 0.5 MG: 1 INJECTION, SOLUTION INTRAMUSCULAR; INTRAVENOUS; SUBCUTANEOUS at 08:18

## 2024-10-26 RX ADMIN — HEPARIN SODIUM 5000 UNITS: 5000 INJECTION INTRAVENOUS; SUBCUTANEOUS at 08:13

## 2024-10-26 RX ADMIN — INSULIN LISPRO 2 UNITS: 100 INJECTION, SOLUTION INTRAVENOUS; SUBCUTANEOUS at 11:49

## 2024-10-26 RX ADMIN — IOPAMIDOL 95 ML: 755 INJECTION, SOLUTION INTRAVENOUS at 14:11

## 2024-10-26 NOTE — CONSULTS
Date of Consultation: 10/26/24    Referral Provider: Sylvain Ventura MD     Reason for Consultation: CHF, hypotension     Encounter Provider: MICHELLE Cobb    Group of Service: [facility]     Patient Name: Donnie Bishop    :1975    Chief complaint: Abdominal pain      History of Present Illness:  Donnie Bishop is a 49 year old who has a past medical history that is significant for diabetes, hypertension, hyperlipidemia, HFrEF secondary to nonischemic cardiomyopathy.     He presented to the ED on 10/22/2024 with complaints of severe abdominal pain.  He reported that the pain woke him from sleep and had progressively worsened throughout the day.  The pain was epigastric radiating to the right side of his abdomen and his back.    Workup included a CT of the abdomen which showed evidence of gastritis versus peptic ulcer disease.  He underwent an EGD with Dr. Wiggins which showed no significant inflammation or ulceration.  His HIDA scan was concerning for chronic cholecystitis.  Today he underwent mesenteric duplex, results are pending.  He also underwent a CTA of the abdomen and pelvis which demonstrated a large fluid collection in his right upper quadrant.    On exam, he is not conversational, he groans in pain but does not really answer questions.  He had a family member at bedside who helps relay his medical history.    He was initially hypotensive and was initiated on 10 mg of midodrine 3 times daily, today his blood pressure has improved.  Cardiology has been asked to evaluate his hypotension along with his HFrEF.    Echocardiogram 10/26/2024    Left ventricular systolic function is low normal. Calculated left ventricular EF = 49.7%    The following left ventricular wall segments are hypokinetic: apical septal and apex hypokinetic.    Left ventricular diastolic function was normal.    Estimated right ventricular systolic pressure from tricuspid regurgitation is mildly elevated (35-45  mmHg). Calculated right ventricular systolic pressure from tricuspid regurgitation is 40 mmHg.    Mild mitral valve regurgitation is present.    Cardiac catheterization 9/13/2023    Normal coronary arteries    Substantially elevated LVEDP    Recommendations: Aggressive up titration of guideline directed medical therapy and diuresis.  Presumably cardiomyopathy is related to uncontrolled hypertension     INDICATION FOR PROCEDURE: Pleasant 48-year-old man with a week of exertional dyspnea and chest pressure.  Found to have new cardiomyopathy.     PROCEDURE PERFORMED:   1. Ultrasound guided arterial access,   2. Coronary Angiography  3. Left heart catheterization      PROCEDURE COMMENTS:   After informed consent was obtained, the patient was prepped and draped in the usual manner.  Moderate sedation was administered.  Lidocaine was infused in the right wrist for anesthesia.  Access was obtained in the right radial artery using micropuncture technique, ultrasound guidance and a 5/6 Slender Kazakh sheath was inserted.  Coronary angiography was performed using 5 Kazakh Tiger catheters.  Left heart catheterization was performed using a 5 Kazakh pigtail catheter.   A TR band was used for hemostasis     HEMODYNAMICS:  LV: 140/30  LVEDP: 25-30  AORTIC: 140/100        CORONARY ANGIOGRAPHY:  LEFT MAIN: Large-caliber, short, essentially 2 separate ostium.  LAD: The LAD is a large caliber vessel.  Normal in the proximal segment.  Gives rise to medium caliber, branching diagonal which is normal.  The LAD mid -distal is normal.  RAMUS: Absent  CIRCUMFLEX: Large-caliber, dominant circumflex.  Normal.  Gives rise to 3 small OM vessels which are normal.  Ongoing left PDA is small to medium caliber normal.  RCA: Small, nondominant, normal       Past Medical History:   Diagnosis Date    Anxiety     CHF (congestive heart failure)     Cough     Inability to attain erection     Injury of acoustic nerve     Myalgia     Type II diabetes  mellitus     Venous insufficiency     Viral illness     Vitamin D deficiency          Past Surgical History:   Procedure Laterality Date    CARDIAC CATHETERIZATION N/A 9/13/2023    Procedure: Left Heart Cath;  Surgeon: Kaylee Kay MD;  Location: [facility] CATH INVASIVE LOCATION;  Service: Cardiology;  Laterality: N/A;    CARDIAC CATHETERIZATION N/A 9/13/2023    Procedure: Coronary angiography;  Surgeon: Kaylee Kay MD;  Location: [facility] CATH INVASIVE LOCATION;  Service: Cardiology;  Laterality: N/A;    ENDOSCOPY N/A 10/24/2024    Procedure: ESOPHAGOGASTRODUODENOSCOPY with biopsies;  Surgeon: Elma Wiggins MD;  Location: [facility] ENDOSCOPY;  Service: Gastroenterology;  Laterality: N/A;  pre-abdominal pain  post-normal         No Known Allergies      No current facility-administered medications on file prior to encounter.     Current Outpatient Medications on File Prior to Encounter   Medication Sig Dispense Refill    melatonin 5 MG tablet tablet Take 1 tablet by mouth At Night As Needed (insomnia). 90 tablet 1    ondansetron (ZOFRAN) 8 MG tablet Take 1 tablet by mouth Every 8 (Eight) Hours As Needed for Nausea or Vomiting. 8 tablet 0    spironolactone (ALDACTONE) 25 MG tablet TAKE 1 TABLET BY MOUTH EVERY DAY 90 tablet 1    venlafaxine XR (EFFEXOR-XR) 150 MG 24 hr capsule Take 1 capsule by mouth Daily. (Patient taking differently: Take 75 mg by mouth Daily.) 90 capsule 1    vitamin D (ERGOCALCIFEROL) 1.25 MG (43458 UT) capsule capsule Take 1 capsule by mouth 1 (One) Time Per Week.      atorvastatin (LIPITOR) 40 MG tablet Take 1 tablet by mouth Daily. 90 tablet 3    carvedilol (COREG) 25 MG tablet Take 1 tablet by mouth Every 12 (Twelve) Hours. 180 tablet 1    empagliflozin (JARDIANCE) 10 MG tablet tablet Take 1 tablet by mouth Daily. 90 tablet 1    hydrOXYzine (ATARAX) 10 MG tablet TAKE 1 TABLET BY MOUTH EVERY 8 HOURS AS NEEDED FOR ANXIETY. 270 tablet 1    metFORMIN (GLUCOPHAGE) 1000 MG tablet Take 1 tablet by  "mouth 2 (Two) Times a Day With Meals. 180 tablet 3    NovoLOG FlexPen 100 UNIT/ML solution pen-injector sc pen INJECT 5 UNITS INTO THE SKIN 3 (THREE) TIMES DAILY WITH MEALS PLUS UP TO 15 UNITS SLIDING SCALE.      sacubitril-valsartan (ENTRESTO) 24-26 MG tablet Take 1 tablet by mouth 2 (Two) Times a Day. 180 tablet 1         Social History     Socioeconomic History    Marital status:    Tobacco Use    Smoking status: Never    Smokeless tobacco: Never   Vaping Use    Vaping status: Never Used   Substance and Sexual Activity    Alcohol use: Never    Drug use: Never    Sexual activity: Defer         Family History   Problem Relation Age of Onset    Diabetes Mother        REVIEW OF SYSTEMS:   12 point ROS was performed and is negative except as outlined in HPI       Objective:     Vitals:    10/26/24 0717 10/26/24 1125 10/26/24 1500 10/26/24 1642   BP: 107/78 110/73 91/67 126/84   BP Location: Right arm Right arm Right arm    Patient Position:  Lying     Pulse: 85 85 89 92   Resp: (!) 30 28 16    Temp: 98.3 °F (36.8 °C) 98.6 °F (37 °C) 98.5 °F (36.9 °C)    TempSrc: Oral Oral Oral    SpO2: 99%  100%    Weight:  81 kg (178 lb 9.2 oz)     Height:  170 cm (66.93\")       Body mass index is 28.03 kg/m².  Flowsheet Rows      Flowsheet Row First Filed Value   Admission Height 170.2 cm (67\") Documented at 10/22/2024 1641   Admission Weight 76.2 kg (168 lb) Documented at 10/22/2024 1641              Physical Exam  Vitals reviewed.   Constitutional:       Appearance: He is ill-appearing.   HENT:      Head: Normocephalic.      Nose: Nose normal.   Eyes:      Extraocular Movements: Extraocular movements intact.      Pupils: Pupils are equal, round, and reactive to light.   Cardiovascular:      Rate and Rhythm: Normal rate and regular rhythm.      Pulses: Normal pulses.      Heart sounds: Normal heart sounds. Heart sounds not distant. No murmur heard.     No friction rub. No gallop. No S3 or S4 sounds.   Pulmonary:      Effort: " Pulmonary effort is normal.      Breath sounds: Normal breath sounds.   Abdominal:      General: Abdomen is flat. Bowel sounds are normal.      Palpations: Abdomen is soft.      Tenderness: There is abdominal tenderness in the right upper quadrant. Negative signs include Ferro's sign.   Skin:     General: Skin is warm and dry.   Neurological:      Mental Status: He is alert. He is disoriented.         Lab Review:                Results from last 7 days   Lab Units 10/26/24  0545   SODIUM mmol/L 137   POTASSIUM mmol/L 4.7   CHLORIDE mmol/L 103   CO2 mmol/L 19.0*   BUN mg/dL 36*   CREATININE mg/dL 1.22   GLUCOSE mg/dL 133*   CALCIUM mg/dL 9.5         Results from last 7 days   Lab Units 10/26/24  0545   WBC 10*3/mm3 9.44   HEMOGLOBIN g/dL 11.9*   HEMATOCRIT % 36.7*   PLATELETS 10*3/mm3 297             Results from last 7 days   Lab Units 10/26/24  0545   MAGNESIUM mg/dL 2.4           EKG (reviewed by me personally):          Assessment/Plan:   Hypotension  Overall blood pressure trend has improved today with addition of midodrine yesterday  Acute kidney injury   Creatinine normalized  Chronic combined systolic and diastolic heart failure   Echocardiogram today showed low normal left ventricular systolic function, EF 50%, normal left ventricular diastolic function, no hemodynamically significant valvular abnormalities.  GDMT is limited by hypotension.  Will resume as able.   Type II diabetes mellitus   Hypertension   Abdominal pain   He is being evaluated by GI and general surgery.  EGD on 10/24/2024 with no acute findings.  HIDA scan concerning for chronic cholecystitis.  CTA of the abdomen today showed a large collection of fluid, general surgery to evaluate.  Encephalopathy    Cardiology will follow, thank you for this consult.     Luz Greene, APRN   10/26/24

## 2024-10-26 NOTE — PROGRESS NOTES
[facility]     Progress Note    Patient Name: Donnie Bishop  : 1975  MRN: [id number]  Primary Care Physician:  Juju Kennedy MD  Date of admission: 10/22/2024    Subjective   Subjective     Chief Complaint: abdominal pain    History of Present Illness  Patient Reports has right sided pain, states he hurts worse if straighteneds right lower extremity, has the knee flexed leg up.  States pain may be worse with eating.   No bm.  Passing gas. Mesenteric doppleer could not be done because of air in the luminal GI tract.        Review of Systems   Gastrointestinal:  Positive for abdominal pain.   Neurological:  Positive for weakness.       Objective   Objective     Vitals:   Temp:  [97.6 °F (36.4 °C)-98.6 °F (37 °C)] 98.6 °F (37 °C)  Heart Rate:  [85] 85  Resp:  [28-37] 28  BP: ()/(71-78) 110/73  Flow (L/min) (Oxygen Therapy):  [3] 3    Physical Exam  Constitutional:       Appearance: He is ill-appearing.   HENT:      Right Ear: External ear normal.      Left Ear: External ear normal.      Mouth/Throat:      Mouth: Mucous membranes are moist.   Eyes:      Conjunctiva/sclera: Conjunctivae normal.   Cardiovascular:      Rate and Rhythm: Normal rate.      Pulses: Normal pulses.   Pulmonary:      Effort: Pulmonary effort is normal.   Abdominal:      General: Bowel sounds are decreased.      Tenderness:  in the right upper quadrant and right lower quadrant There is rebound. There is no guarding.   Neurological:      Mental Status: He is alert.          Result Review    Result Review:  I have personally reviewed the results from the time of this admission to 10/26/2024 11:59 EDT and agree with these findings:  []  Laboratory list / accordion  []  Microbiology  []  Radiology  []  EKG/Telemetry   []  Cardiology/Vascular   []  Pathology  []  Old records  []  Other:  Most notable findings include:       Assessment & Plan   Assessment / Plan     Brief Patient Summary:  Donnie Bishop is a 49 y.o. male who     Abdominal pain right sided  Recent hypotension, acidosis  Gallbladder dysfunction possibly congestion from right heart as well       Active Hospital Problems:  Active Hospital Problems    Diagnosis     **Gastritis     Leukocytosis     Chronic combined systolic and diastolic heart failure     Abdominal pain     Epigastric abdominal pain     Anxiety     Multiple-type hyperlipidemia     Type 2 diabetes mellitus with diabetic neuropathy, with long-term current use of insulin     Benign essential HTN      Plan: will check CTA  of abdomen to exclude occlusive mesenteric ischemia   Reviewed with renal , Dr Randhawa, who has given approval for contrast  Reviewed with RN       VTE Prophylaxis:  Pharmacologic & mechanical VTE prophylaxis orders are present.  ADDENDUM REVIEWED CT WITH DR ROMAN,  MESSAGED DR IZQUIERDO ON THE FINDING OF A NEW LARGE FLUID SUBCAPSULAR COLLECTION IN LIVER  AND SHE WILL REVIEW AND ADDRESS       CODE STATUS:    Level Of Support Discussed With: Patient  Code Status (Patient has no pulse and is not breathing): CPR (Attempt to Resuscitate)  Medical Interventions (Patient has pulse or is breathing): Full Support    Disposition:  I expect patient to be discharged uncertain.    Law Melo MD

## 2024-10-26 NOTE — THERAPY EVALUATION
Patient Name: Donnie Bishop  : 1975    MRN: [id number]                              Today's Date: 10/26/2024       Admit Date: 10/22/2024    Visit Dx:     ICD-10-CM ICD-9-CM   1. Epigastric abdominal pain  R10.13 789.06   2. Acute gastritis without hemorrhage, unspecified gastritis type  K29.00 535.00     Patient Active Problem List   Diagnosis    Multiple-type hyperlipidemia    Type 2 diabetes mellitus with diabetic neuropathy, with long-term current use of insulin    Neuropathy    Vitamin D deficiency    Benign essential HTN    Inability to attain erection    Injury of acoustic nerve    Hypogonadism in male    Anxiety    Chest pain    New onset of congestive heart failure    NSTEMI, initial episode of care    Diabetes mellitus type 1.5, managed as type 1    Chronic combined systolic and diastolic heart failure    Abdominal pain    Gastritis    Leukocytosis    Epigastric abdominal pain     Past Medical History:   Diagnosis Date    Anxiety     CHF (congestive heart failure)     Cough     Inability to attain erection     Injury of acoustic nerve     Myalgia     Type II diabetes mellitus     Venous insufficiency     Viral illness     Vitamin D deficiency      Past Surgical History:   Procedure Laterality Date    CARDIAC CATHETERIZATION N/A 2023    Procedure: Left Heart Cath;  Surgeon: Kaylee Kay MD;  Location: [facility] CATH INVASIVE LOCATION;  Service: Cardiology;  Laterality: N/A;    CARDIAC CATHETERIZATION N/A 2023    Procedure: Coronary angiography;  Surgeon: Kaylee Kay MD;  Location: [facility] CATH INVASIVE LOCATION;  Service: Cardiology;  Laterality: N/A;    ENDOSCOPY N/A 10/24/2024    Procedure: ESOPHAGOGASTRODUODENOSCOPY with biopsies;  Surgeon: Elma Wiggins MD;  Location: [facility] ENDOSCOPY;  Service: Gastroenterology;  Laterality: N/A;  pre-abdominal pain  post-normal      General Information       Row Name 10/26/24 1428          Physical Therapy Time and Intention    Document Type  evaluation  -CS     Mode of Treatment individual therapy;physical therapy  -CS       Row Name 10/26/24 1428          General Information    Patient Profile Reviewed yes  -CS     Prior Level of Function independent:;gait;bed mobility;transfer  SC  -CS     Existing Precautions/Restrictions fall  -CS     Barriers to Rehab none identified  -CS       Row Name 10/26/24 1428          Living Environment    People in Home alone  -CS       Row Name 10/26/24 1428          Cognition    Orientation Status (Cognition) oriented x 3  -CS       Row Name 10/26/24 1428          Safety Issues/Impairments Affecting Functional Mobility    Impairments Affecting Function (Mobility) endurance/activity tolerance;pain;strength  -CS               User Key  (r) = Recorded By, (t) = Taken By, (c) = Cosigned By      Initials Name Provider Type    CS Pam Vega PT Physical Therapist                   Mobility       Row Name 10/26/24 1428          Bed Mobility    Bed Mobility supine-sit;sit-supine  -CS     Supine-Sit O[place]Brien (Bed Mobility) minimum assist (75% patient effort);verbal cues;nonverbal cues (demo/gesture)  -CS     Sit-Supine O[place]Brien (Bed Mobility) moderate assist (50% patient effort);verbal cues;nonverbal cues (demo/gesture)  -CS     Comment, (Bed Mobility) max cues for initiation; pain significantly limiting  -CS       Row Name 10/26/24 1428          Sit-Stand Transfer    Sit-Stand O[place]Brien (Transfers) minimum assist (75% patient effort);verbal cues  -CS     Assistive Device (Sit-Stand Transfers) cane, straight  -CS       Row Name 10/26/24 1428          Gait/Stairs (Locomotion)    O[place]Brien Level (Gait) minimum assist (75% patient effort);2 person assist;verbal cues  -CS     Assistive Device (Gait) cane, straight;other (see comments)  + HHA  -CS     Distance in Feet (Gait) 5  -CS     Deviations/Abnormal Patterns (Gait) rudy decreased;stride length decreased;weight shifting decreased  -CS     Bilateral Gait  Deviations forward flexed posture;heel strike decreased  -CS     [place] Level (Stairs) not tested  -CS     Comment, (Gait/Stairs) very slow pace; mildly unseady and flexed posture with noted guarding throughout due to pain  -CS               User Key  (r) = Recorded By, (t) = Taken By, (c) = Cosigned By      Initials Name Provider Type    CS Pam Vega, PT Physical Therapist                   Obj/Interventions       Row Name 10/26/24 1430          Range of Motion Comprehensive    General Range of Motion bilateral lower extremity ROM WFL  -CS       Row Name 10/26/24 1430          Strength Comprehensive (MMT)    General Manual Muscle Testing (MMT) Assessment other (see comments)  -CS     Comment, General Manual Muscle Testing (MMT) Assessment generalized weakness; B LE grossly 3+/5  -CS       Row Name 10/26/24 1430          Balance    Balance Assessment sitting static balance;sitting dynamic balance;standing static balance;standing dynamic balance  -CS     Static Sitting Balance standby assist  -CS     Dynamic Sitting Balance standby assist  -CS     Position, Sitting Balance unsupported;sitting edge of bed  -CS     Static Standing Balance contact guard  -CS     Dynamic Standing Balance minimal assist  -CS     Position/Device Used, Standing Balance supported  -CS               User Key  (r) = Recorded By, (t) = Taken By, (c) = Cosigned By      Initials Name Provider Type    CS Pam Vega, PT Physical Therapist                   Goals/Plan       Row Name 10/26/24 1432          Bed Mobility Goal 1 (PT)    Activity/Assistive Device (Bed Mobility Goal 1, PT) bed mobility activities, all  -CS     [place]pton Level/Cues Needed (Bed Mobility Goal 1, PT) supervision required  -CS     Time Frame (Bed Mobility Goal 1, PT) 1 week  -       Row Name 10/26/24 1432          Transfer Goal 1 (PT)    Activity/Assistive Device (Transfer Goal 1, PT) sit-to-stand/stand-to-sit;bed-to-chair/chair-to-bed  -CS      [place] Level/Cues Needed (Transfer Goal 1, PT) supervision required  -CS     Time Frame (Transfer Goal 1, PT) 1 week  -CS       Row Name 10/26/24 1434          Gait Training Goal 1 (PT)    Activity/Assistive Device (Gait Training Goal 1, PT) gait (walking locomotion);decrease fall risk;increase endurance/gait distance  -CS     [place] Level (Gait Training Goal 1, PT) supervision required  -CS     Distance (Gait Training Goal 1, PT) 75'  -CS     Time Frame (Gait Training Goal 1, PT) 1 week  -CS               User Key  (r) = Recorded By, (t) = Taken By, (c) = Cosigned By      Initials Name Provider Type    CS Pam Vega, PT Physical Therapist                   Clinical Impression       Row Name 10/26/24 1430          Pain    Pretreatment Pain Rating 9/10  -CS     Posttreatment Pain Rating 9/10  -CS     Pain Location head;abdomen  -CS     Pain Side/Orientation generalized  -CS     Pain Management Interventions activity modification encouraged;diversional activity provided;exercise or physical activity utilized  -CS     Response to Pain Interventions activity participation with tolerable pain  -CS       Row Name 10/26/24 1053          Plan of Care Review    Plan of Care Reviewed With patient  -CS     Outcome Evaluation Pt is a 48 y/o M admitted to [facility] with c/o abdominal pain. EGD revealed no acue findings. GI following. Pt has a past med hx of DM, HTN, HLD, CHF. Pt reports he lives alone and uses a SC for mobility at baseline. Pt presents to PT with generalized weakness and poor activity tolerance. Pt reports 9/10 head and abdomen pain. RN aware. Pt performed bed mobility requiring min/mod A with increased time to complete. Pt was able to stand and ambulate 5' c SC + HHA requiring min A. Pt demo's a very slow guarded gait and flexed posture. Overall mobility remains significantly limited due to pain. D/C plan pending progress.  -CS       Row Name 10/26/24 8888          Therapy Assessment/Plan (PT)     Rehab Potential (PT) good  -CS     Criteria for Skilled Interventions Met (PT) yes;meets criteria  -CS     Therapy Frequency (PT) 5 times/wk  -CS       Row Name 10/26/24 1430          Positioning and Restraints    Pre-Treatment Position in bed  -CS     Post Treatment Position other  -CS     Other Position with other staff  with transport to leave floor  -CS               User Key  (r) = Recorded By, (t) = Taken By, (c) = Cosigned By      Initials Name Provider Type     Pam Vega, PT Physical Therapist                   Outcome Measures       Row Name 10/26/24 1436 10/26/24 0818       How much help from another person do you currently need...    Turning from your back to your side while in flat bed without using bedrails? 4  -CS 4  -LH    Moving from lying on back to sitting on the side of a flat bed without bedrails? 3  -CS 4  -LH    Moving to and from a bed to a chair (including a wheelchair)? 3  -CS 3  -LH    Standing up from a chair using your arms (e.g., wheelchair, bedside chair)? 3  -CS 3  -LH    Climbing 3-5 steps with a railing? 2  -CS 2  -LH    To walk in hospital room? 3  -CS 2  -LH    AM-PAC 6 Clicks Score (PT) 18  -CS 18  -LH    Highest Level of Mobility Goal 6 --> Walk 10 steps or more  - 6 --> Walk 10 steps or more  -      Row Name 10/26/24 1436          Functional Assessment    Outcome Measure Options AM-PAC 6 Clicks Basic Mobility (PT)  -CS               User Key  (r) = Recorded By, (t) = Taken By, (c) = Cosigned By      Initials Name Provider Type     Elma Sosa, RN Registered Nurse     Pam Vega, PT Physical Therapist                                 Physical Therapy Education       Title: PT OT SLP Therapies (In Progress)       Topic: Physical Therapy (In Progress)       Point: Mobility training (In Progress)       Learning Progress Summary            Patient Acceptance, E,TB, NR by  at 10/26/2024 1436                      Point: Home exercise program (Not Started)        Learner Progress:  Not documented in this visit.              Point: Body mechanics (In Progress)       Learning Progress Summary            Patient Acceptance, E,TB, NR by  at 10/26/2024 1436                      Point: Precautions (Not Started)       Learner Progress:  Not documented in this visit.                              User Key       Initials Effective Dates Name Provider Type Discipline     09/22/22 -  Pam Vega, PT Physical Therapist PT                  PT Recommendation and Plan     Outcome Evaluation: Pt is a 48 y/o M admitted to [facility] with c/o abdominal pain. EGD revealed no acue findings. GI following. Pt has a past med hx of DM, HTN, HLD, CHF. Pt reports he lives alone and uses a SC for mobility at baseline. Pt presents to PT with generalized weakness and poor activity tolerance. Pt reports 9/10 head and abdomen pain. RN aware. Pt performed bed mobility requiring min/mod A with increased time to complete. Pt was able to stand and ambulate 5' c SC + HHA requiring min A. Pt demo's a very slow guarded gait and flexed posture. Overall mobility remains significantly limited due to pain. D/C plan pending progress.     Time Calculation:         PT Charges       Row Name 10/26/24 1436             Time Calculation    Start Time 1326  -CS      Stop Time 1344  -CS      Time Calculation (min) 18 min  -CS      PT Received On 10/26/24  -      PT - Next Appointment 10/28/24  -      PT Goal Re-Cert Due Date 11/02/24  -         Time Calculation- PT    Total Timed Code Minutes- PT 14 minute(s)  -CS         Timed Charges    34732 - PT Therapeutic Activity Minutes 14  -CS         Total Minutes    Timed Charges Total Minutes 14  -CS       Total Minutes 14  -CS                User Key  (r) = Recorded By, (t) = Taken By, (c) = Cosigned By      Initials Name Provider Type    CS Pam Vega, PT Physical Therapist                  Therapy Charges for Today       Code Description Service Date Service  Provider Modifiers Qty    34608219667  PT THERAPEUTIC ACT EA 15 MIN 10/26/2024 Pam Vega, PT GP 1    07418844708 HC PT EVAL MOD COMPLEXITY 3 10/26/2024 Pam Vega, PT GP 1            PT G-Codes  Outcome Measure Options: AM-PAC 6 Clicks Basic Mobility (PT)  AM-PAC 6 Clicks Score (PT): 18  PT Discharge Summary  Anticipated Discharge Disposition (PT): home with assist, home with home health, skilled nursing facility (pending progress)    Pam Vega PT  10/26/2024

## 2024-10-26 NOTE — PROGRESS NOTES
"General Surgery Progress Note    CC: Chronic cholecystitis, right upper quadrant abdominal pain    S: Patient is more alert and oriented today.  He is able to tell me that he is still hurting in his right upper quadrant and can point to the location.  He denies nausea.  He has not had vomiting.  He had severe pain this morning prompting a mesenteric duplex which cannot be obtained secondary to bowel gas and patient's inability to hold his breath.  He underwent CTA abdomen pelvis to evaluate for mesenteric ischemia.    O:/84   Pulse 92   Temp 98.5 °F (36.9 °C) (Oral)   Resp 16   Ht 170 cm (66.93\")   Wt 81 kg (178 lb 9.2 oz)   SpO2 100%   BMI 28.03 kg/m²     Intake & Output (last day)         10/25 0701  10/26 0700 10/26 0701  10/27 0700    P.O. 420 120    I.V. (mL/kg) 300 (3.7)     IV Piggyback 100     Total Intake(mL/kg) 820 (10.1) 120 (1.5)    Urine (mL/kg/hr) 1550 (0.8) 1125 (1.4)    Total Output 1550 1125    Net -730 -1005          Urine Unmeasured Occurrence 3 x 1 x            GENERAL: awake, alert, cooperative, answers questions appropriately   HEENT: EOMI, moist mucus membranes   CHEST: normal work of breathing on room air  CARDIAC: well perfused, heart rate 90s, regular, normotensive on monitor, no peripheral edema  GI: Abd soft, mildly distended, tender in the right upper quadrant without rebound or guarding  EXTREMITIES: MOCK, no cyanosis    SKIN: Warm and dry    LABS  Results from last 7 days   Lab Units 10/26/24  0545 10/25/24  0426 10/24/24  2126 10/24/24  0617   WBC 10*3/mm3 9.44 7.93 9.87 10.27   HEMOGLOBIN g/dL 11.9* 10.9* 10.6* 10.7*   HEMATOCRIT % 36.7* 32.4* 31.2* 32.9*   PLATELETS 10*3/mm3 297 278 273 266   MONOCYTES % %  --  12.2* 9.0 5.1   EOSINOPHIL % %  --  0.0* 0.0* 0.0*     Results from last 7 days   Lab Units 10/26/24  0545 10/25/24  1544 10/25/24  0426 10/23/24  0311 10/22/24  1557   SODIUM mmol/L 137 129* 132*   < > 136   POTASSIUM mmol/L 4.7 4.4 4.5   < > 4.8   CHLORIDE " mmol/L 103 99 98   < > 96*   CO2 mmol/L 19.0* 16.9* 20.0*   < > 24.6   BUN mg/dL 36* 37* 38*   < > 9   CREATININE mg/dL 1.22 1.38* 1.75*   < > 1.07   CALCIUM mg/dL 9.5 9.0 8.6   < > 10.5   BILIRUBIN mg/dL 1.1  --  1.8*  --  2.6*   ALK PHOS U/L 99  --  72  --  60   ALT (SGPT) U/L 92*  --  77*  --  11   AST (SGOT) U/L 108*  --  148*  --  10   GLUCOSE mg/dL 133* 211* 213*   < > 183*    < > = values in this interval not displayed.         IMAGING:  CT angiogram abdomen and pelvis from today images and report reviewed, there is no vascular occlusion identified, there is a large right subcapsular fluid collection with mass effect on the liver which is new for which consideration of drain placement and sampling was recommended to determine the etiology of the fluid collection.  There are smaller fluid collections seen adjacent to the gallbladder and lateral to the mid duodenum.  There is mild distal gastric antral wall thickening with surrounding inflammatory changes in the right upper quadrant.  The gallbladder still looks partially contracted with a mildly thickened wall.  There is third spacing with pleural effusions, body wall edema, and free intraperitoneal fluid that has increased.    A/P: 49 y.o. male with multiple medical problems including CHF with EF of 35%, hypertension, hyperlipidemia, type 2 diabetes mellitus with A1c 9.4, who came into the hospital complaining of abdominal pain.  He has undergone workup for possible cholecystitis with initial CT demonstrating contracted gallbladder with mild hyperenhancing wall, EGD negative for abnormalities, and HIDA scan demonstrating filling but with decreased ejection fraction concerning for possible biliary dyskinesia vs chronic cholecystitis.  Initially with gallbladder filling on HIDA less likely for acute cholecystitis, and possibility of other etiology such as some element of congestion due to heart failure with ascites was noted. He has been on zosyn with  normalization of his WBC.  On exam currently he has RUQ tenderness without peritoneal or Ferro's signs this afternoon.  He had severe pain this morning and underwent CTA abdomen pelvis to evaluate for mesenteric ischemia.  This demonstrates a large right subcapsular fluid collection with mass effect on the liver.  I reviewed his imaging with Dr. Vasquez, noted prior imaging where gallbladder did appear normal in August. Differential for his subcapsular liver collection includes biloma, hematoma, abscess/infectious source, among others. Based on his stable vitals and Hgb and no blush on CT I am doubtful of hematoma. I will discuss with IR possible evaluation for aspiration/drainage to clarify the source of the fluid.     Addendum:  Discussed with Dr. Morrell who reviewed the patient's images with me. Unclear source for this subcapsular liver collection. At this time my suspicion is possible biloma, though the source is not clear. Given that it is contained, I will plan to monitor the patient and plan for possible repeat scan depending on his clinical course.     Manuela Wisdom MD  General, Robotic and Endoscopic Surgery  [facility]    [address], [address]  [place], [place], [address]  P: [phone]  F: [phone]

## 2024-10-26 NOTE — PROGRESS NOTES
Name: Donnie Bishop ADMIT: 10/22/2024   : 1975  PCP: Juju Kennedy MD    MRN: [id number] LOS: 2 days   AGE/SEX: 49 y.o. male  ROOM: [facility]     Subjective   Subjective   Patient seen this morning.  Drowsy but arousable, follows commands.  Received IV Dilaudid for abdominal pain as patient is n.p.o. for planned duplex.  Continues to complain of abdominal pain.    Review of Systems     Objective   Objective   Vital Signs  Temp:  [97.6 °F (36.4 °C)-98.6 °F (37 °C)] 98.6 °F (37 °C)  Heart Rate:  [85] 85  Resp:  [28-37] 28  BP: ()/(71-78) 110/73  SpO2:  [96 %-99 %] 99 %  on  Flow (L/min) (Oxygen Therapy):  [3] 3;   Device (Oxygen Therapy): room air  Body mass index is 28.03 kg/m².  Physical Exam    General: Lying in bed, drowsy but arousable, ill-appearing,  HEENT: Normocephalic, atraumatic  CV: Regular rate and rhythm, no murmurs rubs or gallops  Lungs: Tachypneic, diminished  Abdomen: Soft, nondistended, tender to palpation,  Extremities: Trace edema in lower extremities,, no cyanosis     Results Review     I reviewed the patient's new clinical results.  Results from last 7 days   Lab Units 10/26/24  0545 10/25/24  0426 10/24/24  2126 10/24/24  0617   WBC 10*3/mm3 9.44 7.93 9.87 10.27   HEMOGLOBIN g/dL 11.9* 10.9* 10.6* 10.7*   PLATELETS 10*3/mm3 297 278 273 266     Results from last 7 days   Lab Units 10/26/24  0545 10/25/24  1544 10/25/24  0426 10/24/24  1715   SODIUM mmol/L 137 129* 132* 132*   POTASSIUM mmol/L 4.7 4.4 4.5 4.9   CHLORIDE mmol/L 103 99 98 98   CO2 mmol/L 19.0* 16.9* 20.0* 18.0*   BUN mg/dL 36* 37* 38* 35*   CREATININE mg/dL 1.22 1.38* 1.75* 1.57*   GLUCOSE mg/dL 133* 211* 213* 255*   Estimated Creatinine Clearance: 76.1 mL/min (by C-G formula based on SCr of 1.22 mg/dL).  Results from last 7 days   Lab Units 10/26/24  0545 10/25/24  1544 10/25/24  0426 10/22/24  1557   ALBUMIN g/dL 3.0* 2.8* 3.2* 4.6   BILIRUBIN mg/dL 1.1  --  1.8* 2.6*   ALK PHOS U/L 99  --  72 60   AST (SGOT) U/L  108*  --  148* 10   ALT (SGPT) U/L 92*  --  77* 11     Results from last 7 days   Lab Units 10/26/24  0545 10/25/24  1544 10/25/24  0426 10/24/24  1715 10/23/24  0311 10/22/24  1557   CALCIUM mg/dL 9.5 9.0 8.6 8.5*   < > 10.5   ALBUMIN g/dL 3.0* 2.8* 3.2*  --   --  4.6   MAGNESIUM mg/dL 2.4  --   --   --   --   --    PHOSPHORUS mg/dL 3.4 3.8  --   --   --   --     < > = values in this interval not displayed.     Results from last 7 days   Lab Units 10/26/24  0545 10/25/24  0426 10/24/24  1404 10/24/24  0617 10/24/24  0012 10/23/24  2104   PROCALCITONIN ng/mL 30.30* 78.10*  --   --   --   --    LACTATE mmol/L  --   --  3.8* 3.3* 3.6* 4.5*     COVID19   Date Value Ref Range Status   10/25/2024 Not Detected Not Detected - Ref. Range Final   12/26/2023 Not Detected Not Detected - Ref. Range Final     Hemoglobin A1C   Date/Time Value Ref Range Status   10/24/2024 0617 9.40 (H) 4.80 - 5.60 % Final     Glucose   Date/Time Value Ref Range Status   10/26/2024 1138 156 (H) 70 - 130 mg/dL Final   10/26/2024 0745 130 70 - 130 mg/dL Final   10/25/2024 2040 155 (H) 70 - 130 mg/dL Final   10/25/2024 1603 206 (H) 70 - 130 mg/dL Final   10/25/2024 1149 230 (H) 70 - 130 mg/dL Final   10/24/2024 2132 259 (H) 70 - 130 mg/dL Final   10/24/2024 2005 286 (H) 70 - 130 mg/dL Final           NM HIDA SCAN WITH PHARMACOLOGICAL INTERVENTION  Narrative: Nuclear medicine scan with pharmacological intervention     INDICATION: Upper abdominal pain     COMPARISON: CT abdomen pelvis October 22, 2024     TECHNIQUE: Following intravenous injection of 5.5 mCi Technetium 99m  Choletec IV, sequential static images of the abdomen were obtained for  at 30 and 60 minutes. Subsequently, 1.6 mcg CCK was infused to assess  ejection fraction.     FINDINGS:     Radiotracer seen in the liver, gallbladder, biliary system and duodenum  at 30 minutes. Progressive accumulation of radiotracer in the  gallbladder, duodenum and proximal jejunum at 60 minutes.  CCK  administered with ejection fraction measuring 19%.     Impression:    1. Patent cystic duct.  2. Patent CBD.  3. Ejection fraction is diminished which can be seen with chronic  cholecystitis or functional gallbladder disorder.              This report was finalized on 10/25/2024 9:36 AM by Dr. Connor Vasquez M.D on Workstation: [id number]       Scheduled Medications  [Held by provider] atorvastatin, 40 mg, Oral, Daily  [Held by provider] carvedilol, 6.25 mg, Oral, Q12H  heparin (porcine), 5,000 Units, Subcutaneous, Q12H  insulin glargine, 10 Units, Subcutaneous, Nightly  insulin lispro, 2 Units, Subcutaneous, TID With Meals  insulin lispro, 2-7 Units, Subcutaneous, 4x Daily AC & at Bedtime  midodrine, 10 mg, Oral, TID AC  mupirocin, 1 Application, Each Nare, BID  pantoprazole, 40 mg, Oral, BID AC  piperacillin-tazobactam, 3.375 g, Intravenous, Q8H  senna-docusate sodium, 2 tablet, Oral, BID  venlafaxine XR, 75 mg, Oral, Daily    Infusions   Diet  Diet: Liquid; Clear Liquid; Fluid Consistency: Thin (IDDSI 0)    I have personally reviewed     [x]  Laboratory   [x]  Microbiology   [x]  Radiology   [x]  EKG/Telemetry  []  Cardiology/Vascular   []  Pathology    []  Records       Assessment/Plan     Active Hospital Problems    Diagnosis  POA    **Epigastric abdominal pain [R10.13]  Unknown    Leukocytosis [D72.829]  Yes    Chronic combined systolic and diastolic heart failure [I50.42]  Yes    Abdominal pain [R10.9]  Yes    Gastritis [K29.70]  Yes    Anxiety [F41.9]  Yes    Multiple-type hyperlipidemia [E78.2]  Yes    Type 2 diabetes mellitus with diabetic neuropathy, with long-term current use of insulin [E11.40, Z79.4]  Not Applicable    Benign essential HTN [I10]  Yes      Resolved Hospital Problems   No resolved problems to display.       Patient is a 49 y.o. man with type 2 diabetes, HTN, n, hyperlipidemia, chronic combined systolic and diastolic heart failure (non-ischemic), anxiety who presented with  severe abdominal/epigastric pain.    Epigastric abdominal pain/right upper quadrant abdominal pain  Transaminitis  Chronic cholecystitis  -EGD 10/24/2024 with no acute findings  -On PPI  -HIDA scan concerning for chronic cholecystitis versus bili dyskinesia  -Elevated liver enzymes could be ischemic liver injury as well in the setting of hypotension which now has improved.  -Monitor daily CMP, AST/bilirubin improved.  ALT slightly up today.  -Hold statin in the setting of transaminitis  -Duplex ultrasound of mesenteric arteries was ordered however was unable to be completed due to bowel gas and patient's inability to hold his breath per report  -GI following         Hypotension  -On midodrine, status post IV fluids.  BP improved.  -Check echo in the setting of history of chronic combined systolic and diastolic heart failure  - cardiology consulted  -     TENISHA/hyponatremia  -Creatinine peaked at 1.75 on 10/25/2024, creatinine was 1.07 on admission  -Nephrology managing, status post IV fluids, creatinine improving     Infection/sepsis?  -WBC was 13.79 on admission, patient has been afebrile since admission  -Procalcitonin significantly elevated at 78.10 10/25/2024-repeat improved  HIDA scan showed possible chronic cholecystitis, general surgery evaluated, no indication for surgery currently no signs of acute cholecystitis and findings could be related secondary to congestive hepatopathy  -Blood cultures remain negative to date  Continue with Zosyn for now-    Anemia   Iron panel consistent with anemia chronic disease with ferritin of 943, B12 and folate normal  Monitor daily CBC, transfuse as indicated for symptomatic anemia or hemoglobin 7    Encephalopathy  -Suspect secondary to acute illness/pain meds/infection/TENISHA  -No gross neurological deficits  - Discontinued IV Dilaudid.  Monitor symptoms, if worsening confusion will order CT of the head    Type II DM   -Continue current regimen of Lantus 10 units nightly, SSI.   Discontinue scheduled regular insulin, initiated on scheduled lispro 8 units 3 times daily with meals in addition to SSI.    DVT prophylaxis.  Initiate subcu heparin  Full code.  Discussed with patient and nursing staff.  Expected Discharge Date: 10/28/2024; Expected Discharge Time:        Copied text in this note has been reviewed and is accurate as of 10/26/24.         Dictated utilizing Dragon dictation        Yaritza Hurtado MD  [facility]  10/26/24  12:29 EDT

## 2024-10-26 NOTE — PLAN OF CARE
Goal Outcome Evaluation:  Plan of Care Reviewed With: patient           Outcome Evaluation: Pt is a 50 y/o M admitted to  [facility] with c/o abdominal pain. EGD revealed no acue findings. GI following. Pt has a past med hx of DM, HTN, HLD, CHF. Pt reports he lives alone and uses a SC for mobility at baseline. Pt presents to PT with generalized weakness and poor activity tolerance. Pt reports 9/10 head and abdomen pain. RN aware. Pt performed bed mobility requiring min/mod A with increased time to complete. Pt was able to stand and ambulate 5' c SC + HHA requiring min A. Pt demo's a very slow guarded gait and flexed posture. Overall mobility remains significantly limited due to pain. D/C plan pending progress.    Anticipated Discharge Disposition (PT): home with assist, home with home health, skilled nursing facility (pending progress)

## 2024-10-26 NOTE — PLAN OF CARE
Goal Outcome Evaluation:  Plan of Care Reviewed With: patient, family        Progress: no change  Outcome Evaluation: VSS. Weaned O2 to RA. AOx4, slow to answer questions. More alert since stopping IV dilaudid. Unable to complete mesenteric duplex today, MD notified, and STAT CTA A&P ordered. Large perihepatic fluid collection seen, surgeon aware, monitor pt for now. Severe abd pain, PRN Edison given. ECHO completed. CXR completed. Encouraged IS use. Worked with PT, x2 assist. Clear liquid diet. IV ABX. Family updated. Bed alarm on.

## 2024-10-26 NOTE — PROGRESS NOTES
[facility] Progress Note      Patient Name: Donnie Bishop  : 1975  MRN: [id number]  Primary Care Physician:  Juju Kennedy MD  Date of admission: 10/22/2024    Subjective     Interval History:   Follow-up TENISHA    Continues to complain of abdominal pain; cannot localize any further  No shortness of breath on 3 L/min; no chest pain  CTA performed earlier today; large right perihepatic fluid collection with mass effect on liver noted, among other abnormal findings    Review of Systems:   As noted above    Objective     Vitals:   Temp:  [97.6 °F (36.4 °C)-98.6 °F (37 °C)] 98.5 °F (36.9 °C)  Heart Rate:  [85-92] 92  Resp:  [16-37] 16  BP: ()/(67-84) 126/84  Flow (L/min) (Oxygen Therapy):  [3] 3    Intake/Output Summary (Last 24 hours) at 10/26/2024 1759  Last data filed at 10/26/2024 1644  Gross per 24 hour   Intake 620 ml   Output 2075 ml   Net -1455 ml       Physical Exam:    General Appearance: Awake, slow to answer, uncomfortable, moaning  Skin: warm and dry  HEENT: oral mucosa dry, nonicteric sclera  Neck: supple, no JVD  Lungs: Coarse anteriorly; no wheezing  Heart: RR, tachycardic, no rub  Abdomen: soft, + T, + D, BS hypoactive   : no palpable bladder  Extremities: no edema, cyanosis or clubbing  Neuro: Cognitive blunting; slow speech    Scheduled Meds:     [Held by provider] atorvastatin, 40 mg, Oral, Daily  [Held by provider] carvedilol, 6.25 mg, Oral, Q12H  heparin (porcine), 5,000 Units, Subcutaneous, Q12H  insulin glargine, 10 Units, Subcutaneous, Nightly  insulin lispro, 2 Units, Subcutaneous, TID With Meals  insulin lispro, 2-7 Units, Subcutaneous, 4x Daily AC & at Bedtime  midodrine, 10 mg, Oral, TID AC  mupirocin, 1 Application, Each Nare, BID  pantoprazole, 40 mg, Oral, BID AC  piperacillin-tazobactam, 3.375 g, Intravenous, Q8H  senna-docusate sodium, 2 tablet, Oral, BID  venlafaxine XR, 75 mg, Oral, Daily      IV Meds:          Results Reviewed:   I have  personally reviewed the results from the time of this admission to 10/26/2024 17:59 EDT     Results from last 7 days   Lab Units 10/26/24  0545 10/25/24  1544 10/25/24  0426 10/23/24  0311 10/22/24  1557   SODIUM mmol/L 137 129* 132*   < > 136   POTASSIUM mmol/L 4.7 4.4 4.5   < > 4.8   CHLORIDE mmol/L 103 99 98   < > 96*   CO2 mmol/L 19.0* 16.9* 20.0*   < > 24.6   BUN mg/dL 36* 37* 38*   < > 9   CREATININE mg/dL 1.22 1.38* 1.75*   < > 1.07   CALCIUM mg/dL 9.5 9.0 8.6   < > 10.5   BILIRUBIN mg/dL 1.1  --  1.8*  --  2.6*   ALK PHOS U/L 99  --  72  --  60   ALT (SGPT) U/L 92*  --  77*  --  11   AST (SGOT) U/L 108*  --  148*  --  10   GLUCOSE mg/dL 133* 211* 213*   < > 183*    < > = values in this interval not displayed.       Estimated Creatinine Clearance: 76.1 mL/min (by C-G formula based on SCr of 1.22 mg/dL).    Results from last 7 days   Lab Units 10/26/24  0545 10/25/24  1544   MAGNESIUM mg/dL 2.4  --    PHOSPHORUS mg/dL 3.4 3.8       Results from last 7 days   Lab Units 10/24/24  0617   URIC ACID mg/dL 5.4       Results from last 7 days   Lab Units 10/26/24  0545 10/25/24  0426 10/24/24  2126 10/24/24  0617 10/23/24  1433   WBC 10*3/mm3 9.44 7.93 9.87 10.27 12.62*   HEMOGLOBIN g/dL 11.9* 10.9* 10.6* 10.7* 15.1   PLATELETS 10*3/mm3 297 278 273 266 399             Assessment / Plan     ASSESSMENT:  TENISHA, nonoliguric, improving, prerenal due to hypovolemia, hypotension, and impaired renal autoregulation due to Entresto, Aldactone and Jardiance.  May have had element of JOSEP, too.  Looks dry; normal potassium and likely NAGMA  Perihepatic fluid collection with mass effect on liver by CTA 10/26  Transaminitis  History of congestive heart failure ejection fraction 35%.  Currently compensated  Severe anemia   Encephalopathy  DM2      PLAN:  Resume IVF given contrast load today  Discussed with Dr. Melo earlier today.  Low risk for JOSEP given SCR well below 2 mg/dL      Thank you for involving us in the care of Donnie Bishop.   Please feel free to call with any questions.    Girma Randhawa MD  10/26/24  17:59 EDT    [facility]  [phone]    Parts of this note may be an electronic transcription/translation of spoken language to printed text using the Dragon dictation system.

## 2024-10-26 NOTE — PLAN OF CARE
Patient a/ox4, slow to speak at times. However, oriented, at times forgetful. Patient NPO after midnight for scan today. Continues to have abdominal pain and generalized pain throughout. Medication given as needed.     Problem: Pain Acute  Goal: Optimal Pain Control and Function  Outcome: Progressing  Intervention: Optimize Psychosocial Wellbeing  Recent Flowsheet Documentation  Taken 10/26/2024 0200 by Katiuska Sanchez RN  Diversional Activities: television  Taken 10/25/2024 2000 by Katiuska Sanchez RN  Diversional Activities: television  Intervention: Prevent or Manage Pain  Recent Flowsheet Documentation  Taken 10/26/2024 0200 by Katiuska Sanchez RN  Medication Review/Management: medications reviewed  Taken 10/26/2024 0000 by Katiuska Sanchez RN  Medication Review/Management: medications reviewed  Taken 10/25/2024 2200 by Katiuska Sanchez RN  Medication Review/Management:   medications reviewed   high-risk medications identified  Taken 10/25/2024 2000 by Katiuska Sanchez RN  Medication Review/Management: medications reviewed     Problem: Sepsis/Septic Shock  Goal: Blood Glucose Level Within Target Range  Intervention: Optimize Glycemic Control  Recent Flowsheet Documentation  Taken 10/25/2024 2000 by Katiuska Sanchez RN  Hyperglycemia Management: blood glucose monitored   Goal Outcome Evaluation:

## 2024-10-27 ENCOUNTER — APPOINTMENT (OUTPATIENT)
Dept: CT IMAGING | Facility: HOSPITAL | Age: 49
DRG: 853 | End: 2024-10-27
Payer: COMMERCIAL

## 2024-10-27 ENCOUNTER — INPATIENT HOSPITAL (OUTPATIENT)
Dept: URBAN - METROPOLITAN AREA HOSPITAL 113 | Facility: HOSPITAL | Age: 49
End: 2024-10-27
Payer: COMMERCIAL

## 2024-10-27 ENCOUNTER — APPOINTMENT (OUTPATIENT)
Dept: GENERAL RADIOLOGY | Facility: HOSPITAL | Age: 49
DRG: 853 | End: 2024-10-27
Payer: COMMERCIAL

## 2024-10-27 ENCOUNTER — INPATIENT HOSPITAL (OUTPATIENT)
Age: 49
End: 2024-10-27
Payer: COMMERCIAL

## 2024-10-27 DIAGNOSIS — K76.89 OTHER SPECIFIED DISEASES OF LIVER: ICD-10-CM

## 2024-10-27 DIAGNOSIS — R10.13 EPIGASTRIC PAIN: ICD-10-CM

## 2024-10-27 DIAGNOSIS — K29.70 GASTRITIS, UNSPECIFIED, WITHOUT BLEEDING: ICD-10-CM

## 2024-10-27 LAB
ALBUMIN SERPL-MCNC: 2.5 G/DL (ref 3.5–5.2)
ALBUMIN SERPL-MCNC: 2.7 G/DL (ref 3.5–5.2)
ALBUMIN/GLOB SERPL: 0.6 G/DL
ALBUMIN/GLOB SERPL: 0.6 G/DL
ALP SERPL-CCNC: 140 U/L (ref 39–117)
ALP SERPL-CCNC: 217 U/L (ref 39–117)
ALT SERPL W P-5'-P-CCNC: 121 U/L (ref 1–41)
ALT SERPL W P-5'-P-CCNC: 133 U/L (ref 1–41)
ANION GAP SERPL CALCULATED.3IONS-SCNC: 12 MMOL/L (ref 5–15)
ANION GAP SERPL CALCULATED.3IONS-SCNC: 14 MMOL/L (ref 5–15)
AST SERPL-CCNC: 121 U/L (ref 1–40)
AST SERPL-CCNC: 172 U/L (ref 1–40)
B-OH-BUTYR SERPL-MCNC: 90 MCG/ML
BASOPHILS # BLD MANUAL: 0 10*3/MM3 (ref 0–0.2)
BASOPHILS # BLD MANUAL: 0 10*3/MM3 (ref 0–0.2)
BASOPHILS NFR BLD MANUAL: 0 % (ref 0–1.5)
BASOPHILS NFR BLD MANUAL: 0 % (ref 0–1.5)
BILIRUB SERPL-MCNC: 0.7 MG/DL (ref 0–1.2)
BILIRUB SERPL-MCNC: 0.8 MG/DL (ref 0–1.2)
BUN SERPL-MCNC: 28 MG/DL (ref 6–20)
BUN SERPL-MCNC: 32 MG/DL (ref 6–20)
BUN/CREAT SERPL: 28.6 (ref 7–25)
BUN/CREAT SERPL: 30.1 (ref 7–25)
BURR CELLS BLD QL SMEAR: ABNORMAL
BURR CELLS BLD QL SMEAR: ABNORMAL
CALCIUM SPEC-SCNC: 9.8 MG/DL (ref 8.6–10.5)
CALCIUM SPEC-SCNC: 9.8 MG/DL (ref 8.6–10.5)
CHLORIDE SERPL-SCNC: 104 MMOL/L (ref 98–107)
CHLORIDE SERPL-SCNC: 106 MMOL/L (ref 98–107)
CO2 SERPL-SCNC: 19 MMOL/L (ref 22–29)
CO2 SERPL-SCNC: 19 MMOL/L (ref 22–29)
CREAT SERPL-MCNC: 0.93 MG/DL (ref 0.76–1.27)
CREAT SERPL-MCNC: 1.12 MG/DL (ref 0.76–1.27)
DEPRECATED RDW RBC AUTO: 40.2 FL (ref 37–54)
DEPRECATED RDW RBC AUTO: 41.2 FL (ref 37–54)
EGFRCR SERPLBLD CKD-EPI 2021: 101.9 ML/MIN/1.73
EGFRCR SERPLBLD CKD-EPI 2021: 81.5 ML/MIN/1.73
EOSINOPHIL # BLD MANUAL: 0 10*3/MM3 (ref 0–0.4)
EOSINOPHIL # BLD MANUAL: 0 10*3/MM3 (ref 0–0.4)
EOSINOPHIL NFR BLD MANUAL: 0 % (ref 0.3–6.2)
EOSINOPHIL NFR BLD MANUAL: 0 % (ref 0.3–6.2)
ERYTHROCYTE [DISTWIDTH] IN BLOOD BY AUTOMATED COUNT: 11.9 % (ref 12.3–15.4)
ERYTHROCYTE [DISTWIDTH] IN BLOOD BY AUTOMATED COUNT: 12.3 % (ref 12.3–15.4)
GLOBULIN UR ELPH-MCNC: 4.3 GM/DL
GLOBULIN UR ELPH-MCNC: 4.5 GM/DL
GLUCOSE BLDC GLUCOMTR-MCNC: 234 MG/DL (ref 70–130)
GLUCOSE BLDC GLUCOMTR-MCNC: 262 MG/DL (ref 70–130)
GLUCOSE BLDC GLUCOMTR-MCNC: 264 MG/DL (ref 70–130)
GLUCOSE BLDC GLUCOMTR-MCNC: 275 MG/DL (ref 70–130)
GLUCOSE SERPL-MCNC: 177 MG/DL (ref 65–99)
GLUCOSE SERPL-MCNC: 223 MG/DL (ref 65–99)
HCT VFR BLD AUTO: 36.6 % (ref 37.5–51)
HCT VFR BLD AUTO: 38.7 % (ref 37.5–51)
HGB BLD-MCNC: 12 G/DL (ref 13–17.7)
HGB BLD-MCNC: 12.7 G/DL (ref 13–17.7)
LIPASE SERPL-CCNC: 11 U/L (ref 13–60)
LYMPHOCYTES # BLD MANUAL: 0.54 10*3/MM3 (ref 0.7–3.1)
LYMPHOCYTES # BLD MANUAL: 0.57 10*3/MM3 (ref 0.7–3.1)
LYMPHOCYTES NFR BLD MANUAL: 10.1 % (ref 5–12)
LYMPHOCYTES NFR BLD MANUAL: 7.1 % (ref 5–12)
MAGNESIUM SERPL-MCNC: 2.5 MG/DL (ref 1.6–2.6)
MAGNESIUM SERPL-MCNC: 2.9 MG/DL (ref 1.6–2.6)
MCH RBC QN AUTO: 29.9 PG (ref 26.6–33)
MCH RBC QN AUTO: 29.9 PG (ref 26.6–33)
MCHC RBC AUTO-ENTMCNC: 32.8 G/DL (ref 31.5–35.7)
MCHC RBC AUTO-ENTMCNC: 32.8 G/DL (ref 31.5–35.7)
MCV RBC AUTO: 91 FL (ref 79–97)
MCV RBC AUTO: 91.1 FL (ref 79–97)
METAMYELOCYTES NFR BLD MANUAL: 1 % (ref 0–0)
MONOCYTES # BLD: 0.76 10*3/MM3 (ref 0.1–0.9)
MONOCYTES # BLD: 1.43 10*3/MM3 (ref 0.1–0.9)
NEUTROPHILS # BLD AUTO: 11.99 10*3/MM3 (ref 1.7–7)
NEUTROPHILS # BLD AUTO: 9.34 10*3/MM3 (ref 1.7–7)
NEUTROPHILS NFR BLD MANUAL: 84.8 % (ref 42.7–76)
NEUTROPHILS NFR BLD MANUAL: 87.8 % (ref 42.7–76)
NRBC BLD AUTO-RTO: 0.3 /100 WBC (ref 0–0.2)
NRBC BLD AUTO-RTO: 0.5 /100 WBC (ref 0–0.2)
PHOSPHATE SERPL-MCNC: 1.9 MG/DL (ref 2.5–4.5)
PHOSPHATE SERPL-MCNC: 2.9 MG/DL (ref 2.5–4.5)
PLAT MORPH BLD: NORMAL
PLAT MORPH BLD: NORMAL
PLATELET # BLD AUTO: 349 10*3/MM3 (ref 140–450)
PLATELET # BLD AUTO: 352 10*3/MM3 (ref 140–450)
PMV BLD AUTO: 10.1 FL (ref 6–12)
PMV BLD AUTO: 10.3 FL (ref 6–12)
POIKILOCYTOSIS BLD QL SMEAR: ABNORMAL
POIKILOCYTOSIS BLD QL SMEAR: ABNORMAL
POLYCHROMASIA BLD QL SMEAR: ABNORMAL
POTASSIUM SERPL-SCNC: 3.9 MMOL/L (ref 3.5–5.2)
POTASSIUM SERPL-SCNC: 4.7 MMOL/L (ref 3.5–5.2)
PROT SERPL-MCNC: 7 G/DL (ref 6–8.5)
PROT SERPL-MCNC: 7 G/DL (ref 6–8.5)
RBC # BLD AUTO: 4.02 10*6/MM3 (ref 4.14–5.8)
RBC # BLD AUTO: 4.25 10*6/MM3 (ref 4.14–5.8)
SODIUM SERPL-SCNC: 137 MMOL/L (ref 136–145)
SODIUM SERPL-SCNC: 137 MMOL/L (ref 136–145)
SPHEROCYTES BLD QL SMEAR: ABNORMAL
TROPONIN T SERPL HS-MCNC: 28 NG/L
VARIANT LYMPHS NFR BLD MANUAL: 4 % (ref 19.6–45.3)
VARIANT LYMPHS NFR BLD MANUAL: 5.1 % (ref 19.6–45.3)
WBC MORPH BLD: NORMAL
WBC MORPH BLD: NORMAL
WBC NRBC COR # BLD AUTO: 10.64 10*3/MM3 (ref 3.4–10.8)
WBC NRBC COR # BLD AUTO: 14.14 10*3/MM3 (ref 3.4–10.8)

## 2024-10-27 PROCEDURE — 25010000002 ONDANSETRON PER 1 MG: Performed by: INTERNAL MEDICINE

## 2024-10-27 PROCEDURE — 99233 SBSQ HOSP IP/OBS HIGH 50: CPT | Performed by: INTERNAL MEDICINE

## 2024-10-27 PROCEDURE — 25010000002 DIPHENHYDRAMINE PER 50 MG: Performed by: STUDENT IN AN ORGANIZED HEALTH CARE EDUCATION/TRAINING PROGRAM

## 2024-10-27 PROCEDURE — 85007 BL SMEAR W/DIFF WBC COUNT: CPT | Performed by: INTERNAL MEDICINE

## 2024-10-27 PROCEDURE — 25010000002 MAGNESIUM SULFATE 2 GM/50ML SOLUTION: Performed by: STUDENT IN AN ORGANIZED HEALTH CARE EDUCATION/TRAINING PROGRAM

## 2024-10-27 PROCEDURE — 85379 FIBRIN DEGRADATION QUANT: CPT

## 2024-10-27 PROCEDURE — 63710000001 INSULIN GLARGINE PER 5 UNITS: Performed by: STUDENT IN AN ORGANIZED HEALTH CARE EDUCATION/TRAINING PROGRAM

## 2024-10-27 PROCEDURE — 70450 CT HEAD/BRAIN W/O DYE: CPT

## 2024-10-27 PROCEDURE — 83735 ASSAY OF MAGNESIUM: CPT | Performed by: STUDENT IN AN ORGANIZED HEALTH CARE EDUCATION/TRAINING PROGRAM

## 2024-10-27 PROCEDURE — 85025 COMPLETE CBC W/AUTO DIFF WBC: CPT | Performed by: INTERNAL MEDICINE

## 2024-10-27 PROCEDURE — 93010 ELECTROCARDIOGRAM REPORT: CPT | Performed by: INTERNAL MEDICINE

## 2024-10-27 PROCEDURE — 25010000002 HYDROMORPHONE PER 4 MG: Performed by: STUDENT IN AN ORGANIZED HEALTH CARE EDUCATION/TRAINING PROGRAM

## 2024-10-27 PROCEDURE — 80053 COMPREHEN METABOLIC PANEL: CPT | Performed by: STUDENT IN AN ORGANIZED HEALTH CARE EDUCATION/TRAINING PROGRAM

## 2024-10-27 PROCEDURE — 84100 ASSAY OF PHOSPHORUS: CPT | Performed by: STUDENT IN AN ORGANIZED HEALTH CARE EDUCATION/TRAINING PROGRAM

## 2024-10-27 PROCEDURE — 25010000002 PIPERACILLIN SOD-TAZOBACTAM PER 1 G: Performed by: STUDENT IN AN ORGANIZED HEALTH CARE EDUCATION/TRAINING PROGRAM

## 2024-10-27 PROCEDURE — 93005 ELECTROCARDIOGRAM TRACING: CPT | Performed by: STUDENT IN AN ORGANIZED HEALTH CARE EDUCATION/TRAINING PROGRAM

## 2024-10-27 PROCEDURE — 84484 ASSAY OF TROPONIN QUANT: CPT | Performed by: STUDENT IN AN ORGANIZED HEALTH CARE EDUCATION/TRAINING PROGRAM

## 2024-10-27 PROCEDURE — 25010000002 PROCHLORPERAZINE 10 MG/2ML SOLUTION: Performed by: STUDENT IN AN ORGANIZED HEALTH CARE EDUCATION/TRAINING PROGRAM

## 2024-10-27 PROCEDURE — 63710000001 INSULIN LISPRO (HUMAN) PER 5 UNITS: Performed by: STUDENT IN AN ORGANIZED HEALTH CARE EDUCATION/TRAINING PROGRAM

## 2024-10-27 PROCEDURE — 71045 X-RAY EXAM CHEST 1 VIEW: CPT

## 2024-10-27 PROCEDURE — 25010000002 HEPARIN (PORCINE) PER 1000 UNITS: Performed by: STUDENT IN AN ORGANIZED HEALTH CARE EDUCATION/TRAINING PROGRAM

## 2024-10-27 PROCEDURE — 63710000001 INSULIN LISPRO (HUMAN) PER 5 UNITS: Performed by: INTERNAL MEDICINE

## 2024-10-27 PROCEDURE — 25810000003 SODIUM CHLORIDE 0.9 % SOLUTION: Performed by: INTERNAL MEDICINE

## 2024-10-27 PROCEDURE — 99232 SBSQ HOSP IP/OBS MODERATE 35: CPT | Performed by: INTERNAL MEDICINE

## 2024-10-27 PROCEDURE — 82948 REAGENT STRIP/BLOOD GLUCOSE: CPT

## 2024-10-27 PROCEDURE — 99232 SBSQ HOSP IP/OBS MODERATE 35: CPT | Performed by: STUDENT IN AN ORGANIZED HEALTH CARE EDUCATION/TRAINING PROGRAM

## 2024-10-27 PROCEDURE — 0F9030Z DRAINAGE OF LIVER WITH DRAINAGE DEVICE, PERCUTANEOUS APPROACH: ICD-10-PCS | Performed by: RADIOLOGY

## 2024-10-27 PROCEDURE — 83690 ASSAY OF LIPASE: CPT | Performed by: STUDENT IN AN ORGANIZED HEALTH CARE EDUCATION/TRAINING PROGRAM

## 2024-10-27 RX ORDER — BISACODYL 10 MG
10 SUPPOSITORY, RECTAL RECTAL DAILY PRN
Status: DISCONTINUED | OUTPATIENT
Start: 2024-10-27 | End: 2024-11-14 | Stop reason: HOSPADM

## 2024-10-27 RX ORDER — MAGNESIUM SULFATE HEPTAHYDRATE 40 MG/ML
2 INJECTION, SOLUTION INTRAVENOUS ONCE
Status: COMPLETED | OUTPATIENT
Start: 2024-10-27 | End: 2024-10-27

## 2024-10-27 RX ORDER — INSULIN LISPRO 100 [IU]/ML
4 INJECTION, SOLUTION INTRAVENOUS; SUBCUTANEOUS
Status: DISCONTINUED | OUTPATIENT
Start: 2024-10-27 | End: 2024-11-02

## 2024-10-27 RX ORDER — OXYCODONE HYDROCHLORIDE 5 MG/1
5 TABLET ORAL EVERY 4 HOURS PRN
Status: DISCONTINUED | OUTPATIENT
Start: 2024-10-27 | End: 2024-11-07

## 2024-10-27 RX ORDER — POLYETHYLENE GLYCOL 3350 17 G/17G
17 POWDER, FOR SOLUTION ORAL 2 TIMES DAILY
Status: DISCONTINUED | OUTPATIENT
Start: 2024-10-27 | End: 2024-11-14 | Stop reason: HOSPADM

## 2024-10-27 RX ORDER — PROCHLORPERAZINE EDISYLATE 5 MG/ML
10 INJECTION INTRAMUSCULAR; INTRAVENOUS ONCE
Status: COMPLETED | OUTPATIENT
Start: 2024-10-27 | End: 2024-10-27

## 2024-10-27 RX ORDER — AMOXICILLIN 250 MG
2 CAPSULE ORAL 2 TIMES DAILY
Status: DISCONTINUED | OUTPATIENT
Start: 2024-10-27 | End: 2024-11-14 | Stop reason: HOSPADM

## 2024-10-27 RX ORDER — INSULIN LISPRO 100 [IU]/ML
3 INJECTION, SOLUTION INTRAVENOUS; SUBCUTANEOUS
Status: DISCONTINUED | OUTPATIENT
Start: 2024-10-27 | End: 2024-10-27

## 2024-10-27 RX ORDER — DIPHENHYDRAMINE HYDROCHLORIDE 50 MG/ML
25 INJECTION INTRAMUSCULAR; INTRAVENOUS ONCE
Status: COMPLETED | OUTPATIENT
Start: 2024-10-27 | End: 2024-10-27

## 2024-10-27 RX ORDER — FENTANYL/ROPIVACAINE/NS/PF 2-625MCG/1
15 PLASTIC BAG, INJECTION (ML) EPIDURAL ONCE
Status: COMPLETED | OUTPATIENT
Start: 2024-10-28 | End: 2024-10-28

## 2024-10-27 RX ORDER — SODIUM CHLORIDE 9 MG/ML
75 INJECTION, SOLUTION INTRAVENOUS CONTINUOUS
Status: DISCONTINUED | OUTPATIENT
Start: 2024-10-27 | End: 2024-10-28

## 2024-10-27 RX ORDER — BISACODYL 5 MG/1
5 TABLET, DELAYED RELEASE ORAL DAILY PRN
Status: DISCONTINUED | OUTPATIENT
Start: 2024-10-27 | End: 2024-11-14 | Stop reason: HOSPADM

## 2024-10-27 RX ORDER — MIDODRINE HYDROCHLORIDE 5 MG/1
5 TABLET ORAL
Status: DISCONTINUED | OUTPATIENT
Start: 2024-10-27 | End: 2024-10-28

## 2024-10-27 RX ADMIN — SODIUM CHLORIDE 100 ML/HR: 9 INJECTION, SOLUTION INTRAVENOUS at 16:14

## 2024-10-27 RX ADMIN — HYDROMORPHONE HYDROCHLORIDE 0.5 MG: 1 INJECTION, SOLUTION INTRAMUSCULAR; INTRAVENOUS; SUBCUTANEOUS at 21:41

## 2024-10-27 RX ADMIN — POLYETHYLENE GLYCOL 3350 17 G: 17 POWDER, FOR SOLUTION ORAL at 20:15

## 2024-10-27 RX ADMIN — INSULIN LISPRO 4 UNITS: 100 INJECTION, SOLUTION INTRAVENOUS; SUBCUTANEOUS at 11:26

## 2024-10-27 RX ADMIN — PIPERACILLIN AND TAZOBACTAM 3.38 G: 3; .375 INJECTION, POWDER, FOR SOLUTION INTRAVENOUS at 04:22

## 2024-10-27 RX ADMIN — HYDROMORPHONE HYDROCHLORIDE 0.5 MG: 1 INJECTION, SOLUTION INTRAMUSCULAR; INTRAVENOUS; SUBCUTANEOUS at 04:23

## 2024-10-27 RX ADMIN — INSULIN LISPRO 4 UNITS: 100 INJECTION, SOLUTION INTRAVENOUS; SUBCUTANEOUS at 20:15

## 2024-10-27 RX ADMIN — POLYETHYLENE GLYCOL 3350 17 G: 17 POWDER, FOR SOLUTION ORAL at 08:39

## 2024-10-27 RX ADMIN — INSULIN LISPRO 3 UNITS: 100 INJECTION, SOLUTION INTRAVENOUS; SUBCUTANEOUS at 08:39

## 2024-10-27 RX ADMIN — MAGNESIUM SULFATE HEPTAHYDRATE 2 G: 40 INJECTION, SOLUTION INTRAVENOUS at 17:51

## 2024-10-27 RX ADMIN — DIPHENHYDRAMINE HYDROCHLORIDE 25 MG: 50 INJECTION, SOLUTION INTRAMUSCULAR; INTRAVENOUS at 17:50

## 2024-10-27 RX ADMIN — PIPERACILLIN AND TAZOBACTAM 3.38 G: 3; .375 INJECTION, POWDER, FOR SOLUTION INTRAVENOUS at 14:45

## 2024-10-27 RX ADMIN — PROCHLORPERAZINE EDISYLATE 10 MG: 5 INJECTION INTRAMUSCULAR; INTRAVENOUS at 17:50

## 2024-10-27 RX ADMIN — HYDROMORPHONE HYDROCHLORIDE 0.5 MG: 1 INJECTION, SOLUTION INTRAMUSCULAR; INTRAVENOUS; SUBCUTANEOUS at 00:54

## 2024-10-27 RX ADMIN — SODIUM CHLORIDE 75 ML/HR: 9 INJECTION, SOLUTION INTRAVENOUS at 17:51

## 2024-10-27 RX ADMIN — MIDODRINE HYDROCHLORIDE 5 MG: 5 TABLET ORAL at 11:27

## 2024-10-27 RX ADMIN — PANTOPRAZOLE SODIUM 40 MG: 40 TABLET, DELAYED RELEASE ORAL at 16:34

## 2024-10-27 RX ADMIN — INSULIN GLARGINE 14 UNITS: 100 INJECTION, SOLUTION SUBCUTANEOUS at 20:15

## 2024-10-27 RX ADMIN — SENNOSIDES AND DOCUSATE SODIUM 2 TABLET: 50; 8.6 TABLET ORAL at 20:15

## 2024-10-27 RX ADMIN — PANTOPRAZOLE SODIUM 40 MG: 40 TABLET, DELAYED RELEASE ORAL at 08:38

## 2024-10-27 RX ADMIN — OXYCODONE HYDROCHLORIDE 5 MG: 5 TABLET ORAL at 08:39

## 2024-10-27 RX ADMIN — OXYCODONE HYDROCHLORIDE 5 MG: 5 TABLET ORAL at 20:15

## 2024-10-27 RX ADMIN — MIDODRINE HYDROCHLORIDE 5 MG: 5 TABLET ORAL at 08:39

## 2024-10-27 RX ADMIN — PIPERACILLIN AND TAZOBACTAM 3.38 G: 3; .375 INJECTION, POWDER, FOR SOLUTION INTRAVENOUS at 20:15

## 2024-10-27 RX ADMIN — INSULIN LISPRO 4 UNITS: 100 INJECTION, SOLUTION INTRAVENOUS; SUBCUTANEOUS at 16:12

## 2024-10-27 RX ADMIN — ONDANSETRON 4 MG: 2 INJECTION INTRAMUSCULAR; INTRAVENOUS at 08:51

## 2024-10-27 RX ADMIN — SODIUM CHLORIDE 100 ML/HR: 9 INJECTION, SOLUTION INTRAVENOUS at 04:22

## 2024-10-27 RX ADMIN — VENLAFAXINE HYDROCHLORIDE 75 MG: 75 CAPSULE, EXTENDED RELEASE ORAL at 08:39

## 2024-10-27 RX ADMIN — OXYCODONE HYDROCHLORIDE 5 MG: 5 TABLET ORAL at 14:03

## 2024-10-27 RX ADMIN — HEPARIN SODIUM 5000 UNITS: 5000 INJECTION INTRAVENOUS; SUBCUTANEOUS at 08:39

## 2024-10-27 RX ADMIN — SENNOSIDES AND DOCUSATE SODIUM 2 TABLET: 50; 8.6 TABLET ORAL at 08:39

## 2024-10-27 NOTE — PAYOR COMM NOTE
"Donnie Ornelas (49 y.o. Male)                           ATTENTION; CONTINUED CLINICALS FOR CASE [id number]                         REPLY TO [facility] DEPT  [phone]  OR CALL                Date of Birth   1975    Social Security Number       Address   [address] [place], [place] [address]    Home Phone   [phone]    MRN   [id number]       Mandaeism   Hindu    Marital Status                               Admission Date   10/22/24    Admission Type   Emergency    Admitting Provider   James Alexandra MD    Attending Provider   Yaritza Hurtado MD    Department, Room/Bed   [facility] CORONARY CARE, N339/1       Discharge Date       Discharge Disposition       Discharge Destination                                 Attending Provider: Yaritza Hurtado MD    Allergies: No Known Allergies    Isolation: None   Infection: None   Code Status: CPR    Ht: 170 cm (66.93\")   Wt: 81 kg (178 lb 9.2 oz)    Admission Cmt: None   Principal Problem: Epigastric abdominal pain [R10.13]                   Active Insurance as of 10/22/2024       Primary Coverage       Payor Plan Insurance Group Employer/Plan Group    [employer] PPO [id number]       Payor Plan Address Payor Plan Phone Number Payor Plan Fax Number Effective Dates    [id number] [phone]  1/1/2019 - None Entered    [place] [address]         Subscriber Name Subscriber Birth Date Member ID       DONNIE ORNELAS 1975 [id number]                     Emergency Contacts        (Rel.) Home Phone Work Phone Mobile Phone    Murphy Padilla (Brother) -- -- [phone]    Charity Ornelas (Spouse) [phone] -- [phone]    joie montero (Relative) -- -- [phone]    Daniel Ornelas (Father) -- -- [phone]              Vital Signs (last 2 days)       Date/Time Temp Temp src Pulse Resp BP Patient Position SpO2    10/27/24 1009 99.3 (37.4) Oral -- 28 111/81 Lying --    " 10/27/24 0700 98.3 (36.8) Oral -- 38 132/84 -- --    10/27/24 0431 -- -- 92 -- 127/79 -- 95    10/27/24 0100 -- -- 97 -- -- -- --    10/26/24 2336 97.6 (36.4) Oral 97 38 120/81 Lying 96    10/26/24 2027 97.2 (36.2) Axillary 93 27 118/80 Lying 100    10/26/24 1642 -- -- 92 -- 126/84 -- --    10/26/24 1500 98.5 (36.9) Oral 89 16 91/67 -- 100    10/26/24 1125 98.6 (37) Oral 85 28 110/73 Lying --    10/26/24 0717 98.3 (36.8) Oral 85 30 107/78 -- 99    10/26/24 0043 97.6 (36.4) Axillary 85 30 124/71 Lying 96    10/25/24 1957 97.8 (36.6) Oral 85 37 104/71 Lying 96    10/25/24 1500 97.9 (36.6) Oral -- 28 98/74 -- --    10/25/24 1100 98.5 (36.9) Oral -- -- -- -- --    10/25/24 0906 -- -- 84 18 98/69 Lying 97    10/25/24 0553 98.6 (37) Oral 83 22 98/66 Lying 96    10/25/24 0546 -- -- 89 -- 103/77  -- 93    BP: orthostatic bp at 10/25/24 0546    10/25/24 0007 98 (36.7) Oral 92 22 102/60 Lying 94          Oxygen Therapy (last 2 days)       Date/Time SpO2 Device (Oxygen Therapy) Flow (L/min) (Oxygen Therapy) Oxygen Concentration (%) ETCO2 (mmHg)    10/27/24 0839 -- room air -- -- --    10/27/24 0431 95 -- -- -- --    10/27/24 0200 -- room air -- -- --    10/26/24 2336 96 -- -- -- --    10/26/24 2027 100 -- -- -- --    10/26/24 2000 -- room air -- -- --    10/26/24 1500 100 room air -- -- --    10/26/24 1425 -- room air -- -- --    10/26/24 0818 -- room air -- -- --    10/26/24 0717 99 nasal cannula 3 -- --    10/26/24 0200 -- nasal cannula 3 -- --    10/26/24 0043 96 -- -- -- --    10/25/24 2000 -- nasal cannula 3 -- --    10/25/24 1957 96 -- -- -- --    10/25/24 1410 -- nasal cannula 3 -- --    10/25/24 1018 -- nasal cannula 3 -- --    10/25/24 0906 97 nasal cannula 3 -- --    10/25/24 0553 96 nasal cannula -- -- --    10/25/24 0546 93 -- -- -- --    10/25/24 0200 -- nasal cannula 4 -- --    10/25/24 0007 94 nasal cannula -- -- --          Lines, Drains & Airways       Active LDAs       Name Placement date Placement time Site  "Days    Peripheral IV 10/25/24 Left Wrist 10/25/24  --  Wrist  2    Peripheral IV 10/25/24 Posterior;Right Forearm 10/25/24  --  Forearm  2    Peripheral IV Right Antecubital --  --  Antecubital  --                  Orders (last 48 hrs)        Start     Ordered    10/27/24 2100  insulin glargine (LANTUS, SEMGLEE) injection 14 Units  Nightly         10/27/24 0700    10/27/24 1200  insulin lispro (HUMALOG/ADMELOG) injection 4 Units  3 Times Daily With Meals         10/27/24 1102    10/27/24 1050  POC Glucose Once  PROCEDURE ONCE        Comments: Complete no more than 45 minutes prior to patient eating      10/27/24 1048    10/27/24 1028  Lipase  Once         10/27/24 0744    10/27/24 0900  polyethylene glycol (MIRALAX) packet 17 g  2 Times Daily        Placed in \"And\" Linked Group    10/27/24 0700    10/27/24 0900  sennosides-docusate (PERICOLACE) 8.6-50 MG per tablet 2 tablet  2 Times Daily        Placed in \"And\" Linked Group    10/27/24 0700    10/27/24 0800  insulin lispro (HUMALOG/ADMELOG) injection 3 Units  3 Times Daily With Meals,   Status:  Discontinued         10/27/24 0658    10/27/24 0745  midodrine (PROAMATINE) tablet 5 mg  3 Times Daily Before Meals         10/27/24 0658    10/27/24 0707  POC Glucose Once  PROCEDURE ONCE        Comments: Complete no more than 45 minutes prior to patient eating      10/27/24 0705    10/27/24 0701  CT Head Without Contrast  1 Time Imaging         10/27/24 0700    10/27/24 0700  bisacodyl (DULCOLAX) EC tablet 5 mg  Daily PRN        Placed in \"And\" Linked Group    10/27/24 0700    10/27/24 0700  bisacodyl (DULCOLAX) suppository 10 mg  Daily PRN        Placed in \"And\" Linked Group    10/27/24 0700    10/27/24 0659  oxyCODONE (ROXICODONE) immediate release tablet 5 mg  Every 4 Hours PRN         10/27/24 0659    10/27/24 0600  CBC Auto Differential  PROCEDURE ONCE         10/26/24 2201    10/27/24 0353  Manual Differential  Once         10/27/24 0352    10/26/24 2111  POC " Glucose Once  PROCEDURE ONCE        Comments: Complete no more than 45 minutes prior to patient eating      10/26/24 2109    10/26/24 1932  Diet: Liquid; Clear Liquid; Fluid Consistency: Thin (IDDSI 0)  Diet Effective Now         10/26/24 1931    10/26/24 1926  NPO Diet NPO Type: Sips with Meds  Diet Effective Now,   Status:  Canceled         10/26/24 1925    10/26/24 1900  sodium chloride 0.9 % infusion  Continuous         10/26/24 1807    10/26/24 1828  HYDROmorphone (DILAUDID) injection 0.5 mg  Every 2 Hours PRN         10/26/24 1828    10/26/24 1800  Incentive Spirometry  Every 4 Hours While Awake       10/26/24 1617    10/26/24 1608  POC Glucose Once  PROCEDURE ONCE        Comments: Complete no more than 45 minutes prior to patient eating      10/26/24 1558    10/26/24 1500  iopamidol (ISOVUE-370) 76 % injection 100 mL  Once in Imaging         10/26/24 1411    10/26/24 1358  CT Angiogram Abdomen Pelvis  1 Time Imaging         10/26/24 1228    10/26/24 1300  insulin lispro (HUMALOG/ADMELOG) injection 2 Units  3 Times Daily With Meals,   Status:  Discontinued         10/26/24 1206    10/26/24 1227  CT Angiogram Abdomen  1 Time Imaging,   Status:  Canceled         10/26/24 1228    10/26/24 1216  BNP  Once         10/26/24 1216    10/26/24 1215  perflutren (DEFINITY) 8.476 mg in Sodium Chloride (PF) 0.9 % 10 mL injection  Once in Imaging         10/26/24 1126    10/26/24 1140  POC Glucose Once  PROCEDURE ONCE        Comments: Complete no more than 45 minutes prior to patient eating      10/26/24 1138    10/26/24 1115  XR Chest 1 View  1 Time Imaging         10/26/24 1114    10/26/24 1115  Procalcitonin  Once         10/26/24 1114    10/26/24 1114  Diet: Liquid; Clear Liquid; Fluid Consistency: Thin (IDDSI 0)  Diet Effective Now,   Status:  Canceled         10/26/24 1113    10/26/24 0750  POC Glucose Once  PROCEDURE ONCE        Comments: Complete no more than 45 minutes prior to patient eating      10/26/24 0745     10/26/24 0625  Manual Differential  Once,   Status:  Canceled         10/26/24 0625    10/26/24 0600  Comprehensive Metabolic Panel  Daily       10/25/24 1013    10/26/24 0600  Magnesium  Daily       10/25/24 1914    10/26/24 0600  Phosphorus  Daily       10/25/24 1914    10/26/24 0600  Vitamin B12  Morning Draw         10/25/24 1921    10/26/24 0600  Folate  Morning Draw         10/25/24 1921    10/26/24 0600  CBC Auto Differential  PROCEDURE ONCE         10/25/24 2201    10/26/24 0001  NPO Diet NPO Type: Strict NPO  Diet Effective Midnight,   Status:  Canceled         10/25/24 0149    10/25/24 2100  heparin (porcine) 5000 UNIT/ML injection 5,000 Units  Every 12 Hours Scheduled         10/25/24 1916    10/25/24 2042  POC Glucose Once  PROCEDURE ONCE        Comments: Complete no more than 45 minutes prior to patient eating      10/25/24 2040    10/25/24 2029  Auto Discontinue GI Panel in 48 Hours if not Collected  ONCE GI PANEL         10/23/24 2028    10/25/24 1920  Iron Profile  Once         10/25/24 1919    10/25/24 1920  Ferritin  Once         10/25/24 1919    10/25/24 1707  Adult Transthoracic Echo Complete W/ Cont if Necessary Per Protocol  Once         10/25/24 1706    10/25/24 1707  Inpatient Cardiology Consult  Once        Specialty:  Cardiology  Provider:  Sam Vasquez III, MD    10/25/24 1706    10/25/24 1607  POC Glucose Once  PROCEDURE ONCE        Comments: Complete no more than 45 minutes prior to patient eating      10/25/24 1603    10/25/24 1405  Duplex Mesenteric Complete CAR  Once         10/25/24 1404    10/25/24 1400  Renal Function Panel  Timed         10/25/24 0956    10/25/24 1153  POC Glucose Once  PROCEDURE ONCE        Comments: Complete no more than 45 minutes prior to patient eating      10/25/24 1149    10/25/24 1045  sodium chloride 0.9 % infusion  Continuous         10/25/24 0954    10/25/24 1045  sodium chloride 0.9 % bolus 500 mL  Once         10/25/24 0954    10/25/24 0830   mupirocin (BACTROBAN) 2 % nasal ointment 1 Application  2 Times Daily         10/25/24 0739    10/25/24 0740  Daily CHG Bath While in ICU  Daily      Comments: Use for admission bath & daily bath for duration of critical care stay    10/25/24 0739    10/25/24 0730  insulin regular (humuLIN R,novoLIN R) injection 5 Units  3 Times Daily Before Meals,   Status:  Discontinued         10/24/24 1847    10/25/24 0300  midodrine (PROAMATINE) tablet 10 mg  3 Times Daily Before Meals,   Status:  Discontinued         10/25/24 0207    10/24/24 2100  insulin glargine (LANTUS, SEMGLEE) injection 10 Units  Nightly,   Status:  Discontinued         10/24/24 1709    10/24/24 2100  [Held by provider]  carvedilol (COREG) tablet 6.25 mg  Every 12 Hours Scheduled        (On hold since Fri 10/25/2024 at 1032 until manually unheld; held by Yaritza Hurtado MDHold Reason: Other (Comment Required))    10/24/24 1903    10/24/24 1730  pantoprazole (PROTONIX) EC tablet 40 mg  2 Times Daily Before Meals         10/24/24 1302    10/24/24 0600  CBC & Differential  Daily       10/23/24 0726    10/23/24 2100  piperacillin-tazobactam (ZOSYN) 3.375 g IVPB in 100 mL NS MBP (CD)  Every 8 Hours         10/23/24 1411    10/23/24 2036  HYDROcodone-acetaminophen (NORCO) 5-325 MG per tablet 1 tablet  Every 6 Hours PRN,   Status:  Discontinued         10/23/24 2036    10/23/24 1025  HYDROmorphone (DILAUDID) injection 0.5 mg  Every 4 Hours PRN,   Status:  Discontinued         10/23/24 1025    10/23/24 0900  [Held by provider]  atorvastatin (LIPITOR) tablet 40 mg  Daily        (On hold since yesterday at 1228 until manually unheld; held by Yaritza Hurtado MDHold Reason: Other (Comment Required))    10/22/24 2242    10/23/24 0900  venlafaxine XR (EFFEXOR-XR) 24 hr capsule 75 mg  Daily         10/22/24 2242    10/23/24 0830  influenza virus vacc split PF FLUZONE 0.5 mL  During Hospitalization         10/22/24 2332    10/23/24 0800  Oral Care  2 Times  "Daily       10/22/24 2026    10/23/24 0727  Strict Intake & Output  Every Shift       10/23/24 0726    10/23/24 0727  Daily Weights  Daily       10/23/24 0726    10/23/24 0725  Potassium Replacement - Follow Nurse / BPA Driven Protocol  As Needed         10/23/24 0726    10/23/24 0725  Magnesium Standard Dose Replacement - Follow Nurse / BPA Driven Protocol  As Needed         10/23/24 0726    10/23/24 0725  Phosphorus Replacement - Follow Nurse / BPA Driven Protocol  As Needed         10/23/24 0726    10/23/24 0725  Calcium Replacement - Follow Nurse / BPA Driven Protocol  As Needed         10/23/24 0726    10/23/24 0000  Vital Signs  Every 4 Hours       10/22/24 2026    10/22/24 2241  hydrOXYzine (ATARAX) tablet 10 mg  Every 8 Hours PRN         10/22/24 2242    10/22/24 2200  POC Glucose 4x Daily Before Meals & at Bedtime  4 Times Daily Before Meals & at Bedtime      Comments: Complete no more than 45 minutes prior to patient eating      10/22/24 2113    10/22/24 2200  insulin lispro (HUMALOG/ADMELOG) injection 2-7 Units  4 Times Daily Before Meals & Nightly         10/22/24 2113    10/22/24 2113  naloxone (NARCAN) injection 0.4 mg  Every 5 Minutes PRN        Placed in \"And\" Linked Group    10/22/24 2113    10/22/24 2112  dextrose (GLUTOSE) oral gel 15 g  Every 15 Minutes PRN         10/22/24 2113    10/22/24 2112  dextrose (D50W) (25 g/50 mL) IV injection 25 g  Every 15 Minutes PRN         10/22/24 2113    10/22/24 2112  glucagon (GLUCAGEN) injection 1 mg  Every 15 Minutes PRN         10/22/24 2113    10/22/24 2100  sennosides-docusate (PERICOLACE) 8.6-50 MG per tablet 2 tablet  2 Times Daily,   Status:  Discontinued        Placed in \"And\" Linked Group    10/22/24 2026    10/22/24 2026  polyethylene glycol (MIRALAX) packet 17 g  Daily PRN,   Status:  Discontinued        Placed in \"And\" Linked Group    10/22/24 2026    10/22/24 2026  bisacodyl (DULCOLAX) EC tablet 5 mg  Daily PRN,   Status:  Discontinued      " "  Placed in \"And\" Linked Group    10/22/24 2026    10/22/24 2026  bisacodyl (DULCOLAX) suppository 10 mg  Daily PRN,   Status:  Discontinued        Placed in \"And\" Linked Group    10/22/24 2026    10/22/24 2025  melatonin tablet 2.5 mg  Nightly PRN         10/22/24 2026    10/22/24 2025  Intake & Output  Every Shift       10/22/24 2026    10/22/24 2024  ondansetron ODT (ZOFRAN-ODT) disintegrating tablet 4 mg  Every 6 Hours PRN        Placed in \"Or\" Linked Group    10/22/24 2026    10/22/24 2024  ondansetron (ZOFRAN) injection 4 mg  Every 6 Hours PRN        Placed in \"Or\" Linked Group    10/22/24 2026    10/22/24 1551  sodium chloride 0.9 % flush 10 mL  As Needed         10/22/24 155    Unscheduled  Follow Hypoglycemia Standing Orders For Blood Glucose <70 & Notify Provider of Treatment  As Needed      Comments: Follow Hypoglycemia Orders As Outlined in Process Instructions (Open Order Report to View Full Instructions)  Notify Provider Any Time Hypoglycemia Treatment is Administered    10/22/24 2113    Signed and Held  Implement Anesthesia orders day of procedure.  Once,   Status:  Canceled         Signed and Held                     Operative/Procedure Notes (most recent note)               Physician Progress Notes (last 48 hours)        Yaritza Hurtado MD at 10/27/24 1102              Name: Donnie Bishop ADMIT: 10/22/2024   : 1975  PCP: Juju Kennedy MD    MRN: [id number] LOS: 3 days   AGE/SEX: 49 y.o. male  [facility]     Subjective   Subjective   Laying in bed, overnight RN at bedside.  No acute events overnight.  Continues to complain of abdominal pain.  Denies drowsy, intermittently confused.    Review of Systems  As above  Objective   Objective   Vital Signs  Temp:  [97.2 °F (36.2 °C)-99.3 °F (37.4 °C)] 99.3 °F (37.4 °C)  Heart Rate:  [85-97] 92  Resp:  [16-38] 28  BP: ()/(67-84) 111/81  SpO2:  [95 %-100 %] 95 %  on   ;   Device (Oxygen Therapy): room air  Body mass index is 28.03 " kg/m².  Physical Exam    General: Lying in bed, drowsy but arousable, ill-appearing,  HEENT: Normocephalic, atraumatic  CV: Regular rate and rhythm, no murmurs rubs or gallops  Lungs: CTA anteriorly, no wheezing, tachypnea  Abdomen: Soft, distended, tender to palpation epigastric and right upper quadrant region  Extremities: Trace edema in lower extremities,, no cyanosis     Results Review     I reviewed the patient's new clinical results.  Results from last 7 days   Lab Units 10/27/24  0329 10/26/24  0545 10/25/24  0426 10/24/24  2126   WBC 10*3/mm3 10.64 9.44 7.93 9.87   HEMOGLOBIN g/dL 12.7* 11.9* 10.9* 10.6*   PLATELETS 10*3/mm3 352 297 278 273     Results from last 7 days   Lab Units 10/27/24  0329 10/26/24  0545 10/25/24  1544 10/25/24  0426   SODIUM mmol/L 137 137 129* 132*   POTASSIUM mmol/L 4.7 4.7 4.4 4.5   CHLORIDE mmol/L 104 103 99 98   CO2 mmol/L 19.0* 19.0* 16.9* 20.0*   BUN mg/dL 32* 36* 37* 38*   CREATININE mg/dL 1.12 1.22 1.38* 1.75*   GLUCOSE mg/dL 223* 133* 211* 213*   Estimated Creatinine Clearance: 82.9 mL/min (by C-G formula based on SCr of 1.12 mg/dL).  Results from last 7 days   Lab Units 10/27/24  0329 10/26/24  0545 10/25/24  1544 10/25/24  0426 10/22/24  1557   ALBUMIN g/dL 2.7* 3.0* 2.8* 3.2* 4.6   BILIRUBIN mg/dL 0.8 1.1  --  1.8* 2.6*   ALK PHOS U/L 140* 99  --  72 60   AST (SGOT) U/L 172* 108*  --  148* 10   ALT (SGPT) U/L 133* 92*  --  77* 11     Results from last 7 days   Lab Units 10/27/24  0329 10/26/24  0545 10/25/24  1544 10/25/24  0426   CALCIUM mg/dL 9.8 9.5 9.0 8.6   ALBUMIN g/dL 2.7* 3.0* 2.8* 3.2*   MAGNESIUM mg/dL 2.5 2.4  --   --    PHOSPHORUS mg/dL 2.9 3.4 3.8  --      Results from last 7 days   Lab Units 10/26/24  0545 10/25/24  0426 10/24/24  1404 10/24/24  0617 10/24/24  0012 10/23/24  2104   PROCALCITONIN ng/mL 30.30* 78.10*  --   --   --   --    LACTATE mmol/L  --   --  3.8* 3.3* 3.6* 4.5*     COVID19   Date Value Ref Range Status   10/25/2024 Not Detected Not  Detected - Ref. Range Final   12/26/2023 Not Detected Not Detected - Ref. Range Final     Glucose   Date/Time Value Ref Range Status   10/27/2024 1048 275 (H) 70 - 130 mg/dL Final   10/27/2024 0705 234 (H) 70 - 130 mg/dL Final   10/26/2024 2109 234 (H) 70 - 130 mg/dL Final   10/26/2024 1558 222 (H) 70 - 130 mg/dL Final   10/26/2024 1138 156 (H) 70 - 130 mg/dL Final   10/26/2024 0745 130 70 - 130 mg/dL Final   10/25/2024 2040 155 (H) 70 - 130 mg/dL Final           CT Angiogram Abdomen Pelvis  Addendum: Call Report: Surgery called to discuss the case. Collection along the   right hepatic lobe appears subcapsular, with mass effect on the liver       This report was finalized on 10/26/2024 5:11 PM by Dr. Connor Vasquez M.D on Workstation: [id number]         Call Report: Dr. Melo was notified by telephone of the above findings on   October 26, 2024 at 3:08 p.m.       This report was finalized on 10/26/2024 3:08 PM by Dr. Connor Vasquez M.D on Workstation: [id number]  Narrative: CT ANGIOGRAM ABDOMEN PELVIS-     INDICATION: Severe pain.     COMPARISON: CT abdomen pelvis October 22, 2024     TECHNIQUE:  Routine CTA abdomen and pelvis with IV contrast. Arterial and delayed  phase. Coronal and sagittal reformats. Three dimensional  reconstructions. Radiation dose reduction techniques were utilized,  including automated exposure control and exposure modulation based on  body size.     FINDINGS:      Lung bases: Small right greater than left pleural effusions. Bibasilar  lung opacities with large amount of opacity in the right lower lobe,  with volume loss and lung enhancement. Large amount of opacity in the  right middle lobe, with volume loss and lung enhancement. Suspected  subsegmental atelectasis at the left lung base. Mild cardiomegaly.     ABDOMEN: Large fluid collection seen lateral to the right hepatic lobe,  with mass effect on the liver, series 6, axial mage 54, measures 18.2 x  6.1 x 23.4 cm, series 7,  coronal image 68, with the fluid collection  extending over the hepatic dome and surrounding the inferior right  hepatic lobe, extends medially to the level of the mid duodenum, series  6, axial mage 105 where there is a fluid collection measures 2.8 x 3.0  cm. Small fluid collection seen adjacent to the gallbladder neck, series  6, axial mage 87, measuring 2.2 x 2.0 cm.     Gallbladder appears partially contracted. Gallbladder wall is mildly  thickened. No biliary ductal dilatation. Spleen is normal in size. No  pancreatic mass or pancreatic ductal dilatation seen. Nodular thickening  of the adrenal glands. Symmetric perinephric edema. No solid-appearing  renal mass or hydronephrosis. Excreted contrast in the urinary  collecting system.     Pelvis: Prostate is normal in size. Mild bladder distention. No bladder  calculus. Small amount of free fluid seen in the rectovesicular pouch,  extending over the bladder dome.     Bowel: Mild gastric antral wall thickening with stranding/inflammation  in the fat in the right upper quadrant adjacent to the distal stomach  and proximal duodenum. [place]-shaped hyperattenuating area seen in the  gastric antrum on the arterial phase, series 6, axial mage 95, not seen  on the delayed phase though there is a similar appearing area in the D3  segment of the duodenum on the delayed phase, series 10, axial image 75,  suspect bowel content though a noncontrast CT is not available. Moderate  gas and stool seen in the rectum. Moderate gas and stool seen in the  colon. Normal appendix. Small amount of free fluid in the right  paracolic gutter.     Abdominal wall: Body wall edema/anasarca seen in the flanks.  Periumbilical abdominal wall scarring. Small fat-containing umbilical  hernia.     Retroperitoneum: No lymphadenopathy.     Vasculature: Patent. No abdominal aortic aneurysm. No aortic dissection.     Osseous structures: Suspect bone island in the left ilium.     Impression:    1. SMA  and SMV are patent. No vessel occlusions identified.  2. Large right perihepatic fluid collection with mass effect on the  liver, new. Consider drain placement and sampling to determine the  etiology of the fluid collection.  3. Smaller fluid collections seen adjacent to the gallbladder and  lateral to the mid duodenum.  4. Mild distal gastric antral wall thickening with surrounding  inflammatory changes in the right upper quadrant, extend of the  gallbladder which appears partially contracted with a mildly thickened  wall. Recommend correlation with endoscopy findings and recent HIDA scan     5. Nodular hyperplasia of the adrenal glands.  6. Third spacing with small right greater than left pleural effusions,  body wall edema and small amount of free intraperitoneal fluid that is  increased.  7. Opacities at the lung bases with normal enhancement with suspect  large amount of atelectasis in the right middle lobe and subtotal  collapse of the right lower lobe.  8. Mild cardiomegaly     This report was finalized on 10/26/2024 2:53 PM by Dr. Connor Vasquez M.D on Workstation: [id number]     XR Chest 1 View  Narrative: Portable chest radiograph     HISTORY: Tachypnea     TECHNIQUE: Single AP portable radiograph of the chest     COMPARISON: Chest radiograph dating back to 12/17/2023     Impression: FINDINGS AND IMPRESSION:  Lungs are hypoinflated. There is roughly symmetric mild to moderate  pulmonary opacification throughout the bilateral lungs, left greater  than right. No pneumothorax is seen. Suggestion of a small left pleural  effusion. Findings raise concern for atelectasis, pneumonia and/or  pulmonary edema in the proper clinical context and correlation patient  history is recommended with at least continued attention on follow-up to  ensure resolution.     Cardiac silhouette is accentuated by low lung volumes and cannot be  assessed. Mild to moderate asymmetric elevation of the right  hemidiaphragm.     This  report was finalized on 10/26/2024 3:18 PM by Dr. Michael Avelar M.D on Workstation: [id number]     Adult Transthoracic Echo Complete W/ Cont if Necessary Per Protocol    Left ventricular systolic function is low normal. Calculated left   ventricular EF = 49.7%    The following left ventricular wall segments are hypokinetic: apical   septal and apex hypokinetic.    Left ventricular diastolic function was normal.    Estimated right ventricular systolic pressure from tricuspid   regurgitation is mildly elevated (35-45 mmHg). Calculated right   ventricular systolic pressure from tricuspid regurgitation is 40 mmHg.    Mild mitral valve regurgitation is present.    Scheduled Medications  [Held by provider] atorvastatin, 40 mg, Oral, Daily  [Held by provider] carvedilol, 6.25 mg, Oral, Q12H  heparin (porcine), 5,000 Units, Subcutaneous, Q12H  insulin glargine, 14 Units, Subcutaneous, Nightly  insulin lispro, 2-7 Units, Subcutaneous, 4x Daily AC & at Bedtime  insulin lispro, 4 Units, Subcutaneous, TID With Meals  midodrine, 5 mg, Oral, TID AC  mupirocin, 1 Application, Each Nare, BID  pantoprazole, 40 mg, Oral, BID AC  piperacillin-tazobactam, 3.375 g, Intravenous, Q8H  senna-docusate sodium, 2 tablet, Oral, BID   And  polyethylene glycol, 17 g, Oral, BID  venlafaxine XR, 75 mg, Oral, Daily    Infusions  sodium chloride, 100 mL/hr, Last Rate: 100 mL/hr (10/27/24 0422)    Diet  Diet: Liquid; Clear Liquid; Fluid Consistency: Thin (IDDSI 0)    I have personally reviewed     [x]  Laboratory   [x]  Microbiology   [x]  Radiology   [x]  EKG/Telemetry  []  Cardiology/Vascular   []  Pathology    []  Records      Assessment/Plan     Active Hospital Problems    Diagnosis  POA    **Epigastric abdominal pain [R10.13]  Unknown    Leukocytosis [D72.829]  Yes    Chronic combined systolic and diastolic heart failure [I50.42]  Yes    Abdominal pain [R10.9]  Yes    Gastritis [K29.70]  Yes    Anxiety [F41.9]  Yes    Multiple-type  hyperlipidemia [E78.2]  Yes    Type 2 diabetes mellitus with diabetic neuropathy, with long-term current use of insulin [E11.40, Z79.4]  Not Applicable    Benign essential HTN [I10]  Yes      Resolved Hospital Problems   No resolved problems to display.       Patient is a 49 y.o. man with type 2 diabetes, HTN, n, hyperlipidemia, chronic combined systolic and diastolic heart failure (non-ischemic), anxiety who presented with severe abdominal/epigastric pain.    Epigastric abdominal pain/right upper quadrant abdominal pain  Transaminitis  Chronic cholecystitis  large right subcapsular fluid collection with mass effect on the liver.   -EGD 10/24/2024 with no acute findings  -On PPI  -HIDA scan concerning for chronic cholecystitis versus bili dyskinesia  -Elevated liver enzymes could be ischemic liver injury as well in the setting of hypotension which now has improved.  -Monitor daily CMP, AST/bilirubin improved.  ALT slightly up today.  -Hold statin in the setting of transaminitis  -Duplex ultrasound of mesenteric arteries was ordered however was unable to be completed due to bowel gas and patient's inability to hold his breath per report  -CT angiogram of the abdomen was obtained 10/26/2024 which showed large right perihepatic fluid collection with mass effect on the liver, new.  Patent blood vessels.  -General surgery following, unclear etiology of findings, possibly pulm on differential.      Hypotension  -On midodrine, status post IV fluids.  BP improved.  -ECHO  10/26/2024 showed EF of 49.7%  -  Blood pressure meds stable.  Decrease midodrine to 5 mg 3 times daily.  -     TENISHA/hyponatremia  -Creatinine peaked at 1.75 on 10/25/2024, creatinine was 1.07 on admission  -Nephrology managing, status post IV fluids, creatinine improved.     Infection/sepsis?  -WBC was 13.79 on admission, patient has been afebrile since admission  -Procalcitonin significantly elevated at 78.10 10/25/2024-repeat improved  HIDA scan showed  possible chronic cholecystitis, general surgery evaluated, no indication for surgery currently no signs of acute cholecystitis and findings could be related secondary to congestive hepatopathy  -Blood cultures remain negative to date  -Large collection seen on CT with infectious cause/abscess on differential  -Continue empiric IV Zosyn    Anemia   Iron panel consistent with anemia chronic disease with ferritin of 943, B12 and folate normal  Monitor daily CBC, transfuse as indicated for symptomatic anemia or hemoglobin 7    Encephalopathy  -Suspect secondary to acute illness/pain meds/infection/TEINSHA  -No gross neurological deficits  -Patient remains confused.  Ordered CT head to rule out acute findings.    Type II DM   -Blood glucose not at goal.  Increase Lantus to 14 units nightly, increase scheduled lispro to 4 units 3 times daily, continue SSI.      DVT prophylaxis.  subcu heparin  Full code.  Discussed with patient and nursing staff.  Expected Discharge Date: 10/28/2024; Expected Discharge Time:        Copied text in this note has been reviewed and is accurate as of 10/27/24.         Dictated utilizing Dragon dictation        Yaritza Hurtado MD  [facility]  10/27/24  11:13 EDT        Electronically signed by Yaritza Hurtado MD at 10/27/24 1113       Girma Randhawa MD at 10/26/24 1248              [facility] Progress Note      Patient Name: Donnie Bishop  : 1975  MRN: [id number]  Primary Care Physician:  Juju Kennedy MD  Date of admission: 10/22/2024    Subjective     Interval History:   Follow-up TENISHA    Continues to complain of abdominal pain; cannot localize any further  No shortness of breath on 3 L/min; no chest pain  CTA performed earlier today; large right perihepatic fluid collection with mass effect on liver noted, among other abnormal findings    Review of Systems:   As noted above    Objective     Vitals:   Temp:  [97.6 °F (36.4  °C)-98.6 °F (37 °C)] 98.5 °F (36.9 °C)  Heart Rate:  [85-92] 92  Resp:  [16-37] 16  BP: ()/(67-84) 126/84  Flow (L/min) (Oxygen Therapy):  [3] 3    Intake/Output Summary (Last 24 hours) at 10/26/2024 1759  Last data filed at 10/26/2024 1644  Gross per 24 hour   Intake 620 ml   Output 2075 ml   Net -1455 ml       Physical Exam:    General Appearance: Awake, slow to answer, uncomfortable, moaning  Skin: warm and dry  HEENT: oral mucosa dry, nonicteric sclera  Neck: supple, no JVD  Lungs: Coarse anteriorly; no wheezing  Heart: RR, tachycardic, no rub  Abdomen: soft, + T, + D, BS hypoactive   : no palpable bladder  Extremities: no edema, cyanosis or clubbing  Neuro: Cognitive blunting; slow speech    Scheduled Meds:     [Held by provider] atorvastatin, 40 mg, Oral, Daily  [Held by provider] carvedilol, 6.25 mg, Oral, Q12H  heparin (porcine), 5,000 Units, Subcutaneous, Q12H  insulin glargine, 10 Units, Subcutaneous, Nightly  insulin lispro, 2 Units, Subcutaneous, TID With Meals  insulin lispro, 2-7 Units, Subcutaneous, 4x Daily AC & at Bedtime  midodrine, 10 mg, Oral, TID AC  mupirocin, 1 Application, Each Nare, BID  pantoprazole, 40 mg, Oral, BID AC  piperacillin-tazobactam, 3.375 g, Intravenous, Q8H  senna-docusate sodium, 2 tablet, Oral, BID  venlafaxine XR, 75 mg, Oral, Daily      IV Meds:          Results Reviewed:   I have personally reviewed the results from the time of this admission to 10/26/2024 17:59 EDT     Results from last 7 days   Lab Units 10/26/24  0545 10/25/24  1544 10/25/24  0426 10/23/24  0311 10/22/24  1557   SODIUM mmol/L 137 129* 132*   < > 136   POTASSIUM mmol/L 4.7 4.4 4.5   < > 4.8   CHLORIDE mmol/L 103 99 98   < > 96*   CO2 mmol/L 19.0* 16.9* 20.0*   < > 24.6   BUN mg/dL 36* 37* 38*   < > 9   CREATININE mg/dL 1.22 1.38* 1.75*   < > 1.07   CALCIUM mg/dL 9.5 9.0 8.6   < > 10.5   BILIRUBIN mg/dL 1.1  --  1.8*  --  2.6*   ALK PHOS U/L 99  --  72  --  60   ALT (SGPT) U/L 92*  --  77*  --   11   AST (SGOT) U/L 108*  --  148*  --  10   GLUCOSE mg/dL 133* 211* 213*   < > 183*    < > = values in this interval not displayed.       Estimated Creatinine Clearance: 76.1 mL/min (by C-G formula based on SCr of 1.22 mg/dL).    Results from last 7 days   Lab Units 10/26/24  0545 10/25/24  1544   MAGNESIUM mg/dL 2.4  --    PHOSPHORUS mg/dL 3.4 3.8       Results from last 7 days   Lab Units 10/24/24  0617   URIC ACID mg/dL 5.4       Results from last 7 days   Lab Units 10/26/24  0545 10/25/24  0426 10/24/24  2126 10/24/24  0617 10/23/24  1433   WBC 10*3/mm3 9.44 7.93 9.87 10.27 12.62*   HEMOGLOBIN g/dL 11.9* 10.9* 10.6* 10.7* 15.1   PLATELETS 10*3/mm3 297 278 273 266 399             Assessment / Plan     ASSESSMENT:  TENISHA, nonoliguric, improving, prerenal due to hypovolemia, hypotension, and impaired renal autoregulation due to Entresto, Aldactone and Jardiance.  May have had element of JOSEP, too.  Looks dry; normal potassium and likely NAGMA  Perihepatic fluid collection with mass effect on liver by CTA 10/26  Transaminitis  History of congestive heart failure ejection fraction 35%.  Currently compensated  Severe anemia   Encephalopathy  DM2      PLAN:  Resume IVF given contrast load today  Discussed with Dr. Melo earlier today.  Low risk for JOSEP given SCR well below 2 mg/dL      Thank you for involving us in the care of Donnie Bishop.  Please feel free to call with any questions.    Girma Randhawa MD  10/26/24  17:59 EDT    [facility]  [phone]    Parts of this note may be an electronic transcription/translation of spoken language to printed text using the Dragon dictation system.    Electronically signed by Girma Randhawa MD at 10/26/24 5538       Manuela Wisdom MD at 10/26/24 2659          General Surgery Progress Note    CC: Chronic cholecystitis, right upper quadrant abdominal pain    S: Patient is more alert and oriented today.  He is able to tell me that he is  "still hurting in his right upper quadrant and can point to the location.  He denies nausea.  He has not had vomiting.  He had severe pain this morning prompting a mesenteric duplex which cannot be obtained secondary to bowel gas and patient's inability to hold his breath.  He underwent CTA abdomen pelvis to evaluate for mesenteric ischemia.    O:/84   Pulse 92   Temp 98.5 °F (36.9 °C) (Oral)   Resp 16   Ht 170 cm (66.93\")   Wt 81 kg (178 lb 9.2 oz)   SpO2 100%   BMI 28.03 kg/m²     Intake & Output (last day)         10/25 0701  10/26 0700 10/26 0701  10/27 0700    P.O. 420 120    I.V. (mL/kg) 300 (3.7)     IV Piggyback 100     Total Intake(mL/kg) 820 (10.1) 120 (1.5)    Urine (mL/kg/hr) 1550 (0.8) 1125 (1.4)    Total Output 1550 1125    Net -730 -1005          Urine Unmeasured Occurrence 3 x 1 x            GENERAL: awake, alert, cooperative, answers questions appropriately   HEENT: EOMI, moist mucus membranes   CHEST: normal work of breathing on room air  CARDIAC: well perfused, heart rate 90s, regular, normotensive on monitor, no peripheral edema  GI: Abd soft, mildly distended, tender in the right upper quadrant without rebound or guarding  EXTREMITIES: MOCK, no cyanosis    SKIN: Warm and dry    LABS  Results from last 7 days   Lab Units 10/26/24  0545 10/25/24  0426 10/24/24  2126 10/24/24  0617   WBC 10*3/mm3 9.44 7.93 9.87 10.27   HEMOGLOBIN g/dL 11.9* 10.9* 10.6* 10.7*   HEMATOCRIT % 36.7* 32.4* 31.2* 32.9*   PLATELETS 10*3/mm3 297 278 273 266   MONOCYTES % %  --  12.2* 9.0 5.1   EOSINOPHIL % %  --  0.0* 0.0* 0.0*     Results from last 7 days   Lab Units 10/26/24  0545 10/25/24  1544 10/25/24  0426 10/23/24  0311 10/22/24  1557   SODIUM mmol/L 137 129* 132*   < > 136   POTASSIUM mmol/L 4.7 4.4 4.5   < > 4.8   CHLORIDE mmol/L 103 99 98   < > 96*   CO2 mmol/L 19.0* 16.9* 20.0*   < > 24.6   BUN mg/dL 36* 37* 38*   < > 9   CREATININE mg/dL 1.22 1.38* 1.75*   < > 1.07   CALCIUM mg/dL 9.5 9.0 8.6   < > " 10.5   BILIRUBIN mg/dL 1.1  --  1.8*  --  2.6*   ALK PHOS U/L 99  --  72  --  60   ALT (SGPT) U/L 92*  --  77*  --  11   AST (SGOT) U/L 108*  --  148*  --  10   GLUCOSE mg/dL 133* 211* 213*   < > 183*    < > = values in this interval not displayed.         IMAGING:  CT angiogram abdomen and pelvis from today images and report reviewed, there is no vascular occlusion identified, there is a large right subcapsular fluid collection with mass effect on the liver which is new for which consideration of drain placement and sampling was recommended to determine the etiology of the fluid collection.  There are smaller fluid collections seen adjacent to the gallbladder and lateral to the mid duodenum.  There is mild distal gastric antral wall thickening with surrounding inflammatory changes in the right upper quadrant.  The gallbladder still looks partially contracted with a mildly thickened wall.  There is third spacing with pleural effusions, body wall edema, and free intraperitoneal fluid that has increased.    A/P: 49 y.o. male with multiple medical problems including CHF with EF of 35%, hypertension, hyperlipidemia, type 2 diabetes mellitus with A1c 9.4, who came into the hospital complaining of abdominal pain.  He has undergone workup for possible cholecystitis with initial CT demonstrating contracted gallbladder with mild hyperenhancing wall, EGD negative for abnormalities, and HIDA scan demonstrating filling but with decreased ejection fraction concerning for possible biliary dyskinesia vs chronic cholecystitis.  Initially with gallbladder filling on HIDA less likely for acute cholecystitis, and possibility of other etiology such as some element of congestion due to heart failure with ascites was noted. He has been on zosyn with normalization of his WBC.  On exam currently he has RUQ tenderness without peritoneal or Ferro's signs this afternoon.  He had severe pain this morning and underwent CTA abdomen pelvis to  evaluate for mesenteric ischemia.  This demonstrates a large right subcapsular fluid collection with mass effect on the liver.  I reviewed his imaging with Dr. Vasquez, noted prior imaging where gallbladder did appear normal in August. Differential for his subcapsular liver collection includes biloma, hematoma, abscess/infectious source, among others. Based on his stable vitals and Hgb and no blush on CT I am doubtful of hematoma. I will discuss with IR possible evaluation for aspiration/drainage to clarify the source of the fluid.     Addendum:  Discussed with Dr. Morrell who reviewed the patient's images with me. Unclear source for this subcapsular liver collection. At this time my suspicion is possible biloma, though the source is not clear. Given that it is contained, I will plan to monitor the patient and plan for possible repeat scan depending on his clinical course.     Manuela Wisdom MD  General, Robotic and Endoscopic Surgery  [facility]    [address], [address]  [place], [place], [address]  P: [phone]  F: [phone]       Electronically signed by Manuela Wisdom MD at 10/26/24 1933       Yaritza Hurtado MD at 10/26/24 1214              Name: Donnie Bishop ADMIT: 10/22/2024   : 1975  PCP: Juju Kennedy MD    MRN: [id number] LOS: 2 days   AGE/SEX: 49 y.o. male  ROOM: [facility]     Subjective   Subjective   Patient seen this morning.  Drowsy but arousable, follows commands.  Received IV Dilaudid for abdominal pain as patient is n.p.o. for planned duplex.  Continues to complain of abdominal pain.    Review of Systems    Objective   Objective   Vital Signs  Temp:  [97.6 °F (36.4 °C)-98.6 °F (37 °C)] 98.6 °F (37 °C)  Heart Rate:  [85] 85  Resp:  [28-37] 28  BP: ()/(71-78) 110/73  SpO2:  [96 %-99 %] 99 %  on  Flow (L/min) (Oxygen Therapy):  [3] 3;   Device (Oxygen Therapy): room air  Body mass index is 28.03 kg/m².  Physical Exam    General: Lying in bed, drowsy but  arousable, ill-appearing,  HEENT: Normocephalic, atraumatic  CV: Regular rate and rhythm, no murmurs rubs or gallops  Lungs: Tachypneic, diminished  Abdomen: Soft, nondistended, tender to palpation,  Extremities: Trace edema in lower extremities,, no cyanosis     Results Review     I reviewed the patient's new clinical results.  Results from last 7 days   Lab Units 10/26/24  0545 10/25/24  0426 10/24/24  2126 10/24/24  0617   WBC 10*3/mm3 9.44 7.93 9.87 10.27   HEMOGLOBIN g/dL 11.9* 10.9* 10.6* 10.7*   PLATELETS 10*3/mm3 297 278 273 266     Results from last 7 days   Lab Units 10/26/24  0545 10/25/24  1544 10/25/24  0426 10/24/24  1715   SODIUM mmol/L 137 129* 132* 132*   POTASSIUM mmol/L 4.7 4.4 4.5 4.9   CHLORIDE mmol/L 103 99 98 98   CO2 mmol/L 19.0* 16.9* 20.0* 18.0*   BUN mg/dL 36* 37* 38* 35*   CREATININE mg/dL 1.22 1.38* 1.75* 1.57*   GLUCOSE mg/dL 133* 211* 213* 255*   Estimated Creatinine Clearance: 76.1 mL/min (by C-G formula based on SCr of 1.22 mg/dL).  Results from last 7 days   Lab Units 10/26/24  0545 10/25/24  1544 10/25/24  0426 10/22/24  1557   ALBUMIN g/dL 3.0* 2.8* 3.2* 4.6   BILIRUBIN mg/dL 1.1  --  1.8* 2.6*   ALK PHOS U/L 99  --  72 60   AST (SGOT) U/L 108*  --  148* 10   ALT (SGPT) U/L 92*  --  77* 11     Results from last 7 days   Lab Units 10/26/24  0545 10/25/24  1544 10/25/24  0426 10/24/24  1715 10/23/24  0311 10/22/24  1557   CALCIUM mg/dL 9.5 9.0 8.6 8.5*   < > 10.5   ALBUMIN g/dL 3.0* 2.8* 3.2*  --   --  4.6   MAGNESIUM mg/dL 2.4  --   --   --   --   --    PHOSPHORUS mg/dL 3.4 3.8  --   --   --   --     < > = values in this interval not displayed.     Results from last 7 days   Lab Units 10/26/24  0545 10/25/24  0426 10/24/24  1404 10/24/24  0617 10/24/24  0012 10/23/24  2104   PROCALCITONIN ng/mL 30.30* 78.10*  --   --   --   --    LACTATE mmol/L  --   --  3.8* 3.3* 3.6* 4.5*     COVID19   Date Value Ref Range Status   10/25/2024 Not Detected Not Detected - Ref. Range Final    12/26/2023 Not Detected Not Detected - Ref. Range Final     Hemoglobin A1C   Date/Time Value Ref Range Status   10/24/2024 0617 9.40 (H) 4.80 - 5.60 % Final     Glucose   Date/Time Value Ref Range Status   10/26/2024 1138 156 (H) 70 - 130 mg/dL Final   10/26/2024 0745 130 70 - 130 mg/dL Final   10/25/2024 2040 155 (H) 70 - 130 mg/dL Final   10/25/2024 1603 206 (H) 70 - 130 mg/dL Final   10/25/2024 1149 230 (H) 70 - 130 mg/dL Final   10/24/2024 2132 259 (H) 70 - 130 mg/dL Final   10/24/2024 2005 286 (H) 70 - 130 mg/dL Final           NM HIDA SCAN WITH PHARMACOLOGICAL INTERVENTION  Narrative: Nuclear medicine scan with pharmacological intervention     INDICATION: Upper abdominal pain     COMPARISON: CT abdomen pelvis October 22, 2024     TECHNIQUE: Following intravenous injection of 5.5 mCi Technetium 99m  Choletec IV, sequential static images of the abdomen were obtained for  at 30 and 60 minutes. Subsequently, 1.6 mcg CCK was infused to assess  ejection fraction.     FINDINGS:     Radiotracer seen in the liver, gallbladder, biliary system and duodenum  at 30 minutes. Progressive accumulation of radiotracer in the  gallbladder, duodenum and proximal jejunum at 60 minutes. CCK  administered with ejection fraction measuring 19%.     Impression:    1. Patent cystic duct.  2. Patent CBD.  3. Ejection fraction is diminished which can be seen with chronic  cholecystitis or functional gallbladder disorder.              This report was finalized on 10/25/2024 9:36 AM by Dr. Connor Vasquez M.D on Workstation: [id number]       Scheduled Medications  [Held by provider] atorvastatin, 40 mg, Oral, Daily  [Held by provider] carvedilol, 6.25 mg, Oral, Q12H  heparin (porcine), 5,000 Units, Subcutaneous, Q12H  insulin glargine, 10 Units, Subcutaneous, Nightly  insulin lispro, 2 Units, Subcutaneous, TID With Meals  insulin lispro, 2-7 Units, Subcutaneous, 4x Daily AC & at Bedtime  midodrine, 10 mg, Oral, TID AC  mupirocin, 1  Application, Each Nare, BID  pantoprazole, 40 mg, Oral, BID AC  piperacillin-tazobactam, 3.375 g, Intravenous, Q8H  senna-docusate sodium, 2 tablet, Oral, BID  venlafaxine XR, 75 mg, Oral, Daily    Infusions   Diet  Diet: Liquid; Clear Liquid; Fluid Consistency: Thin (IDDSI 0)    I have personally reviewed     [x]  Laboratory   [x]  Microbiology   [x]  Radiology   [x]  EKG/Telemetry  []  Cardiology/Vascular   []  Pathology    []  Records      Assessment/Plan     Active Hospital Problems    Diagnosis  POA    **Epigastric abdominal pain [R10.13]  Unknown    Leukocytosis [D72.829]  Yes    Chronic combined systolic and diastolic heart failure [I50.42]  Yes    Abdominal pain [R10.9]  Yes    Gastritis [K29.70]  Yes    Anxiety [F41.9]  Yes    Multiple-type hyperlipidemia [E78.2]  Yes    Type 2 diabetes mellitus with diabetic neuropathy, with long-term current use of insulin [E11.40, Z79.4]  Not Applicable    Benign essential HTN [I10]  Yes      Resolved Hospital Problems   No resolved problems to display.       Patient is a 49 y.o. man with type 2 diabetes, HTN, n, hyperlipidemia, chronic combined systolic and diastolic heart failure (non-ischemic), anxiety who presented with severe abdominal/epigastric pain.    Epigastric abdominal pain/right upper quadrant abdominal pain  Transaminitis  Chronic cholecystitis  -EGD 10/24/2024 with no acute findings  -On PPI  -HIDA scan concerning for chronic cholecystitis versus bili dyskinesia  -Elevated liver enzymes could be ischemic liver injury as well in the setting of hypotension which now has improved.  -Monitor daily CMP, AST/bilirubin improved.  ALT slightly up today.  -Hold statin in the setting of transaminitis  -Duplex ultrasound of mesenteric arteries was ordered however was unable to be completed due to bowel gas and patient's inability to hold his breath per report  -GI following         Hypotension  -On midodrine, status post IV fluids.  BP improved.  -Check echo in the  setting of history of chronic combined systolic and diastolic heart failure  - cardiology consulted  -     TENISHA/hyponatremia  -Creatinine peaked at 1.75 on 10/25/2024, creatinine was 1.07 on admission  -Nephrology managing, status post IV fluids, creatinine improving     Infection/sepsis?  -WBC was 13.79 on admission, patient has been afebrile since admission  -Procalcitonin significantly elevated at 78.10 10/25/2024-repeat improved  HIDA scan showed possible chronic cholecystitis, general surgery evaluated, no indication for surgery currently no signs of acute cholecystitis and findings could be related secondary to congestive hepatopathy  -Blood cultures remain negative to date  Continue with Zosyn for now-    Anemia   Iron panel consistent with anemia chronic disease with ferritin of 943, B12 and folate normal  Monitor daily CBC, transfuse as indicated for symptomatic anemia or hemoglobin 7    Encephalopathy  -Suspect secondary to acute illness/pain meds/infection/TENISHA  -No gross neurological deficits  - Discontinued IV Dilaudid.  Monitor symptoms, if worsening confusion will order CT of the head    Type II DM   -Continue current regimen of Lantus 10 units nightly, SSI.  Discontinue scheduled regular insulin, initiated on scheduled lispro 8 units 3 times daily with meals in addition to SSI.    DVT prophylaxis.  Initiate subcu heparin  Full code.  Discussed with patient and nursing staff.  Expected Discharge Date: 10/28/2024; Expected Discharge Time:        Copied text in this note has been reviewed and is accurate as of 10/26/24.         Dictated utilizing Dragon dictation        Yaritza Hurtado MD  [facility]  10/26/24  12:29 EDT        Electronically signed by Yaritza Hurtado MD at 10/26/24 1232       Law Melo MD at 10/26/24 1159           [facility]     Progress Note    Patient Name: Donnie Bishop  : 1975  MRN: [id number]  Primary Care Physician:  Juju Kennedy  MD TOMMY  Date of admission: 10/22/2024    Subjective   Subjective     Chief Complaint: abdominal pain    History of Present Illness  Patient Reports has right sided pain, states he hurts worse if straighteneds right lower extremity, has the knee flexed leg up.  States pain may be worse with eating.   No bm.  Passing gas. Mesenteric doppleer could not be done because of air in the luminal GI tract.        Review of Systems   Gastrointestinal:  Positive for abdominal pain.   Neurological:  Positive for weakness.       Objective   Objective     Vitals:   Temp:  [97.6 °F (36.4 °C)-98.6 °F (37 °C)] 98.6 °F (37 °C)  Heart Rate:  [85] 85  Resp:  [28-37] 28  BP: ()/(71-78) 110/73  Flow (L/min) (Oxygen Therapy):  [3] 3    Physical Exam  Constitutional:       Appearance: He is ill-appearing.   HENT:      Right Ear: External ear normal.      Left Ear: External ear normal.      Mouth/Throat:      Mouth: Mucous membranes are moist.   Eyes:      Conjunctiva/sclera: Conjunctivae normal.   Cardiovascular:      Rate and Rhythm: Normal rate.      Pulses: Normal pulses.   Pulmonary:      Effort: Pulmonary effort is normal.   Abdominal:      General: Bowel sounds are decreased.      Tenderness:  in the right upper quadrant and right lower quadrant There is rebound. There is no guarding.   Neurological:      Mental Status: He is alert.          Result Review    Result Review:  I have personally reviewed the results from the time of this admission to 10/26/2024 11:59 EDT and agree with these findings:  []  Laboratory list / accordion  []  Microbiology  []  Radiology  []  EKG/Telemetry   []  Cardiology/Vascular   []  Pathology  []  Old records  []  Other:  Most notable findings include:       Assessment & Plan   Assessment / Plan     Brief Patient Summary:  Donnie Bishop is a 49 y.o. male who    Abdominal pain right sided  Recent hypotension, acidosis  Gallbladder dysfunction possibly congestion from right heart as well       Active  Hospital Problems:  Active Hospital Problems    Diagnosis     **Gastritis     Leukocytosis     Chronic combined systolic and diastolic heart failure     Abdominal pain     Epigastric abdominal pain     Anxiety     Multiple-type hyperlipidemia     Type 2 diabetes mellitus with diabetic neuropathy, with long-term current use of insulin     Benign essential HTN      Plan: will check CTA  of abdomen to exclude occlusive mesenteric ischemia   Reviewed with renal , Dr Randhawa, who has given approval for contrast  Reviewed with RN       VTE Prophylaxis:  Pharmacologic & mechanical VTE prophylaxis orders are present.  ADDENDUM REVIEWED CT WITH DR ROMNA,  MESSAGED DR IZQUIERDO ON THE FINDING OF A NEW LARGE FLUID SUBCAPSULAR COLLECTION IN LIVER  AND SHE WILL REVIEW AND ADDRESS       CODE STATUS:    Level Of Support Discussed With: Patient  Code Status (Patient has no pulse and is not breathing): CPR (Attempt to Resuscitate)  Medical Interventions (Patient has pulse or is breathing): Full Support    Disposition:  I expect patient to be discharged uncertain.    Law Melo MD               Electronically signed by Law Melo MD at 10/26/24 2043       Shawanda Blackburn MD at 10/26/24 08          Hypotension resolved.no acute pulm or crit care issues.we will sign off.    Electronically signed by Shawanda Blackburn MD at 10/26/24 0822       Evan Pat MD at 10/25/24 1523              [facility] Progress Note      Patient Name: Donnie Bishop  : 1975  MRN: [id number]  Primary Care Physician:  Juju Kennedy MD  Date of admission: 10/22/2024    Subjective     Interval History:   The patient was seen and examined today for follow-up on TENISHA   Sleeping this morning.  Slow to respond.  Continues to complain of abdominal pain  Continues to be hypotensive  Review of Systems:   As noted above    Objective     Vitals:   Temp:  [98 °F (36.7 °C)-98.8 °F (37.1 °C)] 98.5 °F (36.9 °C)  Heart Rate:   [] 83  Resp:  [22-24] 22  BP: ()/(60-77) 98/66  Flow (L/min) (Oxygen Therapy):  [3-4] 3    Intake/Output Summary (Last 24 hours) at 10/25/2024 1523  Last data filed at 10/25/2024 1300  Gross per 24 hour   Intake 100 ml   Output 1100 ml   Net -1000 ml       Physical Exam:    General Appearance: alert, oriented x 3, no acute distress   Skin: warm and dry  HEENT: oral mucosa normal, nonicteric sclera  Neck: supple, no JVD  Lungs: CTA  Heart: RRR, normal S1 and S2  Abdomen: soft, nontender, nondistended  : no palpable bladder  Extremities: no edema, cyanosis or clubbing  Neuro: normal speech and mental status     Scheduled Meds:     atorvastatin, 40 mg, Oral, Daily  [Held by provider] carvedilol, 6.25 mg, Oral, Q12H  insulin glargine, 10 Units, Subcutaneous, Nightly  insulin lispro, 2-7 Units, Subcutaneous, 4x Daily AC & at Bedtime  insulin regular, 5 Units, Subcutaneous, TID AC  midodrine, 10 mg, Oral, TID AC  mupirocin, 1 Application, Each Nare, BID  pantoprazole, 40 mg, Oral, BID AC  piperacillin-tazobactam, 3.375 g, Intravenous, Q8H  senna-docusate sodium, 2 tablet, Oral, BID  venlafaxine XR, 75 mg, Oral, Daily      IV Meds:   sodium chloride, 100 mL/hr, Last Rate: 100 mL/hr (10/25/24 1055)        Results Reviewed:   I have personally reviewed the results from the time of this admission to 10/25/2024 15:23 EDT     Results from last 7 days   Lab Units 10/25/24  0426 10/24/24  1715 10/24/24  0617 10/23/24  0311 10/22/24  1557   SODIUM mmol/L 132* 132* 128*   < > 136   POTASSIUM mmol/L 4.5 4.9 4.6   < > 4.8   CHLORIDE mmol/L 98 98 95*   < > 96*   CO2 mmol/L 20.0* 18.0* 16.2*   < > 24.6   BUN mg/dL 38* 35* 30*   < > 9   CREATININE mg/dL 1.75* 1.57* 1.42*   < > 1.07   CALCIUM mg/dL 8.6 8.5* 8.8   < > 10.5   BILIRUBIN mg/dL 1.8*  --   --   --  2.6*   ALK PHOS U/L 72  --   --   --  60   ALT (SGPT) U/L 77*  --   --   --  11   AST (SGOT) U/L 148*  --   --   --  10   GLUCOSE mg/dL 213* 255* 264*   < > 183*    <  > = values in this interval not displayed.       Estimated Creatinine Clearance: 53.2 mL/min (A) (by C-G formula based on SCr of 1.75 mg/dL (H)).          Results from last 7 days   Lab Units 10/24/24  0617   URIC ACID mg/dL 5.4       Results from last 7 days   Lab Units 10/25/24  0426 10/24/24  2126 10/24/24  0617 10/23/24  1433 10/23/24  0311   WBC 10*3/mm3 7.93 9.87 10.27 12.62* 12.59*   HEMOGLOBIN g/dL 10.9* 10.6* 10.7* 15.1 14.5   PLATELETS 10*3/mm3 278 273 266 399 384             Assessment / Plan     ASSESSMENT:  Acute kidney injury: Multifactorial secondary to prerenal/ATN due to hypovolemia/hypotension in the setting of Entresto, Aldactone and Jardiance use in addition to contribution from post contrast-induced nephropathy.  Patient also received 1 dose of Toradol that could be contributing.  Currently hypotensive received couple boluses of IV fluids.  Urine sodium less than 20 in favor of prerenal state.  No signs of obstruction on CT scan of the abdomen pelvis  Hyponatremia likely hypovolemic with contribution from poor solute intake.  Improving with normal saline  High anion gap metabolic acidosis secondary to acute kidney injury and lactic acidosis.   History of congestive heart failure ejection fraction 35%.  Currently compensated  Severe anemia   History of hypertension currently hypotensive  Lactic acidosis secondary to poor tissue perfusion.  The patient is also on metformin cannot rule out midodrine ischemia        PLAN:  I believe that the patient continues to be hypovolemic.  He is hypotensive this morning  We will give 1 bolus of fluid and continue gentle IV hydration with normal saline  Agree with checking morning cortisol and starting midodrine  Labs in a.m.      Thank you for involving us in the care of Donnie Bishop.  Please feel free to call with any questions.    Evan Pat MD  10/25/24  15:23 EDT    [facility]  [phone]    Parts of this note may be an  electronic transcription/translation of spoken language to printed text using the Dragon dictation system.    Electronically signed by Evan Pat MD at 10/25/24 1527          Consult Notes (last 48 hours)        Luz Greene APRN at 10/26/24 0828       Attestation signed by Erna Walsh MD at 10/27/24 0810    I have reviewed this documentation and agree.                  Date of Consultation: 10/26/24    Referral Provider: Sylvain Ventura MD     Reason for Consultation: CHF, hypotension     Encounter Provider: MICHELLE Cobb    Group of Service: [facility]     Patient Name: Donnie Bishop    :1975    Chief complaint: Abdominal pain      History of Present Illness:  Donnie Bishop is a 49 year old who has a past medical history that is significant for diabetes, hypertension, hyperlipidemia, HFrEF secondary to nonischemic cardiomyopathy.     He presented to the ED on 10/22/2024 with complaints of severe abdominal pain.  He reported that the pain woke him from sleep and had progressively worsened throughout the day.  The pain was epigastric radiating to the right side of his abdomen and his back.    Workup included a CT of the abdomen which showed evidence of gastritis versus peptic ulcer disease.  He underwent an EGD with Dr. Wiggins which showed no significant inflammation or ulceration.  His HIDA scan was concerning for chronic cholecystitis.  Today he underwent mesenteric duplex, results are pending.  He also underwent a CTA of the abdomen and pelvis which demonstrated a large fluid collection in his right upper quadrant.    On exam, he is not conversational, he groans in pain but does not really answer questions.  He had a family member at bedside who helps relay his medical history.    He was initially hypotensive and was initiated on 10 mg of midodrine 3 times daily, today his blood pressure has improved.  Cardiology has been asked to evaluate his hypotension along with his  HFrEF.    Echocardiogram 10/26/2024    Left ventricular systolic function is low normal. Calculated left ventricular EF = 49.7%    The following left ventricular wall segments are hypokinetic: apical septal and apex hypokinetic.    Left ventricular diastolic function was normal.    Estimated right ventricular systolic pressure from tricuspid regurgitation is mildly elevated (35-45 mmHg). Calculated right ventricular systolic pressure from tricuspid regurgitation is 40 mmHg.    Mild mitral valve regurgitation is present.    Cardiac catheterization 9/13/2023    Normal coronary arteries    Substantially elevated LVEDP    Recommendations: Aggressive up titration of guideline directed medical therapy and diuresis.  Presumably cardiomyopathy is related to uncontrolled hypertension     INDICATION FOR PROCEDURE: Pleasant 48-year-old man with a week of exertional dyspnea and chest pressure.  Found to have new cardiomyopathy.     PROCEDURE PERFORMED:   1. Ultrasound guided arterial access,   2. Coronary Angiography  3. Left heart catheterization      PROCEDURE COMMENTS:   After informed consent was obtained, the patient was prepped and draped in the usual manner.  Moderate sedation was administered.  Lidocaine was infused in the right wrist for anesthesia.  Access was obtained in the right radial artery using micropuncture technique, ultrasound guidance and a 5/6 Slender Kosovan sheath was inserted.  Coronary angiography was performed using 5 Kosovan Tiger catheters.  Left heart catheterization was performed using a 5 Kosovan pigtail catheter.   A TR band was used for hemostasis     HEMODYNAMICS:  LV: 140/30  LVEDP: 25-30  AORTIC: 140/100        CORONARY ANGIOGRAPHY:  LEFT MAIN: Large-caliber, short, essentially 2 separate ostium.  LAD: The LAD is a large caliber vessel.  Normal in the proximal segment.  Gives rise to medium caliber, branching diagonal which is normal.  The LAD mid -distal is normal.  RAMUS: Absent  CIRCUMFLEX:  Large-caliber, dominant circumflex.  Normal.  Gives rise to 3 small OM vessels which are normal.  Ongoing left PDA is small to medium caliber normal.  RCA: Small, nondominant, normal       Past Medical History:   Diagnosis Date    Anxiety     CHF (congestive heart failure)     Cough     Inability to attain erection     Injury of acoustic nerve     Myalgia     Type II diabetes mellitus     Venous insufficiency     Viral illness     Vitamin D deficiency          Past Surgical History:   Procedure Laterality Date    CARDIAC CATHETERIZATION N/A 9/13/2023    Procedure: Left Heart Cath;  Surgeon: Kaylee Kay MD;  Location: [facility] CATH INVASIVE LOCATION;  Service: Cardiology;  Laterality: N/A;    CARDIAC CATHETERIZATION N/A 9/13/2023    Procedure: Coronary angiography;  Surgeon: Kaylee Kay MD;  Location: [facility] CATH INVASIVE LOCATION;  Service: Cardiology;  Laterality: N/A;    ENDOSCOPY N/A 10/24/2024    Procedure: ESOPHAGOGASTRODUODENOSCOPY with biopsies;  Surgeon: Elma Wiggins MD;  Location: [facility] ENDOSCOPY;  Service: Gastroenterology;  Laterality: N/A;  pre-abdominal pain  post-normal         No Known Allergies      No current facility-administered medications on file prior to encounter.     Current Outpatient Medications on File Prior to Encounter   Medication Sig Dispense Refill    melatonin 5 MG tablet tablet Take 1 tablet by mouth At Night As Needed (insomnia). 90 tablet 1    ondansetron (ZOFRAN) 8 MG tablet Take 1 tablet by mouth Every 8 (Eight) Hours As Needed for Nausea or Vomiting. 8 tablet 0    spironolactone (ALDACTONE) 25 MG tablet TAKE 1 TABLET BY MOUTH EVERY DAY 90 tablet 1    venlafaxine XR (EFFEXOR-XR) 150 MG 24 hr capsule Take 1 capsule by mouth Daily. (Patient taking differently: Take 75 mg by mouth Daily.) 90 capsule 1    vitamin D (ERGOCALCIFEROL) 1.25 MG (86836 UT) capsule capsule Take 1 capsule by mouth 1 (One) Time Per Week.      atorvastatin (LIPITOR) 40 MG tablet Take 1 tablet by  "mouth Daily. 90 tablet 3    carvedilol (COREG) 25 MG tablet Take 1 tablet by mouth Every 12 (Twelve) Hours. 180 tablet 1    empagliflozin (JARDIANCE) 10 MG tablet tablet Take 1 tablet by mouth Daily. 90 tablet 1    hydrOXYzine (ATARAX) 10 MG tablet TAKE 1 TABLET BY MOUTH EVERY 8 HOURS AS NEEDED FOR ANXIETY. 270 tablet 1    metFORMIN (GLUCOPHAGE) 1000 MG tablet Take 1 tablet by mouth 2 (Two) Times a Day With Meals. 180 tablet 3    NovoLOG FlexPen 100 UNIT/ML solution pen-injector sc pen INJECT 5 UNITS INTO THE SKIN 3 (THREE) TIMES DAILY WITH MEALS PLUS UP TO 15 UNITS SLIDING SCALE.      sacubitril-valsartan (ENTRESTO) 24-26 MG tablet Take 1 tablet by mouth 2 (Two) Times a Day. 180 tablet 1         Social History     Socioeconomic History    Marital status:    Tobacco Use    Smoking status: Never    Smokeless tobacco: Never   Vaping Use    Vaping status: Never Used   Substance and Sexual Activity    Alcohol use: Never    Drug use: Never    Sexual activity: Defer         Family History   Problem Relation Age of Onset    Diabetes Mother        REVIEW OF SYSTEMS:   12 point ROS was performed and is negative except as outlined in HPI       Objective:     Vitals:    10/26/24 0717 10/26/24 1125 10/26/24 1500 10/26/24 1642   BP: 107/78 110/73 91/67 126/84   BP Location: Right arm Right arm Right arm    Patient Position:  Lying     Pulse: 85 85 89 92   Resp: (!) 30 28 16    Temp: 98.3 °F (36.8 °C) 98.6 °F (37 °C) 98.5 °F (36.9 °C)    TempSrc: Oral Oral Oral    SpO2: 99%  100%    Weight:  81 kg (178 lb 9.2 oz)     Height:  170 cm (66.93\")       Body mass index is 28.03 kg/m².  Flowsheet Rows      Flowsheet Row First Filed Value   Admission Height 170.2 cm (67\") Documented at 10/22/2024 1641   Admission Weight 76.2 kg (168 lb) Documented at 10/22/2024 1641              Physical Exam  Vitals reviewed.   Constitutional:       Appearance: He is ill-appearing.   HENT:      Head: Normocephalic.      Nose: Nose normal.   Eyes: "      Extraocular Movements: Extraocular movements intact.      Pupils: Pupils are equal, round, and reactive to light.   Cardiovascular:      Rate and Rhythm: Normal rate and regular rhythm.      Pulses: Normal pulses.      Heart sounds: Normal heart sounds. Heart sounds not distant. No murmur heard.     No friction rub. No gallop. No S3 or S4 sounds.   Pulmonary:      Effort: Pulmonary effort is normal.      Breath sounds: Normal breath sounds.   Abdominal:      General: Abdomen is flat. Bowel sounds are normal.      Palpations: Abdomen is soft.      Tenderness: There is abdominal tenderness in the right upper quadrant. Negative signs include Ferro's sign.   Skin:     General: Skin is warm and dry.   Neurological:      Mental Status: He is alert. He is disoriented.         Lab Review:                Results from last 7 days   Lab Units 10/26/24  0545   SODIUM mmol/L 137   POTASSIUM mmol/L 4.7   CHLORIDE mmol/L 103   CO2 mmol/L 19.0*   BUN mg/dL 36*   CREATININE mg/dL 1.22   GLUCOSE mg/dL 133*   CALCIUM mg/dL 9.5         Results from last 7 days   Lab Units 10/26/24  0545   WBC 10*3/mm3 9.44   HEMOGLOBIN g/dL 11.9*   HEMATOCRIT % 36.7*   PLATELETS 10*3/mm3 297             Results from last 7 days   Lab Units 10/26/24  0545   MAGNESIUM mg/dL 2.4           EKG (reviewed by me personally):          Assessment/Plan:   Hypotension  Overall blood pressure trend has improved today with addition of midodrine yesterday  Acute kidney injury   Creatinine normalized  Chronic combined systolic and diastolic heart failure   Echocardiogram today showed low normal left ventricular systolic function, EF 50%, normal left ventricular diastolic function, no hemodynamically significant valvular abnormalities.  GDMT is limited by hypotension.  Will resume as able.   Type II diabetes mellitus   Hypertension   Abdominal pain   He is being evaluated by GI and general surgery.  EGD on 10/24/2024 with no acute findings.  HIDA scan concerning  "for chronic cholecystitis.  CTA of the abdomen today showed a large collection of fluid, general surgery to evaluate.  Encephalopathy    Cardiology will follow, thank you for this consult.     Luz Greene, APRN   10/26/24       Electronically signed by Erna Walsh MD at 10/27/24 0810       Manuela Wisdom MD at 10/25/24 1405        Consult Orders    1. Inpatient General Surgery Consult [[id number]] ordered by Yaritza Hurtado MD at 10/25/24 1033                 General Surgery Consultation    Consulting Physician: Manuela Wisdom MD  Referring: Yaritza Hurtado MD     Reason for consultation:   Right upper quadrant abdominal pain.  Chronic cholecystitis on HIDA scan.    CC:   Abdominal pain    HPI:   The patient is a 49 y.o. male who presented to the hospital with abdominal pain in the right upper quadrant radiating to his back.  He underwent CT demonstrating evidence of possible antral gastritis versus ulcer.  He underwent EGD by Dr. Wiggins on 10/24/2024 which demonstrated normal findings.  He had a HIDA scan that was performed today which demonstrated filling of the gallbladder with patent cystic duct and common bile duct with ejection fraction 19% concerning for possible chronic cholecystitis.  The patient has a history of combined systolic and diastolic heart failure, type 2 diabetes mellitus, hypertension, hyperlipidemia, and anxiety.  He is unable to provide much history to me during my encounter with him as he appears somnolent and falls asleep easily and when he awakens answers very slowly and mutters mostly incomprehensible words, but with prompting he is able to verbalize the words \" no surgery\" when I tell him I am here to discuss his gallbladder.  His history is gleaned from the chart and from discussion with nursing and attending.    Past Medical History:  Past Medical History:   Diagnosis Date    Anxiety     CHF (congestive heart failure)     Cough     Inability to attain erection     " Injury of acoustic nerve     Myalgia     Type II diabetes mellitus     Venous insufficiency     Viral illness     Vitamin D deficiency        Past Surgical History:  Past Surgical History:   Procedure Laterality Date    CARDIAC CATHETERIZATION N/A 9/13/2023    Procedure: Left Heart Cath;  Surgeon: Kaylee Kay MD;  Location: [facility] CATH INVASIVE LOCATION;  Service: Cardiology;  Laterality: N/A;    CARDIAC CATHETERIZATION N/A 9/13/2023    Procedure: Coronary angiography;  Surgeon: Kaylee Kay MD;  Location: [facility] CATH INVASIVE LOCATION;  Service: Cardiology;  Laterality: N/A;    ENDOSCOPY N/A 10/24/2024    Procedure: ESOPHAGOGASTRODUODENOSCOPY with biopsies;  Surgeon: Elma Wiggins MD;  Location: [facility] ENDOSCOPY;  Service: Gastroenterology;  Laterality: N/A;  pre-abdominal pain  post-normal       Medications:  Medications Prior to Admission   Medication Sig Dispense Refill Last Dose/Taking    melatonin 5 MG tablet tablet Take 1 tablet by mouth At Night As Needed (insomnia). 90 tablet 1 10/22/2024    ondansetron (ZOFRAN) 8 MG tablet Take 1 tablet by mouth Every 8 (Eight) Hours As Needed for Nausea or Vomiting. 8 tablet 0 10/22/2024    spironolactone (ALDACTONE) 25 MG tablet TAKE 1 TABLET BY MOUTH EVERY DAY 90 tablet 1 10/22/2024    venlafaxine XR (EFFEXOR-XR) 150 MG 24 hr capsule Take 1 capsule by mouth Daily. (Patient taking differently: Take 75 mg by mouth Daily.) 90 capsule 1 10/22/2024    vitamin D (ERGOCALCIFEROL) 1.25 MG (87703 UT) capsule capsule Take 1 capsule by mouth 1 (One) Time Per Week.   10/22/2024    atorvastatin (LIPITOR) 40 MG tablet Take 1 tablet by mouth Daily. 90 tablet 3 Morning    carvedilol (COREG) 25 MG tablet Take 1 tablet by mouth Every 12 (Twelve) Hours. 180 tablet 1     empagliflozin (JARDIANCE) 10 MG tablet tablet Take 1 tablet by mouth Daily. 90 tablet 1     hydrOXYzine (ATARAX) 10 MG tablet TAKE 1 TABLET BY MOUTH EVERY 8 HOURS AS NEEDED FOR ANXIETY. 270 tablet 1     metFORMIN  (GLUCOPHAGE) 1000 MG tablet Take 1 tablet by mouth 2 (Two) Times a Day With Meals. 180 tablet 3 Morning    NovoLOG FlexPen 100 UNIT/ML solution pen-injector sc pen INJECT 5 UNITS INTO THE SKIN 3 (THREE) TIMES DAILY WITH MEALS PLUS UP TO 15 UNITS SLIDING SCALE.   Morning    sacubitril-valsartan (ENTRESTO) 24-26 MG tablet Take 1 tablet by mouth 2 (Two) Times a Day. 180 tablet 1        Current Facility-Administered Medications:     atorvastatin (LIPITOR) tablet 40 mg, 40 mg, Oral, Daily, Elma Wiggins MD, 40 mg at 10/25/24 1018    sennosides-docusate (PERICOLACE) 8.6-50 MG per tablet 2 tablet, 2 tablet, Oral, BID, 2 tablet at 10/25/24 1018 **AND** polyethylene glycol (MIRALAX) packet 17 g, 17 g, Oral, Daily PRN **AND** bisacodyl (DULCOLAX) EC tablet 5 mg, 5 mg, Oral, Daily PRN **AND** bisacodyl (DULCOLAX) suppository 10 mg, 10 mg, Rectal, Daily PRN, Elma Wiggins MD    Calcium Replacement - Follow Nurse / BPA Driven Protocol, , Does not apply, PRN, Elma Wiggins MD    [Held by provider] carvedilol (COREG) tablet 6.25 mg, 6.25 mg, Oral, Q12H, Godwin Carcamo, DO, 6.25 mg at 10/24/24 2142    dextrose (D50W) (25 g/50 mL) IV injection 25 g, 25 g, Intravenous, Q15 Min PRN, Elma Wiggins MD    dextrose (GLUTOSE) oral gel 15 g, 15 g, Oral, Q15 Min PRN, Elma Wiggins MD    glucagon (GLUCAGEN) injection 1 mg, 1 mg, Intramuscular, Q15 Min PRN, Elma Wiggins MD    HYDROcodone-acetaminophen (NORCO) 5-325 MG per tablet 1 tablet, 1 tablet, Oral, Q6H PRN, Elma Wiggins MD, 1 tablet at 10/25/24 1018    HYDROmorphone (DILAUDID) injection 0.5 mg, 0.5 mg, Intravenous, Q4H PRN, Elma Wiggins MD, 0.5 mg at 10/25/24 1136    hydrOXYzine (ATARAX) tablet 10 mg, 10 mg, Oral, Q8H PRN, Elma Wiggins MD, 10 mg at 10/23/24 1447    influenza virus vacc split PF FLUZONE 0.5 mL, 0.5 mL, Intramuscular, During Hospitalization, Elma Wiggins MD    insulin glargine (LANTUS, SEMGLEE) injection 10 Units, 10  Units, Subcutaneous, Nightly, Godwin Carcamo, , 10 Units at 10/24/24 2143    insulin lispro (HUMALOG/ADMELOG) injection 2-7 Units, 2-7 Units, Subcutaneous, 4x Daily AC & at Bedtime, Elma Wiggins MD, 3 Units at 10/25/24 1140    insulin regular (humuLIN R,novoLIN R) injection 5 Units, 5 Units, Subcutaneous, TID AC, Godwin Carcamo DO, 5 Units at 10/25/24 1306    Magnesium Standard Dose Replacement - Follow Nurse / BPA Driven Protocol, , Does not apply, PRN, Elma Wiggins MD    melatonin tablet 2.5 mg, 2.5 mg, Oral, Nightly PRN, Elma Wiggins MD    midodrine (PROAMATINE) tablet 10 mg, 10 mg, Oral, TID AC, Richard Ugalde MD, 10 mg at 10/25/24 1138    mupirocin (BACTROBAN) 2 % nasal ointment 1 Application, 1 Application, Each Nare, BID, Delaney Rosario, APRTOM    [DISCONTINUED] morphine injection 1 mg, 1 mg, Intravenous, Q4H PRN, 1 mg at 10/23/24 0832 **AND** naloxone (NARCAN) injection 0.4 mg, 0.4 mg, Intravenous, Q5 Min PRN, Elma Wiggins MD    ondansetron ODT (ZOFRAN-ODT) disintegrating tablet 4 mg, 4 mg, Oral, Q6H PRN **OR** ondansetron (ZOFRAN) injection 4 mg, 4 mg, Intravenous, Q6H PRN, Elma Wiggins MD, 4 mg at 10/22/24 2128    pantoprazole (PROTONIX) EC tablet 40 mg, 40 mg, Oral, BID AC, Elma Wiggins MD, 40 mg at 10/25/24 1018    Phosphorus Replacement - Follow Nurse / BPA Driven Protocol, , Does not apply, PRNRosalie Lauren C., MD    piperacillin-tazobactam (ZOSYN) 3.375 g IVPB in 100 mL NS MBP (CD), 3.375 g, Intravenous, Q8H, Elma Wiggins MD, 3.375 g at 10/25/24 1308    Potassium Replacement - Follow Nurse / BPA Driven Protocol, , Does not apply, Rosalie DOWD Lauren C., MD    sodium chloride 0.9 % flush 10 mL, 10 mL, Intravenous, PRNRosalie Lauren C., MD    sodium chloride 0.9 % infusion, 100 mL/hr, Intravenous, Continuous, Evan Pat MD, Last Rate: 100 mL/hr at 10/25/24 1055, 100 mL/hr at 10/25/24 1055    venlafaxine XR (EFFEXOR-XR) 24 hr capsule 75 mg,  75 mg, Oral, Daily, Elma Wiggins MD, 75 mg at 10/25/24 1018    Allergies:   No Known Allergies    Social History:   Social History     Socioeconomic History    Marital status:    Tobacco Use    Smoking status: Never    Smokeless tobacco: Never   Vaping Use    Vaping status: Never Used   Substance and Sexual Activity    Alcohol use: Never    Drug use: Never    Sexual activity: Defer       Family History:   Family History   Problem Relation Age of Onset    Diabetes Mother        Review of Systems:  +abdominal pain  Unable to obtain secondary to patient mental status     Physical Exam:   Vitals:    10/25/24 1100   BP:    Pulse:    Resp:    Temp: 98.5 °F (36.9 °C)   SpO2:      GENERAL: Somnolent, arousable, however falls asleep easily and when answering questions motors mostly incomprehensible sounds  NECK: Supple  RESPIRATORY: Unlabored breathing on room air  CARDIOVASCULAR: Heart rate 90s, regular, no pedal edema  GASTROINTESTINAL: Abdomen soft, mildly diffusely tender without rebound or guarding, no Ferro sign, no scars noted  MUSCULOSKELETAL: MOCK  SKIN: warm, no rash    Diagnostic workup:     Pertinent labs:   Results from last 7 days   Lab Units 10/25/24  0426 10/24/24  2126 10/24/24  0617 10/23/24  1433 10/23/24  0311 10/22/24  1557   WBC 10*3/mm3 7.93 9.87 10.27 12.62* 12.59* 13.79*   HEMOGLOBIN g/dL 10.9* 10.6* 10.7* 15.1 14.5 14.2   HEMATOCRIT % 32.4* 31.2* 32.9* 44.3 42.6 40.8   PLATELETS 10*3/mm3 278 273 266 399 384 388     Results from last 7 days   Lab Units 10/25/24  0426 10/24/24  1715 10/24/24  0617 10/23/24  0311 10/22/24  1557   SODIUM mmol/L 132* 132* 128*   < > 136   POTASSIUM mmol/L 4.5 4.9 4.6   < > 4.8   CHLORIDE mmol/L 98 98 95*   < > 96*   CO2 mmol/L 20.0* 18.0* 16.2*   < > 24.6   BUN mg/dL 38* 35* 30*   < > 9   CREATININE mg/dL 1.75* 1.57* 1.42*   < > 1.07   CALCIUM mg/dL 8.6 8.5* 8.8   < > 10.5   BILIRUBIN mg/dL 1.8*  --   --   --  2.6*   ALK PHOS U/L 72  --   --   --  60   ALT  (SGPT) U/L 77*  --   --   --  11   AST (SGOT) U/L 148*  --   --   --  10   GLUCOSE mg/dL 213* 255* 264*   < > 183*    < > = values in this interval not displayed.       Imaging:  CT abdomen pelvis images and report reviewed, there is a contracted gallbladder with mild hyperenhancing wall, I do not see any stones, there is abdominal ascites and evidence of constipation  EGD report and images 10/24/2024 reviewed, negative for abnormalities  HIDA scan 10/25/2024 images and report reviewed, EF is 19% with positive gallbladder filling    Assessment and plan:   The patient is a 49 y.o. male with multiple medical problems including CHF with EF of 35%, hypertension, hyperlipidemia, type 2 diabetes mellitus with A1c 9.4, who came into the hospital complaining of abdominal pain.  He has undergone workup for possible cholecystitis with recent CT demonstrating contracted gallbladder with mild hyperenhancing wall, EGD negative for now abnormalities, and HIDA scan demonstrating filling but with decreased ejection fraction concerning for possible biliary dyskinesia vs chronic cholecystitis.  His WBC was 13 on admission and has normalized while on zosyn, with labs appearing consistent with dehydration/hypovolemia.  There is abdominal ascites on CT.  His LFTs demonstrate downtrending hyperbilirubinemia with bilirubin 1.8, normal alkaline phosphatase, and ALT and AST normal at admission and mildly elevated today.  On exam he has mild diffuse tenderness without peritoneal or Ferro's signs.  Given his gallbladder filling on HIDA, I suspect congestive hepatopathy as the source of his symptoms. He may have an element of chronic cholecystitis but it is difficult to ascertain his history with his current mental status, and as there is no acute cholecystitis he does not require surgery at this time.      Manuela Wisdom M.D.  General, Robotic, and Endoscopic Surgery  [facility]    [address], [address]  [place], [place], [address]  P: [phone]  F: [phone]       Electronically signed by Manuela Wisdom MD at 10/25/24 4087        All medication doses during the admission are shown, including meds that are no longer on order.  Scheduled Meds Sorted by Name  for Donnie Bishop as of 10/25/24 through 10/27/24    1 Day 3 Days 7 Days 10 Days < Today >   Legend:       Medications 10/25/24 10/26/24 10/27/24   atorvastatin (LIPITOR) tablet 40 mg  Dose: 40 mg  Freq: Daily Route: PO  Start: 10/23/24 0900   Admin Instructions:       1019        1144 [C]   1223       0900          carvedilol (COREG) tablet 12.5 mg  Dose: 12.5 mg  Freq: Every 12 Hours Scheduled Route: PO  Start: 10/24/24 2100 End: 10/24/24 1903   Admin Instructions:            carvedilol (COREG) tablet 25 mg  Dose: 25 mg  Freq: Every 12 Hours Scheduled Route: PO  Start: 10/23/24 0900 End: 10/24/24 1846   Admin Instructions:            carvedilol (COREG) tablet 6.25 mg  Dose: 6.25 mg  Freq: Every 12 Hours Scheduled Route: PO  Start: 10/24/24 2100   Admin Instructions:       (5537)   1032   2100      0900   2100       0900   2100         heparin (porcine) 5000 UNIT/ML injection 5,000 Units  Dose: 5,000 Units  Freq: Every 12 Hours Scheduled Route: SC  Indications of Use: PROPHYLAXIS OF VENOUS THROMBOEMBOLISM  Start: 10/25/24 2100    2133        0857   2144       0839   2100         HYDROmorphone (DILAUDID) injection 0.5 mg  Dose: 0.5 mg  Freq: Once Route: IV  Start: 10/22/24 1806 End: 10/22/24 1836   Admin Instructions:            HYDROmorphone (DILAUDID) injection 1 mg  Dose: 1 mg  Freq: Once Route: IV  Start: 10/22/24 1640 End: 10/22/24 1637   Admin Instructions:            insulin glargine (LANTUS, SEMGLEE) injection 10 Units  Dose: 10 Units  Freq: Nightly Route: SC  Start: 10/24/24 2100 End: 10/27/24 0700   Admin Instructions:       2133        2150        0700-D/C'd        insulin glargine (LANTUS, SEMGLEE) injection 14 Units  Dose: 14 Units  Freq:  Nightly Route: SC  Start: 10/27/24 2100   Admin Instructions:         2100          insulin glargine (LANTUS, SEMGLEE) injection 7 Units  Dose: 7 Units  Freq: Nightly Route: SC  Start: 10/24/24 2100 End: 10/24/24 1709   Admin Instructions:            insulin lispro (HUMALOG/ADMELOG) injection 2 Units  Dose: 2 Units  Freq: 3 Times Daily With Meals Route: SC  Start: 10/26/24 1300 End: 10/27/24 0658   Admin Instructions:        1214   1610         0658-D/C'd        insulin lispro (HUMALOG/ADMELOG) injection 2-7 Units  Dose: 2-7 Units  Freq: 4 Times Daily Before Meals & Nightly Route: SC  Start: 10/22/24 2200   Admin Instructions:       [phone]     2133 [phone]   1618        2152       0839   1126   1730     2100          insulin lispro (HUMALOG/ADMELOG) injection 3 Units  Dose: 3 Units  Freq: 3 Times Daily With Meals Route: SC  Start: 10/27/24 0800 End: 10/27/24 1102   Admin Instructions:         0839   1102-D/C'd       insulin lispro (HUMALOG/ADMELOG) injection 3 Units  Dose: 3 Units  Freq: 3 Times Daily With Meals Route: SC  Start: 10/24/24 1945 End: 10/24/24 1846   Admin Instructions:            insulin lispro (HUMALOG/ADMELOG) injection 4 Units  Dose: 4 Units  Freq: 3 Times Daily With Meals Route: SC  Start: 10/27/24 1200   Admin Instructions:         1126   1800         insulin regular (humuLIN R,novoLIN R) injection 10 Units  Dose: 10 Units  Freq: 3 Times Daily Before Meals Route: SC  Start: 10/24/24 1815 End: 10/24/24 1847   Admin Instructions:            insulin regular (humuLIN R,novoLIN R) injection 5 Units  Dose: 5 Units  Freq: 3 Times Daily Before Meals Route: SC  Start: 10/25/24 0730 End: 10/26/24 1205   Admin Instructions:       (0230)   1308 [C]   1640      (9139)   120-D/C'd  (1211)          iopamidol (ISOVUE-300) 61 % injection 100 mL  Dose: 100 mL  Freq: Once in Imaging Route: IV  Start: 10/22/24 1831 End: 10/22/24 1822         iopamidol (ISOVUE-370) 76 % injection 100  mL  Dose: 100 mL  Freq: Once in Imaging Route: IV  Start: 10/26/24 1500 End: 10/26/24 1411     1411           ketorolac (TORADOL) injection 15 mg  Dose: 15 mg  Freq: Once Route: IV  Start: 10/22/24 1806 End: 10/22/24 1836   Admin Instructions:            midodrine (PROAMATINE) tablet 10 mg  Dose: 10 mg  Freq: 3 Times Daily Before Meals Route: PO  Start: 10/25/24 0300 End: 10/27/24 0658    [phone] 1216   1640       (0927) [phone]      0658-D/C'd        midodrine (PROAMATINE) tablet 5 mg  Dose: 5 mg  Freq: 3 Times Daily Before Meals Route: PO  Start: 10/27/24 0745      0839   1125   1737        morphine injection 2 mg  Dose: 2 mg  Freq: Once Route: IV  Start: 10/23/24 0600 End: 10/23/24 0510   Admin Instructions:            mupirocin (BACTROBAN) 2 % nasal ointment 1 Application  Dose: 1 Application  Freq: 2 Times Daily Route: EACH NARE  Start: 10/25/24 0830 End: 10/30/24 0859   Admin Instructions:       (0273) 1910 [phone]         ondansetron (ZOFRAN) injection 4 mg  Dose: 4 mg  Freq: Once Route: IV  Start: 10/22/24 1640 End: 10/22/24 1637   Admin Instructions:            pantoprazole (PROTONIX) EC tablet 40 mg  Dose: 40 mg  Freq: 2 Times Daily Before Meals Route: PO  Start: 10/24/24 1730   Admin Instructions:       1018   1641       1148 [C]   0597 8495   1732         pantoprazole (PROTONIX) injection 40 mg  Dose: 40 mg  Freq: 2 Times Daily Before Meals Route: IV  Indications of Use: GASTROESOPHAGEAL REFLUX DISEASE  Indications Comment: gastritis vs. PUD on imaging  Start: 10/23/24 0730 End: 10/24/24 1302   Admin Instructions:            pantoprazole (PROTONIX) injection 40 mg  Dose: 40 mg  Freq: Once Route: IV  Indications of Use: GASTROESOPHAGEAL REFLUX DISEASE  Start: 10/22/24 2002 End: 10/22/24 1954   Admin Instructions:            perflutren (DEFINITY) 8.476 mg in Sodium Chloride (PF) 0.9 % 10 mL injection  Dose: 2 mL  Freq: Once in Imaging Route:  IV  Start: 10/26/24 1215 End: 10/26/24 1126   Admin Instructions:        1126           piperacillin-tazobactam (ZOSYN) 3.375 g IVPB in 100 mL NS MBP (CD)  Dose: 3.375 g  Freq: Every 8 Hours Route: IV  Indications of Use: INTRA-ABDOMINAL INFECTION  Start: 10/23/24 2100 End: 11/02/24 2059    0612   1308   2133      0509   1207   2149      0422   1300   2100        piperacillin-tazobactam (ZOSYN) 3.375 g IVPB in 100 mL NS MBP (CD)  Dose: 3.375 g  Freq: Once Route: IV  Start: 10/23/24 1500 End: 10/23/24 1656          sennosides-docusate (PERICOLACE) 8.6-50 MG per tablet 2 tablet  Dose: 2 tablet  Freq: 2 Times Daily Route: PO  Start: 10/27/24 0900   Admin Instructions:         0839   2100         And   polyethylene glycol (MIRALAX) packet 17 g  Dose: 17 g  Freq: 2 Times Daily Route: PO  Start: 10/27/24 0900   Admin Instructions:         0839   2100         And   bisacodyl (DULCOLAX) EC tablet 5 mg  Dose: 5 mg  Freq: Daily PRN Route: PO  PRN Reason: Constipation  PRN Comment: Use if polyethylene glycol is ineffective  Start: 10/27/24 0700   Admin Instructions:            And   bisacodyl (DULCOLAX) suppository 10 mg  Dose: 10 mg  Freq: Daily PRN Route: RE  PRN Reason: Constipation  PRN Comment: Use if bisacodyl oral is ineffective  Start: 10/27/24 0700   Admin Instructions:            sacubitril-valsartan (ENTRESTO) 24-26 MG tablet 1 tablet  Dose: 1 tablet  Freq: 2 Times Daily Route: PO  Start: 10/23/24 0900 End: 10/24/24 1804   Order specific questions:             sennosides-docusate (PERICOLACE) 8.6-50 MG per tablet 2 tablet  Dose: 2 tablet  Freq: 2 Times Daily Route: PO  Start: 10/22/24 2100 End: 10/27/24 0700   Admin Instructions:       1018   2134       1148 [C]   2149       0700-D/C'd        And   polyethylene glycol (MIRALAX) packet 17 g  Dose: 17 g  Freq: Daily PRN Route: PO  PRN Reason: Constipation  PRN Comment: Use if senna-docusate is ineffective  Start: 10/22/24 2026 End: 10/27/24 0700   Admin Instructions:          0700-D/C'd        And   bisacodyl (DULCOLAX) EC tablet 5 mg  Dose: 5 mg  Freq: Daily PRN Route: PO  PRN Reason: Constipation  PRN Comment: Use if polyethylene glycol is ineffective  Start: 10/22/24 2026 End: 10/27/24 0700   Admin Instructions:         0700-D/C'd        And   bisacodyl (DULCOLAX) suppository 10 mg  Dose: 10 mg  Freq: Daily PRN Route: RE  PRN Reason: Constipation  PRN Comment: Use if bisacodyl oral is ineffective  Start: 10/22/24 2026 End: 10/27/24 0700   Admin Instructions:         0700-D/C'd        sincalide (KINEVAC) 1.6 mcg in sodium chloride 0.9 % 101.6 mL IVPB  Dose: 0.02 mcg/kg  Weight Dosing Info: 81 kg  Freq: Once Route: IV  Start: 10/25/24 0800 End: 10/25/24 0745    0745            sodium chloride 0.9 % bolus 500 mL  Dose: 500 mL  Freq: Once Route: IV  Last Dose: 500 mL (10/25/24 1055)  Start: 10/25/24 1045 End: 10/25/24 1255    1055            sodium chloride 0.9 % bolus 500 mL  Dose: 500 mL  Freq: Once Route: IV  Last Dose: 500 mL (10/25/24 0225)  Start: 10/25/24 0230 End: 10/25/24 0255    0225            sodium chloride 0.9 % bolus 500 mL  Dose: 500 mL  Freq: Once Route: IV  Last Dose: 500 mL (10/24/24 1122)  Start: 10/24/24 1115 End: 10/24/24 1152         sodium chloride 0.9 % bolus 500 mL  Dose: 500 mL  Freq: Once Route: IV  Last Dose: 500 mL (10/24/24 0239)  Start: 10/24/24 0315 End: 10/24/24 0309         spironolactone (ALDACTONE) tablet 25 mg  Dose: 25 mg  Freq: Daily Route: PO  Start: 10/23/24 0900 End: 10/24/24 1804   Admin Instructions:            sucralfate (CARAFATE) tablet 1 g  Dose: 1 g  Freq: 3 Times Daily Before Meals Route: PO  Start: 10/23/24 1730 End: 10/24/24 1302   Admin Instructions:            technetium Tc 99m mebrofenin (CHOLETEC) injection 1 dose  Dose: 1 dose  Freq: Once in Imaging Route: IV  Start: 10/25/24 0745 End: 10/25/24 0648   Order specific questions:       0648            venlafaxine XR (EFFEXOR-XR) 24 hr capsule 75 mg  Dose: 75 mg  Freq: Daily  Route: PO  Start: 10/23/24 0900   Admin Instructions:       1018        1149 [C]        0839                      Continuous Meds Sorted by Name  for Dario Donnie as of 10/25/24 through 10/27/24  Legend:       Medications 10/25/24 10/26/24 10/27/24   Pharmacy to Dose Zosyn  Freq: Continuous PRN Route: XX  PRN Reason: Consult  Indications of Use: INTRA-ABDOMINAL INFECTION  Start: 10/23/24 1403 End: 10/23/24 1412         sodium chloride 0.9 % infusion  Rate: 100 mL/hr Dose: 100 mL/hr  Freq: Continuous Route: IV  Last Dose: 100 mL/hr (10/27/24 0422)  Start: 10/26/24 1900 End: 10/27/24 1824     1825 0422   1824-D/C'd       sodium chloride 0.9 % infusion  Rate: 100 mL/hr Dose: 100 mL/hr  Freq: Continuous Route: IV  Last Dose: Stopped (10/25/24 2300)  Start: 10/25/24 1045 End: 10/25/24 2254    1055   2254-D/C'd  2300           sodium chloride 0.9 % infusion  Rate: 100 mL/hr Dose: 100 mL/hr  Freq: Continuous Route: IV  Last Dose: 100 mL/hr (10/24/24 2009)  Start: 10/24/24 1945 End: 10/25/24 0808    0808-D/C'd          sodium chloride 0.9 % infusion  Rate: 100 mL/hr Dose: 100 mL/hr  Freq: Continuous Route: IV  Last Dose: Stopped (10/24/24 1420)  Start: 10/23/24 1700 End: 10/24/24 1409                     PRN Meds Sorted by Name  for Rm Bishopson as of 10/25/24 through 10/27/24  Legend:       Medications 10/25/24 10/26/24 10/27/24    sennosides-docusate (PERICOLACE) 8.6-50 MG per tablet 2 tablet  Dose: 2 tablet  Freq: 2 Times Daily Route: PO  Start: 10/27/24 0900   Admin Instructions:         0839   2100         And   polyethylene glycol (MIRALAX) packet 17 g  Dose: 17 g  Freq: 2 Times Daily Route: PO  Start: 10/27/24 0900   Admin Instructions:         0839   2100         And   bisacodyl (DULCOLAX) EC tablet 5 mg  Dose: 5 mg  Freq: Daily PRN Route: PO  PRN Reason: Constipation  PRN Comment: Use if polyethylene glycol is ineffective  Start: 10/27/24 0700   Admin Instructions:            And   bisacodyl (DULCOLAX)  suppository 10 mg  Dose: 10 mg  Freq: Daily PRN Route: RE  PRN Reason: Constipation  PRN Comment: Use if bisacodyl oral is ineffective  Start: 10/27/24 0700   Admin Instructions:             sennosides-docusate (PERICOLACE) 8.6-50 MG per tablet 2 tablet  Dose: 2 tablet  Freq: 2 Times Daily Route: PO  Start: 10/22/24 2100 End: 10/27/24 0700   Admin Instructions:       1018   2134       1148 [C]   2149       0700-D/C'd        And   polyethylene glycol (MIRALAX) packet 17 g  Dose: 17 g  Freq: Daily PRN Route: PO  PRN Reason: Constipation  PRN Comment: Use if senna-docusate is ineffective  Start: 10/22/24 2026 End: 10/27/24 0700   Admin Instructions:         0700-D/C'd        And   bisacodyl (DULCOLAX) EC tablet 5 mg  Dose: 5 mg  Freq: Daily PRN Route: PO  PRN Reason: Constipation  PRN Comment: Use if polyethylene glycol is ineffective  Start: 10/22/24 2026 End: 10/27/24 0700   Admin Instructions:         0700-D/C'd        And   bisacodyl (DULCOLAX) suppository 10 mg  Dose: 10 mg  Freq: Daily PRN Route: RE  PRN Reason: Constipation  PRN Comment: Use if bisacodyl oral is ineffective  Start: 10/22/24 2026 End: 10/27/24 0700   Admin Instructions:         0700-D/C'd        Calcium Replacement - Follow Nurse / BPA Driven Protocol  Freq: As Needed Route: XX  PRN Reason: Other  Start: 10/23/24 0725   Admin Instructions:            dextrose (D50W) (25 g/50 mL) IV injection 25 g  Dose: 25 g  Freq: Every 15 Minutes PRN Route: IV  PRN Reason: Low Blood Sugar  PRN Comment: Blood Sugar Less Than 70  Start: 10/22/24 2112   Admin Instructions:            dextrose (GLUTOSE) oral gel 15 g  Dose: 15 g  Freq: Every 15 Minutes PRN Route: PO  PRN Reason: Low Blood Sugar  PRN Comment: Blood sugar less than 70  Start: 10/22/24 2112   Admin Instructions:            glucagon (GLUCAGEN) injection 1 mg  Dose: 1 mg  Freq: Every 15 Minutes PRN Route: IM  PRN Reason: Low Blood Sugar  PRN Comment: Blood Glucose Less Than 70  Start: 10/22/24 2111    Admin Instructions:            HYDROcodone-acetaminophen (NORCO) 5-325 MG per tablet 1 tablet  Dose: 1 tablet  Freq: Every 6 Hours PRN Route: PO  PRN Reason: Severe Pain  Start: 10/23/24 2036 End: 10/27/24 0659   Admin Instructions:       1018        1610        0659-D/C'd        HYDROcodone-acetaminophen (NORCO) 5-325 MG per tablet 1 tablet  Dose: 1 tablet  Freq: Every 4 Hours PRN Route: PO  PRN Reason: Severe Pain  Start: 10/22/24 2025 End: 10/23/24 2036   Admin Instructions:            HYDROmorphone (DILAUDID) injection 0.5 mg  Dose: 0.5 mg  Freq: Every 2 Hours PRN Route: IV  PRN Reason: Severe Pain  Start: 10/26/24 1828 End: 10/31/24 1827   Admin Instructions:        1834   2149       0054   0423         HYDROmorphone (DILAUDID) injection 0.5 mg  Dose: 0.5 mg  Freq: Every 4 Hours PRN Route: IV  PRN Reason: Severe Pain  Start: 10/23/24 1025 End: 10/26/24 1114   Admin Instructions:       1136   2153       0252   0818   1114-D/C'd       hydrOXYzine (ATARAX) tablet 10 mg  Dose: 10 mg  Freq: Every 8 Hours PRN Route: PO  PRN Reason: Anxiety  Start: 10/22/24 2241   Admin Instructions:            influenza virus vacc split PF FLUZONE 0.5 mL  Dose: 0.5 mL  Freq: During Hospitalization Route: IM  PRN Reason: Immunization  Start: 10/23/24 0830   Admin Instructions:            Magnesium Standard Dose Replacement - Follow Nurse / BPA Driven Protocol  Freq: As Needed Route: XX  PRN Reason: Other  Start: 10/23/24 0725   Admin Instructions:            melatonin tablet 2.5 mg  Dose: 2.5 mg  Freq: Nightly PRN Route: PO  PRN Reason: Sleep  Start: 10/22/24 2025          morphine injection 1 mg  Dose: 1 mg  Freq: Every 4 Hours PRN Route: IV  PRN Reason: Moderate Pain  Start: 10/22/24 2113 End: 10/23/24 1025   Admin Instructions:            And   naloxone (NARCAN) injection 0.4 mg  Dose: 0.4 mg  Freq: Every 5 Minutes PRN Route: IV  PRN Reason: Respiratory Depression  Start: 10/22/24 2113   Admin Instructions:              ondansetron ODT (ZOFRAN-ODT) disintegrating tablet 4 mg  Dose: 4 mg  Freq: Every 6 Hours PRN Route: PO  PRN Reasons: Nausea,Vomiting  Start: 10/22/24 2024   Admin Instructions:                   Or   ondansetron (ZOFRAN) injection 4 mg  Dose: 4 mg  Freq: Every 6 Hours PRN Route: IV  PRN Reasons: Nausea,Vomiting  Start: 10/22/24 2024   Admin Instructions:         0851          oxyCODONE (ROXICODONE) immediate release tablet 5 mg  Dose: 5 mg  Freq: Every 4 Hours PRN Route: PO  PRN Reason: Moderate Pain  Start: 10/27/24 0659 End: 11/03/24 0658   Admin Instructions:         0839          Pharmacy to Dose Zosyn  Freq: Continuous PRN Route: XX  PRN Reason: Consult  Indications of Use: INTRA-ABDOMINAL INFECTION  Start: 10/23/24 1403 End: 10/23/24 1412         Phosphorus Replacement - Follow Nurse / BPA Driven Protocol  Freq: As Needed Route: XX  PRN Reason: Other  Start: 10/23/24 0725   Admin Instructions:            Potassium Replacement - Follow Nurse / BPA Driven Protocol  Freq: As Needed Route: XX  PRN Reason: Other  Start: 10/23/24 0725   Admin Instructions:            sodium chloride 0.9 % flush 10 mL  Dose: 10 mL  Freq: As Needed Route: IV  PRN Reason: Line Care  Start: 10/22/24 9115

## 2024-10-27 NOTE — PROGRESS NOTES
"General Surgery Progress Note    CC: Cholecystitis, right upper quadrant abdominal pain    S: Patient is once again very drowsy when I come to see him.  He is falling asleep eating Jell-O.  He does wake up and tell me he is still having pain in his right upper side.  He denies pain in his left abdomen.  He has passed gas but not had a bowel movement.  He has not thrown up today but has asked for nausea medicine per the nurse.    O:/90 (BP Location: Right arm)   Pulse 92   Temp 98.3 °F (36.8 °C) (Oral)   Resp 28   Ht 170 cm (66.93\")   Wt 81 kg (178 lb 9.2 oz)   SpO2 95%   BMI 28.03 kg/m²     Intake & Output (last day)         10/26 0701  10/27 0700 10/27 0701  10/28 0700    P.O. 120 600    I.V. (mL/kg)      IV Piggyback      Total Intake(mL/kg) 120 (1.5) 600 (7.4)    Urine (mL/kg/hr) 1475 (0.8) 400 (0.6)    Total Output 1475 400    Net -1355 +200          Urine Unmeasured Occurrence 1 x 1 x            GENERAL: Ill-appearing, answers a few questions but is somnolent  HEENT: EOMI, dry mucus membranes   CHEST: normal work of breathing on room air  CARDIAC: Regular rate, normotensive on monitor  GI: Abd soft, mildly distended, remains tender in the right upper quadrant without rebound or guarding, negative Ferro sign  EXTREMITIES: MOCK, no cyanosis    SKIN: Warm and dry    LABS  Results from last 7 days   Lab Units 10/27/24  0329 10/26/24  0545 10/25/24  0426 10/24/24  2126   WBC 10*3/mm3 10.64 9.44 7.93 9.87   HEMOGLOBIN g/dL 12.7* 11.9* 10.9* 10.6*   HEMATOCRIT % 38.7 36.7* 32.4* 31.2*   PLATELETS 10*3/mm3 352 297 278 273   MONOCYTES % % 7.1  --  12.2* 9.0   EOSINOPHIL % % 0.0*  --  0.0* 0.0*     Results from last 7 days   Lab Units 10/27/24  0329 10/26/24  0545 10/25/24  1544 10/25/24  0426   SODIUM mmol/L 137 137 129* 132*   POTASSIUM mmol/L 4.7 4.7 4.4 4.5   CHLORIDE mmol/L 104 103 99 98   CO2 mmol/L 19.0* 19.0* 16.9* 20.0*   BUN mg/dL 32* 36* 37* 38*   CREATININE mg/dL 1.12 1.22 1.38* 1.75*   CALCIUM " mg/dL 9.8 9.5 9.0 8.6   BILIRUBIN mg/dL 0.8 1.1  --  1.8*   ALK PHOS U/L 140* 99  --  72   ALT (SGPT) U/L 133* 92*  --  77*   AST (SGOT) U/L 172* 108*  --  148*   GLUCOSE mg/dL 223* 133* 211* 213*         IMAGING:  CT head 10/27/2024 report reviewed, there is right thalamus hyperattenuating area concerning for possible intraparenchymal hemorrhage versus calcification    A/P: 49 y.o. male with multiple medical problems including CHF with EF of 35%, hypertension, hyperlipidemia, type 2 diabetes mellitus with A1c 9.4, who came into the hospital complaining of abdominal pain.  He has undergone workup for possible cholecystitis with initial CT demonstrating contracted gallbladder with mild hyperenhancing wall, EGD negative for abnormalities, and HIDA scan demonstrating filling but with decreased ejection fraction concerning for possible chronic cholecystitis.  Initially with gallbladder filling on HIDA less likely for acute cholecystitis, and possibility of other etiology such as some element of congestion due to heart failure with ascites was noted. He has been on zosyn with normalization of his WBC. He had worsening pain 10/26 and underwent CTA abdomen pelvis to evaluate for mesenteric ischemia.  This demonstrated a large right subcapsular fluid collection with mass effect on the liver.  I reviewed his imaging with Dr. Vasquez, noted prior imaging where gallbladder did appear normal in August. Based on his stable vitals and Hgb and no blush on CT I am doubtful of hematoma. I discussed with Dr. Morrell with IR.  The etiology of this subcapsular liver collection remains unclear but my suspicion is that he has a possible biloma vs hematoma.  It is definitely a source of his pain.     Based on him recently having normal-appearing gallbladder and presenting with evidence of cholecystitis without decompensated heart failure, resolution of his leukocytosis on IV antibiotics, and normal EGD with abnormal HIDA, his gallbladder  does appear to be involved although the cause of his liver collection remains unclear. I discussed with Dr. Benson with IR regarding possible drainage of this subcapsular fluid collection tomorrow to define the etiology and hopefully decrease the patient's pain.  NPO after midnight for this. And given the gallbladder involvement and patient's CHF, will ask Cardiology for preoperative risk assessment for cholecystectomy.    Manuela Wisdom MD  General, Robotic and Endoscopic Surgery  [facility]    [address], [address]  [place], [place], [address]  P: [phone]  F: [phone]

## 2024-10-27 NOTE — PROGRESS NOTES
[facility] Progress Note      Patient Name: Donnie Bishop  : 1975  MRN: [id number]  Primary Care Physician:  Juju Kennedy MD  Date of admission: 10/22/2024    Subjective     Interval History:   Follow-up TENISHA    CTA 10/26:  large right perihepatic fluid collection with mass effect on liver noted, among other abnormal findings    IR plans drainage of obdulia-hepatic fluid collection tomorrow  Looks uncomfortable; mostly nonverbal, moaning  No shortness of breath on room air  Drinking some    Review of Systems:   As noted above    Objective     Vitals:   Temp:  [97.2 °F (36.2 °C)-99.3 °F (37.4 °C)] 98.3 °F (36.8 °C)  Heart Rate:  [92-97] 92  Resp:  [27-38] 28  BP: (111-132)/(79-90) 132/90    Intake/Output Summary (Last 24 hours) at 10/27/2024 1632  Last data filed at 10/27/2024 1403  Gross per 24 hour   Intake 600 ml   Output 1350 ml   Net -750 ml       Physical Exam:    General: Awake, nonverbal, looks uncomfortable, moaning  Skin: warm and dry  HEENT: oral mucosa dry, nonicteric sclera  Neck: supple, no JVD  Lungs: Coarse anteriorly; no wheezing  Heart: RR, tachycardic, no rub  Abdomen: soft, + T, + D, BS hypoactive   : no palpable bladder  Extremities: no edema, cyanosis or clubbing  Neuro: Cognitive blunting; slow speech    Scheduled Meds:     [Held by provider] atorvastatin, 40 mg, Oral, Daily  [Held by provider] carvedilol, 6.25 mg, Oral, Q12H  insulin glargine, 14 Units, Subcutaneous, Nightly  insulin lispro, 2-7 Units, Subcutaneous, 4x Daily AC & at Bedtime  insulin lispro, 4 Units, Subcutaneous, TID With Meals  midodrine, 5 mg, Oral, TID AC  mupirocin, 1 Application, Each Nare, BID  pantoprazole, 40 mg, Oral, BID AC  piperacillin-tazobactam, 3.375 g, Intravenous, Q8H  senna-docusate sodium, 2 tablet, Oral, BID   And  polyethylene glycol, 17 g, Oral, BID  venlafaxine XR, 75 mg, Oral, Daily      IV Meds:   sodium chloride, 100 mL/hr, Last Rate: 100 mL/hr (10/27/24  1367)          Results Reviewed:   I have personally reviewed the results from the time of this admission to 10/27/2024 16:32 EDT     Results from last 7 days   Lab Units 10/27/24  0329 10/26/24  0545 10/25/24  1544 10/25/24  0426   SODIUM mmol/L 137 137 129* 132*   POTASSIUM mmol/L 4.7 4.7 4.4 4.5   CHLORIDE mmol/L 104 103 99 98   CO2 mmol/L 19.0* 19.0* 16.9* 20.0*   BUN mg/dL 32* 36* 37* 38*   CREATININE mg/dL 1.12 1.22 1.38* 1.75*   CALCIUM mg/dL 9.8 9.5 9.0 8.6   BILIRUBIN mg/dL 0.8 1.1  --  1.8*   ALK PHOS U/L 140* 99  --  72   ALT (SGPT) U/L 133* 92*  --  77*   AST (SGOT) U/L 172* 108*  --  148*   GLUCOSE mg/dL 223* 133* 211* 213*       Estimated Creatinine Clearance: 82.9 mL/min (by C-G formula based on SCr of 1.12 mg/dL).    Results from last 7 days   Lab Units 10/27/24  0329 10/26/24  0545 10/25/24  1544   MAGNESIUM mg/dL 2.5 2.4  --    PHOSPHORUS mg/dL 2.9 3.4 3.8       Results from last 7 days   Lab Units 10/24/24  0617   URIC ACID mg/dL 5.4       Results from last 7 days   Lab Units 10/27/24  0329 10/26/24  0545 10/25/24  0426 10/24/24  2126 10/24/24  0617   WBC 10*3/mm3 10.64 9.44 7.93 9.87 10.27   HEMOGLOBIN g/dL 12.7* 11.9* 10.9* 10.6* 10.7*   PLATELETS 10*3/mm3 352 297 278 273 266             Assessment / Plan     ASSESSMENT:  TENISHA, nonoliguric, resolving, prerenal due to hypovolemia, hypotension, and impaired renal autoregulation due to Entresto, Aldactone and Jardiance.  Stable SCR following CTA yesterday.  Volume status is stable; normal potassium and likely NAGMA  Perihepatic fluid collection with mass effect on liver by CTA 10/26  Transaminitis  History of congestive heart failure ejection fraction 35%.  Currently compensated  Severe anemia   Encephalopathy  DM2      PLAN:  Reduce IVF rate since he is drinking well  Stop statin given transaminitis  Drainage of fluid collection by liver likely tomorrow      Thank you for involving us in the care of Donnieaysha Bishop.  Please feel free to call with any  questions.    Girma Randhawa MD  10/27/24  16:32 EDT    [facility]  [phone]    Parts of this note may be an electronic transcription/translation of spoken language to printed text using the Dragon dictation system.

## 2024-10-27 NOTE — PLAN OF CARE
Problem: Sepsis/Septic Shock  Goal: Optimal Coping  Outcome: Progressing  Intervention: Support Patient and Family Response  Recent Flowsheet Documentation  Taken 10/26/2024 2000 by Katiuska Sanchez RN  Family/Support System Care: support provided     Problem: Adult Inpatient Plan of Care  Goal: Absence of Hospital-Acquired Illness or Injury  Intervention: Identify and Manage Fall Risk  Recent Flowsheet Documentation  Taken 10/27/2024 0200 by Katiuska Sanchez RN  Safety Promotion/Fall Prevention:   activity supervised   lighting adjusted   nonskid shoes/slippers when out of bed   safety round/check completed   room organization consistent  Taken 10/27/2024 0000 by Katiuska Sanchez RN  Safety Promotion/Fall Prevention:   activity supervised   lighting adjusted   nonskid shoes/slippers when out of bed   safety round/check completed   room organization consistent  Taken 10/26/2024 2200 by Katiuska Sanchez RN  Safety Promotion/Fall Prevention:   activity supervised   lighting adjusted   nonskid shoes/slippers when out of bed   safety round/check completed   room organization consistent  Taken 10/26/2024 2000 by Katiuska Sanchez RN  Safety Promotion/Fall Prevention:   activity supervised   lighting adjusted   nonskid shoes/slippers when out of bed   safety round/check completed   room organization consistent  Intervention: Prevent Skin Injury  Recent Flowsheet Documentation  Taken 10/27/2024 0400 by Katiuska Sanchez RN  Body Position: position changed independently  Taken 10/27/2024 0200 by Katiuska Sanchez RN  Body Position: position changed independently  Skin Protection:   drying agents applied   transparent dressing maintained   incontinence pads utilized  Taken 10/27/2024 0000 by Katiuska Sanchez RN  Body Position: position changed independently  Taken 10/26/2024 2000 by Katiuska Sanchez RN  Body Position: position changed independently  Skin Protection:   drying agents applied   incontinence pads utilized    transparent dressing maintained  Intervention: Prevent Infection  Recent Flowsheet Documentation  Taken 10/27/2024 0200 by Katiuska Sanchez RN  Infection Prevention: hand hygiene promoted  Taken 10/27/2024 0000 by Katiuska Sanchez RN  Infection Prevention: hand hygiene promoted  Taken 10/26/2024 2200 by Katiuska Sanchez RN  Infection Prevention: hand hygiene promoted  Taken 10/26/2024 2000 by Katiuska Sanchez RN  Infection Prevention: hand hygiene promoted  Goal: Optimal Comfort and Wellbeing  Intervention: Monitor Pain and Promote Comfort  Recent Flowsheet Documentation  Taken 10/27/2024 0054 by Katiuska Sanchez RN  Pain Management Interventions: pain medication given  Intervention: Provide Person-Centered Care  Recent Flowsheet Documentation  Taken 10/27/2024 0200 by Katiuska Sanchez RN  Trust Relationship/Rapport:   care explained   choices provided   thoughts/feelings acknowledged  Taken 10/26/2024 2000 by Katiuska Sanchez RN  Trust Relationship/Rapport:   care explained   choices provided   thoughts/feelings acknowledged     Problem: Sepsis/Septic Shock  Goal: Absence of Infection Signs and Symptoms  Intervention: Initiate Sepsis Management  Recent Flowsheet Documentation  Taken 10/27/2024 0200 by Katiuska Sanchez RN  Infection Prevention: hand hygiene promoted  Taken 10/27/2024 0000 by Katiuska Sanchez RN  Infection Prevention: hand hygiene promoted  Taken 10/26/2024 2200 by Katiuska Sanchez RN  Infection Prevention: hand hygiene promoted  Taken 10/26/2024 2000 by Katiuska Sanchez RN  Infection Prevention: hand hygiene promoted  Intervention: Promote Recovery  Recent Flowsheet Documentation  Taken 10/27/2024 0200 by Katiuska Sanchez RN  Activity Management: activity minimized  Taken 10/26/2024 2000 by Katiuska Sanchez RN  Activity Management: activity minimized     Problem: Pain Acute  Goal: Optimal Pain Control and Function  Intervention: Optimize Psychosocial Wellbeing  Recent Flowsheet Documentation  Taken  10/27/2024 0200 by Katiuska Sanchez RN  Diversional Activities: television  Taken 10/26/2024 2000 by Katiuska Sanchez RN  Diversional Activities: television  Intervention: Develop Pain Management Plan  Recent Flowsheet Documentation  Taken 10/27/2024 0054 by Katiuska Sanchez RN  Pain Management Interventions: pain medication given  Intervention: Prevent or Manage Pain  Recent Flowsheet Documentation  Taken 10/27/2024 0200 by Katiuska Sanchez RN  Medication Review/Management: medications reviewed  Taken 10/27/2024 0000 by Katiuska Sanchez RN  Medication Review/Management: medications reviewed  Taken 10/26/2024 2200 by Katiuska Sanchez RN  Medication Review/Management: medications reviewed  Taken 10/26/2024 2000 by Katiuska Sanchez RN  Medication Review/Management: medications reviewed   Goal Outcome Evaluation:

## 2024-10-27 NOTE — H&P (VIEW-ONLY)
"General Surgery Progress Note    CC: Cholecystitis, right upper quadrant abdominal pain    S: Patient is once again very drowsy when I come to see him.  He is falling asleep eating Jell-O.  He does wake up and tell me he is still having pain in his right upper side.  He denies pain in his left abdomen.  He has passed gas but not had a bowel movement.  He has not thrown up today but has asked for nausea medicine per the nurse.    O:/90 (BP Location: Right arm)   Pulse 92   Temp 98.3 °F (36.8 °C) (Oral)   Resp 28   Ht 170 cm (66.93\")   Wt 81 kg (178 lb 9.2 oz)   SpO2 95%   BMI 28.03 kg/m²     Intake & Output (last day)         10/26 0701  10/27 0700 10/27 0701  10/28 0700    P.O. 120 600    I.V. (mL/kg)      IV Piggyback      Total Intake(mL/kg) 120 (1.5) 600 (7.4)    Urine (mL/kg/hr) 1475 (0.8) 400 (0.6)    Total Output 1475 400    Net -1355 +200          Urine Unmeasured Occurrence 1 x 1 x            GENERAL: Ill-appearing, answers a few questions but is somnolent  HEENT: EOMI, dry mucus membranes   CHEST: normal work of breathing on room air  CARDIAC: Regular rate, normotensive on monitor  GI: Abd soft, mildly distended, remains tender in the right upper quadrant without rebound or guarding, negative Ferro sign  EXTREMITIES: MOCK, no cyanosis    SKIN: Warm and dry    LABS  Results from last 7 days   Lab Units 10/27/24  0329 10/26/24  0545 10/25/24  0426 10/24/24  2126   WBC 10*3/mm3 10.64 9.44 7.93 9.87   HEMOGLOBIN g/dL 12.7* 11.9* 10.9* 10.6*   HEMATOCRIT % 38.7 36.7* 32.4* 31.2*   PLATELETS 10*3/mm3 352 297 278 273   MONOCYTES % % 7.1  --  12.2* 9.0   EOSINOPHIL % % 0.0*  --  0.0* 0.0*     Results from last 7 days   Lab Units 10/27/24  0329 10/26/24  0545 10/25/24  1544 10/25/24  0426   SODIUM mmol/L 137 137 129* 132*   POTASSIUM mmol/L 4.7 4.7 4.4 4.5   CHLORIDE mmol/L 104 103 99 98   CO2 mmol/L 19.0* 19.0* 16.9* 20.0*   BUN mg/dL 32* 36* 37* 38*   CREATININE mg/dL 1.12 1.22 1.38* 1.75*   CALCIUM " mg/dL 9.8 9.5 9.0 8.6   BILIRUBIN mg/dL 0.8 1.1  --  1.8*   ALK PHOS U/L 140* 99  --  72   ALT (SGPT) U/L 133* 92*  --  77*   AST (SGOT) U/L 172* 108*  --  148*   GLUCOSE mg/dL 223* 133* 211* 213*         IMAGING:  CT head 10/27/2024 report reviewed, there is right thalamus hyperattenuating area concerning for possible intraparenchymal hemorrhage versus calcification    A/P: 49 y.o. male with multiple medical problems including CHF with EF of 35%, hypertension, hyperlipidemia, type 2 diabetes mellitus with A1c 9.4, who came into the hospital complaining of abdominal pain.  He has undergone workup for possible cholecystitis with initial CT demonstrating contracted gallbladder with mild hyperenhancing wall, EGD negative for abnormalities, and HIDA scan demonstrating filling but with decreased ejection fraction concerning for possible chronic cholecystitis.  Initially with gallbladder filling on HIDA less likely for acute cholecystitis, and possibility of other etiology such as some element of congestion due to heart failure with ascites was noted. He has been on zosyn with normalization of his WBC. He had worsening pain 10/26 and underwent CTA abdomen pelvis to evaluate for mesenteric ischemia.  This demonstrated a large right subcapsular fluid collection with mass effect on the liver.  I reviewed his imaging with Dr. Vasquez, noted prior imaging where gallbladder did appear normal in August. Based on his stable vitals and Hgb and no blush on CT I am doubtful of hematoma. I discussed with Dr. Morrell with IR.  The etiology of this subcapsular liver collection remains unclear but my suspicion is that he has a possible biloma vs hematoma.  It is definitely a source of his pain.     Based on him recently having normal-appearing gallbladder and presenting with evidence of cholecystitis without decompensated heart failure, resolution of his leukocytosis on IV antibiotics, and normal EGD with abnormal HIDA, his gallbladder  does appear to be involved although the cause of his liver collection remains unclear. I discussed with Dr. Benson with IR regarding possible drainage of this subcapsular fluid collection tomorrow to define the etiology and hopefully decrease the patient's pain.  NPO after midnight for this. And given the gallbladder involvement and patient's CHF, will ask Cardiology for preoperative risk assessment for cholecystectomy.    Manuela Wisdom MD  General, Robotic and Endoscopic Surgery  [facility]    [address], [address]  [place], [place], [address]  P: [phone]  F: [phone]

## 2024-10-27 NOTE — PROGRESS NOTES
"[facility]     Progress Note    Patient Name: Donnie Bishop  : 1975  MRN: [id number]  Primary Care Physician:  Juju Kennedy MD  Date of admission: 10/22/2024    Subjective   Subjective     Chief Complaint: right sided abdominal pain     History of Present Illness  Patient Reports feeling sore in the abdomen    Review of Systems   Gastrointestinal:  Positive for abdominal distention and abdominal pain.   Neurological:  Positive for weakness.       Objective   Objective     Vitals:   Temp:  [97.2 °F (36.2 °C)-99.3 °F (37.4 °C)] 99.3 °F (37.4 °C)  Heart Rate:  [89-97] 92  Resp:  [16-38] 28  BP: ()/(67-84) 111/81    Physical Exam  Constitutional:       Appearance: He is ill-appearing.   HENT:      Right Ear: External ear normal.      Left Ear: External ear normal.      Mouth/Throat:      Pharynx: Oropharynx is clear.   Eyes:      Conjunctiva/sclera: Conjunctivae normal.   Cardiovascular:      Rate and Rhythm: Normal rate.      Pulses: Normal pulses.   Abdominal:      General: There is distension.      Tenderness: There is abdominal tenderness. There is no guarding.   Skin:     General: Skin is warm.   Neurological:      General: No focal deficit present.   Psychiatric:         Mood and Affect: Mood normal.          Result Review    Result Review:  I have personally reviewed the results from the time of this admission to 10/27/2024 12:22 EDT and agree with these findings:  []  Laboratory list / accordion  []  Microbiology  []  Radiology  []  EKG/Telemetry   []  Cardiology/Vascular   []  Pathology  []  Old records  []  Other:  Most notable findings include:       Assessment & Plan   Assessment / Plan     Brief Patient Summary:  Donnie Bishop is a 49 y.o. male who   Abdominal pain  Large subcapsular fluid collection in the right lobe of liver   Suspect chronic cholycystitis/gallbladder dysfunction  \"Gastritis \" on imaging but egd was normal  History of heart failure    Active Hospital Problems:  Active " Hospital Problems    Diagnosis     **Epigastric abdominal pain     Leukocytosis     Chronic combined systolic and diastolic heart failure     Abdominal pain     Gastritis     Anxiety     Multiple-type hyperlipidemia     Type 2 diabetes mellitus with diabetic neuropathy, with long-term current use of insulin     Benign essential HTN      Plan: Reviewed with Dr Wisdom, plans for drainage of fluid in  the liver  May need eventual cholycystectomy  Will sign off for now, please contact [facility] for any questions or concerns       VTE Prophylaxis:  Pharmacologic & mechanical VTE prophylaxis orders are present.        CODE STATUS:    Level Of Support Discussed With: Patient  Code Status (Patient has no pulse and is not breathing): CPR (Attempt to Resuscitate)  Medical Interventions (Patient has pulse or is breathing): Full Support    Disposition:  I expect patient to be discharged not known     Law Melo MD

## 2024-10-27 NOTE — PROGRESS NOTES
Name: Donnie Bishop ADMIT: 10/22/2024   : 1975  PCP: Juju Kennedy MD    MRN: [id number] LOS: 3 days   AGE/SEX: 49 y.o. male  ROOM: [facility]     Subjective   Subjective   Laying in bed, overnight RN at bedside.  No acute events overnight.  Continues to complain of abdominal pain.  Denies drowsy, intermittently confused.    Review of Systems   As above  Objective   Objective   Vital Signs  Temp:  [97.2 °F (36.2 °C)-99.3 °F (37.4 °C)] 99.3 °F (37.4 °C)  Heart Rate:  [85-97] 92  Resp:  [16-38] 28  BP: ()/(67-84) 111/81  SpO2:  [95 %-100 %] 95 %  on   ;   Device (Oxygen Therapy): room air  Body mass index is 28.03 kg/m².  Physical Exam    General: Lying in bed, drowsy but arousable, ill-appearing,  HEENT: Normocephalic, atraumatic  CV: Regular rate and rhythm, no murmurs rubs or gallops  Lungs: CTA anteriorly, no wheezing, tachypnea  Abdomen: Soft, distended, tender to palpation epigastric and right upper quadrant region  Extremities: Trace edema in lower extremities,, no cyanosis     Results Review     I reviewed the patient's new clinical results.  Results from last 7 days   Lab Units 10/27/24  0329 10/26/24  0545 10/25/24  0426 10/24/24  2126   WBC 10*3/mm3 10.64 9.44 7.93 9.87   HEMOGLOBIN g/dL 12.7* 11.9* 10.9* 10.6*   PLATELETS 10*3/mm3 352 297 278 273     Results from last 7 days   Lab Units 10/27/24  0329 10/26/24  0545 10/25/24  1544 10/25/24  0426   SODIUM mmol/L 137 137 129* 132*   POTASSIUM mmol/L 4.7 4.7 4.4 4.5   CHLORIDE mmol/L 104 103 99 98   CO2 mmol/L 19.0* 19.0* 16.9* 20.0*   BUN mg/dL 32* 36* 37* 38*   CREATININE mg/dL 1.12 1.22 1.38* 1.75*   GLUCOSE mg/dL 223* 133* 211* 213*   Estimated Creatinine Clearance: 82.9 mL/min (by C-G formula based on SCr of 1.12 mg/dL).  Results from last 7 days   Lab Units 10/27/24  0329 10/26/24  0545 10/25/24  1544 10/25/24  0426 10/22/24  1557   ALBUMIN g/dL 2.7* 3.0* 2.8* 3.2* 4.6   BILIRUBIN mg/dL 0.8 1.1  --  1.8* 2.6*   ALK PHOS U/L 140* 99  --   72 60   AST (SGOT) U/L 172* 108*  --  148* 10   ALT (SGPT) U/L 133* 92*  --  77* 11     Results from last 7 days   Lab Units 10/27/24  0329 10/26/24  0545 10/25/24  1544 10/25/24  0426   CALCIUM mg/dL 9.8 9.5 9.0 8.6   ALBUMIN g/dL 2.7* 3.0* 2.8* 3.2*   MAGNESIUM mg/dL 2.5 2.4  --   --    PHOSPHORUS mg/dL 2.9 3.4 3.8  --      Results from last 7 days   Lab Units 10/26/24  0545 10/25/24  0426 10/24/24  1404 10/24/24  0617 10/24/24  0012 10/23/24  2104   PROCALCITONIN ng/mL 30.30* 78.10*  --   --   --   --    LACTATE mmol/L  --   --  3.8* 3.3* 3.6* 4.5*     COVID19   Date Value Ref Range Status   10/25/2024 Not Detected Not Detected - Ref. Range Final   12/26/2023 Not Detected Not Detected - Ref. Range Final     Glucose   Date/Time Value Ref Range Status   10/27/2024 1048 275 (H) 70 - 130 mg/dL Final   10/27/2024 0705 234 (H) 70 - 130 mg/dL Final   10/26/2024 2109 234 (H) 70 - 130 mg/dL Final   10/26/2024 1558 222 (H) 70 - 130 mg/dL Final   10/26/2024 1138 156 (H) 70 - 130 mg/dL Final   10/26/2024 0745 130 70 - 130 mg/dL Final   10/25/2024 2040 155 (H) 70 - 130 mg/dL Final           CT Angiogram Abdomen Pelvis  Addendum: Call Report: Surgery called to discuss the case. Collection along the   right hepatic lobe appears subcapsular, with mass effect on the liver       This report was finalized on 10/26/2024 5:11 PM by Dr. Connor Vasquez M.D on Workstation: [id number]         Call Report: Dr. Melo was notified by telephone of the above findings on   October 26, 2024 at 3:08 p.m.       This report was finalized on 10/26/2024 3:08 PM by Dr. Connor Vasquez M.D on Workstation: [id number]  Narrative: CT ANGIOGRAM ABDOMEN PELVIS-     INDICATION: Severe pain.     COMPARISON: CT abdomen pelvis October 22, 2024     TECHNIQUE:  Routine CTA abdomen and pelvis with IV contrast. Arterial and delayed  phase. Coronal and sagittal reformats. Three dimensional  reconstructions. Radiation dose reduction techniques were  utilized,  including automated exposure control and exposure modulation based on  body size.     FINDINGS:      Lung bases: Small right greater than left pleural effusions. Bibasilar  lung opacities with large amount of opacity in the right lower lobe,  with volume loss and lung enhancement. Large amount of opacity in the  right middle lobe, with volume loss and lung enhancement. Suspected  subsegmental atelectasis at the left lung base. Mild cardiomegaly.     ABDOMEN: Large fluid collection seen lateral to the right hepatic lobe,  with mass effect on the liver, series 6, axial mage 54, measures 18.2 x  6.1 x 23.4 cm, series 7, coronal image 68, with the fluid collection  extending over the hepatic dome and surrounding the inferior right  hepatic lobe, extends medially to the level of the mid duodenum, series  6, axial mage 105 where there is a fluid collection measures 2.8 x 3.0  cm. Small fluid collection seen adjacent to the gallbladder neck, series  6, axial mage 87, measuring 2.2 x 2.0 cm.     Gallbladder appears partially contracted. Gallbladder wall is mildly  thickened. No biliary ductal dilatation. Spleen is normal in size. No  pancreatic mass or pancreatic ductal dilatation seen. Nodular thickening  of the adrenal glands. Symmetric perinephric edema. No solid-appearing  renal mass or hydronephrosis. Excreted contrast in the urinary  collecting system.     Pelvis: Prostate is normal in size. Mild bladder distention. No bladder  calculus. Small amount of free fluid seen in the rectovesicular pouch,  extending over the bladder dome.     Bowel: Mild gastric antral wall thickening with stranding/inflammation  in the fat in the right upper quadrant adjacent to the distal stomach  and proximal duodenum. Pimento-shaped hyperattenuating area seen in the  gastric antrum on the arterial phase, series 6, axial mage 95, not seen  on the delayed phase though there is a similar appearing area in the D3  segment of the  duodenum on the delayed phase, series 10, axial image 75,  suspect bowel content though a noncontrast CT is not available. Moderate  gas and stool seen in the rectum. Moderate gas and stool seen in the  colon. Normal appendix. Small amount of free fluid in the right  paracolic gutter.     Abdominal wall: Body wall edema/anasarca seen in the flanks.  Periumbilical abdominal wall scarring. Small fat-containing umbilical  hernia.     Retroperitoneum: No lymphadenopathy.     Vasculature: Patent. No abdominal aortic aneurysm. No aortic dissection.     Osseous structures: Suspect bone island in the left ilium.     Impression:    1. SMA and SMV are patent. No vessel occlusions identified.  2. Large right perihepatic fluid collection with mass effect on the  liver, new. Consider drain placement and sampling to determine the  etiology of the fluid collection.  3. Smaller fluid collections seen adjacent to the gallbladder and  lateral to the mid duodenum.  4. Mild distal gastric antral wall thickening with surrounding  inflammatory changes in the right upper quadrant, extend of the  gallbladder which appears partially contracted with a mildly thickened  wall. Recommend correlation with endoscopy findings and recent HIDA scan     5. Nodular hyperplasia of the adrenal glands.  6. Third spacing with small right greater than left pleural effusions,  body wall edema and small amount of free intraperitoneal fluid that is  increased.  7. Opacities at the lung bases with normal enhancement with suspect  large amount of atelectasis in the right middle lobe and subtotal  collapse of the right lower lobe.  8. Mild cardiomegaly     This report was finalized on 10/26/2024 2:53 PM by Dr. Connor Vasquez M.D on Workstation: [id number]     XR Chest 1 View  Narrative: Portable chest radiograph     HISTORY: Tachypnea     TECHNIQUE: Single AP portable radiograph of the chest     COMPARISON: Chest radiograph dating back to 12/17/2023      Impression: FINDINGS AND IMPRESSION:  Lungs are hypoinflated. There is roughly symmetric mild to moderate  pulmonary opacification throughout the bilateral lungs, left greater  than right. No pneumothorax is seen. Suggestion of a small left pleural  effusion. Findings raise concern for atelectasis, pneumonia and/or  pulmonary edema in the proper clinical context and correlation patient  history is recommended with at least continued attention on follow-up to  ensure resolution.     Cardiac silhouette is accentuated by low lung volumes and cannot be  assessed. Mild to moderate asymmetric elevation of the right  hemidiaphragm.     This report was finalized on 10/26/2024 3:18 PM by Dr. Michael Avelar M.D on Workstation: [id number]     Adult Transthoracic Echo Complete W/ Cont if Necessary Per Protocol    Left ventricular systolic function is low normal. Calculated left   ventricular EF = 49.7%    The following left ventricular wall segments are hypokinetic: apical   septal and apex hypokinetic.    Left ventricular diastolic function was normal.    Estimated right ventricular systolic pressure from tricuspid   regurgitation is mildly elevated (35-45 mmHg). Calculated right   ventricular systolic pressure from tricuspid regurgitation is 40 mmHg.    Mild mitral valve regurgitation is present.    Scheduled Medications  [Held by provider] atorvastatin, 40 mg, Oral, Daily  [Held by provider] carvedilol, 6.25 mg, Oral, Q12H  heparin (porcine), 5,000 Units, Subcutaneous, Q12H  insulin glargine, 14 Units, Subcutaneous, Nightly  insulin lispro, 2-7 Units, Subcutaneous, 4x Daily AC & at Bedtime  insulin lispro, 4 Units, Subcutaneous, TID With Meals  midodrine, 5 mg, Oral, TID AC  mupirocin, 1 Application, Each Nare, BID  pantoprazole, 40 mg, Oral, BID AC  piperacillin-tazobactam, 3.375 g, Intravenous, Q8H  senna-docusate sodium, 2 tablet, Oral, BID   And  polyethylene glycol, 17 g, Oral, BID  venlafaxine XR, 75 mg, Oral,  Daily    Infusions  sodium chloride, 100 mL/hr, Last Rate: 100 mL/hr (10/27/24 0422)    Diet  Diet: Liquid; Clear Liquid; Fluid Consistency: Thin (IDDSI 0)    I have personally reviewed     [x]  Laboratory   [x]  Microbiology   [x]  Radiology   [x]  EKG/Telemetry  []  Cardiology/Vascular   []  Pathology    []  Records       Assessment/Plan     Active Hospital Problems    Diagnosis  POA    **Epigastric abdominal pain [R10.13]  Unknown    Leukocytosis [D72.829]  Yes    Chronic combined systolic and diastolic heart failure [I50.42]  Yes    Abdominal pain [R10.9]  Yes    Gastritis [K29.70]  Yes    Anxiety [F41.9]  Yes    Multiple-type hyperlipidemia [E78.2]  Yes    Type 2 diabetes mellitus with diabetic neuropathy, with long-term current use of insulin [E11.40, Z79.4]  Not Applicable    Benign essential HTN [I10]  Yes      Resolved Hospital Problems   No resolved problems to display.       Patient is a 49 y.o. man with type 2 diabetes, HTN, n, hyperlipidemia, chronic combined systolic and diastolic heart failure (non-ischemic), anxiety who presented with severe abdominal/epigastric pain.    Epigastric abdominal pain/right upper quadrant abdominal pain  Transaminitis  Chronic cholecystitis  large right subcapsular fluid collection with mass effect on the liver.   -EGD 10/24/2024 with no acute findings  -On PPI  -HIDA scan concerning for chronic cholecystitis versus bili dyskinesia  -Elevated liver enzymes could be ischemic liver injury as well in the setting of hypotension which now has improved.  -Monitor daily CMP, AST/bilirubin improved.  ALT slightly up today.  -Hold statin in the setting of transaminitis  -Duplex ultrasound of mesenteric arteries was ordered however was unable to be completed due to bowel gas and patient's inability to hold his breath per report  -CT angiogram of the abdomen was obtained 10/26/2024 which showed large right perihepatic fluid collection with mass effect on the liver, new.  Patent  blood vessels.  -General surgery following, unclear etiology of findings,       Hypotension  -On midodrine, status post IV fluids.  BP improved.  -ECHO  10/26/2024 showed EF of 49.7%  -  Blood pressure meds stable.  Decrease midodrine to 5 mg 3 times daily.  -     TENISHA/hyponatremia  -Creatinine peaked at 1.75 on 10/25/2024, creatinine was 1.07 on admission  -Nephrology managing, status post IV fluids, creatinine improved.     Infection/sepsis?  -WBC was 13.79 on admission, patient has been afebrile since admission  -Procalcitonin significantly elevated at 78.10 10/25/2024-repeat improved  HIDA scan showed possible chronic cholecystitis, general surgery evaluated, no indication for surgery currently no signs of acute cholecystitis and findings could be related secondary to congestive hepatopathy  -Blood cultures remain negative to date  -Large collection seen on CT with infectious cause/abscess on differential  -Continue empiric IV Zosyn    Anemia   Iron panel consistent with anemia chronic disease with ferritin of 943, B12 and folate normal  Monitor daily CBC, transfuse as indicated for symptomatic anemia or hemoglobin 7    Encephalopathy  -Suspect secondary to acute illness/pain meds/infection/TENISHA  -No gross neurological deficits  -Patient remains confused.  Ordered CT head to rule out acute findings.    Type II DM   -Blood glucose not at goal.  Increase Lantus to 14 units nightly, increase scheduled lispro to 4 units 3 times daily, continue SSI.      DVT prophylaxis.  subcu heparin  Full code.  Discussed with patient and nursing staff.  Expected Discharge Date: 10/28/2024; Expected Discharge Time:        Copied text in this note has been reviewed and is accurate as of 10/27/24.         Dictated utilizing Dragon dictation        Yaritza Hurtado MD  [facility]  10/27/24  11:13 EDT

## 2024-10-27 NOTE — PROGRESS NOTES
[facility]  Progress note: 10/27/2024    Patient Identification:  Name:Donnie Bishop  Age:49 y.o.  Sex: male  :  1975  MRN: [id number]           CC:  Cardiomyopathy    Interval history:  Vital stable overnight.  Arousable but not appropriate.  Not verbalizing well vital stable    Vital Signs:   Temp:  [97.2 °F (36.2 °C)-99.3 °F (37.4 °C)] 99.3 °F (37.4 °C)  Heart Rate:  [89-97] 92  Resp:  [16-38] 28  BP: ()/(67-84) 111/81    Intake/Output Summary (Last 24 hours) at 10/27/2024 1234  Last data filed at 10/27/2024 1009  Gross per 24 hour   Intake 360 ml   Output 1875 ml   Net -1515 ml       Physical Examination:    General Appearance No acute distress   Neck No adenopathy, supple, trachea midline, no thyromegaly, no carotid bruit, no JVD   Lungs Clear to auscultation,respirations regular, even and unlabored   Heart Regular rhythm and normal rate, normal S1 and S2, no murmur, no gallop, no rub, no click   Chest wall No abnormalities observed   Abdomen Normal bowel sounds, no masses, no hepatomegaly, soft   Extremities No edema, no cyanosis, no redness   Neurological Alert and oriented x0     Lab Review:  Personally reviewed the labs, radiology imaging and other cardiac procedures.   Results from last 7 days   Lab Units 10/27/24  0329   SODIUM mmol/L 137   POTASSIUM mmol/L 4.7   CHLORIDE mmol/L 104   CO2 mmol/L 19.0*   BUN mg/dL 32*   CREATININE mg/dL 1.12   CALCIUM mg/dL 9.8   BILIRUBIN mg/dL 0.8   ALK PHOS U/L 140*   ALT (SGPT) U/L 133*   AST (SGOT) U/L 172*   GLUCOSE mg/dL 223*         Results from last 7 days   Lab Units 10/27/24  0329 10/26/24  0545 10/25/24  0426   WBC 10*3/mm3 10.64 9.44 7.93   HEMOGLOBIN g/dL 12.7* 11.9* 10.9*   HEMATOCRIT % 38.7 36.7* 32.4*   PLATELETS 10*3/mm3 352 297 278         Medication Review:   Meds reviewed  Scheduled Meds:[Held by provider] atorvastatin, 40 mg, Oral, Daily  [Held by provider] carvedilol, 6.25 mg, Oral, Q12H  heparin (porcine), 5,000 Units,  Subcutaneous, Q12H  insulin glargine, 14 Units, Subcutaneous, Nightly  insulin lispro, 2-7 Units, Subcutaneous, 4x Daily AC & at Bedtime  insulin lispro, 4 Units, Subcutaneous, TID With Meals  midodrine, 5 mg, Oral, TID AC  mupirocin, 1 Application, Each Nare, BID  pantoprazole, 40 mg, Oral, BID AC  piperacillin-tazobactam, 3.375 g, Intravenous, Q8H  senna-docusate sodium, 2 tablet, Oral, BID   And  polyethylene glycol, 17 g, Oral, BID  venlafaxine XR, 75 mg, Oral, Daily      Continuous Infusions:sodium chloride, 100 mL/hr, Last Rate: 100 mL/hr (10/27/24 0422)    nd interpreted the patient's EKG/Telemetry data    Assessment and Plan  1.  Abdominal pain with large subscapular fluid collection with mass effect on the liver.  EGD unremarkable statin is on hold.  Surgical team following though abdomen feels soft at the moment  2.  History of nonischemic cardiomyopathy chronic combined systolic diastolic heart failure.  Echo yesterday EF 50% no significant valvular heart disease. Currently Entresto carvedilol spironolactone on hold.  Hopefully can resume this in the next several days if blood pressure continues to improve.  Reassess in a.m. and favor initiation of carvedilol first  3.  Acute renal injury  4.  Chronic cholecystitis transaminitis  5.  Encephalopathy    6.  History of hypertension and now with hypotension.  Blood pressure improved on midodrine.  Dose decreased today.  7.  Diabetes      Mini Walsh  10/27/939469:34 EDT  35min spent in reviewing records, discussion and examination of the patient and discussion with other members of the patient's medical team.     Dictated utilizing Dragon dictation

## 2024-10-27 NOTE — PLAN OF CARE
Goal Outcome Evaluation:  Plan of Care Reviewed With: patient        Progress: no change  Outcome Evaluation: VSS on RA. Aox3, disoriented to time. Remains slow to respond/answer questions. RUQ persists, PRN oxycodone x2. CT head with possible IPH, STAT neuro consult. See new orders placed. Made MD aware of pt's c/o headache, migraine cocktail ordered. MRI W & WO contrast ordered, screening form faxed to MRI. Plan CT guided drain of liver fluid tomorrow, consent signed by wife and in chart. NPO at midnight. IVF. IV ABX. SCDs on. Bed alarm on.

## 2024-10-28 ENCOUNTER — TELEPHONE (OUTPATIENT)
Dept: FAMILY MEDICINE CLINIC | Facility: CLINIC | Age: 49
End: 2024-10-28
Payer: COMMERCIAL

## 2024-10-28 ENCOUNTER — APPOINTMENT (OUTPATIENT)
Dept: NUCLEAR MEDICINE | Facility: HOSPITAL | Age: 49
DRG: 853 | End: 2024-10-28
Payer: COMMERCIAL

## 2024-10-28 ENCOUNTER — APPOINTMENT (OUTPATIENT)
Dept: CT IMAGING | Facility: HOSPITAL | Age: 49
DRG: 853 | End: 2024-10-28
Payer: COMMERCIAL

## 2024-10-28 ENCOUNTER — APPOINTMENT (OUTPATIENT)
Dept: MRI IMAGING | Facility: HOSPITAL | Age: 49
DRG: 853 | End: 2024-10-28
Payer: COMMERCIAL

## 2024-10-28 ENCOUNTER — APPOINTMENT (OUTPATIENT)
Dept: CARDIOLOGY | Facility: HOSPITAL | Age: 49
DRG: 853 | End: 2024-10-28
Payer: COMMERCIAL

## 2024-10-28 LAB
ALBUMIN SERPL-MCNC: 2 G/DL (ref 3.5–5.2)
ALBUMIN SERPL-MCNC: 3.6 G/DL (ref 3.5–5.2)
ALBUMIN/GLOB SERPL: 0.5 G/DL
ALBUMIN/GLOB SERPL: 1.2 G/DL
ALP SERPL-CCNC: 222 U/L (ref 39–117)
ALP SERPL-CCNC: 229 U/L (ref 39–117)
ALT SERPL W P-5'-P-CCNC: 159 U/L (ref 1–41)
ALT SERPL W P-5'-P-CCNC: 164 U/L (ref 1–41)
ANION GAP SERPL CALCULATED.3IONS-SCNC: 16.4 MMOL/L (ref 5–15)
ANION GAP SERPL CALCULATED.3IONS-SCNC: 17 MMOL/L (ref 5–15)
APTT PPP: 28.8 SECONDS (ref 22.7–35.4)
AST SERPL-CCNC: 199 U/L (ref 1–40)
AST SERPL-CCNC: 229 U/L (ref 1–40)
BACTERIA SPEC AEROBE CULT: NORMAL
BACTERIA SPEC AEROBE CULT: NORMAL
BH CV LOWER VASCULAR LEFT COMMON FEMORAL AUGMENT: NORMAL
BH CV LOWER VASCULAR LEFT COMMON FEMORAL COMPETENT: NORMAL
BH CV LOWER VASCULAR LEFT COMMON FEMORAL COMPRESS: NORMAL
BH CV LOWER VASCULAR LEFT COMMON FEMORAL PHASIC: NORMAL
BH CV LOWER VASCULAR LEFT COMMON FEMORAL SPONT: NORMAL
BH CV LOWER VASCULAR LEFT DISTAL FEMORAL COMPRESS: NORMAL
BH CV LOWER VASCULAR LEFT GASTRONEMIUS COMPRESS: NORMAL
BH CV LOWER VASCULAR LEFT GREATER SAPH AK COMPRESS: NORMAL
BH CV LOWER VASCULAR LEFT GREATER SAPH BK COMPRESS: NORMAL
BH CV LOWER VASCULAR LEFT LESSER SAPH COMPRESS: NORMAL
BH CV LOWER VASCULAR LEFT MID FEMORAL AUGMENT: NORMAL
BH CV LOWER VASCULAR LEFT MID FEMORAL COMPETENT: NORMAL
BH CV LOWER VASCULAR LEFT MID FEMORAL COMPRESS: NORMAL
BH CV LOWER VASCULAR LEFT MID FEMORAL PHASIC: NORMAL
BH CV LOWER VASCULAR LEFT MID FEMORAL SPONT: NORMAL
BH CV LOWER VASCULAR LEFT PERONEAL COMPRESS: NORMAL
BH CV LOWER VASCULAR LEFT POPLITEAL AUGMENT: NORMAL
BH CV LOWER VASCULAR LEFT POPLITEAL COMPETENT: NORMAL
BH CV LOWER VASCULAR LEFT POPLITEAL COMPRESS: NORMAL
BH CV LOWER VASCULAR LEFT POPLITEAL PHASIC: NORMAL
BH CV LOWER VASCULAR LEFT POPLITEAL SPONT: NORMAL
BH CV LOWER VASCULAR LEFT POSTERIOR TIBIAL COMPRESS: NORMAL
BH CV LOWER VASCULAR LEFT PROFUNDA FEMORAL COMPRESS: NORMAL
BH CV LOWER VASCULAR LEFT PROXIMAL FEMORAL COMPRESS: NORMAL
BH CV LOWER VASCULAR LEFT SAPHENOFEMORAL JUNCTION COMPRESS: NORMAL
BH CV LOWER VASCULAR RIGHT COMMON FEMORAL AUGMENT: NORMAL
BH CV LOWER VASCULAR RIGHT COMMON FEMORAL COMPETENT: NORMAL
BH CV LOWER VASCULAR RIGHT COMMON FEMORAL COMPRESS: NORMAL
BH CV LOWER VASCULAR RIGHT COMMON FEMORAL PHASIC: NORMAL
BH CV LOWER VASCULAR RIGHT COMMON FEMORAL SPONT: NORMAL
BH CV LOWER VASCULAR RIGHT DISTAL FEMORAL COMPRESS: NORMAL
BH CV LOWER VASCULAR RIGHT GASTRONEMIUS COMPRESS: NORMAL
BH CV LOWER VASCULAR RIGHT GREATER SAPH AK COMPRESS: NORMAL
BH CV LOWER VASCULAR RIGHT GREATER SAPH BK COMPRESS: NORMAL
BH CV LOWER VASCULAR RIGHT LESSER SAPH COMPRESS: NORMAL
BH CV LOWER VASCULAR RIGHT MID FEMORAL AUGMENT: NORMAL
BH CV LOWER VASCULAR RIGHT MID FEMORAL COMPETENT: NORMAL
BH CV LOWER VASCULAR RIGHT MID FEMORAL COMPRESS: NORMAL
BH CV LOWER VASCULAR RIGHT MID FEMORAL PHASIC: NORMAL
BH CV LOWER VASCULAR RIGHT MID FEMORAL SPONT: NORMAL
BH CV LOWER VASCULAR RIGHT PERONEAL COMPRESS: NORMAL
BH CV LOWER VASCULAR RIGHT POPLITEAL AUGMENT: NORMAL
BH CV LOWER VASCULAR RIGHT POPLITEAL COMPETENT: NORMAL
BH CV LOWER VASCULAR RIGHT POPLITEAL COMPRESS: NORMAL
BH CV LOWER VASCULAR RIGHT POPLITEAL PHASIC: NORMAL
BH CV LOWER VASCULAR RIGHT POPLITEAL SPONT: NORMAL
BH CV LOWER VASCULAR RIGHT POSTERIOR TIBIAL COMPRESS: NORMAL
BH CV LOWER VASCULAR RIGHT PROFUNDA FEMORAL COMPRESS: NORMAL
BH CV LOWER VASCULAR RIGHT PROXIMAL FEMORAL COMPRESS: NORMAL
BH CV LOWER VASCULAR RIGHT SAPHENOFEMORAL JUNCTION COMPRESS: NORMAL
BH CV VAS SMA AORTA EDV: 14.1 CM/S
BH CV VAS SMA AORTA PSV: 160 CM/S
BH CV VAS SMA CELIAC DIST EDV: 36.8 CM/S
BH CV VAS SMA CELIAC DIST PSV: 108 CM/S
BH CV VAS SMA CELIAC ORIGIN EDV: 36.8 CM/S
BH CV VAS SMA CELIAC ORIGIN PSV: 123 CM/S
BH CV VAS SMA CELIAC PROX EDV: 40.1 CM/S
BH CV VAS SMA CELIAC PROX PSV: 134 CM/S
BH CV VAS SMA HEPATIC EDV: 47.1 CM/S
BH CV VAS SMA HEPATIC PSV: 150 CM/S
BH CV VAS SMA IMA EDV: 6.49 CM/S
BH CV VAS SMA IMA PSV: 133 CM/S
BH CV VAS SMA SMA DIST PSV: 108 CM/S
BH CV VAS SMA SMA MID PSV: 110 CM/S
BH CV VAS SMA SMA PROX PSV: 141 CM/S
BH CV VAS SMA SPLENIC EDV: 26.7 CM/S
BH CV VAS SMA SPLENIC PSV: 64.5 CM/S
BILIRUB SERPL-MCNC: 0.7 MG/DL (ref 0–1.2)
BILIRUB SERPL-MCNC: 0.7 MG/DL (ref 0–1.2)
BUN SERPL-MCNC: 35 MG/DL (ref 6–20)
BUN SERPL-MCNC: 35 MG/DL (ref 6–20)
BUN/CREAT SERPL: 32.4 (ref 7–25)
BUN/CREAT SERPL: 33 (ref 7–25)
CALCIUM SPEC-SCNC: 10.1 MG/DL (ref 8.6–10.5)
CALCIUM SPEC-SCNC: 9.8 MG/DL (ref 8.6–10.5)
CHLORIDE SERPL-SCNC: 108 MMOL/L (ref 98–107)
CHLORIDE SERPL-SCNC: 110 MMOL/L (ref 98–107)
CO2 SERPL-SCNC: 16 MMOL/L (ref 22–29)
CO2 SERPL-SCNC: 16.6 MMOL/L (ref 22–29)
CREAT SERPL-MCNC: 1.06 MG/DL (ref 0.76–1.27)
CREAT SERPL-MCNC: 1.08 MG/DL (ref 0.76–1.27)
D DIMER PPP FEU-MCNC: 6.6 MCGFEU/ML (ref 0–0.5)
DEPRECATED RDW RBC AUTO: 40.8 FL (ref 37–54)
EGFRCR SERPLBLD CKD-EPI 2021: 85.2 ML/MIN/1.73
EGFRCR SERPLBLD CKD-EPI 2021: 87.1 ML/MIN/1.73
ERYTHROCYTE [DISTWIDTH] IN BLOOD BY AUTOMATED COUNT: 12.3 % (ref 12.3–15.4)
GEN 5 2HR TROPONIN T REFLEX: 30 NG/L
GLOBULIN UR ELPH-MCNC: 3 GM/DL
GLOBULIN UR ELPH-MCNC: 4.2 GM/DL
GLUCOSE BLDC GLUCOMTR-MCNC: 158 MG/DL (ref 70–130)
GLUCOSE BLDC GLUCOMTR-MCNC: 171 MG/DL (ref 70–130)
GLUCOSE BLDC GLUCOMTR-MCNC: 253 MG/DL (ref 70–130)
GLUCOSE SERPL-MCNC: 159 MG/DL (ref 65–99)
GLUCOSE SERPL-MCNC: 161 MG/DL (ref 65–99)
HCT VFR BLD AUTO: 38.6 % (ref 37.5–51)
HGB BLD-MCNC: 12.3 G/DL (ref 13–17.7)
INR PPP: 1 (ref 0.8–1.2)
INR PPP: 1.13 (ref 0.9–1.1)
MAGNESIUM SERPL-MCNC: 2.5 MG/DL (ref 1.6–2.6)
MCH RBC QN AUTO: 29.2 PG (ref 26.6–33)
MCHC RBC AUTO-ENTMCNC: 31.9 G/DL (ref 31.5–35.7)
MCV RBC AUTO: 91.7 FL (ref 79–97)
PHOSPHATE SERPL-MCNC: 2.4 MG/DL (ref 2.5–4.5)
PHOSPHATE SERPL-MCNC: 2.8 MG/DL (ref 2.5–4.5)
PLATELET # BLD AUTO: 382 10*3/MM3 (ref 140–450)
PMV BLD AUTO: 10.6 FL (ref 6–12)
POTASSIUM SERPL-SCNC: 4.3 MMOL/L (ref 3.5–5.2)
POTASSIUM SERPL-SCNC: 4.5 MMOL/L (ref 3.5–5.2)
PROT SERPL-MCNC: 6.2 G/DL (ref 6–8.5)
PROT SERPL-MCNC: 6.6 G/DL (ref 6–8.5)
PROTHROMBIN TIME: 10.5 SECONDS (ref 12.8–15.2)
PROTHROMBIN TIME: 14.7 SECONDS (ref 11.7–14.2)
QT INTERVAL: 352 MS
QTC INTERVAL: 485 MS
RBC # BLD AUTO: 4.21 10*6/MM3 (ref 4.14–5.8)
SODIUM SERPL-SCNC: 141 MMOL/L (ref 136–145)
SODIUM SERPL-SCNC: 143 MMOL/L (ref 136–145)
TROPONIN T DELTA: 2 NG/L
WBC NRBC COR # BLD AUTO: 16.23 10*3/MM3 (ref 3.4–10.8)

## 2024-10-28 PROCEDURE — 63710000001 INSULIN GLARGINE PER 5 UNITS: Performed by: STUDENT IN AN ORGANIZED HEALTH CARE EDUCATION/TRAINING PROGRAM

## 2024-10-28 PROCEDURE — 87077 CULTURE AEROBIC IDENTIFY: CPT | Performed by: STUDENT IN AN ORGANIZED HEALTH CARE EDUCATION/TRAINING PROGRAM

## 2024-10-28 PROCEDURE — 71275 CT ANGIOGRAPHY CHEST: CPT

## 2024-10-28 PROCEDURE — 84484 ASSAY OF TROPONIN QUANT: CPT | Performed by: STUDENT IN AN ORGANIZED HEALTH CARE EDUCATION/TRAINING PROGRAM

## 2024-10-28 PROCEDURE — 99153 MOD SED SAME PHYS/QHP EA: CPT

## 2024-10-28 PROCEDURE — 85730 THROMBOPLASTIN TIME PARTIAL: CPT | Performed by: STUDENT IN AN ORGANIZED HEALTH CARE EDUCATION/TRAINING PROGRAM

## 2024-10-28 PROCEDURE — 25810000003 LACTATED RINGERS PER 1000 ML: Performed by: HOSPITALIST

## 2024-10-28 PROCEDURE — 25010000002 HEPARIN (PORCINE) PER 1000 UNITS: Performed by: STUDENT IN AN ORGANIZED HEALTH CARE EDUCATION/TRAINING PROGRAM

## 2024-10-28 PROCEDURE — 87075 CULTR BACTERIA EXCEPT BLOOD: CPT | Performed by: STUDENT IN AN ORGANIZED HEALTH CARE EDUCATION/TRAINING PROGRAM

## 2024-10-28 PROCEDURE — 25010000002 MIDAZOLAM PER 1 MG: Performed by: RADIOLOGY

## 2024-10-28 PROCEDURE — C1729 CATH, DRAINAGE: HCPCS

## 2024-10-28 PROCEDURE — 99232 SBSQ HOSP IP/OBS MODERATE 35: CPT | Performed by: STUDENT IN AN ORGANIZED HEALTH CARE EDUCATION/TRAINING PROGRAM

## 2024-10-28 PROCEDURE — 87205 SMEAR GRAM STAIN: CPT | Performed by: STUDENT IN AN ORGANIZED HEALTH CARE EDUCATION/TRAINING PROGRAM

## 2024-10-28 PROCEDURE — 99152 MOD SED SAME PHYS/QHP 5/>YRS: CPT

## 2024-10-28 PROCEDURE — 85027 COMPLETE CBC AUTOMATED: CPT | Performed by: STUDENT IN AN ORGANIZED HEALTH CARE EDUCATION/TRAINING PROGRAM

## 2024-10-28 PROCEDURE — A9577 INJ MULTIHANCE: HCPCS | Performed by: STUDENT IN AN ORGANIZED HEALTH CARE EDUCATION/TRAINING PROGRAM

## 2024-10-28 PROCEDURE — 80053 COMPREHEN METABOLIC PANEL: CPT | Performed by: STUDENT IN AN ORGANIZED HEALTH CARE EDUCATION/TRAINING PROGRAM

## 2024-10-28 PROCEDURE — 25510000001 IOPAMIDOL PER 1 ML: Performed by: STUDENT IN AN ORGANIZED HEALTH CARE EDUCATION/TRAINING PROGRAM

## 2024-10-28 PROCEDURE — 70553 MRI BRAIN STEM W/O & W/DYE: CPT

## 2024-10-28 PROCEDURE — 84100 ASSAY OF PHOSPHORUS: CPT | Performed by: STUDENT IN AN ORGANIZED HEALTH CARE EDUCATION/TRAINING PROGRAM

## 2024-10-28 PROCEDURE — 93970 EXTREMITY STUDY: CPT

## 2024-10-28 PROCEDURE — 93970 EXTREMITY STUDY: CPT | Performed by: STUDENT IN AN ORGANIZED HEALTH CARE EDUCATION/TRAINING PROGRAM

## 2024-10-28 PROCEDURE — 87040 BLOOD CULTURE FOR BACTERIA: CPT

## 2024-10-28 PROCEDURE — 25810000003 SODIUM CHLORIDE 0.9 % SOLUTION: Performed by: STUDENT IN AN ORGANIZED HEALTH CARE EDUCATION/TRAINING PROGRAM

## 2024-10-28 PROCEDURE — 0 GADOBENATE DIMEGLUMINE 529 MG/ML SOLUTION: Performed by: STUDENT IN AN ORGANIZED HEALTH CARE EDUCATION/TRAINING PROGRAM

## 2024-10-28 PROCEDURE — 63710000001 INSULIN LISPRO (HUMAN) PER 5 UNITS: Performed by: STUDENT IN AN ORGANIZED HEALTH CARE EDUCATION/TRAINING PROGRAM

## 2024-10-28 PROCEDURE — 83735 ASSAY OF MAGNESIUM: CPT | Performed by: STUDENT IN AN ORGANIZED HEALTH CARE EDUCATION/TRAINING PROGRAM

## 2024-10-28 PROCEDURE — 85610 PROTHROMBIN TIME: CPT | Performed by: STUDENT IN AN ORGANIZED HEALTH CARE EDUCATION/TRAINING PROGRAM

## 2024-10-28 PROCEDURE — 25010000002 PIPERACILLIN SOD-TAZOBACTAM PER 1 G: Performed by: STUDENT IN AN ORGANIZED HEALTH CARE EDUCATION/TRAINING PROGRAM

## 2024-10-28 PROCEDURE — 87015 SPECIMEN INFECT AGNT CONCNTJ: CPT | Performed by: STUDENT IN AN ORGANIZED HEALTH CARE EDUCATION/TRAINING PROGRAM

## 2024-10-28 PROCEDURE — 25010000002 HYDROMORPHONE PER 4 MG: Performed by: STUDENT IN AN ORGANIZED HEALTH CARE EDUCATION/TRAINING PROGRAM

## 2024-10-28 PROCEDURE — 87186 SC STD MICRODIL/AGAR DIL: CPT | Performed by: STUDENT IN AN ORGANIZED HEALTH CARE EDUCATION/TRAINING PROGRAM

## 2024-10-28 PROCEDURE — 87070 CULTURE OTHR SPECIMN AEROBIC: CPT | Performed by: STUDENT IN AN ORGANIZED HEALTH CARE EDUCATION/TRAINING PROGRAM

## 2024-10-28 PROCEDURE — 85610 PROTHROMBIN TIME: CPT

## 2024-10-28 PROCEDURE — 49406 IMAGE CATH FLUID PERI/RETRO: CPT

## 2024-10-28 PROCEDURE — 25010000002 LIDOCAINE 1 % SOLUTION: Performed by: RADIOLOGY

## 2024-10-28 PROCEDURE — 25010000002 FENTANYL CITRATE (PF) 50 MCG/ML SOLUTION: Performed by: RADIOLOGY

## 2024-10-28 PROCEDURE — 82948 REAGENT STRIP/BLOOD GLUCOSE: CPT

## 2024-10-28 PROCEDURE — 99232 SBSQ HOSP IP/OBS MODERATE 35: CPT | Performed by: INTERNAL MEDICINE

## 2024-10-28 RX ORDER — FENTANYL CITRATE 50 UG/ML
INJECTION, SOLUTION INTRAMUSCULAR; INTRAVENOUS AS NEEDED
Status: COMPLETED | OUTPATIENT
Start: 2024-10-28 | End: 2024-10-28

## 2024-10-28 RX ORDER — MIDODRINE HYDROCHLORIDE 5 MG/1
2.5 TABLET ORAL
Status: DISCONTINUED | OUTPATIENT
Start: 2024-10-28 | End: 2024-10-30

## 2024-10-28 RX ORDER — SODIUM CHLORIDE, SODIUM LACTATE, POTASSIUM CHLORIDE, CALCIUM CHLORIDE 600; 310; 30; 20 MG/100ML; MG/100ML; MG/100ML; MG/100ML
75 INJECTION, SOLUTION INTRAVENOUS CONTINUOUS
Status: ACTIVE | OUTPATIENT
Start: 2024-10-28 | End: 2024-10-29

## 2024-10-28 RX ORDER — MIDAZOLAM HYDROCHLORIDE 1 MG/ML
INJECTION, SOLUTION INTRAMUSCULAR; INTRAVENOUS AS NEEDED
Status: COMPLETED | OUTPATIENT
Start: 2024-10-28 | End: 2024-10-28

## 2024-10-28 RX ORDER — IOPAMIDOL 755 MG/ML
100 INJECTION, SOLUTION INTRAVASCULAR
Status: COMPLETED | OUTPATIENT
Start: 2024-10-28 | End: 2024-10-28

## 2024-10-28 RX ORDER — LIDOCAINE HYDROCHLORIDE 10 MG/ML
20 INJECTION, SOLUTION INFILTRATION; PERINEURAL ONCE
Status: COMPLETED | OUTPATIENT
Start: 2024-10-28 | End: 2024-10-28

## 2024-10-28 RX ORDER — HEPARIN SODIUM 5000 [USP'U]/ML
5000 INJECTION, SOLUTION INTRAVENOUS; SUBCUTANEOUS EVERY 8 HOURS SCHEDULED
Status: DISCONTINUED | OUTPATIENT
Start: 2024-10-28 | End: 2024-11-14 | Stop reason: HOSPADM

## 2024-10-28 RX ADMIN — HEPARIN SODIUM 5000 UNITS: 5000 INJECTION INTRAVENOUS; SUBCUTANEOUS at 16:33

## 2024-10-28 RX ADMIN — HYDROMORPHONE HYDROCHLORIDE 0.5 MG: 1 INJECTION, SOLUTION INTRAMUSCULAR; INTRAVENOUS; SUBCUTANEOUS at 03:48

## 2024-10-28 RX ADMIN — PIPERACILLIN AND TAZOBACTAM 3.38 G: 3; .375 INJECTION, POWDER, FOR SOLUTION INTRAVENOUS at 21:08

## 2024-10-28 RX ADMIN — SENNOSIDES AND DOCUSATE SODIUM 2 TABLET: 50; 8.6 TABLET ORAL at 09:52

## 2024-10-28 RX ADMIN — MIDODRINE HYDROCHLORIDE 2.5 MG: 5 TABLET ORAL at 18:41

## 2024-10-28 RX ADMIN — MIDODRINE HYDROCHLORIDE 5 MG: 5 TABLET ORAL at 08:37

## 2024-10-28 RX ADMIN — MIDAZOLAM 1 MG: 1 INJECTION INTRAMUSCULAR; INTRAVENOUS at 12:41

## 2024-10-28 RX ADMIN — VENLAFAXINE HYDROCHLORIDE 75 MG: 75 CAPSULE, EXTENDED RELEASE ORAL at 09:52

## 2024-10-28 RX ADMIN — HEPARIN SODIUM 5000 UNITS: 5000 INJECTION INTRAVENOUS; SUBCUTANEOUS at 22:45

## 2024-10-28 RX ADMIN — LIDOCAINE HYDROCHLORIDE 20 ML: 10 INJECTION, SOLUTION INFILTRATION; PERINEURAL at 13:00

## 2024-10-28 RX ADMIN — HYDROMORPHONE HYDROCHLORIDE 0.5 MG: 1 INJECTION, SOLUTION INTRAMUSCULAR; INTRAVENOUS; SUBCUTANEOUS at 11:35

## 2024-10-28 RX ADMIN — MUPIROCIN 1 APPLICATION: 20 OINTMENT TOPICAL at 09:53

## 2024-10-28 RX ADMIN — FENTANYL CITRATE 50 MCG: 50 INJECTION, SOLUTION INTRAMUSCULAR; INTRAVENOUS at 12:42

## 2024-10-28 RX ADMIN — PIPERACILLIN AND TAZOBACTAM 3.38 G: 3; .375 INJECTION, POWDER, FOR SOLUTION INTRAVENOUS at 05:02

## 2024-10-28 RX ADMIN — IOPAMIDOL 85 ML: 755 INJECTION, SOLUTION INTRAVENOUS at 17:52

## 2024-10-28 RX ADMIN — GADOBENATE DIMEGLUMINE 20 ML: 529 INJECTION, SOLUTION INTRAVENOUS at 11:04

## 2024-10-28 RX ADMIN — MUPIROCIN 1 APPLICATION: 20 OINTMENT TOPICAL at 21:00

## 2024-10-28 RX ADMIN — INSULIN GLARGINE 6 UNITS: 100 INJECTION, SOLUTION SUBCUTANEOUS at 21:08

## 2024-10-28 RX ADMIN — SODIUM CHLORIDE, POTASSIUM CHLORIDE, SODIUM LACTATE AND CALCIUM CHLORIDE 75 ML/HR: 600; 310; 30; 20 INJECTION, SOLUTION INTRAVENOUS at 16:35

## 2024-10-28 RX ADMIN — PANTOPRAZOLE SODIUM 40 MG: 40 TABLET, DELAYED RELEASE ORAL at 09:51

## 2024-10-28 RX ADMIN — INSULIN LISPRO 4 UNITS: 100 INJECTION, SOLUTION INTRAVENOUS; SUBCUTANEOUS at 08:31

## 2024-10-28 RX ADMIN — PIPERACILLIN AND TAZOBACTAM 3.38 G: 3; .375 INJECTION, POWDER, FOR SOLUTION INTRAVENOUS at 16:34

## 2024-10-28 RX ADMIN — PANTOPRAZOLE SODIUM 40 MG: 40 TABLET, DELAYED RELEASE ORAL at 18:42

## 2024-10-28 RX ADMIN — POTASSIUM PHOSPHATE, MONOBASIC POTASSIUM PHOSPHATE, DIBASIC 15 MMOL: 224; 236 INJECTION, SOLUTION, CONCENTRATE INTRAVENOUS at 00:37

## 2024-10-28 NOTE — PAYOR COMM NOTE
"Donnie Ornelas (49 y.o. Male)     PLEASE SEE ATTACHED FOR CONTINUED INPT STAY DAYS    REF #  [id number]     PLEASE CALL BRANDI ALMAZAN RN/ DEPT @ [phone]   OR [phone]    THANK YOU  BRANDI ALMAZAN RN  [facility]         Date of Birth   1975    Social Security Number       Address   [address] [place] [address]    Home Phone   [phone]    MRN   [id number]       Holiness   Mormonism    Marital Status                               Admission Date   10/22/24    Admission Type   Emergency    Admitting Provider   James Alexandra MD    Attending Provider   Yaritza Hurtado MD    Department, Room/Bed   [facility] CORONARY CARE, N339/1       Discharge Date       Discharge Disposition       Discharge Destination                                 Attending Provider: Yaritza Hurtado MD    Allergies: No Known Allergies    Isolation: None   Infection: None   Code Status: CPR    Ht: 170 cm (66.93\")   Wt: 85.2 kg (187 lb 13.3 oz)    Admission Cmt: None   Principal Problem: Epigastric abdominal pain [R10.13]                   Active Insurance as of 10/22/2024       Primary Coverage       Payor Plan Insurance Group Employer/Plan Group    ANTHEM BLUE CROSS ANTHEM BLUE CROSS BLUE SHIELD PPO [id number]       Payor Plan Address Payor Plan Phone Number Payor Plan Fax Number Effective Dates    [address] [phone]  1/1/2019 - None Entered    [place] [address]         Subscriber Name Subscriber Birth Date Member ID       DONNIE ORNELAS 1975 [id number]                     Emergency Contacts        (Rel.) Home Phone Work Phone Mobile Phone    Murphy Padilla (Brother) -- -- [phone]    Charity Ornelas (Spouse) [phone] -- [phone]    joie montero (Relative) -- -- [phone]    Daniel Ornelas (Father) -- -- [phone]              [place]: NPI [id number]  Tax ID [id number]     Physician Progress Notes (last 48 hours)        Asiya, " MD [place] at 10/28/24 9768          I went to see the patient 4 times since the morning, the patient was gone for procedure.  I personally reviewed his MRI brain which showed no acute hemorrhage, his hypodensity on the prior CT head likely from calcification.  MRI results discussed with neuroradiologist Dr. Berkowitz.  MRI did show left auditory canal/left cerebellar pontine angle enhancing lesion likely vestibular schwannoma this need to be followed by neurosurgery as an outpatient.  MRI brain result discussed with hospitalist Dr. Hurtado.  Okay to place the patient on Lovenox for DVT prophylaxis from my standpoint.  Will sign off and see again as needed.    IMPRESSION:  1. Corresponding to the area of increased density in the posterior right  thalamus on yesterday's head CT is an area of precontrast faint T1  hyperintensity but no corresponding signal abnormality on any of the  other sequences and I think this is a benign area of faint  mineralization in the posterior right thalamus and not an acute  intraparenchymal hemorrhage.     2. This patient has an enhancing mass filling the left internal auditory  canal bulging through the left porus acusticus into the left  cerebellopontine angle and it maximally measures 12 x 9 x 9 mm  mediolateral anterior posterior and craniocaudal dimensions, most  consistent with a left-sided vestibular schwannoma (acoustic neuroma)  and correlate clinically as to whether the patient has left-sided  sensorineural hearing loss.     3. While the lateral and third ventricles are normal in size, the fourth  ventricle is prominent in size which is felt to be due to some volume  loss in the cerebellum. The remainder of the MRI of the brain is normal.    Electronically signed by Darien Braden MD at 10/28/24 1303       Manuela Wisdom MD at 10/28/24 1155          General Surgery Progress Note    CC: Cholecystitis, right upper quadrant abdominal pain    S: Patient more appropriately  "answering questions today. States he is thirsty. Stable pain in RUQ. Passing gas, no BM.     O:/79 (BP Location: Right arm, Patient Position: Lying)   Pulse 107   Temp 99 °F (37.2 °C) (Oral)   Resp 22   Ht 170 cm (66.93\")   Wt 85.2 kg (187 lb 13.3 oz)   SpO2 (!) 88% Comment: 88 % on room air, placed on o2 at 2 liters per nc, now  93%  BMI 29.48 kg/m²     Intake & Output (last day)         10/27 0701  10/28 0700 10/28 0701  10/29 0700    P.O. 600     Total Intake(mL/kg) 600 (7)     Urine (mL/kg/hr) 400 (0.2) 350 (0.8)    Total Output 400 350    Net +200 -350          Urine Unmeasured Occurrence 3 x             GENERAL: Ill-appearing   HEENT: EOMI, dry mucus membranes   CHEST: normal work of breathing on room air  CARDIAC: palpable distal pulses, no pedal edema  GI: Abd soft, mildly distended, moderately tender in the right upper quadrant without rebound or guarding; no left sided tenderness  EXTREMITIES: MOCK, no cyanosis    SKIN: Warm and dry    LABS  Results from last 7 days   Lab Units 10/27/24  2310 10/27/24  0329 10/26/24  0545 10/25/24  0426   WBC 10*3/mm3 14.14* 10.64 9.44 7.93   HEMOGLOBIN g/dL 12.0* 12.7* 11.9* 10.9*   HEMATOCRIT % 36.6* 38.7 36.7* 32.4*   PLATELETS 10*3/mm3 349 352 297 278   MONOCYTES % % 10.1 7.1  --  12.2*   EOSINOPHIL % % 0.0* 0.0*  --  0.0*     Results from last 7 days   Lab Units 10/27/24  2205 10/27/24  0329 10/26/24  0545   SODIUM mmol/L 137 137 137   POTASSIUM mmol/L 3.9 4.7 4.7   CHLORIDE mmol/L 106 104 103   CO2 mmol/L 19.0* 19.0* 19.0*   BUN mg/dL 28* 32* 36*   CREATININE mg/dL 0.93 1.12 1.22   CALCIUM mg/dL 9.8 9.8 9.5   BILIRUBIN mg/dL 0.7 0.8 1.1   ALK PHOS U/L 217* 140* 99   ALT (SGPT) U/L 121* 133* 92*   AST (SGOT) U/L 121* 172* 108*   GLUCOSE mg/dL 177* 223* 133*     Results from last 7 days   Lab Units 10/28/24  1129   INR  1.0     IMAGING:  CXR 10/27/24 reviewed - Cardiomegaly present, vascular congestion, diminished lung volumes, no pneumothorax, small right " pleural effusion, gaseous distention of the stomach, bibasilar atelectasis present    A/P: 49 y.o. male with multiple medical problems including CHF with EF of 35%, hypertension, hyperlipidemia, type 2 diabetes mellitus with A1c 9.4, who came into the hospital complaining of abdominal pain.      -CT demonstrating contracted gallbladder with mild hyperenhancing wall, EGD negative for abnormalities; August CT had distended gallbladder but no other findings of cholecystitis  -HIDA scan demonstrating filling but with decreased ejection fraction 19% concerning for possible chronic cholecystitis.   -CTA abdomen pelvis 10/26 to evaluate worsening RUQ pain and possible mesenteric ischemia demonstrated a large right subcapsular fluid collection with mass effect on the liver; no active extravasation  -CT head 10/27 with possible right thalamic stroke  -WBC had normalized, now 14 from 10; patient on zosyn for sepsis possible cholecystitis  -Transaminases and Alkphos uptrending, tbili remains normal  -Plan IR drainage of liver fluid collection today     Manuela Wisdom MD  General, Robotic and Endoscopic Surgery  [facility], [facility]    [address], [address]  [place], [place], [facility]  P: [phone]  F: [phone]       Electronically signed by Manuela Wisdom MD at 10/28/24 1207       Alex Varela MD at 10/28/24 1031            [facility] Progress Note       Encounter Date:10/28/24  Patient:Donnie Bishop  :1975  MRN:[id number]      Chief Complaint: Abdominal pain      Subjective:   Subjective     Patient continues to be in a lot of pain.  Was tachycardic overnight there is concerns of possible other etiology D-dimer was sent which was elevated obviously somewhat hard to interpret given his acute illness.  Heart rate remained slightly elevated.  Minimal oxygen requirements.    Review of Systems:  Review of Systems   Cardiovascular:  Positive for chest pain.   Respiratory:   Negative for shortness of breath.    Gastrointestinal:  Positive for abdominal pain.       Medications:  Scheduled Meds:  [Held by provider] carvedilol, 6.25 mg, Oral, Q12H  insulin glargine, 14 Units, Subcutaneous, Nightly  insulin lispro, 2-7 Units, Subcutaneous, 4x Daily AC & at Bedtime  insulin lispro, 4 Units, Subcutaneous, TID With Meals  midodrine, 5 mg, Oral, TID AC  mupirocin, 1 Application, Each Nare, BID  pantoprazole, 40 mg, Oral, BID AC  piperacillin-tazobactam, 3.375 g, Intravenous, Q8H  senna-docusate sodium, 2 tablet, Oral, BID   And  polyethylene glycol, 17 g, Oral, BID  venlafaxine XR, 75 mg, Oral, Daily    Continuous Infusions:  lactated ringers, 75 mL/hr    PRN Meds:    senna-docusate sodium **AND** polyethylene glycol **AND** bisacodyl **AND** bisacodyl    Calcium Replacement - Follow Nurse / BPA Driven Protocol    dextrose    dextrose    glucagon (human recombinant)    HYDROmorphone    hydrOXYzine    influenza vaccine    Magnesium Standard Dose Replacement - Follow Nurse / BPA Driven Protocol    melatonin    [DISCONTINUED] Morphine **AND** naloxone    ondansetron ODT **OR** ondansetron    oxyCODONE    Phosphorus Replacement - Follow Nurse / BPA Driven Protocol    Potassium Replacement - Follow Nurse / BPA Driven Protocol    sodium chloride        Objective:   Objective    Vitals:    10/28/24 0448 10/28/24 0524 10/28/24 0745 10/28/24 0952   BP: 103/79  112/83 113/79   BP Location: Right arm  Right arm    Patient Position: Lying  Lying    Pulse: 106   106   Resp: 28 28    Temp: 98.1 °F (36.7 °C)  99 °F (37.2 °C)    TempSrc: Oral  Oral    SpO2: 96%      Weight:  85.2 kg (187 lb 13.3 oz)     Height:               Physical Exam:  Constitutional: Acutely ill appearing, well developed, moderate to severe acute distress due to pain  HENT: Oropharynx clear and membrane moist  Eyes: Normal conjunctiva, no sclera icterus.  Neck: Supple, no carotid bruit bilaterally.  Cardiovascular: Regular and  "Tachycardia rate and rhythm, No Murmur, Trace bilateral lower extremity edema.  Pulmonary: Normal respiratory effort, normal lung sounds, no wheezing.  Abdominal: Soft, nontender, no hepatosplenomegaly, liver is non-pulsatile.   Skin: Warm, dry, no ecchymosis, no rash.             Lab Review:   Results from last 7 days   Lab Units 10/27/24  2205 10/27/24  0329 10/26/24  0545 10/25/24  1544 10/25/24  0426 10/24/24  1715 10/24/24  0617 10/23/24  0311 10/22/24  1557   SODIUM mmol/L 137 137 137 129* 132* 132* 128*   < > 136   POTASSIUM mmol/L 3.9 4.7 4.7 4.4 4.5 4.9 4.6   < > 4.8   CHLORIDE mmol/L 106 104 103 99 98 98 95*   < > 96*   CO2 mmol/L 19.0* 19.0* 19.0* 16.9* 20.0* 18.0* 16.2*   < > 24.6   BUN mg/dL 28* 32* 36* 37* 38* 35* 30*   < > 9   CREATININE mg/dL 0.93 1.12 1.22 1.38* 1.75* 1.57* 1.42*   < > 1.07   GLUCOSE mg/dL 177* 223* 133* 211* 213* 255* 264*   < > 183*   CALCIUM mg/dL 9.8 9.8 9.5 9.0 8.6 8.5* 8.8   < > 10.5   AST (SGOT) U/L 121* 172* 108*  --  148*  --   --   --  10   ALT (SGPT) U/L 121* 133* 92*  --  77*  --   --   --  11    < > = values in this interval not displayed.     Results from last 7 days   Lab Units 10/28/24  0045 10/27/24  2205   HSTROP T ng/L 30* 28*     Results from last 7 days   Lab Units 10/27/24  2310 10/27/24  0329 10/26/24  0545 10/25/24  0426 10/24/24  2126 10/24/24  0617 10/23/24  1433   WBC 10*3/mm3 14.14* 10.64 9.44 7.93 9.87 10.27 12.62*   HEMOGLOBIN g/dL 12.0* 12.7* 11.9* 10.9* 10.6* 10.7* 15.1   HEMATOCRIT % 36.6* 38.7 36.7* 32.4* 31.2* 32.9* 44.3   PLATELETS 10*3/mm3 349 352 297 278 273 266 399         Results from last 7 days   Lab Units 10/27/24  2205 10/27/24  0329 10/26/24  0545   MAGNESIUM mg/dL 2.9* 2.5 2.4           Invalid input(s): \"LDLCALC\"  Results from last 7 days   Lab Units 10/26/24  0545   PROBNP pg/mL 393.0     Results from last 7 days   Lab Units 10/24/24  0617   TSH uIU/mL 0.973           Assessment:   Assessment       Diagnosis Plan   1. Epigastric " abdominal pain  Case Request    Case Request    Tissue Pathology Exam    Tissue Pathology Exam      2. Acute gastritis without hemorrhage, unspecified gastritis type              Plan:   Plan    Mr. Bishop is a 49-year-old gentleman with past medical history notable for nonischemic cardiomyopathy, diabetes, hypertension, mixed hyperlipidemia presented to the hospital with abdominal pain initially concerns were for gastritis symptoms significantly worsened and now found to have a perihepatic fluid collection concerning for biloma versus hematoma.  Patient has had a lot more pain over the last 24 hours were asked to evaluate him for perioperative risk assessment prior to proceeding forward with possible cholecystectomy.  He has tentative plans for IR drainage of his fluid around his liver today.  Overnight there was some concerns that may be a pulmonary emboli might be contributing to his presentation.  In general his current presentation seems consistent with his fluid collection around the liver.  PE cannot be completely ruled out but given the concerns that this could be a hematoma around his liver would not recommend empiric anticoagulation.  I would recommend getting his liver fluid addressed.  His troponin level which was checked last night is essentially flat and unchanged from prior levels.  He is slightly tachycardic but again this would not be unexpected given his pain.  He is on minimal oxygen requirements.  I would recommend proceeding forward with his IR drainage we could always reassess next steps for possible pulmonary emboli.  That order to nuclear med scan and lower extremity duplex.  His kidney function seems to be better if need be could always do a chest CTA.  However if significantly improved after drainage of his perihepatic fluid this would likely explain the changes.    Nonischemic cardiomyopathy:  Echocardiogram 10/26/2024 with improvement in heart function almost back to normal  Failure regimen  on hold given hypotension which not expected given his acute illness  Hopefully can restart medications back as blood pressure will allow    Possible pulmonary emboli:  There is a concern that his tachycardia was related to an acute pulmonary emboli D-dimer is elevated however his clinical presentation seems more consistent with acute infection as a likely source.  His initial proBNP level and echocardiogram on arrival were fairly bland and troponin that was just checked last night into this morning is showing flat troponins compared to prior.  I doubt that we are dealing with a high risk large pulmonary emboli even if present would address underlying acute illness specially in light of the fact that there is a concern that the fluid collection could be a hematoma empiric anticoagulation would be potentially problematic  Can follow-up lower extremity duplex.  A CTA would also be helpful think there was some concerns given that he got CTA yesterday to give too much contrast.  Imaging of the CTA does not suggest any large contrast reflux which would suggest RV dysfunction RV looks fairly normal in size and limited views.              Alex Varela MD  [facility]  10/28/24  10:31 EDT      Electronically signed by Alex Varela MD at 10/28/24 1038       Yaritza Hurtado MD at 10/28/24 1013              Name: Donnie Bishop ADMIT: 10/22/2024   : 1975  PCP: Juju Kennedy MD    MRN: [id number] LOS: 4 days   AGE/SEX: 49 y.o. male  ROOM: [facility]     Subjective   Subjective   Laying in bed.  Continues to complain of abdominal pain.  Overnight was tachycardic, D-dimer was obtained patient was found to be tachypneic.  On 2 L of cannula this morning.  Continues to complain of abdominal pain, denies shortness of breath when asked.      Review of Systems  As above  Objective   Objective   Vital Signs  Temp:  [97.5 °F (36.4 °C)-99 °F (37.2 °C)] 99 °F (37.2 °C)  Heart Rate:  [105-114] 107  Resp:   [18-28] 22  BP: (103-141)/(79-92) 111/79  SpO2:  [88 %-100 %] 88 %  on  Flow (L/min) (Oxygen Therapy):  [2] 2;   Device (Oxygen Therapy): room air  Body mass index is 29.48 kg/m².  Physical Exam    General: Lying in bed, drowsy but arousable, oriented to name, place, not to year.  HEENT: Normocephalic, atraumatic  CV: Regular rate and rhythm, no murmurs rubs or gallops  Lungs: CTA anteriorly, no wheezing, tachypnea  Abdomen: Soft, distended, tenderness to palpation  Extremities: Trace edema in lower extremities,, no cyanosis     Results Review     I reviewed the patient's new clinical results.  Results from last 7 days   Lab Units 10/27/24  2310 10/27/24  0329 10/26/24  0545 10/25/24  0426   WBC 10*3/mm3 14.14* 10.64 9.44 7.93   HEMOGLOBIN g/dL 12.0* 12.7* 11.9* 10.9*   PLATELETS 10*3/mm3 349 352 297 278     Results from last 7 days   Lab Units 10/27/24  2205 10/27/24  0329 10/26/24  0545 10/25/24  1544   SODIUM mmol/L 137 137 137 129*   POTASSIUM mmol/L 3.9 4.7 4.7 4.4   CHLORIDE mmol/L 106 104 103 99   CO2 mmol/L 19.0* 19.0* 19.0* 16.9*   BUN mg/dL 28* 32* 36* 37*   CREATININE mg/dL 0.93 1.12 1.22 1.38*   GLUCOSE mg/dL 177* 223* 133* 211*   Estimated Creatinine Clearance: 102.2 mL/min (by C-G formula based on SCr of 0.93 mg/dL).  Results from last 7 days   Lab Units 10/27/24  2205 10/27/24  0329 10/26/24  0545 10/25/24  1544 10/25/24  0426   ALBUMIN g/dL 2.5* 2.7* 3.0* 2.8* 3.2*   BILIRUBIN mg/dL 0.7 0.8 1.1  --  1.8*   ALK PHOS U/L 217* 140* 99  --  72   AST (SGOT) U/L 121* 172* 108*  --  148*   ALT (SGPT) U/L 121* 133* 92*  --  77*     Results from last 7 days   Lab Units 10/28/24  0045 10/27/24  2205 10/27/24  0329 10/26/24  0545 10/25/24  1544   CALCIUM mg/dL  --  9.8 9.8 9.5 9.0   ALBUMIN g/dL  --  2.5* 2.7* 3.0* 2.8*   MAGNESIUM mg/dL  --  2.9* 2.5 2.4  --    PHOSPHORUS mg/dL 2.4* 1.9* 2.9 3.4 3.8     Results from last 7 days   Lab Units 10/26/24  0545 10/25/24  0426 10/24/24  1404 10/24/24  0617  10/24/24  0012 10/23/24  2104   PROCALCITONIN ng/mL 30.30* 78.10*  --   --   --   --    LACTATE mmol/L  --   --  3.8* 3.3* 3.6* 4.5*     COVID19   Date Value Ref Range Status   10/25/2024 Not Detected Not Detected - Ref. Range Final   12/26/2023 Not Detected Not Detected - Ref. Range Final     Glucose   Date/Time Value Ref Range Status   10/28/2024 0648 253 (H) 70 - 130 mg/dL Final   10/27/2024 2005 262 (H) 70 - 130 mg/dL Final   10/27/2024 1602 264 (H) 70 - 130 mg/dL Final   10/27/2024 1048 275 (H) 70 - 130 mg/dL Final   10/27/2024 0705 234 (H) 70 - 130 mg/dL Final   10/26/2024 2109 234 (H) 70 - 130 mg/dL Final   10/26/2024 1558 222 (H) 70 - 130 mg/dL Final           XR Chest 1 View  Narrative: SINGLE VIEW OF THE CHEST     HISTORY: Shortness of breath     COMPARISON: October 26, 2024     FINDINGS:  There is cardiomegaly. There is vascular congestion. Lung volumes remain  diminished. No pneumothorax is identified. Small right pleural effusion  is suspected. There is gaseous distention of the stomach. Bibasilar  atelectasis is present.     Impression:    1. Cardiomegaly with vascular congestion.  2. Gaseous distention of the stomach.     This report was finalized on 10/27/2024 11:23 PM by Dr. Maria Teresa Badillo M.D on Workstation: [id number]     CT Head Without Contrast  Addendum: Call Report: Dr. Hurtado was notified by telephone of the above findings   on October 27, 2024 at 2:49 p.m.       This report was finalized on 10/27/2024 2:50 PM by Dr. Connor Vasquez M.D on Workstation: [id number]  Narrative: CT HEAD WO CONTRAST-     INDICATION: Altered mental status     COMPARISON: None     TECHNIQUE:  Routine CT head without IV contrast. Coronal and sagittal reformats.  Radiation dose reduction techniques were utilized, including automated  exposure control and exposure modulation based on body size.     FINDINGS:      Brain: Hyperattenuating areas seen in the right posterior thalamus,  series 2, axial image 25,  measures 8 x 4 mm. Preserved gray-white  differentiation. No mass effect or midline shift. Chronic small vessel  ischemic changes.     Ventricles: Ventriculomegaly, suspect ex vacuo dilatation from central  volume loss. No intraventricular hemorrhage.     Extra-axial spaces: No extra-axial hemorrhage or fluid collection.     Osseous structures: Hyperostosis frontalis interna. No fracture.     Sinuses: Patent mastoid air cells and middle ears. Patent paranasal  sinuses.     Impression: Hyperattenuating area in the right thalamus suspicious for  intraparenchymal hemorrhage. Calcification is a consideration. Clinical  correlation needed. Recommend comparison with any prior head CT if  available. Short follow-up can reevaluate.     This report was finalized on 10/27/2024 2:43 PM by Dr. Connor Vasquez M.D on Workstation: [id number]       Scheduled Medications  [Held by provider] carvedilol, 6.25 mg, Oral, Q12H  insulin glargine, 14 Units, Subcutaneous, Nightly  insulin lispro, 2-7 Units, Subcutaneous, 4x Daily AC & at Bedtime  insulin lispro, 4 Units, Subcutaneous, TID With Meals  midodrine, 5 mg, Oral, TID AC  mupirocin, 1 Application, Each Nare, BID  pantoprazole, 40 mg, Oral, BID AC  piperacillin-tazobactam, 3.375 g, Intravenous, Q8H  senna-docusate sodium, 2 tablet, Oral, BID   And  polyethylene glycol, 17 g, Oral, BID  venlafaxine XR, 75 mg, Oral, Daily    Infusions  lactated ringers, 75 mL/hr    Diet  NPO Diet NPO Type: Strict NPO    I have personally reviewed     [x]  Laboratory   [x]  Microbiology   [x]  Radiology   [x]  EKG/Telemetry  []  Cardiology/Vascular   []  Pathology    []  Records      Assessment/Plan     Active Hospital Problems    Diagnosis  POA    **Epigastric abdominal pain [R10.13]  Unknown    Leukocytosis [D72.829]  Yes    Chronic combined systolic and diastolic heart failure [I50.42]  Yes    Abdominal pain [R10.9]  Yes    Gastritis [K29.70]  Yes    Anxiety [F41.9]  Yes    Multiple-type  hyperlipidemia [E78.2]  Yes    Type 2 diabetes mellitus with diabetic neuropathy, with long-term current use of insulin [E11.40, Z79.4]  Not Applicable    Benign essential HTN [I10]  Yes      Resolved Hospital Problems   No resolved problems to display.       Patient is a 49 y.o. man with type 2 diabetes, HTN, n, hyperlipidemia, chronic combined systolic and diastolic heart failure (non-ischemic), anxiety who presented with severe abdominal/epigastric pain.    Epigastric abdominal pain/right upper quadrant abdominal pain  Transaminitis  Chronic cholecystitis  large right subcapsular fluid collection with mass effect on the liver.   -EGD 10/24/2024 with no acute findings  -On PPI  -HIDA scan concerning for chronic cholecystitis versus bili dyskinesia  -Elevated liver enzymes could be ischemic liver injury as well in the setting of hypotension which now has improved.  -Monitor daily CMP, AST/bilirubin improved.  ALT slightly up today.  -Hold statin in the setting of transaminitis  -Duplex ultrasound of mesenteric arteries was ordered however was unable to be completed due to bowel gas and patient's inability to hold his breath per report  -CT angiogram of the abdomen was obtained 10/26/2024 which showed large right perihepatic fluid collection with mass effect on the liver, new.  Patent blood vessels.  -General surgery following, plan for IR guided drainage today      Hypotension  -On midodrine, status post IV fluids.  BP improved.  -ECHO  10/26/2024 showed EF of 49.7%  -Decrease midodrine to 2.5 3 times daily  -     TENISHA/hyponatremia  -Creatinine peaked at 1.75 on 10/25/2024, creatinine was 1.07 on admission  -Nephrology managing, status post IV fluids, creatinine normalized     Infection/sepsis?  -WBC was 13.79 on admission, patient has been afebrile since admission  -Procalcitonin significantly elevated at 78.10 10/25/2024-repeat improved  -HIDA scan showed possible chronic cholecystitis  -Blood cultures remain  negative to date  -Large collection seen on CT with infectious cause/abscess on differential  -Continue empiric IV Zosyn  -Plan for IR guided drainage of the fluid collection today.    Anemia   Iron panel consistent with anemia chronic disease with ferritin of 943, B12 and folate normal  Monitor daily CBC, transfuse as indicated for symptomatic anemia or hemoglobin 7    Encephalopathy  -Suspect secondary to acute illness/pain meds/infection/TENISHA  -No gross neurological deficits  -CT scan was obtained on 10/27/2024, there was concern for possible intraparenchymal hemorrhage, neurology consulted and MRI was obtained.  Discussed with neurology 10/20/2024, CT findings due to calcification, there is no intra intraparenchymal hemorrhage acute anticoagulation.    Type II DM   -Increase Lantus to 16 units nightly.  Continue SSI/scheduled lispro.      Tachypnea/tachycardia  -D-dimer elevated cardiomegaly, however in the setting of acute illness difficult to interpret.   -Chest x-ray 10/27/2024  with vascular congestion.  IV fluids held, management of fluid/diuresis per nephrology  -On 2 L nasal cannula  -NM lung perfusion study ordered overnight in the setting of recent TENISHA/and patient already receiving multiple contrasts.  Duplex ultrasound of lower extremities ordered.        DVT prophylaxis.  Subcu heparin was stopped yesterday due to concern for intracranial bleed.  Will resume today as MRI with no bleeding.  Full code.  Discussed with patient and nursing staff.  Expected Discharge Date: 10/28/2024; Expected Discharge Time:        Copied text in this note has been reviewed and is accurate as of 10/28/24.         Dictated utilizing Dragon dictation        Yaritza Hurtado MD  [facility]  10/28/24  12:13 EDT        Electronically signed by Yaritza Hurtado MD at 10/28/24 1223       Evan Pat MD at 10/28/24 1000              [facility] Progress  Note      Patient Name: Donnie Bishop  : 1975  MRN: [id number]  Primary Care Physician:  Juju Kennedy MD  Date of admission: 10/22/2024    Subjective     Interval History:   Follow-up TENISHA    CTA 10/26:  large right perihepatic fluid collection with mass effect on liver noted, among other abnormal findings    IR plans drainage of obdulia-hepatic fluid collection today   Looks uncomfortable; mostly nonverbal, moaning  No shortness of breath on room air  Drinking some    Review of Systems:   As noted above    Objective     Vitals:   Temp:  [97.5 °F (36.4 °C)-99.3 °F (37.4 °C)] 99 °F (37.2 °C)  Heart Rate:  [105-114] 106  Resp:  [18-28] 28  BP: (103-141)/(79-92) 113/79  Flow (L/min) (Oxygen Therapy):  [2] 2    Intake/Output Summary (Last 24 hours) at 10/28/2024 1000  Last data filed at 10/27/2024 1403  Gross per 24 hour   Intake 240 ml   Output 400 ml   Net -160 ml       Physical Exam:    General: Awake, nonverbal, looks uncomfortable, moaning  Skin: warm and dry  HEENT: oral mucosa dry, nonicteric sclera  Neck: supple, no JVD  Lungs: Coarse anteriorly; no wheezing  Heart: RR, tachycardic, no rub  Abdomen: soft, + T, + D, BS hypoactive   : no palpable bladder  Extremities: no edema, cyanosis or clubbing  Neuro: Cognitive blunting; slow speech    Scheduled Meds:     [Held by provider] carvedilol, 6.25 mg, Oral, Q12H  insulin glargine, 14 Units, Subcutaneous, Nightly  insulin lispro, 2-7 Units, Subcutaneous, 4x Daily AC & at Bedtime  insulin lispro, 4 Units, Subcutaneous, TID With Meals  midodrine, 5 mg, Oral, TID AC  mupirocin, 1 Application, Each Nare, BID  pantoprazole, 40 mg, Oral, BID AC  piperacillin-tazobactam, 3.375 g, Intravenous, Q8H  senna-docusate sodium, 2 tablet, Oral, BID   And  polyethylene glycol, 17 g, Oral, BID  venlafaxine XR, 75 mg, Oral, Daily      IV Meds:            Results Reviewed:   I have personally reviewed the results from the time of this admission to 10/28/2024 10:00 EDT      Results from last 7 days   Lab Units 10/27/24  2205 10/27/24  0329 10/26/24  0545   SODIUM mmol/L 137 137 137   POTASSIUM mmol/L 3.9 4.7 4.7   CHLORIDE mmol/L 106 104 103   CO2 mmol/L 19.0* 19.0* 19.0*   BUN mg/dL 28* 32* 36*   CREATININE mg/dL 0.93 1.12 1.22   CALCIUM mg/dL 9.8 9.8 9.5   BILIRUBIN mg/dL 0.7 0.8 1.1   ALK PHOS U/L 217* 140* 99   ALT (SGPT) U/L 121* 133* 92*   AST (SGOT) U/L 121* 172* 108*   GLUCOSE mg/dL 177* 223* 133*       Estimated Creatinine Clearance: 102.2 mL/min (by C-G formula based on SCr of 0.93 mg/dL).    Results from last 7 days   Lab Units 10/28/24  0045 10/27/24  2205 10/27/24  0329 10/26/24  0545   MAGNESIUM mg/dL  --  2.9* 2.5 2.4   PHOSPHORUS mg/dL 2.4* 1.9* 2.9 3.4       Results from last 7 days   Lab Units 10/24/24  0617   URIC ACID mg/dL 5.4       Results from last 7 days   Lab Units 10/27/24  2310 10/27/24  0329 10/26/24  0545 10/25/24  0426 10/24/24  2126   WBC 10*3/mm3 14.14* 10.64 9.44 7.93 9.87   HEMOGLOBIN g/dL 12.0* 12.7* 11.9* 10.9* 10.6*   PLATELETS 10*3/mm3 349 352 297 278 273             Assessment / Plan     ASSESSMENT:  TENISHA, nonoliguric, resolving, prerenal due to hypovolemia, hypotension, and impaired renal autoregulation due to Entresto, Aldactone and Jardiance.  Stable SCR following CTA yesterday.  Volume status is stable; normal potassium and likely NAGMA  Perihepatic fluid collection with mass effect on liver by CTA 10/26  Transaminitis  History of congestive heart failure ejection fraction 35%.  Currently compensated  Severe anemia   Encephalopathy  DM2      PLAN:  Kidney function is improving.  Will continue gentle IV hydration given n.p.o.  Labs in a.m.      Thank you for involving us in the care of Donnie Dario.  Please feel free to call with any questions.    Evan Pat MD  10/28/24  10:00 EDT    [facility]  [phone]    Parts of this note may be an electronic transcription/translation of spoken language to printed  text using the Dragon dictation system.    Electronically signed by Evan Pat MD at 10/28/24 1001       Girma Randhawa MD at 10/27/24 1632              [facility] Progress Note      Patient Name: Donnie Bishop  : 1975  MRN: [id number]  Primary Care Physician:  Juju Kennedy MD  Date of admission: 10/22/2024    Subjective     Interval History:   Follow-up TENISHA    CTA 10/26:  large right perihepatic fluid collection with mass effect on liver noted, among other abnormal findings    IR plans drainage of obdulia-hepatic fluid collection tomorrow  Looks uncomfortable; mostly nonverbal, moaning  No shortness of breath on room air  Drinking some    Review of Systems:   As noted above    Objective     Vitals:   Temp:  [97.2 °F (36.2 °C)-99.3 °F (37.4 °C)] 98.3 °F (36.8 °C)  Heart Rate:  [92-97] 92  Resp:  [27-38] 28  BP: (111-132)/(79-90) 132/90    Intake/Output Summary (Last 24 hours) at 10/27/2024 1632  Last data filed at 10/27/2024 1403  Gross per 24 hour   Intake 600 ml   Output 1350 ml   Net -750 ml       Physical Exam:    General: Awake, nonverbal, looks uncomfortable, moaning  Skin: warm and dry  HEENT: oral mucosa dry, nonicteric sclera  Neck: supple, no JVD  Lungs: Coarse anteriorly; no wheezing  Heart: RR, tachycardic, no rub  Abdomen: soft, + T, + D, BS hypoactive   : no palpable bladder  Extremities: no edema, cyanosis or clubbing  Neuro: Cognitive blunting; slow speech    Scheduled Meds:     [Held by provider] atorvastatin, 40 mg, Oral, Daily  [Held by provider] carvedilol, 6.25 mg, Oral, Q12H  insulin glargine, 14 Units, Subcutaneous, Nightly  insulin lispro, 2-7 Units, Subcutaneous, 4x Daily AC & at Bedtime  insulin lispro, 4 Units, Subcutaneous, TID With Meals  midodrine, 5 mg, Oral, TID AC  mupirocin, 1 Application, Each Nare, BID  pantoprazole, 40 mg, Oral, BID AC  piperacillin-tazobactam, 3.375 g, Intravenous, Q8H  senna-docusate sodium, 2 tablet, Oral,  BID   And  polyethylene glycol, 17 g, Oral, BID  venlafaxine XR, 75 mg, Oral, Daily      IV Meds:   sodium chloride, 100 mL/hr, Last Rate: 100 mL/hr (10/27/24 1614)          Results Reviewed:   I have personally reviewed the results from the time of this admission to 10/27/2024 16:32 EDT     Results from last 7 days   Lab Units 10/27/24  0329 10/26/24  0545 10/25/24  1544 10/25/24  0426   SODIUM mmol/L 137 137 129* 132*   POTASSIUM mmol/L 4.7 4.7 4.4 4.5   CHLORIDE mmol/L 104 103 99 98   CO2 mmol/L 19.0* 19.0* 16.9* 20.0*   BUN mg/dL 32* 36* 37* 38*   CREATININE mg/dL 1.12 1.22 1.38* 1.75*   CALCIUM mg/dL 9.8 9.5 9.0 8.6   BILIRUBIN mg/dL 0.8 1.1  --  1.8*   ALK PHOS U/L 140* 99  --  72   ALT (SGPT) U/L 133* 92*  --  77*   AST (SGOT) U/L 172* 108*  --  148*   GLUCOSE mg/dL 223* 133* 211* 213*       Estimated Creatinine Clearance: 82.9 mL/min (by C-G formula based on SCr of 1.12 mg/dL).    Results from last 7 days   Lab Units 10/27/24  0329 10/26/24  0545 10/25/24  1544   MAGNESIUM mg/dL 2.5 2.4  --    PHOSPHORUS mg/dL 2.9 3.4 3.8       Results from last 7 days   Lab Units 10/24/24  0617   URIC ACID mg/dL 5.4       Results from last 7 days   Lab Units 10/27/24  0329 10/26/24  0545 10/25/24  0426 10/24/24  2126 10/24/24  0617   WBC 10*3/mm3 10.64 9.44 7.93 9.87 10.27   HEMOGLOBIN g/dL 12.7* 11.9* 10.9* 10.6* 10.7*   PLATELETS 10*3/mm3 352 297 278 273 266             Assessment / Plan     ASSESSMENT:  TENISHA, nonoliguric, resolving, prerenal due to hypovolemia, hypotension, and impaired renal autoregulation due to Entresto, Aldactone and Jardiance.  Stable SCR following CTA yesterday.  Volume status is stable; normal potassium and likely NAGMA  Perihepatic fluid collection with mass effect on liver by CTA 10/26  Transaminitis  History of congestive heart failure ejection fraction 35%.  Currently compensated  Severe anemia   Encephalopathy  DM2      PLAN:  Reduce IVF rate since he is drinking well  Stop statin given  "transaminitis  Drainage of fluid collection by liver likely tomorrow      Thank you for involving us in the care of Donnie Bishop.  Please feel free to call with any questions.    Girma Randhawa MD  10/27/24  16:32 EDT    [facility]  [phone]    Parts of this note may be an electronic transcription/translation of spoken language to printed text using the Dragon dictation system.    Electronically signed by Girma Randhawa MD at 10/27/24 1636       Manuela Wisdom MD at 10/27/24 1557          General Surgery Progress Note    CC: Cholecystitis, right upper quadrant abdominal pain    S: Patient is once again very drowsy when I come to see him.  He is falling asleep eating Jell-O.  He does wake up and tell me he is still having pain in his right upper side.  He denies pain in his left abdomen.  He has passed gas but not had a bowel movement.  He has not thrown up today but has asked for nausea medicine per the nurse.    O:/90 (BP Location: Right arm)   Pulse 92   Temp 98.3 °F (36.8 °C) (Oral)   Resp 28   Ht 170 cm (66.93\")   Wt 81 kg (178 lb 9.2 oz)   SpO2 95%   BMI 28.03 kg/m²     Intake & Output (last day)         10/26 0701  10/27 0700 10/27 0701  10/28 0700    P.O. 120 600    I.V. (mL/kg)      IV Piggyback      Total Intake(mL/kg) 120 (1.5) 600 (7.4)    Urine (mL/kg/hr) 1475 (0.8) 400 (0.6)    Total Output 1475 400    Net -1355 +200          Urine Unmeasured Occurrence 1 x 1 x            GENERAL: Ill-appearing, answers a few questions but is somnolent  HEENT: EOMI, dry mucus membranes   CHEST: normal work of breathing on room air  CARDIAC: Regular rate, normotensive on monitor  GI: Abd soft, mildly distended, remains tender in the right upper quadrant without rebound or guarding, negative Ferro sign  EXTREMITIES: MOCK, no cyanosis    SKIN: Warm and dry    LABS  Results from last 7 days   Lab Units 10/27/24  0329 10/26/24  0545 10/25/24  0426 10/24/24  2126 "   WBC 10*3/mm3 10.64 9.44 7.93 9.87   HEMOGLOBIN g/dL 12.7* 11.9* 10.9* 10.6*   HEMATOCRIT % 38.7 36.7* 32.4* 31.2*   PLATELETS 10*3/mm3 352 297 278 273   MONOCYTES % % 7.1  --  12.2* 9.0   EOSINOPHIL % % 0.0*  --  0.0* 0.0*     Results from last 7 days   Lab Units 10/27/24  0329 10/26/24  0545 10/25/24  1544 10/25/24  0426   SODIUM mmol/L 137 137 129* 132*   POTASSIUM mmol/L 4.7 4.7 4.4 4.5   CHLORIDE mmol/L 104 103 99 98   CO2 mmol/L 19.0* 19.0* 16.9* 20.0*   BUN mg/dL 32* 36* 37* 38*   CREATININE mg/dL 1.12 1.22 1.38* 1.75*   CALCIUM mg/dL 9.8 9.5 9.0 8.6   BILIRUBIN mg/dL 0.8 1.1  --  1.8*   ALK PHOS U/L 140* 99  --  72   ALT (SGPT) U/L 133* 92*  --  77*   AST (SGOT) U/L 172* 108*  --  148*   GLUCOSE mg/dL 223* 133* 211* 213*         IMAGING:  CT head 10/27/2024 report reviewed, there is right thalamus hyperattenuating area concerning for possible intraparenchymal hemorrhage versus calcification    A/P: 49 y.o. male with multiple medical problems including CHF with EF of 35%, hypertension, hyperlipidemia, type 2 diabetes mellitus with A1c 9.4, who came into the hospital complaining of abdominal pain.  He has undergone workup for possible cholecystitis with initial CT demonstrating contracted gallbladder with mild hyperenhancing wall, EGD negative for abnormalities, and HIDA scan demonstrating filling but with decreased ejection fraction concerning for possible chronic cholecystitis.  Initially with gallbladder filling on HIDA less likely for acute cholecystitis, and possibility of other etiology such as some element of congestion due to heart failure with ascites was noted. He has been on zosyn with normalization of his WBC. He had worsening pain 10/26 and underwent CTA abdomen pelvis to evaluate for mesenteric ischemia.  This demonstrated a large right subcapsular fluid collection with mass effect on the liver.  I reviewed his imaging with Dr. Vasquez, noted prior imaging where gallbladder did appear normal in  August. Based on his stable vitals and Hgb and no blush on CT I am doubtful of hematoma. I discussed with Dr. Morrell with IR.  The etiology of this subcapsular liver collection remains unclear but my suspicion is that he has a possible biloma vs hematoma.  It is definitely a source of his pain.     Based on him recently having normal-appearing gallbladder and presenting with evidence of cholecystitis without decompensated heart failure, resolution of his leukocytosis on IV antibiotics, and normal EGD with abnormal HIDA, his gallbladder does appear to be involved although the cause of his liver collection remains unclear. I discussed with Dr. Benson with IR regarding possible drainage of this subcapsular fluid collection tomorrow to define the etiology and hopefully decrease the patient's pain.  NPO after midnight for this. And given the gallbladder involvement and patient's CHF, will ask Cardiology for preoperative risk assessment for cholecystectomy.    Manuela Wisdom MD  General, Robotic and Endoscopic Surgery  [facility]    [address], [address]  [place], [place], [address]  P: [phone]  F: [phone]       Electronically signed by Manuela Wisdom MD at 10/27/24 183       Erna Walsh MD at 10/27/24 1234          [facility]  Progress note: 10/27/2024    Patient Identification:  Name:Donnie Bishop  Age:49 y.o.  Sex: male  :  1975  MRN: [id number]           CC:  Cardiomyopathy    Interval history:  Vital stable overnight.  Arousable but not appropriate.  Not verbalizing well vital stable    Vital Signs:   Temp:  [97.2 °F (36.2 °C)-99.3 °F (37.4 °C)] 99.3 °F (37.4 °C)  Heart Rate:  [89-97] 92  Resp:  [16-38] 28  BP: ()/(67-84) 111/81    Intake/Output Summary (Last 24 hours) at 10/27/2024 1234  Last data filed at 10/27/2024 1009  Gross per 24 hour   Intake 360 ml   Output 1875 ml   Net -1515 ml       Physical Examination:    General Appearance No acute distress   Neck  No adenopathy, supple, trachea midline, no thyromegaly, no carotid bruit, no JVD   Lungs Clear to auscultation,respirations regular, even and unlabored   Heart Regular rhythm and normal rate, normal S1 and S2, no murmur, no gallop, no rub, no click   Chest wall No abnormalities observed   Abdomen Normal bowel sounds, no masses, no hepatomegaly, soft   Extremities No edema, no cyanosis, no redness   Neurological Alert and oriented x0     Lab Review:  Personally reviewed the labs, radiology imaging and other cardiac procedures.   Results from last 7 days   Lab Units 10/27/24  0329   SODIUM mmol/L 137   POTASSIUM mmol/L 4.7   CHLORIDE mmol/L 104   CO2 mmol/L 19.0*   BUN mg/dL 32*   CREATININE mg/dL 1.12   CALCIUM mg/dL 9.8   BILIRUBIN mg/dL 0.8   ALK PHOS U/L 140*   ALT (SGPT) U/L 133*   AST (SGOT) U/L 172*   GLUCOSE mg/dL 223*         Results from last 7 days   Lab Units 10/27/24  0329 10/26/24  0545 10/25/24  0426   WBC 10*3/mm3 10.64 9.44 7.93   HEMOGLOBIN g/dL 12.7* 11.9* 10.9*   HEMATOCRIT % 38.7 36.7* 32.4*   PLATELETS 10*3/mm3 352 297 278         Medication Review:   Meds reviewed  Scheduled Meds:[Held by provider] atorvastatin, 40 mg, Oral, Daily  [Held by provider] carvedilol, 6.25 mg, Oral, Q12H  heparin (porcine), 5,000 Units, Subcutaneous, Q12H  insulin glargine, 14 Units, Subcutaneous, Nightly  insulin lispro, 2-7 Units, Subcutaneous, 4x Daily AC & at Bedtime  insulin lispro, 4 Units, Subcutaneous, TID With Meals  midodrine, 5 mg, Oral, TID AC  mupirocin, 1 Application, Each Nare, BID  pantoprazole, 40 mg, Oral, BID AC  piperacillin-tazobactam, 3.375 g, Intravenous, Q8H  senna-docusate sodium, 2 tablet, Oral, BID   And  polyethylene glycol, 17 g, Oral, BID  venlafaxine XR, 75 mg, Oral, Daily      Continuous Infusions:sodium chloride, 100 mL/hr, Last Rate: 100 mL/hr (10/27/24 8422)    nd interpreted the patient's EKG/Telemetry data    Assessment and Plan  1.  Abdominal pain with large subscapular fluid  collection with mass effect on the liver.  EGD unremarkable statin is on hold.  Surgical team following though abdomen feels soft at the moment  2.  History of nonischemic cardiomyopathy chronic combined systolic diastolic heart failure.  Echo yesterday EF 50% no significant valvular heart disease. Currently Entresto carvedilol spironolactone on hold.  Hopefully can resume this in the next several days if blood pressure continues to improve.  Reassess in a.m. and favor initiation of carvedilol first  3.  Acute renal injury  4.  Chronic cholecystitis transaminitis  5.  Encephalopathy    6.  History of hypertension and now with hypotension.  Blood pressure improved on midodrine.  Dose decreased today.  7.  Diabetes      Mini Walsh  10/27/106917:34 EDT  35min spent in reviewing records, discussion and examination of the patient and discussion with other members of the patient's medical team.     Dictated utilizing Dragon dictation     Electronically signed by Erna Walsh MD at 10/27/24 1439       Law Melo MD at 10/27/24 1222           [facility]     Progress Note    Patient Name: Donnie Bishop  : 1975  MRN: [id number]  Primary Care Physician:  Juju Kennedy MD  Date of admission: 10/22/2024    Subjective   Subjective     Chief Complaint: right sided abdominal pain     History of Present Illness  Patient Reports feeling sore in the abdomen    Review of Systems   Gastrointestinal:  Positive for abdominal distention and abdominal pain.   Neurological:  Positive for weakness.       Objective   Objective     Vitals:   Temp:  [97.2 °F (36.2 °C)-99.3 °F (37.4 °C)] 99.3 °F (37.4 °C)  Heart Rate:  [89-97] 92  Resp:  [16-38] 28  BP: ()/(67-84) 111/81    Physical Exam  Constitutional:       Appearance: He is ill-appearing.   HENT:      Right Ear: External ear normal.      Left Ear: External ear normal.      Mouth/Throat:      Pharynx: Oropharynx is clear.   Eyes:      Conjunctiva/sclera: Conjunctivae  "normal.   Cardiovascular:      Rate and Rhythm: Normal rate.      Pulses: Normal pulses.   Abdominal:      General: There is distension.      Tenderness: There is abdominal tenderness. There is no guarding.   Skin:     General: Skin is warm.   Neurological:      General: No focal deficit present.   Psychiatric:         Mood and Affect: Mood normal.          Result Review    Result Review:  I have personally reviewed the results from the time of this admission to 10/27/2024 12:22 EDT and agree with these findings:  []  Laboratory list / accordion  []  Microbiology  []  Radiology  []  EKG/Telemetry   []  Cardiology/Vascular   []  Pathology  []  Old records  []  Other:  Most notable findings include:       Assessment & Plan   Assessment / Plan     Brief Patient Summary:  Donnie Bishop is a 49 y.o. male who   Abdominal pain  Large subcapsular fluid collection in the right lobe of liver   Suspect chronic cholycystitis/gallbladder dysfunction  \"Gastritis \" on imaging but egd was normal  History of heart failure    Active Hospital Problems:  Active Hospital Problems    Diagnosis     **Epigastric abdominal pain     Leukocytosis     Chronic combined systolic and diastolic heart failure     Abdominal pain     Gastritis     Anxiety     Multiple-type hyperlipidemia     Type 2 diabetes mellitus with diabetic neuropathy, with long-term current use of insulin     Benign essential HTN      Plan: Reviewed with Dr Wisdom, plans for drainage of fluid in  the liver  May need eventual cholycystectomy  Will sign off for now, please contact [facility] for any questions or concerns       VTE Prophylaxis:  Pharmacologic & mechanical VTE prophylaxis orders are present.        CODE STATUS:    Level Of Support Discussed With: Patient  Code Status (Patient has no pulse and is not breathing): CPR (Attempt to Resuscitate)  Medical Interventions (Patient has pulse or is breathing): Full Support    Disposition:  I expect patient to be discharged " not known     Law Melo MD               Electronically signed by Law Melo MD at 10/27/24 9384       Yaritza Hurtado MD at 10/27/24 1102              Name: Donnie Bishop ADMIT: 10/22/2024   : 1975  PCP: Juju Kennedy MD    MRN: [id number] LOS: 3 days   AGE/SEX: 49 y.o. male  ROOM: [facility]     Subjective   Subjective   Laying in bed, overnight RN at bedside.  No acute events overnight.  Continues to complain of abdominal pain.  Denies drowsy, intermittently confused.    Review of Systems  As above  Objective   Objective   Vital Signs  Temp:  [97.2 °F (36.2 °C)-99.3 °F (37.4 °C)] 99.3 °F (37.4 °C)  Heart Rate:  [85-97] 92  Resp:  [16-38] 28  BP: ()/(67-84) 111/81  SpO2:  [95 %-100 %] 95 %  on   ;   Device (Oxygen Therapy): room air  Body mass index is 28.03 kg/m².  Physical Exam    General: Lying in bed, drowsy but arousable, ill-appearing,  HEENT: Normocephalic, atraumatic  CV: Regular rate and rhythm, no murmurs rubs or gallops  Lungs: CTA anteriorly, no wheezing, tachypnea  Abdomen: Soft, distended, tender to palpation epigastric and right upper quadrant region  Extremities: Trace edema in lower extremities,, no cyanosis     Results Review     I reviewed the patient's new clinical results.  Results from last 7 days   Lab Units 10/27/24  0329 10/26/24  0545 10/25/24  0426 10/24/24  2126   WBC 10*3/mm3 10.64 9.44 7.93 9.87   HEMOGLOBIN g/dL 12.7* 11.9* 10.9* 10.6*   PLATELETS 10*3/mm3 352 297 278 273     Results from last 7 days   Lab Units 10/27/24  0329 10/26/24  0545 10/25/24  1544 10/25/24  0426   SODIUM mmol/L 137 137 129* 132*   POTASSIUM mmol/L 4.7 4.7 4.4 4.5   CHLORIDE mmol/L 104 103 99 98   CO2 mmol/L 19.0* 19.0* 16.9* 20.0*   BUN mg/dL 32* 36* 37* 38*   CREATININE mg/dL 1.12 1.22 1.38* 1.75*   GLUCOSE mg/dL 223* 133* 211* 213*   Estimated Creatinine Clearance: 82.9 mL/min (by C-G formula based on SCr of 1.12 mg/dL).  Results from last 7 days   Lab Units 10/27/24  1643  10/26/24  0545 10/25/24  1544 10/25/24  0426 10/22/24  1557   ALBUMIN g/dL 2.7* 3.0* 2.8* 3.2* 4.6   BILIRUBIN mg/dL 0.8 1.1  --  1.8* 2.6*   ALK PHOS U/L 140* 99  --  72 60   AST (SGOT) U/L 172* 108*  --  148* 10   ALT (SGPT) U/L 133* 92*  --  77* 11     Results from last 7 days   Lab Units 10/27/24  0329 10/26/24  0545 10/25/24  1544 10/25/24  0426   CALCIUM mg/dL 9.8 9.5 9.0 8.6   ALBUMIN g/dL 2.7* 3.0* 2.8* 3.2*   MAGNESIUM mg/dL 2.5 2.4  --   --    PHOSPHORUS mg/dL 2.9 3.4 3.8  --      Results from last 7 days   Lab Units 10/26/24  0545 10/25/24  0426 10/24/24  1404 10/24/24  0617 10/24/24  0012 10/23/24  2104   PROCALCITONIN ng/mL 30.30* 78.10*  --   --   --   --    LACTATE mmol/L  --   --  3.8* 3.3* 3.6* 4.5*     COVID19   Date Value Ref Range Status   10/25/2024 Not Detected Not Detected - Ref. Range Final   12/26/2023 Not Detected Not Detected - Ref. Range Final     Glucose   Date/Time Value Ref Range Status   10/27/2024 1048 275 (H) 70 - 130 mg/dL Final   10/27/2024 0705 234 (H) 70 - 130 mg/dL Final   10/26/2024 2109 234 (H) 70 - 130 mg/dL Final   10/26/2024 1558 222 (H) 70 - 130 mg/dL Final   10/26/2024 1138 156 (H) 70 - 130 mg/dL Final   10/26/2024 0745 130 70 - 130 mg/dL Final   10/25/2024 2040 155 (H) 70 - 130 mg/dL Final           CT Angiogram Abdomen Pelvis  Addendum: Call Report: Surgery called to discuss the case. Collection along the   right hepatic lobe appears subcapsular, with mass effect on the liver       This report was finalized on 10/26/2024 5:11 PM by Dr. Connor Vasquez M.D on Workstation: [id number]         Call Report: Dr. Melo was notified by telephone of the above findings on   October 26, 2024 at 3:08 p.m.       This report was finalized on 10/26/2024 3:08 PM by Dr. Connor Vasquez M.D on Workstation: [id number]  Narrative: CT ANGIOGRAM ABDOMEN PELVIS-     INDICATION: Severe pain.     COMPARISON: CT abdomen pelvis October 22, 2024     TECHNIQUE:  Routine CTA abdomen and  pelvis with IV contrast. Arterial and delayed  phase. Coronal and sagittal reformats. Three dimensional  reconstructions. Radiation dose reduction techniques were utilized,  including automated exposure control and exposure modulation based on  body size.     FINDINGS:      Lung bases: Small right greater than left pleural effusions. Bibasilar  lung opacities with large amount of opacity in the right lower lobe,  with volume loss and lung enhancement. Large amount of opacity in the  right middle lobe, with volume loss and lung enhancement. Suspected  subsegmental atelectasis at the left lung base. Mild cardiomegaly.     ABDOMEN: Large fluid collection seen lateral to the right hepatic lobe,  with mass effect on the liver, series 6, axial mage 54, measures 18.2 x  6.1 x 23.4 cm, series 7, coronal image 68, with the fluid collection  extending over the hepatic dome and surrounding the inferior right  hepatic lobe, extends medially to the level of the mid duodenum, series  6, axial mage 105 where there is a fluid collection measures 2.8 x 3.0  cm. Small fluid collection seen adjacent to the gallbladder neck, series  6, axial mage 87, measuring 2.2 x 2.0 cm.     Gallbladder appears partially contracted. Gallbladder wall is mildly  thickened. No biliary ductal dilatation. Spleen is normal in size. No  pancreatic mass or pancreatic ductal dilatation seen. Nodular thickening  of the adrenal glands. Symmetric perinephric edema. No solid-appearing  renal mass or hydronephrosis. Excreted contrast in the urinary  collecting system.     Pelvis: Prostate is normal in size. Mild bladder distention. No bladder  calculus. Small amount of free fluid seen in the rectovesicular pouch,  extending over the bladder dome.     Bowel: Mild gastric antral wall thickening with stranding/inflammation  in the fat in the right upper quadrant adjacent to the distal stomach  and proximal duodenum. [place]-shaped hyperattenuating area seen in  the  gastric antrum on the arterial phase, series 6, axial mage 95, not seen  on the delayed phase though there is a similar appearing area in the D3  segment of the duodenum on the delayed phase, series 10, axial image 75,  suspect bowel content though a noncontrast CT is not available. Moderate  gas and stool seen in the rectum. Moderate gas and stool seen in the  colon. Normal appendix. Small amount of free fluid in the right  paracolic gutter.     Abdominal wall: Body wall edema/anasarca seen in the flanks.  Periumbilical abdominal wall scarring. Small fat-containing umbilical  hernia.     Retroperitoneum: No lymphadenopathy.     Vasculature: Patent. No abdominal aortic aneurysm. No aortic dissection.     Osseous structures: Suspect bone island in the left ilium.     Impression:    1. SMA and SMV are patent. No vessel occlusions identified.  2. Large right perihepatic fluid collection with mass effect on the  liver, new. Consider drain placement and sampling to determine the  etiology of the fluid collection.  3. Smaller fluid collections seen adjacent to the gallbladder and  lateral to the mid duodenum.  4. Mild distal gastric antral wall thickening with surrounding  inflammatory changes in the right upper quadrant, extend of the  gallbladder which appears partially contracted with a mildly thickened  wall. Recommend correlation with endoscopy findings and recent HIDA scan     5. Nodular hyperplasia of the adrenal glands.  6. Third spacing with small right greater than left pleural effusions,  body wall edema and small amount of free intraperitoneal fluid that is  increased.  7. Opacities at the lung bases with normal enhancement with suspect  large amount of atelectasis in the right middle lobe and subtotal  collapse of the right lower lobe.  8. Mild cardiomegaly     This report was finalized on 10/26/2024 2:53 PM by Dr. Connor Vasquez M.D on Workstation: [id number]     XR Chest 1 View  Narrative:  Portable chest radiograph     HISTORY: Tachypnea     TECHNIQUE: Single AP portable radiograph of the chest     COMPARISON: Chest radiograph dating back to 12/17/2023     Impression: FINDINGS AND IMPRESSION:  Lungs are hypoinflated. There is roughly symmetric mild to moderate  pulmonary opacification throughout the bilateral lungs, left greater  than right. No pneumothorax is seen. Suggestion of a small left pleural  effusion. Findings raise concern for atelectasis, pneumonia and/or  pulmonary edema in the proper clinical context and correlation patient  history is recommended with at least continued attention on follow-up to  ensure resolution.     Cardiac silhouette is accentuated by low lung volumes and cannot be  assessed. Mild to moderate asymmetric elevation of the right  hemidiaphragm.     This report was finalized on 10/26/2024 3:18 PM by Dr. Michael Avelar M.D on Workstation: [id number]     Adult Transthoracic Echo Complete W/ Cont if Necessary Per Protocol    Left ventricular systolic function is low normal. Calculated left   ventricular EF = 49.7%    The following left ventricular wall segments are hypokinetic: apical   septal and apex hypokinetic.    Left ventricular diastolic function was normal.    Estimated right ventricular systolic pressure from tricuspid   regurgitation is mildly elevated (35-45 mmHg). Calculated right   ventricular systolic pressure from tricuspid regurgitation is 40 mmHg.    Mild mitral valve regurgitation is present.    Scheduled Medications  [Held by provider] atorvastatin, 40 mg, Oral, Daily  [Held by provider] carvedilol, 6.25 mg, Oral, Q12H  heparin (porcine), 5,000 Units, Subcutaneous, Q12H  insulin glargine, 14 Units, Subcutaneous, Nightly  insulin lispro, 2-7 Units, Subcutaneous, 4x Daily AC & at Bedtime  insulin lispro, 4 Units, Subcutaneous, TID With Meals  midodrine, 5 mg, Oral, TID AC  mupirocin, 1 Application, Each Nare, BID  pantoprazole, 40 mg, Oral, BID  AC  piperacillin-tazobactam, 3.375 g, Intravenous, Q8H  senna-docusate sodium, 2 tablet, Oral, BID   And  polyethylene glycol, 17 g, Oral, BID  venlafaxine XR, 75 mg, Oral, Daily    Infusions  sodium chloride, 100 mL/hr, Last Rate: 100 mL/hr (10/27/24 0422)    Diet  Diet: Liquid; Clear Liquid; Fluid Consistency: Thin (IDDSI 0)    I have personally reviewed     [x]  Laboratory   [x]  Microbiology   [x]  Radiology   [x]  EKG/Telemetry  []  Cardiology/Vascular   []  Pathology    []  Records      Assessment/Plan     Active Hospital Problems    Diagnosis  POA    **Epigastric abdominal pain [R10.13]  Unknown    Leukocytosis [D72.829]  Yes    Chronic combined systolic and diastolic heart failure [I50.42]  Yes    Abdominal pain [R10.9]  Yes    Gastritis [K29.70]  Yes    Anxiety [F41.9]  Yes    Multiple-type hyperlipidemia [E78.2]  Yes    Type 2 diabetes mellitus with diabetic neuropathy, with long-term current use of insulin [E11.40, Z79.4]  Not Applicable    Benign essential HTN [I10]  Yes      Resolved Hospital Problems   No resolved problems to display.       Patient is a 49 y.o. man with type 2 diabetes, HTN, n, hyperlipidemia, chronic combined systolic and diastolic heart failure (non-ischemic), anxiety who presented with severe abdominal/epigastric pain.    Epigastric abdominal pain/right upper quadrant abdominal pain  Transaminitis  Chronic cholecystitis  large right subcapsular fluid collection with mass effect on the liver.   -EGD 10/24/2024 with no acute findings  -On PPI  -HIDA scan concerning for chronic cholecystitis versus bili dyskinesia  -Elevated liver enzymes could be ischemic liver injury as well in the setting of hypotension which now has improved.  -Monitor daily CMP, AST/bilirubin improved.  ALT slightly up today.  -Hold statin in the setting of transaminitis  -Duplex ultrasound of mesenteric arteries was ordered however was unable to be completed due to bowel gas and patient's inability to hold his  breath per report  -CT angiogram of the abdomen was obtained 10/26/2024 which showed large right perihepatic fluid collection with mass effect on the liver, new.  Patent blood vessels.  -General surgery following, unclear etiology of findings,       Hypotension  -On midodrine, status post IV fluids.  BP improved.  -ECHO  10/26/2024 showed EF of 49.7%  -  Blood pressure meds stable.  Decrease midodrine to 5 mg 3 times daily.  -     TENISHA/hyponatremia  -Creatinine peaked at 1.75 on 10/25/2024, creatinine was 1.07 on admission  -Nephrology managing, status post IV fluids, creatinine improved.     Infection/sepsis?  -WBC was 13.79 on admission, patient has been afebrile since admission  -Procalcitonin significantly elevated at 78.10 10/25/2024-repeat improved  HIDA scan showed possible chronic cholecystitis, general surgery evaluated, no indication for surgery currently no signs of acute cholecystitis and findings could be related secondary to congestive hepatopathy  -Blood cultures remain negative to date  -Large collection seen on CT with infectious cause/abscess on differential  -Continue empiric IV Zosyn    Anemia   Iron panel consistent with anemia chronic disease with ferritin of 943, B12 and folate normal  Monitor daily CBC, transfuse as indicated for symptomatic anemia or hemoglobin 7    Encephalopathy  -Suspect secondary to acute illness/pain meds/infection/TENISHA  -No gross neurological deficits  -Patient remains confused.  Ordered CT head to rule out acute findings.    Type II DM   -Blood glucose not at goal.  Increase Lantus to 14 units nightly, increase scheduled lispro to 4 units 3 times daily, continue SSI.      DVT prophylaxis.  subcu heparin  Full code.  Discussed with patient and nursing staff.  Expected Discharge Date: 10/28/2024; Expected Discharge Time:        Copied text in this note has been reviewed and is accurate as of 10/27/24.         Dictated utilizing Dragon dictation        Yaritza Hurtado,  MD  [facility]  10/27/24  11:13 EDT        Electronically signed by Yaritza Hurtado MD at 10/28/24 1216       Girma Randhawa MD at 10/26/24 1759              [facility] Progress Note      Patient Name: Donnie Bishop  : 1975  MRN: [id number]  Primary Care Physician:  Juju Kennedy MD  Date of admission: 10/22/2024    Subjective     Interval History:   Follow-up TENISHA    Continues to complain of abdominal pain; cannot localize any further  No shortness of breath on 3 L/min; no chest pain  CTA performed earlier today; large right perihepatic fluid collection with mass effect on liver noted, among other abnormal findings    Review of Systems:   As noted above    Objective     Vitals:   Temp:  [97.6 °F (36.4 °C)-98.6 °F (37 °C)] 98.5 °F (36.9 °C)  Heart Rate:  [85-92] 92  Resp:  [16-37] 16  BP: ()/(67-84) 126/84  Flow (L/min) (Oxygen Therapy):  [3] 3    Intake/Output Summary (Last 24 hours) at 10/26/2024 1759  Last data filed at 10/26/2024 1644  Gross per 24 hour   Intake 620 ml   Output 2075 ml   Net -1455 ml       Physical Exam:    General Appearance: Awake, slow to answer, uncomfortable, moaning  Skin: warm and dry  HEENT: oral mucosa dry, nonicteric sclera  Neck: supple, no JVD  Lungs: Coarse anteriorly; no wheezing  Heart: RR, tachycardic, no rub  Abdomen: soft, + T, + D, BS hypoactive   : no palpable bladder  Extremities: no edema, cyanosis or clubbing  Neuro: Cognitive blunting; slow speech    Scheduled Meds:     [Held by provider] atorvastatin, 40 mg, Oral, Daily  [Held by provider] carvedilol, 6.25 mg, Oral, Q12H  heparin (porcine), 5,000 Units, Subcutaneous, Q12H  insulin glargine, 10 Units, Subcutaneous, Nightly  insulin lispro, 2 Units, Subcutaneous, TID With Meals  insulin lispro, 2-7 Units, Subcutaneous, 4x Daily AC & at Bedtime  midodrine, 10 mg, Oral, TID AC  mupirocin, 1 Application, Each Nare, BID  pantoprazole, 40 mg, Oral,  BID AC  piperacillin-tazobactam, 3.375 g, Intravenous, Q8H  senna-docusate sodium, 2 tablet, Oral, BID  venlafaxine XR, 75 mg, Oral, Daily      IV Meds:          Results Reviewed:   I have personally reviewed the results from the time of this admission to 10/26/2024 17:59 EDT     Results from last 7 days   Lab Units 10/26/24  0545 10/25/24  1544 10/25/24  0426 10/23/24  0311 10/22/24  1557   SODIUM mmol/L 137 129* 132*   < > 136   POTASSIUM mmol/L 4.7 4.4 4.5   < > 4.8   CHLORIDE mmol/L 103 99 98   < > 96*   CO2 mmol/L 19.0* 16.9* 20.0*   < > 24.6   BUN mg/dL 36* 37* 38*   < > 9   CREATININE mg/dL 1.22 1.38* 1.75*   < > 1.07   CALCIUM mg/dL 9.5 9.0 8.6   < > 10.5   BILIRUBIN mg/dL 1.1  --  1.8*  --  2.6*   ALK PHOS U/L 99  --  72  --  60   ALT (SGPT) U/L 92*  --  77*  --  11   AST (SGOT) U/L 108*  --  148*  --  10   GLUCOSE mg/dL 133* 211* 213*   < > 183*    < > = values in this interval not displayed.       Estimated Creatinine Clearance: 76.1 mL/min (by C-G formula based on SCr of 1.22 mg/dL).    Results from last 7 days   Lab Units 10/26/24  0545 10/25/24  1544   MAGNESIUM mg/dL 2.4  --    PHOSPHORUS mg/dL 3.4 3.8       Results from last 7 days   Lab Units 10/24/24  0617   URIC ACID mg/dL 5.4       Results from last 7 days   Lab Units 10/26/24  0545 10/25/24  0426 10/24/24  2126 10/24/24  0617 10/23/24  1433   WBC 10*3/mm3 9.44 7.93 9.87 10.27 12.62*   HEMOGLOBIN g/dL 11.9* 10.9* 10.6* 10.7* 15.1   PLATELETS 10*3/mm3 297 278 273 266 399             Assessment / Plan     ASSESSMENT:  TENISHA, nonoliguric, improving, prerenal due to hypovolemia, hypotension, and impaired renal autoregulation due to Entresto, Aldactone and Jardiance.  May have had element of JOSEP, too.  Looks dry; normal potassium and likely NAGMA  Perihepatic fluid collection with mass effect on liver by CTA 10/26  Transaminitis  History of congestive heart failure ejection fraction 35%.  Currently compensated  Severe anemia  "  Encephalopathy  DM2      PLAN:  Resume IVF given contrast load today  Discussed with Dr. Melo earlier today.  Low risk for JOSEP given SCR well below 2 mg/dL      Thank you for involving us in the care of Donnie Bishop.  Please feel free to call with any questions.    Girma Randhawa MD  10/26/24  17:59 EDT    [facility]  [phone]    Parts of this note may be an electronic transcription/translation of spoken language to printed text using the Dragon dictation system.    Electronically signed by Girma Randhawa MD at 10/26/24 1746       Manuela Wisdom MD at 10/26/24 0595          General Surgery Progress Note    CC: Chronic cholecystitis, right upper quadrant abdominal pain    S: Patient is more alert and oriented today.  He is able to tell me that he is still hurting in his right upper quadrant and can point to the location.  He denies nausea.  He has not had vomiting.  He had severe pain this morning prompting a mesenteric duplex which cannot be obtained secondary to bowel gas and patient's inability to hold his breath.  He underwent CTA abdomen pelvis to evaluate for mesenteric ischemia.    O:/84   Pulse 92   Temp 98.5 °F (36.9 °C) (Oral)   Resp 16   Ht 170 cm (66.93\")   Wt 81 kg (178 lb 9.2 oz)   SpO2 100%   BMI 28.03 kg/m²     Intake & Output (last day)         10/25 0701  10/26 0700 10/26 0701  10/27 0700    P.O. 420 120    I.V. (mL/kg) 300 (3.7)     IV Piggyback 100     Total Intake(mL/kg) 820 (10.1) 120 (1.5)    Urine (mL/kg/hr) 1550 (0.8) 1125 (1.4)    Total Output 1550 1125    Net -730 -1005          Urine Unmeasured Occurrence 3 x 1 x            GENERAL: awake, alert, cooperative, answers questions appropriately   HEENT: EOMI, moist mucus membranes   CHEST: normal work of breathing on room air  CARDIAC: well perfused, heart rate 90s, regular, normotensive on monitor, no peripheral edema  GI: Abd soft, mildly distended, tender in the right upper " quadrant without rebound or guarding  EXTREMITIES: MOCK, no cyanosis    SKIN: Warm and dry    LABS  Results from last 7 days   Lab Units 10/26/24  0545 10/25/24  0426 10/24/24  2126 10/24/24  0617   WBC 10*3/mm3 9.44 7.93 9.87 10.27   HEMOGLOBIN g/dL 11.9* 10.9* 10.6* 10.7*   HEMATOCRIT % 36.7* 32.4* 31.2* 32.9*   PLATELETS 10*3/mm3 297 278 273 266   MONOCYTES % %  --  12.2* 9.0 5.1   EOSINOPHIL % %  --  0.0* 0.0* 0.0*     Results from last 7 days   Lab Units 10/26/24  0545 10/25/24  1544 10/25/24  0426 10/23/24  0311 10/22/24  1557   SODIUM mmol/L 137 129* 132*   < > 136   POTASSIUM mmol/L 4.7 4.4 4.5   < > 4.8   CHLORIDE mmol/L 103 99 98   < > 96*   CO2 mmol/L 19.0* 16.9* 20.0*   < > 24.6   BUN mg/dL 36* 37* 38*   < > 9   CREATININE mg/dL 1.22 1.38* 1.75*   < > 1.07   CALCIUM mg/dL 9.5 9.0 8.6   < > 10.5   BILIRUBIN mg/dL 1.1  --  1.8*  --  2.6*   ALK PHOS U/L 99  --  72  --  60   ALT (SGPT) U/L 92*  --  77*  --  11   AST (SGOT) U/L 108*  --  148*  --  10   GLUCOSE mg/dL 133* 211* 213*   < > 183*    < > = values in this interval not displayed.         IMAGING:  CT angiogram abdomen and pelvis from today images and report reviewed, there is no vascular occlusion identified, there is a large right subcapsular fluid collection with mass effect on the liver which is new for which consideration of drain placement and sampling was recommended to determine the etiology of the fluid collection.  There are smaller fluid collections seen adjacent to the gallbladder and lateral to the mid duodenum.  There is mild distal gastric antral wall thickening with surrounding inflammatory changes in the right upper quadrant.  The gallbladder still looks partially contracted with a mildly thickened wall.  There is third spacing with pleural effusions, body wall edema, and free intraperitoneal fluid that has increased.    A/P: 49 y.o. male with multiple medical problems including CHF with EF of 35%, hypertension, hyperlipidemia, type 2  diabetes mellitus with A1c 9.4, who came into the hospital complaining of abdominal pain.  He has undergone workup for possible cholecystitis with initial CT demonstrating contracted gallbladder with mild hyperenhancing wall, EGD negative for abnormalities, and HIDA scan demonstrating filling but with decreased ejection fraction concerning for possible biliary dyskinesia vs chronic cholecystitis.  Initially with gallbladder filling on HIDA less likely for acute cholecystitis, and possibility of other etiology such as some element of congestion due to heart failure with ascites was noted. He has been on zosyn with normalization of his WBC.  On exam currently he has RUQ tenderness without peritoneal or Ferro's signs this afternoon.  He had severe pain this morning and underwent CTA abdomen pelvis to evaluate for mesenteric ischemia.  This demonstrates a large right subcapsular fluid collection with mass effect on the liver.  I reviewed his imaging with Dr. Vasquez, noted prior imaging where gallbladder did appear normal in August. Differential for his subcapsular liver collection includes biloma, hematoma, abscess/infectious source, among others. Based on his stable vitals and Hgb and no blush on CT I am doubtful of hematoma. I will discuss with IR possible evaluation for aspiration/drainage to clarify the source of the fluid.     Addendum:  Discussed with Dr. Morrell who reviewed the patient's images with me. Unclear source for this subcapsular liver collection. At this time my suspicion is possible biloma, though the source is not clear. Given that it is contained, I will plan to monitor the patient and plan for possible repeat scan depending on his clinical course.     Manuela Wisdom MD  General, Robotic and Endoscopic Surgery  [facility]    [address], [address]  [place], [place], [address]  P: [phone]  F: [phone]       Electronically signed by Manuela Wisdom MD at 10/26/24 1333

## 2024-10-28 NOTE — PLAN OF CARE
Problem: Adult Inpatient Plan of Care  Goal: Absence of Hospital-Acquired Illness or Injury  Intervention: Identify and Manage Fall Risk  Recent Flowsheet Documentation  Taken 10/27/2024 2200 by Katiuska Sanchez RN  Safety Promotion/Fall Prevention:   activity supervised   lighting adjusted   nonskid shoes/slippers when out of bed   safety round/check completed   room organization consistent  Taken 10/27/2024 2000 by Katiuska Sanchez RN  Safety Promotion/Fall Prevention:   activity supervised   lighting adjusted   nonskid shoes/slippers when out of bed   safety round/check completed   room organization consistent  Intervention: Prevent Skin Injury  Recent Flowsheet Documentation  Taken 10/27/2024 2000 by Katiuska Sanchez RN  Body Position:   position changed independently   sitting up in bed  Skin Protection:   drying agents applied   incontinence pads utilized   transparent dressing maintained  Intervention: Prevent and Manage VTE (Venous Thromboembolism) Risk  Recent Flowsheet Documentation  Taken 10/27/2024 2000 by Katiuska Sanchez RN  VTE Prevention/Management: SCDs (sequential compression devices) on  Intervention: Prevent Infection  Recent Flowsheet Documentation  Taken 10/27/2024 2200 by Katiuska Sanchez RN  Infection Prevention: hand hygiene promoted  Taken 10/27/2024 2000 by Katiuska Sanchez RN  Infection Prevention: hand hygiene promoted  Goal: Optimal Comfort and Wellbeing  Intervention: Monitor Pain and Promote Comfort  Recent Flowsheet Documentation  Taken 10/28/2024 0348 by Katiuska Sanchez RN  Pain Management Interventions: pain medication given  Taken 10/27/2024 2015 by Katiuska Sanchez RN  Pain Management Interventions: (PO) pain medication given  Taken 10/27/2024 2000 by Katiuska Sanchez RN  Pain Management Interventions: pain management plan reviewed with patient/caregiver  Intervention: Provide Person-Centered Care  Recent Flowsheet Documentation  Taken 10/27/2024 2000 by Katiuska Sanchez  RN  Trust Relationship/Rapport:   choices provided   care explained   thoughts/feelings acknowledged     Problem: Sepsis/Septic Shock  Goal: Blood Glucose Level Within Target Range  Intervention: Optimize Glycemic Control  Recent Flowsheet Documentation  Taken 10/27/2024 2000 by Katiuska Sanchez RN  Hyperglycemia Management: blood glucose monitored  Goal: Absence of Infection Signs and Symptoms  Intervention: Initiate Sepsis Management  Recent Flowsheet Documentation  Taken 10/27/2024 2200 by Katiuska Sanchez RN  Infection Prevention: hand hygiene promoted  Taken 10/27/2024 2000 by Katiuska Sanchez RN  Infection Prevention: hand hygiene promoted  Intervention: Promote Recovery  Recent Flowsheet Documentation  Taken 10/27/2024 2000 by Katiuska Sanchez RN  Activity Management: activity minimized     Problem: Fall Injury Risk  Goal: Absence of Fall and Fall-Related Injury  Intervention: Identify and Manage Contributors  Recent Flowsheet Documentation  Taken 10/27/2024 2200 by Katiuska Sanchez RN  Medication Review/Management: medications reviewed  Taken 10/27/2024 2000 by Katiuska Sanchez RN  Medication Review/Management: medications reviewed  Intervention: Promote Injury-Free Environment  Recent Flowsheet Documentation  Taken 10/27/2024 2200 by Katiuska Sanchez RN  Safety Promotion/Fall Prevention:   activity supervised   lighting adjusted   nonskid shoes/slippers when out of bed   safety round/check completed   room organization consistent  Taken 10/27/2024 2000 by Katiuska Sanchez RN  Safety Promotion/Fall Prevention:   activity supervised   lighting adjusted   nonskid shoes/slippers when out of bed   safety round/check completed   room organization consistent     Problem: Pain Acute  Goal: Optimal Pain Control and Function  Intervention: Optimize Psychosocial Wellbeing  Recent Flowsheet Documentation  Taken 10/27/2024 2000 by Katiuska Sanchez RN  Diversional Activities: television  Intervention: Develop Pain  Management Plan  Recent Flowsheet Documentation  Taken 10/28/2024 0348 by Katiuska Sanchez RN  Pain Management Interventions: pain medication given  Taken 10/27/2024 2015 by Katiuska Sanchez RN  Pain Management Interventions: (PO) pain medication given  Taken 10/27/2024 2000 by Katiuska Sanchez RN  Pain Management Interventions: pain management plan reviewed with patient/caregiver  Intervention: Prevent or Manage Pain  Recent Flowsheet Documentation  Taken 10/27/2024 2200 by Katiuska Sanchez RN  Medication Review/Management: medications reviewed  Taken 10/27/2024 2000 by Katiuska Sanchez RN  Bowel Elimination Promotion: (medications) adequate fluid intake promoted  Medication Review/Management: medications reviewed     Problem: Sepsis/Septic Shock  Goal: Blood Glucose Level Within Target Range  Intervention: Optimize Glycemic Control  Recent Flowsheet Documentation  Taken 10/27/2024 2000 by Katiuska Sanchez RN  Hyperglycemia Management: blood glucose monitored  Goal: Absence of Infection Signs and Symptoms  Intervention: Initiate Sepsis Management  Recent Flowsheet Documentation  Taken 10/27/2024 2200 by Katiuska Sanchez RN  Infection Prevention: hand hygiene promoted  Taken 10/27/2024 2000 by Katiuska Sanchez RN  Infection Prevention: hand hygiene promoted  Intervention: Promote Recovery  Recent Flowsheet Documentation  Taken 10/27/2024 2000 by Katiuska Sanchez RN  Activity Management: activity minimized     Problem: Sepsis/Septic Shock  Goal: Absence of Infection Signs and Symptoms  Intervention: Initiate Sepsis Management  Recent Flowsheet Documentation  Taken 10/27/2024 2200 by Katiuska Sanchez RN  Infection Prevention: hand hygiene promoted  Taken 10/27/2024 2000 by Katiuska Sanchez RN  Infection Prevention: hand hygiene promoted  Intervention: Promote Recovery  Recent Flowsheet Documentation  Taken 10/27/2024 2000 by Katiuska Sanchez RN  Activity Management: activity minimized   Goal Outcome Evaluation:

## 2024-10-28 NOTE — TELEPHONE ENCOUNTER
Provider: DR HALLERON    Caller: LAURA ORNELAS    Relationship to Patient: SPOUSE        Phone Number: [phone]    Reason for Call: PATIENT'S WIFE IS CALLING TO ADVISE DR GARZA THAT PATIENT IS IN THE HOSPITAL AT [facility].  SHE STATES SHE WILL KEEP DR GARZA POSTED AND WILL BE NEEDING SOME [facility] PAPERWORK COMPLETED    PLEASE ADVISE.

## 2024-10-28 NOTE — PROGRESS NOTES
"General Surgery Progress Note    CC: Cholecystitis, right upper quadrant abdominal pain    S: Patient more appropriately answering questions today. States he is thirsty. Stable pain in RUQ. Passing gas, no BM.     O:/79 (BP Location: Right arm, Patient Position: Lying)   Pulse 107   Temp 99 °F (37.2 °C) (Oral)   Resp 22   Ht 170 cm (66.93\")   Wt 85.2 kg (187 lb 13.3 oz)   SpO2 (!) 88% Comment: 88 % on room air, placed on o2 at 2 liters per nc, now  93%  BMI 29.48 kg/m²     Intake & Output (last day)         10/27 0701  10/28 0700 10/28 0701  10/29 0700    P.O. 600     Total Intake(mL/kg) 600 (7)     Urine (mL/kg/hr) 400 (0.2) 350 (0.8)    Total Output 400 350    Net +200 -350          Urine Unmeasured Occurrence 3 x             GENERAL: Ill-appearing   HEENT: EOMI, dry mucus membranes   CHEST: normal work of breathing on room air  CARDIAC: palpable distal pulses, no pedal edema  GI: Abd soft, mildly distended, moderately tender in the right upper quadrant without rebound or guarding; no left sided tenderness  EXTREMITIES: MOCK, no cyanosis    SKIN: Warm and dry    LABS  Results from last 7 days   Lab Units 10/27/24  2310 10/27/24  0329 10/26/24  0545 10/25/24  0426   WBC 10*3/mm3 14.14* 10.64 9.44 7.93   HEMOGLOBIN g/dL 12.0* 12.7* 11.9* 10.9*   HEMATOCRIT % 36.6* 38.7 36.7* 32.4*   PLATELETS 10*3/mm3 349 352 297 278   MONOCYTES % % 10.1 7.1  --  12.2*   EOSINOPHIL % % 0.0* 0.0*  --  0.0*     Results from last 7 days   Lab Units 10/27/24  2205 10/27/24  0329 10/26/24  0545   SODIUM mmol/L 137 137 137   POTASSIUM mmol/L 3.9 4.7 4.7   CHLORIDE mmol/L 106 104 103   CO2 mmol/L 19.0* 19.0* 19.0*   BUN mg/dL 28* 32* 36*   CREATININE mg/dL 0.93 1.12 1.22   CALCIUM mg/dL 9.8 9.8 9.5   BILIRUBIN mg/dL 0.7 0.8 1.1   ALK PHOS U/L 217* 140* 99   ALT (SGPT) U/L 121* 133* 92*   AST (SGOT) U/L 121* 172* 108*   GLUCOSE mg/dL 177* 223* 133*     Results from last 7 days   Lab Units 10/28/24  1129   INR  1.0 "     IMAGING:  CXR 10/27/24 reviewed - Cardiomegaly present, vascular congestion, diminished lung volumes, no pneumothorax, small right pleural effusion, gaseous distention of the stomach, bibasilar atelectasis present    A/P: 49 y.o. male with multiple medical problems including CHF with EF of 35%, hypertension, hyperlipidemia, type 2 diabetes mellitus with A1c 9.4, who came into the hospital complaining of abdominal pain.      -CT demonstrating contracted gallbladder with mild hyperenhancing wall, EGD negative for abnormalities; August CT had distended gallbladder but no other findings of cholecystitis  -HIDA scan demonstrating filling but with decreased ejection fraction 19% concerning for possible chronic cholecystitis.   -CTA abdomen pelvis 10/26 to evaluate worsening RUQ pain and possible mesenteric ischemia demonstrated a large right subcapsular fluid collection with mass effect on the liver; no active extravasation  -CT head 10/27 with possible right thalamic stroke  -WBC had normalized, now 14 from 10; patient on zosyn for sepsis possible cholecystitis  -Transaminases and Alkphos uptrending, tbili remains normal  -Plan IR drainage of liver fluid collection today     Manuela Wisdom MD  General, Robotic and Endoscopic Surgery  [facility]    [address], [address]  [place], [place], [address]  P: [phone]  F: [phone]

## 2024-10-28 NOTE — PROGRESS NOTES
[facility] Progress Note      Patient Name: Donnie Bishop  : 1975  MRN: [id number]  Primary Care Physician:  Juju Kennedy MD  Date of admission: 10/22/2024    Subjective     Interval History:   Follow-up TENISHA    CTA 10/26:  large right perihepatic fluid collection with mass effect on liver noted, among other abnormal findings    IR plans drainage of obdulia-hepatic fluid collection today   Looks uncomfortable; mostly nonverbal, moaning  No shortness of breath on room air  Drinking some    Review of Systems:   As noted above    Objective     Vitals:   Temp:  [97.5 °F (36.4 °C)-99.3 °F (37.4 °C)] 99 °F (37.2 °C)  Heart Rate:  [105-114] 106  Resp:  [18-28] 28  BP: (103-141)/(79-92) 113/79  Flow (L/min) (Oxygen Therapy):  [2] 2    Intake/Output Summary (Last 24 hours) at 10/28/2024 1000  Last data filed at 10/27/2024 1403  Gross per 24 hour   Intake 240 ml   Output 400 ml   Net -160 ml       Physical Exam:    General: Awake, nonverbal, looks uncomfortable, moaning  Skin: warm and dry  HEENT: oral mucosa dry, nonicteric sclera  Neck: supple, no JVD  Lungs: Coarse anteriorly; no wheezing  Heart: RR, tachycardic, no rub  Abdomen: soft, + T, + D, BS hypoactive   : no palpable bladder  Extremities: no edema, cyanosis or clubbing  Neuro: Cognitive blunting; slow speech    Scheduled Meds:     [Held by provider] carvedilol, 6.25 mg, Oral, Q12H  insulin glargine, 14 Units, Subcutaneous, Nightly  insulin lispro, 2-7 Units, Subcutaneous, 4x Daily AC & at Bedtime  insulin lispro, 4 Units, Subcutaneous, TID With Meals  midodrine, 5 mg, Oral, TID AC  mupirocin, 1 Application, Each Nare, BID  pantoprazole, 40 mg, Oral, BID AC  piperacillin-tazobactam, 3.375 g, Intravenous, Q8H  senna-docusate sodium, 2 tablet, Oral, BID   And  polyethylene glycol, 17 g, Oral, BID  venlafaxine XR, 75 mg, Oral, Daily      IV Meds:            Results Reviewed:   I have personally reviewed the results from the time of  this admission to 10/28/2024 10:00 EDT     Results from last 7 days   Lab Units 10/27/24  2205 10/27/24  0329 10/26/24  0545   SODIUM mmol/L 137 137 137   POTASSIUM mmol/L 3.9 4.7 4.7   CHLORIDE mmol/L 106 104 103   CO2 mmol/L 19.0* 19.0* 19.0*   BUN mg/dL 28* 32* 36*   CREATININE mg/dL 0.93 1.12 1.22   CALCIUM mg/dL 9.8 9.8 9.5   BILIRUBIN mg/dL 0.7 0.8 1.1   ALK PHOS U/L 217* 140* 99   ALT (SGPT) U/L 121* 133* 92*   AST (SGOT) U/L 121* 172* 108*   GLUCOSE mg/dL 177* 223* 133*       Estimated Creatinine Clearance: 102.2 mL/min (by C-G formula based on SCr of 0.93 mg/dL).    Results from last 7 days   Lab Units 10/28/24  0045 10/27/24  2205 10/27/24  0329 10/26/24  0545   MAGNESIUM mg/dL  --  2.9* 2.5 2.4   PHOSPHORUS mg/dL 2.4* 1.9* 2.9 3.4       Results from last 7 days   Lab Units 10/24/24  0617   URIC ACID mg/dL 5.4       Results from last 7 days   Lab Units 10/27/24  2310 10/27/24  0329 10/26/24  0545 10/25/24  0426 10/24/24  2126   WBC 10*3/mm3 14.14* 10.64 9.44 7.93 9.87   HEMOGLOBIN g/dL 12.0* 12.7* 11.9* 10.9* 10.6*   PLATELETS 10*3/mm3 349 352 297 278 273             Assessment / Plan     ASSESSMENT:  TENISHA, nonoliguric, resolving, prerenal due to hypovolemia, hypotension, and impaired renal autoregulation due to Entresto, Aldactone and Jardiance.  Stable SCR following CTA yesterday.  Volume status is stable; normal potassium and likely NAGMA  Perihepatic fluid collection with mass effect on liver by CTA 10/26  Transaminitis  History of congestive heart failure ejection fraction 35%.  Currently compensated  Severe anemia   Encephalopathy  DM2      PLAN:  Kidney function is improving.  Will continue gentle IV hydration given n.p.o.  Labs in a.m.      Thank you for involving us in the care of Donnie Bishop.  Please feel free to call with any questions.    Evan Pat MD  10/28/24  10:00 EDT    [facility]  [phone]    Parts of this note may be an electronic  transcription/translation of spoken language to printed text using the Dragon dictation system.

## 2024-10-28 NOTE — PROGRESS NOTES
[facility] Progress Note       Encounter Date:10/28/24  Patient:Donnie Bishop  :1975  MRN:[id number]      Chief Complaint: Abdominal pain      Subjective:        Patient continues to be in a lot of pain.  Was tachycardic overnight there is concerns of possible other etiology D-dimer was sent which was elevated obviously somewhat hard to interpret given his acute illness.  Heart rate remained slightly elevated.  Minimal oxygen requirements.    Review of Systems:  Review of Systems   Cardiovascular:  Positive for chest pain.   Respiratory:  Negative for shortness of breath.    Gastrointestinal:  Positive for abdominal pain.       Medications:  Scheduled Meds:  [Held by provider] carvedilol, 6.25 mg, Oral, Q12H  insulin glargine, 14 Units, Subcutaneous, Nightly  insulin lispro, 2-7 Units, Subcutaneous, 4x Daily AC & at Bedtime  insulin lispro, 4 Units, Subcutaneous, TID With Meals  midodrine, 5 mg, Oral, TID AC  mupirocin, 1 Application, Each Nare, BID  pantoprazole, 40 mg, Oral, BID AC  piperacillin-tazobactam, 3.375 g, Intravenous, Q8H  senna-docusate sodium, 2 tablet, Oral, BID   And  polyethylene glycol, 17 g, Oral, BID  venlafaxine XR, 75 mg, Oral, Daily    Continuous Infusions:  lactated ringers, 75 mL/hr    PRN Meds:    senna-docusate sodium **AND** polyethylene glycol **AND** bisacodyl **AND** bisacodyl    Calcium Replacement - Follow Nurse / BPA Driven Protocol    dextrose    dextrose    glucagon (human recombinant)    HYDROmorphone    hydrOXYzine    influenza vaccine    Magnesium Standard Dose Replacement - Follow Nurse / BPA Driven Protocol    melatonin    [DISCONTINUED] Morphine **AND** naloxone    ondansetron ODT **OR** ondansetron    oxyCODONE    Phosphorus Replacement - Follow Nurse / BPA Driven Protocol    Potassium Replacement - Follow Nurse / BPA Driven Protocol    sodium chloride         Objective:       Vitals:    10/28/24 0448 10/28/24 0524 10/28/24 0745 10/28/24 0952   BP:  103/79  112/83 113/79   BP Location: Right arm  Right arm    Patient Position: Lying  Lying    Pulse: 106   106   Resp: 28  28    Temp: 98.1 °F (36.7 °C)  99 °F (37.2 °C)    TempSrc: Oral  Oral    SpO2: 96%      Weight:  85.2 kg (187 lb 13.3 oz)     Height:               Physical Exam:  Constitutional: Acutely ill appearing, well developed, moderate to severe acute distress due to pain  HENT: Oropharynx clear and membrane moist  Eyes: Normal conjunctiva, no sclera icterus.  Neck: Supple, no carotid bruit bilaterally.  Cardiovascular: Regular and Tachycardia rate and rhythm, No Murmur, Trace bilateral lower extremity edema.  Pulmonary: Normal respiratory effort, normal lung sounds, no wheezing.  Abdominal: Soft, nontender, no hepatosplenomegaly, liver is non-pulsatile.   Skin: Warm, dry, no ecchymosis, no rash.             Lab Review:   Results from last 7 days   Lab Units 10/27/24  2205 10/27/24  0329 10/26/24  0545 10/25/24  1544 10/25/24  0426 10/24/24  1715 10/24/24  0617 10/23/24  0311 10/22/24  1557   SODIUM mmol/L 137 137 137 129* 132* 132* 128*   < > 136   POTASSIUM mmol/L 3.9 4.7 4.7 4.4 4.5 4.9 4.6   < > 4.8   CHLORIDE mmol/L 106 104 103 99 98 98 95*   < > 96*   CO2 mmol/L 19.0* 19.0* 19.0* 16.9* 20.0* 18.0* 16.2*   < > 24.6   BUN mg/dL 28* 32* 36* 37* 38* 35* 30*   < > 9   CREATININE mg/dL 0.93 1.12 1.22 1.38* 1.75* 1.57* 1.42*   < > 1.07   GLUCOSE mg/dL 177* 223* 133* 211* 213* 255* 264*   < > 183*   CALCIUM mg/dL 9.8 9.8 9.5 9.0 8.6 8.5* 8.8   < > 10.5   AST (SGOT) U/L 121* 172* 108*  --  148*  --   --   --  10   ALT (SGPT) U/L 121* 133* 92*  --  77*  --   --   --  11    < > = values in this interval not displayed.     Results from last 7 days   Lab Units 10/28/24  0045 10/27/24  2205   HSTROP T ng/L 30* 28*     Results from last 7 days   Lab Units 10/27/24  2310 10/27/24  0329 10/26/24  0545 10/25/24  0426 10/24/24  2126 10/24/24  0617 10/23/24  1433   WBC 10*3/mm3 14.14* 10.64 9.44 7.93 9.87 10.27  "12.62*   HEMOGLOBIN g/dL 12.0* 12.7* 11.9* 10.9* 10.6* 10.7* 15.1   HEMATOCRIT % 36.6* 38.7 36.7* 32.4* 31.2* 32.9* 44.3   PLATELETS 10*3/mm3 349 352 297 278 273 266 399         Results from last 7 days   Lab Units 10/27/24  2205 10/27/24  0329 10/26/24  0545   MAGNESIUM mg/dL 2.9* 2.5 2.4           Invalid input(s): \"LDLCALC\"  Results from last 7 days   Lab Units 10/26/24  0545   PROBNP pg/mL 393.0     Results from last 7 days   Lab Units 10/24/24  0617   TSH uIU/mL 0.973            Assessment:          Diagnosis Plan   1. Epigastric abdominal pain  Case Request    Case Request    Tissue Pathology Exam    Tissue Pathology Exam      2. Acute gastritis without hemorrhage, unspecified gastritis type               Plan:       Mr. Bishop is a 49-year-old gentleman with past medical history notable for nonischemic cardiomyopathy, diabetes, hypertension, mixed hyperlipidemia presented to the hospital with abdominal pain initially concerns were for gastritis symptoms significantly worsened and now found to have a perihepatic fluid collection concerning for biloma versus hematoma.  Patient has had a lot more pain over the last 24 hours were asked to evaluate him for perioperative risk assessment prior to proceeding forward with possible cholecystectomy.  He has tentative plans for IR drainage of his fluid around his liver today.  Overnight there was some concerns that may be a pulmonary emboli might be contributing to his presentation.  In general his current presentation seems consistent with his fluid collection around the liver.  PE cannot be completely ruled out but given the concerns that this could be a hematoma around his liver would not recommend empiric anticoagulation.  I would recommend getting his liver fluid addressed.  His troponin level which was checked last night is essentially flat and unchanged from prior levels.  He is slightly tachycardic but again this would not be unexpected given his pain.  He is on " minimal oxygen requirements.  I would recommend proceeding forward with his IR drainage we could always reassess next steps for possible pulmonary emboli.  That order to nuclear med scan and lower extremity duplex.  His kidney function seems to be better if need be could always do a chest CTA.  However if significantly improved after drainage of his perihepatic fluid this would likely explain the changes.    Nonischemic cardiomyopathy:  Echocardiogram 10/26/2024 with improvement in heart function almost back to normal  Failure regimen on hold given hypotension which not expected given his acute illness  Hopefully can restart medications back as blood pressure will allow    Possible pulmonary emboli:  There is a concern that his tachycardia was related to an acute pulmonary emboli D-dimer is elevated however his clinical presentation seems more consistent with acute infection as a likely source.  His initial proBNP level and echocardiogram on arrival were fairly bland and troponin that was just checked last night into this morning is showing flat troponins compared to prior.  I doubt that we are dealing with a high risk large pulmonary emboli even if present would address underlying acute illness specially in light of the fact that there is a concern that the fluid collection could be a hematoma empiric anticoagulation would be potentially problematic  Can follow-up lower extremity duplex.  A CTA would also be helpful think there was some concerns given that he got CTA yesterday to give too much contrast.  Imaging of the CTA does not suggest any large contrast reflux which would suggest RV dysfunction RV looks fairly normal in size and limited views.               Alex Varela MD  [facility]  10/28/24  10:31 EDT

## 2024-10-28 NOTE — NURSING NOTE
Unable to obtain blood cultures. RN attempted x4. Lab came to attempt, unable to get. Will try again after 0800 with another .

## 2024-10-28 NOTE — PROGRESS NOTES
Name: Donnie Bishop ADMIT: 10/22/2024   : 1975  PCP: Juju Kennedy MD    MRN: [id number] LOS: 4 days   AGE/SEX: 49 y.o. male  ROOM: [facility]     Subjective   Subjective   Laying in bed.  Continues to complain of abdominal pain.  Overnight was tachycardic, D-dimer was obtained patient was found to be tachypneic.  On 2 L of cannula this morning.  Continues to complain of abdominal pain, denies shortness of breath when asked.      Review of Systems   As above  Objective   Objective   Vital Signs  Temp:  [97.5 °F (36.4 °C)-99 °F (37.2 °C)] 99 °F (37.2 °C)  Heart Rate:  [105-114] 107  Resp:  [18-28] 22  BP: (103-141)/(79-92) 111/79  SpO2:  [88 %-100 %] 88 %  on  Flow (L/min) (Oxygen Therapy):  [2] 2;   Device (Oxygen Therapy): room air  Body mass index is 29.48 kg/m².  Physical Exam    General: Lying in bed, drowsy but arousable, oriented to name, place, not to year.  HEENT: Normocephalic, atraumatic  CV: Regular rate and rhythm, no murmurs rubs or gallops  Lungs: CTA anteriorly, no wheezing, tachypnea  Abdomen: Soft, distended, tenderness to palpation  Extremities: Trace edema in lower extremities,, no cyanosis     Results Review     I reviewed the patient's new clinical results.  Results from last 7 days   Lab Units 10/27/24  2310 10/27/24  0329 10/26/24  0545 10/25/24  0426   WBC 10*3/mm3 14.14* 10.64 9.44 7.93   HEMOGLOBIN g/dL 12.0* 12.7* 11.9* 10.9*   PLATELETS 10*3/mm3 349 352 297 278     Results from last 7 days   Lab Units 10/27/24  2205 10/27/24  0329 10/26/24  0545 10/25/24  1544   SODIUM mmol/L 137 137 137 129*   POTASSIUM mmol/L 3.9 4.7 4.7 4.4   CHLORIDE mmol/L 106 104 103 99   CO2 mmol/L 19.0* 19.0* 19.0* 16.9*   BUN mg/dL 28* 32* 36* 37*   CREATININE mg/dL 0.93 1.12 1.22 1.38*   GLUCOSE mg/dL 177* 223* 133* 211*   Estimated Creatinine Clearance: 102.2 mL/min (by C-G formula based on SCr of 0.93 mg/dL).  Results from last 7 days   Lab Units 10/27/24  2205 10/27/24  0329 10/26/24  0545  10/25/24  1544 10/25/24  0426   ALBUMIN g/dL 2.5* 2.7* 3.0* 2.8* 3.2*   BILIRUBIN mg/dL 0.7 0.8 1.1  --  1.8*   ALK PHOS U/L 217* 140* 99  --  72   AST (SGOT) U/L 121* 172* 108*  --  148*   ALT (SGPT) U/L 121* 133* 92*  --  77*     Results from last 7 days   Lab Units 10/28/24  0045 10/27/24  2205 10/27/24  0329 10/26/24  0545 10/25/24  1544   CALCIUM mg/dL  --  9.8 9.8 9.5 9.0   ALBUMIN g/dL  --  2.5* 2.7* 3.0* 2.8*   MAGNESIUM mg/dL  --  2.9* 2.5 2.4  --    PHOSPHORUS mg/dL 2.4* 1.9* 2.9 3.4 3.8     Results from last 7 days   Lab Units 10/26/24  0545 10/25/24  0426 10/24/24  1404 10/24/24  0617 10/24/24  0012 10/23/24  2104   PROCALCITONIN ng/mL 30.30* 78.10*  --   --   --   --    LACTATE mmol/L  --   --  3.8* 3.3* 3.6* 4.5*     COVID19   Date Value Ref Range Status   10/25/2024 Not Detected Not Detected - Ref. Range Final   12/26/2023 Not Detected Not Detected - Ref. Range Final     Glucose   Date/Time Value Ref Range Status   10/28/2024 0648 253 (H) 70 - 130 mg/dL Final   10/27/2024 2005 262 (H) 70 - 130 mg/dL Final   10/27/2024 1602 264 (H) 70 - 130 mg/dL Final   10/27/2024 1048 275 (H) 70 - 130 mg/dL Final   10/27/2024 0705 234 (H) 70 - 130 mg/dL Final   10/26/2024 2109 234 (H) 70 - 130 mg/dL Final   10/26/2024 1558 222 (H) 70 - 130 mg/dL Final           XR Chest 1 View  Narrative: SINGLE VIEW OF THE CHEST     HISTORY: Shortness of breath     COMPARISON: October 26, 2024     FINDINGS:  There is cardiomegaly. There is vascular congestion. Lung volumes remain  diminished. No pneumothorax is identified. Small right pleural effusion  is suspected. There is gaseous distention of the stomach. Bibasilar  atelectasis is present.     Impression:    1. Cardiomegaly with vascular congestion.  2. Gaseous distention of the stomach.     This report was finalized on 10/27/2024 11:23 PM by Dr. Maria Teresa Badillo M.D on Workstation: [id number]     CT Head Without Contrast  Addendum: Call Report: Dr. Hurtado was notified by  telephone of the above findings   on October 27, 2024 at 2:49 p.m.       This report was finalized on 10/27/2024 2:50 PM by Dr. Connor Vasquez M.D on Workstation: [id number]  Narrative: CT HEAD WO CONTRAST-     INDICATION: Altered mental status     COMPARISON: None     TECHNIQUE:  Routine CT head without IV contrast. Coronal and sagittal reformats.  Radiation dose reduction techniques were utilized, including automated  exposure control and exposure modulation based on body size.     FINDINGS:      Brain: Hyperattenuating areas seen in the right posterior thalamus,  series 2, axial image 25, measures 8 x 4 mm. Preserved gray-white  differentiation. No mass effect or midline shift. Chronic small vessel  ischemic changes.     Ventricles: Ventriculomegaly, suspect ex vacuo dilatation from central  volume loss. No intraventricular hemorrhage.     Extra-axial spaces: No extra-axial hemorrhage or fluid collection.     Osseous structures: Hyperostosis frontalis interna. No fracture.     Sinuses: Patent mastoid air cells and middle ears. Patent paranasal  sinuses.     Impression: Hyperattenuating area in the right thalamus suspicious for  intraparenchymal hemorrhage. Calcification is a consideration. Clinical  correlation needed. Recommend comparison with any prior head CT if  available. Short follow-up can reevaluate.     This report was finalized on 10/27/2024 2:43 PM by Dr. Connor Vasquez M.D on Workstation: [id number]       Scheduled Medications  [Held by provider] carvedilol, 6.25 mg, Oral, Q12H  insulin glargine, 14 Units, Subcutaneous, Nightly  insulin lispro, 2-7 Units, Subcutaneous, 4x Daily AC & at Bedtime  insulin lispro, 4 Units, Subcutaneous, TID With Meals  midodrine, 5 mg, Oral, TID AC  mupirocin, 1 Application, Each Nare, BID  pantoprazole, 40 mg, Oral, BID AC  piperacillin-tazobactam, 3.375 g, Intravenous, Q8H  senna-docusate sodium, 2 tablet, Oral, BID   And  polyethylene glycol, 17 g, Oral,  BID  venlafaxine XR, 75 mg, Oral, Daily    Infusions  lactated ringers, 75 mL/hr    Diet  NPO Diet NPO Type: Strict NPO    I have personally reviewed     [x]  Laboratory   [x]  Microbiology   [x]  Radiology   [x]  EKG/Telemetry  []  Cardiology/Vascular   []  Pathology    []  Records       Assessment/Plan     Active Hospital Problems    Diagnosis  POA    **Epigastric abdominal pain [R10.13]  Unknown    Leukocytosis [D72.829]  Yes    Chronic combined systolic and diastolic heart failure [I50.42]  Yes    Abdominal pain [R10.9]  Yes    Gastritis [K29.70]  Yes    Anxiety [F41.9]  Yes    Multiple-type hyperlipidemia [E78.2]  Yes    Type 2 diabetes mellitus with diabetic neuropathy, with long-term current use of insulin [E11.40, Z79.4]  Not Applicable    Benign essential HTN [I10]  Yes      Resolved Hospital Problems   No resolved problems to display.       Patient is a 49 y.o. man with type 2 diabetes, HTN, n, hyperlipidemia, chronic combined systolic and diastolic heart failure (non-ischemic), anxiety who presented with severe abdominal/epigastric pain.    Epigastric abdominal pain/right upper quadrant abdominal pain  Transaminitis  Chronic cholecystitis  large right subcapsular fluid collection with mass effect on the liver.   -EGD 10/24/2024 with no acute findings  -On PPI  -HIDA scan concerning for chronic cholecystitis versus bili dyskinesia  -Elevated liver enzymes could be ischemic liver injury as well in the setting of hypotension which now has improved.  -Monitor daily CMP, AST/bilirubin improved.  ALT slightly up today.  -Hold statin in the setting of transaminitis  -Duplex ultrasound of mesenteric arteries was ordered however was unable to be completed due to bowel gas and patient's inability to hold his breath per report  -CT angiogram of the abdomen was obtained 10/26/2024 which showed large right perihepatic fluid collection with mass effect on the liver, new.  Patent blood vessels.  -General surgery  following, plan for IR guided drainage today      Hypotension  -On midodrine, status post IV fluids.  BP improved.  -ECHO  10/26/2024 showed EF of 49.7%  -Decrease midodrine to 2.5 3 times daily  -     TENISHA/hyponatremia  -Creatinine peaked at 1.75 on 10/25/2024, creatinine was 1.07 on admission  -Nephrology managing, status post IV fluids, creatinine normalized     Infection/sepsis?  -WBC was 13.79 on admission, patient has been afebrile since admission  -Procalcitonin significantly elevated at 78.10 10/25/2024-repeat improved  -HIDA scan showed possible chronic cholecystitis  -Blood cultures remain negative to date  -Large collection seen on CT with infectious cause/abscess on differential  -Continue empiric IV Zosyn  -Plan for IR guided drainage of the fluid collection today.    Anemia   Iron panel consistent with anemia chronic disease with ferritin of 943, B12 and folate normal  Monitor daily CBC, transfuse as indicated for symptomatic anemia or hemoglobin 7    Encephalopathy  -Suspect secondary to acute illness/pain meds/infection/TENISHA  -No gross neurological deficits  -CT scan was obtained on 10/27/2024, there was concern for possible intraparenchymal hemorrhage, neurology consulted and MRI was obtained.  Discussed with neurology 10/20/2024, initial CT findings due to calcification not seen on follow-up MRI, there is no intra intraparenchymal hemorrhage.  Okay for anticoagulation from neurology standpoint.    Type II DM   -Increase Lantus to 16 units nightly.  Continue SSI/scheduled lispro.      Tachypnea/tachycardia  -D-dimer elevated cardiomegaly, however in the setting of acute illness difficult to interpret.   -Chest x-ray 10/27/2024  with vascular congestion.  IV fluids held, management of fluid/diuresis per nephrology  -On 2 L nasal cannula  -NM lung perfusion study ordered overnight in the setting of recent TENISHA/and patient already receiving multiple contrasts.  Duplex ultrasound of lower extremities  ordered.        DVT prophylaxis.  Subcu heparin was stopped yesterday due to concern for intracranial bleed.  Will resume today as MRI with no bleeding.  Full code.  Discussed with patient and nursing staff.  Expected Discharge Date: 10/28/2024; Expected Discharge Time:        Copied text in this note has been reviewed and is accurate as of 10/28/24.         Dictated utilizing Dragon dictation        Yaritza Hurtado MD  [facility]  10/28/24  12:13 EDT

## 2024-10-28 NOTE — PROGRESS NOTES
I went to see the patient 4 times since the morning, the patient was gone for procedure.  I personally reviewed his MRI brain which showed no acute hemorrhage, his hypodensity on the prior CT head likely from calcification.  MRI results discussed with neuroradiologist Dr. Berkowitz.  MRI did show left auditory canal/left cerebellar pontine angle enhancing lesion likely vestibular schwannoma this need to be followed by neurosurgery as an outpatient.  MRI brain result discussed with hospitalist Dr. Hurtado.  Okay to place the patient on Lovenox for DVT prophylaxis from my standpoint.  Will sign off and see again as needed.    IMPRESSION:  1. Corresponding to the area of increased density in the posterior right  thalamus on yesterday's head CT is an area of precontrast faint T1  hyperintensity but no corresponding signal abnormality on any of the  other sequences and I think this is a benign area of faint  mineralization in the posterior right thalamus and not an acute  intraparenchymal hemorrhage.     2. This patient has an enhancing mass filling the left internal auditory  canal bulging through the left porus acusticus into the left  cerebellopontine angle and it maximally measures 12 x 9 x 9 mm  mediolateral anterior posterior and craniocaudal dimensions, most  consistent with a left-sided vestibular schwannoma (acoustic neuroma)  and correlate clinically as to whether the patient has left-sided  sensorineural hearing loss.     3. While the lateral and third ventricles are normal in size, the fourth  ventricle is prominent in size which is felt to be due to some volume  loss in the cerebellum. The remainder of the MRI of the brain is normal.

## 2024-10-28 NOTE — TELEPHONE ENCOUNTER
Patient is currently admitted & they have not been given an estimated date of discharge. He is having procedure today to place a drain in the liver to remove the fluid build up. He was admitted to [facility] on 10/22/24 & his employer is requiring the forms as soon as possible. Patients wife has the FMLA forms & she's hoping to drop them off on Wednesday or Thursday, he is trying to keep his job, please advise     Please call [phone] Charity Bishop

## 2024-10-28 NOTE — POST-PROCEDURE NOTE
POST PROCEDURE NOTE    Procedure: drainage    Pre-Procedure Diagnosis: subcapsular collection    Post-procedure Diagnosis: same    Findings: successful 12 fr drain plcmt    Complications: none    Blood loss: min    Specimen Removed: 300 ml fluid removed manually (initially green then yellow)    Disposition:   Transfer back to inpatient room

## 2024-10-28 NOTE — NURSING NOTE
Patient increase in HR, became very diaphoretic. Complaints of pain wouldn't specify, however, has been having abdominal pain. EKG completed along with drawing of troponin. Cardiology updated--don't believe any cause for MI. Primary updated, as RN also concerned for sepsis. Labs ordered along with CXR. Blood cultures also ordered along with V/Q which is pending completion and duplex of BLE. Vascular congestion indicated on CXR. Nephrology made aware--see orders to discontinue fluids. Patient more comfortable now, not diaphoretic, HR trending to normal, RR appropriate. Patient placed on 2L NC for comfort.     Rest of shift, patient slept. Occasionally woke up to use urinal. Given pain medication when needed. Did not attempt MRI beginning of shift due to patient becoming diaphoretic and feeling short of breath. Called after patient stable. MRI said they'd call when ready for patient as they had Stat MRIs to be completed along with ICU MRIs scheduled. Pending completion.

## 2024-10-29 ENCOUNTER — TELEPHONE (OUTPATIENT)
Dept: GASTROENTEROLOGY | Facility: CLINIC | Age: 49
End: 2024-10-29
Payer: COMMERCIAL

## 2024-10-29 LAB
ALBUMIN SERPL-MCNC: 1.7 G/DL (ref 3.5–5.2)
ALBUMIN/GLOB SERPL: 0.4 G/DL
ALP SERPL-CCNC: 189 U/L (ref 39–117)
ALT SERPL W P-5'-P-CCNC: 135 U/L (ref 1–41)
AMMONIA BLD-SCNC: 17 UMOL/L (ref 16–60)
ANION GAP SERPL CALCULATED.3IONS-SCNC: 12.4 MMOL/L (ref 5–15)
AST SERPL-CCNC: 154 U/L (ref 1–40)
BASOPHILS # BLD MANUAL: 0 10*3/MM3 (ref 0–0.2)
BASOPHILS NFR BLD MANUAL: 0 % (ref 0–1.5)
BILIRUB SERPL-MCNC: 0.7 MG/DL (ref 0–1.2)
BUN SERPL-MCNC: 30 MG/DL (ref 6–20)
BUN/CREAT SERPL: 26.3 (ref 7–25)
CALCIUM SPEC-SCNC: 10.1 MG/DL (ref 8.6–10.5)
CHLORIDE SERPL-SCNC: 107 MMOL/L (ref 98–107)
CO2 SERPL-SCNC: 16.6 MMOL/L (ref 22–29)
CREAT SERPL-MCNC: 1.14 MG/DL (ref 0.76–1.27)
DEPRECATED RDW RBC AUTO: 42.1 FL (ref 37–54)
EGFRCR SERPLBLD CKD-EPI 2021: 79.8 ML/MIN/1.73
EOSINOPHIL # BLD MANUAL: 0 10*3/MM3 (ref 0–0.4)
EOSINOPHIL NFR BLD MANUAL: 0 % (ref 0.3–6.2)
ERYTHROCYTE [DISTWIDTH] IN BLOOD BY AUTOMATED COUNT: 12.4 % (ref 12.3–15.4)
GLOBULIN UR ELPH-MCNC: 4.5 GM/DL
GLUCOSE BLDC GLUCOMTR-MCNC: 128 MG/DL (ref 70–130)
GLUCOSE BLDC GLUCOMTR-MCNC: 141 MG/DL (ref 70–130)
GLUCOSE BLDC GLUCOMTR-MCNC: 196 MG/DL (ref 70–130)
GLUCOSE SERPL-MCNC: 176 MG/DL (ref 65–99)
HCT VFR BLD AUTO: 37.1 % (ref 37.5–51)
HGB BLD-MCNC: 11.6 G/DL (ref 13–17.7)
LYMPHOCYTES # BLD MANUAL: 1.23 10*3/MM3 (ref 0.7–3.1)
LYMPHOCYTES NFR BLD MANUAL: 9.9 % (ref 5–12)
MAGNESIUM SERPL-MCNC: 2.3 MG/DL (ref 1.6–2.6)
MCH RBC QN AUTO: 29.1 PG (ref 26.6–33)
MCHC RBC AUTO-ENTMCNC: 31.3 G/DL (ref 31.5–35.7)
MCV RBC AUTO: 93 FL (ref 79–97)
MONOCYTES # BLD: 1.54 10*3/MM3 (ref 0.1–0.9)
NEUTROPHILS # BLD AUTO: 12.77 10*3/MM3 (ref 1.7–7)
NEUTROPHILS NFR BLD MANUAL: 82.2 % (ref 42.7–76)
NRBC BLD AUTO-RTO: 0 /100 WBC (ref 0–0.2)
PHOSPHATE SERPL-MCNC: 2.6 MG/DL (ref 2.5–4.5)
PLAT MORPH BLD: NORMAL
PLATELET # BLD AUTO: 370 10*3/MM3 (ref 140–450)
PMV BLD AUTO: 10.5 FL (ref 6–12)
POTASSIUM SERPL-SCNC: 3.9 MMOL/L (ref 3.5–5.2)
PROT SERPL-MCNC: 6.2 G/DL (ref 6–8.5)
RBC # BLD AUTO: 3.99 10*6/MM3 (ref 4.14–5.8)
RBC MORPH BLD: NORMAL
SODIUM SERPL-SCNC: 136 MMOL/L (ref 136–145)
VARIANT LYMPHS NFR BLD MANUAL: 7.9 % (ref 19.6–45.3)
WBC MORPH BLD: NORMAL
WBC NRBC COR # BLD AUTO: 15.53 10*3/MM3 (ref 3.4–10.8)

## 2024-10-29 PROCEDURE — 80053 COMPREHEN METABOLIC PANEL: CPT | Performed by: STUDENT IN AN ORGANIZED HEALTH CARE EDUCATION/TRAINING PROGRAM

## 2024-10-29 PROCEDURE — 85007 BL SMEAR W/DIFF WBC COUNT: CPT | Performed by: INTERNAL MEDICINE

## 2024-10-29 PROCEDURE — 84100 ASSAY OF PHOSPHORUS: CPT | Performed by: HOSPITALIST

## 2024-10-29 PROCEDURE — 82140 ASSAY OF AMMONIA: CPT

## 2024-10-29 PROCEDURE — 82948 REAGENT STRIP/BLOOD GLUCOSE: CPT

## 2024-10-29 PROCEDURE — 63710000001 INSULIN GLARGINE PER 5 UNITS: Performed by: STUDENT IN AN ORGANIZED HEALTH CARE EDUCATION/TRAINING PROGRAM

## 2024-10-29 PROCEDURE — 25010000002 PIPERACILLIN SOD-TAZOBACTAM PER 1 G: Performed by: STUDENT IN AN ORGANIZED HEALTH CARE EDUCATION/TRAINING PROGRAM

## 2024-10-29 PROCEDURE — 25010000002 HEPARIN (PORCINE) PER 1000 UNITS: Performed by: STUDENT IN AN ORGANIZED HEALTH CARE EDUCATION/TRAINING PROGRAM

## 2024-10-29 PROCEDURE — 97166 OT EVAL MOD COMPLEX 45 MIN: CPT

## 2024-10-29 PROCEDURE — 63710000001 INSULIN LISPRO (HUMAN) PER 5 UNITS: Performed by: INTERNAL MEDICINE

## 2024-10-29 PROCEDURE — 83735 ASSAY OF MAGNESIUM: CPT | Performed by: STUDENT IN AN ORGANIZED HEALTH CARE EDUCATION/TRAINING PROGRAM

## 2024-10-29 PROCEDURE — 85025 COMPLETE CBC W/AUTO DIFF WBC: CPT | Performed by: INTERNAL MEDICINE

## 2024-10-29 PROCEDURE — 97535 SELF CARE MNGMENT TRAINING: CPT

## 2024-10-29 PROCEDURE — 99232 SBSQ HOSP IP/OBS MODERATE 35: CPT | Performed by: INTERNAL MEDICINE

## 2024-10-29 PROCEDURE — 63710000001 INSULIN LISPRO (HUMAN) PER 5 UNITS: Performed by: STUDENT IN AN ORGANIZED HEALTH CARE EDUCATION/TRAINING PROGRAM

## 2024-10-29 PROCEDURE — 97530 THERAPEUTIC ACTIVITIES: CPT

## 2024-10-29 PROCEDURE — 99232 SBSQ HOSP IP/OBS MODERATE 35: CPT | Performed by: STUDENT IN AN ORGANIZED HEALTH CARE EDUCATION/TRAINING PROGRAM

## 2024-10-29 RX ADMIN — SODIUM BICARBONATE: 84 INJECTION, SOLUTION INTRAVENOUS at 09:51

## 2024-10-29 RX ADMIN — NALOXEGOL OXALATE 25 MG: 25 TABLET, FILM COATED ORAL at 15:53

## 2024-10-29 RX ADMIN — SODIUM BICARBONATE: 84 INJECTION, SOLUTION INTRAVENOUS at 22:24

## 2024-10-29 RX ADMIN — OXYCODONE HYDROCHLORIDE 5 MG: 5 TABLET ORAL at 01:42

## 2024-10-29 RX ADMIN — VENLAFAXINE HYDROCHLORIDE 75 MG: 75 CAPSULE, EXTENDED RELEASE ORAL at 08:28

## 2024-10-29 RX ADMIN — OXYCODONE HYDROCHLORIDE 5 MG: 5 TABLET ORAL at 14:34

## 2024-10-29 RX ADMIN — MUPIROCIN 1 APPLICATION: 20 OINTMENT TOPICAL at 08:25

## 2024-10-29 RX ADMIN — INSULIN LISPRO 2 UNITS: 100 INJECTION, SOLUTION INTRAVENOUS; SUBCUTANEOUS at 21:51

## 2024-10-29 RX ADMIN — SENNOSIDES AND DOCUSATE SODIUM 2 TABLET: 50; 8.6 TABLET ORAL at 08:25

## 2024-10-29 RX ADMIN — POLYETHYLENE GLYCOL 3350 17 G: 17 POWDER, FOR SOLUTION ORAL at 08:25

## 2024-10-29 RX ADMIN — HEPARIN SODIUM 5000 UNITS: 5000 INJECTION INTRAVENOUS; SUBCUTANEOUS at 14:34

## 2024-10-29 RX ADMIN — PANTOPRAZOLE SODIUM 40 MG: 40 TABLET, DELAYED RELEASE ORAL at 08:25

## 2024-10-29 RX ADMIN — INSULIN LISPRO 2 UNITS: 100 INJECTION, SOLUTION INTRAVENOUS; SUBCUTANEOUS at 08:24

## 2024-10-29 RX ADMIN — PIPERACILLIN AND TAZOBACTAM 3.38 G: 3; .375 INJECTION, POWDER, FOR SOLUTION INTRAVENOUS at 14:35

## 2024-10-29 RX ADMIN — HEPARIN SODIUM 5000 UNITS: 5000 INJECTION INTRAVENOUS; SUBCUTANEOUS at 21:52

## 2024-10-29 RX ADMIN — INSULIN LISPRO 4 UNITS: 100 INJECTION, SOLUTION INTRAVENOUS; SUBCUTANEOUS at 08:25

## 2024-10-29 RX ADMIN — HEPARIN SODIUM 5000 UNITS: 5000 INJECTION INTRAVENOUS; SUBCUTANEOUS at 05:38

## 2024-10-29 RX ADMIN — INSULIN GLARGINE 10 UNITS: 100 INJECTION, SOLUTION SUBCUTANEOUS at 21:51

## 2024-10-29 RX ADMIN — PIPERACILLIN AND TAZOBACTAM 3.38 G: 3; .375 INJECTION, POWDER, FOR SOLUTION INTRAVENOUS at 21:52

## 2024-10-29 RX ADMIN — PANTOPRAZOLE SODIUM 40 MG: 40 TABLET, DELAYED RELEASE ORAL at 17:46

## 2024-10-29 RX ADMIN — PIPERACILLIN AND TAZOBACTAM 3.38 G: 3; .375 INJECTION, POWDER, FOR SOLUTION INTRAVENOUS at 05:38

## 2024-10-29 NOTE — PLAN OF CARE
Goal Outcome Evaluation:  Plan of Care Reviewed With: patient           Outcome Evaluation: Pt seen for OT eval after admission 2/2 epigastric pain. Pt found to have cholecystitis with subcapsular liver fluid collection s/p IR drainage and drain placement on 10/28. Pt presents this date with increased confusion and difficulty attending to conversation and with questionable effort during bed mobility and MMT. Pt states he lives alone and uses a cane at baseline. Unclear about PLOF with ADLs and home setup. Pt able to complete bed mobility with modA x 1 and max verbal/tactile cues this date. Pt required depA for donning socks seated EOB. OT will con't to follow for stated goals to address weakness, impaired safety and decreased ind with ADLs. Recommend d/c to SNF.    Anticipated Discharge Disposition (OT): skilled nursing facility

## 2024-10-29 NOTE — PROGRESS NOTES
[facility] Progress Note      Patient Name: Donnie Bishop  : 1975  MRN: [id number]  Primary Care Physician:  Juju Kennedy MD  Date of admission: 10/22/2024    Subjective     Interval History:   Follow-up acute kidney injury.  CT-guided percutaneous drain placed yesterday in the right upper quadrant.  300cc removed.  1300 cc from drain.  Cultures pending.  Afebrile.  Urine output 1500 cc.  IV fluid intake not recorded.  CTA chest negative for pulmonary embolism yesterday.    Review of Systems:   Limited as patient does not answer all questions.  Limited as patient does not answer all questions.  Does complain of abdominal pain.  Thirsty.    Objective     Vitals:   Temp:  [97.6 °F (36.4 °C)-98.6 °F (37 °C)] 97.6 °F (36.4 °C)  Heart Rate:  [] 99  Resp:  [12-22] 20  BP: ()/(59-90) 130/90  Flow (L/min) (Oxygen Therapy):  [2] 2    Intake/Output Summary (Last 24 hours) at 10/29/2024 0857  Last data filed at 10/29/2024 0556  Gross per 24 hour   Intake --   Output 3075 ml   Net -3075 ml       Physical Exam:    General Appearance: Sleeping but awakens.  Does not answer all questions.  Slowly answers some.  Skin: warm and dry  HEENT: oral mucosa dry.  nonicteric sclera  Neck: supple, no JVD  Lungs: Clear to auscultation anteriorly.  Heart: RRR, normal S1 and S2  Abdomen: soft, positive bowel sounds.  Right upper quadrant drain.  : no palpable bladder  Extremities: no edema.  Neuro: Speech slow.  Only answers a few questions.    Scheduled Meds:     [Held by provider] carvedilol, 6.25 mg, Oral, Q12H  heparin (porcine), 5,000 Units, Subcutaneous, Q8H  insulin glargine, 6 Units, Subcutaneous, Nightly  insulin lispro, 2-7 Units, Subcutaneous, 4x Daily AC & at Bedtime  insulin lispro, 4 Units, Subcutaneous, TID With Meals  [Held by provider] midodrine, 2.5 mg, Oral, TID AC  mupirocin, 1 Application, Each Nare, BID  pantoprazole, 40 mg, Oral, BID AC  piperacillin-tazobactam, 3.375  g, Intravenous, Q8H  senna-docusate sodium, 2 tablet, Oral, BID   And  polyethylene glycol, 17 g, Oral, BID  venlafaxine XR, 75 mg, Oral, Daily      IV Meds:   lactated ringers, 75 mL/hr, Last Rate: 75 mL/hr (10/28/24 1635)  sodium chloride 0.45 % 925 mL with sodium bicarbonate 8.4 % 75 mEq infusion,         Results Reviewed:   I have personally reviewed the results from the time of this admission to 10/29/2024 08:57 EDT     Results from last 7 days   Lab Units 10/29/24  0652 10/28/24  2059 10/28/24  1959   SODIUM mmol/L 136 143 141   POTASSIUM mmol/L 3.9 4.5 4.3   CHLORIDE mmol/L 107 110* 108*   CO2 mmol/L 16.6* 16.0* 16.6*   BUN mg/dL 30* 35* 35*   CREATININE mg/dL 1.14 1.06 1.08   CALCIUM mg/dL 10.1 9.8 10.1   BILIRUBIN mg/dL 0.7 0.7 0.7   ALK PHOS U/L 189* 222* 229*   ALT (SGPT) U/L 135* 159* 164*   AST (SGOT) U/L 154* 199* 229*   GLUCOSE mg/dL 176* 159* 161*       Estimated Creatinine Clearance: 83.5 mL/min (by C-G formula based on SCr of 1.14 mg/dL).    Results from last 7 days   Lab Units 10/29/24  0652 10/28/24  1959 10/28/24  0045 10/27/24  2205   MAGNESIUM mg/dL 2.3 2.5  --  2.9*   PHOSPHORUS mg/dL 2.6 2.8 2.4* 1.9*       Results from last 7 days   Lab Units 10/24/24  0617   URIC ACID mg/dL 5.4       Results from last 7 days   Lab Units 10/29/24  0652 10/28/24  1959 10/27/24  2310 10/27/24  0329 10/26/24  0545   WBC 10*3/mm3 15.53* 16.23* 14.14* 10.64 9.44   HEMOGLOBIN g/dL 11.6* 12.3* 12.0* 12.7* 11.9*   PLATELETS 10*3/mm3 370 382 349 352 297       Results from last 7 days   Lab Units 10/28/24  1959 10/28/24  1129   INR  1.13* 1.0       Assessment / Plan     ASSESSMENT:  Acute kidney injury.  Multifactorial including hypovolemia, hypotension, impaired renal autoregulation due to Jardiance, Entresto, Aldactone.  Nonoliguric.  Creatinine stable despite 3 contrast studies required for diagnostics the past few days.  Metabolic acidosis.  Now nonanion gap.  Change IV fluids to bicarb containing.  Cholecystitis  with large right perihepatic fluid collection.  Status post IR drainage with drain left in yesterday.  On Zosyn.  Transaminitis.  Enzymes improving.  Off statin.  Heart failure reduced ejection fraction.  EF 49%.  (Improved from 35% a year ago).  Anemia.  Hemoglobin stable.  Encephalopathy.  Neurology evaluation noted.  Diabetes mellitus type 2.  Blood sugar 159-176.    PLAN:  IV fluids with bicarb today.    Thank you for involving us in the care of Donnie Bishop.  Please feel free to call with any questions.    Anne Wilson MD  10/29/24  08:57 EDT    [facility]  [phone]    Please note that portions of this note were completed with a voice recognition program.

## 2024-10-29 NOTE — PLAN OF CARE
Problem: Adult Inpatient Plan of Care  Goal: Absence of Hospital-Acquired Illness or Injury  Intervention: Identify and Manage Fall Risk  Recent Flowsheet Documentation  Taken 10/29/2024 1800 by Leah Espinoza RN  Safety Promotion/Fall Prevention:   clutter free environment maintained   safety round/check completed  Taken 10/29/2024 1600 by Leah Espinoza RN  Safety Promotion/Fall Prevention:   clutter free environment maintained   safety round/check completed  Taken 10/29/2024 1415 by Leah Espinoza RN  Safety Promotion/Fall Prevention:   clutter free environment maintained   safety round/check completed  Taken 10/29/2024 1200 by Leah Espinoza RN  Safety Promotion/Fall Prevention:   clutter free environment maintained   safety round/check completed  Taken 10/29/2024 1000 by Leah Espinoza RN  Safety Promotion/Fall Prevention:   clutter free environment maintained   safety round/check completed  Taken 10/29/2024 0815 by Leah Espinoza RN  Safety Promotion/Fall Prevention:   clutter free environment maintained   safety round/check completed  Intervention: Prevent Skin Injury  Recent Flowsheet Documentation  Taken 10/29/2024 1800 by Leah Espinoza RN  Body Position: position changed independently  Taken 10/29/2024 1600 by Leah Espinoza RN  Body Position: position changed independently  Taken 10/29/2024 1415 by Leah Espinoza RN  Body Position: position changed independently  Taken 10/29/2024 1000 by Leah Espinoza RN  Body Position: position changed independently  Taken 10/29/2024 0815 by Leah Espinoza RN  Body Position: position changed independently  Skin Protection: transparent dressing maintained  Intervention: Prevent and Manage VTE (Venous Thromboembolism) Risk  Recent Flowsheet Documentation  Taken 10/29/2024 0815 by Leah Espinoza RN  VTE Prevention/Management:   bilateral   SCDs (sequential compression devices) on  Intervention: Prevent Infection  Recent Flowsheet  Documentation  Taken 10/29/2024 1800 by Leah Espinoza RN  Infection Prevention: single patient room provided  Taken 10/29/2024 1600 by Leah Espinoza RN  Infection Prevention: single patient room provided  Taken 10/29/2024 1415 by Leah Espinoza RN  Infection Prevention: single patient room provided  Taken 10/29/2024 1200 by Leah Espinoza RN  Infection Prevention: single patient room provided  Taken 10/29/2024 1000 by Leah Espinoza RN  Infection Prevention: single patient room provided  Taken 10/29/2024 0815 by Leah Espinoza RN  Infection Prevention: single patient room provided  Goal: Optimal Comfort and Wellbeing  Intervention: Provide Person-Centered Care  Recent Flowsheet Documentation  Taken 10/29/2024 1415 by Leah Espinoza RN  Trust Relationship/Rapport:   care explained   thoughts/feelings acknowledged  Taken 10/29/2024 0815 by Leah Espinoza RN  Trust Relationship/Rapport:   care explained   thoughts/feelings acknowledged   Goal Outcome Evaluation:

## 2024-10-29 NOTE — PLAN OF CARE
Goal Outcome Evaluation:      Patient undergone MRI, CT scan and a procedure that  drained fluid from the obdulia-hepatic area. Total of 500ml was drained. I got additional 100ml in this shift. He is alert and oriented X 3. He is is still on NPO. His general condition is stable.

## 2024-10-29 NOTE — PROGRESS NOTES
Name: Donnie Bishop ADMIT: 10/22/2024   : 1975  PCP: Juju Kennedy MD    MRN: [id number] LOS: 5 days   AGE/SEX: 49 y.o. male  ROOM: [facility]     Subjective   Subjective   Patient seen this morning.  Drowsy but arousable, oriented to name, place not to year.  Status post CT-guided drain percutaneously with drain placement.  Approximately hiad 600 cc of output since.  Reports improvement in abdominal pain.      Review of Systems   As above  Objective   Objective   Vital Signs  Temp:  [97.6 °F (36.4 °C)-98.6 °F (37 °C)] 97.8 °F (36.6 °C)  Heart Rate:  [] 96  Resp:  [12-20] 20  BP: ()/(59-90) 131/88  SpO2:  [91 %-95 %] 92 %  on  Flow (L/min) (Oxygen Therapy):  [2] 2;   Device (Oxygen Therapy): room air  Body mass index is 29.51 kg/m².  Physical Exam    General: Lying in bed, drowsy, not in distress, ill-appearing,  HEENT: Normocephalic, atraumatic  CV: Regular rate and rhythm, no murmurs rubs or gallops  Lungs: Diminished at bases, no wheezing, no crackles, right upper quadrant drain in place.  Abdomen: Soft, distended, tenderness to palpation  Extremities: Trace edema in lower extremities,, no cyanosis     Results Review     I reviewed the patient's new clinical results.  Results from last 7 days   Lab Units 10/29/24  0652 10/28/24  1959 10/27/24  2310 10/27/24  0329   WBC 10*3/mm3 15.53* 16.23* 14.14* 10.64   HEMOGLOBIN g/dL 11.6* 12.3* 12.0* 12.7*   PLATELETS 10*3/mm3 370 382 349 352     Results from last 7 days   Lab Units 10/29/24  0652 10/28/24  2059 10/28/24  1959 10/27/24  2205   SODIUM mmol/L 136 143 141 137   POTASSIUM mmol/L 3.9 4.5 4.3 3.9   CHLORIDE mmol/L 107 110* 108* 106   CO2 mmol/L 16.6* 16.0* 16.6* 19.0*   BUN mg/dL 30* 35* 35* 28*   CREATININE mg/dL 1.14 1.06 1.08 0.93   GLUCOSE mg/dL 176* 159* 161* 177*   Estimated Creatinine Clearance: 83.5 mL/min (by C-G formula based on SCr of 1.14 mg/dL).  Results from last 7 days   Lab Units 10/29/24  0652 10/28/24  2059 10/28/24  1959  10/27/24  2205   ALBUMIN g/dL 1.7* 2.0* 3.6 2.5*   BILIRUBIN mg/dL 0.7 0.7 0.7 0.7   ALK PHOS U/L 189* 222* 229* 217*   AST (SGOT) U/L 154* 199* 229* 121*   ALT (SGPT) U/L 135* 159* 164* 121*     Results from last 7 days   Lab Units 10/29/24  0652 10/28/24  2059 10/28/24  1959 10/28/24  0045 10/27/24  2205 10/27/24  0329   CALCIUM mg/dL 10.1 9.8 10.1  --  9.8 9.8   ALBUMIN g/dL 1.7* 2.0* 3.6  --  2.5* 2.7*   MAGNESIUM mg/dL 2.3  --  2.5  --  2.9* 2.5   PHOSPHORUS mg/dL 2.6  --  2.8 2.4* 1.9* 2.9     Results from last 7 days   Lab Units 10/26/24  0545 10/25/24  0426 10/24/24  1404 10/24/24  0617 10/24/24  0012 10/23/24  2104   PROCALCITONIN ng/mL 30.30* 78.10*  --   --   --   --    LACTATE mmol/L  --   --  3.8* 3.3* 3.6* 4.5*     COVID19   Date Value Ref Range Status   10/25/2024 Not Detected Not Detected - Ref. Range Final   12/26/2023 Not Detected Not Detected - Ref. Range Final     Glucose   Date/Time Value Ref Range Status   10/29/2024 1050 128 70 - 130 mg/dL Final   10/28/2024 2037 171 (H) 70 - 130 mg/dL Final   10/28/2024 1840 158 (H) 70 - 130 mg/dL Final   10/28/2024 0648 253 (H) 70 - 130 mg/dL Final   10/27/2024 2005 262 (H) 70 - 130 mg/dL Final   10/27/2024 1602 264 (H) 70 - 130 mg/dL Final   10/27/2024 1048 275 (H) 70 - 130 mg/dL Final           MRI Brain With & Without Contrast  Narrative: MRI OF THE BRAIN WITH AND WITHOUT CONTRAST ON 10/28/2024     CLINICAL HISTORY: This is a 49-year-old male patient who has altered  mental status and has a head CT yesterday showing possible  intraparenchymal hemorrhage in the posterior right thalamus.     TECHNIQUE: Axial T1, FLAIR, fat-suppressed T2, axial diffusion and  gradient echo T2, sagittal T1 and postcontrast axial fat-suppressed T1  and coronal T1-weighted images were obtained of the entire head.     This is correlated to a head CT without contrast yesterday afternoon on  10/27/2024 at 2:26 p.m.     FINDINGS: At the site of the faint hyperdensity seen on  yesterday's head  CT in the posterior right thalamus is a very faint area of precontrast  T1 hyperintensity that measures 8 x 6 mm mediolateral and anterior  posterior dimension.  There is no signal abnormality on the T2 or FLAIR  images or the gradient echo T2 weighted images and this is not felt to  be an acute intraparenchymal hemorrhage but rather some faint  mineralization in the posterior right thalamus. The remainder of the  brain parenchyma is normal in signal intensity. The lateral and third  ventricles are normal in size. There is mild prominence in size of the  fourth ventricle which may be secondary to some cerebellar volume loss.  I see no mass effect and no midline shift, no extra-axial fluid  collections are identified. There is an enhancing mass that fills the  left internal auditory canal and bulges through the left porus acusticus  into the left cerebellopontine angle and I think this is most consistent  with a left-sided vestibular schwannoma-acoustic neuroma that maximally  measures 12 x 9 x 9 mm in size. It has no mass effect on the adjacent  hannah or anterior left cerebellum.  No additional abnormal areas of  enhancement are seen in the head. The calvarium and the skull base are  normal in appearance. The paranasal sinuses and the mastoid air cells  and middle ear cavities are clear. Good flow voids are demonstrated  within the cerebral vessels and in the dural venous sinuses.     Impression: 1. Corresponding to the area of increased density in the posterior right  thalamus on yesterday's head CT is an area of precontrast faint T1  hyperintensity but no corresponding signal abnormality on any of the  other sequences and I think this is a benign area of faint  mineralization in the posterior right thalamus and not an acute  intraparenchymal hemorrhage.     2. This patient has an enhancing mass filling the left internal auditory  canal bulging through the left porus acusticus into the  left  cerebellopontine angle and it maximally measures 12 x 9 x 9 mm  mediolateral, anterior posterior and craniocaudal dimensions, most  consistent with a left-sided vestibular schwannoma (acoustic neuroma)  and correlate clinically as to whether the patient has left-sided  sensorineural hearing loss.     3. While the lateral and third ventricles are normal in size, the fourth  ventricle is prominent in size which is felt to be due to some volume  loss and atrophy in the cerebellum and correlate clinically as to the  etiology. The remainder of the MRI of the brain is normal.     The results were communicated to Dr. Braden from stroke neurology by  telephone on 10/28/2024 at 11:15 a.m.     This report was finalized on 10/29/2024 6:43 AM by Dr. Frankie Berkowitz M.D  on Workstation: [id number]       Scheduled Medications  [Held by provider] carvedilol, 6.25 mg, Oral, Q12H  heparin (porcine), 5,000 Units, Subcutaneous, Q8H  insulin glargine, 6 Units, Subcutaneous, Nightly  insulin lispro, 2-7 Units, Subcutaneous, 4x Daily AC & at Bedtime  insulin lispro, 4 Units, Subcutaneous, TID With Meals  [Held by provider] midodrine, 2.5 mg, Oral, TID AC  mupirocin, 1 Application, Each Nare, BID  Naloxegol Oxalate, 25 mg, Oral, QAM  pantoprazole, 40 mg, Oral, BID AC  piperacillin-tazobactam, 3.375 g, Intravenous, Q8H  senna-docusate sodium, 2 tablet, Oral, BID   And  polyethylene glycol, 17 g, Oral, BID  venlafaxine XR, 75 mg, Oral, Daily    Infusions  lactated ringers, 75 mL/hr, Last Rate: 75 mL/hr (10/28/24 1635)  sodium chloride 0.45 % 925 mL with sodium bicarbonate 8.4 % 75 mEq infusion, , Last Rate: 75 mL/hr at 10/29/24 0951    Diet  Diet: Liquid, Diabetic; Clear Liquid; Consistent Carbohydrate; Fluid Consistency: Thin (IDDSI 0)    I have personally reviewed     [x]  Laboratory   [x]  Microbiology   [x]  Radiology   [x]  EKG/Telemetry  []  Cardiology/Vascular   []  Pathology    []  Records       Assessment/Plan     Active  Hospital Problems    Diagnosis  POA    **Epigastric abdominal pain [R10.13]  Unknown    Leukocytosis [D72.829]  Yes    Chronic combined systolic and diastolic heart failure [I50.42]  Yes    Abdominal pain [R10.9]  Yes    Gastritis [K29.70]  Yes    Anxiety [F41.9]  Yes    Multiple-type hyperlipidemia [E78.2]  Yes    Type 2 diabetes mellitus with diabetic neuropathy, with long-term current use of insulin [E11.40, Z79.4]  Not Applicable    Benign essential HTN [I10]  Yes      Resolved Hospital Problems   No resolved problems to display.       Patient is a 49 y.o. man with type 2 diabetes, HTN, n, hyperlipidemia, chronic combined systolic and diastolic heart failure (non-ischemic), anxiety who presented with severe abdominal/epigastric pain.    large right subcapsular fluid collection with mass effect on the liver.   Chronic cholecystitis  Transaminitis  -EGD 10/24/2024 with no acute findings  -On PPI  -HIDA scan concerning for chronic cholecystitis versus bili dyskinesia  -Elevated liver enzymes could be ischemic liver injury as well in the setting of hypotension which now has improved.    -Hold statin in the setting of transaminitis  -Duplex ultrasound of mesenteric arteries was ordered however was unable to be completed due to bowel gas and patient's inability to hold his breath per report  -CT angiogram of the abdomen was obtained 10/26/2024 which showed large right perihepatic fluid collection with mass effect on the liver, new.  Patent blood vessels.  --status post CT-guided drain placement on 10/28/2024-preliminary fluid cultures growing gram-negative bacilli  -Continue empiric IV Zosyn, follow-up final culture results  -General Surgery following  -LFTs improving, monitor    Hypotension  -On midodrine, status post IV fluids.  BP improved.  -ECHO  10/26/2024 showed EF of 49.7%  -BP improved.  Discontinued midodrine 10/29/2024  -Outpatient carvedilol, spironolactone, Entresto hold      TENISHA/hyponatremia/acidosis  -Creatinine peaked at 1.75 on 10/25/2024, creatinine was 1.07 on admission  -Nephrology managing, status post IV fluids, creatinine stable, improved  -Nausea management     Cholecystitis,   holecystitis, subcapsular liver fluid collection   -WBC was 13.79 on admission, patient has been afebrile since admission  -Procalcitonin significantly elevated at 78.10 10/25/2024-  -HIDA scan showed possible chronic cholecystitis  -Large subscapular liver collection seen on CT-status post CT-guided drain placement on 10/28/2024,  -Continue empiric IV Zosyn        Anemia   Iron panel consistent with anemia chronic disease with ferritin of 943, B12 and folate normal  Monitor daily CBC, transfuse as indicated for symptomatic anemia or hemoglobin 7    Encephalopathy  -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  -Suspect secondary to acute illness/pain meds/infection/TENISHA  -No gross neurological deficits  -CT scan was obtained on 10/27/2024, there was concern for possible intraparenchymal hemorrhage  -, neurology consulted and MRI was obtained.  Discussed with neurology 10/28/2024, initial CT findings due to calcification, and it was not seen on follow-up MRI.  Okay for anticoagulation from neurology standpoint.    History of acoustic neuroma  -MRI did show show left auditory canal/left cerebellar pontine angle enhancing lesion likely vestibular schwannoma   -Wife reported patient close history of an acoustic neuroma and radiation in 2008  -Follow-up outpatient as scheduled      Type II DM   -Continue current regimen, blood glucose stable    Atelectasis  Possible effusions  Elevated D-dimer  -D-dimer elevated cardiomegaly, however in the setting of acute illness difficult to interpret.   -Chest x-ray 10/27/2024  with vascular congestion.   -CTA  10/28/2024 with no PE-o pulmonary embolism. Increased right more than left lower lung  atelectasis, possibility of underlying pneumonia not excluded. Trace bilateral pleural effusions.  -IV fluids held, management of fluid/diuresis per nephrology  -Low suspicion for PNA, however on IV Zosyn as above which would cover for pneumonia as well.      DVT prophylaxis.  Subcu heparin  Full code.  Discussed with patient and nursing staff.  Expected Discharge Date: 10/30/2024; Expected Discharge Time:        Copied text in this note has been reviewed and is accurate as of 10/29/24.         Dictated utilizing Dragon dictation        Yaritza Hurtado MD  [facility]  10/29/24  12:39 EDT

## 2024-10-29 NOTE — THERAPY TREATMENT NOTE
Patient Name: Donnie Bishop  : 1975    MRN: [id number]                              Today's Date: 10/29/2024       Admit Date: 10/22/2024    Visit Dx:     ICD-10-CM ICD-9-CM   1. Epigastric abdominal pain  R10.13 789.06   2. Acute gastritis without hemorrhage, unspecified gastritis type  K29.00 535.00     Patient Active Problem List   Diagnosis    Multiple-type hyperlipidemia    Type 2 diabetes mellitus with diabetic neuropathy, with long-term current use of insulin    Neuropathy    Vitamin D deficiency    Benign essential HTN    Inability to attain erection    Injury of acoustic nerve    Hypogonadism in male    Anxiety    Chest pain    New onset of congestive heart failure    NSTEMI, initial episode of care    Diabetes mellitus type 1.5, managed as type 1    Chronic combined systolic and diastolic heart failure    Abdominal pain    Gastritis    Leukocytosis    Epigastric abdominal pain     Past Medical History:   Diagnosis Date    Anxiety     CHF (congestive heart failure)     Cough     Inability to attain erection     Injury of acoustic nerve     Myalgia     Type II diabetes mellitus     Venous insufficiency     Viral illness     Vitamin D deficiency      Past Surgical History:   Procedure Laterality Date    CARDIAC CATHETERIZATION N/A 2023    Procedure: Left Heart Cath;  Surgeon: Kaylee Kay MD;  Location: [facility] CATH INVASIVE LOCATION;  Service: Cardiology;  Laterality: N/A;    CARDIAC CATHETERIZATION N/A 2023    Procedure: Coronary angiography;  Surgeon: Kaylee Kay MD;  Location: [facility] CATH INVASIVE LOCATION;  Service: Cardiology;  Laterality: N/A;    ENDOSCOPY N/A 10/24/2024    Procedure: ESOPHAGOGASTRODUODENOSCOPY with biopsies;  Surgeon: Elma Wiggins MD;  Location: [facility] ENDOSCOPY;  Service: Gastroenterology;  Laterality: N/A;  pre-abdominal pain  post-normal      General Information       Row Name 10/29/24 1255          Physical Therapy Time and Intention    Document Type  therapy note (daily note)  -     Mode of Treatment individual therapy;physical therapy  -       Row Name 10/29/24 1255          General Information    Patient Profile Reviewed yes  -SV               User Key  (r) = Recorded By, (t) = Taken By, (c) = Cosigned By      Initials Name Provider Type    Mahogany Dewitt, PT Physical Therapist                   Mobility       Row Name 10/29/24 1344          Bed Mobility    Supine-Sit Harrison (Bed Mobility) maximum assist (25% patient effort);2 person assist  -SV     Comment, (Bed Mobility) unable due to pt became resistive and arched his neck, reported abd pain  -SV               User Key  (r) = Recorded By, (t) = Taken By, (c) = Cosigned By      Initials Name Provider Type    Mahogany Dewitt, PT Physical Therapist                   Obj/Interventions    No documentation.                  Goals/Plan    No documentation.                  Clinical Impression       Row Name 10/29/24 1345          Pain    Pain Location abdomen  -     Additional Documentation Pain Scale: FACES Pre/Post-Treatment (Group)  -       Row Name 10/29/24 1345          Pain Scale: FACES Pre/Post-Treatment    Pain: FACES Scale, Pretreatment 2-->hurts little bit  -SV     Posttreatment Pain Rating 8-->hurts whole lot  -SV       Row Name 10/29/24 1345          Plan of Care Review    Plan of Care Reviewed With patient  -SV               User Key  (r) = Recorded By, (t) = Taken By, (c) = Cosigned By      Initials Name Provider Type    Mahogany Dewitt, PT Physical Therapist                   Outcome Measures       Row Name 10/29/24 1346 10/29/24 0230       How much help from another person do you currently need...    Turning from your back to your side while in flat bed without using bedrails? 2  -SV 4  -EF    Moving from lying on back to sitting on the side of a flat bed without bedrails? 2  -SV 4  -EF    Moving to and from a bed to a chair (including a wheelchair)? 2  -SV 2  -EF     Standing up from a chair using your arms (e.g., wheelchair, bedside chair)? 2  -SV 3  -EF    Climbing 3-5 steps with a railing? 1  -SV 2  -EF    To walk in hospital room? 1  -SV 3  -EF    AM-PAC 6 Clicks Score (PT) 10  -SV 18  -EF    Highest Level of Mobility Goal 4 --> Transfer to chair/commode  -SV 6 --> Walk 10 steps or more  -EF      Row Name 10/29/24 1249          Functional Assessment    Outcome Measure Options AM-PAC 6 Clicks Daily Activity (OT)  -CE               User Key  (r) = Recorded By, (t) = Taken By, (c) = Cosigned By      Initials Name Provider Type    SV Mahogany Horn, PT Physical Therapist    Dee Spencer, OT Occupational Therapist    Torito Briceno, RN Registered Nurse                                 Physical Therapy Education       Title: PT OT SLP Therapies (In Progress)       Topic: Physical Therapy (In Progress)       Point: Mobility training (Done)       Learning Progress Summary            Patient Acceptance, E, VU,NR by  at 10/29/2024 1346    Acceptance, E,TB, NR by  at 10/26/2024 1436                      Point: Home exercise program (Not Started)       Learner Progress:  Not documented in this visit.              Point: Body mechanics (In Progress)       Learning Progress Summary            Patient Acceptance, E,TB, NR by  at 10/26/2024 1436                      Point: Precautions (Not Started)       Learner Progress:  Not documented in this visit.                              User Key       Initials Effective Dates Name Provider Type Discipline     07/11/23 -  Mahogany Horn, PT Physical Therapist PT     09/22/22 -  Pam Vega, PT Physical Therapist PT                  PT Recommendation and Plan           Time Calculation:         PT Charges       Row Name 10/29/24 1349             Time Calculation    Start Time 1255  -SV      Stop Time 1312  -SV      Time Calculation (min) 17 min  -      PT Received On 10/29/24  -      PT - Next Appointment 10/30/24   -SV                User Key  (r) = Recorded By, (t) = Taken By, (c) = Cosigned By      Initials Name Provider Type    SV Mahogany Horn, PT Physical Therapist                  Therapy Charges for Today       Code Description Service Date Service Provider Modifiers Qty    99607228435  PT THERAPEUTIC ACT EA 15 MIN 10/29/2024 Mahogany Horn, PT GP 1    09673019396 HC PT THER SUPP EA 15 MIN 10/29/2024 Mahogany Horn, PT GP 1            PT G-Codes  Outcome Measure Options: AM-PAC 6 Clicks Daily Activity (OT)  AM-PAC 6 Clicks Score (PT): 10  AM-PAC 6 Clicks Score (OT): 12       Mahogany Horn PT  10/29/2024

## 2024-10-29 NOTE — TELEPHONE ENCOUNTER
----- Message from Elma Wiggins sent at 10/28/2024  3:51 PM EDT -----  No significant inflammation was seen on stomach biopsies - further w/u per surgery for fluid collection near liver

## 2024-10-29 NOTE — PLAN OF CARE
Goal Outcome Evaluation:  Plan of Care Reviewed With: patient         Appt scheduled with pt today and pt was given one hour notice before PT. Pt agreeable initially and requested amb to BR. We agreed to asst pt to bsc today. Upon attempt at sup to sit... Max of 2 for moving BLE toward eob. UPone attempt at sup to eob pt arched his neck and back resisting mobility. He reported need for BM at the moment. Nsg notified of pt need for bed pan. Pt not able to tolerate activity today due to pain.

## 2024-10-29 NOTE — PROGRESS NOTES
[facility] Progress Note       Encounter Date:10/29/24  Patient:Donnie Bishop  :1975  MRN:[id number]      Chief Complaint: Abdominal pain      Subjective:        Patient seems to be in less pain mainly complaining of being thirsty wanting milk.    Review of Systems:  Review of Systems   Cardiovascular:  Negative for chest pain.   Respiratory:  Negative for shortness of breath.    Gastrointestinal:  Positive for abdominal pain.       Medications:  Scheduled Meds:  [Held by provider] carvedilol, 6.25 mg, Oral, Q12H  heparin (porcine), 5,000 Units, Subcutaneous, Q8H  insulin glargine, 6 Units, Subcutaneous, Nightly  insulin lispro, 2-7 Units, Subcutaneous, 4x Daily AC & at Bedtime  insulin lispro, 4 Units, Subcutaneous, TID With Meals  [Held by provider] midodrine, 2.5 mg, Oral, TID AC  mupirocin, 1 Application, Each Nare, BID  pantoprazole, 40 mg, Oral, BID AC  piperacillin-tazobactam, 3.375 g, Intravenous, Q8H  senna-docusate sodium, 2 tablet, Oral, BID   And  polyethylene glycol, 17 g, Oral, BID  venlafaxine XR, 75 mg, Oral, Daily    Continuous Infusions:  lactated ringers, 75 mL/hr, Last Rate: 75 mL/hr (10/28/24 1635)  sodium chloride 0.45 % 925 mL with sodium bicarbonate 8.4 % 75 mEq infusion, , Last Rate: 75 mL/hr at 10/29/24 0951    PRN Meds:    senna-docusate sodium **AND** polyethylene glycol **AND** bisacodyl **AND** bisacodyl    Calcium Replacement - Follow Nurse / BPA Driven Protocol    dextrose    dextrose    glucagon (human recombinant)    hydrOXYzine    influenza vaccine    Magnesium Standard Dose Replacement - Follow Nurse / BPA Driven Protocol    melatonin    [DISCONTINUED] Morphine **AND** naloxone    ondansetron ODT **OR** ondansetron    oxyCODONE    Phosphorus Replacement - Follow Nurse / BPA Driven Protocol    Potassium Replacement - Follow Nurse / BPA Driven Protocol    sodium chloride         Objective:       Vitals:    10/29/24 0319 10/29/24 0400 10/29/24 0700 10/29/24  0800   BP: 126/82 131/79 130/90 128/75   BP Location: Right arm  Right arm    Patient Position: Lying      Pulse: 99 99  96   Resp:   20    Temp: 98.1 °F (36.7 °C)  97.6 °F (36.4 °C)    TempSrc: Oral  Axillary    SpO2:       Weight:   85.3 kg (188 lb)    Height:               Physical Exam:  Constitutional: Acutely ill appearing, well developed, mild acute distress due to pain  HENT: Oropharynx clear and membrane moist  Eyes: Normal conjunctiva, no sclera icterus.  Neck: Supple, no carotid bruit bilaterally.  Cardiovascular: Regular rate and rhythm, No Murmur, Trace bilateral lower extremity edema.  Pulmonary: Normal respiratory effort, normal lung sounds, no wheezing.   Skin: Warm, dry, no ecchymosis, no rash.             Lab Review:   Results from last 7 days   Lab Units 10/29/24  0652 10/28/24  2059 10/28/24  1959 10/27/24  2205 10/27/24  0329 10/26/24  0545 10/25/24  1544 10/25/24  0426   SODIUM mmol/L 136 143 141 137 137 137 129* 132*   POTASSIUM mmol/L 3.9 4.5 4.3 3.9 4.7 4.7 4.4 4.5   CHLORIDE mmol/L 107 110* 108* 106 104 103 99 98   CO2 mmol/L 16.6* 16.0* 16.6* 19.0* 19.0* 19.0* 16.9* 20.0*   BUN mg/dL 30* 35* 35* 28* 32* 36* 37* 38*   CREATININE mg/dL 1.14 1.06 1.08 0.93 1.12 1.22 1.38* 1.75*   GLUCOSE mg/dL 176* 159* 161* 177* 223* 133* 211* 213*   CALCIUM mg/dL 10.1 9.8 10.1 9.8 9.8 9.5 9.0 8.6   AST (SGOT) U/L 154* 199* 229* 121* 172* 108*  --  148*   ALT (SGPT) U/L 135* 159* 164* 121* 133* 92*  --  77*     Results from last 7 days   Lab Units 10/28/24  0045 10/27/24  2205   HSTROP T ng/L 30* 28*     Results from last 7 days   Lab Units 10/29/24  0652 10/28/24  1959 10/27/24  2310 10/27/24  0329 10/26/24  0545 10/25/24  0426 10/24/24  2126   WBC 10*3/mm3 15.53* 16.23* 14.14* 10.64 9.44 7.93 9.87   HEMOGLOBIN g/dL 11.6* 12.3* 12.0* 12.7* 11.9* 10.9* 10.6*   HEMATOCRIT % 37.1* 38.6 36.6* 38.7 36.7* 32.4* 31.2*   PLATELETS 10*3/mm3 370 382 349 352 297 278 273     Results from last 7 days   Lab Units  "10/28/24  1959 10/28/24  1129   INR  1.13* 1.0   APTT seconds 28.8  --      Results from last 7 days   Lab Units 10/29/24  0652 10/28/24  1959 10/27/24  2205 10/27/24  0329 10/26/24  0545   MAGNESIUM mg/dL 2.3 2.5 2.9* 2.5 2.4           Invalid input(s): \"LDLCALC\"  Results from last 7 days   Lab Units 10/26/24  0545   PROBNP pg/mL 393.0     Results from last 7 days   Lab Units 10/24/24  0617   TSH uIU/mL 0.973            Assessment:          Diagnosis Plan   1. Epigastric abdominal pain  Case Request    Case Request    Tissue Pathology Exam    Tissue Pathology Exam      2. Acute gastritis without hemorrhage, unspecified gastritis type               Plan:       Mr. Bishop is a 49 y.o. gentleman with past medical history notable for nonischemic cardiomyopathy, diabetes, hypertension, mixed hyperlipidemia presented to the hospital with abdominal pain initially concerns were for gastritis symptoms significantly worsened and now found to have a perihepatic fluid collection concerning for biloma versus hematoma.  Patient has had a lot more pain over the last 24 hours were asked to evaluate him for perioperative risk assessment prior to proceeding forward with possible cholecystectomy.  Status post IR draining of his's perihepatic abscess thought to be a bile leak.  Surgery following.  From a cardiac standpoint heart rate is better.  Likely related to pain.  Blood pressure is also improving.  Midodrine is being weaned.  Hopefully can restart heart failure regimen over the next 48 hours as midodrine is weaned off.  Blood pressure seems to be recovering.  Heart rates in a reasonable range may be tomorrow could add back carvedilol.    Nonischemic cardiomyopathy:  Echocardiogram 10/26/2024 with improvement in heart function almost back to normal  Failure regimen on hold given hypotension which not expected given his acute illness  Hopefully can restart medications back tomorrow pending further weaning of midodrine and blood " pressure response    Possible pulmonary emboli:  There is a concern that his tachycardia was related to an acute pulmonary emboli D-dimer is elevated however his clinical presentation seems more consistent with acute infection as a likely source.    Subsequent CTA did not show any PE.  Would continue prophylaxis for prevention of DVT and PE               Alex Varela MD  [facility]  10/29/24  09:59 EDT

## 2024-10-29 NOTE — PROGRESS NOTES
"General Surgery Progress Note    CC: Cholecystitis, subcapsular liver fluid collection, right upper quadrant abdominal pain    S: Patient reports he is thirsty today, he states his pain is a little better since IR drainage. He denies NV. Has tried to have a BM but was unable to.    O:/90 (BP Location: Right arm)   Pulse 99   Temp 97.6 °F (36.4 °C) (Axillary)   Resp 20   Ht 170 cm (66.93\")   Wt 85.3 kg (188 lb)   SpO2 92%   BMI 29.51 kg/m²     Intake & Output (last day)         10/28 0701  10/29 0700 10/29 0701  10/30 0700    P.O.      Total Intake(mL/kg)      Urine (mL/kg/hr) 1525 (0.7)     Drains 1550     Total Output 3075     Net -3075                   GENERAL: Ill-appearing, awake, alert, answering questions slowly  HEENT: EOMI, dry mucus membranes   CHEST: normal work of breathing on room air  CARDIAC: palpable distal pulses, no pedal edema  GI: Abd soft, mildly distended, moderately tender in the right upper quadrant without rebound or guarding; RUQ IR drain with thick bilious output  EXTREMITIES: MOCK, no cyanosis    SKIN: Warm and dry    LABS  Results from last 7 days   Lab Units 10/29/24  0652 10/28/24  1959 10/27/24  2310 10/27/24  0329   WBC 10*3/mm3 15.53* 16.23* 14.14* 10.64   HEMOGLOBIN g/dL 11.6* 12.3* 12.0* 12.7*   HEMATOCRIT % 37.1* 38.6 36.6* 38.7   PLATELETS 10*3/mm3 370 382 349 352   MONOCYTES % % 9.9  --  10.1 7.1   EOSINOPHIL % % 0.0*  --  0.0* 0.0*     Results from last 7 days   Lab Units 10/29/24  0652 10/28/24  2059 10/28/24  1959   SODIUM mmol/L 136 143 141   POTASSIUM mmol/L 3.9 4.5 4.3   CHLORIDE mmol/L 107 110* 108*   CO2 mmol/L 16.6* 16.0* 16.6*   BUN mg/dL 30* 35* 35*   CREATININE mg/dL 1.14 1.06 1.08   CALCIUM mg/dL 10.1 9.8 10.1   BILIRUBIN mg/dL 0.7 0.7 0.7   ALK PHOS U/L 189* 222* 229*   ALT (SGPT) U/L 135* 159* 164*   AST (SGOT) U/L 154* 199* 229*   GLUCOSE mg/dL 176* 159* 161*     Results from last 7 days   Lab Units 10/28/24  1959 10/28/24  1129   INR  1.13* 1.0 "   APTT seconds 28.8  --    Mg 2.3  Phos 2.6  Ammonia 17      IMAGING:  CTA chest 10/28/24 -negative for PE, subcapsular fluid collection of the liver is smaller, stable fluid density posterior to the gallbladder, cholelithiasis present; RLL atelectasis and R pleural effusion noted  Venous duplex 10/28/2024-normal bilateral lower extremity venous duplex scan  CT-guided percutaneous liver drainage 10/28/2024-drain noted in right abdomen within subcapsular fluid collection      A/P: 49 y.o. male with multiple medical problems including CHF with EF of 35%, hypertension, hyperlipidemia, type 2 diabetes mellitus with A1c 9.4, who came into the hospital complaining of abdominal pain.      -CT demonstrating contracted gallbladder with mild hyperenhancing wall, EGD negative for abnormalities; August CT had distended gallbladder but no other findings of cholecystitis  -HIDA scan demonstrating filling but with decreased ejection fraction 19% concerning for possible chronic cholecystitis.   -CTA abdomen pelvis 10/26 to evaluate worsening RUQ pain and possible mesenteric ischemia demonstrated a large right subcapsular fluid collection with mass effect on the liver; no active extravasation  -CT head 10/27 with possible right thalamic stroke  -s/p CT-guided IR drainage 10/28 with thick, turbid bilious output from drain - spontaneous bile leak vs abscess in the differential, cause unclear. WBC is slightly down at 15.53, and LFTs are downtrending. Continue antibiotics, f/u drain cultures.  -Continue pain and nausea control. Ok for clear liquid diet and advance as tolerated. Continue scheduled bowel regimen.    Manuela Wisdom MD  General, Robotic and Endoscopic Surgery  [facility]    [address], [address]  [place], [place], [address]  P: [phone]  F: [phone]

## 2024-10-29 NOTE — THERAPY EVALUATION
Patient Name: Donnie Bishop  : 1975    MRN: [id number]                              Today's Date: 10/29/2024       Admit Date: 10/22/2024    Visit Dx:     ICD-10-CM ICD-9-CM   1. Epigastric abdominal pain  R10.13 789.06   2. Acute gastritis without hemorrhage, unspecified gastritis type  K29.00 535.00     Patient Active Problem List   Diagnosis    Multiple-type hyperlipidemia    Type 2 diabetes mellitus with diabetic neuropathy, with long-term current use of insulin    Neuropathy    Vitamin D deficiency    Benign essential HTN    Inability to attain erection    Injury of acoustic nerve    Hypogonadism in male    Anxiety    Chest pain    New onset of congestive heart failure    NSTEMI, initial episode of care    Diabetes mellitus type 1.5, managed as type 1    Chronic combined systolic and diastolic heart failure    Abdominal pain    Gastritis    Leukocytosis    Epigastric abdominal pain     Past Medical History:   Diagnosis Date    Anxiety     CHF (congestive heart failure)     Cough     Inability to attain erection     Injury of acoustic nerve     Myalgia     Type II diabetes mellitus     Venous insufficiency     Viral illness     Vitamin D deficiency      Past Surgical History:   Procedure Laterality Date    CARDIAC CATHETERIZATION N/A 2023    Procedure: Left Heart Cath;  Surgeon: Kaylee Kay MD;  Location: [facility] CATH INVASIVE LOCATION;  Service: Cardiology;  Laterality: N/A;    CARDIAC CATHETERIZATION N/A 2023    Procedure: Coronary angiography;  Surgeon: Kaylee Kay MD;  Location: [facility] CATH INVASIVE LOCATION;  Service: Cardiology;  Laterality: N/A;    ENDOSCOPY N/A 10/24/2024    Procedure: ESOPHAGOGASTRODUODENOSCOPY with biopsies;  Surgeon: Elma Wiggins MD;  Location: [facility] ENDOSCOPY;  Service: Gastroenterology;  Laterality: N/A;  pre-abdominal pain  post-normal      General Information       Row Name 10/29/24 1230          OT Time and Intention    Subjective Information complains  "of  being thirsty  -CE     Document Type evaluation  -CE     Mode of Treatment occupational therapy  -CE     Patient Effort fair  -CE     Symptoms Noted During/After Treatment increased pain  -CE       Row Name 10/29/24 1230          General Information    Patient Profile Reviewed yes  -CE     Prior Level of Function --  pt not an accurate historian at time of eval but per chart and pt, he lives alone and uses a SC at times; was ind with ADLs  -CE     Existing Precautions/Restrictions fall  -CE       Row Name 10/29/24 1230          Living Environment    People in Home alone  -CE       Row Name 10/29/24 1230          Home Main Entrance    Number of Stairs, Main Entrance --  unsure and pt unable to provide accurate info at time of eval  -CE       Row Name 10/29/24 1230          Cognition    Orientation Status (Cognition) oriented to;person;place  cues and increased time to name month and pt stating \"September\"; perseverating on day of week when asked month; appears confused regarding recent events and asking \"what is that???\" when seeing abdominal drain  -CE       Row Name 10/29/24 1230          Safety Issues/Impairments Affecting Functional Mobility    Safety Issues Affecting Function (Mobility) ability to follow commands;awareness of need for assistance;insight into deficits/self-awareness;judgment;problem-solving;safety precaution awareness;safety precautions follow-through/compliance;sequencing abilities  -CE     Impairments Affecting Function (Mobility) endurance/activity tolerance;pain;strength;cognition;balance  -CE     Cognitive Impairments, Mobility Safety/Performance attention;awareness, need for assistance;insight into deficits/self-awareness;judgment;problem-solving/reasoning;safety precaution awareness;safety precaution follow-through;sequencing abilities  -CE     Comment, Safety Issues/Impairments (Mobility) pt with impaired attn to task and conversation and req'd max cues for initiation throughout  -CE  "              User Key  (r) = Recorded By, (t) = Taken By, (c) = Cosigned By      Initials Name Provider Type    Dee Spencer OT Occupational Therapist                     Mobility/ADL's       Row Name 10/29/24 1239          Bed Mobility    Bed Mobility supine-sit;sit-supine  -CE     Supine-Sit [place] (Bed Mobility) verbal cues;nonverbal cues (demo/gesture);moderate assist (50% patient effort)  -CE     Sit-Supine [place] (Bed Mobility) moderate assist (50% patient effort);1 person assist;verbal cues;nonverbal cues (demo/gesture)  -CE     Bed Mobility, Safety Issues cognitive deficits limit understanding  -CE     Assistive Device (Bed Mobility) head of bed elevated;bed rails  -CE     Comment, (Bed Mobility) cues for initiation and sequencing  -CE       Row Name 10/29/24 1239          Transfers    Comment, (Transfers) deferred as pt not safe to attempt  -       Row Name 10/29/24 1239          Activities of Daily Living    BADL Assessment/Intervention lower body dressing;feeding  -CE       Row Name 10/29/24 1239          Lower Body Dressing Assessment/Training    [place] Level (Lower Body Dressing) don;socks;dependent (less than 25% patient effort)  -CE     Position (Lower Body Dressing) edge of bed sitting  -CE       Row Name 10/29/24 1239          Self-Feeding Assessment/Training    [place] Level (Feeding) feeding skills;liquids to mouth;set up;supervision  -CE     Position (Feeding) sitting up in bed  -CE     Comment, (Feeding) to bring cup with straw to mouth using LUE  -CE               User Key  (r) = Recorded By, (t) = Taken By, (c) = Cosigned By      Initials Name Provider Type    Dee Spencer OT Occupational Therapist                   Obj/Interventions       Row Name 10/29/24 1242          Sensory Assessment (Somatosensory)    Sensory Assessment (Somatosensory) unable/difficult to assess  -CE       Row Name 10/29/24 1242          Vision Assessment/Intervention    Visual  Impairment/Limitations unable/difficult to assess;corrective lenses for distance;corrective lenses for reading  -CE       Row Name 10/29/24 1242          Range of Motion Comprehensive    General Range of Motion no range of motion deficits identified  -       Row Name 10/29/24 1242          Strength Comprehensive (MMT)    Comment, General Manual Muscle Testing (MMT) Assessment pt with generalized weakness and difficult to formally MMT; BUEs grossly 3/5 and BLUE 2+/5 but with poor effort for LE testing  -CE       Row Name 10/29/24 1242          Motor Skills    Motor Skills functional endurance  -CE     Functional Endurance fair-  -CE       Row Name 10/29/24 1242          Balance    Dynamic Sitting Balance contact guard;minimal assist  -CE     Position, Sitting Balance unsupported;sitting edge of bed  -CE               User Key  (r) = Recorded By, (t) = Taken By, (c) = Cosigned By      Initials Name Provider Type    CE Dee Cardona OT Occupational Therapist                   Goals/Plan       Row Name 10/29/24 1248          Bed Mobility Goal 1 (OT)    Activity/Assistive Device (Bed Mobility Goal 1, OT) bed mobility activities, all  -CE     [place] Level/Cues Needed (Bed Mobility Goal 1, OT) modified independence  -CE     Time Frame (Bed Mobility Goal 1, OT) short term goal (STG);2 weeks  -CE       Row Name 10/29/24 1248          Transfer Goal 1 (OT)    Activity/Assistive Device (Transfer Goal 1, OT) transfers, all  -CE     [place] Level/Cues Needed (Transfer Goal 1, OT) modified independence  -CE     Time Frame (Transfer Goal 1, OT) short term goal (STG);2 weeks  -CE       Row Name 10/29/24 1248          Bathing Goal 1 (OT)    Activity/Device (Bathing Goal 1, OT) bathing skills, all  -CE     [place] Level/Cues Needed (Bathing Goal 1, OT) modified independence  -CE     Time Frame (Bathing Goal 1, OT) short term goal (STG);2 weeks  -CE       Row Name 10/29/24 1248          Dressing Goal 1 (OT)     Activity/Device (Dressing Goal 1, OT) dressing skills, all  -CE     [place]/Cues Needed (Dressing Goal 1, OT) modified independence  -CE     Time Frame (Dressing Goal 1, OT) short term goal (STG);2 weeks  -CE       Row Name 10/29/24 1248          Toileting Goal 1 (OT)    Activity/Device (Toileting Goal 1, OT) toileting skills, all  -CE     [place] Level/Cues Needed (Toileting Goal 1, OT) modified independence  -CE     Time Frame (Toileting Goal 1, OT) short term goal (STG);2 weeks  -CE       Row Name 10/29/24 1244          Strength Goal 1 (OT)    Strength Goal 1 (OT) Pt will be independent with HEP focusing on increasing strength in prep for I/ADLs.  -CE       Row Name 10/29/24 1247          Therapy Assessment/Plan (OT)    Planned Therapy Interventions (OT) activity tolerance training;functional balance retraining;BADL retraining;strengthening exercise;transfer/mobility retraining;cognitive/visual perception retraining  -CE               User Key  (r) = Recorded By, (t) = Taken By, (c) = Cosigned By      Initials Name Provider Type    CE Dee Cardona OT Occupational Therapist                   Clinical Impression       Row Name 10/29/24 3647          Pain Assessment    Pre/Posttreatment Pain Comment pt with c/o pain upon sup>sit but did not rate; pt medicated prior to eval and RN aware of abdominal pain  -CE       Row Name 10/29/24 1248          Plan of Care Review    Plan of Care Reviewed With patient  -CE     Outcome Evaluation Pt seen for OT eval after admission 2/2 epigastric pain. Pt found to have cholecystitis with subcapsular liver fluid collection s/p IR drainage and drain placement on 10/28. Pt presents this date with increased confusion and difficulty attending to conversation and with questionable effort during bed mobility and MMT. Pt states he lives alone and uses a cane at baseline. Unclear about PLOF with ADLs and home setup. Pt able to complete bed mobility with modA x 1 and max  verbal/tactile cues this date. Pt required depA for donning socks seated EOB. OT will con't to follow for stated goals to address weakness, impaired safety and decreased ind with ADLs. Recommend d/c to SNF.  -CE       Row Name 10/29/24 1244          Therapy Assessment/Plan (OT)    Rehab Potential (OT) good  -CE     Criteria for Skilled Therapeutic Interventions Met (OT) yes  -CE     Therapy Frequency (OT) 5 times/wk  -CE       Row Name 10/29/24 1244          Therapy Plan Review/Discharge Plan (OT)    Anticipated Discharge Disposition (OT) skilled nursing facility  -CE       Row Name 10/29/24 1244          Vital Signs    O2 Delivery Pre Treatment room air  -CE     O2 Delivery Intra Treatment room air  -CE     O2 Delivery Post Treatment room air  -CE     Pre Patient Position Supine  -CE     Intra Patient Position Sitting  -CE     Post Patient Position Supine  -CE       Row Name 10/29/24 1244          Positioning and Restraints    Pre-Treatment Position in bed  -CE     Post Treatment Position bed  -CE     In Bed notified nsg;supine;fowlers;call light within reach;encouraged to call for assist;exit alarm on  -CE               User Key  (r) = Recorded By, (t) = Taken By, (c) = Cosigned By      Initials Name Provider Type    CE Dee Cardona, OT Occupational Therapist                   Outcome Measures       Row Name 10/29/24 1249          How much help from another is currently needed...    Putting on and taking off regular lower body clothing? 2  -CE     Bathing (including washing, rinsing, and drying) 2  -CE     Toileting (which includes using toilet bed pan or urinal) 1  -CE     Putting on and taking off regular upper body clothing 2  -CE     Taking care of personal grooming (such as brushing teeth) 2  -CE     Eating meals 3  -CE     AM-PAC 6 Clicks Score (OT) 12  -CE       Row Name 10/29/24 0230          How much help from another person do you currently need...    Turning from your back to your side while in  flat bed without using bedrails? 4  -EF     Moving from lying on back to sitting on the side of a flat bed without bedrails? 4  -EF     Moving to and from a bed to a chair (including a wheelchair)? 2  -EF     Standing up from a chair using your arms (e.g., wheelchair, bedside chair)? 3  -EF     Climbing 3-5 steps with a railing? 2  -EF     To walk in hospital room? 3  -EF     AM-PAC 6 Clicks Score (PT) 18  -EF     Highest Level of Mobility Goal 6 --> Walk 10 steps or more  -EF       Row Name 10/29/24 1249          Functional Assessment    Outcome Measure Options AM-PAC 6 Clicks Daily Activity (OT)  -CE               User Key  (r) = Recorded By, (t) = Taken By, (c) = Cosigned By      Initials Name Provider Type    CE Dee Cardona OT Occupational Therapist    Torito Briceno RN Registered Nurse                    Occupational Therapy Education       Title: PT OT SLP Therapies (In Progress)       Topic: Occupational Therapy (In Progress)       Point: ADL training (In Progress)       Description:   Instruct learner(s) on proper safety adaptation and remediation techniques during self care or transfers.   Instruct in proper use of assistive devices.                  Learning Progress Summary            Patient Acceptance, E, NR,NL by CE at 10/29/2024 1250                      Point: Precautions (In Progress)       Description:   Instruct learner(s) on prescribed precautions during self-care and functional transfers.                  Learning Progress Summary            Patient Acceptance, E, NR,NL by CE at 10/29/2024 1250                      Point: Body mechanics (In Progress)       Description:   Instruct learner(s) on proper positioning and spine alignment during self-care, functional mobility activities and/or exercises.                  Learning Progress Summary            Patient Acceptance, E, NR,NL by CE at 10/29/2024 1250                                      User Key       Initials Effective Dates Name  Provider Type Discipline    CE 10/17/22 -  Dee Cardona OT Occupational Therapist OT                  OT Recommendation and Plan  Planned Therapy Interventions (OT): activity tolerance training, functional balance retraining, BADL retraining, strengthening exercise, transfer/mobility retraining, cognitive/visual perception retraining  Therapy Frequency (OT): 5 times/wk  Plan of Care Review  Plan of Care Reviewed With: patient  Outcome Evaluation: Pt seen for OT eval after admission 2/2 epigastric pain. Pt found to have cholecystitis with subcapsular liver fluid collection s/p IR drainage and drain placement on 10/28. Pt presents this date with increased confusion and difficulty attending to conversation and with questionable effort during bed mobility and MMT. Pt states he lives alone and uses a cane at baseline. Unclear about PLOF with ADLs and home setup. Pt able to complete bed mobility with modA x 1 and max verbal/tactile cues this date. Pt required depA for donning socks seated EOB. OT will con't to follow for stated goals to address weakness, impaired safety and decreased ind with ADLs. Recommend d/c to SNF.     Time Calculation:   Evaluation Complexity (OT)  Review Occupational Profile/Medical/Therapy History Complexity: expanded/moderate complexity  Assessment, Occupational Performance/Identification of Deficit Complexity: 3-5 performance deficits  Clinical Decision Making Complexity (OT): detailed assessment/moderate complexity  Overall Complexity of Evaluation (OT): moderate complexity     Time Calculation- OT       Row Name 10/29/24 1250             Time Calculation- OT    OT Start Time 0825  -CE      OT Stop Time 0846  -CE      OT Time Calculation (min) 21 min  -CE      Total Timed Code Minutes- OT 8 minute(s)  -CE      OT Received On 10/29/24  -CE      OT - Next Appointment 10/30/24  -CE      OT Goal Re-Cert Due Date 11/12/24  -CE         Timed Charges    01371 - OT Self Care/Mgmt Minutes 8  -CE          Total Minutes    Timed Charges Total Minutes 8  -CE       Total Minutes 8  -CE                User Key  (r) = Recorded By, (t) = Taken By, (c) = Cosigned By      Initials Name Provider Type    Dee Spencer OT Occupational Therapist                  Therapy Charges for Today       Code Description Service Date Service Provider Modifiers Qty    [id number]  OT SELF CARE/MGMT/TRAIN EA 15 MIN 10/29/2024 Dee Cardona OT GO 1    [id number]  OT EVAL MOD COMPLEXITY 3 10/29/2024 Dee Cardona OT GO 1                 Dee Cardona OT  10/29/2024

## 2024-10-30 ENCOUNTER — APPOINTMENT (OUTPATIENT)
Dept: MRI IMAGING | Facility: HOSPITAL | Age: 49
DRG: 853 | End: 2024-10-30
Payer: COMMERCIAL

## 2024-10-30 PROBLEM — N17.9 ACUTE KIDNEY INJURY: Status: RESOLVED | Noted: 2024-10-30 | Resolved: 2024-10-30

## 2024-10-30 PROBLEM — R74.01 TRANSAMINITIS: Status: ACTIVE | Noted: 2024-10-30

## 2024-10-30 PROBLEM — K75.0 ABSCESS OF LIVER: Status: ACTIVE | Noted: 2024-10-30

## 2024-10-30 PROBLEM — B96.1 KLEBSIELLA PNEUMONIAE (K. PNEUMONIAE) AS THE CAUSE OF DISEASES CLASSIFIED ELSEWHERE: Status: ACTIVE | Noted: 2024-10-30

## 2024-10-30 PROBLEM — I95.9 HYPOTENSION: Status: ACTIVE | Noted: 2024-10-30

## 2024-10-30 PROBLEM — N17.9 ACUTE KIDNEY INJURY: Status: ACTIVE | Noted: 2024-10-30

## 2024-10-30 LAB
ALBUMIN SERPL-MCNC: 2.2 G/DL (ref 3.5–5.2)
ALBUMIN/GLOB SERPL: 0.6 G/DL
ALP SERPL-CCNC: 149 U/L (ref 39–117)
ALT SERPL W P-5'-P-CCNC: 83 U/L (ref 1–41)
ANION GAP SERPL CALCULATED.3IONS-SCNC: 11.6 MMOL/L (ref 5–15)
AST SERPL-CCNC: 55 U/L (ref 1–40)
BASOPHILS # BLD AUTO: 0.03 10*3/MM3 (ref 0–0.2)
BASOPHILS NFR BLD AUTO: 0.2 % (ref 0–1.5)
BILIRUB SERPL-MCNC: 0.8 MG/DL (ref 0–1.2)
BUN SERPL-MCNC: 23 MG/DL (ref 6–20)
BUN/CREAT SERPL: 29.5 (ref 7–25)
CALCIUM SPEC-SCNC: 9 MG/DL (ref 8.6–10.5)
CHLORIDE SERPL-SCNC: 102 MMOL/L (ref 98–107)
CO2 SERPL-SCNC: 21.4 MMOL/L (ref 22–29)
CREAT SERPL-MCNC: 0.78 MG/DL (ref 0.76–1.27)
DEPRECATED RDW RBC AUTO: 43.4 FL (ref 37–54)
EGFRCR SERPLBLD CKD-EPI 2021: 110.7 ML/MIN/1.73
EOSINOPHIL # BLD AUTO: 0.04 10*3/MM3 (ref 0–0.4)
EOSINOPHIL NFR BLD AUTO: 0.3 % (ref 0.3–6.2)
ERYTHROCYTE [DISTWIDTH] IN BLOOD BY AUTOMATED COUNT: 12.9 % (ref 12.3–15.4)
GLOBULIN UR ELPH-MCNC: 3.5 GM/DL
GLUCOSE BLDC GLUCOMTR-MCNC: 101 MG/DL (ref 70–130)
GLUCOSE BLDC GLUCOMTR-MCNC: 122 MG/DL (ref 70–130)
GLUCOSE BLDC GLUCOMTR-MCNC: 253 MG/DL (ref 70–130)
GLUCOSE BLDC GLUCOMTR-MCNC: 258 MG/DL (ref 70–130)
GLUCOSE SERPL-MCNC: 122 MG/DL (ref 65–99)
HCT VFR BLD AUTO: 33.5 % (ref 37.5–51)
HGB BLD-MCNC: 10.6 G/DL (ref 13–17.7)
IMM GRANULOCYTES # BLD AUTO: 0.19 10*3/MM3 (ref 0–0.05)
IMM GRANULOCYTES NFR BLD AUTO: 1.3 % (ref 0–0.5)
LYMPHOCYTES # BLD AUTO: 1.46 10*3/MM3 (ref 0.7–3.1)
LYMPHOCYTES NFR BLD AUTO: 10.2 % (ref 19.6–45.3)
MAGNESIUM SERPL-MCNC: 1.7 MG/DL (ref 1.6–2.6)
MCH RBC QN AUTO: 29.1 PG (ref 26.6–33)
MCHC RBC AUTO-ENTMCNC: 31.6 G/DL (ref 31.5–35.7)
MCV RBC AUTO: 92 FL (ref 79–97)
MONOCYTES # BLD AUTO: 0.72 10*3/MM3 (ref 0.1–0.9)
MONOCYTES NFR BLD AUTO: 5 % (ref 5–12)
NEUTROPHILS NFR BLD AUTO: 11.94 10*3/MM3 (ref 1.7–7)
NEUTROPHILS NFR BLD AUTO: 83 % (ref 42.7–76)
NRBC BLD AUTO-RTO: 0 /100 WBC (ref 0–0.2)
PHOSPHATE SERPL-MCNC: 2.1 MG/DL (ref 2.5–4.5)
PHOSPHATE SERPL-MCNC: 2.3 MG/DL (ref 2.5–4.5)
PLATELET # BLD AUTO: 364 10*3/MM3 (ref 140–450)
PMV BLD AUTO: 10.3 FL (ref 6–12)
POTASSIUM SERPL-SCNC: 3.1 MMOL/L (ref 3.5–5.2)
POTASSIUM SERPL-SCNC: 4 MMOL/L (ref 3.5–5.2)
PROT SERPL-MCNC: 5.7 G/DL (ref 6–8.5)
RBC # BLD AUTO: 3.64 10*6/MM3 (ref 4.14–5.8)
SODIUM SERPL-SCNC: 135 MMOL/L (ref 136–145)
URATE SERPL-MCNC: 3.4 MG/DL (ref 3.4–7)
WBC NRBC COR # BLD AUTO: 14.38 10*3/MM3 (ref 3.4–10.8)

## 2024-10-30 PROCEDURE — 84100 ASSAY OF PHOSPHORUS: CPT | Performed by: STUDENT IN AN ORGANIZED HEALTH CARE EDUCATION/TRAINING PROGRAM

## 2024-10-30 PROCEDURE — 97530 THERAPEUTIC ACTIVITIES: CPT

## 2024-10-30 PROCEDURE — 84132 ASSAY OF SERUM POTASSIUM: CPT | Performed by: HOSPITALIST

## 2024-10-30 PROCEDURE — 83735 ASSAY OF MAGNESIUM: CPT | Performed by: STUDENT IN AN ORGANIZED HEALTH CARE EDUCATION/TRAINING PROGRAM

## 2024-10-30 PROCEDURE — 74183 MRI ABD W/O CNTR FLWD CNTR: CPT

## 2024-10-30 PROCEDURE — 63710000001 INSULIN GLARGINE PER 5 UNITS: Performed by: STUDENT IN AN ORGANIZED HEALTH CARE EDUCATION/TRAINING PROGRAM

## 2024-10-30 PROCEDURE — 25010000002 HEPARIN (PORCINE) PER 1000 UNITS: Performed by: STUDENT IN AN ORGANIZED HEALTH CARE EDUCATION/TRAINING PROGRAM

## 2024-10-30 PROCEDURE — 25010000002 PIPERACILLIN SOD-TAZOBACTAM PER 1 G: Performed by: STUDENT IN AN ORGANIZED HEALTH CARE EDUCATION/TRAINING PROGRAM

## 2024-10-30 PROCEDURE — 99232 SBSQ HOSP IP/OBS MODERATE 35: CPT | Performed by: INTERNAL MEDICINE

## 2024-10-30 PROCEDURE — 99231 SBSQ HOSP IP/OBS SF/LOW 25: CPT | Performed by: STUDENT IN AN ORGANIZED HEALTH CARE EDUCATION/TRAINING PROGRAM

## 2024-10-30 PROCEDURE — 63710000001 INSULIN LISPRO (HUMAN) PER 5 UNITS: Performed by: STUDENT IN AN ORGANIZED HEALTH CARE EDUCATION/TRAINING PROGRAM

## 2024-10-30 PROCEDURE — A9577 INJ MULTIHANCE: HCPCS | Performed by: HOSPITALIST

## 2024-10-30 PROCEDURE — 25010000002 FUROSEMIDE PER 20 MG: Performed by: INTERNAL MEDICINE

## 2024-10-30 PROCEDURE — 84100 ASSAY OF PHOSPHORUS: CPT | Performed by: HOSPITALIST

## 2024-10-30 PROCEDURE — 84550 ASSAY OF BLOOD/URIC ACID: CPT | Performed by: INTERNAL MEDICINE

## 2024-10-30 PROCEDURE — 80053 COMPREHEN METABOLIC PANEL: CPT | Performed by: STUDENT IN AN ORGANIZED HEALTH CARE EDUCATION/TRAINING PROGRAM

## 2024-10-30 PROCEDURE — 63710000001 ONDANSETRON ODT 4 MG TABLET DISPERSIBLE: Performed by: INTERNAL MEDICINE

## 2024-10-30 PROCEDURE — 82948 REAGENT STRIP/BLOOD GLUCOSE: CPT

## 2024-10-30 PROCEDURE — 63710000001 INSULIN LISPRO (HUMAN) PER 5 UNITS: Performed by: INTERNAL MEDICINE

## 2024-10-30 PROCEDURE — 85025 COMPLETE CBC W/AUTO DIFF WBC: CPT | Performed by: INTERNAL MEDICINE

## 2024-10-30 PROCEDURE — 0 GADOBENATE DIMEGLUMINE 529 MG/ML SOLUTION: Performed by: HOSPITALIST

## 2024-10-30 RX ORDER — POTASSIUM CHLORIDE 750 MG/1
40 TABLET, FILM COATED, EXTENDED RELEASE ORAL EVERY 4 HOURS
Status: COMPLETED | OUTPATIENT
Start: 2024-10-30 | End: 2024-10-30

## 2024-10-30 RX ORDER — CARVEDILOL 3.12 MG/1
3.12 TABLET ORAL EVERY 12 HOURS SCHEDULED
Status: DISCONTINUED | OUTPATIENT
Start: 2024-10-30 | End: 2024-10-31

## 2024-10-30 RX ORDER — FUROSEMIDE 10 MG/ML
40 INJECTION INTRAMUSCULAR; INTRAVENOUS ONCE
Status: COMPLETED | OUTPATIENT
Start: 2024-10-30 | End: 2024-10-30

## 2024-10-30 RX ADMIN — PANTOPRAZOLE SODIUM 40 MG: 40 TABLET, DELAYED RELEASE ORAL at 17:10

## 2024-10-30 RX ADMIN — INSULIN LISPRO 2 UNITS: 100 INJECTION, SOLUTION INTRAVENOUS; SUBCUTANEOUS at 11:20

## 2024-10-30 RX ADMIN — CARVEDILOL 3.12 MG: 3.12 TABLET, FILM COATED ORAL at 08:52

## 2024-10-30 RX ADMIN — PIPERACILLIN AND TAZOBACTAM 3.38 G: 3; .375 INJECTION, POWDER, FOR SOLUTION INTRAVENOUS at 05:24

## 2024-10-30 RX ADMIN — SENNOSIDES AND DOCUSATE SODIUM 2 TABLET: 50; 8.6 TABLET ORAL at 08:52

## 2024-10-30 RX ADMIN — Medication 2 PACKET: at 08:52

## 2024-10-30 RX ADMIN — PIPERACILLIN AND TAZOBACTAM 3.38 G: 3; .375 INJECTION, POWDER, FOR SOLUTION INTRAVENOUS at 13:43

## 2024-10-30 RX ADMIN — BISACODYL 10 MG: 10 SUPPOSITORY RECTAL at 18:41

## 2024-10-30 RX ADMIN — HEPARIN SODIUM 5000 UNITS: 5000 INJECTION INTRAVENOUS; SUBCUTANEOUS at 13:44

## 2024-10-30 RX ADMIN — SENNOSIDES AND DOCUSATE SODIUM 2 TABLET: 50; 8.6 TABLET ORAL at 21:35

## 2024-10-30 RX ADMIN — POLYETHYLENE GLYCOL 3350 17 G: 17 POWDER, FOR SOLUTION ORAL at 08:52

## 2024-10-30 RX ADMIN — INSULIN GLARGINE 10 UNITS: 100 INJECTION, SOLUTION SUBCUTANEOUS at 22:49

## 2024-10-30 RX ADMIN — PIPERACILLIN AND TAZOBACTAM 3.38 G: 3; .375 INJECTION, POWDER, FOR SOLUTION INTRAVENOUS at 21:36

## 2024-10-30 RX ADMIN — POTASSIUM CHLORIDE 40 MEQ: 750 TABLET, EXTENDED RELEASE ORAL at 17:12

## 2024-10-30 RX ADMIN — POTASSIUM CHLORIDE 40 MEQ: 750 TABLET, EXTENDED RELEASE ORAL at 13:44

## 2024-10-30 RX ADMIN — HEPARIN SODIUM 5000 UNITS: 5000 INJECTION INTRAVENOUS; SUBCUTANEOUS at 21:35

## 2024-10-30 RX ADMIN — NALOXEGOL OXALATE 25 MG: 25 TABLET, FILM COATED ORAL at 08:52

## 2024-10-30 RX ADMIN — INSULIN LISPRO 4 UNITS: 100 INJECTION, SOLUTION INTRAVENOUS; SUBCUTANEOUS at 11:21

## 2024-10-30 RX ADMIN — OXYCODONE HYDROCHLORIDE 5 MG: 5 TABLET ORAL at 01:47

## 2024-10-30 RX ADMIN — POTASSIUM CHLORIDE 40 MEQ: 750 TABLET, EXTENDED RELEASE ORAL at 08:52

## 2024-10-30 RX ADMIN — OXYCODONE HYDROCHLORIDE 5 MG: 5 TABLET ORAL at 08:52

## 2024-10-30 RX ADMIN — FUROSEMIDE 40 MG: 10 INJECTION, SOLUTION INTRAMUSCULAR; INTRAVENOUS at 11:21

## 2024-10-30 RX ADMIN — INSULIN LISPRO 4 UNITS: 100 INJECTION, SOLUTION INTRAVENOUS; SUBCUTANEOUS at 17:10

## 2024-10-30 RX ADMIN — VENLAFAXINE HYDROCHLORIDE 75 MG: 75 CAPSULE, EXTENDED RELEASE ORAL at 08:52

## 2024-10-30 RX ADMIN — ONDANSETRON 4 MG: 4 TABLET, ORALLY DISINTEGRATING ORAL at 23:48

## 2024-10-30 RX ADMIN — GADOBENATE DIMEGLUMINE 16 ML: 529 INJECTION, SOLUTION INTRAVENOUS at 21:13

## 2024-10-30 RX ADMIN — HEPARIN SODIUM 5000 UNITS: 5000 INJECTION INTRAVENOUS; SUBCUTANEOUS at 05:23

## 2024-10-30 RX ADMIN — PANTOPRAZOLE SODIUM 40 MG: 40 TABLET, DELAYED RELEASE ORAL at 08:52

## 2024-10-30 RX ADMIN — OXYCODONE HYDROCHLORIDE 5 MG: 5 TABLET ORAL at 21:35

## 2024-10-30 RX ADMIN — CARVEDILOL 3.12 MG: 3.12 TABLET, FILM COATED ORAL at 21:35

## 2024-10-30 NOTE — PLAN OF CARE
Problem: Adult Inpatient Plan of Care  Goal: Absence of Hospital-Acquired Illness or Injury  Intervention: Identify and Manage Fall Risk  Recent Flowsheet Documentation  Taken 10/30/2024 1800 by Leah Espinoza RN  Safety Promotion/Fall Prevention:   clutter free environment maintained   safety round/check completed  Taken 10/30/2024 1600 by Leah Espinoza RN  Safety Promotion/Fall Prevention:   clutter free environment maintained   safety round/check completed  Taken 10/30/2024 1430 by Leah Espinoza RN  Safety Promotion/Fall Prevention:   clutter free environment maintained   safety round/check completed  Taken 10/30/2024 1200 by Leah Espinoza RN  Safety Promotion/Fall Prevention:   clutter free environment maintained   safety round/check completed  Taken 10/30/2024 1000 by Leah Espinoza RN  Safety Promotion/Fall Prevention:   clutter free environment maintained   safety round/check completed  Taken 10/30/2024 0810 by Leah Espinoza RN  Safety Promotion/Fall Prevention:   clutter free environment maintained   safety round/check completed  Intervention: Prevent Skin Injury  Recent Flowsheet Documentation  Taken 10/30/2024 1600 by Leah Espinoza RN  Body Position: position changed independently  Taken 10/30/2024 1430 by Leah Espinoza RN  Skin Protection: transparent dressing maintained  Taken 10/30/2024 0810 by Leah Espinoza RN  Body Position:   position changed independently   tilted   right  Skin Protection: transparent dressing maintained  Intervention: Prevent and Manage VTE (Venous Thromboembolism) Risk  Recent Flowsheet Documentation  Taken 10/30/2024 1430 by Leah Espinoza RN  VTE Prevention/Management:   bilateral   SCDs (sequential compression devices) off  Taken 10/30/2024 0810 by Leah Espinoza RN  VTE Prevention/Management:   bilateral   SCDs (sequential compression devices) off  Intervention: Prevent Infection  Recent Flowsheet Documentation  Taken 10/30/2024 1800 by Alexis  LUCIO Lynn  Infection Prevention: single patient room provided  Taken 10/30/2024 1600 by Leah Espinoza RN  Infection Prevention: single patient room provided  Taken 10/30/2024 1430 by Leah Espinoza RN  Infection Prevention: single patient room provided  Taken 10/30/2024 1200 by Leah Espinoza RN  Infection Prevention: single patient room provided  Taken 10/30/2024 1000 by Leah Espinoza RN  Infection Prevention: single patient room provided  Taken 10/30/2024 0810 by Leah Espinoza RN  Infection Prevention: single patient room provided  Goal: Optimal Comfort and Wellbeing  Intervention: Provide Person-Centered Care  Recent Flowsheet Documentation  Taken 10/30/2024 1430 by Leah Espinoza RN  Trust Relationship/Rapport:   care explained   thoughts/feelings acknowledged  Taken 10/30/2024 0810 by Leah Espinoza RN  Trust Relationship/Rapport:   care explained   thoughts/feelings acknowledged   Goal Outcome Evaluation:  Pt remain in ccu on roomair sating 96-97%. A & O x 3 follows simple command. Advance diet to regular. Tolerated well. Received order for MRI of abdomen. Family working on mri screening sheet. No BM today. Given stool softner and laxative. Vital stable. Ongoing plan of care.

## 2024-10-30 NOTE — PLAN OF CARE
Goal Outcome Evaluation:  Plan of Care Reviewed With: patient        Progress: improving  Outcome Evaluation: Pt seen for PT this PM with improved overall mobility. mod A of 2 for bed mobility - increased processing time needed for task. performed STS with min A of 2 HHA - however fatigues quickly and then requests to get back to bed. pt able to reposition self at EOB. educated pt on effects of immobility. He continues to benefit from skilled PT to address mobility deficits. Recommend SNU at d/c.    Anticipated Discharge Disposition (PT): skilled nursing facility

## 2024-10-30 NOTE — THERAPY TREATMENT NOTE
Patient Name: Donnie Bishop  : 1975    MRN: [id number]                              Today's Date: 10/30/2024       Admit Date: 10/22/2024    Visit Dx:     ICD-10-CM ICD-9-CM   1. Epigastric abdominal pain  R10.13 789.06   2. Acute gastritis without hemorrhage, unspecified gastritis type  K29.00 535.00     Patient Active Problem List   Diagnosis    Multiple-type hyperlipidemia    Type 2 diabetes mellitus with diabetic neuropathy, with long-term current use of insulin    Neuropathy    Vitamin D deficiency    Benign essential HTN    Inability to attain erection    Injury of acoustic nerve    Hypogonadism in male    Anxiety    Chest pain    New onset of congestive heart failure    NSTEMI, initial episode of care    Diabetes mellitus type 1.5, managed as type 1    Chronic combined systolic and diastolic heart failure    Gastritis    Leukocytosis    Epigastric abdominal pain    Abscess of liver    Klebsiella pneumoniae (k. pneumoniae) as the cause of diseases classified elsewhere    Hypotension    Transaminitis     Past Medical History:   Diagnosis Date    Anxiety     CHF (congestive heart failure)     Cough     Inability to attain erection     Injury of acoustic nerve     Myalgia     Type II diabetes mellitus     Venous insufficiency     Viral illness     Vitamin D deficiency      Past Surgical History:   Procedure Laterality Date    CARDIAC CATHETERIZATION N/A 2023    Procedure: Left Heart Cath;  Surgeon: Kaylee Kay MD;  Location: [facility] CATH INVASIVE LOCATION;  Service: Cardiology;  Laterality: N/A;    CARDIAC CATHETERIZATION N/A 2023    Procedure: Coronary angiography;  Surgeon: Kaylee Kay MD;  Location: [facility] CATH INVASIVE LOCATION;  Service: Cardiology;  Laterality: N/A;    ENDOSCOPY N/A 10/24/2024    Procedure: ESOPHAGOGASTRODUODENOSCOPY with biopsies;  Surgeon: Elma Wiggins MD;  Location: [facility] ENDOSCOPY;  Service: Gastroenterology;  Laterality: N/A;  pre-abdominal pain  post-normal       General Information       Row Name 10/30/24 1536          Physical Therapy Time and Intention    Document Type therapy note (daily note)  -ST     Mode of Treatment individual therapy;physical therapy  -ST       Row Name 10/30/24 1536          General Information    Patient Profile Reviewed yes  -ST     Existing Precautions/Restrictions fall  -ST       Row Name 10/30/24 1536          Cognition    Orientation Status (Cognition) oriented to;person;place  -ST       Row Name 10/30/24 1536          Safety Issues/Impairments Affecting Functional Mobility    Impairments Affecting Function (Mobility) endurance/activity tolerance;pain;strength;cognition;balance  -ST     Cognitive Impairments, Mobility Safety/Performance sequencing abilities  -ST     Comment, Safety Issues/Impairments (Mobility) nonskid socls, gait belt donned. increased processing time needed for all tasks  -ST               User Key  (r) = Recorded By, (t) = Taken By, (c) = Cosigned By      Initials Name Provider Type    ST Mary Weber, PT Physical Therapist                   Mobility       Row Name 10/30/24 1536          Bed Mobility    Supine-Sit [place] (Bed Mobility) moderate assist (50% patient effort);2 person assist  -ST     Sit-Supine [place] (Bed Mobility) moderate assist (50% patient effort);verbal cues;nonverbal cues (demo/gesture);2 person assist  -ST     Assistive Device (Bed Mobility) head of bed elevated;bed rails  -ST     Comment, (Bed Mobility) cues for sequenicng, increased time to complete  -ST       Row Name 10/30/24 1536          Sit-Stand Transfer    Sit-Stand [place] (Transfers) minimum assist (75% patient effort);verbal cues;2 person assist  -ST     Comment, (Sit-Stand Transfer) bilat HHA  -ST       Row Name 10/30/24 1536          Gait/Stairs (Locomotion)    [place] Level (Gait) not tested  -ST     Comment, (Gait/Stairs) pt fatigues quickly, declines further stands/ambulation  -ST               User  "Key  (r) = Recorded By, (t) = Taken By, (c) = Cosigned By      Initials Name Provider Type    ST Mary Weber, PT Physical Therapist                   Obj/Interventions       Row Name 10/30/24 1537          Balance    Comment, Balance SBA for unsupported sitting at EOB  -ST               User Key  (r) = Recorded By, (t) = Taken By, (c) = Cosigned By      Initials Name Provider Type    ST Mary Weber, PT Physical Therapist                   Goals/Plan    No documentation.                  Clinical Impression       Row Name 10/30/24 1538          Pain    Pain Location abdomen  -ST     Pain Side/Orientation generalized  -ST     Pre/Posttreatment Pain Comment does not rate pain, reporst \"it's a little better\"  -       Row Name 10/30/24 1538          Plan of Care Review    Plan of Care Reviewed With patient  -ST     Progress improving  -ST     Outcome Evaluation Pt seen for PT this PM with improved overall mobility. mod A of 2 for bed mobility - increased processing time needed for task. performed STS with min A of 2 HHA - however fatigues quickly and then requests to get back to bed. pt able to reposition self at EOB. educated pt on effects of immobility. He continues to benefit from skilled PT to address mobility deficits. Recommend SNU at d/c.  -       Row Name 10/30/24 1538          Therapy Assessment/Plan (PT)    Rehab Potential (PT) good  -ST     Criteria for Skilled Interventions Met (PT) yes;meets criteria  -ST     Therapy Frequency (PT) 5 times/wk  -       Row Name 10/30/24 1538          Positioning and Restraints    Pre-Treatment Position in bed  -ST     Post Treatment Position bed  -ST     In Bed supine;encouraged to call for assist;exit alarm on;call light within reach  -ST               User Key  (r) = Recorded By, (t) = Taken By, (c) = Cosigned By      Initials Name Provider Type    ST Mary Weber, PT Physical Therapist                   Outcome Measures       Row Name 10/30/24 " 1541          How much help from another person do you currently need...    Turning from your back to your side while in flat bed without using bedrails? 2  -ST     Moving from lying on back to sitting on the side of a flat bed without bedrails? 2  -ST     Moving to and from a bed to a chair (including a wheelchair)? 2  -ST     Standing up from a chair using your arms (e.g., wheelchair, bedside chair)? 2  -ST     Climbing 3-5 steps with a railing? 2  -ST     To walk in hospital room? 2  -ST     AM-PAC 6 Clicks Score (PT) 12  -ST     Highest Level of Mobility Goal 4 --> Transfer to chair/commode  -ST               User Key  (r) = Recorded By, (t) = Taken By, (c) = Cosigned By      Initials Name Provider Type    Mary Teresa, PT Physical Therapist                                 Physical Therapy Education       Title: PT OT SLP Therapies (In Progress)       Topic: Physical Therapy (In Progress)       Point: Mobility training (In Progress)       Learning Progress Summary            Patient Acceptance, TB,E, NR by  at 10/30/2024 1541    Acceptance, E,TB, VU by  at 10/30/2024 0506    Acceptance, E, VU,NR by  at 10/29/2024 1346    Acceptance, E,TB, NR by  at 10/26/2024 1436                      Point: Home exercise program (Done)       Learning Progress Summary            Patient Acceptance, E,TB, VU by  at 10/30/2024 0506                      Point: Body mechanics (In Progress)       Learning Progress Summary            Patient Acceptance, TB,E, NR by  at 10/30/2024 1541    Acceptance, E,TB, VU by  at 10/30/2024 0506    Acceptance, E,TB, NR by CS at 10/26/2024 1436                      Point: Precautions (Done)       Learning Progress Summary            Patient Acceptance, E,TB, VU by  at 10/30/2024 0506                                      User Key       Initials Effective Dates Name Provider Type Discipline     07/11/23 -  Mahogany Horn, PT Physical Therapist PT    ST 09/22/22 -   Mary Weber, PT Physical Therapist PT    CS 09/22/22 -  Pam Vega, PT Physical Therapist PT    WS 05/29/24 -  Kel Benson, RN Registered Nurse Nurse                  PT Recommendation and Plan     Progress: improving  Outcome Evaluation: Pt seen for PT this PM with improved overall mobility. mod A of 2 for bed mobility - increased processing time needed for task. performed STS with min A of 2 HHA - however fatigues quickly and then requests to get back to bed. pt able to reposition self at EOB. educated pt on effects of immobility. He continues to benefit from skilled PT to address mobility deficits. Recommend SNU at d/c.     Time Calculation:         PT Charges       Row Name 10/30/24 1541             Time Calculation    Start Time 1411  -ST      Stop Time 1430  -ST      Time Calculation (min) 19 min  -ST      PT Received On 10/30/24  -ST      PT - Next Appointment 10/31/24  -ST         Time Calculation- PT    Total Timed Code Minutes- PT 19 minute(s)  -ST         Timed Charges    64219 - PT Therapeutic Activity Minutes 19  -ST         Total Minutes    Timed Charges Total Minutes 19  -ST       Total Minutes 19  -ST                User Key  (r) = Recorded By, (t) = Taken By, (c) = Cosigned By      Initials Name Provider Type    ST Mary Weber, PT Physical Therapist                  Therapy Charges for Today       Code Description Service Date Service Provider Modifiers Qty    84173597625 HC PT THERAPEUTIC ACT EA 15 MIN 10/30/2024 Mary Weber, PT GP 1    55050820463 HC PT THER SUPP EA 15 MIN 10/30/2024 Mary Weber, PT GP 1            PT G-Codes  Outcome Measure Options: AM-PAC 6 Clicks Daily Activity (OT)  AM-PAC 6 Clicks Score (PT): 12  AM-PAC 6 Clicks Score (OT): 12  PT Discharge Summary  Anticipated Discharge Disposition (PT): skilled nursing facility    Mary Weber PT  10/30/2024

## 2024-10-30 NOTE — PROGRESS NOTES
Name: Donnie Bishop ADMIT: 10/22/2024   : 1975  PCP: Juju Kennedy MD    MRN: [id number] LOS: 6 days   AGE/SEX: 49 y.o. male  ROOM: [facility]     Subjective   Subjective   Seems he had fallen asleep while trying to use hand-held urinal.  He did awaken easily and answer questions appropriately for the most part.  Still has some abdominal pain.     Objective   Objective   Vital Signs  Temp:  [97.9 °F (36.6 °C)-98.7 °F (37.1 °C)] 97.9 °F (36.6 °C)  Heart Rate:  [85-98] 90  Resp:  [18-20] 18  BP: (112-142)/() 114/89  SpO2:  [81 %-100 %] 81 %  on   ;   Device (Oxygen Therapy): room air  Body mass index is 28.13 kg/m².  Physical Exam  General: Lying in bed, drowsy, not in distress, ill-appearing,  CV: Regular rate and rhythm, no murmurs rubs or gallops  Lungs: Diminished at bases, no wheezing, no crackles, right upper quadrant drain in place.  Abdomen: Soft, distended, tenderness to palpation  Extremities: Trace edema in lower extremities,, no cyanosis     Results Review     I reviewed the patient's new clinical results.  Results from last 7 days   Lab Units 10/30/24  0639 10/29/24  0652 10/28/24  1959 10/27/24  2310   WBC 10*3/mm3 14.38* 15.53* 16.23* 14.14*   HEMOGLOBIN g/dL 10.6* 11.6* 12.3* 12.0*   PLATELETS 10*3/mm3 364 370 382 349     Results from last 7 days   Lab Units 10/30/24  0639 10/29/24  0652 10/28/24  2059 10/28/24  1959 10/27/24  2205 10/27/24  0329 10/26/24  0545   SODIUM mmol/L 135* 136 143 141 137 137 137   POTASSIUM mmol/L 3.1* 3.9 4.5 4.3 3.9 4.7 4.7   CHLORIDE mmol/L 102 107 110* 108* 106 104 103   CO2 mmol/L 21.4* 16.6* 16.0* 16.6* 19.0* 19.0* 19.0*   BUN mg/dL 23* 30* 35* 35* 28* 32* 36*   CREATININE mg/dL 0.78 1.14 1.06 1.08 0.93 1.12 1.22   GLUCOSE mg/dL 122* 176* 159* 161* 177* 223* 133*   EGFR mL/min/1.73 110.7 79.8 87.1 85.2 101.9 81.5 73.6   ALBUMIN g/dL 2.2* 1.7* 2.0* 3.6 2.5* 2.7* 3.0*   BILIRUBIN mg/dL 0.8 0.7 0.7 0.7 0.7 0.8 1.1   ALK PHOS U/L 149* 189* 222* 229* 217* 140*  99   AST (SGOT) U/L 55* 154* 199* 229* 121* 172* 108*   ALT (SGPT) U/L 83* 135* 159* 164* 121* 133* 92*     Results from last 7 days   Lab Units 10/30/24  0639 10/29/24  0652 10/28/24  2059 10/28/24  1959 10/28/24  0045 10/27/24  2205   CALCIUM mg/dL 9.0 10.1 9.8 10.1  --  9.8   ALBUMIN g/dL 2.2* 1.7* 2.0* 3.6  --  2.5*   MAGNESIUM mg/dL 1.7 2.3  --  2.5  --  2.9*   PHOSPHORUS mg/dL 2.1* 2.6  --  2.8 2.4* 1.9*     Results from last 7 days   Lab Units 10/26/24  0545 10/25/24  0426 10/24/24  1404 10/24/24  0617 10/24/24  0012 10/23/24  2104   PROCALCITONIN ng/mL 30.30* 78.10*  --   --   --   --    LACTATE mmol/L  --   --  3.8* 3.3* 3.6* 4.5*     COVID19   Date Value Ref Range Status   10/25/2024 Not Detected Not Detected - Ref. Range Final   12/26/2023 Not Detected Not Detected - Ref. Range Final     Glucose   Date/Time Value Ref Range Status   10/30/2024 1056 253 (H) 70 - 130 mg/dL Final   10/30/2024 0639 122 70 - 130 mg/dL Final   10/29/2024 2050 196 (H) 70 - 130 mg/dL Final   10/29/2024 1603 141 (H) 70 - 130 mg/dL Final   10/29/2024 1050 128 70 - 130 mg/dL Final   10/28/2024 2037 171 (H) 70 - 130 mg/dL Final   10/28/2024 1840 158 (H) 70 - 130 mg/dL Final       CT Guided Percutaneous Drain Liver  Narrative: CT GUIDED DRAINAGE     HISTORY:  drainage of subcapsular liver fluid collection     COMPARISON: CTA 10/26/2024.     PROCEDURE DETAILS: After informed consent was obtained from the patient,  the patient was placed on the CT scanner in supine position. A nurse was  continuously present and moderate sedation was performed under physician  supervision from 1240 to 1309 hours with a total of 50 mcg fentanyl and  1 mg versed administered. A preliminary scan of the abdomen was obtained  and the subcapsular hepatic fluid collection identified. A route was  planned for the drainage and an appropriate skin site identified. This  site was then prepped and draped in the usual sterile manner and the  skin anesthetized with  lidocaine. The tract to the collection was  anesthetized with lidocaine and a needle placed within the collection. A  wire was then advanced into the collection and the needle removed. After  serial dilation, a 12 Cypriot drainage catheter was placed into the  collection, coiled, locked, secured with Dermabond reinforced suture and  stay-fix dressing, and attached to a gravity drainage bag. 300 mL fluid  were manually removed with relatively thick opaque fluid noted which was  green initially, then yellow. Initial irrigation was performed. The  patient tolerated the procedure well and there were no immediate  complications.  All elements of maximal sterile technique were followed.  Radiation dose reduction techniques were utilized, including automated  exposure control and exposure modulation.        Impression: Successful drain placement into perihepatic fluid collection as above.  CT follow-up prior to drain removal recommended.        This report was finalized on 10/29/2024 2:35 PM by Dr. Gilmer Morrell M.D on Workstation: [id number]     MRI Brain With & Without Contrast  Narrative: MRI OF THE BRAIN WITH AND WITHOUT CONTRAST ON 10/28/2024     CLINICAL HISTORY: This is a 49-year-old male patient who has altered  mental status and has a head CT yesterday showing possible  intraparenchymal hemorrhage in the posterior right thalamus.     TECHNIQUE: Axial T1, FLAIR, fat-suppressed T2, axial diffusion and  gradient echo T2, sagittal T1 and postcontrast axial fat-suppressed T1  and coronal T1-weighted images were obtained of the entire head.     This is correlated to a head CT without contrast yesterday afternoon on  10/27/2024 at 2:26 p.m.     FINDINGS: At the site of the faint hyperdensity seen on yesterday's head  CT in the posterior right thalamus is a very faint area of precontrast  T1 hyperintensity that measures 8 x 6 mm mediolateral and anterior  posterior dimension.  There is no signal abnormality on the T2 or  FLAIR  images or the gradient echo T2 weighted images and this is not felt to  be an acute intraparenchymal hemorrhage but rather some faint  mineralization in the posterior right thalamus. The remainder of the  brain parenchyma is normal in signal intensity. The lateral and third  ventricles are normal in size. There is mild prominence in size of the  fourth ventricle which may be secondary to some cerebellar volume loss.  I see no mass effect and no midline shift, no extra-axial fluid  collections are identified. There is an enhancing mass that fills the  left internal auditory canal and bulges through the left porus acusticus  into the left cerebellopontine angle and I think this is most consistent  with a left-sided vestibular schwannoma-acoustic neuroma that maximally  measures 12 x 9 x 9 mm in size. It has no mass effect on the adjacent  hannah or anterior left cerebellum.  No additional abnormal areas of  enhancement are seen in the head. The calvarium and the skull base are  normal in appearance. The paranasal sinuses and the mastoid air cells  and middle ear cavities are clear. Good flow voids are demonstrated  within the cerebral vessels and in the dural venous sinuses.     Impression: 1. Corresponding to the area of increased density in the posterior right  thalamus on yesterday's head CT is an area of precontrast faint T1  hyperintensity but no corresponding signal abnormality on any of the  other sequences and I think this is a benign area of faint  mineralization in the posterior right thalamus and not an acute  intraparenchymal hemorrhage.     2. This patient has an enhancing mass filling the left internal auditory  canal bulging through the left porus acusticus into the left  cerebellopontine angle and it maximally measures 12 x 9 x 9 mm  mediolateral, anterior posterior and craniocaudal dimensions, most  consistent with a left-sided vestibular schwannoma (acoustic neuroma)  and correlate clinically as  to whether the patient has left-sided  sensorineural hearing loss.     3. While the lateral and third ventricles are normal in size, the fourth  ventricle is prominent in size which is felt to be due to some volume  loss and atrophy in the cerebellum and correlate clinically as to the  etiology. The remainder of the MRI of the brain is normal.     The results were communicated to Dr. Braden from stroke neurology by  telephone on 10/28/2024 at 11:15 a.m.     This report was finalized on 10/29/2024 6:43 AM by Dr. Frankie Berkowitz M.D  on Workstation: [id number]       Scheduled Medications  carvedilol, 3.125 mg, Oral, Q12H  heparin (porcine), 5,000 Units, Subcutaneous, Q8H  insulin glargine, 10 Units, Subcutaneous, Nightly  insulin lispro, 2-7 Units, Subcutaneous, 4x Daily AC & at Bedtime  insulin lispro, 4 Units, Subcutaneous, TID With Meals  Naloxegol Oxalate, 25 mg, Oral, QAM  pantoprazole, 40 mg, Oral, BID AC  piperacillin-tazobactam, 3.375 g, Intravenous, Q8H  senna-docusate sodium, 2 tablet, Oral, BID   And  polyethylene glycol, 17 g, Oral, BID  potassium chloride ER, 40 mEq, Oral, Q4H  venlafaxine XR, 75 mg, Oral, Daily    Infusions     Diet  Diet: Liquid, Diabetic; Clear Liquid; Consistent Carbohydrate; Fluid Consistency: Thin (IDDSI 0)    I have personally reviewed     [x]  Laboratory   [x]  Microbiology   [x]  Radiology   [x]  EKG/Telemetry  []  Cardiology/Vascular   []  Pathology    []  Records       Assessment/Plan     Active Hospital Problems    Diagnosis  POA    **Epigastric abdominal pain [R10.13]  Yes    Abscess of liver [K75.0]  Yes    Klebsiella pneumoniae (k. pneumoniae) as the cause of diseases classified elsewhere [B96.1]  Yes    Hypotension [I95.9]  Yes    Transaminitis [R74.01]  Yes    Leukocytosis [D72.829]  Yes    Chronic combined systolic and diastolic heart failure [I50.42]  Yes    Anxiety [F41.9]  Yes    Multiple-type hyperlipidemia [E78.2]  Yes    Type 2 diabetes mellitus with diabetic  neuropathy, with long-term current use of insulin [E11.40, Z79.4]  Not Applicable    Benign essential HTN [I10]  Yes      Resolved Hospital Problems    Diagnosis Date Resolved POA    Acute kidney injury [N17.9] 10/30/2024 No       49 y.o. man with diabetes, HTN, hyperlipidemia, chronic systolic and diastolic heart failure (non-ischemic) and anxiety who presented with severe abdominal/epigastric pain.  EGD was negative.  CT scan demonstrated a large right subcapsular fluid collection with mass effect on liver.  He underwent drainage of liver fluid collection by IR on 10/28/2024.  Culture thus far positive for rare growth Klebsiella pneumoniae.    Appreciate general surgery assistance.  Liver drain remains in place.  Culture growing Klebsiella.  Based on sensitivities he is receiving IV Zosyn.  MRCP ordered by surgery, not yet performed.  LFTs improving since drain placed.  Symptoms have improved as well.  Statin on hold, likely resume at discharge.    Hypotension resolving with IV fluids.  Midodrine has been tapered off and cardiology reinitiating carvedilol.  Spironolactone and Entresto currently on hold.  TENISHA has resolved.  Receiving electrolyte replacement.  IV Lasix given today per nephrology.    Monitor hemoglobin  Confusion improved      Continue outpatient follow-up regarding acoustic neuroma.  MRI here showed possible vestibular schwannoma that will need monitoring.  Heparin SC for DVT prophylaxis.  Full code.  Discussed with patient.  Anticipate discharge home with HH vs SNU facility timing yet to be determined.  Expected Discharge Date: 11/4/2024; Expected Discharge Time:       Steven Rivera MD  [facility]  10/30/24  13:59 EDT

## 2024-10-30 NOTE — TELEPHONE ENCOUNTER
Spoke anne Nash, pt's wife, and she states she understands we cannot fill out attending physician's statement while pt is currently admitted.  She is going to reach out to patient advocate at the hospital and see if they are able to piece all this together for her. She will call back with any further questions. [facility]

## 2024-10-30 NOTE — TELEPHONE ENCOUNTER
PATIENT'S WIFE ESTEFANY IS CALLING BACK. STATES PATIENT IS IN [facility]  HE HAS SOME KIND OF  INFECTION AND THEY ARE UNSURE OF HIS DIAGNOSIS. NO PLAN FOR RELEASE AT THIS TIME.   ESTEFANY IS GOING TO DROP OFF HIS [facility] PAPERWORK TODAY.     CALL BACK NUMBER [phone]

## 2024-10-30 NOTE — PROGRESS NOTES
[facility] Progress Note       Encounter Date:10/30/24  Patient:Donnie Bishop  :1975  MRN:[id number]      Chief Complaint: Abdominal pain      Subjective:        Looks better more clear today.  Complaining of his feet hurting looks to be more swelling.    Review of Systems:  Review of Systems   Cardiovascular:  Negative for chest pain.   Respiratory:  Negative for shortness of breath.    Gastrointestinal:  Positive for abdominal pain.       Medications:  Scheduled Meds:  carvedilol, 3.125 mg, Oral, Q12H  heparin (porcine), 5,000 Units, Subcutaneous, Q8H  insulin glargine, 10 Units, Subcutaneous, Nightly  insulin lispro, 2-7 Units, Subcutaneous, 4x Daily AC & at Bedtime  insulin lispro, 4 Units, Subcutaneous, TID With Meals  [Held by provider] midodrine, 2.5 mg, Oral, TID AC  Naloxegol Oxalate, 25 mg, Oral, QAM  pantoprazole, 40 mg, Oral, BID AC  piperacillin-tazobactam, 3.375 g, Intravenous, Q8H  senna-docusate sodium, 2 tablet, Oral, BID   And  polyethylene glycol, 17 g, Oral, BID  potassium chloride ER, 40 mEq, Oral, Q4H  venlafaxine XR, 75 mg, Oral, Daily    Continuous Infusions:     PRN Meds:    senna-docusate sodium **AND** polyethylene glycol **AND** bisacodyl **AND** bisacodyl    Calcium Replacement - Follow Nurse / BPA Driven Protocol    dextrose    dextrose    glucagon (human recombinant)    hydrOXYzine    influenza vaccine    Magnesium Standard Dose Replacement - Follow Nurse / BPA Driven Protocol    melatonin    [DISCONTINUED] Morphine **AND** naloxone    ondansetron ODT **OR** ondansetron    oxyCODONE    Phosphorus Replacement - Follow Nurse / BPA Driven Protocol    Potassium Replacement - Follow Nurse / BPA Driven Protocol    sodium chloride         Objective:       Vitals:    10/30/24 0400 10/30/24 0500 10/30/24 0540 10/30/24 0731   BP: 112/78   132/87   BP Location:    Right arm   Patient Position:    Lying   Pulse: 97 95  93   Resp:    18   Temp:    98.2 °F (36.8 °C)    [facility]    [facility]   SpO2: 96% 96%  92%   Weight:   81.3 kg (179 lb 3.7 oz)    Height:               Physical Exam:  Constitutional: Acutely ill appearing, well developed, mild acute distress due to pain  HENT: Oropharynx clear and membrane moist  Eyes: Normal conjunctiva, no sclera icterus.  Neck: Supple, no carotid bruit bilaterally.  Cardiovascular: Regular rate and rhythm, No Murmur, 1+ bilateral lower extremity edema located in the feet.  Pulmonary: Normal respiratory effort, normal lung sounds, no wheezing.   Skin: Warm, dry, no ecchymosis, no rash.             Lab Review:   Results from last 7 days   Lab Units 10/30/24  0639 10/29/24  0652 10/28/24  2059 10/28/24  1959 10/27/24  2205 10/27/24  0329 10/26/24  0545   SODIUM mmol/L 135* 136 143 141 137 137 137   POTASSIUM mmol/L 3.1* 3.9 4.5 4.3 3.9 4.7 4.7   CHLORIDE mmol/L 102 107 110* 108* 106 104 103   CO2 mmol/L 21.4* 16.6* 16.0* 16.6* 19.0* 19.0* 19.0*   BUN mg/dL 23* 30* 35* 35* 28* 32* 36*   CREATININE mg/dL 0.78 1.14 1.06 1.08 0.93 1.12 1.22   GLUCOSE mg/dL 122* 176* 159* 161* 177* 223* 133*   CALCIUM mg/dL 9.0 10.1 9.8 10.1 9.8 9.8 9.5   AST (SGOT) U/L 55* 154* 199* 229* 121* 172* 108*   ALT (SGPT) U/L 83* 135* 159* 164* 121* 133* 92*     Results from last 7 days   Lab Units 10/28/24  0045 10/27/24  2205   HSTROP T ng/L 30* 28*     Results from last 7 days   Lab Units 10/30/24  0639 10/29/24  0652 10/28/24  1959 10/27/24  2310 10/27/24  0329 10/26/24  0545 10/25/24  0426   WBC 10*3/mm3 14.38* 15.53* 16.23* 14.14* 10.64 9.44 7.93   HEMOGLOBIN g/dL 10.6* 11.6* 12.3* 12.0* 12.7* 11.9* 10.9*   HEMATOCRIT % 33.5* 37.1* 38.6 36.6* 38.7 36.7* 32.4*   PLATELETS 10*3/mm3 364 370 382 349 352 297 278     Results from last 7 days   Lab Units 10/28/24  1959 10/28/24  1129   INR  1.13* 1.0   APTT seconds 28.8  --      Results from last 7 days   Lab Units 10/30/24  0639 10/29/24  0652 10/28/24  1959 10/27/24  2205 10/27/24  0329 10/26/24  0545   MAGNESIUM mg/dL 1.7  "2.3 2.5 2.9* 2.5 2.4           Invalid input(s): \"LDLCALC\"  Results from last 7 days   Lab Units 10/26/24  0545   PROBNP pg/mL 393.0     Results from last 7 days   Lab Units 10/24/24  0617   TSH uIU/mL 0.973            Assessment:          Diagnosis Plan   1. Epigastric abdominal pain  Case Request    Case Request    Tissue Pathology Exam    Tissue Pathology Exam      2. Acute gastritis without hemorrhage, unspecified gastritis type               Plan:       Mr. Bishop is a 49 y.o. gentleman with past medical history notable for nonischemic cardiomyopathy, diabetes, hypertension, mixed hyperlipidemia presented to the hospital with abdominal pain initially concerns were for gastritis symptoms significantly worsened and now found to have a perihepatic fluid collection concerning for biloma versus hematoma.  Patient has had a lot more pain over the last 24 hours were asked to evaluate him for perioperative risk assessment prior to proceeding forward with possible cholecystectomy.  Status post IR draining of his's perihepatic abscess thought to be a bile leak.  Surgery following.  From a cardiac standpoint heart rate is better.  Likely related to pain.  Blood pressure is also improving.  Midodrine has been weaned with last dose 10/29.  I will start low-dose carvedilol at 3.125 mg half of what he was on at home.  I would also recommend stopping his maintenance IV fluids since he is taking oral intake.  Likely would need dose of diuretic in the next day or 2 pending blood pressure response and response to stopping IV fluids    Nonischemic cardiomyopathy:  Echocardiogram 10/26/2024 with improvement in heart function almost back to normal  Blood pressure seems to be recovering we will start low-dose carvedilol 3.125 mg today  Likely will needed little dose of diuretic at some point given the amount of IV fluids he is gotten.  IV fluids today reassess may be dosed with diuretic tomorrow    Possible pulmonary emboli:  There is " a concern that his tachycardia was related to an acute pulmonary emboli D-dimer is elevated however his clinical presentation seems more consistent with acute infection as a likely source.    Subsequent CTA did not show any PE.  Would continue prophylaxis for prevention of DVT and PE               Alex Varela MD  [facility]  10/30/24  09:14 EDT

## 2024-10-30 NOTE — PAYOR COMM NOTE
"Donnie Ornelas (49 y.o. Male) \\\                                 ATTENTION CONTINUED CLINICALS CASE [id number] - STARTING 10/29/24 - 10/30                               REPLY TO [facility] DEPT  [phone]            Date of Birth   1975    Social Security Number       Address   [address] [place] [address]    Home Phone   [phone]    MRN   [id number]       Gnosticism   Voodoo    Marital Status                               Admission Date   10/22/24    Admission Type   Emergency    Admitting Provider   James Alexandra MD    Attending Provider   Steven Rivera MD    Department, Room/Bed   [facility], N339/1       Discharge Date       Discharge Disposition       Discharge Destination                                 Attending Provider: Steven Rivera MD    Allergies: No Known Allergies    Isolation: None   Infection: None   Code Status: CPR    Ht: 170 cm (66.93\")   Wt: 81.3 kg (179 lb 3.7 oz)    Admission Cmt: None   Principal Problem: Epigastric abdominal pain [R10.13]                   Active Insurance as of 10/22/2024       Primary Coverage       Payor Plan Insurance Group Employer/Plan Group    [employer]HE[employer] BLUE CROSS AN[employer] BLUE CROSS BLUE SHIELD PPO [id number]       Payor Plan Address Payor Plan Phone Number Payor Plan Fax Number Effective Dates    [id number] [phone]  1/1/2019 - None Entered    [place] [address]         Subscriber Name Subscriber Birth Date Member ID       DONNIE ORNELAS 1975 [id number]                     Emergency Contacts        (Rel.) Home Phone Work Phone Mobile Phone    MadburyMurphy (Brother) -- -- [phone]    Charity Ornelas (Spouse) [phone] -- [phone]    joie montero (Relative) -- -- [phone]    Daniel Ornelas (Father) -- -- [phone]              Vital Signs (last 2 days)       Date/Time Temp Temp src Pulse Resp BP Patient Position SpO2    10/30/24 1055 97.9 (36.6) Oral 90 18 114/89 Lying 81    " 10/30/24 0731 98.2 (36.8) Oral 93 18 132/87 Lying 92    10/30/24 0500 -- -- 95 -- -- -- 96    10/30/24 0400 -- -- 97 -- 112/78 -- 96    10/30/24 0332 98 (36.7) Oral 93 18 138/71 Lying 97    10/30/24 0300 -- -- 85 -- -- -- 97    10/30/24 0200 -- -- 97 -- -- -- 97    10/30/24 0100 -- -- 96 -- -- -- 86    10/30/24 0000 -- -- 98 -- 124/87 -- 96    10/29/24 2324 98 (36.7) Oral 91 18 133/87 Lying 99    10/29/24 2045 98.6 (37) Oral 90 18 123/84 Lying 100    10/29/24 1600 -- -- 96 -- 135/85 -- 96    10/29/24 1500 98.7 (37.1) Oral -- 20 142/100 -- --    10/29/24 1050 97.8 (36.6) Axillary -- 20 131/88 Lying --    10/29/24 0800 -- -- 96 -- 128/75 -- --    10/29/24 0700 97.6 (36.4) Axillary -- 20 130/90 -- --    10/29/24 0400 -- -- 99 -- 131/79 -- --    10/29/24 0319 98.1 (36.7) Oral 99 -- 126/82 Lying --    10/28/24 2316 98.3 (36.8) Oral 104 -- 130/80 Lying --    10/28/24 1944 98.6 (37) Oral 106 -- 111/68 Lying --    10/28/24 1841 -- -- 107 -- 116/81 -- --    10/28/24 1317 -- -- 108 15 115/77 Lying 92    Comment rows:    OBSERV: Patient returned from drain placement. Drain to bag right abdomen with stayfix dressing clean, dry and intact. Call to Jeff VALDES with report. at 10/28/24 1317    10/28/24 13:07:05 -- -- 110 17 95/69 -- 94    10/28/24 13:03:09 -- -- 110 13 96/66 -- 93    10/28/24 12:59:31 -- -- 110 15 94/77 -- 93    10/28/24 12:55:59 -- -- 110 17 103/59 -- 91     SpO2: increased to 4 liters per nc at 10/28/24 1255    10/28/24 12:53:58 -- -- 109 15 97/68 -- 92    10/28/24 12:51:07 -- -- 109 12 89/77 -- 92    10/28/24 12:47:22 -- -- 107 13 109/80 -- 92    10/28/24 12:43:07 -- -- 107 12 111/62 -- 95    10/28/24 12:37:44 -- -- 107 20 112/75 -- 95     SpO2: o2 at 2 liters per nc at 10/28/24 1237    10/28/24 1125 -- -- 107 22 111/79 Lying 88     SpO2: 88 % on room air, placed on o2 at 2 liters per nc, now  93% at 10/28/24 1125    10/28/24 0952 -- -- 106 -- 113/79 -- --    10/28/24 0745 99 (37.2) Oral -- 28 112/83 Lying --     10/28/24 0448 98.1 (36.7) Oral 106 28 103/79 Lying 96    10/28/24 0030 98.2 (36.8) Oral 105 27 107/79 Lying 96          Oxygen Therapy (last 2 days)       Date/Time SpO2 Device (Oxygen Therapy) Flow (L/min) (Oxygen Therapy) Oxygen Concentration (%) ETCO2 (mmHg)    10/30/24 1055 81 -- -- -- --    10/30/24 0731 92 -- -- -- --    10/30/24 0500 96 -- -- -- --    10/30/24 0400 96 -- -- -- --    10/30/24 0332 97 -- -- -- --    10/30/24 0300 97 -- -- -- --    10/30/24 0200 97 room air -- -- --    10/30/24 0100 86 -- -- -- --    10/30/24 0000 96 -- -- -- --    10/29/24 2324 99 -- -- -- --    10/29/24 2045 100 -- -- -- --    10/29/24 2000 -- room air -- -- --    10/29/24 1600 96 -- -- -- --    10/29/24 0815 -- room air -- -- --    10/29/24 0230 -- room air -- -- --    10/28/24 2000 -- room air -- -- --    10/28/24 1400 -- nasal cannula 2 -- --    10/28/24 1317 92 -- 2 -- --    10/28/24 13:07:05 94 -- -- -- --    10/28/24 13:03:09 93 -- -- -- --    10/28/24 12:59:31 93 -- -- -- --    10/28/24 12:55:59 91 -- -- -- --    10/28/24 12:53:58 92 -- -- -- --    10/28/24 12:51:07 92 -- -- -- --    10/28/24 12:47:22 92 -- -- -- --    10/28/24 12:43:07 95 -- -- -- --    10/28/24 12:37:44 95 -- -- -- --    10/28/24 1125 88 -- -- -- --    10/28/24 0800 -- partial rebreather mask 2 -- --    10/28/24 0448 96 -- -- -- --    10/28/24 0400 -- room air -- -- --    10/28/24 0204 -- nasal cannula 2 -- --    10/28/24 0030 96 -- -- -- --    10/28/24 0005 -- room air -- -- --          Lines, Drains & Airways       Active LDAs       Name Placement date Placement time Site Days    Peripheral IV 10/25/24 Posterior;Right Forearm 10/25/24  --  Forearm  5    Peripheral IV 10/27/24 2311 Anterior;Distal;Left;Upper Arm 10/27/24  2311  Arm  2    Closed/Suction Drain 1 RUQ Pigtail 12 Fr. 10/28/24  1300  RUQ  2                  Orders (last 48 hrs)        Start     Ordered    10/30/24 2136  Potassium  Timed         10/30/24 0837    10/30/24 1637  Phosphorus   Timed         10/30/24 0837    10/30/24 1232  Advance Diet As Tolerated -  Until Discontinued         10/30/24 1231    10/30/24 1231  MRI abdomen w wo contrast mrcp  1 Time Imaging         10/30/24 1231    10/30/24 1058  POC Glucose Once  PROCEDURE ONCE        Comments: Complete no more than 45 minutes prior to patient eating      10/30/24 1056    10/30/24 1030  furosemide (LASIX) injection 40 mg  Once         10/30/24 0939    10/30/24 0941  Uric Acid  Once         10/30/24 0940    10/30/24 0930  potassium chloride (K-DUR,KLOR-CON) ER tablet 40 mEq  Every 4 Hours         10/30/24 0837    10/30/24 0930  potassium & sodium phosphates (PHOS-NAK) 280-160-250 MG packet 2 packet  Once         10/30/24 0837    10/30/24 0900  carvedilol (COREG) tablet 3.125 mg  Every 12 Hours Scheduled         10/30/24 0726    10/30/24 0641  POC Glucose Once  PROCEDURE ONCE        Comments: Complete no more than 45 minutes prior to patient eating      10/30/24 0639    10/30/24 0600  CBC Auto Differential  PROCEDURE ONCE         10/29/24 2201    10/29/24 2100  insulin glargine (LANTUS, SEMGLEE) injection 10 Units  Nightly         10/29/24 1644    10/29/24 2052  POC Glucose Once  PROCEDURE ONCE        Comments: Complete no more than 45 minutes prior to patient eating      10/29/24 2050    10/29/24 1605  POC Glucose Once  PROCEDURE ONCE        Comments: Complete no more than 45 minutes prior to patient eating      10/29/24 1603    10/29/24 1430  Naloxegol Oxalate (MOVANTIK) tablet 25 mg  Every Morning         10/29/24 1237    10/29/24 1055  POC Glucose Once  PROCEDURE ONCE        Comments: Complete no more than 45 minutes prior to patient eating      10/29/24 1050    10/29/24 1000  Incentive Spirometry  Every 4 Hours While Awake       10/29/24 0648    10/29/24 0800  Strict Intake & Output  Every 8 Hours         10/29/24 0656    10/29/24 0745  sodium chloride 0.45 % 925 mL with sodium bicarbonate 8.4 % 75 mEq infusion  Continuous,   Status:   Discontinued         10/29/24 0658    10/29/24 0726  Manual Differential  Once         10/29/24 0725    10/29/24 0657  Record all INTAKE including IVF.  Nursing Communication  Continuous        Comments: Record all INTAKE including IVF.    10/29/24 0656    10/29/24 0648  Diet: Liquid; Clear Liquid; Fluid Consistency: Thin (IDDSI 0)  Diet Effective Now,   Status:  Canceled         10/29/24 0647    10/29/24 0648  Diet: Liquid, Diabetic; Clear Liquid; Consistent Carbohydrate; Fluid Consistency: Thin (IDDSI 0)  Diet Effective Now         10/29/24 0647    10/29/24 0600  Renal Function Panel  Morning Draw,   Status:  Canceled         10/28/24 1003    10/29/24 0600  Ammonia  Morning Draw,   Status:  Canceled         10/28/24 2124    10/29/24 0600  CBC Auto Differential  PROCEDURE ONCE         10/28/24 2201    10/29/24 0600  Phosphorus  PROCEDURE ONCE         10/28/24 2201    10/28/24 2348  Ammonia  STAT         10/28/24 2347    10/28/24 2115  insulin glargine (LANTUS, SEMGLEE) injection 10 Units  Nightly,   Status:  Discontinued         10/28/24 2021    10/28/24 2115  insulin glargine (LANTUS, SEMGLEE) injection 6 Units  Nightly,   Status:  Discontinued         10/28/24 2045    10/28/24 2100  insulin glargine (LANTUS, SEMGLEE) injection 16 Units  Nightly,   Status:  Discontinued         10/28/24 1219    10/28/24 2039  POC Glucose Once  PROCEDURE ONCE        Comments: Complete no more than 45 minutes prior to patient eating      10/28/24 2037    10/28/24 1930  Comprehensive Metabolic Panel  STAT         10/28/24 1929    10/28/24 1845  iopamidol (ISOVUE-370) 76 % injection 100 mL  Once in Imaging         10/28/24 1752    10/28/24 1842  POC Glucose Once  PROCEDURE ONCE        Comments: Complete no more than 45 minutes prior to patient eating      10/28/24 1840    10/28/24 1810  Phosphorus  STAT         10/28/24 1809    10/28/24 1810  CBC (No Diff)  STAT         10/28/24 1809    10/28/24 1810  Magnesium  STAT         10/28/24  "1809    10/28/24 1809  Comprehensive Metabolic Panel  STAT         10/28/24 1809    10/28/24 1730  midodrine (PROAMATINE) tablet 2.5 mg  3 Times Daily Before Meals,   Status:  Discontinued         10/28/24 1218    10/28/24 1400  heparin (porcine) 5000 UNIT/ML injection 5,000 Units  Every 8 Hours Scheduled         10/28/24 1223    10/28/24 1100  lactated ringers infusion  Continuous         10/28/24 1002    10/27/24 1200  insulin lispro (HUMALOG/ADMELOG) injection 4 Units  3 Times Daily With Meals         10/27/24 1102    10/27/24 0900  polyethylene glycol (MIRALAX) packet 17 g  2 Times Daily        Placed in \"And\" Linked Group    10/27/24 0700    10/27/24 0900  sennosides-docusate (PERICOLACE) 8.6-50 MG per tablet 2 tablet  2 Times Daily        Placed in \"And\" Linked Group    10/27/24 0700    10/27/24 0700  bisacodyl (DULCOLAX) EC tablet 5 mg  Daily PRN        Placed in \"And\" Linked Group    10/27/24 0700    10/27/24 0700  bisacodyl (DULCOLAX) suppository 10 mg  Daily PRN        Placed in \"And\" Linked Group    10/27/24 0700    10/27/24 0659  oxyCODONE (ROXICODONE) immediate release tablet 5 mg  Every 4 Hours PRN         10/27/24 0659    10/26/24 1828  HYDROmorphone (DILAUDID) injection 0.5 mg  Every 2 Hours PRN,   Status:  Discontinued         10/26/24 1828    10/26/24 1800  Incentive Spirometry  Every 4 Hours While Awake       10/26/24 1617    10/26/24 0600  Comprehensive Metabolic Panel  Daily       10/25/24 1013    10/26/24 0600  Magnesium  Daily       10/25/24 1914    10/26/24 0600  Phosphorus  Daily       10/25/24 1914    10/25/24 0830  mupirocin (BACTROBAN) 2 % nasal ointment 1 Application  2 Times Daily         10/25/24 0739    10/25/24 0740  Daily CHG Bath While in ICU  Daily      Comments: Use for admission bath & daily bath for duration of critical care stay    10/25/24 0739    10/24/24 2100  carvedilol (COREG) tablet 6.25 mg  Every 12 Hours Scheduled,   Status:  Discontinued         10/24/24 8953    " "10/24/24 1730  pantoprazole (PROTONIX) EC tablet 40 mg  2 Times Daily Before Meals         10/24/24 1302    10/24/24 0600  CBC & Differential  Daily       10/23/24 0726    10/23/24 2100  piperacillin-tazobactam (ZOSYN) 3.375 g IVPB in 100 mL NS MBP (CD)  Every 8 Hours         10/23/24 1411    10/23/24 0900  venlafaxine XR (EFFEXOR-XR) 24 hr capsule 75 mg  Daily         10/22/24 2242    10/23/24 0830  influenza virus vacc split PF FLUZONE 0.5 mL  During Hospitalization         10/22/24 2332    10/23/24 0800  Oral Care  2 Times Daily       10/22/24 2026    10/23/24 0727  Strict Intake & Output  Every Shift,   Status:  Canceled       10/23/24 0726    10/23/24 0727  Daily Weights  Daily       10/23/24 0726    10/23/24 0725  Potassium Replacement - Follow Nurse / BPA Driven Protocol  As Needed         10/23/24 0726    10/23/24 0725  Magnesium Standard Dose Replacement - Follow Nurse / BPA Driven Protocol  As Needed         10/23/24 0726    10/23/24 0725  Phosphorus Replacement - Follow Nurse / BPA Driven Protocol  As Needed         10/23/24 0726    10/23/24 0725  Calcium Replacement - Follow Nurse / BPA Driven Protocol  As Needed         10/23/24 0726    10/23/24 0000  Vital Signs  Every 4 Hours       10/22/24 2026    10/22/24 2241  hydrOXYzine (ATARAX) tablet 10 mg  Every 8 Hours PRN         10/22/24 2242    10/22/24 2200  POC Glucose 4x Daily Before Meals & at Bedtime  4 Times Daily Before Meals & at Bedtime      Comments: Complete no more than 45 minutes prior to patient eating      10/22/24 2113    10/22/24 2200  insulin lispro (HUMALOG/ADMELOG) injection 2-7 Units  4 Times Daily Before Meals & Nightly         10/22/24 2113    10/22/24 2113  naloxone (NARCAN) injection 0.4 mg  Every 5 Minutes PRN        Placed in \"And\" Linked Group    10/22/24 2113    10/22/24 2112  dextrose (GLUTOSE) oral gel 15 g  Every 15 Minutes PRN         10/22/24 2113    10/22/24 2112  dextrose (D50W) (25 g/50 mL) IV injection 25 g  Every 15 " "Minutes PRN         10/22/24 2113    10/22/24 2112  glucagon (GLUCAGEN) injection 1 mg  Every 15 Minutes PRN         10/22/24 2113    10/22/24 2025  melatonin tablet 2.5 mg  Nightly PRN         10/22/24 2026    10/22/24 2025  Intake & Output  Every Shift       10/22/24 2026    10/22/24 2024  ondansetron ODT (ZOFRAN-ODT) disintegrating tablet 4 mg  Every 6 Hours PRN        Placed in \"Or\" Linked Group    10/22/24 2026    10/22/24 2024  ondansetron (ZOFRAN) injection 4 mg  Every 6 Hours PRN        Placed in \"Or\" Linked Group    10/22/24 2026    10/22/24 1551  sodium chloride 0.9 % flush 10 mL  As Needed         10/22/24 1552    Unscheduled  Follow Hypoglycemia Standing Orders For Blood Glucose <70 & Notify Provider of Treatment  As Needed      Comments: Follow Hypoglycemia Orders As Outlined in Process Instructions (Open Order Report to View Full Instructions)  Notify Provider Any Time Hypoglycemia Treatment is Administered    10/22/24 2113    Signed and Held  Irrigate Drain  Continuous        Comments: Irrigate drain 4 times a day with 10 cc sterile normal saline.    Signed and Held    Signed and Held  Measure drainage output every shift  Continuous         Signed and Held    Signed and Held  Notify patients physician if drain comes out unexpectedly  Continuous         Signed and Held                   Laura Junior, LUCOI     Case Management     Case Management/Social Work     Signed     Date of Service: 10/30/24 1149  Creation Time: 10/30/24 1149     Signed         Continued Stay Note  [facility]     Patient Name: Donnie Bishop                    MRN: [id number]  Today's Date: 10/30/2024              Admit Date: 10/22/2024     Plan: PT/OT recommending snf. Patient considering choices    Discharge Plan         Row Name 10/30/24 1141           Plan     Plan PT/OT recommending snf. Patient considering choices     Roadmap to Recovery Yes     Provided Post Acute Provider List? Yes     Post Acute Provider " "List Nursing Home     Provided Post Acute Provider Quality & Resource List? Yes     Post Acute Provider Quality and Resource List Nursing Home     Delivered To Patient     Method of Delivery In person     Plan Comments Spoke with patient at bedside. Reviewed PT/OT recommendations of snf at [place]. Patient verbalizes understanding. States he will discuss with family and talk with CCP again \"tomorrow\".  Gave choice list and cms compare. Received call earlier this am from spouse asking about FMLA papers. Instructed spouse this ccp would  and get information for fmla paperwork. Call placed to Nohemy with [employer], who states patient family can bring paperwork and $50 cash to office at [address] today. Called patient spouse back and left detailed information with [employer] contact information and instructions on her voicemail..                         Discharge Codes    No documentation.                      Expected Discharge Date and Time         Expected Discharge Date Expected Discharge Time     2024                     Laura Junior RN                                                              Physician Progress Notes (last 48 hours)        Steven Rivera MD at 10/30/24 1244              Name: Donnie Bishop ADMIT: 10/22/2024   : 1975  PCP: Juju Kennedy MD    MRN: [id number] LOS: 6 days   AGE/SEX: 49 y.o. male  ROOM: [facility]     Subjective   Subjective   Seems he had fallen asleep while trying to use hand-held urinal.  He did awaken easily and answer questions appropriately for the most part.  Still has some abdominal pain.     Objective   Objective   Vital Signs  Temp:  [97.9 °F (36.6 °C)-98.7 °F (37.1 °C)] 97.9 °F (36.6 °C)  Heart Rate:  [85-98] 90  Resp:  [18-20] 18  BP: (112-142)/() 114/89  SpO2:  [81 %-100 %] 81 %  on   ;   Device (Oxygen Therapy): room air  Body mass index is 28.13 kg/m².  Physical Exam  General: Lying in bed, drowsy, not in distress, " ill-appearing,  CV: Regular rate and rhythm, no murmurs rubs or gallops  Lungs: Diminished at bases, no wheezing, no crackles, right upper quadrant drain in place.  Abdomen: Soft, distended, tenderness to palpation  Extremities: Trace edema in lower extremities,, no cyanosis     Results Review     I reviewed the patient's new clinical results.  Results from last 7 days   Lab Units 10/30/24  0639 10/29/24  0652 10/28/24  1959 10/27/24  2310   WBC 10*3/mm3 14.38* 15.53* 16.23* 14.14*   HEMOGLOBIN g/dL 10.6* 11.6* 12.3* 12.0*   PLATELETS 10*3/mm3 364 370 382 349     Results from last 7 days   Lab Units 10/30/24  0639 10/29/24  0652 10/28/24  2059 10/28/24  1959 10/27/24  2205 10/27/24  0329 10/26/24  0545   SODIUM mmol/L 135* 136 143 141 137 137 137   POTASSIUM mmol/L 3.1* 3.9 4.5 4.3 3.9 4.7 4.7   CHLORIDE mmol/L 102 107 110* 108* 106 104 103   CO2 mmol/L 21.4* 16.6* 16.0* 16.6* 19.0* 19.0* 19.0*   BUN mg/dL 23* 30* 35* 35* 28* 32* 36*   CREATININE mg/dL 0.78 1.14 1.06 1.08 0.93 1.12 1.22   GLUCOSE mg/dL 122* 176* 159* 161* 177* 223* 133*   EGFR mL/min/1.73 110.7 79.8 87.1 85.2 101.9 81.5 73.6   ALBUMIN g/dL 2.2* 1.7* 2.0* 3.6 2.5* 2.7* 3.0*   BILIRUBIN mg/dL 0.8 0.7 0.7 0.7 0.7 0.8 1.1   ALK PHOS U/L 149* 189* 222* 229* 217* 140* 99   AST (SGOT) U/L 55* 154* 199* 229* 121* 172* 108*   ALT (SGPT) U/L 83* 135* 159* 164* 121* 133* 92*     Results from last 7 days   Lab Units 10/30/24  0639 10/29/24  0652 10/28/24  2059 10/28/24  1959 10/28/24  0045 10/27/24  2205   CALCIUM mg/dL 9.0 10.1 9.8 10.1  --  9.8   ALBUMIN g/dL 2.2* 1.7* 2.0* 3.6  --  2.5*   MAGNESIUM mg/dL 1.7 2.3  --  2.5  --  2.9*   PHOSPHORUS mg/dL 2.1* 2.6  --  2.8 2.4* 1.9*     Results from last 7 days   Lab Units 10/26/24  0545 10/25/24  0426 10/24/24  1404 10/24/24  0617 10/24/24  0012 10/23/24  2104   PROCALCITONIN ng/mL 30.30* 78.10*  --   --   --   --    LACTATE mmol/L  --   --  3.8* 3.3* 3.6* 4.5*     COVID19   Date Value Ref Range Status    10/25/2024 Not Detected Not Detected - Ref. Range Final   12/26/2023 Not Detected Not Detected - Ref. Range Final     Glucose   Date/Time Value Ref Range Status   10/30/2024 1056 253 (H) 70 - 130 mg/dL Final   10/30/2024 0639 122 70 - 130 mg/dL Final   10/29/2024 2050 196 (H) 70 - 130 mg/dL Final   10/29/2024 1603 141 (H) 70 - 130 mg/dL Final   10/29/2024 1050 128 70 - 130 mg/dL Final   10/28/2024 2037 171 (H) 70 - 130 mg/dL Final   10/28/2024 1840 158 (H) 70 - 130 mg/dL Final       CT Guided Percutaneous Drain Liver  Narrative: CT GUIDED DRAINAGE     HISTORY:  drainage of subcapsular liver fluid collection     COMPARISON: CTA 10/26/2024.     PROCEDURE DETAILS: After informed consent was obtained from the patient,  the patient was placed on the CT scanner in supine position. A nurse was  continuously present and moderate sedation was performed under physician  supervision from 1240 to 1309 hours with a total of 50 mcg fentanyl and  1 mg versed administered. A preliminary scan of the abdomen was obtained  and the subcapsular hepatic fluid collection identified. A route was  planned for the drainage and an appropriate skin site identified. This  site was then prepped and draped in the usual sterile manner and the  skin anesthetized with lidocaine. The tract to the collection was  anesthetized with lidocaine and a needle placed within the collection. A  wire was then advanced into the collection and the needle removed. After  serial dilation, a 12 Chinese drainage catheter was placed into the  collection, coiled, locked, secured with Dermabond reinforced suture and  stay-fix dressing, and attached to a gravity drainage bag. 300 mL fluid  were manually removed with relatively thick opaque fluid noted which was  green initially, then yellow. Initial irrigation was performed. The  patient tolerated the procedure well and there were no immediate  complications.  All elements of maximal sterile technique were  followed.  Radiation dose reduction techniques were utilized, including automated  exposure control and exposure modulation.        Impression: Successful drain placement into perihepatic fluid collection as above.  CT follow-up prior to drain removal recommended.        This report was finalized on 10/29/2024 2:35 PM by Dr. Gilmer Morrell M.D on Workstation: [id number]     MRI Brain With & Without Contrast  Narrative: MRI OF THE BRAIN WITH AND WITHOUT CONTRAST ON 10/28/2024     CLINICAL HISTORY: This is a 49-year-old male patient who has altered  mental status and has a head CT yesterday showing possible  intraparenchymal hemorrhage in the posterior right thalamus.     TECHNIQUE: Axial T1, FLAIR, fat-suppressed T2, axial diffusion and  gradient echo T2, sagittal T1 and postcontrast axial fat-suppressed T1  and coronal T1-weighted images were obtained of the entire head.     This is correlated to a head CT without contrast yesterday afternoon on  10/27/2024 at 2:26 p.m.     FINDINGS: At the site of the faint hyperdensity seen on yesterday's head  CT in the posterior right thalamus is a very faint area of precontrast  T1 hyperintensity that measures 8 x 6 mm mediolateral and anterior  posterior dimension.  There is no signal abnormality on the T2 or FLAIR  images or the gradient echo T2 weighted images and this is not felt to  be an acute intraparenchymal hemorrhage but rather some faint  mineralization in the posterior right thalamus. The remainder of the  brain parenchyma is normal in signal intensity. The lateral and third  ventricles are normal in size. There is mild prominence in size of the  fourth ventricle which may be secondary to some cerebellar volume loss.  I see no mass effect and no midline shift, no extra-axial fluid  collections are identified. 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 It has no mass effect on the adjacent  hannah or anterior left cerebellum.  No additional abnormal areas of  enhancement are seen in the head. The calvarium and the skull base are  normal in appearance. The paranasal sinuses and the mastoid air cells  and middle ear cavities are clear. Good flow voids are demonstrated  within the cerebral vessels and in the dural venous sinuses.     Impression: 1. Corresponding to the area of increased density in the posterior right  thalamus on yesterday's head CT is an area of precontrast faint T1  hyperintensity but no corresponding signal abnormality on any of the  other sequences and I think this is a benign area of faint  mineralization in the posterior right thalamus and not an acute  intraparenchymal hemorrhage.     2. This patient has an enhancing mass filling the left internal auditory  canal bulging through the left porus acusticus into the left  cerebellopontine angle and it maximally measures 12 x 9 x 9 mm  mediolateral, anterior posterior and craniocaudal dimensions, most  consistent with a left-sided vestibular schwannoma (acoustic neuroma)  and correlate clinically as to whether the patient has left-sided  sensorineural hearing loss.     3. While the lateral and third ventricles are normal in size, the fourth  ventricle is prominent in size which is felt to be due to some volume  loss and atrophy in the cerebellum and correlate clinically as to the  etiology. The remainder of the MRI of the brain is normal.     The results were communicated to Dr. Braden from stroke neurology by  telephone on 10/28/2024 at 11:15 a.m.     This report was finalized on 10/29/2024 6:43 AM by Dr. Frankie Berkowitz M.D  on Workstation: [id number]       Scheduled Medications  carvedilol, 3.125 mg, Oral, Q12H  heparin (porcine), 5,000 Units, Subcutaneous,  Q8H  insulin glargine, 10 Units, Subcutaneous, Nightly  insulin lispro, 2-7 Units, Subcutaneous, 4x Daily AC & at Bedtime  insulin lispro, 4 Units, Subcutaneous, TID With Meals  Naloxegol Oxalate, 25 mg, Oral, QAM  pantoprazole, 40 mg, Oral, BID AC  piperacillin-tazobactam, 3.375 g, Intravenous, Q8H  senna-docusate sodium, 2 tablet, Oral, BID   And  polyethylene glycol, 17 g, Oral, BID  potassium chloride ER, 40 mEq, Oral, Q4H  venlafaxine XR, 75 mg, Oral, Daily    Infusions     Diet  Diet: Liquid, Diabetic; Clear Liquid; Consistent Carbohydrate; Fluid Consistency: Thin (IDDSI 0)    I have personally reviewed     [x]  Laboratory   [x]  Microbiology   [x]  Radiology   [x]  EKG/Telemetry  []  Cardiology/Vascular   []  Pathology    []  Records      Assessment/Plan     Active Hospital Problems    Diagnosis  POA    **Epigastric abdominal pain [R10.13]  Yes    Abscess of liver [K75.0]  Yes    Klebsiella pneumoniae (k. pneumoniae) as the cause of diseases classified elsewhere [B96.1]  Yes    Hypotension [I95.9]  Yes    Transaminitis [R74.01]  Yes    Leukocytosis [D72.829]  Yes    Chronic combined systolic and diastolic heart failure [I50.42]  Yes    Anxiety [F41.9]  Yes    Multiple-type hyperlipidemia [E78.2]  Yes    Type 2 diabetes mellitus with diabetic neuropathy, with long-term current use of insulin [E11.40, Z79.4]  Not Applicable    Benign essential HTN [I10]  Yes      Resolved Hospital Problems    Diagnosis Date Resolved POA    Acute kidney injury [N17.9] 10/30/2024 No       49 y.o. man with diabetes, HTN, hyperlipidemia, chronic systolic and diastolic heart failure (non-ischemic) and anxiety who presented with severe abdominal/epigastric pain.  EGD was negative.  CT scan demonstrated a large right subcapsular fluid collection with mass effect on liver.  He underwent drainage of liver fluid collection by IR on 10/28/2024.  Culture thus far positive for rare growth Klebsiella pneumoniae.    Appreciate general surgery  "assistance.  Liver drain remains in place.  Culture growing Klebsiella.  Based on sensitivities he is receiving IV Zosyn.  MRCP ordered by surgery, not yet performed.  LFTs improving since drain placed.  Symptoms have improved as well.  Statin on hold, likely resume at discharge.    Hypotension resolving with IV fluids.  Midodrine has been tapered off and cardiology reinitiating carvedilol.  Spironolactone and Entresto currently on hold.  TENISHA has resolved.  Receiving electrolyte replacement.  IV Lasix given today per nephrology.    Monitor hemoglobin  Confusion improved      Continue outpatient follow-up regarding acoustic neuroma.  MRI here showed possible vestibular schwannoma that will need monitoring.  Heparin SC for DVT prophylaxis.  Full code.  Discussed with patient.  Anticipate discharge home with HH vs SNU facility timing yet to be determined.  Expected Discharge Date: 11/4/2024; Expected Discharge Time:       Steven Rivera MD  [facility]  10/30/24  13:59 EDT    Electronically signed by Steven Rivera MD at 10/30/24 1359       Manuela Wisdom MD at 10/30/24 1224          General Surgery Progress Note    CC: Cholecystitis, subcapsular biloma, right upper quadrant abdominal pain    S: Patient is much more awake today, answering questions appropriately.  He is able to tell me that his pain is much better.  He denies any nausea or vomiting and ate all of his breakfast of clear liquids.  He states that he is peeing a lot but has not had a bowel movement.  He is passing gas.     O:/89 (BP Location: Right arm, Patient Position: Lying)   Pulse 90   Temp 97.9 °F (36.6 °C) (Oral)   Resp 18   Ht 170 cm (66.93\")   Wt 81.3 kg (179 lb 3.7 oz)   SpO2 (!) 81%   BMI 28.13 kg/m²     Intake & Output (last day)         10/29 0701  10/30 0700 10/30 0701  10/31 0700    P.O. 100     I.V. (mL/kg) 750 (9.2)     IV Piggyback 100     Total Intake(mL/kg) 950 (11.7)     Urine (mL/kg/hr) 900 (0.5) " 200 (0.5)    Drains 50     Total Output 950 200    Net 0 -200                  GENERAL: Ill-appearing, awake, much more alert and interactive, answering questions appropriately  HEENT: EOMI, moist mucus membranes   CHEST: normal work of breathing on room air  CARDIAC: palpable distal pulses, no pedal edema  GI: Abd soft, less distended, mildly tender in the right upper quadrant without rebound or guarding; RUQ IR drain with small amount of bilious output  EXTREMITIES: MOKC, no cyanosis, no lower extremity edema  SKIN: Warm and dry    LABS  Results from last 7 days   Lab Units 10/30/24  0639 10/29/24  0652 10/28/24  1959 10/27/24  2310 10/27/24  0329   WBC 10*3/mm3 14.38* 15.53* 16.23* 14.14* 10.64   HEMOGLOBIN g/dL 10.6* 11.6* 12.3* 12.0* 12.7*   HEMATOCRIT % 33.5* 37.1* 38.6 36.6* 38.7   PLATELETS 10*3/mm3 364 370 382 349 352   MONOCYTES % %  --  9.9  --  10.1 7.1   EOSINOPHIL % %  --  0.0*  --  0.0* 0.0*     Results from last 7 days   Lab Units 10/30/24  0639 10/29/24  0652 10/28/24  2059   SODIUM mmol/L 135* 136 143   POTASSIUM mmol/L 3.1* 3.9 4.5   CHLORIDE mmol/L 102 107 110*   CO2 mmol/L 21.4* 16.6* 16.0*   BUN mg/dL 23* 30* 35*   CREATININE mg/dL 0.78 1.14 1.06   CALCIUM mg/dL 9.0 10.1 9.8   BILIRUBIN mg/dL 0.8 0.7 0.7   ALK PHOS U/L 149* 189* 222*   ALT (SGPT) U/L 83* 135* 159*   AST (SGOT) U/L 55* 154* 199*   GLUCOSE mg/dL 122* 176* 159*     Results from last 7 days   Lab Units 10/28/24  1959 10/28/24  1129   INR  1.13* 1.0   APTT seconds 28.8  --    Magnesium 1.7  Phosphorus 2.1  Urate 3.4      IMAGING:  No new imaging    A/P: 49 y.o. male with multiple medical problems including CHF with EF of 35%, hypertension, hyperlipidemia, type 2 diabetes mellitus with A1c 9.4, presenting with abdominal pain.      -CT demonstrating contracted gallbladder with mild hyperenhancing wall.  EGD performed 10/24 negative for abnormalities; August 2024 CT had distended gallbladder but no other findings of  cholecystitis.  -HIDA scan 10/24 demonstrating filling but with decreased ejection fraction 19% concerning for possible chronic cholecystitis.   -CTA abdomen pelvis 10/26 to evaluate worsening RUQ pain and possible mesenteric ischemia demonstrated a large right subcapsular fluid collection with mass effect on the liver; no active extravasation  -s/p CT-guided IR drainage 10/28 with thick, turbid bilious output from drain - bile leak vs abscess in the differential, cause unclear but may be related to cholecystitis.  Will order MRCP today to further evaluate etiology.  -The patient is feeling much better today.  His abdominal exam is vastly improved.  His drain output has decreased from 1550 cc to 50 cc today.  It still appears bilious.  Culture is demonstrating Klebsiella.  Continue antibiotics per primary.  -WBC still slowly downtrending 14 from 15.  LFTs are also downtrending appropriately after liver drainage.  -Continue pain and nausea control. Ok to advance diet as tolerated.  Continue scheduled bowel regimen.  Monitor bowel function.    Manuela Wisdom MD  General, Robotic and Endoscopic Surgery  [facility]    [address], [address]  [place], [place], [facility]  P: [phone]  F: [phone]       Electronically signed by Manuela Wisdom MD at 10/30/24 8829       Anne Wilson MD at 10/30/24 0998              [facility] Progress Note      Patient Name: Donnie Bishop  : 1975  MRN: [id number]  Primary Care Physician:  Juju Kennedy MD  Date of admission: 10/22/2024    Subjective     Interval History:   Follow-up acute kidney injury.  Tolerating clear liquid diet.  Mental status improved today.  Still slow to answer some questions and perseverates.  Afebrile.  Urine output 900 cc.  Drain output 50 cc last shift.  Feels full quickly after clear liquids.  Complains of his feet being swollen and painful.  Review of Systems:   See above.    Objective      Vitals:   Temp:  [97.8 °F (36.6 °C)-98.7 °F (37.1 °C)] 98.2 °F (36.8 °C)  Heart Rate:  [85-98] 93  Resp:  [18-20] 18  BP: (112-142)/() 132/87    Intake/Output Summary (Last 24 hours) at 10/30/2024 0935  Last data filed at 10/30/2024 0524  Gross per 24 hour   Intake 850 ml   Output 950 ml   Net -100 ml       Physical Exam:    General Appearance: More awake and conversant.  Sitting up talking with the nurse.  A little slow to answer some questions.  Seems hard of hearing.  Skin: warm and dry  HEENT: oral mucosa moist.  Nonicteric sclera  Neck: supple, no JVD  Lungs: Clear to auscultation anteriorly.  Heart: RRR, normal S1 and S2  Abdomen: soft, positive bowel sounds.  Right upper quadrant drain.  Slightly tender but no guarding or rebound.  : no palpable bladder  Extremities: 1+ lower extremity edema both feet erythematous.  Neuro: Improved.  See above.    Scheduled Meds:     carvedilol, 3.125 mg, Oral, Q12H  heparin (porcine), 5,000 Units, Subcutaneous, Q8H  insulin glargine, 10 Units, Subcutaneous, Nightly  insulin lispro, 2-7 Units, Subcutaneous, 4x Daily AC & at Bedtime  insulin lispro, 4 Units, Subcutaneous, TID With Meals  [Held by provider] midodrine, 2.5 mg, Oral, TID AC  Naloxegol Oxalate, 25 mg, Oral, QAM  pantoprazole, 40 mg, Oral, BID AC  piperacillin-tazobactam, 3.375 g, Intravenous, Q8H  senna-docusate sodium, 2 tablet, Oral, BID   And  polyethylene glycol, 17 g, Oral, BID  potassium chloride ER, 40 mEq, Oral, Q4H  venlafaxine XR, 75 mg, Oral, Daily      IV Meds:          Results Reviewed:   I have personally reviewed the results from the time of this admission to 10/30/2024 09:35 EDT     Results from last 7 days   Lab Units 10/30/24  0639 10/29/24  0652 10/28/24  2059   SODIUM mmol/L 135* 136 143   POTASSIUM mmol/L 3.1* 3.9 4.5   CHLORIDE mmol/L 102 107 110*   CO2 mmol/L 21.4* 16.6* 16.0*   BUN mg/dL 23* 30* 35*   CREATININE mg/dL 0.78 1.14 1.06   CALCIUM mg/dL 9.0 10.1 9.8   BILIRUBIN mg/dL  0.8 0.7 0.7   ALK PHOS U/L 149* 189* 222*   ALT (SGPT) U/L 83* 135* 159*   AST (SGOT) U/L 55* 154* 199*   GLUCOSE mg/dL 122* 176* 159*       Estimated Creatinine Clearance: 119.4 mL/min (by C-G formula based on SCr of 0.78 mg/dL).    Results from last 7 days   Lab Units 10/30/24  0639 10/29/24  0652 10/28/24  1959   MAGNESIUM mg/dL 1.7 2.3 2.5   PHOSPHORUS mg/dL 2.1* 2.6 2.8       Results from last 7 days   Lab Units 10/24/24  0617   URIC ACID mg/dL 5.4       Results from last 7 days   Lab Units 10/30/24  0639 10/29/24  0652 10/28/24  1959 10/27/24  2310 10/27/24  0329   WBC 10*3/mm3 14.38* 15.53* 16.23* 14.14* 10.64   HEMOGLOBIN g/dL 10.6* 11.6* 12.3* 12.0* 12.7*   PLATELETS 10*3/mm3 364 370 382 349 352       Results from last 7 days   Lab Units 10/28/24  1959 10/28/24  1129   INR  1.13* 1.0       Assessment / Plan     ASSESSMENT:  Acute kidney injury.  Multifactorial including hypovolemia, hypotension, impaired renal autoregulation due to Jardiance, Entresto, Aldactone.  Nonoliguric.  Creatinine improved further.  More edematous today.  Metabolic acidosis improved after IV fluids with bicarb.  Stop IV fluids and give 1 dose of IV Lasix today.  Potassium and phosphorus being replaced.  Cholecystitis with large right perihepatic fluid collection.  Status post IR drainage with drain left in 10/28/2024..  On Zosyn.  Transaminitis.  Enzymes improving.  Off statin.  Heart failure reduced ejection fraction.  EF 49%.  (Improved from 35% a year ago).  Anemia.  Hemoglobin down some with IV fluid.  Encephalopathy.  Improving.  Diabetes mellitus type 2.  Blood sugar 122-196.    PLAN:  Replace potassium and phosphorus per protocol.  Lasix 40 mg IV x 1 today  Check uric acid on blood in the lab.    Thank you for involving us in the care of Donnie Bishop.  Please feel free to call with any questions.    Anne Wilson MD  10/30/24  09:35 EDT    [facility]  [phone]    Please note that portions of  this note were completed with a voice recognition program.    Electronically signed by Anne Wilson MD at 10/30/24 0940       Alex Varela MD at 10/30/24 0913            [facility] Progress Note       Encounter Date:10/30/24  Patient:Donnie Bishop  :1975  MRN:[id number]      Chief Complaint: Abdominal pain      Subjective:   Subjective     Looks better more clear today.  Complaining of his feet hurting looks to be more swelling.    Review of Systems:  Review of Systems   Cardiovascular:  Negative for chest pain.   Respiratory:  Negative for shortness of breath.    Gastrointestinal:  Positive for abdominal pain.       Medications:  Scheduled Meds:  carvedilol, 3.125 mg, Oral, Q12H  heparin (porcine), 5,000 Units, Subcutaneous, Q8H  insulin glargine, 10 Units, Subcutaneous, Nightly  insulin lispro, 2-7 Units, Subcutaneous, 4x Daily AC & at Bedtime  insulin lispro, 4 Units, Subcutaneous, TID With Meals  [Held by provider] midodrine, 2.5 mg, Oral, TID AC  Naloxegol Oxalate, 25 mg, Oral, QAM  pantoprazole, 40 mg, Oral, BID AC  piperacillin-tazobactam, 3.375 g, Intravenous, Q8H  senna-docusate sodium, 2 tablet, Oral, BID   And  polyethylene glycol, 17 g, Oral, BID  potassium chloride ER, 40 mEq, Oral, Q4H  venlafaxine XR, 75 mg, Oral, Daily    Continuous Infusions:     PRN Meds:    senna-docusate sodium **AND** polyethylene glycol **AND** bisacodyl **AND** bisacodyl    Calcium Replacement - Follow Nurse / BPA Driven Protocol    dextrose    dextrose    glucagon (human recombinant)    hydrOXYzine    influenza vaccine    Magnesium Standard Dose Replacement - Follow Nurse / BPA Driven Protocol    melatonin    [DISCONTINUED] Morphine **AND** naloxone    ondansetron ODT **OR** ondansetron    oxyCODONE    Phosphorus Replacement - Follow Nurse / BPA Driven Protocol    Potassium Replacement - Follow Nurse / BPA Driven Protocol    sodium chloride        Objective:   Objective    Vitals:     10/30/24 0400 10/30/24 0500 10/30/24 0540 10/30/24 0731   BP: 112/78   132/87   BP Location:    Right arm   Patient Position:    Lying   Pulse: 97 95  93   Resp:    18   Temp:    98.2 °F (36.8 °C)   TempSrc:    Oral   SpO2: 96% 96%  92%   Weight:   81.3 kg (179 lb 3.7 oz)    Height:               Physical Exam:  Constitutional: Acutely ill appearing, well developed, mild acute distress due to pain  HENT: Oropharynx clear and membrane moist  Eyes: Normal conjunctiva, no sclera icterus.  Neck: Supple, no carotid bruit bilaterally.  Cardiovascular: Regular rate and rhythm, No Murmur, 1+ bilateral lower extremity edema located in the feet.  Pulmonary: Normal respiratory effort, normal lung sounds, no wheezing.   Skin: Warm, dry, no ecchymosis, no rash.             Lab Review:   Results from last 7 days   Lab Units 10/30/24  0639 10/29/24  0652 10/28/24  2059 10/28/24  1959 10/27/24  2205 10/27/24  0329 10/26/24  0545   SODIUM mmol/L 135* 136 143 141 137 137 137   POTASSIUM mmol/L 3.1* 3.9 4.5 4.3 3.9 4.7 4.7   CHLORIDE mmol/L 102 107 110* 108* 106 104 103   CO2 mmol/L 21.4* 16.6* 16.0* 16.6* 19.0* 19.0* 19.0*   BUN mg/dL 23* 30* 35* 35* 28* 32* 36*   CREATININE mg/dL 0.78 1.14 1.06 1.08 0.93 1.12 1.22   GLUCOSE mg/dL 122* 176* 159* 161* 177* 223* 133*   CALCIUM mg/dL 9.0 10.1 9.8 10.1 9.8 9.8 9.5   AST (SGOT) U/L 55* 154* 199* 229* 121* 172* 108*   ALT (SGPT) U/L 83* 135* 159* 164* 121* 133* 92*     Results from last 7 days   Lab Units 10/28/24  0045 10/27/24  2205   HSTROP T ng/L 30* 28*     Results from last 7 days   Lab Units 10/30/24  0639 10/29/24  0652 10/28/24  1959 10/27/24  2310 10/27/24  0329 10/26/24  0545 10/25/24  0426   WBC 10*3/mm3 14.38* 15.53* 16.23* 14.14* 10.64 9.44 7.93   HEMOGLOBIN g/dL 10.6* 11.6* 12.3* 12.0* 12.7* 11.9* 10.9*   HEMATOCRIT % 33.5* 37.1* 38.6 36.6* 38.7 36.7* 32.4*   PLATELETS 10*3/mm3 364 370 382 349 352 297 278     Results from last 7 days   Lab Units 10/28/24  1959 10/28/24  1129  "  INR  1.13* 1.0   APTT seconds 28.8  --      Results from last 7 days   Lab Units 10/30/24  0639 10/29/24  0652 10/28/24  1959 10/27/24  2205 10/27/24  0329 10/26/24  0545   MAGNESIUM mg/dL 1.7 2.3 2.5 2.9* 2.5 2.4           Invalid input(s): \"LDLCALC\"  Results from last 7 days   Lab Units 10/26/24  0545   PROBNP pg/mL 393.0     Results from last 7 days   Lab Units 10/24/24  0617   TSH uIU/mL 0.973           Assessment:   Assessment       Diagnosis Plan   1. Epigastric abdominal pain  Case Request    Case Request    Tissue Pathology Exam    Tissue Pathology Exam      2. Acute gastritis without hemorrhage, unspecified gastritis type              Plan:   Plan    Mr. Bishop is a 49 y.o. gentleman with past medical history notable for nonischemic cardiomyopathy, diabetes, hypertension, mixed hyperlipidemia presented to the hospital with abdominal pain initially concerns were for gastritis symptoms significantly worsened and now found to have a perihepatic fluid collection concerning for biloma versus hematoma.  Patient has had a lot more pain over the last 24 hours were asked to evaluate him for perioperative risk assessment prior to proceeding forward with possible cholecystectomy.  Status post IR draining of his's perihepatic abscess thought to be a bile leak.  Surgery following.  From a cardiac standpoint heart rate is better.  Likely related to pain.  Blood pressure is also improving.  Midodrine has been weaned with last dose 10/29.  I will start low-dose carvedilol at 3.125 mg half of what he was on at home.  I would also recommend stopping his maintenance IV fluids since he is taking oral intake.  Likely would need dose of diuretic in the next day or 2 pending blood pressure response and response to stopping IV fluids    Nonischemic cardiomyopathy:  Echocardiogram 10/26/2024 with improvement in heart function almost back to normal  Blood pressure seems to be recovering we will start low-dose carvedilol 3.125 mg " today  Likely will needed little dose of diuretic at some point given the amount of IV fluids he is gotten.  IV fluids today reassess may be dosed with diuretic tomorrow    Possible pulmonary emboli:  There is a concern that his tachycardia was related to an acute pulmonary emboli D-dimer is elevated however his clinical presentation seems more consistent with acute infection as a likely source.    Subsequent CTA did not show any PE.  Would continue prophylaxis for prevention of DVT and PE              Alex Varela MD  [facility]  10/30/24  09:14 EDT      Electronically signed by Alex Varela MD at 10/30/24 0915       Yaritza Hurtado MD at 10/29/24 1239              Name: Donnie Bishop ADMIT: 10/22/2024   : 1975  PCP: Juju Kennedy MD    MRN: [id number] LOS: 5 days   AGE/SEX: 49 y.o. male  ROOM: [facility]     Subjective   Subjective   Patient seen this morning.  Drowsy but arousable, oriented to name, place not to year.  Status post CT-guided drain percutaneously with drain placement.  Approximately hiad 600 cc of output since.  Reports improvement in abdominal pain.      Review of Systems  As above  Objective   Objective   Vital Signs  Temp:  [97.6 °F (36.4 °C)-98.6 °F (37 °C)] 97.8 °F (36.6 °C)  Heart Rate:  [] 96  Resp:  [12-20] 20  BP: ()/(59-90) 131/88  SpO2:  [91 %-95 %] 92 %  on  Flow (L/min) (Oxygen Therapy):  [2] 2;   Device (Oxygen Therapy): room air  Body mass index is 29.51 kg/m².  Physical Exam    General: Lying in bed, drowsy, not in distress, ill-appearing,  HEENT: Normocephalic, atraumatic  CV: Regular rate and rhythm, no murmurs rubs or gallops  Lungs: Diminished at bases, no wheezing, no crackles, right upper quadrant drain in place.  Abdomen: Soft, distended, tenderness to palpation  Extremities: Trace edema in lower extremities,, no cyanosis     Results Review     I reviewed the patient's new clinical results.  Results from last 7 days   Lab  Units 10/29/24  0652 10/28/24  1959 10/27/24  2310 10/27/24  0329   WBC 10*3/mm3 15.53* 16.23* 14.14* 10.64   HEMOGLOBIN g/dL 11.6* 12.3* 12.0* 12.7*   PLATELETS 10*3/mm3 370 382 349 352     Results from last 7 days   Lab Units 10/29/24  0652 10/28/24  2059 10/28/24  1959 10/27/24  2205   SODIUM mmol/L 136 143 141 137   POTASSIUM mmol/L 3.9 4.5 4.3 3.9   CHLORIDE mmol/L 107 110* 108* 106   CO2 mmol/L 16.6* 16.0* 16.6* 19.0*   BUN mg/dL 30* 35* 35* 28*   CREATININE mg/dL 1.14 1.06 1.08 0.93   GLUCOSE mg/dL 176* 159* 161* 177*   Estimated Creatinine Clearance: 83.5 mL/min (by C-G formula based on SCr of 1.14 mg/dL).  Results from last 7 days   Lab Units 10/29/24  0652 10/28/24  2059 10/28/24  1959 10/27/24  2205   ALBUMIN g/dL 1.7* 2.0* 3.6 2.5*   BILIRUBIN mg/dL 0.7 0.7 0.7 0.7   ALK PHOS U/L 189* 222* 229* 217*   AST (SGOT) U/L 154* 199* 229* 121*   ALT (SGPT) U/L 135* 159* 164* 121*     Results from last 7 days   Lab Units 10/29/24  0652 10/28/24  2059 10/28/24  1959 10/28/24  0045 10/27/24  2205 10/27/24  0329   CALCIUM mg/dL 10.1 9.8 10.1  --  9.8 9.8   ALBUMIN g/dL 1.7* 2.0* 3.6  --  2.5* 2.7*   MAGNESIUM mg/dL 2.3  --  2.5  --  2.9* 2.5   PHOSPHORUS mg/dL 2.6  --  2.8 2.4* 1.9* 2.9     Results from last 7 days   Lab Units 10/26/24  0545 10/25/24  0426 10/24/24  1404 10/24/24  0617 10/24/24  0012 10/23/24  2104   PROCALCITONIN ng/mL 30.30* 78.10*  --   --   --   --    LACTATE mmol/L  --   --  3.8* 3.3* 3.6* 4.5*     COVID19   Date Value Ref Range Status   10/25/2024 Not Detected Not Detected - Ref. Range Final   12/26/2023 Not Detected Not Detected - Ref. Range Final     Glucose   Date/Time Value Ref Range Status   10/29/2024 1050 128 70 - 130 mg/dL Final   10/28/2024 2037 171 (H) 70 - 130 mg/dL Final   10/28/2024 1840 158 (H) 70 - 130 mg/dL Final   10/28/2024 0648 253 (H) 70 - 130 mg/dL Final   10/27/2024 2005 262 (H) 70 - 130 mg/dL Final   10/27/2024 1602 264 (H) 70 - 130 mg/dL Final   10/27/2024 1048 275 (H)  70 - 130 mg/dL Final           MRI Brain With & Without Contrast  Narrative: MRI OF THE BRAIN WITH AND WITHOUT CONTRAST ON 10/28/2024     CLINICAL HISTORY: This is a 49-year-old male patient who has altered  mental status and has a head CT yesterday showing possible  intraparenchymal hemorrhage in the posterior right thalamus.     TECHNIQUE: Axial T1, FLAIR, fat-suppressed T2, axial diffusion and  gradient echo T2, sagittal T1 and postcontrast axial fat-suppressed T1  and coronal T1-weighted images were obtained of the entire head.     This is correlated to a head CT without contrast yesterday afternoon on  10/27/2024 at 2:26 p.m.     FINDINGS: At the site of the faint hyperdensity seen on yesterday's head  CT in the posterior right thalamus is a very faint area of precontrast  T1 hyperintensity that measures 8 x 6 mm mediolateral and anterior  posterior dimension.  There is no signal abnormality on the T2 or FLAIR  images or the gradient echo T2 weighted images and this is not felt to  be an acute intraparenchymal hemorrhage but rather some faint  mineralization in the posterior right thalamus. The remainder of the  brain parenchyma is normal in signal intensity. The lateral and third  ventricles are normal in size. There is mild prominence in size of the  fourth ventricle which may be secondary to some cerebellar volume loss.  I see no mass effect and no midline shift, no extra-axial fluid  collections are identified. There is an enhancing mass that fills the  left internal auditory canal and bulges through the left porus acusticus  into the left cerebellopontine angle and I think this is most consistent  with a left-sided vestibular schwannoma-acoustic neuroma that maximally  measures 12 x 9 x 9 mm in size. It has no mass effect on the adjacent  hannah or anterior left cerebellum.  No additional abnormal areas of  enhancement are seen in the head. The calvarium and the skull base are  normal in appearance. The  paranasal sinuses and the mastoid air cells  and middle ear cavities are clear. Good flow voids are demonstrated  within the cerebral vessels and in the dural venous sinuses.     Impression: 1. Corresponding to the area of increased density in the posterior right  thalamus on yesterday's head CT is an area of precontrast faint T1  hyperintensity but no corresponding signal abnormality on any of the  other sequences and I think this is a benign area of faint  mineralization in the posterior right thalamus and not an acute  intraparenchymal hemorrhage.     2. This patient has an enhancing mass filling the left internal auditory  canal bulging through the left porus acusticus into the left  cerebellopontine angle and it maximally measures 12 x 9 x 9 mm  mediolateral, anterior posterior and craniocaudal dimensions, most  consistent with a left-sided vestibular schwannoma (acoustic neuroma)  and correlate clinically as to whether the patient has left-sided  sensorineural hearing loss.     3. While the lateral and third ventricles are normal in size, the fourth  ventricle is prominent in size which is felt to be due to some volume  loss and atrophy in the cerebellum and correlate clinically as to the  etiology. The remainder of the MRI of the brain is normal.     The results were communicated to Dr. Braden from stroke neurology by  telephone on 10/28/2024 at 11:15 a.m.     This report was finalized on 10/29/2024 6:43 AM by Dr. Frankie Berkowitz M.D  on Workstation: [id number]       Scheduled Medications  [Held by provider] carvedilol, 6.25 mg, Oral, Q12H  heparin (porcine), 5,000 Units, Subcutaneous, Q8H  insulin glargine, 6 Units, Subcutaneous, Nightly  insulin lispro, 2-7 Units, Subcutaneous, 4x Daily AC & at Bedtime  insulin lispro, 4 Units, Subcutaneous, TID With Meals  [Held by provider] midodrine, 2.5 mg, Oral, TID AC  mupirocin, 1 Application, Each Nare, BID  Naloxegol Oxalate, 25 mg, Oral, QAM  pantoprazole, 40  mg, Oral, BID AC  piperacillin-tazobactam, 3.375 g, Intravenous, Q8H  senna-docusate sodium, 2 tablet, Oral, BID   And  polyethylene glycol, 17 g, Oral, BID  venlafaxine XR, 75 mg, Oral, Daily    Infusions  lactated ringers, 75 mL/hr, Last Rate: 75 mL/hr (10/28/24 1635)  sodium chloride 0.45 % 925 mL with sodium bicarbonate 8.4 % 75 mEq infusion, , Last Rate: 75 mL/hr at 10/29/24 0951    Diet  Diet: Liquid, Diabetic; Clear Liquid; Consistent Carbohydrate; Fluid Consistency: Thin (IDDSI 0)    I have personally reviewed     [x]  Laboratory   [x]  Microbiology   [x]  Radiology   [x]  EKG/Telemetry  []  Cardiology/Vascular   []  Pathology    []  Records      Assessment/Plan     Active Hospital Problems    Diagnosis  POA    **Epigastric abdominal pain [R10.13]  Unknown    Leukocytosis [D72.829]  Yes    Chronic combined systolic and diastolic heart failure [I50.42]  Yes    Abdominal pain [R10.9]  Yes    Gastritis [K29.70]  Yes    Anxiety [F41.9]  Yes    Multiple-type hyperlipidemia [E78.2]  Yes    Type 2 diabetes mellitus with diabetic neuropathy, with long-term current use of insulin [E11.40, Z79.4]  Not Applicable    Benign essential HTN [I10]  Yes      Resolved Hospital Problems   No resolved problems to display.       Patient is a 49 y.o. man with type 2 diabetes, HTN, n, hyperlipidemia, chronic combined systolic and diastolic heart failure (non-ischemic), anxiety who presented with severe abdominal/epigastric pain.    large right subcapsular fluid collection with mass effect on the liver.   Chronic cholecystitis  Transaminitis  -EGD 10/24/2024 with no acute findings  -On PPI  -HIDA scan concerning for chronic cholecystitis versus bili dyskinesia  -Elevated liver enzymes could be ischemic liver injury as well in the setting of hypotension which now has improved.    -Hold statin in the setting of transaminitis  -Duplex ultrasound of mesenteric arteries was ordered however was unable to be completed due to bowel gas and  patient's inability to hold his breath per report  -CT angiogram of the abdomen was obtained 10/26/2024 which showed large right perihepatic fluid collection with mass effect on the liver, new.  Patent blood vessels.  --status post CT-guided drain placement on 10/28/2024-preliminary fluid cultures growing gram-negative bacilli  -Continue empiric IV Zosyn, follow-up final culture results  -General Surgery following  -LFTs improving, monitor    Hypotension  -On midodrine, status post IV fluids.  BP improved.  -ECHO  10/26/2024 showed EF of 49.7%  -BP improved.  Discontinued midodrine 10/29/2024  -Outpatient carvedilol, spironolactone, Entresto hold     TENISHA/hyponatremia/acidosis  -Creatinine peaked at 1.75 on 10/25/2024, creatinine was 1.07 on admission  -Nephrology managing, status post IV fluids, creatinine stable, improved  -Nausea management     Cholecystitis,   holecystitis, subcapsular liver fluid collection   -WBC was 13.79 on admission, patient has been afebrile since admission  -Procalcitonin significantly elevated at 78.10 10/25/2024-  -HIDA scan showed possible chronic cholecystitis  -Large subscapular liver collection seen on CT-status post CT-guided drain placement on 10/28/2024,  -Continue empiric IV Zosyn        Anemia   Iron panel consistent with anemia chronic disease with ferritin of 943, B12 and folate normal  Monitor daily CBC, transfuse as indicated for symptomatic anemia or hemoglobin 7    Encephalopathy  -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  -Suspect secondary to acute illness/pain meds/infection/TENISHA  -No gross neurological deficits  -CT scan was obtained on 10/27/2024, there was concern for possible intraparenchymal hemorrhage  -, neurology consulted and MRI was obtained.  Discussed with neurology 10/28/2024, initial CT findings  due to calcification, and it was not seen on follow-up MRI.  Okay for anticoagulation from neurology standpoint.    History of acoustic neuroma  -MRI did show show left auditory canal/left cerebellar pontine angle enhancing lesion likely vestibular schwannoma   -Wife reported patient close history of an acoustic neuroma and radiation in   -Follow-up outpatient as scheduled      Type II DM   -Continue current regimen, blood glucose stable    Atelectasis  Possible effusions  Elevated D-dimer  -D-dimer elevated cardiomegaly, however in the setting of acute illness difficult to interpret.   -Chest x-ray 10/27/2024  with vascular congestion.   -CTA 10/28/2024 with no PE-o pulmonary embolism. Increased right more than left lower lung  atelectasis, possibility of underlying pneumonia not excluded. Trace bilateral pleural effusions.  -IV fluids held, management of fluid/diuresis per nephrology  -Low suspicion for PNA, however on IV Zosyn as above which would cover for pneumonia as well.      DVT prophylaxis.  Subcu heparin  Full code.  Discussed with patient and nursing staff.  Expected Discharge Date: 10/30/2024; Expected Discharge Time:        Copied text in this note has been reviewed and is accurate as of 10/29/24.         Dictated utilizing Dragon dictation        Yaritza Hurtado MD  [facility]  10/29/24  12:39 EDT        Electronically signed by Yaritza Hurtado MD at 10/29/24 3924       Alex Varela MD at 10/29/24 0926            [facility] Progress Note       Encounter Date:10/29/24  Patient:Donnie Bishop  :1975  MRN:[id number]      Chief Complaint: Abdominal pain      Subjective:   Subjective     Patient seems to be in less pain mainly complaining of being thirsty wanting milk.    Review of Systems:  Review of Systems   Cardiovascular:  Negative for chest pain.   Respiratory:  Negative for shortness of breath.    Gastrointestinal:  Positive for  abdominal pain.       Medications:  Scheduled Meds:  [Held by provider] carvedilol, 6.25 mg, Oral, Q12H  heparin (porcine), 5,000 Units, Subcutaneous, Q8H  insulin glargine, 6 Units, Subcutaneous, Nightly  insulin lispro, 2-7 Units, Subcutaneous, 4x Daily AC & at Bedtime  insulin lispro, 4 Units, Subcutaneous, TID With Meals  [Held by provider] midodrine, 2.5 mg, Oral, TID AC  mupirocin, 1 Application, Each Nare, BID  pantoprazole, 40 mg, Oral, BID AC  piperacillin-tazobactam, 3.375 g, Intravenous, Q8H  senna-docusate sodium, 2 tablet, Oral, BID   And  polyethylene glycol, 17 g, Oral, BID  venlafaxine XR, 75 mg, Oral, Daily    Continuous Infusions:  lactated ringers, 75 mL/hr, Last Rate: 75 mL/hr (10/28/24 1635)  sodium chloride 0.45 % 925 mL with sodium bicarbonate 8.4 % 75 mEq infusion, , Last Rate: 75 mL/hr at 10/29/24 0951    PRN Meds:    senna-docusate sodium **AND** polyethylene glycol **AND** bisacodyl **AND** bisacodyl    Calcium Replacement - Follow Nurse / BPA Driven Protocol    dextrose    dextrose    glucagon (human recombinant)    hydrOXYzine    influenza vaccine    Magnesium Standard Dose Replacement - Follow Nurse / BPA Driven Protocol    melatonin    [DISCONTINUED] Morphine **AND** naloxone    ondansetron ODT **OR** ondansetron    oxyCODONE    Phosphorus Replacement - Follow Nurse / BPA Driven Protocol    Potassium Replacement - Follow Nurse / BPA Driven Protocol    sodium chloride        Objective:   Objective    Vitals:    10/29/24 0319 10/29/24 0400 10/29/24 0700 10/29/24 0800   BP: 126/82 131/79 130/90 128/75   BP Location: Right arm  Right arm    Patient Position: Lying      Pulse: 99 99  96   Resp:   20    Temp: 98.1 °F (36.7 °C)  97.6 °F (36.4 °C)    TempSrc: Oral  Axillary    SpO2:       Weight:   85.3 kg (188 lb)    Height:               Physical Exam:  Constitutional: Acutely ill appearing, well developed, mild acute distress due to pain  HENT: Oropharynx clear and membrane moist  Eyes:  "Normal conjunctiva, no sclera icterus.  Neck: Supple, no carotid bruit bilaterally.  Cardiovascular: Regular rate and rhythm, No Murmur, Trace bilateral lower extremity edema.  Pulmonary: Normal respiratory effort, normal lung sounds, no wheezing.   Skin: Warm, dry, no ecchymosis, no rash.             Lab Review:   Results from last 7 days   Lab Units 10/29/24  0652 10/28/24  2059 10/28/24  1959 10/27/24  2205 10/27/24  0329 10/26/24  0545 10/25/24  1544 10/25/24  0426   SODIUM mmol/L 136 143 141 137 137 137 129* 132*   POTASSIUM mmol/L 3.9 4.5 4.3 3.9 4.7 4.7 4.4 4.5   CHLORIDE mmol/L 107 110* 108* 106 104 103 99 98   CO2 mmol/L 16.6* 16.0* 16.6* 19.0* 19.0* 19.0* 16.9* 20.0*   BUN mg/dL 30* 35* 35* 28* 32* 36* 37* 38*   CREATININE mg/dL 1.14 1.06 1.08 0.93 1.12 1.22 1.38* 1.75*   GLUCOSE mg/dL 176* 159* 161* 177* 223* 133* 211* 213*   CALCIUM mg/dL 10.1 9.8 10.1 9.8 9.8 9.5 9.0 8.6   AST (SGOT) U/L 154* 199* 229* 121* 172* 108*  --  148*   ALT (SGPT) U/L 135* 159* 164* 121* 133* 92*  --  77*     Results from last 7 days   Lab Units 10/28/24  0045 10/27/24  2205   HSTROP T ng/L 30* 28*     Results from last 7 days   Lab Units 10/29/24  0652 10/28/24  1959 10/27/24  2310 10/27/24  0329 10/26/24  0545 10/25/24  0426 10/24/24  2126   WBC 10*3/mm3 15.53* 16.23* 14.14* 10.64 9.44 7.93 9.87   HEMOGLOBIN g/dL 11.6* 12.3* 12.0* 12.7* 11.9* 10.9* 10.6*   HEMATOCRIT % 37.1* 38.6 36.6* 38.7 36.7* 32.4* 31.2*   PLATELETS 10*3/mm3 370 382 349 352 297 278 273     Results from last 7 days   Lab Units 10/28/24  1959 10/28/24  1129   INR  1.13* 1.0   APTT seconds 28.8  --      Results from last 7 days   Lab Units 10/29/24  0652 10/28/24  1959 10/27/24  2205 10/27/24  0329 10/26/24  0545   MAGNESIUM mg/dL 2.3 2.5 2.9* 2.5 2.4           Invalid input(s): \"LDLCALC\"  Results from last 7 days   Lab Units 10/26/24  0545   PROBNP pg/mL 393.0     Results from last 7 days   Lab Units 10/24/24  0617   TSH uIU/mL 0.973           Assessment: "   Assessment       Diagnosis Plan   1. Epigastric abdominal pain  Case Request    Case Request    Tissue Pathology Exam    Tissue Pathology Exam      2. Acute gastritis without hemorrhage, unspecified gastritis type              Plan:   Plan    Mr. Bishop is a 49 y.o. gentleman with past medical history notable for nonischemic cardiomyopathy, diabetes, hypertension, mixed hyperlipidemia presented to the hospital with abdominal pain initially concerns were for gastritis symptoms significantly worsened and now found to have a perihepatic fluid collection concerning for biloma versus hematoma.  Patient has had a lot more pain over the last 24 hours were asked to evaluate him for perioperative risk assessment prior to proceeding forward with possible cholecystectomy.  Status post IR draining of his's perihepatic abscess thought to be a bile leak.  Surgery following.  From a cardiac standpoint heart rate is better.  Likely related to pain.  Blood pressure is also improving.  Midodrine is being weaned.  Hopefully can restart heart failure regimen over the next 48 hours as midodrine is weaned off.  Blood pressure seems to be recovering.  Heart rates in a reasonable range may be tomorrow could add back carvedilol.    Nonischemic cardiomyopathy:  Echocardiogram 10/26/2024 with improvement in heart function almost back to normal  Failure regimen on hold given hypotension which not expected given his acute illness  Hopefully can restart medications back tomorrow pending further weaning of midodrine and blood pressure response    Possible pulmonary emboli:  There is a concern that his tachycardia was related to an acute pulmonary emboli D-dimer is elevated however his clinical presentation seems more consistent with acute infection as a likely source.    Subsequent CTA did not show any PE.  Would continue prophylaxis for prevention of DVT and PE              Alex Varela MD  [facility]  10/29/24  09:59  EDT      Electronically signed by Alex Varela MD at 10/29/24 1002       Anne Wilson MD at 10/29/24 0857              [facility] Progress Note      Patient Name: Donnie Bishop  : 1975  MRN: [id number]  Primary Care Physician:  Juju Kennedy MD  Date of admission: 10/22/2024    Subjective     Interval History:   Follow-up acute kidney injury.  CT-guided percutaneous drain placed yesterday in the right upper quadrant.  300cc removed.  1300 cc from drain.  Cultures pending.  Afebrile.  Urine output 1500 cc.  IV fluid intake not recorded.  CTA chest negative for pulmonary embolism yesterday.    Review of Systems:   Limited as patient does not answer all questions.  Limited as patient does not answer all questions.  Does complain of abdominal pain.  Thirsty.    Objective     Vitals:   Temp:  [97.6 °F (36.4 °C)-98.6 °F (37 °C)] 97.6 °F (36.4 °C)  Heart Rate:  [] 99  Resp:  [12-22] 20  BP: ()/(59-90) 130/90  Flow (L/min) (Oxygen Therapy):  [2] 2    Intake/Output Summary (Last 24 hours) at 10/29/2024 0857  Last data filed at 10/29/2024 0556  Gross per 24 hour   Intake --   Output 3075 ml   Net -3075 ml       Physical Exam:    General Appearance: Sleeping but awakens.  Does not answer all questions.  Slowly answers some.  Skin: warm and dry  HEENT: oral mucosa dry.  nonicteric sclera  Neck: supple, no JVD  Lungs: Clear to auscultation anteriorly.  Heart: RRR, normal S1 and S2  Abdomen: soft, positive bowel sounds.  Right upper quadrant drain.  : no palpable bladder  Extremities: no edema.  Neuro: Speech slow.  Only answers a few questions.    Scheduled Meds:     [Held by provider] carvedilol, 6.25 mg, Oral, Q12H  heparin (porcine), 5,000 Units, Subcutaneous, Q8H  insulin glargine, 6 Units, Subcutaneous, Nightly  insulin lispro, 2-7 Units, Subcutaneous, 4x Daily AC & at Bedtime  insulin lispro, 4 Units, Subcutaneous, TID With Meals  [Held by provider] midodrine,  2.5 mg, Oral, TID AC  mupirocin, 1 Application, Each Nare, BID  pantoprazole, 40 mg, Oral, BID AC  piperacillin-tazobactam, 3.375 g, Intravenous, Q8H  senna-docusate sodium, 2 tablet, Oral, BID   And  polyethylene glycol, 17 g, Oral, BID  venlafaxine XR, 75 mg, Oral, Daily      IV Meds:   lactated ringers, 75 mL/hr, Last Rate: 75 mL/hr (10/28/24 1635)  sodium chloride 0.45 % 925 mL with sodium bicarbonate 8.4 % 75 mEq infusion,         Results Reviewed:   I have personally reviewed the results from the time of this admission to 10/29/2024 08:57 EDT     Results from last 7 days   Lab Units 10/29/24  0652 10/28/24  2059 10/28/24  1959   SODIUM mmol/L 136 143 141   POTASSIUM mmol/L 3.9 4.5 4.3   CHLORIDE mmol/L 107 110* 108*   CO2 mmol/L 16.6* 16.0* 16.6*   BUN mg/dL 30* 35* 35*   CREATININE mg/dL 1.14 1.06 1.08   CALCIUM mg/dL 10.1 9.8 10.1   BILIRUBIN mg/dL 0.7 0.7 0.7   ALK PHOS U/L 189* 222* 229*   ALT (SGPT) U/L 135* 159* 164*   AST (SGOT) U/L 154* 199* 229*   GLUCOSE mg/dL 176* 159* 161*       Estimated Creatinine Clearance: 83.5 mL/min (by C-G formula based on SCr of 1.14 mg/dL).    Results from last 7 days   Lab Units 10/29/24  0652 10/28/24  1959 10/28/24  0045 10/27/24  2205   MAGNESIUM mg/dL 2.3 2.5  --  2.9*   PHOSPHORUS mg/dL 2.6 2.8 2.4* 1.9*       Results from last 7 days   Lab Units 10/24/24  0617   URIC ACID mg/dL 5.4       Results from last 7 days   Lab Units 10/29/24  0652 10/28/24  1959 10/27/24  2310 10/27/24  0329 10/26/24  0545   WBC 10*3/mm3 15.53* 16.23* 14.14* 10.64 9.44   HEMOGLOBIN g/dL 11.6* 12.3* 12.0* 12.7* 11.9*   PLATELETS 10*3/mm3 370 382 349 352 297       Results from last 7 days   Lab Units 10/28/24  1959 10/28/24  1129   INR  1.13* 1.0       Assessment / Plan     ASSESSMENT:  Acute kidney injury.  Multifactorial including hypovolemia, hypotension, impaired renal autoregulation due to Jardiance, Entresto, Aldactone.  Nonoliguric.  Creatinine stable despite 3 contrast studies required  "for diagnostics the past few days.  Metabolic acidosis.  Now nonanion gap.  Change IV fluids to bicarb containing.  Cholecystitis with large right perihepatic fluid collection.  Status post IR drainage with drain left in yesterday.  On Zosyn.  Transaminitis.  Enzymes improving.  Off statin.  Heart failure reduced ejection fraction.  EF 49%.  (Improved from 35% a year ago).  Anemia.  Hemoglobin stable.  Encephalopathy.  Neurology evaluation noted.  Diabetes mellitus type 2.  Blood sugar 159-176.    PLAN:  IV fluids with bicarb today.    Thank you for involving us in the care of Donnie Bishop.  Please feel free to call with any questions.    Anne Wilson MD  10/29/24  08:57 EDT    [facility]  [phone]    Please note that portions of this note were completed with a voice recognition program.    Electronically signed by Anne Wilson MD at 10/29/24 1050       Manuela Wisdom MD at 10/29/24 0718          General Surgery Progress Note    CC: Cholecystitis, subcapsular liver fluid collection, right upper quadrant abdominal pain    S: Patient reports he is thirsty today, he states his pain is a little better since IR drainage. He denies NV. Has tried to have a BM but was unable to.    O:/90 (BP Location: Right arm)   Pulse 99   Temp 97.6 °F (36.4 °C) (Axillary)   Resp 20   Ht 170 cm (66.93\")   Wt 85.3 kg (188 lb)   SpO2 92%   BMI 29.51 kg/m²     Intake & Output (last day)         10/28 0701  10/29 0700 10/29 0701  10/30 0700    P.O.      Total Intake(mL/kg)      Urine (mL/kg/hr) 1525 (0.7)     Drains 1550     Total Output 3075     Net -3075                   GENERAL: Ill-appearing, awake, alert, answering questions slowly  HEENT: EOMI, dry mucus membranes   CHEST: normal work of breathing on room air  CARDIAC: palpable distal pulses, no pedal edema  GI: Abd soft, mildly distended, moderately tender in the right upper quadrant without rebound or guarding; RUQ IR drain " with thick bilious output  EXTREMITIES: MOCK, no cyanosis    SKIN: Warm and dry    LABS  Results from last 7 days   Lab Units 10/29/24  0652 10/28/24  1959 10/27/24  2310 10/27/24  0329   WBC 10*3/mm3 15.53* 16.23* 14.14* 10.64   HEMOGLOBIN g/dL 11.6* 12.3* 12.0* 12.7*   HEMATOCRIT % 37.1* 38.6 36.6* 38.7   PLATELETS 10*3/mm3 370 382 349 352   MONOCYTES % % 9.9  --  10.1 7.1   EOSINOPHIL % % 0.0*  --  0.0* 0.0*     Results from last 7 days   Lab Units 10/29/24  0652 10/28/24  2059 10/28/24  1959   SODIUM mmol/L 136 143 141   POTASSIUM mmol/L 3.9 4.5 4.3   CHLORIDE mmol/L 107 110* 108*   CO2 mmol/L 16.6* 16.0* 16.6*   BUN mg/dL 30* 35* 35*   CREATININE mg/dL 1.14 1.06 1.08   CALCIUM mg/dL 10.1 9.8 10.1   BILIRUBIN mg/dL 0.7 0.7 0.7   ALK PHOS U/L 189* 222* 229*   ALT (SGPT) U/L 135* 159* 164*   AST (SGOT) U/L 154* 199* 229*   GLUCOSE mg/dL 176* 159* 161*     Results from last 7 days   Lab Units 10/28/24  1959 10/28/24  1129   INR  1.13* 1.0   APTT seconds 28.8  --    Mg 2.3  Phos 2.6  Ammonia 17      IMAGING:  CTA chest 10/28/24 -negative for PE, subcapsular fluid collection of the liver is smaller, stable fluid density posterior to the gallbladder, cholelithiasis present; RLL atelectasis and R pleural effusion noted  Venous duplex 10/28/2024-normal bilateral lower extremity venous duplex scan  CT-guided percutaneous liver drainage 10/28/2024-drain noted in right abdomen within subcapsular fluid collection      A/P: 49 y.o. male with multiple medical problems including CHF with EF of 35%, hypertension, hyperlipidemia, type 2 diabetes mellitus with A1c 9.4, who came into the hospital complaining of abdominal pain.      -CT demonstrating contracted gallbladder with mild hyperenhancing wall, EGD negative for abnormalities; August CT had distended gallbladder but no other findings of cholecystitis  -HIDA scan demonstrating filling but with decreased ejection fraction 19% concerning for possible chronic cholecystitis.    -CTA abdomen pelvis 10/26 to evaluate worsening RUQ pain and possible mesenteric ischemia demonstrated a large right subcapsular fluid collection with mass effect on the liver; no active extravasation  -CT head 10/27 with possible right thalamic stroke  -s/p CT-guided IR drainage 10/28 with thick, turbid bilious output from drain - spontaneous bile leak vs abscess in the differential, cause unclear. WBC is slightly down at 15.53, and LFTs are downtrending. Continue antibiotics, f/u drain cultures.  -Continue pain and nausea control. Ok for clear liquid diet and advance as tolerated. Continue scheduled bowel regimen.    Manuela Wisdom MD  General, Robotic and Endoscopic Surgery  [facility]    [address], [address]  [place], [place], [address]  P: [phone]  F: [phone]       Electronically signed by Manuela Wisdom MD at 10/29/24 0911        Dee Cardona OT   Occupational Therapist  Occupational Therapy     Plan of Care     Signed     Date of Service: 10/29/24 1250  Creation Time: 10/29/24 1250     Signed         Goal Outcome Evaluation:  Plan of Care Reviewed With: patient  Outcome Evaluation: Pt seen for OT eval after admission 2/2 epigastric pain. Pt found to have cholecystitis with subcapsular liver fluid collection s/p IR drainage and drain placement on 10/28. Pt presents this date with increased confusion and difficulty attending to conversation and with questionable effort during bed mobility and MMT. Pt states he lives alone and uses a cane at baseline. Unclear about PLOF with ADLs and home setup. Pt able to complete bed mobility with modA x 1 and max verbal/tactile cues this date. Pt required depA for donning socks seated EOB. OT will con't to follow for stated goals to address weakness, impaired safety and decreased ind with ADLs. Recommend d/c to SNF.     Anticipated Discharge Disposition (OT): skilled nursing facility                        Mahogany Horn, PT    Physical Therapist  Physical Therapy     Plan of Care     Signed     Date of Service: 10/29/24 1346  Creation Time: 10/29/24 1346     Signed         Goal Outcome Evaluation:  Plan of Care Reviewed With: patient   Appt scheduled with pt today and pt was given one hour notice before PT. Pt agreeable initially and requested amb to BR. We agreed to asst pt to bsc today. Upon attempt at sup to sit... Max of 2 for moving BLE toward eob. UPone attempt at sup to eob pt arched his neck and back resisting mobility. He reported need for BM at the moment. Nsg notified of pt need for bed pan. Pt not able to tolerate activity today due to pain.

## 2024-10-30 NOTE — SIGNIFICANT NOTE
10/30/24 1524   OTHER   Discipline occupational therapist   Rehab Time/Intention   Session Not Performed patient/family declined treatment  (Encouraged OT participation, pt reports too fatiqued. Difficulty keeping eyes open during conversation. He reports fatique from PT eariler.)   Recommendation   OT - Next Appointment 10/31/24

## 2024-10-30 NOTE — CASE MANAGEMENT/SOCIAL WORK
"Continued Stay Note  [facility]     Patient Name: Donnie Bishop  MRN: [id number]  Today's Date: 10/30/2024    Admit Date: 10/22/2024    Plan: PT/OT recommending snf. Patient considering choices   Discharge Plan       Row Name 10/30/24 1141       Plan    Plan PT/OT recommending snf. Patient considering choices    Roadmap to Recovery Yes    Provided Post Acute Provider List? Yes    Post Acute Provider List Nursing Home    Provided Post Acute Provider Quality & Resource List? Yes    Post Acute Provider Quality and Resource List Nursing Home    Delivered To Patient    Method of Delivery In person    Plan Comments Spoke with patient at bedside. Reviewed PT/OT recommendations of snf at [place]. Patient verbalizes understanding. States he will discuss with family and talk with CCP again \"tomorrow\".  Gave choice list and cms compare. Received call earlier this am from spouse asking about [place] papers. Instructed spouse this ccp would  and get information for fmla paperwork. Call placed to Nohemy with [facility], who states patient family can bring paperwork and $50 cash to office at [address] today. Called patient spouse back and left detailed information with Kimmie contact information and instructions on her voicemail..                   Discharge Codes    No documentation.                 Expected Discharge Date and Time       Expected Discharge Date Expected Discharge Time    Nov 4, 2024               Laura Junior RN    "

## 2024-10-30 NOTE — PROGRESS NOTES
[facility] Progress Note      Patient Name: Donnie Bishop  : 1975  MRN: [id number]  Primary Care Physician:  Juju Kenneyd MD  Date of admission: 10/22/2024    Subjective     Interval History:   Follow-up acute kidney injury.  Tolerating clear liquid diet.  Mental status improved today.  Still slow to answer some questions and perseverates.  Afebrile.  Urine output 900 cc.  Drain output 50 cc last shift.  Feels full quickly after clear liquids.  Complains of his feet being swollen and painful.  Review of Systems:   See above.    Objective     Vitals:   Temp:  [97.8 °F (36.6 °C)-98.7 °F (37.1 °C)] 98.2 °F (36.8 °C)  Heart Rate:  [85-98] 93  Resp:  [18-20] 18  BP: (112-142)/() 132/87    Intake/Output Summary (Last 24 hours) at 10/30/2024 0935  Last data filed at 10/30/2024 0524  Gross per 24 hour   Intake 850 ml   Output 950 ml   Net -100 ml       Physical Exam:    General Appearance: More awake and conversant.  Sitting up talking with the nurse.  A little slow to answer some questions.  Seems hard of hearing.  Skin: warm and dry  HEENT: oral mucosa moist.  Nonicteric sclera  Neck: supple, no JVD  Lungs: Clear to auscultation anteriorly.  Heart: RRR, normal S1 and S2  Abdomen: soft, positive bowel sounds.  Right upper quadrant drain.  Slightly tender but no guarding or rebound.  : no palpable bladder  Extremities: 1+ lower extremity edema both feet erythematous.  Neuro: Improved.  See above.    Scheduled Meds:     carvedilol, 3.125 mg, Oral, Q12H  heparin (porcine), 5,000 Units, Subcutaneous, Q8H  insulin glargine, 10 Units, Subcutaneous, Nightly  insulin lispro, 2-7 Units, Subcutaneous, 4x Daily AC & at Bedtime  insulin lispro, 4 Units, Subcutaneous, TID With Meals  [Held by provider] midodrine, 2.5 mg, Oral, TID AC  Naloxegol Oxalate, 25 mg, Oral, QAM  pantoprazole, 40 mg, Oral, BID AC  piperacillin-tazobactam, 3.375 g, Intravenous, Q8H  senna-docusate sodium, 2 tablet,  Oral, BID   And  polyethylene glycol, 17 g, Oral, BID  potassium chloride ER, 40 mEq, Oral, Q4H  venlafaxine XR, 75 mg, Oral, Daily      IV Meds:          Results Reviewed:   I have personally reviewed the results from the time of this admission to 10/30/2024 09:35 EDT     Results from last 7 days   Lab Units 10/30/24  0639 10/29/24  0652 10/28/24  2059   SODIUM mmol/L 135* 136 143   POTASSIUM mmol/L 3.1* 3.9 4.5   CHLORIDE mmol/L 102 107 110*   CO2 mmol/L 21.4* 16.6* 16.0*   BUN mg/dL 23* 30* 35*   CREATININE mg/dL 0.78 1.14 1.06   CALCIUM mg/dL 9.0 10.1 9.8   BILIRUBIN mg/dL 0.8 0.7 0.7   ALK PHOS U/L 149* 189* 222*   ALT (SGPT) U/L 83* 135* 159*   AST (SGOT) U/L 55* 154* 199*   GLUCOSE mg/dL 122* 176* 159*       Estimated Creatinine Clearance: 119.4 mL/min (by C-G formula based on SCr of 0.78 mg/dL).    Results from last 7 days   Lab Units 10/30/24  0639 10/29/24  0652 10/28/24  1959   MAGNESIUM mg/dL 1.7 2.3 2.5   PHOSPHORUS mg/dL 2.1* 2.6 2.8       Results from last 7 days   Lab Units 10/24/24  0617   URIC ACID mg/dL 5.4       Results from last 7 days   Lab Units 10/30/24  0639 10/29/24  0652 10/28/24  1959 10/27/24  2310 10/27/24  0329   WBC 10*3/mm3 14.38* 15.53* 16.23* 14.14* 10.64   HEMOGLOBIN g/dL 10.6* 11.6* 12.3* 12.0* 12.7*   PLATELETS 10*3/mm3 364 370 382 349 352       Results from last 7 days   Lab Units 10/28/24  1959 10/28/24  1129   INR  1.13* 1.0       Assessment / Plan     ASSESSMENT:  Acute kidney injury.  Multifactorial including hypovolemia, hypotension, impaired renal autoregulation due to Jardiance, Entresto, Aldactone.  Nonoliguric.  Creatinine improved further.  More edematous today.  Metabolic acidosis improved after IV fluids with bicarb.  Stop IV fluids and give 1 dose of IV Lasix today.  Potassium and phosphorus being replaced.  Cholecystitis with large right perihepatic fluid collection.  Status post IR drainage with drain left in 10/28/2024..  On Zosyn.  Transaminitis.  Enzymes  improving.  Off statin.  Heart failure reduced ejection fraction.  EF 49%.  (Improved from 35% a year ago).  Anemia.  Hemoglobin down some with IV fluid.  Encephalopathy.  Improving.  Diabetes mellitus type 2.  Blood sugar 122-196.    PLAN:  Replace potassium and phosphorus per protocol.  Lasix 40 mg IV x 1 today  Check uric acid on blood in the lab.    Thank you for involving us in the care of Donnie Bishop.  Please feel free to call with any questions.    Anne Wilson MD  10/30/24  09:35 EDT    [facility]  [phone]    Please note that portions of this note were completed with a voice recognition program.

## 2024-10-30 NOTE — PLAN OF CARE
Goal Outcome Evaluation:    The patient is alert and oriented x3. Able to move all extremities and follows commands. Pupils are PERRLA. Normal sinus. Room air while awake, and 2L nasal cannula at night. Urine output has been adequate. Drain has had moderate output. Pain management provided during care for the drain insertion site. See MAR and morning labs.

## 2024-10-30 NOTE — PROGRESS NOTES
"General Surgery Progress Note    CC: Cholecystitis, subcapsular biloma, right upper quadrant abdominal pain    S: Patient is much more awake today, answering questions appropriately.  He is able to tell me that his pain is much better.  He denies any nausea or vomiting and ate all of his breakfast of clear liquids.  He states that he is peeing a lot but has not had a bowel movement.  He is passing gas.     O:/89 (BP Location: Right arm, Patient Position: Lying)   Pulse 90   Temp 97.9 °F (36.6 °C) (Oral)   Resp 18   Ht 170 cm (66.93\")   Wt 81.3 kg (179 lb 3.7 oz)   SpO2 (!) 81%   BMI 28.13 kg/m²     Intake & Output (last day)         10/29 0701  10/30 0700 10/30 0701  10/31 0700    P.O. 100     I.V. (mL/kg) 750 (9.2)     IV Piggyback 100     Total Intake(mL/kg) 950 (11.7)     Urine (mL/kg/hr) 900 (0.5) 200 (0.5)    Drains 50     Total Output 950 200    Net 0 -200                  GENERAL: Ill-appearing, awake, much more alert and interactive, answering questions appropriately  HEENT: EOMI, moist mucus membranes   CHEST: normal work of breathing on room air  CARDIAC: palpable distal pulses, no pedal edema  GI: Abd soft, less distended, mildly tender in the right upper quadrant without rebound or guarding; RUQ IR drain with small amount of bilious output  EXTREMITIES: MOCK, no cyanosis, no lower extremity edema  SKIN: Warm and dry    LABS  Results from last 7 days   Lab Units 10/30/24  0639 10/29/24  0652 10/28/24  1959 10/27/24  2310 10/27/24  0329   WBC 10*3/mm3 14.38* 15.53* 16.23* 14.14* 10.64   HEMOGLOBIN g/dL 10.6* 11.6* 12.3* 12.0* 12.7*   HEMATOCRIT % 33.5* 37.1* 38.6 36.6* 38.7   PLATELETS 10*3/mm3 364 370 382 349 352   MONOCYTES % %  --  9.9  --  10.1 7.1   EOSINOPHIL % %  --  0.0*  --  0.0* 0.0*     Results from last 7 days   Lab Units 10/30/24  0639 10/29/24  0652 10/28/24  2050   SODIUM mmol/L 135* 136 143   POTASSIUM mmol/L 3.1* 3.9 4.5   CHLORIDE mmol/L 102 107 110*   CO2 mmol/L 21.4* 16.6* " 16.0*   BUN mg/dL 23* 30* 35*   CREATININE mg/dL 0.78 1.14 1.06   CALCIUM mg/dL 9.0 10.1 9.8   BILIRUBIN mg/dL 0.8 0.7 0.7   ALK PHOS U/L 149* 189* 222*   ALT (SGPT) U/L 83* 135* 159*   AST (SGOT) U/L 55* 154* 199*   GLUCOSE mg/dL 122* 176* 159*     Results from last 7 days   Lab Units 10/28/24  1959 10/28/24  1129   INR  1.13* 1.0   APTT seconds 28.8  --    Magnesium 1.7  Phosphorus 2.1  Urate 3.4      IMAGING:  No new imaging    A/P: 49 y.o. male with multiple medical problems including CHF with EF of 35%, hypertension, hyperlipidemia, type 2 diabetes mellitus with A1c 9.4, presenting with abdominal pain.      -CT demonstrating contracted gallbladder with mild hyperenhancing wall.  EGD performed 10/24 negative for abnormalities; August 2024 CT had distended gallbladder but no other findings of cholecystitis.  -HIDA scan 10/24 demonstrating filling but with decreased ejection fraction 19% concerning for possible chronic cholecystitis.   -CTA abdomen pelvis 10/26 to evaluate worsening RUQ pain and possible mesenteric ischemia demonstrated a large right subcapsular fluid collection with mass effect on the liver; no active extravasation  -s/p CT-guided IR drainage 10/28 with thick, turbid bilious output from drain - bile leak vs abscess in the differential, cause unclear but may be related to cholecystitis.  Will order MRCP today to further evaluate etiology.  -The patient is feeling much better today.  His abdominal exam is vastly improved.  His drain output has decreased from 1550 cc to 50 cc today.  It still appears bilious.  Culture is demonstrating Klebsiella.  Continue antibiotics per primary.  -WBC still slowly downtrending 14 from 15.  LFTs are also downtrending appropriately after liver drainage.  -Continue pain and nausea control. Ok to advance diet as tolerated.  Continue scheduled bowel regimen.  Monitor bowel function.    Manuela Wisdom MD  General, Robotic and Endoscopic Surgery  [facility]    [address], [address]  [place], [place], [address]  P: [phone]  F: [phone]

## 2024-10-31 LAB
ALBUMIN SERPL-MCNC: 2.2 G/DL (ref 3.5–5.2)
ALBUMIN/GLOB SERPL: 0.6 G/DL
ALP SERPL-CCNC: 138 U/L (ref 39–117)
ALT SERPL W P-5'-P-CCNC: 68 U/L (ref 1–41)
ANION GAP SERPL CALCULATED.3IONS-SCNC: 9.4 MMOL/L (ref 5–15)
AST SERPL-CCNC: 40 U/L (ref 1–40)
BACTERIA FLD CULT: ABNORMAL
BASOPHILS # BLD AUTO: 0.02 10*3/MM3 (ref 0–0.2)
BASOPHILS NFR BLD AUTO: 0.2 % (ref 0–1.5)
BILIRUB SERPL-MCNC: 0.8 MG/DL (ref 0–1.2)
BUN SERPL-MCNC: 17 MG/DL (ref 6–20)
BUN/CREAT SERPL: 16.3 (ref 7–25)
CALCIUM SPEC-SCNC: 8.9 MG/DL (ref 8.6–10.5)
CHLORIDE SERPL-SCNC: 100 MMOL/L (ref 98–107)
CO2 SERPL-SCNC: 25.6 MMOL/L (ref 22–29)
CREAT SERPL-MCNC: 1.04 MG/DL (ref 0.76–1.27)
DEPRECATED RDW RBC AUTO: 41.7 FL (ref 37–54)
EGFRCR SERPLBLD CKD-EPI 2021: 89.1 ML/MIN/1.73
EOSINOPHIL # BLD AUTO: 0.05 10*3/MM3 (ref 0–0.4)
EOSINOPHIL NFR BLD AUTO: 0.4 % (ref 0.3–6.2)
ERYTHROCYTE [DISTWIDTH] IN BLOOD BY AUTOMATED COUNT: 13 % (ref 12.3–15.4)
GLOBULIN UR ELPH-MCNC: 3.7 GM/DL
GLUCOSE BLDC GLUCOMTR-MCNC: 155 MG/DL (ref 70–130)
GLUCOSE BLDC GLUCOMTR-MCNC: 173 MG/DL (ref 70–130)
GLUCOSE BLDC GLUCOMTR-MCNC: 204 MG/DL (ref 70–130)
GLUCOSE SERPL-MCNC: 155 MG/DL (ref 65–99)
GRAM STN SPEC: ABNORMAL
HCT VFR BLD AUTO: 33.5 % (ref 37.5–51)
HGB BLD-MCNC: 11.1 G/DL (ref 13–17.7)
IMM GRANULOCYTES # BLD AUTO: 0.23 10*3/MM3 (ref 0–0.05)
IMM GRANULOCYTES NFR BLD AUTO: 2 % (ref 0–0.5)
LYMPHOCYTES # BLD AUTO: 1.57 10*3/MM3 (ref 0.7–3.1)
LYMPHOCYTES NFR BLD AUTO: 13.8 % (ref 19.6–45.3)
MAGNESIUM SERPL-MCNC: 1.5 MG/DL (ref 1.6–2.6)
MCH RBC QN AUTO: 29.8 PG (ref 26.6–33)
MCHC RBC AUTO-ENTMCNC: 33.1 G/DL (ref 31.5–35.7)
MCV RBC AUTO: 89.8 FL (ref 79–97)
MONOCYTES # BLD AUTO: 0.73 10*3/MM3 (ref 0.1–0.9)
MONOCYTES NFR BLD AUTO: 6.4 % (ref 5–12)
NEUTROPHILS NFR BLD AUTO: 77.2 % (ref 42.7–76)
NEUTROPHILS NFR BLD AUTO: 8.75 10*3/MM3 (ref 1.7–7)
NRBC BLD AUTO-RTO: 0 /100 WBC (ref 0–0.2)
PHOSPHATE SERPL-MCNC: 2.6 MG/DL (ref 2.5–4.5)
PLATELET # BLD AUTO: 392 10*3/MM3 (ref 140–450)
PMV BLD AUTO: 10.6 FL (ref 6–12)
POTASSIUM SERPL-SCNC: 4.1 MMOL/L (ref 3.5–5.2)
PROT SERPL-MCNC: 5.9 G/DL (ref 6–8.5)
RBC # BLD AUTO: 3.73 10*6/MM3 (ref 4.14–5.8)
SODIUM SERPL-SCNC: 135 MMOL/L (ref 136–145)
WBC NRBC COR # BLD AUTO: 11.35 10*3/MM3 (ref 3.4–10.8)

## 2024-10-31 PROCEDURE — 25010000002 FUROSEMIDE PER 20 MG: Performed by: INTERNAL MEDICINE

## 2024-10-31 PROCEDURE — 99232 SBSQ HOSP IP/OBS MODERATE 35: CPT | Performed by: STUDENT IN AN ORGANIZED HEALTH CARE EDUCATION/TRAINING PROGRAM

## 2024-10-31 PROCEDURE — 25010000002 HEPARIN (PORCINE) PER 1000 UNITS: Performed by: STUDENT IN AN ORGANIZED HEALTH CARE EDUCATION/TRAINING PROGRAM

## 2024-10-31 PROCEDURE — 83735 ASSAY OF MAGNESIUM: CPT | Performed by: STUDENT IN AN ORGANIZED HEALTH CARE EDUCATION/TRAINING PROGRAM

## 2024-10-31 PROCEDURE — 80053 COMPREHEN METABOLIC PANEL: CPT | Performed by: STUDENT IN AN ORGANIZED HEALTH CARE EDUCATION/TRAINING PROGRAM

## 2024-10-31 PROCEDURE — 25010000002 PIPERACILLIN SOD-TAZOBACTAM PER 1 G: Performed by: STUDENT IN AN ORGANIZED HEALTH CARE EDUCATION/TRAINING PROGRAM

## 2024-10-31 PROCEDURE — 84100 ASSAY OF PHOSPHORUS: CPT | Performed by: STUDENT IN AN ORGANIZED HEALTH CARE EDUCATION/TRAINING PROGRAM

## 2024-10-31 PROCEDURE — 63710000001 INSULIN LISPRO (HUMAN) PER 5 UNITS: Performed by: INTERNAL MEDICINE

## 2024-10-31 PROCEDURE — 63710000001 INSULIN GLARGINE PER 5 UNITS: Performed by: STUDENT IN AN ORGANIZED HEALTH CARE EDUCATION/TRAINING PROGRAM

## 2024-10-31 PROCEDURE — 85025 COMPLETE CBC W/AUTO DIFF WBC: CPT | Performed by: INTERNAL MEDICINE

## 2024-10-31 PROCEDURE — 25010000002 MAGNESIUM SULFATE 2 GM/50ML SOLUTION: Performed by: INTERNAL MEDICINE

## 2024-10-31 PROCEDURE — 63710000001 INSULIN LISPRO (HUMAN) PER 5 UNITS: Performed by: STUDENT IN AN ORGANIZED HEALTH CARE EDUCATION/TRAINING PROGRAM

## 2024-10-31 PROCEDURE — 25010000002 ONDANSETRON PER 1 MG: Performed by: INTERNAL MEDICINE

## 2024-10-31 PROCEDURE — 82948 REAGENT STRIP/BLOOD GLUCOSE: CPT

## 2024-10-31 PROCEDURE — 25010000002 CEFTRIAXONE PER 250 MG: Performed by: HOSPITALIST

## 2024-10-31 PROCEDURE — 99232 SBSQ HOSP IP/OBS MODERATE 35: CPT | Performed by: INTERNAL MEDICINE

## 2024-10-31 RX ORDER — FUROSEMIDE 10 MG/ML
40 INJECTION INTRAMUSCULAR; INTRAVENOUS ONCE
Status: COMPLETED | OUTPATIENT
Start: 2024-10-31 | End: 2024-10-31

## 2024-10-31 RX ORDER — MAGNESIUM SULFATE HEPTAHYDRATE 40 MG/ML
2 INJECTION, SOLUTION INTRAVENOUS ONCE
Status: COMPLETED | OUTPATIENT
Start: 2024-10-31 | End: 2024-10-31

## 2024-10-31 RX ORDER — CARVEDILOL 6.25 MG/1
6.25 TABLET ORAL EVERY 12 HOURS SCHEDULED
Status: DISCONTINUED | OUTPATIENT
Start: 2024-10-31 | End: 2024-11-13

## 2024-10-31 RX ADMIN — INSULIN LISPRO 3 UNITS: 100 INJECTION, SOLUTION INTRAVENOUS; SUBCUTANEOUS at 21:03

## 2024-10-31 RX ADMIN — MAGNESIUM SULFATE HEPTAHYDRATE 2 G: 40 INJECTION, SOLUTION INTRAVENOUS at 09:57

## 2024-10-31 RX ADMIN — PIPERACILLIN AND TAZOBACTAM 3.38 G: 3; .375 INJECTION, POWDER, FOR SOLUTION INTRAVENOUS at 06:05

## 2024-10-31 RX ADMIN — FUROSEMIDE 40 MG: 10 INJECTION, SOLUTION INTRAMUSCULAR; INTRAVENOUS at 16:49

## 2024-10-31 RX ADMIN — HEPARIN SODIUM 5000 UNITS: 5000 INJECTION INTRAVENOUS; SUBCUTANEOUS at 06:05

## 2024-10-31 RX ADMIN — INSULIN LISPRO 2 UNITS: 100 INJECTION, SOLUTION INTRAVENOUS; SUBCUTANEOUS at 09:58

## 2024-10-31 RX ADMIN — PANTOPRAZOLE SODIUM 40 MG: 40 TABLET, DELAYED RELEASE ORAL at 18:25

## 2024-10-31 RX ADMIN — ONDANSETRON 4 MG: 2 INJECTION INTRAMUSCULAR; INTRAVENOUS at 17:49

## 2024-10-31 RX ADMIN — SENNOSIDES AND DOCUSATE SODIUM 2 TABLET: 50; 8.6 TABLET ORAL at 21:03

## 2024-10-31 RX ADMIN — HEPARIN SODIUM 5000 UNITS: 5000 INJECTION INTRAVENOUS; SUBCUTANEOUS at 14:27

## 2024-10-31 RX ADMIN — INSULIN LISPRO 2 UNITS: 100 INJECTION, SOLUTION INTRAVENOUS; SUBCUTANEOUS at 18:25

## 2024-10-31 RX ADMIN — SENNOSIDES AND DOCUSATE SODIUM 2 TABLET: 50; 8.6 TABLET ORAL at 09:56

## 2024-10-31 RX ADMIN — PANTOPRAZOLE SODIUM 40 MG: 40 TABLET, DELAYED RELEASE ORAL at 09:56

## 2024-10-31 RX ADMIN — CARVEDILOL 3.12 MG: 3.12 TABLET, FILM COATED ORAL at 09:57

## 2024-10-31 RX ADMIN — CARVEDILOL 6.25 MG: 6.25 TABLET, FILM COATED ORAL at 21:03

## 2024-10-31 RX ADMIN — INSULIN LISPRO 4 UNITS: 100 INJECTION, SOLUTION INTRAVENOUS; SUBCUTANEOUS at 09:57

## 2024-10-31 RX ADMIN — MUPIROCIN 1 APPLICATION: 20 OINTMENT TOPICAL at 21:03

## 2024-10-31 RX ADMIN — HEPARIN SODIUM 5000 UNITS: 5000 INJECTION INTRAVENOUS; SUBCUTANEOUS at 21:03

## 2024-10-31 RX ADMIN — VENLAFAXINE HYDROCHLORIDE 75 MG: 75 CAPSULE, EXTENDED RELEASE ORAL at 09:57

## 2024-10-31 RX ADMIN — INSULIN LISPRO 4 UNITS: 100 INJECTION, SOLUTION INTRAVENOUS; SUBCUTANEOUS at 18:25

## 2024-10-31 RX ADMIN — NALOXEGOL OXALATE 25 MG: 25 TABLET, FILM COATED ORAL at 09:57

## 2024-10-31 RX ADMIN — INSULIN GLARGINE 10 UNITS: 100 INJECTION, SOLUTION SUBCUTANEOUS at 21:03

## 2024-10-31 RX ADMIN — POLYETHYLENE GLYCOL 3350 17 G: 17 POWDER, FOR SOLUTION ORAL at 09:57

## 2024-10-31 RX ADMIN — CEFTRIAXONE 2000 MG: 2 INJECTION, POWDER, FOR SOLUTION INTRAMUSCULAR; INTRAVENOUS at 14:28

## 2024-10-31 RX ADMIN — INSULIN LISPRO 2 UNITS: 100 INJECTION, SOLUTION INTRAVENOUS; SUBCUTANEOUS at 11:54

## 2024-10-31 RX ADMIN — INSULIN LISPRO 4 UNITS: 100 INJECTION, SOLUTION INTRAVENOUS; SUBCUTANEOUS at 11:54

## 2024-10-31 NOTE — SIGNIFICANT NOTE
10/31/24 1530   OTHER   Discipline physical therapist   Rehab Time/Intention   Session Not Performed patient/family declined treatment  (pt decline PT d/t nausea today. will f/u)   Recommendation   PT - Next Appointment 11/01/24

## 2024-10-31 NOTE — PROGRESS NOTES
"    Patient Name: Donnie Bishop  :1975  49 y.o.      Patient Care Team:  Juju Kennedy MD as PCP - General (Family Medicine)    Chief Complaint:   CM  Interval History:   Bigeminy on tele. Maybe a bit confused? Describes orthopnea      Objective   Vital Signs  Temp:  [97.5 °F (36.4 °C)-98.5 °F (36.9 °C)] 98 °F (36.7 °C)  Heart Rate:  [89-97] 89  Resp:  [14-28] 28  BP: (104-120)/(78-86) 110/83    Intake/Output Summary (Last 24 hours) at 10/31/2024 1455  Last data filed at 10/31/2024 0250  Gross per 24 hour   Intake 240 ml   Output 1450 ml   Net -1210 ml     Flowsheet Rows      Flowsheet Row First Filed Value   Admission Height 170.2 cm (67\") Documented at 10/22/2024 1641   Admission Weight 76.2 kg (168 lb) Documented at 10/22/2024 1641            Physical Exam:   General Appearance:    Alert, cooperative, in no acute distress   Lungs:     Clear to auscultation.  Normal respiratory effort and rate.      Heart:    Regular rhythm and normal rate, normal S1 and S2, no murmurs, gallops or rubs.     Chest Wall:    No abnormalities observed   Abdomen:     Soft, nontender, positive bowel sounds.     Extremities:   no cyanosis, clubbing or edema.  No marked joint deformities.  Adequate musculoskeletal strength.       Results Review:    Results from last 7 days   Lab Units 10/31/24  0542   SODIUM mmol/L 135*   POTASSIUM mmol/L 4.1   CHLORIDE mmol/L 100   CO2 mmol/L 25.6   BUN mg/dL 17   CREATININE mg/dL 1.04   GLUCOSE mg/dL 155*   CALCIUM mg/dL 8.9     Results from last 7 days   Lab Units 10/28/24  0045 10/27/24  2205   HSTROP T ng/L 30* 28*     Results from last 7 days   Lab Units 10/31/24  0542   WBC 10*3/mm3 11.35*   HEMOGLOBIN g/dL 11.1*   HEMATOCRIT % 33.5*   PLATELETS 10*3/mm3 392     Results from last 7 days   Lab Units 10/28/24  1959 10/28/24  1129   INR  1.13* 1.0   APTT seconds 28.8  --      Results from last 7 days   Lab Units 10/31/24  0542   MAGNESIUM mg/dL 1.5*                   Medication Review: "   carvedilol, 3.125 mg, Oral, Q12H  cefTRIAXone, 2,000 mg, Intravenous, Q24H  heparin (porcine), 5,000 Units, Subcutaneous, Q8H  insulin glargine, 10 Units, Subcutaneous, Nightly  insulin lispro, 2-7 Units, Subcutaneous, 4x Daily AC & at Bedtime  insulin lispro, 4 Units, Subcutaneous, TID With Meals  mupirocin, 1 Application, Each Nare, BID  Naloxegol Oxalate, 25 mg, Oral, QAM  pantoprazole, 40 mg, Oral, BID AC  senna-docusate sodium, 2 tablet, Oral, BID   And  polyethylene glycol, 17 g, Oral, BID  venlafaxine XR, 75 mg, Oral, Daily              Assessment & Plan   NICM with recovered EF, will give an additional dose of lasix this afternoon given ongoing orthopnea  Bigeminy, coreg 6.25 today, uptitrate to 12.5 tomorrow most likely  Biloma/gall bladder sludge/subcapsular fluid collection s/p [id number] growing klebsiella  Hypotension resolved. Midodrine stopped    Kaylee Kay MD  [facility]  10/31/24  14:55 EDT

## 2024-10-31 NOTE — NURSING NOTE
Jean Lopez (Family) visited today at 1600 for about 30 minutes.  Nurse updated status and test results.      Also, Mr. Lopez states that the patient is at his baseline from a neurological orientation standpoint.

## 2024-10-31 NOTE — PROGRESS NOTES
[facility] Progress Note      Patient Name: Donnie Bishop  : 1975  MRN: [id number]  Primary Care Physician:  Juju Kennedy MD  Date of admission: 10/22/2024    Subjective     Interval History:   Follow-up acute kidney injury.  Feeling better today.  Feet less swollen and less sore.  Abdomen still feels tender and sore.  Had MRI of the abdomen last night, pending.  Feels a little short winded.  Says his bowels moved, but I do not see documentation of this in the nursing notes.  Not really hungry.  Still feels full quickly taking clear liquids.  Review of Systems:   See above.    Objective     Vitals:   Temp:  [97.7 °F (36.5 °C)-98.5 °F (36.9 °C)] 97.7 °F (36.5 °C)  Heart Rate:  [87-97] 89  Resp:  [14-18] 16  BP: ()/(78-89) 118/80    Intake/Output Summary (Last 24 hours) at 10/31/2024 0727  Last data filed at 10/31/2024 0250  Gross per 24 hour   Intake 240 ml   Output 2300 ml   Net -2060 ml       Physical Exam:    General Appearance: More awake and engaged.  Conversation much more appropriate.    Skin: warm and dry  HEENT: oral mucosa moist.  Nonicteric sclera  Neck: supple, no JVD  Lungs: Clear to auscultation anteriorly.  Heart: RRR, normal S1 and S2  Abdomen: soft, positive bowel sounds.  Right upper quadrant drain.  Slightly tender but no guarding or rebound.  : no palpable bladder  Extremities: Trace lower extremity edema.  Feet less erythematous.  Neuro: Improved.  See above.    Scheduled Meds:     carvedilol, 3.125 mg, Oral, Q12H  heparin (porcine), 5,000 Units, Subcutaneous, Q8H  insulin glargine, 10 Units, Subcutaneous, Nightly  insulin lispro, 2-7 Units, Subcutaneous, 4x Daily AC & at Bedtime  insulin lispro, 4 Units, Subcutaneous, TID With Meals  magnesium sulfate, 2 g, Intravenous, Once  mupirocin, 1 Application, Each Nare, BID  Naloxegol Oxalate, 25 mg, Oral, QAM  pantoprazole, 40 mg, Oral, BID AC  piperacillin-tazobactam, 3.375 g, Intravenous,  Q8H  senna-docusate sodium, 2 tablet, Oral, BID   And  polyethylene glycol, 17 g, Oral, BID  venlafaxine XR, 75 mg, Oral, Daily      IV Meds:          Results Reviewed:   I have personally reviewed the results from the time of this admission to 10/31/2024 07:27 EDT     Results from last 7 days   Lab Units 10/31/24  0542 10/30/24  2148 10/30/24  0639 10/29/24  0652   SODIUM mmol/L 135*  --  135* 136   POTASSIUM mmol/L 4.1 4.0 3.1* 3.9   CHLORIDE mmol/L 100  --  102 107   CO2 mmol/L 25.6  --  21.4* 16.6*   BUN mg/dL 17  --  23* 30*   CREATININE mg/dL 1.04  --  0.78 1.14   CALCIUM mg/dL 8.9  --  9.0 10.1   BILIRUBIN mg/dL 0.8  --  0.8 0.7   ALK PHOS U/L 138*  --  149* 189*   ALT (SGPT) U/L 68*  --  83* 135*   AST (SGOT) U/L 40  --  55* 154*   GLUCOSE mg/dL 155*  --  122* 176*       Estimated Creatinine Clearance: 97.9 mL/min (by C-G formula based on SCr of 1.04 mg/dL).    Results from last 7 days   Lab Units 10/31/24  0542 10/30/24  1719 10/30/24  0639 10/29/24  0652   MAGNESIUM mg/dL 1.5*  --  1.7 2.3   PHOSPHORUS mg/dL 2.6 2.3* 2.1* 2.6       Results from last 7 days   Lab Units 10/30/24  0639   URIC ACID mg/dL 3.4       Results from last 7 days   Lab Units 10/31/24  0542 10/30/24  0639 10/29/24  0652 10/28/24  1959 10/27/24  2310   WBC 10*3/mm3 11.35* 14.38* 15.53* 16.23* 14.14*   HEMOGLOBIN g/dL 11.1* 10.6* 11.6* 12.3* 12.0*   PLATELETS 10*3/mm3 392 364 370 382 349       Results from last 7 days   Lab Units 10/28/24  1959 10/28/24  1129   INR  1.13* 1.0       Assessment / Plan     ASSESSMENT:  Acute kidney injury.  Multifactorial including hypovolemia, hypotension, impaired renal autoregulation due to Jardiance, Entresto, Aldactone.  Nonoliguric.  Creatinine stable.  Volume status improved with IV Lasix.  Give another dose today.  Metabolic acidosis resolved.  Placed magnesium.  Cholecystitis with large right perihepatic fluid collection.  Status post IR drainage with drain left in 10/28/2024..  On Zosyn.   Transaminitis.  Enzymes improving.  Off statin.  MRI abdomen pending.  White blood cell count falling.  Heart failure reduced ejection fraction.  EF 49%.  (Improved from 35% a year ago).  Anemia.  Hemoglobin better with diuresis.  Encephalopathy.  Improving.  Diabetes mellitus type 2.  Blood sugar 122-196.    PLAN:  Replace magnesium IV.  Lasix 40 mg IV again today.      Thank you for involving us in the care of Donnie Bishop.  Please feel free to call with any questions.    Anne Wilson MD  10/31/24  07:27 EDT    [facility]  [phone]    Please note that portions of this note were completed with a voice recognition program.

## 2024-10-31 NOTE — PROGRESS NOTES
"General Surgery Progress Note    CC: Cholecystitis, subcapsular biloma, right upper quadrant abdominal pain    S: Patient awake and alert.  States he is feeling okay today.  Denies any nausea or vomiting.  Has had a bowel movement this morning per patient and her nursing staff.  Denies any abdominal pain.    O:/83 (BP Location: Right arm)   Pulse 89   Temp 98 °F (36.7 °C) (Oral)   Resp 28   Ht 170 cm (66.93\")   Wt 78.8 kg (173 lb 11.6 oz)   SpO2 98%   BMI 27.27 kg/m²     Intake & Output (last day)         10/30 0701  10/31 0700 10/31 0701  11/01 0700    P.O. 240     I.V. (mL/kg)      IV Piggyback      Total Intake(mL/kg) 240 (3)     Urine (mL/kg/hr) 2300 (1.2)     Drains      Total Output 2300     Net -2060                   GENERAL: Awake, alert, interactive, cooperative, answering questions appropriately  HEENT: EOMI, moist mucus membranes   CHEST: normal work of breathing on room air  CARDIAC: palpable distal pulses, no pedal edema  GI: Abd soft, non distended, mildly tender in the right upper quadrant around drain site without rebound or guarding; RUQ IR drain with small amount of purulent bilious output  EXTREMITIES: MOCK, no cyanosis, no lower extremity edema  SKIN: Warm and dry    LABS  Results from last 7 days   Lab Units 10/31/24  0542 10/30/24  0639 10/29/24  0652 10/28/24  1959 10/27/24  2310 10/27/24  0329   WBC 10*3/mm3 11.35* 14.38* 15.53*   < > 14.14* 10.64   HEMOGLOBIN g/dL 11.1* 10.6* 11.6*   < > 12.0* 12.7*   HEMATOCRIT % 33.5* 33.5* 37.1*   < > 36.6* 38.7   PLATELETS 10*3/mm3 392 364 370   < > 349 352   MONOCYTES % %  --   --  9.9  --  10.1 7.1   EOSINOPHIL % %  --   --  0.0*  --  0.0* 0.0*    < > = values in this interval not displayed.     Results from last 7 days   Lab Units 10/31/24  0542 10/30/24  2148 10/30/24  0639 10/29/24  0652   SODIUM mmol/L 135*  --  135* 136   POTASSIUM mmol/L 4.1 4.0 3.1* 3.9   CHLORIDE mmol/L 100  --  102 107   CO2 mmol/L 25.6  --  21.4* 16.6*   BUN " mg/dL 17  --  23* 30*   CREATININE mg/dL 1.04  --  0.78 1.14   CALCIUM mg/dL 8.9  --  9.0 10.1   BILIRUBIN mg/dL 0.8  --  0.8 0.7   ALK PHOS U/L 138*  --  149* 189*   ALT (SGPT) U/L 68*  --  83* 135*   AST (SGOT) U/L 40  --  55* 154*   GLUCOSE mg/dL 155*  --  122* 176*     Results from last 7 days   Lab Units 10/28/24  1959 10/28/24  1129   INR  1.13* 1.0   APTT seconds 28.8  --    Magnesium 1.5  Phosphorus 2.6    Cardiovascular:  Echo 10/26/24:    Left ventricular systolic function is low normal. Calculated left ventricular EF = 49.7%    The following left ventricular wall segments are hypokinetic: apical septal and apex hypokinetic.    Left ventricular diastolic function was normal.    Estimated right ventricular systolic pressure from tricuspid regurgitation is mildly elevated (35-45 mmHg). Calculated right ventricular systolic pressure from tricuspid regurgitation is 40 mmHg.    Mild mitral valve regurgitation is present.    IMAGING:    -CT demonstrating contracted gallbladder with mild hyperenhancing wall.  EGD performed 10/24 negative for abnormalities; August 2024 CT had distended gallbladder but no other findings of cholecystitis.  -HIDA scan 10/24 demonstrating filling but with decreased ejection fraction 19% concerning for possible chronic cholecystitis.   -CTA abdomen pelvis 10/26 to evaluate worsening RUQ pain and possible mesenteric ischemia demonstrated a large right subcapsular fluid collection with mass effect on the liver; no active extravasation  -s/p CT-guided IR drainage 10/28 with thick, turbid bilious output from drain - bile leak vs abscess in the differential, cause unclear but may be related to cholecystitis.    MRCP 10/30/24 reviewed:  FINDINGS: Please note evaluation significantly suboptimal, particularly  the pre and postcontrast weighted T1 fat-sat imaging.     Asymmetric pulmonary consolidation with right pleural effusion is only  partially seen and cannot be evaluated. Ill-defined T2  hypointensity is  present within the gallbladder which has a thickened appearance,  hyperenhancing appearance with adjacent stranding which contacts  portions of the distal stomach and duodenum as well. Loculated appearing  collection contiguous with the duodenum inferior to the gallbladder  fossa measures up to 2.5 x 1.8 x 3.1 cm (previously 3 x 3.4 x 3.4 cm on  10/26/2024).. No intra or extrahepatic bili duct dilation is present.  Main pancreatic duct is not distended.     There is a nonenhancing T2 hyperintense lesion within the inferior  hepatic lobe measuring 1.6 cm, new since 8/31/2024. There is a small  amount of fluid along the superior and anterior aspects of the right  hepatic lobe corresponding with the previously seen subcapsular  collection on 10/26/2024. It is overall significantly decreased in size,  measuring up to approximately 0.7 cm in thickness and coronal imaging  (previously 7.3 cm on 10/26/2024). Presumed drainage catheter is  visualized over the right aspect of the abdominal wall; however, is not  well evaluated on MRI. Somewhat nodular thickening of the bilateral  adrenal glands with overall preservation adreniform shape, as seen since  12/17/2023.     IMPRESSION  1.  Significant interval decrease in size of the previously seen  subcapsular collection status post percutaneous drainage placement.  Please note evaluation for current location of the drain is suboptimal  on MRI. A second loculated appearing collection contiguous with the  duodenum and inferior to the gallbladder fossa also appears to have  mildly decreased in size since 10/26/2024. Given findings on recent  fluid sampling, primary differential considerations include abscess  and/or biloma and can be further evaluated with HIDA scan if clinically  indicated.  2.  The gallbladder has an inflamed appearance with T2 hyperintense  sludge and/or small stones. The gallbladder is otherwise nondistended.  No intra or extrahepatic bili  ductal dilation with common bile duct  grossly similar in size that seen on 8/31/2024. Inflammation contacts  portions of the distal stomach and duodenum and differential  considerations include cholecystitis, gastritis and/or duodenitis.  Correlation with patient history is recommended with follow-up HIDA scan  and/or endoscopy if clinically indicated.  3.  Asymmetric pulmonary consolidation right pleural effusion is only  partially seen and cannot be evaluated. Pneumonia cannot be excluded in  the appropriate context and correlation with patient history is  recommend follow-up chest CT if clinically indicated. At least continued  attention on follow-up is recommended to ensure resolution  4.  Nonenhancing T2 hyperintense lesion within the inferior right  hepatic lobe which is new since 8/31/2024, nonspecific and may represent  local extension of biloma and/or infected fluid in the liver. Continued  close interval follow-up is recommended to ensure resolution and/or  stability.  5.  Other findings as above.    A/P: 49 y.o. male with multiple medical problems including CHF, hypertension, hyperlipidemia, type 2 diabetes mellitus with A1c 9.4, presenting with abdominal pain, found to have possible cholecystitis, with Klebsiella infected biloma and right inferior liver lobe lesion.    MRCP reviewed- study limited; nondistended, inflamed appearing gallbladder containing stones present; subcapsular fluid collection decreased as expected with drainage; also a second loculated collection appearing contiguous with the duodenum and inferior to the gallbladder fossa has decreased in size.  Nonenhancing lesion in the inferior right hepatic lobe new since August, may be related to biloma, for which follow up is recommended    AVSS. Patient clinically much better with no abdominal tenderness or distension, tolerating diet, having bowel function.   Drain output decreasing.   WBC and LFTs normalizing.   Continue antibiotics and  drainage at this time.  Will ask Dr. Ken about recommendations for possible ERCP/stent for internal drainage today.     Addendum: Discussed with Dr. Ken the patient's history and exam today. He recommends monitoring drain output and possible repeat HIDA scan to evaluate for ongoing leak prior to consideration of ERCP. Will follow up for further recommendations. Appreciate eval.    Manuela Wisdom MD  General, Robotic and Endoscopic Surgery  [facility]    [address], [address]  [place], [place], [facility]  P: [phone]  F: [phone]

## 2024-10-31 NOTE — PLAN OF CARE
Problem: Sepsis/Septic Shock  Goal: Blood Glucose Level Within Target Range  Outcome: Progressing     Problem: Sepsis/Septic Shock  Goal: Optimal Nutrition Delivery  Outcome: Progressing     Problem: Pain Acute  Goal: Optimal Pain Control and Function  Outcome: Progressing  Intervention: Develop Pain Management Plan  Recent Flowsheet Documentation  Taken 10/30/2024 2100 by Scar Pozo RN  Pain Management Interventions: pain medication given  Intervention: Prevent or Manage Pain  Recent Flowsheet Documentation  Taken 10/30/2024 2100 by Scar Pozo RN  Medication Review/Management: medications reviewed   Goal Outcome Evaluation:

## 2024-10-31 NOTE — PLAN OF CARE
Problem: Fall Injury Risk  Goal: Absence of Fall and Fall-Related Injury  Outcome: Not Progressing  Intervention: Identify and Manage Contributors  Recent Flowsheet Documentation  Taken 10/31/2024 1400 by Regan Prince RN  Self-Care Promotion: independence encouraged  Taken 10/31/2024 0830 by Regan Prince RN  Self-Care Promotion: independence encouraged  Intervention: Promote Injury-Free Environment  Recent Flowsheet Documentation  Taken 10/31/2024 1800 by Regan Prince RN  Safety Promotion/Fall Prevention:   activity supervised   assistive device/personal items within reach   clutter free environment maintained   fall prevention program maintained   gait belt   mobility aid in reach   lighting adjusted   muscle strengthening facilitated   nonskid shoes/slippers when out of bed   room organization consistent   safety round/check completed  Taken 10/31/2024 1600 by Regan Prince RN  Safety Promotion/Fall Prevention:   activity supervised   assistive device/personal items within reach   clutter free environment maintained   fall prevention program maintained   gait belt   lighting adjusted   mobility aid in reach   muscle strengthening facilitated   nonskid shoes/slippers when out of bed   room organization consistent   safety round/check completed  Taken 10/31/2024 1400 by Regan Prince RN  Safety Promotion/Fall Prevention:   activity supervised   assistive device/personal items within reach   clutter free environment maintained   gait belt   fall prevention program maintained   lighting adjusted   mobility aid in reach   muscle strengthening facilitated   nonskid shoes/slippers when out of bed   room organization consistent   safety round/check completed  Taken 10/31/2024 1200 by Regan Prince RN  Safety Promotion/Fall Prevention:   activity supervised   assistive device/personal items within reach   clutter free environment maintained   fall prevention program maintained   gait belt    mobility aid in reach   lighting adjusted   muscle strengthening facilitated   nonskid shoes/slippers when out of bed   room organization consistent   safety round/check completed  Taken 10/31/2024 1005 by Regan Prince RN  Safety Promotion/Fall Prevention:   activity supervised   assistive device/personal items within reach   clutter free environment maintained   fall prevention program maintained   gait belt   lighting adjusted   mobility aid in reach   muscle strengthening facilitated   nonskid shoes/slippers when out of bed   room organization consistent   safety round/check completed  Taken 10/31/2024 0830 by Regan Prince RN  Safety Promotion/Fall Prevention:   activity supervised   assistive device/personal items within reach   clutter free environment maintained   fall prevention program maintained   gait belt   lighting adjusted   mobility aid in reach   muscle strengthening facilitated   nonskid shoes/slippers when out of bed   room organization consistent   safety round/check completed     Problem: Sepsis/Septic Shock  Goal: Optimal Coping  Outcome: Not Progressing  Goal: Absence of Bleeding  Outcome: Not Progressing  Goal: Blood Glucose Level Within Target Range  Outcome: Not Progressing  Goal: Absence of Infection Signs and Symptoms  Outcome: Not Progressing  Intervention: Promote Recovery  Recent Flowsheet Documentation  Taken 10/31/2024 0830 by Regan Prince RN  Activity Management: activity encouraged  Goal: Optimal Nutrition Delivery  Outcome: Not Progressing     Problem: Skin Injury Risk Increased  Goal: Skin Health and Integrity  Outcome: Not Progressing  Intervention: Optimize Skin Protection  Recent Flowsheet Documentation  Taken 10/31/2024 0830 by Regan Prince RN  Activity Management: activity encouraged  Head of Bed (HOB) Positioning: HOB elevated     Problem: Pain Acute  Goal: Optimal Pain Control and Function  Outcome: Not Progressing   Goal Outcome Evaluation:

## 2024-10-31 NOTE — PAYOR COMM NOTE
"Donnie Ornelas (49 y.o. Male)                                  ATTENTION; CONTINUED CLINICALS CASE [id number]                              REPLY TO UR DEPT  [phone] OR CALL   ELAN GARRETT LPN           Date of Birth   1975    Social Security Number       Address   [address] [place] [address]  [facility] [address]    Home Phone   [phone]    MRN   [id number]       Jain   Christianity    Marital Status                               Admission Date   10/22/24    Admission Type   Emergency    Admitting Provider   James Alexandra MD    Attending Provider   Steven Rivera MD    Department, Room/Bed   [facility], N339/1       Discharge Date       Discharge Disposition       Discharge Destination                                 Attending Provider: Steven Rivera MD    Allergies: No Known Allergies    Isolation: None   Infection: None   Code Status: CPR    Ht: 170 cm (66.93\")   Wt: 78.8 kg (173 lb 11.6 oz)    Admission Cmt: None   Principal Problem: Epigastric abdominal pain [R10.13]                   Active Insurance as of 10/22/2024       Primary Coverage       Payor Plan Insurance Group Employer/Plan Group    [employer]HEM BLUE CROSS AN[employer]M BLUE CROSS BLUE [employer]IELD PPO [id number]       Payor Plan Address Payor Plan Phone Number Payor Plan Fax Number Effective Dates    [id number] [phone]  1/1/2019 - None Entered    [place] [address]         Subscriber Name Subscriber Birth Date Member ID       DONNIE ORNELAS 1975 [id number]                     Emergency Contacts        (Rel.) Home Phone Work Phone Mobile Phone    Murphy Padilla (Brother) -- -- [phone]    Charity Ornelas (Spouse) [phone] -- [phone]    joie montero (Relative) -- -- [phone]    Daniel Ornelas (Father) -- -- [phone]              Vital Signs (last day)       Date/Time Temp Temp src Pulse Resp BP Patient Position SpO2    10/31/24 1548 98.8 (37.1) Oral -- -- 115/64 Lying --    " 10/31/24 0900 98 (36.7) Oral -- 28 110/83 -- --    10/31/24 0700 97.5 (36.4) Oral -- 28 120/86 -- --    10/31/24 0250 97.7 (36.5) Oral 89 16 118/80 Lying 98    10/30/24 2340 98.5 (36.9) Oral 93 14 104/80 Lying 98    10/30/24 2000 -- -- 95 -- 109/78 -- 96    10/30/24 1945 97.8 (36.6) Oral 97 14 109/86 Lying 100    10/30/24 1200 -- -- 87 16 98/87 Lying 98    10/30/24 1055 97.9 (36.6) Oral 90 18 114/89 Lying 81    10/30/24 0731 98.2 (36.8) Oral 93 18 132/87 Lying 92    10/30/24 0500 -- -- 95 -- -- -- 96    10/30/24 0400 -- -- 97 -- 112/78 -- 96    10/30/24 0332 98 (36.7) Oral 93 18 138/71 Lying 97    10/30/24 0300 -- -- 85 -- -- -- 97    10/30/24 0200 -- -- 97 -- -- -- 97    10/30/24 0100 -- -- 96 -- -- -- 86    10/30/24 0000 -- -- 98 -- 124/87 -- 96          Oxygen Therapy (last day)       Date/Time SpO2 Device (Oxygen Therapy) Flow (L/min) (Oxygen Therapy) Oxygen Concentration (%) ETCO2 (mmHg)    10/31/24 1400 -- room air -- -- --    10/31/24 0830 -- room air -- -- --    10/31/24 0250 98 -- -- -- --    10/30/24 2340 98 room air -- -- --    10/30/24 2000 96 -- -- -- --    10/30/24 1945 100 room air -- -- --    10/30/24 1430 -- room air -- -- --    10/30/24 1200 98 -- -- -- --    10/30/24 1055 81 -- -- -- --    10/30/24 0810 -- room air -- -- --    10/30/24 0731 92 -- -- -- --    10/30/24 0500 96 -- -- -- --    10/30/24 0400 96 -- -- -- --    10/30/24 0332 97 -- -- -- --    10/30/24 0300 97 -- -- -- --    10/30/24 0200 97 room air -- -- --    10/30/24 0100 86 -- -- -- --    10/30/24 0000 96 -- -- -- --          Lines, Drains & Airways       Active LDAs       Name Placement date Placement time Site Days    Peripheral IV 10/25/24 Posterior;Right Forearm 10/25/24  --  Forearm  6    Peripheral IV 10/27/24 2311 Anterior;Distal;Left;Upper Arm 10/27/24  2311  Arm  3    Closed/Suction Drain 1 RUQ Pigtail 12 Fr. 10/28/24  1300  RUQ  3                  Current Facility-Administered Medications   Medication Dose Route Frequency  Provider Last Rate Last Admin    sennosides-docusate (PERICOLACE) 8.6-50 MG per tablet 2 tablet  2 tablet Oral BID Yaritza Hurtado MD   2 tablet at 10/31/24 0956    And    polyethylene glycol (MIRALAX) packet 17 g  17 g Oral BID Yaritza Hurtado MD   17 g at 10/31/24 0957    And    bisacodyl (DULCOLAX) EC tablet 5 mg  5 mg Oral Daily PRN Yaritza Hurtado MD        And    bisacodyl (DULCOLAX) suppository 10 mg  10 mg Rectal Daily PRN Yaritza Hurtado MD   10 mg at 10/30/24 1841    Calcium Replacement - Follow Nurse / BPA Driven Protocol   Does not apply PRN Elma Wiggins MD        carvedilol (COREG) tablet 6.25 mg  6.25 mg Oral Q12H Kaylee Kay MD        cefTRIAXone (ROCEPHIN) 2,000 mg in sodium chloride 0.9 % 100 mL MBP  2,000 mg Intravenous Q24H Steven Rivera  mL/hr at 10/31/24 1428 2,000 mg at 10/31/24 1428    dextrose (D50W) (25 g/50 mL) IV injection 25 g  25 g Intravenous Q15 Min PRN Elma Wiggins MD        dextrose (GLUTOSE) oral gel 15 g  15 g Oral Q15 Min PRN Elma Wiggins MD        furosemide (LASIX) injection 40 mg  40 mg Intravenous Once Kaylee Kay MD        glucagon (GLUCAGEN) injection 1 mg  1 mg Intramuscular Q15 Min PRN Elma Wiggins MD        heparin (porcine) 5000 UNIT/ML injection 5,000 Units  5,000 Units Subcutaneous Q8H Yaritza Hurtado MD   5,000 Units at 10/31/24 1427    hydrOXYzine (ATARAX) tablet 10 mg  10 mg Oral Q8H PRN Elma Wiggins MD   10 mg at 10/23/24 1447    influenza virus vacc split PF FLUZONE 0.5 mL  0.5 mL Intramuscular During Hospitalization Elma Wiggins MD        insulin glargine (LANTUS, SEMGLEE) injection 10 Units  10 Units Subcutaneous Nightly Yaritza Hurtado MD   10 Units at 10/30/24 2249    insulin lispro (HUMALOG/ADMELOG) injection 2-7 Units  2-7 Units Subcutaneous 4x Daily AC & at Bedtime Elma Wiggins MD   2 Units at 10/31/24 1154    insulin lispro (HUMALOG/ADMELOG) injection 4 Units  4 Units  Subcutaneous TID With Meals Yaritza Hurtado MD   4 Units at 10/31/24 1154    melatonin tablet 2.5 mg  2.5 mg Oral Nightly PRN Elma Wiggins MD        mupirocin (BACTROBAN) 2 % nasal ointment 1 Application  1 Application Each Nare BID Steven Rivera MD        Naloxegol Oxalate (MOVANTIK) tablet 25 mg  25 mg Oral QAM Yaritza Hurtado MD   25 mg at 10/31/24 0957    naloxone (NARCAN) injection 0.4 mg  0.4 mg Intravenous Q5 Min PRN Elma Wiggins MD        ondansetron ODT (ZOFRAN-ODT) disintegrating tablet 4 mg  4 mg Oral Q6H PRN Elma Wiggins MD   4 mg at 10/30/24 2348    Or    ondansetron (ZOFRAN) injection 4 mg  4 mg Intravenous Q6H PRN Elma Wiggins MD   4 mg at 10/27/24 0851    oxyCODONE (ROXICODONE) immediate release tablet 5 mg  5 mg Oral Q4H PRN Yaritza Hurtado MD   5 mg at 10/30/24 2135    pantoprazole (PROTONIX) EC tablet 40 mg  40 mg Oral BID AC Elma Wiggins MD   40 mg at 10/31/24 0956    Phosphorus Replacement - Follow Nurse / BPA Driven Protocol   Does not apply Elma Ramos MD        Potassium Replacement - Follow Nurse / BPA Driven Protocol   Does not apply Elma Ramos MD        sodium chloride 0.9 % flush 10 mL  10 mL Intravenous PRN Elma Wiggins MD        venlafaxine XR (EFFEXOR-XR) 24 hr capsule 75 mg  75 mg Oral Daily Elma Wiggins MD   75 mg at 10/31/24 0957        Physician Progress Notes (last 24 hours)        Kaylee Kay MD at 10/31/24 1455              Patient Name: Donnie Bishop  :1975  49 y.o.      Patient Care Team:  Juju Kennedy MD as PCP - General (Family Medicine)    Chief Complaint:   CM  Interval History:   Bigeminy on tele. Maybe a bit confused? Describes orthopnea      Objective   Vital Signs  Temp:  [97.5 °F (36.4 °C)-98.5 °F (36.9 °C)] 98 °F (36.7 °C)  Heart Rate:  [89-97] 89  Resp:  [14-28] 28  BP: (104-120)/(78-86) 110/83    Intake/Output Summary (Last 24 hours) at 10/31/2024 0130  Last data filed at  "10/31/2024 0250  Gross per 24 hour   Intake 240 ml   Output 1450 ml   Net -1210 ml     Flowsheet Rows      Flowsheet Row First Filed Value   Admission Height 170.2 cm (67\") Documented at 10/22/2024 1641   Admission Weight 76.2 kg (168 lb) Documented at 10/22/2024 1641            Physical Exam:   General Appearance:    Alert, cooperative, in no acute distress   Lungs:     Clear to auscultation.  Normal respiratory effort and rate.      Heart:    Regular rhythm and normal rate, normal S1 and S2, no murmurs, gallops or rubs.     Chest Wall:    No abnormalities observed   Abdomen:     Soft, nontender, positive bowel sounds.     Extremities:   no cyanosis, clubbing or edema.  No marked joint deformities.  Adequate musculoskeletal strength.       Results Review:    Results from last 7 days   Lab Units 10/31/24  0542   SODIUM mmol/L 135*   POTASSIUM mmol/L 4.1   CHLORIDE mmol/L 100   CO2 mmol/L 25.6   BUN mg/dL 17   CREATININE mg/dL 1.04   GLUCOSE mg/dL 155*   CALCIUM mg/dL 8.9     Results from last 7 days   Lab Units 10/28/24  0045 10/27/24  2205   HSTROP T ng/L 30* 28*     Results from last 7 days   Lab Units 10/31/24  0542   WBC 10*3/mm3 11.35*   HEMOGLOBIN g/dL 11.1*   HEMATOCRIT % 33.5*   PLATELETS 10*3/mm3 392     Results from last 7 days   Lab Units 10/28/24  1959 10/28/24  1129   INR  1.13* 1.0   APTT seconds 28.8  --      Results from last 7 days   Lab Units 10/31/24  0542   MAGNESIUM mg/dL 1.5*                   Medication Review:   carvedilol, 3.125 mg, Oral, Q12H  cefTRIAXone, 2,000 mg, Intravenous, Q24H  heparin (porcine), 5,000 Units, Subcutaneous, Q8H  insulin glargine, 10 Units, Subcutaneous, Nightly  insulin lispro, 2-7 Units, Subcutaneous, 4x Daily AC & at Bedtime  insulin lispro, 4 Units, Subcutaneous, TID With Meals  mupirocin, 1 Application, Each Nare, BID  Naloxegol Oxalate, 25 mg, Oral, QAM  pantoprazole, 40 mg, Oral, BID AC  senna-docusate sodium, 2 tablet, Oral, BID   And  polyethylene glycol, 17 " "g, Oral, BID  venlafaxine XR, 75 mg, Oral, Daily              Assessment & Plan   NICM with recovered EF, will give an additional dose of lasix this afternoon given ongoing orthopnea  Bigeminy, coreg 6.25 today, uptitrate to 12.5 tomorrow most likely  Biloma/gall bladder sludge/subcapsular fluid collection s/p [id number] growing klebsiella  Hypotension resolved. Midodrine stopped    Kaylee Kay MD  [facility]  10/31/24  14:55 EDT      Electronically signed by Kaylee Kay MD at 10/31/24 1506       Manuela Wisdom MD at 10/31/24 1103          General Surgery Progress Note    CC: Cholecystitis, subcapsular biloma, right upper quadrant abdominal pain    S: Patient awake and alert.  States he is feeling okay today.  Denies any nausea or vomiting.  Has had a bowel movement this morning per patient and her nursing staff.  Denies any abdominal pain.    O:/83 (BP Location: Right arm)   Pulse 89   Temp 98 °F (36.7 °C) (Oral)   Resp 28   Ht 170 cm (66.93\")   Wt 78.8 kg (173 lb 11.6 oz)   SpO2 98%   BMI 27.27 kg/m²     Intake & Output (last day)         10/30 0701  10/31 0700 10/31 0701  11/01 0700    P.O. 240     I.V. (mL/kg)      IV Piggyback      Total Intake(mL/kg) 240 (3)     Urine (mL/kg/hr) 2300 (1.2)     Drains      Total Output 2300     Net -2060                   GENERAL: Awake, alert, interactive, cooperative, answering questions appropriately  HEENT: EOMI, moist mucus membranes   CHEST: normal work of breathing on room air  CARDIAC: palpable distal pulses, no pedal edema  GI: Abd soft, non distended, mildly tender in the right upper quadrant around drain site without rebound or guarding; RUQ IR drain with small amount of purulent bilious output  EXTREMITIES: MOCK, no cyanosis, no lower extremity edema  SKIN: Warm and dry    LABS  Results from last 7 days   Lab Units 10/31/24  0542 10/30/24  0639 10/29/24  0652 10/28/24  1959 10/27/24  2310 10/27/24  0329   WBC 10*3/mm3 11.35* " 14.38* 15.53*   < > 14.14* 10.64   HEMOGLOBIN g/dL 11.1* 10.6* 11.6*   < > 12.0* 12.7*   HEMATOCRIT % 33.5* 33.5* 37.1*   < > 36.6* 38.7   PLATELETS 10*3/mm3 392 364 370   < > 349 352   MONOCYTES % %  --   --  9.9  --  10.1 7.1   EOSINOPHIL % %  --   --  0.0*  --  0.0* 0.0*    < > = values in this interval not displayed.     Results from last 7 days   Lab Units 10/31/24  0542 10/30/24  2148 10/30/24  0639 10/29/24  0652   SODIUM mmol/L 135*  --  135* 136   POTASSIUM mmol/L 4.1 4.0 3.1* 3.9   CHLORIDE mmol/L 100  --  102 107   CO2 mmol/L 25.6  --  21.4* 16.6*   BUN mg/dL 17  --  23* 30*   CREATININE mg/dL 1.04  --  0.78 1.14   CALCIUM mg/dL 8.9  --  9.0 10.1   BILIRUBIN mg/dL 0.8  --  0.8 0.7   ALK PHOS U/L 138*  --  149* 189*   ALT (SGPT) U/L 68*  --  83* 135*   AST (SGOT) U/L 40  --  55* 154*   GLUCOSE mg/dL 155*  --  122* 176*     Results from last 7 days   Lab Units 10/28/24  1959 10/28/24  1129   INR  1.13* 1.0   APTT seconds 28.8  --    Magnesium 1.5  Phosphorus 2.6    Cardiovascular:  Echo 10/26/24:    Left ventricular systolic function is low normal. Calculated left ventricular EF = 49.7%    The following left ventricular wall segments are hypokinetic: apical septal and apex hypokinetic.    Left ventricular diastolic function was normal.    Estimated right ventricular systolic pressure from tricuspid regurgitation is mildly elevated (35-45 mmHg). Calculated right ventricular systolic pressure from tricuspid regurgitation is 40 mmHg.    Mild mitral valve regurgitation is present.    IMAGING:    -CT demonstrating contracted gallbladder with mild hyperenhancing wall.  EGD performed 10/24 negative for abnormalities; August 2024 CT had distended gallbladder but no other findings of cholecystitis.  -HIDA scan 10/24 demonstrating filling but with decreased ejection fraction 19% concerning for possible chronic cholecystitis.   -CTA abdomen pelvis 10/26 to evaluate worsening RUQ pain and possible mesenteric ischemia  demonstrated a large right subcapsular fluid collection with mass effect on the liver; no active extravasation  -s/p CT-guided IR drainage 10/28 with thick, turbid bilious output from drain - bile leak vs abscess in the differential, cause unclear but may be related to cholecystitis.    MRCP 10/30/24 reviewed:  FINDINGS: Please note evaluation significantly suboptimal, particularly  the pre and postcontrast weighted T1 fat-sat imaging.     Asymmetric pulmonary consolidation with right pleural effusion is only  partially seen and cannot be evaluated. Ill-defined T2 hypointensity is  present within the gallbladder which has a thickened appearance,  hyperenhancing appearance with adjacent stranding which contacts  portions of the distal stomach and duodenum as well. Loculated appearing  collection contiguous with the duodenum inferior to the gallbladder  fossa measures up to 2.5 x 1.8 x 3.1 cm (previously 3 x 3.4 x 3.4 cm on  10/26/2024).. No intra or extrahepatic bili duct dilation is present.  Main pancreatic duct is not distended.     There is a nonenhancing T2 hyperintense lesion within the inferior  hepatic lobe measuring 1.6 cm, new since 8/31/2024. There is a small  amount of fluid along the superior and anterior aspects of the right  hepatic lobe corresponding with the previously seen subcapsular  collection on 10/26/2024. It is overall significantly decreased in size,  measuring up to approximately 0.7 cm in thickness and coronal imaging  (previously 7.3 cm on 10/26/2024). Presumed drainage catheter is  visualized over the right aspect of the abdominal wall; however, is not  well evaluated on MRI. Somewhat nodular thickening of the bilateral  adrenal glands with overall preservation adreniform shape, as seen since  12/17/2023.     IMPRESSION  1.  Significant interval decrease in size of the previously seen  subcapsular collection status post percutaneous drainage placement.  Please note evaluation for current  location of the drain is suboptimal  on MRI. A second loculated appearing collection contiguous with the  duodenum and inferior to the gallbladder fossa also appears to have  mildly decreased in size since 10/26/2024. Given findings on recent  fluid sampling, primary differential considerations include abscess  and/or biloma and can be further evaluated with HIDA scan if clinically  indicated.  2.  The gallbladder has an inflamed appearance with T2 hyperintense  sludge and/or small stones. The gallbladder is otherwise nondistended.  No intra or extrahepatic bili ductal dilation with common bile duct  grossly similar in size that seen on 8/31/2024. Inflammation contacts  portions of the distal stomach and duodenum and differential  considerations include cholecystitis, gastritis and/or duodenitis.  Correlation with patient history is recommended with follow-up HIDA scan  and/or endoscopy if clinically indicated.  3.  Asymmetric pulmonary consolidation right pleural effusion is only  partially seen and cannot be evaluated. Pneumonia cannot be excluded in  the appropriate context and correlation with patient history is  recommend follow-up chest CT if clinically indicated. At least continued  attention on follow-up is recommended to ensure resolution  4.  Nonenhancing T2 hyperintense lesion within the inferior right  hepatic lobe which is new since 8/31/2024, nonspecific and may represent  local extension of biloma and/or infected fluid in the liver. Continued  close interval follow-up is recommended to ensure resolution and/or  stability.  5.  Other findings as above.    A/P: 49 y.o. male with multiple medical problems including CHF, hypertension, hyperlipidemia, type 2 diabetes mellitus with A1c 9.4, presenting with abdominal pain, found to have possible cholecystitis, with Klebsiella infected biloma and right inferior liver lobe lesion.    MRCP reviewed- study limited; nondistended, inflamed appearing gallbladder  containing stones present; subcapsular fluid collection decreased as expected with drainage; also a second loculated collection appearing contiguous with the duodenum and inferior to the gallbladder fossa has decreased in size.  Nonenhancing lesion in the inferior right hepatic lobe new since August, may be related to biloma, for which follow up is recommended    AVSS. Patient clinically much better with no abdominal tenderness or distension, tolerating diet, having bowel function.   Drain output decreasing.   WBC and LFTs normalizing.   Continue antibiotics and drainage at this time.  Will ask Dr. Ken about recommendations for possible ERCP/stent for internal drainage today.     Addendum: Discussed with Dr. Ken the patient's history and exam today. He recommends monitoring drain output and possible repeat HIDA scan to evaluate for ongoing leak prior to consideration of ERCP. Will follow up for further recommendations. Appreciate eval.    Manuela Wisdom MD  General, Robotic and Endoscopic Surgery  [facility]    [address], [address]  [place], [place], [address]  P: [phone]  F: [phone]       Electronically signed by Manuela Wisdom MD at 10/31/24 1246       Anne Wilson MD at 10/31/24 0727              [facility] Progress Note      Patient Name: Donnie Bishop  : 1975  MRN: [id number]  Primary Care Physician:  Juju Kennedy MD  Date of admission: 10/22/2024    Subjective     Interval History:   Follow-up acute kidney injury.  Feeling better today.  Feet less swollen and less sore.  Abdomen still feels tender and sore.  Had MRI of the abdomen last night, pending.  Feels a little short winded.  Says his bowels moved, but I do not see documentation of this in the nursing notes.  Not really hungry.  Still feels full quickly taking clear liquids.  Review of Systems:   See above.    Objective     Vitals:   Temp:  [97.7 °F (36.5 °C)-98.5 °F  (36.9 °C)] 97.7 °F (36.5 °C)  Heart Rate:  [87-97] 89  Resp:  [14-18] 16  BP: ()/(78-89) 118/80    Intake/Output Summary (Last 24 hours) at 10/31/2024 0727  Last data filed at 10/31/2024 0250  Gross per 24 hour   Intake 240 ml   Output 2300 ml   Net -2060 ml       Physical Exam:    General Appearance: More awake and engaged.  Conversation much more appropriate.    Skin: warm and dry  HEENT: oral mucosa moist.  Nonicteric sclera  Neck: supple, no JVD  Lungs: Clear to auscultation anteriorly.  Heart: RRR, normal S1 and S2  Abdomen: soft, positive bowel sounds.  Right upper quadrant drain.  Slightly tender but no guarding or rebound.  : no palpable bladder  Extremities: Trace lower extremity edema.  Feet less erythematous.  Neuro: Improved.  See above.    Scheduled Meds:     carvedilol, 3.125 mg, Oral, Q12H  heparin (porcine), 5,000 Units, Subcutaneous, Q8H  insulin glargine, 10 Units, Subcutaneous, Nightly  insulin lispro, 2-7 Units, Subcutaneous, 4x Daily AC & at Bedtime  insulin lispro, 4 Units, Subcutaneous, TID With Meals  magnesium sulfate, 2 g, Intravenous, Once  mupirocin, 1 Application, Each Nare, BID  Naloxegol Oxalate, 25 mg, Oral, QAM  pantoprazole, 40 mg, Oral, BID AC  piperacillin-tazobactam, 3.375 g, Intravenous, Q8H  senna-docusate sodium, 2 tablet, Oral, BID   And  polyethylene glycol, 17 g, Oral, BID  venlafaxine XR, 75 mg, Oral, Daily      IV Meds:          Results Reviewed:   I have personally reviewed the results from the time of this admission to 10/31/2024 07:27 EDT     Results from last 7 days   Lab Units 10/31/24  0542 10/30/24  2148 10/30/24  0639 10/29/24  0652   SODIUM mmol/L 135*  --  135* 136   POTASSIUM mmol/L 4.1 4.0 3.1* 3.9   CHLORIDE mmol/L 100  --  102 107   CO2 mmol/L 25.6  --  21.4* 16.6*   BUN mg/dL 17  --  23* 30*   CREATININE mg/dL 1.04  --  0.78 1.14   CALCIUM mg/dL 8.9  --  9.0 10.1   BILIRUBIN mg/dL 0.8  --  0.8 0.7   ALK PHOS U/L 138*  --  149* 189*   ALT (SGPT) U/L  68*  --  83* 135*   AST (SGOT) U/L 40  --  55* 154*   GLUCOSE mg/dL 155*  --  122* 176*       Estimated Creatinine Clearance: 97.9 mL/min (by C-G formula based on SCr of 1.04 mg/dL).    Results from last 7 days   Lab Units 10/31/24  0542 10/30/24  1719 10/30/24  0639 10/29/24  0652   MAGNESIUM mg/dL 1.5*  --  1.7 2.3   PHOSPHORUS mg/dL 2.6 2.3* 2.1* 2.6       Results from last 7 days   Lab Units 10/30/24  0639   URIC ACID mg/dL 3.4       Results from last 7 days   Lab Units 10/31/24  0542 10/30/24  0639 10/29/24  0652 10/28/24  1959 10/27/24  2310   WBC 10*3/mm3 11.35* 14.38* 15.53* 16.23* 14.14*   HEMOGLOBIN g/dL 11.1* 10.6* 11.6* 12.3* 12.0*   PLATELETS 10*3/mm3 392 364 370 382 349       Results from last 7 days   Lab Units 10/28/24  1959 10/28/24  1129   INR  1.13* 1.0       Assessment / Plan     ASSESSMENT:  Acute kidney injury.  Multifactorial including hypovolemia, hypotension, impaired renal autoregulation due to Jardiance, Entresto, Aldactone.  Nonoliguric.  Creatinine stable.  Volume status improved with IV Lasix.  Give another dose today.  Metabolic acidosis resolved.  Placed magnesium.  Cholecystitis with large right perihepatic fluid collection.  Status post IR drainage with drain left in 10/28/2024..  On Zosyn.  Transaminitis.  Enzymes improving.  Off statin.  MRI abdomen pending.  White blood cell count falling.  Heart failure reduced ejection fraction.  EF 49%.  (Improved from 35% a year ago).  Anemia.  Hemoglobin better with diuresis.  Encephalopathy.  Improving.  Diabetes mellitus type 2.  Blood sugar 122-196.    PLAN:  Replace magnesium IV.  Lasix 40 mg IV again today.      Thank you for involving us in the care of Donnie Bishop.  Please feel free to call with any questions.    Anne Wilson MD  10/31/24  07:27 EDT    [facility]  [phone]    Please note that portions of this note were completed with a voice recognition program.    Electronically signed by Katie  Anne Yarbrough MD at 10/31/24 0731        Laura Junior, LUCIO     Case Management     Case Management/Social Work     Signed     Date of Service: 10/31/24 1342  Creation Time: 10/31/24 1342     Signed         Continued Stay Note  Psychiatric     Patient Name: Donnie Bishop                    MRN: [id number]  Today's Date: 10/31/2024              Admit Date: 10/22/2024     Plan: Pending snf choices. Patient to give ccp choices on 11/1.  Will need precert    Discharge Plan         Row Name 10/31/24 1338           Plan     Plan Pending snf choices. Patient to give ccp choices on 11/1.  Will need precert     Roadmap to Recovery Yes     Plan Comments Spoke with patient at bedside. Reviewed PT recommendations for snf. Pt verbalizes understanding. SNF choice list and cms compare given to patient. Patient states he will review the list and give choices to CCP on 11/1. Patient will need precert .                             Expected Discharge Date and Time         Expected Discharge Date Expected Discharge Time     Nov 4, 2024                     Laura Junior, Mary Amin, PT   Physical Therapist  Physical Therapy     Significant Note     Signed     Date of Service: 10/31/24 1530  Creation Time: 10/31/24 1530     Signed              10/31/24 1530   OTHER   Discipline physical therapist   Rehab Time/Intention   Session Not Performed patient/family declined treatment  (pt decline PT d/t nausea today. will f/u)   Recommendation   PT - Next Appointment 11/01/24

## 2024-10-31 NOTE — CASE MANAGEMENT/SOCIAL WORK
Continued Stay Note  [facility]     Patient Name: Donnie Bishop  MRN: [id number]  Today's Date: 10/31/2024    Admit Date: 10/22/2024    Plan: Pending snf choices. Patient to give ccp choices on 11/1.  Will need precert   Discharge Plan       Row Name 10/31/24 1338       Plan    Plan Pending snf choices. Patient to give ccp choices on 11/1.  Will need precert    Roadmap to Recovery Yes    Plan Comments Spoke with patient at bedside. Reviewed PT recommendations for snf. Pt verbalizes understanding. SNF choice list and cms compare given to patient. Patient states he will review the list and give choices to CCP on 11/1. Patient will need precert .                      Expected Discharge Date and Time       Expected Discharge Date Expected Discharge Time    Nov 4, 2024               Laura Junior RN

## 2024-10-31 NOTE — PROGRESS NOTES
Name: Donnie Bishop ADMIT: 10/22/2024   : 1975  PCP: Juju Kennedy MD    MRN: [id number] LOS: 7 days   AGE/SEX: 49 y.o. male  ROOM: [facility]     Subjective   Subjective   Feels may be having worse abdominal pain today.    Objective   Objective   Vital Signs  Temp:  [97.5 °F (36.4 °C)-98.5 °F (36.9 °C)] 98 °F (36.7 °C)  Heart Rate:  [89-97] 89  Resp:  [14-28] 28  BP: (104-120)/(78-86) 110/83  SpO2:  [96 %-100 %] 98 %  on   ;   Device (Oxygen Therapy): room air  Body mass index is 27.27 kg/m².  Physical Exam  General: Lying in bed, drowsy, not in distress, ill-appearing,  CV: Regular rate and rhythm, no murmurs rubs or gallops  Lungs: Diminished at bases, no wheezing, no crackles, right upper quadrant drain in place.  Abdomen: Soft, distended, tenderness to palpation  Extremities: Trace edema in lower extremities,, no cyanosis     Results Review     I reviewed the patient's new clinical results.  Results from last 7 days   Lab Units 10/31/24  0542 10/30/24  0639 10/29/24  0652 10/28/24  1959   WBC 10*3/mm3 11.35* 14.38* 15.53* 16.23*   HEMOGLOBIN g/dL 11.1* 10.6* 11.6* 12.3*   PLATELETS 10*3/mm3 392 364 370 382     Results from last 7 days   Lab Units 10/31/24  0542 10/30/24  2148 10/30/24  0639 10/29/24  0652 10/28/24  2059 10/28/24  1959 10/27/24  2205 10/27/24  0329   SODIUM mmol/L 135*  --  135* 136 143 141 137 137   POTASSIUM mmol/L 4.1 4.0 3.1* 3.9 4.5 4.3 3.9 4.7   CHLORIDE mmol/L 100  --  102 107 110* 108* 106 104   CO2 mmol/L 25.6  --  21.4* 16.6* 16.0* 16.6* 19.0* 19.0*   BUN mg/dL 17  --  23* 30* 35* 35* 28* 32*   CREATININE mg/dL 1.04  --  0.78 1.14 1.06 1.08 0.93 1.12   GLUCOSE mg/dL 155*  --  122* 176* 159* 161* 177* 223*   EGFR mL/min/1.73 89.1  --  110.7 79.8 87.1 85.2 101.9 81.5   ALBUMIN g/dL 2.2*  --  2.2* 1.7* 2.0* 3.6 2.5* 2.7*   BILIRUBIN mg/dL 0.8  --  0.8 0.7 0.7 0.7 0.7 0.8   ALK PHOS U/L 138*  --  149* 189* 222* 229* 217* 140*   AST (SGOT) U/L 40  --  55* 154* 199* 229* 121* 172*    ALT (SGPT) U/L 68*  --  83* 135* 159* 164* 121* 133*     Results from last 7 days   Lab Units 10/31/24  0542 10/30/24  1719 10/30/24  0639 10/29/24  0652 10/28/24  2059 10/28/24  1959   CALCIUM mg/dL 8.9  --  9.0 10.1 9.8 10.1   ALBUMIN g/dL 2.2*  --  2.2* 1.7* 2.0* 3.6   MAGNESIUM mg/dL 1.5*  --  1.7 2.3  --  2.5   PHOSPHORUS mg/dL 2.6 2.3* 2.1* 2.6  --  2.8     Results from last 7 days   Lab Units 10/26/24  0545 10/25/24  0426 10/24/24  1404   PROCALCITONIN ng/mL 30.30* 78.10*  --    LACTATE mmol/L  --   --  3.8*     COVID19   Date Value Ref Range Status   10/25/2024 Not Detected Not Detected - Ref. Range Final   12/26/2023 Not Detected Not Detected - Ref. Range Final     Glucose   Date/Time Value Ref Range Status   10/31/2024 1057 173 (H) 70 - 130 mg/dL Final   10/30/2024 1952 101 70 - 130 mg/dL Final   10/30/2024 1523 258 (H) 70 - 130 mg/dL Final   10/30/2024 1056 253 (H) 70 - 130 mg/dL Final   10/30/2024 0639 122 70 - 130 mg/dL Final   10/29/2024 2050 196 (H) 70 - 130 mg/dL Final   10/29/2024 1603 141 (H) 70 - 130 mg/dL Final       MRI abdomen w wo contrast mrcp  MRI AND MRCP ABDOMEN WITH AND WITHOUT CONTRAST     HISTORY: Upper abdominal pain for a week, HIDA scan in late October  concerning for chronic cholecystitis with subsequent CT abdomen and  pelvis showing large right subcapsular hepatic fluid collection status  post percutaneous drainage demonstrating bilious output growing  Klebsiella     TECHNIQUE: Multiplanar multisequence MRI an MRCP of the abdomen was  performed before and after the IV administration of MultiHance.     COMPARISON: Multiple CT abdomen pelvis dating back to 8/31/2024 and HIDA  scan 10/25/2024     FINDINGS: Please note evaluation significantly suboptimal, particularly  the pre and postcontrast weighted T1 fat-sat imaging.     Asymmetric pulmonary consolidation with right pleural effusion is only  partially seen and cannot be evaluated. Ill-defined T2 hypointensity is  present within  the gallbladder which has a thickened appearance,  hyperenhancing appearance with adjacent stranding which contacts  portions of the distal stomach and duodenum as well. Loculated appearing  collection contiguous with the duodenum inferior to the gallbladder  fossa measures up to 2.5 x 1.8 x 3.1 cm (previously 3 x 3.4 x 3.4 cm on  10/26/2024).. No intra or extrahepatic bili duct dilation is present.  Main pancreatic duct is not distended.     There is a nonenhancing T2 hyperintense lesion within the inferior  hepatic lobe measuring 1.6 cm, new since 8/31/2024. There is a small  amount of fluid along the superior and anterior aspects of the right  hepatic lobe corresponding with the previously seen subcapsular  collection on 10/26/2024. It is overall significantly decreased in size,  measuring up to approximately 0.7 cm in thickness and coronal imaging  (previously 7.3 cm on 10/26/2024). Presumed drainage catheter is  visualized over the right aspect of the abdominal wall; however, is not  well evaluated on MRI. Somewhat nodular thickening of the bilateral  adrenal glands with overall preservation adreniform shape, as seen since  12/17/2023.     IMPRESSION  1.  Significant interval decrease in size of the previously seen  subcapsular collection status post percutaneous drainage placement.  Please note evaluation for current location of the drain is suboptimal  on MRI. A second loculated appearing collection contiguous with the  duodenum and inferior to the gallbladder fossa also appears to have  mildly decreased in size since 10/26/2024. Given findings on recent  fluid sampling, primary differential considerations include abscess  and/or biloma and can be further evaluated with HIDA scan if clinically  indicated.  2.  The gallbladder has an inflamed appearance with T2 hyperintense  sludge and/or small stones. The gallbladder is otherwise nondistended.  No intra or extrahepatic bili ductal dilation with common bile  duct  grossly similar in size that seen on 8/31/2024. Inflammation contacts  portions of the distal stomach and duodenum and differential  considerations include cholecystitis, gastritis and/or duodenitis.  Correlation with patient history is recommended with follow-up HIDA scan  and/or endoscopy if clinically indicated.  3.  Asymmetric pulmonary consolidation right pleural effusion is only  partially seen and cannot be evaluated. Pneumonia cannot be excluded in  the appropriate context and correlation with patient history is  recommend follow-up chest CT if clinically indicated. At least continued  attention on follow-up is recommended to ensure resolution  4.  Nonenhancing T2 hyperintense lesion within the inferior right  hepatic lobe which is new since 8/31/2024, nonspecific and may represent  local extension of biloma and/or infected fluid in the liver. Continued  close interval follow-up is recommended to ensure resolution and/or  stability.  5.  Other findings as above.        This report was finalized on 10/31/2024 9:09 AM by Dr. Michael Avelar M.D on Workstation: [id number]       Scheduled Medications  carvedilol, 3.125 mg, Oral, Q12H  heparin (porcine), 5,000 Units, Subcutaneous, Q8H  insulin glargine, 10 Units, Subcutaneous, Nightly  insulin lispro, 2-7 Units, Subcutaneous, 4x Daily AC & at Bedtime  insulin lispro, 4 Units, Subcutaneous, TID With Meals  mupirocin, 1 Application, Each Nare, BID  Naloxegol Oxalate, 25 mg, Oral, QAM  pantoprazole, 40 mg, Oral, BID AC  piperacillin-tazobactam, 3.375 g, Intravenous, Q8H  senna-docusate sodium, 2 tablet, Oral, BID   And  polyethylene glycol, 17 g, Oral, BID  venlafaxine XR, 75 mg, Oral, Daily    Infusions     Diet  Diet: Diabetic; Consistent Carbohydrate; Fluid Consistency: Thin (IDDSI 0)    I have personally reviewed     [x]  Laboratory   [x]  Microbiology   [x]  Radiology   [x]  EKG/Telemetry  []  Cardiology/Vascular   []  Pathology    []  Records        Assessment/Plan     Active Hospital Problems    Diagnosis  POA    **Epigastric abdominal pain [R10.13]  Yes    Abscess of liver [K75.0]  Yes    Klebsiella pneumoniae (k. pneumoniae) as the cause of diseases classified elsewhere [B96.1]  Yes    Hypotension [I95.9]  Yes    Transaminitis [R74.01]  Yes    Leukocytosis [D72.829]  Yes    Chronic combined systolic and diastolic heart failure [I50.42]  Yes    Anxiety [F41.9]  Yes    Multiple-type hyperlipidemia [E78.2]  Yes    Type 2 diabetes mellitus with diabetic neuropathy, with long-term current use of insulin [E11.40, Z79.4]  Not Applicable    Benign essential HTN [I10]  Yes      Resolved Hospital Problems    Diagnosis Date Resolved POA    Acute kidney injury [N17.9] 10/30/2024 No       49 y.o. man with diabetes, HTN, hyperlipidemia, chronic systolic and diastolic heart failure (non-ischemic) and anxiety who presented with severe abdominal/epigastric pain.  EGD was negative.  CT scan demonstrated a large right subcapsular fluid collection with mass effect on liver.  He underwent drainage of liver fluid collection by IR on 10/28/2024.  Culture thus far positive for rare growth Klebsiella pneumoniae.    Klebsiella growing from aspirate from liver.  Sensitivities noted.  Change Zosyn to Rocephin  Consult ID to assist with management.  GI and surgery following  BP remained stable off midodrine  Cardiology adjusting meds      Continue outpatient follow-up regarding acoustic neuroma.  MRI here showed possible vestibular schwannoma that will need monitoring.  Heparin SC for DVT prophylaxis.  Full code.  Discussed with patient.  Anticipate discharge home with HH vs SNU facility timing yet to be determined.  Expected Discharge Date: 11/4/2024; Expected Discharge Time:       Steven Rivera MD  [facility]  10/31/24  12:52 EDT

## 2024-11-01 ENCOUNTER — INPATIENT HOSPITAL (OUTPATIENT)
Age: 49
End: 2024-11-01
Payer: COMMERCIAL

## 2024-11-01 ENCOUNTER — INPATIENT HOSPITAL (OUTPATIENT)
Dept: URBAN - METROPOLITAN AREA HOSPITAL 113 | Facility: HOSPITAL | Age: 49
End: 2024-11-01
Payer: COMMERCIAL

## 2024-11-01 DIAGNOSIS — R10.11 RIGHT UPPER QUADRANT PAIN: ICD-10-CM

## 2024-11-01 LAB
ALBUMIN SERPL-MCNC: 2.7 G/DL (ref 3.5–5.2)
ALBUMIN/GLOB SERPL: 0.9 G/DL
ALP SERPL-CCNC: 130 U/L (ref 39–117)
ALT SERPL W P-5'-P-CCNC: 53 U/L (ref 1–41)
ANION GAP SERPL CALCULATED.3IONS-SCNC: 10 MMOL/L (ref 5–15)
AST SERPL-CCNC: 28 U/L (ref 1–40)
BASOPHILS # BLD AUTO: 0.02 10*3/MM3 (ref 0–0.2)
BASOPHILS NFR BLD AUTO: 0.2 % (ref 0–1.5)
BILIRUB SERPL-MCNC: 0.5 MG/DL (ref 0–1.2)
BUN SERPL-MCNC: 18 MG/DL (ref 6–20)
BUN/CREAT SERPL: 18.2 (ref 7–25)
CALCIUM SPEC-SCNC: 8.7 MG/DL (ref 8.6–10.5)
CHLORIDE SERPL-SCNC: 97 MMOL/L (ref 98–107)
CO2 SERPL-SCNC: 27 MMOL/L (ref 22–29)
CREAT SERPL-MCNC: 0.99 MG/DL (ref 0.76–1.27)
CRP SERPL-MCNC: 7.02 MG/DL (ref 0–0.5)
DEPRECATED RDW RBC AUTO: 40.2 FL (ref 37–54)
EGFRCR SERPLBLD CKD-EPI 2021: 94.6 ML/MIN/1.73
EOSINOPHIL # BLD AUTO: 0.06 10*3/MM3 (ref 0–0.4)
EOSINOPHIL NFR BLD AUTO: 0.5 % (ref 0.3–6.2)
ERYTHROCYTE [DISTWIDTH] IN BLOOD BY AUTOMATED COUNT: 12.5 % (ref 12.3–15.4)
GLOBULIN UR ELPH-MCNC: 3.1 GM/DL
GLUCOSE BLDC GLUCOMTR-MCNC: 125 MG/DL (ref 70–130)
GLUCOSE BLDC GLUCOMTR-MCNC: 219 MG/DL (ref 70–130)
GLUCOSE BLDC GLUCOMTR-MCNC: 224 MG/DL (ref 70–130)
GLUCOSE BLDC GLUCOMTR-MCNC: 252 MG/DL (ref 70–130)
GLUCOSE SERPL-MCNC: 126 MG/DL (ref 65–99)
HCT VFR BLD AUTO: 32.5 % (ref 37.5–51)
HGB BLD-MCNC: 10.7 G/DL (ref 13–17.7)
IMM GRANULOCYTES # BLD AUTO: 0.16 10*3/MM3 (ref 0–0.05)
IMM GRANULOCYTES NFR BLD AUTO: 1.4 % (ref 0–0.5)
LYMPHOCYTES # BLD AUTO: 1.43 10*3/MM3 (ref 0.7–3.1)
LYMPHOCYTES NFR BLD AUTO: 12.1 % (ref 19.6–45.3)
MAGNESIUM SERPL-MCNC: 1.7 MG/DL (ref 1.6–2.6)
MCH RBC QN AUTO: 29.8 PG (ref 26.6–33)
MCHC RBC AUTO-ENTMCNC: 32.9 G/DL (ref 31.5–35.7)
MCV RBC AUTO: 90.5 FL (ref 79–97)
MONOCYTES # BLD AUTO: 0.83 10*3/MM3 (ref 0.1–0.9)
MONOCYTES NFR BLD AUTO: 7 % (ref 5–12)
NEUTROPHILS NFR BLD AUTO: 78.8 % (ref 42.7–76)
NEUTROPHILS NFR BLD AUTO: 9.32 10*3/MM3 (ref 1.7–7)
NRBC BLD AUTO-RTO: 0 /100 WBC (ref 0–0.2)
PHOSPHATE SERPL-MCNC: 3.5 MG/DL (ref 2.5–4.5)
PLATELET # BLD AUTO: 471 10*3/MM3 (ref 140–450)
PMV BLD AUTO: 10.1 FL (ref 6–12)
POTASSIUM SERPL-SCNC: 3.7 MMOL/L (ref 3.5–5.2)
PROT SERPL-MCNC: 5.8 G/DL (ref 6–8.5)
QT INTERVAL: 366 MS
QTC INTERVAL: 453 MS
RBC # BLD AUTO: 3.59 10*6/MM3 (ref 4.14–5.8)
SODIUM SERPL-SCNC: 134 MMOL/L (ref 136–145)
WBC NRBC COR # BLD AUTO: 11.82 10*3/MM3 (ref 3.4–10.8)

## 2024-11-01 PROCEDURE — 85025 COMPLETE CBC W/AUTO DIFF WBC: CPT | Performed by: INTERNAL MEDICINE

## 2024-11-01 PROCEDURE — 99222 1ST HOSP IP/OBS MODERATE 55: CPT | Performed by: INTERNAL MEDICINE

## 2024-11-01 PROCEDURE — 86140 C-REACTIVE PROTEIN: CPT | Performed by: INTERNAL MEDICINE

## 2024-11-01 PROCEDURE — 63710000001 INSULIN LISPRO (HUMAN) PER 5 UNITS: Performed by: STUDENT IN AN ORGANIZED HEALTH CARE EDUCATION/TRAINING PROGRAM

## 2024-11-01 PROCEDURE — 93005 ELECTROCARDIOGRAM TRACING: CPT | Performed by: HOSPITALIST

## 2024-11-01 PROCEDURE — 25010000002 CEFTRIAXONE PER 250 MG: Performed by: HOSPITALIST

## 2024-11-01 PROCEDURE — 97535 SELF CARE MNGMENT TRAINING: CPT

## 2024-11-01 PROCEDURE — 25010000002 HEPARIN (PORCINE) PER 1000 UNITS: Performed by: STUDENT IN AN ORGANIZED HEALTH CARE EDUCATION/TRAINING PROGRAM

## 2024-11-01 PROCEDURE — 82948 REAGENT STRIP/BLOOD GLUCOSE: CPT

## 2024-11-01 PROCEDURE — 99232 SBSQ HOSP IP/OBS MODERATE 35: CPT | Performed by: INTERNAL MEDICINE

## 2024-11-01 PROCEDURE — 80053 COMPREHEN METABOLIC PANEL: CPT | Performed by: STUDENT IN AN ORGANIZED HEALTH CARE EDUCATION/TRAINING PROGRAM

## 2024-11-01 PROCEDURE — 84100 ASSAY OF PHOSPHORUS: CPT | Performed by: STUDENT IN AN ORGANIZED HEALTH CARE EDUCATION/TRAINING PROGRAM

## 2024-11-01 PROCEDURE — 97530 THERAPEUTIC ACTIVITIES: CPT

## 2024-11-01 PROCEDURE — 99223 1ST HOSP IP/OBS HIGH 75: CPT | Performed by: INTERNAL MEDICINE

## 2024-11-01 PROCEDURE — 25010000002 FUROSEMIDE PER 20 MG: Performed by: HOSPITALIST

## 2024-11-01 PROCEDURE — 83735 ASSAY OF MAGNESIUM: CPT | Performed by: STUDENT IN AN ORGANIZED HEALTH CARE EDUCATION/TRAINING PROGRAM

## 2024-11-01 PROCEDURE — 63710000001 INSULIN GLARGINE PER 5 UNITS: Performed by: STUDENT IN AN ORGANIZED HEALTH CARE EDUCATION/TRAINING PROGRAM

## 2024-11-01 PROCEDURE — 99231 SBSQ HOSP IP/OBS SF/LOW 25: CPT | Performed by: STUDENT IN AN ORGANIZED HEALTH CARE EDUCATION/TRAINING PROGRAM

## 2024-11-01 PROCEDURE — 93010 ELECTROCARDIOGRAM REPORT: CPT | Performed by: INTERNAL MEDICINE

## 2024-11-01 PROCEDURE — 63710000001 INSULIN LISPRO (HUMAN) PER 5 UNITS: Performed by: INTERNAL MEDICINE

## 2024-11-01 RX ORDER — FUROSEMIDE 10 MG/ML
40 INJECTION INTRAMUSCULAR; INTRAVENOUS ONCE
Status: COMPLETED | OUTPATIENT
Start: 2024-11-01 | End: 2024-11-01

## 2024-11-01 RX ADMIN — INSULIN LISPRO 4 UNITS: 100 INJECTION, SOLUTION INTRAVENOUS; SUBCUTANEOUS at 18:50

## 2024-11-01 RX ADMIN — OXYCODONE HYDROCHLORIDE 5 MG: 5 TABLET ORAL at 18:50

## 2024-11-01 RX ADMIN — INSULIN LISPRO 4 UNITS: 100 INJECTION, SOLUTION INTRAVENOUS; SUBCUTANEOUS at 18:51

## 2024-11-01 RX ADMIN — INSULIN LISPRO 4 UNITS: 100 INJECTION, SOLUTION INTRAVENOUS; SUBCUTANEOUS at 14:09

## 2024-11-01 RX ADMIN — PANTOPRAZOLE SODIUM 40 MG: 40 TABLET, DELAYED RELEASE ORAL at 18:50

## 2024-11-01 RX ADMIN — VENLAFAXINE HYDROCHLORIDE 75 MG: 75 CAPSULE, EXTENDED RELEASE ORAL at 09:24

## 2024-11-01 RX ADMIN — MUPIROCIN 1 APPLICATION: 20 OINTMENT TOPICAL at 21:27

## 2024-11-01 RX ADMIN — PANTOPRAZOLE SODIUM 40 MG: 40 TABLET, DELAYED RELEASE ORAL at 09:24

## 2024-11-01 RX ADMIN — FUROSEMIDE 40 MG: 10 INJECTION, SOLUTION INTRAMUSCULAR; INTRAVENOUS at 14:09

## 2024-11-01 RX ADMIN — MUPIROCIN 1 APPLICATION: 20 OINTMENT TOPICAL at 09:25

## 2024-11-01 RX ADMIN — OXYCODONE HYDROCHLORIDE 5 MG: 5 TABLET ORAL at 09:24

## 2024-11-01 RX ADMIN — INSULIN LISPRO 3 UNITS: 100 INJECTION, SOLUTION INTRAVENOUS; SUBCUTANEOUS at 21:29

## 2024-11-01 RX ADMIN — CARVEDILOL 6.25 MG: 6.25 TABLET, FILM COATED ORAL at 09:25

## 2024-11-01 RX ADMIN — CEFTRIAXONE 2000 MG: 2 INJECTION, POWDER, FOR SOLUTION INTRAMUSCULAR; INTRAVENOUS at 14:09

## 2024-11-01 RX ADMIN — INSULIN LISPRO 2 UNITS: 100 INJECTION, SOLUTION INTRAVENOUS; SUBCUTANEOUS at 14:08

## 2024-11-01 RX ADMIN — OXYCODONE HYDROCHLORIDE 5 MG: 5 TABLET ORAL at 02:19

## 2024-11-01 RX ADMIN — HEPARIN SODIUM 5000 UNITS: 5000 INJECTION INTRAVENOUS; SUBCUTANEOUS at 21:30

## 2024-11-01 RX ADMIN — INSULIN GLARGINE 10 UNITS: 100 INJECTION, SOLUTION SUBCUTANEOUS at 21:28

## 2024-11-01 RX ADMIN — NALOXEGOL OXALATE 25 MG: 25 TABLET, FILM COATED ORAL at 09:27

## 2024-11-01 RX ADMIN — HEPARIN SODIUM 5000 UNITS: 5000 INJECTION INTRAVENOUS; SUBCUTANEOUS at 14:12

## 2024-11-01 RX ADMIN — HEPARIN SODIUM 5000 UNITS: 5000 INJECTION INTRAVENOUS; SUBCUTANEOUS at 06:19

## 2024-11-01 NOTE — CONSULTS
GI CONSULT  NOTE:    Referring Provider: Dr. Wisdom    Chief complaint: Abdominal pain    Subjective .     History of present illness:    49 years old male, who got admitted because of significant right upper quadrant pain which progressively gotten worse associated with the nausea vomiting initially came into the emergency room with a white cell count 13,000, CT abdominal and pelvis showed a significant gastric wall thickening involving the antrum and adjacent fat area, did undergo upper endoscopy lamination by Dr. Lauren Brylee on October 24 with unremarkable examination, he had a HIDA scan performed at that time which showed low ejection fraction of 19% possible: Cholecystitis, later CT demonstrated large right subcapsular fluid collection with a mass effect on the liver underwent CT-guided drain placement on October 28 with a thick turbid bilious material was drained initially he had a significant drainage close to 1500 cc which progressively decreased have grown Klebsiella.  We have been consulted for possible ERCP.    Patient was seen yesterday as evening as well when he was complaining significant discomfort and pain however today his pain is better less nauseous tolerating p.o.  He has been having a bowel movement.  We did review his labs as well as imaging along with the MRCP.,  His liver function test did peak to  and ALT to 164 bilirubin 2.6 which is now normalized.  Alkaline phosphatase more than 222 which is now slowly improving    Endo History:  October 24, 2024.  EGD was unremarkable    Past Medical History:  Past Medical History:   Diagnosis Date    Anxiety     CHF (congestive heart failure)     Cough     Inability to attain erection     Injury of acoustic nerve     Myalgia     Type II diabetes mellitus     Venous insufficiency     Viral illness     Vitamin D deficiency        Past Surgical History:  Past Surgical History:   Procedure Laterality Date    CARDIAC CATHETERIZATION N/A 9/13/2023     Procedure: Left Heart Cath;  Surgeon: Kaylee Kay MD;  Location: [facility] CATH INVASIVE LOCATION;  Service: Cardiology;  Laterality: N/A;    CARDIAC CATHETERIZATION N/A 9/13/2023    Procedure: Coronary angiography;  Surgeon: Kaylee Kay MD;  Location: [facility] CATH INVASIVE LOCATION;  Service: Cardiology;  Laterality: N/A;    ENDOSCOPY N/A 10/24/2024    Procedure: ESOPHAGOGASTRODUODENOSCOPY with biopsies;  Surgeon: Elma Wiggins MD;  Location: [facility] ENDOSCOPY;  Service: Gastroenterology;  Laterality: N/A;  pre-abdominal pain  post-normal       Social History:  Social History     Tobacco Use    Smoking status: Never    Smokeless tobacco: Never   Vaping Use    Vaping status: Never Used   Substance Use Topics    Alcohol use: Never    Drug use: Never       Family History:  Family History   Problem Relation Age of Onset    Diabetes Mother        Medications:  Medications Prior to Admission   Medication Sig Dispense Refill Last Dose/Taking    melatonin 5 MG tablet tablet Take 1 tablet by mouth At Night As Needed (insomnia). 90 tablet 1 10/22/2024    ondansetron (ZOFRAN) 8 MG tablet Take 1 tablet by mouth Every 8 (Eight) Hours As Needed for Nausea or Vomiting. 8 tablet 0 10/22/2024    spironolactone (ALDACTONE) 25 MG tablet TAKE 1 TABLET BY MOUTH EVERY DAY 90 tablet 1 10/22/2024    venlafaxine XR (EFFEXOR-XR) 150 MG 24 hr capsule Take 1 capsule by mouth Daily. (Patient taking differently: Take 75 mg by mouth Daily.) 90 capsule 1 10/22/2024    vitamin D (ERGOCALCIFEROL) 1.25 MG (81315 UT) capsule capsule Take 1 capsule by mouth 1 (One) Time Per Week.   10/22/2024    atorvastatin (LIPITOR) 40 MG tablet Take 1 tablet by mouth Daily. 90 tablet 3 Morning    carvedilol (COREG) 25 MG tablet Take 1 tablet by mouth Every 12 (Twelve) Hours. 180 tablet 1     empagliflozin (JARDIANCE) 10 MG tablet tablet Take 1 tablet by mouth Daily. 90 tablet 1     hydrOXYzine (ATARAX) 10 MG tablet TAKE 1 TABLET BY MOUTH EVERY 8 HOURS AS  NEEDED FOR ANXIETY. 270 tablet 1     metFORMIN (GLUCOPHAGE) 1000 MG tablet Take 1 tablet by mouth 2 (Two) Times a Day With Meals. 180 tablet 3 Morning    NovoLOG FlexPen 100 UNIT/ML solution pen-injector sc pen INJECT 5 UNITS INTO THE SKIN 3 (THREE) TIMES DAILY WITH MEALS PLUS UP TO 15 UNITS SLIDING SCALE.   Morning    sacubitril-valsartan (ENTRESTO) 24-26 MG tablet Take 1 tablet by mouth 2 (Two) Times a Day. 180 tablet 1        Scheduled Meds:carvedilol, 6.25 mg, Oral, Q12H  cefTRIAXone, 2,000 mg, Intravenous, Q24H  heparin (porcine), 5,000 Units, Subcutaneous, Q8H  insulin glargine, 10 Units, Subcutaneous, Nightly  insulin lispro, 2-7 Units, Subcutaneous, 4x Daily AC & at Bedtime  insulin lispro, 4 Units, Subcutaneous, TID With Meals  mupirocin, 1 Application, Each Nare, BID  Naloxegol Oxalate, 25 mg, Oral, QAM  pantoprazole, 40 mg, Oral, BID AC  senna-docusate sodium, 2 tablet, Oral, BID   And  polyethylene glycol, 17 g, Oral, BID  venlafaxine XR, 75 mg, Oral, Daily      Continuous Infusions:   PRN Meds:.  senna-docusate sodium **AND** polyethylene glycol **AND** bisacodyl **AND** bisacodyl    Calcium Replacement - Follow Nurse / BPA Driven Protocol    dextrose    dextrose    glucagon (human recombinant)    hydrOXYzine    influenza vaccine    melatonin    [DISCONTINUED] Morphine **AND** naloxone    ondansetron ODT **OR** ondansetron    oxyCODONE    Phosphorus Replacement - Follow Nurse / BPA Driven Protocol    Potassium Replacement - Follow Nurse / BPA Driven Protocol    sodium chloride    ALLERGIES:  Patient has no known allergies.    ROS:  Review of Systems  As mentioned above in H&P  Objective     Vital Signs:   Temp:  [97.7 °F (36.5 °C)-98.4 °F (36.9 °C)] 97.7 °F (36.5 °C)  Heart Rate:  [92-98] 97  Resp:  [18] 18  BP: (100-119)/(56-81) 110/81    Physical Exam:      General Appearance:    Awake and alert, in no acute distress   Head:    Normocephalic, without obvious abnormality, atraumatic   Eyes:             Conjunctivae normal, anicteric sclera   Ears:    Ears appear intact with no abnormalities noted   Throat:   No oral lesions, no thrush, oral mucosa moist   Neck:   No adenopathy, supple, no thyromegaly, no JVD   Lungs:     Clear to auscultation bilaterally, respirations regular, even and unlabored    Heart:    Regular rhythm and normal rate, normal S1 and S2, no            murmur, no gallop, no rub   Chest Wall:    No abnormalities observed   Abdomen:   Significantly tender right upper quadrant area with a CARLA drain noticed with the purulent turbid colored material.   Rectal:     Deferred   Extremities:   Moves all extremities well, no edema, no cyanosis, no             redness   Pulses:   Pulses palpable and equal bilaterally   Skin:   No bleeding, bruising or rash, no jaundice   Lymph nodes:   No palpable adenopathy   Neurologic:   Cranial nerves 2 - 12 grossly intact, no asterixis, sensation intact       Results Review:   I reviewed the patient's labs and imaging.  CBC  Results from last 7 days   Lab Units 11/01/24  0635 10/31/24  0542 10/30/24  0639 10/29/24  0652 10/28/24  1959 10/27/24  2310 10/27/24  0329   RBC 10*6/mm3 3.59* 3.73* 3.64* 3.99* 4.21 4.02* 4.25   WBC 10*3/mm3 11.82* 11.35* 14.38* 15.53* 16.23* 14.14* 10.64   HEMOGLOBIN g/dL 10.7* 11.1* 10.6* 11.6* 12.3* 12.0* 12.7*   PLATELETS 10*3/mm3 471* 392 364 370 382 349 352       CMP  Results from last 7 days   Lab Units 11/01/24  0635 10/31/24  0542 10/30/24  2148 10/30/24  0639 10/29/24  0652 10/29/24  0036 10/28/24  2059 10/28/24  1959 10/27/24  2205   SODIUM mmol/L 134* 135*  --  135* 136  --  143 141 137   POTASSIUM mmol/L 3.7 4.1 4.0 3.1* 3.9  --  4.5 4.3 3.9   CHLORIDE mmol/L 97* 100  --  102 107  --  110* 108* 106   CO2 mmol/L 27.0 25.6  --  21.4* 16.6*  --  16.0* 16.6* 19.0*   BUN mg/dL 18 17  --  23* 30*  --  35* 35* 28*   CREATININE mg/dL 0.99 1.04  --  0.78 1.14  --  1.06 1.08 0.93   GLUCOSE mg/dL 126* 155*  --  122* 176*  --  159* 161* 177*    ALBUMIN g/dL 2.7* 2.2*  --  2.2* 1.7*  --  2.0* 3.6 2.5*   BILIRUBIN mg/dL 0.5 0.8  --  0.8 0.7  --  0.7 0.7 0.7   ALK PHOS U/L 130* 138*  --  149* 189*  --  222* 229* 217*   AST (SGOT) U/L 28 40  --  55* 154*  --  199* 229* 121*   ALT (SGPT) U/L 53* 68*  --  83* 135*  --  159* 164* 121*   AMMONIA umol/L  --   --   --   --   --  17  --   --   --        Amylase and Lipase  Results from last 7 days   Lab Units 10/27/24  0329   LIPASE U/L 11*       CRP     Results from last 7 days   Lab Units 11/01/24  0635   CRP mg/dL 7.02*       Imaging Results (Last 24 Hours)       ** No results found for the last 24 hours. **               ASSESSMENT:  49 years old male who has a multiple medical problem including CHF type 2 diabetes got admitted with a epigastric upper abdominal pain with negative EGD later CTA shown large right subcapsular fluid collection status post of drain placement by IR.  Noticed to have abnormal liver function test which are progressively improving.    Suspect patient likely have acute on chronic cholecystitis with subcapsular fluid collection likely related to gallbladder etiology.  Doubt he has a bile leak, it will be relatively unusual to have a bile duct injury without any intervention, Other possibly could be a liver abscess. EGD was unrevealing and without any obvious duodenal injury or trauma.  Clinically he is improving and his drain is also progressively decreasing with initial drain was almost close to 1500 now it is down to 30 cc.  His abdominal pain has also improved.    Acute on chronic cholecystitis  Nonischemic cardiomyopathy, diabetes, combined diastolic systolic heart failure  Principal Problem:    Epigastric abdominal pain  Active Problems:    Multiple-type hyperlipidemia    Type 2 diabetes mellitus with diabetic neuropathy, with long-term current use of insulin    Benign essential HTN    Anxiety    Chronic combined systolic and diastolic heart failure    Leukocytosis    Abscess of  liver    Klebsiella pneumoniae (k. pneumoniae) as the cause of diseases classified elsewhere    Hypotension    Transaminitis       PLAN:  At this point given significant decrease in drainage as well as no significant bilious looking drain and LFTs are progressively improving there is no immediate need for ERCP.  Eventually he will need cholecystectomy with IOC.  Continue with the antibiotic as per infectious disease.  We will be available if needed.    I discussed the patients findings and my recommendations with the patient.  Evie Ken MD  11/01/24  19:05 EDT

## 2024-11-01 NOTE — PLAN OF CARE
Goal Outcome Evaluation:   Pt remains in CCU in telemetry status on room air. Pt receiving IV ABX. PRN pain medication given two times this shift. Pt was able to work successfully with PT this shift. Drain output 10cc this shift.

## 2024-11-01 NOTE — PROGRESS NOTES
"    Patient Name: Donnie Bishop  :1975  49 y.o.      Patient Care Team:  Juju Kennedy MD as PCP - General (Family Medicine)    Chief Complaint:   CM  Interval History:   Bigeminy on tele. Maybe a bit confused? Describes orthopnea      Objective   Vital Signs  Temp:  [98.1 °F (36.7 °C)-98.8 °F (37.1 °C)] 98.1 °F (36.7 °C)  Heart Rate:  [] 92  Resp:  [18] 18  BP: (104-119)/(56-77) 119/72    Intake/Output Summary (Last 24 hours) at 2024 1227  Last data filed at 2024 0009  Gross per 24 hour   Intake --   Output 2180 ml   Net -2180 ml     Flowsheet Rows      Flowsheet Row First Filed Value   Admission Height 170.2 cm (67\") Documented at 10/22/2024 1641   Admission Weight 76.2 kg (168 lb) Documented at 10/22/2024 1641            Physical Exam:   General Appearance:    Alert, cooperative, in no acute distress   Lungs:     Clear to auscultation.  Normal respiratory effort and rate.      Heart:    Regular rhythm and normal rate, normal S1 and S2, no murmurs, gallops or rubs.     Chest Wall:    No abnormalities observed   Abdomen:     Soft, nontender, positive bowel sounds.     Extremities:   no cyanosis, clubbing or edema.  No marked joint deformities.  Adequate musculoskeletal strength.       Results Review:    Results from last 7 days   Lab Units 24  0635   SODIUM mmol/L 134*   POTASSIUM mmol/L 3.7   CHLORIDE mmol/L 97*   CO2 mmol/L 27.0   BUN mg/dL 18   CREATININE mg/dL 0.99   GLUCOSE mg/dL 126*   CALCIUM mg/dL 8.7     Results from last 7 days   Lab Units 10/28/24  0045 10/27/24  2205   HSTROP T ng/L 30* 28*     Results from last 7 days   Lab Units 24  0635   WBC 10*3/mm3 11.82*   HEMOGLOBIN g/dL 10.7*   HEMATOCRIT % 32.5*   PLATELETS 10*3/mm3 471*     Results from last 7 days   Lab Units 10/28/24  1959 10/28/24  1129   INR  1.13* 1.0   APTT seconds 28.8  --      Results from last 7 days   Lab Units 24  0635   MAGNESIUM mg/dL 1.7                   Medication Review: "   carvedilol, 6.25 mg, Oral, Q12H  cefTRIAXone, 2,000 mg, Intravenous, Q24H  furosemide, 40 mg, Intravenous, Once  heparin (porcine), 5,000 Units, Subcutaneous, Q8H  insulin glargine, 10 Units, Subcutaneous, Nightly  insulin lispro, 2-7 Units, Subcutaneous, 4x Daily AC & at Bedtime  insulin lispro, 4 Units, Subcutaneous, TID With Meals  mupirocin, 1 Application, Each Nare, BID  Naloxegol Oxalate, 25 mg, Oral, QAM  pantoprazole, 40 mg, Oral, BID AC  senna-docusate sodium, 2 tablet, Oral, BID   And  polyethylene glycol, 17 g, Oral, BID  venlafaxine XR, 75 mg, Oral, Daily              Assessment & Plan   NICM with recovered EF, appears euvolemic though continues to note restricted breathing.  Bigeminy, improved on coreg 6.25  Biloma/gall bladder sludge/subcapsular fluid collection s/p [id number] growing klebsiella  Hypotension resolved. Midodrine stopped    Looks better today. Bigeminy improved. Tolerating coreg.   Lasix per renal today  Kaylee Kay MD  [facility]  11/01/24  12:27 EDT

## 2024-11-01 NOTE — CASE MANAGEMENT/SOCIAL WORK
Continued Stay Note  [facility]     Patient Name: Donnie Bishop  MRN: [id number]  Today's Date: 11/1/2024    Admit Date: 10/22/2024    Plan: Pending snf choices. Patient to give ccp choices on 11/1.  Will need precert   Discharge Plan       Row Name 11/01/24 1538       Plan    Patient/Family in Agreement with Plan yes    Plan Comments Attempted to get facility choices from patient, but he stated that he still hadn't decided. Advised that CCP will continue to follow and that the sooner he gives us choices,  the more likely he will get facility he wants. He verbalized understanding. Will need pre-cert                   Discharge Codes    No documentation.                 Expected Discharge Date and Time       Expected Discharge Date Expected Discharge Time    Nov 4, 2024               Jacqueline Mi RN

## 2024-11-01 NOTE — PROGRESS NOTES
"General Surgery Progress Note    CC: Cholecystitis, subcapsular biloma, right upper quadrant abdominal pain    S: Patient awake and alert, sitting up in the chair.  States he has had a little bit of abdominal pain and nausea but that these are better.  He has not had any vomiting.  He is been tolerating his diet.  He states that he has not had another bowel movement today but has been passing gas.  He had approximately 30 cc of thick bile come out of his drain overnight and just a small amount this morning per the nurse.    O:/72 (BP Location: Right arm, Patient Position: Lying)   Pulse 92   Temp 98.1 °F (36.7 °C) (Axillary)   Resp 18   Ht 170 cm (66.93\")   Wt 77.7 kg (171 lb 4.8 oz)   SpO2 93%   BMI 26.89 kg/m²     Intake & Output (last day)         10/31 0701  11/01 0700 11/01 0701 11/02 0700    P.O.      Total Intake(mL/kg)      Urine (mL/kg/hr) 2150 (1.2)     Drains 30     Total Output 2180     Net -2180                   GENERAL: Awake, alert, interactive, cooperative, answering questions appropriately  HEENT: EOMI, moist mucus membranes   CHEST: normal work of breathing on room air  CARDIAC: Regular rate  GI: Abd soft, non distended, mildly tender in the right upper quadrant around drain site without rebound or guarding; RUQ IR drain with small amount of purulent bilious output  EXTREMITIES: MOCK, no cyanosis, no lower extremity edema  SKIN: Warm and dry    LABS  Results from last 7 days   Lab Units 11/01/24  0635 10/31/24  0542 10/30/24  0639 10/29/24  0652 10/28/24  1959 10/27/24  2310 10/27/24  0329   WBC 10*3/mm3 11.82* 11.35* 14.38* 15.53*   < > 14.14* 10.64   HEMOGLOBIN g/dL 10.7* 11.1* 10.6* 11.6*   < > 12.0* 12.7*   HEMATOCRIT % 32.5* 33.5* 33.5* 37.1*   < > 36.6* 38.7   PLATELETS 10*3/mm3 471* 392 364 370   < > 349 352   MONOCYTES % %  --   --   --  9.9  --  10.1 7.1   EOSINOPHIL % %  --   --   --  0.0*  --  0.0* 0.0*    < > = values in this interval not displayed.     Results from last 7 " days   Lab Units 11/01/24  0635 10/31/24  0542 10/30/24  2148 10/30/24  0639   SODIUM mmol/L 134* 135*  --  135*   POTASSIUM mmol/L 3.7 4.1 4.0 3.1*   CHLORIDE mmol/L 97* 100  --  102   CO2 mmol/L 27.0 25.6  --  21.4*   BUN mg/dL 18 17  --  23*   CREATININE mg/dL 0.99 1.04  --  0.78   CALCIUM mg/dL 8.7 8.9  --  9.0   BILIRUBIN mg/dL 0.5 0.8  --  0.8   ALK PHOS U/L 130* 138*  --  149*   ALT (SGPT) U/L 53* 68*  --  83*   AST (SGOT) U/L 28 40  --  55*   GLUCOSE mg/dL 126* 155*  --  122*     Results from last 7 days   Lab Units 10/28/24  1959 10/28/24  1129   INR  1.13* 1.0   APTT seconds 28.8  --    Magnesium 1.5  Phosphorus 2.6    Cardiovascular:  Echo 10/26/24:    Left ventricular systolic function is low normal. Calculated left ventricular EF = 49.7%    The following left ventricular wall segments are hypokinetic: apical septal and apex hypokinetic.    Left ventricular diastolic function was normal.    Estimated right ventricular systolic pressure from tricuspid regurgitation is mildly elevated (35-45 mmHg). Calculated right ventricular systolic pressure from tricuspid regurgitation is 40 mmHg.    Mild mitral valve regurgitation is present.    IMAGING:    -CT demonstrating contracted gallbladder with mild hyperenhancing wall.  EGD performed 10/24 negative for abnormalities; August 2024 CT had distended gallbladder but no other findings of cholecystitis.  -HIDA scan 10/24 demonstrating filling but with decreased ejection fraction 19% concerning for possible chronic cholecystitis.   -CTA abdomen pelvis 10/26 to evaluate worsening RUQ pain and possible mesenteric ischemia demonstrated a large right subcapsular fluid collection with mass effect on the liver; no active extravasation  -s/p CT-guided IR drainage 10/28 with thick, turbid bilious output from drain - bile leak vs abscess in the differential, cause unclear but may be related to cholecystitis.  -MRCP 10/30/24 study limited; nondistended, inflamed appearing  gallbladder containing stones present; subcapsular fluid collection decreased as expected with drainage; also a second loculated collection appearing contiguous with the duodenum and inferior to the gallbladder fossa has decreased in size.  Nonenhancing lesion in the inferior right hepatic lobe new since August, may be related to biloma, for which follow up is recommended    A/P: 49 y.o. male with multiple medical problems including CHF, hypertension, hyperlipidemia, type 2 diabetes mellitus with A1c 9.4, presenting with abdominal pain, found to have possible cholecystitis, with Klebsiella infected biloma.      AVSS. Patient clinically improving, tolerating diet, having bowel function.   WBC and LFTs stable.   Drain output decreasing, 30 cc last 24h, still with thick bile. Continue drainage, strict outputs.  Will follow up GI recommendations regarding possible ERCP. Since drainage continues to decrease, may be safer to just continue perc drainage rather than proceed with ERCP at this time.  Ultimately he will need to have his gallbladder out, but he will need some time to recover from his sepsis/liver abscess/capsular disruption prior to proceeding with surgery.    Manuela Wisdom MD  General, Robotic and Endoscopic Surgery  [facility]    [address], [address]  [place], [place], [address]  P: [phone]  F: [phone]

## 2024-11-01 NOTE — PLAN OF CARE
Goal Outcome Evaluation:  Plan of Care Reviewed With: patient           Outcome Evaluation: Pt awake and alert, agreeable to therapy, premedicated prior to therapy session after discussion with RN. Patient performed bed mobility with modA, sit to stand with CGA, ambulated in hallway with rwx and Brittny. Pt demonstrates improvement in mobility and activity tolerance today. d/c recommendation is SNU.

## 2024-11-01 NOTE — PROGRESS NOTES
"Nutrition Services    Patient Name:  Donnie Bishop  YOB: 1975  MRN: [id number]  Admit Date:  10/22/2024Assessment Date:  11/01/24    NUTRITION SCREENING      Reason for Encounter LOS   Diagnosis/Problem Epigastric abdominal pain    PMH CHF, HTN, HLD, DM2  Tolerating diet  Having Bms  RUQ drain in place with 30cc output last 24 hours- improving   PO Diet Diet: Diabetic; Consistent Carbohydrate; Fluid Consistency: Thin (IDDSI 0)   Supplements na   PO Intake % Did not eat breakfast, but ate most of lunch        Medications MAR reviewed by RD   Labs  Listed below, reviewed   Physical Findings Alert, oriented, 1-2+  edema    GI Function BM 10/30   Skin Status Intact        Height  Weight  BMI  Weight Trend     Height: 170 cm (66.93\")  Weight: 77.7 kg (171 lb 4.8 oz) (11/01/24 0700)  Body mass index is 26.89 kg/m².  Loss, noted diuretic and edema        Nutrition Problem (PES) Inadequate oral intake related to epigastric abdominal pain as evidence by <75% of meals consumed.       Intervention/Plan Boost BG BID   Encourage oral intakes >75%    RD to follow up per protocol.     Results from last 7 days   Lab Units 11/01/24  0635 10/31/24  0542 10/30/24  2148 10/30/24  0639   SODIUM mmol/L 134* 135*  --  135*   POTASSIUM mmol/L 3.7 4.1 4.0 3.1*   CHLORIDE mmol/L 97* 100  --  102   CO2 mmol/L 27.0 25.6  --  21.4*   BUN mg/dL 18 17  --  23*   CREATININE mg/dL 0.99 1.04  --  0.78   CALCIUM mg/dL 8.7 8.9  --  9.0   BILIRUBIN mg/dL 0.5 0.8  --  0.8   ALK PHOS U/L 130* 138*  --  149*   ALT (SGPT) U/L 53* 68*  --  83*   AST (SGOT) U/L 28 40  --  55*   GLUCOSE mg/dL 126* 155*  --  122*     Results from last 7 days   Lab Units 11/01/24  0635 10/31/24  0542 10/30/24  1719 10/30/24  0639   MAGNESIUM mg/dL 1.7 1.5*  --  1.7   PHOSPHORUS mg/dL 3.5 2.6   < > 2.1*   HEMOGLOBIN g/dL 10.7* 11.1*  --  10.6*   HEMATOCRIT % 32.5* 33.5*  --  33.5*    < > = values in this interval not displayed.     Lab Results   Component Value Date "    HGBA1C 9.40 (H) 10/24/2024         Electronically signed by:  Courtney Kerr RD  11/01/24 13:58 EDT

## 2024-11-01 NOTE — PROGRESS NOTES
Name: Donnie Bishop ADMIT: 10/22/2024   : 1975  PCP: Juju Kennedy MD    MRN: [id number] LOS: 8 days   AGE/SEX: 49 y.o. male  ROOM: [facility]     Subjective   Subjective   Looks little better today.  Still having abdominal pain.    Objective   Objective   Vital Signs  Temp:  [98 °F (36.7 °C)-98.8 °F (37.1 °C)] 98.4 °F (36.9 °C)  Heart Rate:  [] 92  Resp:  [18-28] 18  BP: (104-119)/(56-83) 119/72  SpO2:  [93 %-95 %] 93 %  on   ;   Device (Oxygen Therapy): room air  Body mass index is 26.89 kg/m².  Physical Exam  General: not in distress, ill-appearing,  CV: Regular rate and rhythm, no murmurs rubs or gallops  Lungs: Diminished at bases, no wheezing, no crackles, right upper quadrant drain in place.  Abdomen: Soft, distended, tenderness to palpation  Extremities: Trace edema in lower extremities,, no cyanosis     Results Review     I reviewed the patient's new clinical results.  Results from last 7 days   Lab Units 24  0635 10/31/24  0542 10/30/24  0639 10/29/24  06   WBC 10*3/mm3 11.82* 11.35* 14.38* 15.53*   HEMOGLOBIN g/dL 10.7* 11.1* 10.6* 11.6*   PLATELETS 10*3/mm3 471* 392 364 370     Results from last 7 days   Lab Units 24  0635 10/31/24  0542 10/30/24  2148 10/30/24  0639 10/29/24  0652 10/28/24  2059 10/28/24  1959 10/27/24  2205   SODIUM mmol/L 134* 135*  --  135* 136 143 141 137   POTASSIUM mmol/L 3.7 4.1 4.0 3.1* 3.9 4.5 4.3 3.9   CHLORIDE mmol/L 97* 100  --  102 107 110* 108* 106   CO2 mmol/L 27.0 25.6  --  21.4* 16.6* 16.0* 16.6* 19.0*   BUN mg/dL 18 17  --  23* 30* 35* 35* 28*   CREATININE mg/dL 0.99 1.04  --  0.78 1.14 1.06 1.08 0.93   GLUCOSE mg/dL 126* 155*  --  122* 176* 159* 161* 177*   EGFR mL/min/1.73 94.6 89.1  --  110.7 79.8 87.1 85.2 101.9   ALBUMIN g/dL 2.7* 2.2*  --  2.2* 1.7* 2.0* 3.6 2.5*   BILIRUBIN mg/dL 0.5 0.8  --  0.8 0.7 0.7 0.7 0.7   ALK PHOS U/L 130* 138*  --  149* 189* 222* 229* 217*   AST (SGOT) U/L 28 40  --  55* 154* 199* 229* 121*   ALT (SGPT) U/L  53* 68*  --  83* 135* 159* 164* 121*     Results from last 7 days   Lab Units 11/01/24  0635 10/31/24  0542 10/30/24  1719 10/30/24  0639 10/29/24  0652   CALCIUM mg/dL 8.7 8.9  --  9.0 10.1   ALBUMIN g/dL 2.7* 2.2*  --  2.2* 1.7*   MAGNESIUM mg/dL 1.7 1.5*  --  1.7 2.3   PHOSPHORUS mg/dL 3.5 2.6 2.3* 2.1* 2.6     Results from last 7 days   Lab Units 10/26/24  0545   PROCALCITONIN ng/mL 30.30*     COVID19   Date Value Ref Range Status   10/25/2024 Not Detected Not Detected - Ref. Range Final   12/26/2023 Not Detected Not Detected - Ref. Range Final     Glucose   Date/Time Value Ref Range Status   11/01/2024 0645 125 70 - 130 mg/dL Final   10/31/2024 2039 204 (H) 70 - 130 mg/dL Final   10/31/2024 1553 155 (H) 70 - 130 mg/dL Final   10/31/2024 1057 173 (H) 70 - 130 mg/dL Final   10/30/2024 1952 101 70 - 130 mg/dL Final   10/30/2024 1523 258 (H) 70 - 130 mg/dL Final   10/30/2024 1056 253 (H) 70 - 130 mg/dL Final       MRI abdomen w wo contrast mrcp  MRI AND MRCP ABDOMEN WITH AND WITHOUT CONTRAST     HISTORY: Upper abdominal pain for a week, HIDA scan in late October  concerning for chronic cholecystitis with subsequent CT abdomen and  pelvis showing large right subcapsular hepatic fluid collection status  post percutaneous drainage demonstrating bilious output growing  Klebsiella     TECHNIQUE: Multiplanar multisequence MRI an MRCP of the abdomen was  performed before and after the IV administration of MultiHance.     COMPARISON: Multiple CT abdomen pelvis dating back to 8/31/2024 and HIDA  scan 10/25/2024     FINDINGS: Please note evaluation significantly suboptimal, particularly  the pre and postcontrast weighted T1 fat-sat imaging.     Asymmetric pulmonary consolidation with right pleural effusion is only  partially seen and cannot be evaluated. Ill-defined T2 hypointensity is  present within the gallbladder which has a thickened appearance,  hyperenhancing appearance with adjacent stranding which contacts  portions  of the distal stomach and duodenum as well. Loculated appearing  collection contiguous with the duodenum inferior to the gallbladder  fossa measures up to 2.5 x 1.8 x 3.1 cm (previously 3 x 3.4 x 3.4 cm on  10/26/2024).. No intra or extrahepatic bili duct dilation is present.  Main pancreatic duct is not distended.     There is a nonenhancing T2 hyperintense lesion within the inferior  hepatic lobe measuring 1.6 cm, new since 8/31/2024. There is a small  amount of fluid along the superior and anterior aspects of the right  hepatic lobe corresponding with the previously seen subcapsular  collection on 10/26/2024. It is overall significantly decreased in size,  measuring up to approximately 0.7 cm in thickness and coronal imaging  (previously 7.3 cm on 10/26/2024). Presumed drainage catheter is  visualized over the right aspect of the abdominal wall; however, is not  well evaluated on MRI. Somewhat nodular thickening of the bilateral  adrenal glands with overall preservation adreniform shape, as seen since  12/17/2023.     IMPRESSION  1.  Significant interval decrease in size of the previously seen  subcapsular collection status post percutaneous drainage placement.  Please note evaluation for current location of the drain is suboptimal  on MRI. A second loculated appearing collection contiguous with the  duodenum and inferior to the gallbladder fossa also appears to have  mildly decreased in size since 10/26/2024. Given findings on recent  fluid sampling, primary differential considerations include abscess  and/or biloma and can be further evaluated with HIDA scan if clinically  indicated.  2.  The gallbladder has an inflamed appearance with T2 hyperintense  sludge and/or small stones. The gallbladder is otherwise nondistended.  No intra or extrahepatic bili ductal dilation with common bile duct  grossly similar in size that seen on 8/31/2024. Inflammation contacts  portions of the distal stomach and duodenum and  differential  considerations include cholecystitis, gastritis and/or duodenitis.  Correlation with patient history is recommended with follow-up HIDA scan  and/or endoscopy if clinically indicated.  3.  Asymmetric pulmonary consolidation right pleural effusion is only  partially seen and cannot be evaluated. Pneumonia cannot be excluded in  the appropriate context and correlation with patient history is  recommend follow-up chest CT if clinically indicated. At least continued  attention on follow-up is recommended to ensure resolution  4.  Nonenhancing T2 hyperintense lesion within the inferior right  hepatic lobe which is new since 8/31/2024, nonspecific and may represent  local extension of biloma and/or infected fluid in the liver. Continued  close interval follow-up is recommended to ensure resolution and/or  stability.  5.  Other findings as above.        This report was finalized on 10/31/2024 9:09 AM by Dr. Michael Avelar M.D on Workstation: [id number]       Scheduled Medications  carvedilol, 6.25 mg, Oral, Q12H  cefTRIAXone, 2,000 mg, Intravenous, Q24H  heparin (porcine), 5,000 Units, Subcutaneous, Q8H  insulin glargine, 10 Units, Subcutaneous, Nightly  insulin lispro, 2-7 Units, Subcutaneous, 4x Daily AC & at Bedtime  insulin lispro, 4 Units, Subcutaneous, TID With Meals  mupirocin, 1 Application, Each Nare, BID  Naloxegol Oxalate, 25 mg, Oral, QAM  pantoprazole, 40 mg, Oral, BID AC  senna-docusate sodium, 2 tablet, Oral, BID   And  polyethylene glycol, 17 g, Oral, BID  venlafaxine XR, 75 mg, Oral, Daily    Infusions     Diet  Diet: Diabetic; Consistent Carbohydrate; Fluid Consistency: Thin (IDDSI 0)    I have personally reviewed     [x]  Laboratory   [x]  Microbiology   [x]  Radiology   [x]  EKG/Telemetry  []  Cardiology/Vascular   []  Pathology    []  Records       Assessment/Plan     Active Hospital Problems    Diagnosis  POA    **Epigastric abdominal pain [R10.13]  Yes    Abscess of liver [K75.0]   Yes    Klebsiella pneumoniae (k. pneumoniae) as the cause of diseases classified elsewhere [B96.1]  Yes    Hypotension [I95.9]  Yes    Transaminitis [R74.01]  Yes    Leukocytosis [D72.829]  Yes    Chronic combined systolic and diastolic heart failure [I50.42]  Yes    Anxiety [F41.9]  Yes    Multiple-type hyperlipidemia [E78.2]  Yes    Type 2 diabetes mellitus with diabetic neuropathy, with long-term current use of insulin [E11.40, Z79.4]  Not Applicable    Benign essential HTN [I10]  Yes      Resolved Hospital Problems    Diagnosis Date Resolved POA    Acute kidney injury [N17.9] 10/30/2024 No       49 y.o. man with diabetes, HTN, hyperlipidemia, chronic systolic and diastolic heart failure (non-ischemic) and anxiety who presented with severe abdominal/epigastric pain.  EGD was negative.  CT scan demonstrated a large right subcapsular fluid collection with mass effect on liver.  He underwent drainage of liver fluid collection by IR on 10/28/2024.  Culture thus far positive for rare growth Klebsiella pneumoniae.    Afebrile, 119/72  Labs stable  ID to see  Continue Rocephin  Does not seem cholecystectomy planned this admission  GI to follow-up  BP stable    Patient requesting short-term disability papers to be completed.  He states typically has 2 weeks to turn these in.  Will wait till closer to discharge when plan of care more finalized.      Continue outpatient follow-up regarding acoustic neuroma.  MRI here showed possible vestibular schwannoma that will need monitoring.  Heparin SC for DVT prophylaxis.  Full code.  Discussed with patient.  Anticipate discharge home with HH vs SNU facility timing yet to be determined.  Expected Discharge Date: 11/4/2024; Expected Discharge Time:       Steven Rivera MD  [facility]  11/01/24  07:56 EDT

## 2024-11-01 NOTE — CONSULTS
"Referring Provider: Dr Rivera    Reason for Consultation: Intra-abdominal infection     History of present illness:  Donnie Bishop is a 49 y.o. who I am asked to evaluate and give opinion for \"Intra-abdominal infection.\" History is obtained from the patient and review of the old medical records which I summarize/synthesize as follows:     CT of the abdomen and pelvis showed gastric wall thickening with inflammation, fluid around the gallbladder.  He had a follow-up HIDA scan that showed possible chronic cholecystitis.  On 10/26 he had a repeat abdominal scan that showed a large right perihepatic fluid collection with mass effect on the liver which was new compared to prior.  He had some smaller collections adjacent to the gallbladder.  Therefore he went to IR on 10/28 where 300 cc of fluid was removed.  The culture is growing Klebsiella pneumonia.  He is currently on ceftriaxone which began on 10/31.  Prior to this he had been on piperacillin-tazobactam essentially since admission.      Yesterday he had an MRCP done that showed improvement in the size of the subcapsular fluid collection. General surgery is also on the case.  I reviewed their note from yesterday.  ERCP is being considered in conjunction with GI.  However, for now the plan is to monitor drain output and possibly repeat a HIDA scan to evaluate for any ongoing leak.    He says he is feeling much improved compared to admission though does have some pain at his drain site.    He denies any past history of serious, resistant, or unusual infections.    Past Medical History:   Diagnosis Date    Anxiety     CHF (congestive heart failure)     Cough     Inability to attain erection     Injury of acoustic nerve     Myalgia     Type II diabetes mellitus     Venous insufficiency     Viral illness     Vitamin D deficiency        Past Surgical History:   Procedure Laterality Date    CARDIAC CATHETERIZATION N/A 9/13/2023    Procedure: Left Heart Cath;  Surgeon: Eliza" MD Kaylee;  Location: [facility] CATH INVASIVE LOCATION;  Service: Cardiology;  Laterality: N/A;    CARDIAC CATHETERIZATION N/A 9/13/2023    Procedure: Coronary angiography;  Surgeon: Kaylee Kay MD;  Location: [facility] CATH INVASIVE LOCATION;  Service: Cardiology;  Laterality: N/A;    ENDOSCOPY N/A 10/24/2024    Procedure: ESOPHAGOGASTRODUODENOSCOPY with biopsies;  Surgeon: Elma Wiggins MD;  Location: [facility] ENDOSCOPY;  Service: Gastroenterology;  Laterality: N/A;  pre-abdominal pain  post-normal       Social History:  Lives in [place] by himself  Works at [employer]    Antibiotic allergies and intolerances:  None    Medications:    Current Facility-Administered Medications:     sennosides-docusate (PERICOLACE) 8.6-50 MG per tablet 2 tablet, 2 tablet, Oral, BID, 2 tablet at 10/31/24 2103 **AND** polyethylene glycol (MIRALAX) packet 17 g, 17 g, Oral, BID, 17 g at 10/31/24 0957 **AND** bisacodyl (DULCOLAX) EC tablet 5 mg, 5 mg, Oral, Daily PRN **AND** bisacodyl (DULCOLAX) suppository 10 mg, 10 mg, Rectal, Daily PRN, Yaritza Hurtado MD, 10 mg at 10/30/24 1841    Calcium Replacement - Follow Nurse / BPA Driven Protocol, , Does not apply, PRN, Elma Wiggins MD    carvedilol (COREG) tablet 6.25 mg, 6.25 mg, Oral, Q12H, Kaylee Kay MD, 6.25 mg at 10/31/24 2103    cefTRIAXone (ROCEPHIN) 2,000 mg in sodium chloride 0.9 % 100 mL MBP, 2,000 mg, Intravenous, Q24H, Steven Rivera MD, Last Rate: 200 mL/hr at 10/31/24 1428, 2,000 mg at 10/31/24 1428    dextrose (D50W) (25 g/50 mL) IV injection 25 g, 25 g, Intravenous, Q15 Min PRN, Elma Wiggins MD    dextrose (GLUTOSE) oral gel 15 g, 15 g, Oral, Q15 Min PRN, Elma Wiggins MD    glucagon (GLUCAGEN) injection 1 mg, 1 mg, Intramuscular, Q15 Min PRN, Elma Wiggins MD    heparin (porcine) 5000 UNIT/ML injection 5,000 Units, 5,000 Units, Subcutaneous, Q8H, Yaritza Hurtado MD, 5,000 Units at 11/01/24 0619    hydrOXYzine (ATARAX) tablet 10 mg, 10 mg,  Oral, Q8H PRN, Elma Wiggins MD, 10 mg at 10/23/24 1447    influenza virus vacc split PF FLUZONE 0.5 mL, 0.5 mL, Intramuscular, During Hospitalization, Elma Wiggins MD    insulin glargine (LANTUS, SEMGLEE) injection 10 Units, 10 Units, Subcutaneous, Nightly, Yaritza Hurtado MD, 10 Units at 10/31/24 2103    insulin lispro (HUMALOG/ADMELOG) injection 2-7 Units, 2-7 Units, Subcutaneous, 4x Daily AC & at Bedtime, Elma Wiggins MD, 3 Units at 10/31/24 2103    insulin lispro (HUMALOG/ADMELOG) injection 4 Units, 4 Units, Subcutaneous, TID With Meals, Yaritza Hurtado MD, 4 Units at 10/31/24 1825    melatonin tablet 2.5 mg, 2.5 mg, Oral, Nightly PRN, Elma Wiggins MD    mupirocin (BACTROBAN) 2 % nasal ointment 1 Application, 1 Application, Each Nare, BID, Steven Rivera MD, 1 Application at 10/31/24 2103    Naloxegol Oxalate (MOVANTIK) tablet 25 mg, 25 mg, Oral, QAM, Yaritza Hurtado MD, 25 mg at 10/31/24 0957    [DISCONTINUED] morphine injection 1 mg, 1 mg, Intravenous, Q4H PRN, 1 mg at 10/23/24 0832 **AND** naloxone (NARCAN) injection 0.4 mg, 0.4 mg, Intravenous, Q5 Min PRN, Elma Wiggins MD    ondansetron ODT (ZOFRAN-ODT) disintegrating tablet 4 mg, 4 mg, Oral, Q6H PRN, 4 mg at 10/30/24 3256 **OR** ondansetron (ZOFRAN) injection 4 mg, 4 mg, Intravenous, Q6H PRN, Elma Wiggins MD, 4 mg at 10/31/24 8024    oxyCODONE (ROXICODONE) immediate release tablet 5 mg, 5 mg, Oral, Q4H PRN, Yaritza Hurtado MD, 5 mg at 11/01/24 0219    pantoprazole (PROTONIX) EC tablet 40 mg, 40 mg, Oral, BID AC, Elma Wiggins MD, 40 mg at 10/31/24 1825    Phosphorus Replacement - Follow Nurse / BPA Driven Protocol, , Does not apply, Rosalie DOWD Lauren C., MD    Potassium Replacement - Follow Nurse / BPA Driven Protocol, , Does not apply, Rosalie DOWD Lauren C., MD    sodium chloride 0.9 % flush 10 mL, 10 mL, Intravenous, PRRosalie SANTOS Lauren C., MD    venlafaxine XR (EFFEXOR-XR) 24 hr capsule 75  mg, 75 mg, Oral, Daily, Elma Wiggins MD, 75 mg at 10/31/24 0957      Objective   Vital Signs   Temp:  [98.1 °F (36.7 °C)-98.8 °F (37.1 °C)] 98.4 °F (36.9 °C)  Heart Rate:  [] 92  Resp:  [18] 18  BP: (104-119)/(56-77) 119/72    Physical Exam:   General: awake, alert, NAD, very nice, sitting up in bed  Eyes: no scleral icterus  Cardiovascular: NR  Respiratory: normal work of breathing without wheezing  GI: Abdomen is soft, mildly tender, drain in RUQ Q with bilious fluid  Neurological: Alert and oriented x 3  Psychiatric: Normal mood and affect   Vasc: PIV w/o erythema    Labs:     Lab Results   Component Value Date    WBC 11.82 (H) 11/01/2024    HGB 10.7 (L) 11/01/2024    HCT 32.5 (L) 11/01/2024    MCV 90.5 11/01/2024     (H) 11/01/2024     Lab Results   Component Value Date    GLUCOSE 126 (H) 11/01/2024    CALCIUM 8.7 11/01/2024     (L) 11/01/2024    K 3.7 11/01/2024    CO2 27.0 11/01/2024    CL 97 (L) 11/01/2024    BUN 18 11/01/2024    CREATININE 0.99 11/01/2024    EGFRRESULT 68.0 07/12/2023    EGFR 94.6 11/01/2024    BCR 18.2 11/01/2024    ANIONGAP 10.0 11/01/2024     Lab Results   Component Value Date    ALT 53 (H) 11/01/2024     Lab Results   Component Value Date    BILITOT 0.5 11/01/2024     Lab Results   Component Value Date    HGBA1C 9.40 (H) 10/24/2024       Microbiology:  10/23 BCx: negative  10/25 RPP: negative  10/28 Liver Abscess Cx: rare growth Kleb pneumo (susc all abx tested exc ampicillin)  10/28 BCx: NGTD    Radiology:  10/31 MRCP with marked improvement in the size of the subcapsular fluid collection status post percutaneous drainage placement by: Inflamed gallbladder    10/28 CT chest: Negative for pulmonary embolism    10/28 BLE Doppler: Negative for DVT    10/28 CT-guided liver drain: 300 cc fluid removed    10/28 MRI brain with left internal auditory canal mass likely a vestibular schwannoma    ASSESSMENT/PLAN:  Liver abscess due to Klebsiella  ?  Biloma  ?   Cholecystitis  Nonischemic cardiomyopathy  Combined diastolic and systolic heart failure  Uncontrolled DM2 -last A1c 9.4%  Acute kidney injury  Elevated liver enzymes    For liver abscess due to Klebsiella, I agree with ceftriaxone 2 g IV every 24 hours with duration TBD.  Once he has improved sufficiently and we are closer to discharge, I think it would be reasonable to switch to Augmentin to finish the course.    I will check a baseline CRP.  Repeat CBC and CMP in the a.m. for close monitoring.    ID will follow.

## 2024-11-01 NOTE — PROGRESS NOTES
[facility] Progress Note      Patient Name: Donnie Bishop  : 1975  MRN: [id number]  Primary Care Physician:  Juju Kennedy MD  Date of admission: 10/22/2024    Subjective     Interval History:   Follow-up acute kidney injury.    Feels better today but continues to complain of.  New.  Denies any nausea or vomiting.  No fever no chills.  Urine output of 2.1 L  Review of Systems:   See above.    Objective     Vitals:   Temp:  [98.1 °F (36.7 °C)-98.8 °F (37.1 °C)] 98.1 °F (36.7 °C)  Heart Rate:  [] 92  Resp:  [18] 18  BP: (104-119)/(56-77) 119/72    Intake/Output Summary (Last 24 hours) at 2024 1152  Last data filed at 2024 0009  Gross per 24 hour   Intake --   Output 2180 ml   Net -2180 ml       Physical Exam:    General Appearance: More awake and engaged.  Conversation much more appropriate.    Skin: warm and dry  HEENT: oral mucosa moist.  Nonicteric sclera  Neck: supple, no JVD  Lungs: Clear to auscultation anteriorly.  Heart: RRR, normal S1 and S2  Abdomen: soft, positive bowel sounds.  Right upper quadrant drain.  Slightly tender but no guarding or rebound.  : no palpable bladder  Extremities: Trace lower extremity edema.  Feet less erythematous.  Neuro: Improved.  See above.    Scheduled Meds:     carvedilol, 6.25 mg, Oral, Q12H  cefTRIAXone, 2,000 mg, Intravenous, Q24H  heparin (porcine), 5,000 Units, Subcutaneous, Q8H  insulin glargine, 10 Units, Subcutaneous, Nightly  insulin lispro, 2-7 Units, Subcutaneous, 4x Daily AC & at Bedtime  insulin lispro, 4 Units, Subcutaneous, TID With Meals  mupirocin, 1 Application, Each Nare, BID  Naloxegol Oxalate, 25 mg, Oral, QAM  pantoprazole, 40 mg, Oral, BID AC  senna-docusate sodium, 2 tablet, Oral, BID   And  polyethylene glycol, 17 g, Oral, BID  venlafaxine XR, 75 mg, Oral, Daily      IV Meds:          Results Reviewed:   I have personally reviewed the results from the time of this admission to 2024 11:52 EDT      Results from last 7 days   Lab Units 11/01/24  0635 10/31/24  0542 10/30/24  2148 10/30/24  0639   SODIUM mmol/L 134* 135*  --  135*   POTASSIUM mmol/L 3.7 4.1 4.0 3.1*   CHLORIDE mmol/L 97* 100  --  102   CO2 mmol/L 27.0 25.6  --  21.4*   BUN mg/dL 18 17  --  23*   CREATININE mg/dL 0.99 1.04  --  0.78   CALCIUM mg/dL 8.7 8.9  --  9.0   BILIRUBIN mg/dL 0.5 0.8  --  0.8   ALK PHOS U/L 130* 138*  --  149*   ALT (SGPT) U/L 53* 68*  --  83*   AST (SGOT) U/L 28 40  --  55*   GLUCOSE mg/dL 126* 155*  --  122*       Estimated Creatinine Clearance: 101.4 mL/min (by C-G formula based on SCr of 0.99 mg/dL).    Results from last 7 days   Lab Units 11/01/24  0635 10/31/24  0542 10/30/24  1719 10/30/24  0639   MAGNESIUM mg/dL 1.7 1.5*  --  1.7   PHOSPHORUS mg/dL 3.5 2.6 2.3* 2.1*       Results from last 7 days   Lab Units 10/30/24  0639   URIC ACID mg/dL 3.4       Results from last 7 days   Lab Units 11/01/24  0635 10/31/24  0542 10/30/24  0639 10/29/24  0652 10/28/24  1959   WBC 10*3/mm3 11.82* 11.35* 14.38* 15.53* 16.23*   HEMOGLOBIN g/dL 10.7* 11.1* 10.6* 11.6* 12.3*   PLATELETS 10*3/mm3 471* 392 364 370 382       Results from last 7 days   Lab Units 10/28/24  1959 10/28/24  1129   INR  1.13* 1.0       Assessment / Plan     ASSESSMENT:  Acute kidney injury.  Multifactorial including hypovolemia, hypotension, impaired renal autoregulation due to Jardiance, Entresto, Aldactone.  Nonoliguric.  Creatinine stable.  Volume status seems to be improving but continues to complain of orthopnea  Cholecystitis with large right perihepatic fluid collection.  Status post IR drainage with drain left in 10/28/2024..  On Zosyn.  Transaminitis.  Enzymes improving.  Off statin.  MRI abdomen pending.  White blood cell count falling.  Heart failure reduced ejection fraction.  EF 49%.  (Improved from 35% a year ago).  Anemia.  Hemoglobin better with diuresis.  Encephalopathy.  Improving.  Diabetes mellitus type 2.    PLAN:  Complains of  orthopnea.  Will give 1 dose of Lasix today  Labs in a.m.        Thank you for involving us in the care of Donnie Bishop.  Please feel free to call with any questions.    Evan Pat MD  11/01/24  11:52 EDT    [facility]  [phone]    Please note that portions of this note were completed with a voice recognition program.

## 2024-11-01 NOTE — THERAPY TREATMENT NOTE
Patient Name: Donnie Bishop  : 1975    MRN: [id number]                              Today's Date: 2024       Admit Date: 10/22/2024    Visit Dx:     ICD-10-CM ICD-9-CM   1. Epigastric abdominal pain  R10.13 789.06   2. Acute gastritis without hemorrhage, unspecified gastritis type  K29.00 535.00     Patient Active Problem List   Diagnosis    Multiple-type hyperlipidemia    Type 2 diabetes mellitus with diabetic neuropathy, with long-term current use of insulin    Neuropathy    Vitamin D deficiency    Benign essential HTN    Inability to attain erection    Injury of acoustic nerve    Hypogonadism in male    Anxiety    Chest pain    New onset of congestive heart failure    NSTEMI, initial episode of care    Diabetes mellitus type 1.5, managed as type 1    Chronic combined systolic and diastolic heart failure    Gastritis    Leukocytosis    Epigastric abdominal pain    Abscess of liver    Klebsiella pneumoniae (k. pneumoniae) as the cause of diseases classified elsewhere    Hypotension    Transaminitis     Past Medical History:   Diagnosis Date    Anxiety     CHF (congestive heart failure)     Cough     Inability to attain erection     Injury of acoustic nerve     Myalgia     Type II diabetes mellitus     Venous insufficiency     Viral illness     Vitamin D deficiency      Past Surgical History:   Procedure Laterality Date    CARDIAC CATHETERIZATION N/A 2023    Procedure: Left Heart Cath;  Surgeon: Kaylee Kay MD;  Location: [facility] CATH INVASIVE LOCATION;  Service: Cardiology;  Laterality: N/A;    CARDIAC CATHETERIZATION N/A 2023    Procedure: Coronary angiography;  Surgeon: Kaylee Kay MD;  Location: [facility] CATH INVASIVE LOCATION;  Service: Cardiology;  Laterality: N/A;    ENDOSCOPY N/A 10/24/2024    Procedure: ESOPHAGOGASTRODUODENOSCOPY with biopsies;  Surgeon: Elma Wiggins MD;  Location: [facility] ENDOSCOPY;  Service: Gastroenterology;  Laterality: N/A;  pre-abdominal pain  post-normal       General Information       Row Name 11/01/24 1056          OT Time and Intention    Subjective Information complains of;pain  -BC     Document Type therapy note (daily note)  -BC     Mode of Treatment co-treatment;physical therapy;occupational therapy  -BC     Patient Effort good  -BC       Row Name 11/01/24 1056          General Information    Patient Profile Reviewed yes  -BC     Existing Precautions/Restrictions fall  -BC       Row Name 11/01/24 1056          Cognition    Orientation Status (Cognition) oriented to;person;place;situation  -BC       Row Name 11/01/24 1056          Safety Issues/Impairments Affecting Functional Mobility    Impairments Affecting Function (Mobility) endurance/activity tolerance;pain;strength;cognition;balance  -BC     Cognitive Impairments, Mobility Safety/Performance sequencing abilities  -BC     Comment, Safety Issues/Impairments (Mobility) Cotx with PT/OT for safety w/ bed mobility, concern for tolerance to active participation due to high pain levels. signficant improvement, does not warrant further cotx next POC opportunity.  -BC               User Key  (r) = Recorded By, (t) = Taken By, (c) = Cosigned By      Initials Name Provider Type    BC Marcell Olivera OT Occupational Therapist                     Mobility/ADL's       Row Name 11/01/24 1056          Bed Mobility    Bed Mobility supine-sit  -BC     Supine-Sit [place] (Bed Mobility) verbal cues  -BC     Bed Mobility, Safety Issues cognitive deficits limit understanding  -BC     Comment, (Bed Mobility) slow to process information, [place] requiring increased cues.  -BC       Row Name 11/01/24 1056          Transfers    Transfers sit-stand transfer;stand-sit transfer;bed-chair transfer  -BC       Row Name 11/01/24 1056          Bed-Chair Transfer    Bed-Chair [place] (Transfers) contact guard  -BC     Assistive Device (Bed-Chair Transfers) walker, front-wheeled  -BC       Row Name 11/01/24 1056          Sit-Stand  Transfer    Sit-Stand [place] (Transfers) verbal cues;contact guard  -BC     Assistive Device (Sit-Stand Transfers) walker, front-wheeled  -BC       Row Name 11/01/24 1056          Stand-Sit Transfer    Stand-Sit [place] (Transfers) contact guard  -BC       Row Name 11/01/24 1056          Functional Mobility    Functional Mobility- Device walker, front-wheeled  -BC     Functional Mobility- Comment fxnl mobility in room and to/from hallway for increased endurance, balance, act tolerance for home ADLs/IADLs. CGA only.  -BC     Patient was able to Ambulate yes  -BC       Row Name 11/01/24 1056          Activities of Daily Living    BADL Assessment/Intervention lower body dressing  -BC       Row Name 11/01/24 1056          Lower Body Dressing Assessment/Training    [place] Level (Lower Body Dressing) don;socks;moderate assist (50% patient effort)  -BC     Position (Lower Body Dressing) edge of bed sitting  -BC     Comment, (Lower Body Dressing) assist w/ RLE given pain, cues/practice figur four technique  -BC               User Key  (r) = Recorded By, (t) = Taken By, (c) = Cosigned By      Initials Name Provider Type    Marcell Rhoades OT Occupational Therapist                   Obj/Interventions       Row Name 11/01/24 1159          Balance    Static Sitting Balance standby assist  -BC     Dynamic Sitting Balance standby assist  -BC     Position, Sitting Balance unsupported;sitting edge of bed  -BC     Static Standing Balance contact guard;supervision  -BC     Dynamic Standing Balance contact guard  -BC     Position/Device Used, Standing Balance supported  -BC     Comment, Balance SBA to CGA standing w FWW for balance, some instability w/ turns but no overt LOB  -BC               User Key  (r) = Recorded By, (t) = Taken By, (c) = Cosigned By      Initials Name Provider Type    Marcell Rhoades OT Occupational Therapist                   Goals/Plan    No documentation.                  Clinical  Impression       Row Name 11/01/24 1201          Pain Assessment    Pretreatment Pain Rating 7/10  -BC     Posttreatment Pain Rating 7/10  -BC     Pain Location abdomen  -BC     Pain Side/Orientation right  -BC     Pre/Posttreatment Pain Comment c/o abdomen soreness, pain meds received prior to tx  -BC       Row Name 11/01/24 1201          Plan of Care Review    Plan of Care Reviewed With patient  -BC     Progress improving  -BC     Outcome Evaluation Pt participated well in therapy today w/ significant improvement in performance and function with bed mobility, seated EOB ADLs, and stands/txfrs w/ walker. Pt would continue to benefit from skilled OT services with DC REC: SNF and pending progress and future tolerance may progress daily and be safe to DC home w HHS. Education on importance of OOB daily up to chair for meals. Keep [facility]/SNF recs today.  -BC       Row Name 11/01/24 1201          Therapy Assessment/Plan (OT)    Rehab Potential (OT) good  -BC     Criteria for Skilled Therapeutic Interventions Met (OT) yes  -BC     Therapy Frequency (OT) 3 times/wk  -BC       Row Name 11/01/24 1201          Therapy Plan Review/Discharge Plan (OT)    Anticipated Discharge Disposition (OT) skilled nursing facility  -BC       Row Name 11/01/24 1201          Vital Signs    O2 Delivery Pre Treatment room air  -BC     O2 Delivery Post Treatment room air  -BC     Pre Patient Position Supine  -BC     Intra Patient Position Standing  -BC     Post Patient Position Sitting  -BC       Row Name 11/01/24 1201          Positioning and Restraints    Pre-Treatment Position in bed  -BC     Post Treatment Position chair  -BC     In Chair reclined;call light within reach;encouraged to call for assist;exit alarm on  -BC               User Key  (r) = Recorded By, (t) = Taken By, (c) = Cosigned By      Initials Name Provider Type    Marcell Rhoades OT Occupational Therapist                   Outcome Measures       Row Name 11/01/24 1206           How much help from another is currently needed...    Putting on and taking off regular lower body clothing? 2  -BC     Bathing (including washing, rinsing, and drying) 2  -BC     Toileting (which includes using toilet bed pan or urinal) 3  -BC     Putting on and taking off regular upper body clothing 3  -BC     Taking care of personal grooming (such as brushing teeth) 3  -BC     Eating meals 4  -BC     AM-PAC 6 Clicks Score (OT) 17  -BC       Row Name 11/01/24 1147 11/01/24 0800       How much help from another person do you currently need...    Turning from your back to your side while in flat bed without using bedrails? 3  -EM 3  -BW    Moving from lying on back to sitting on the side of a flat bed without bedrails? 2  -EM 3  -BW    Moving to and from a bed to a chair (including a wheelchair)? 3  -EM 3  -BW    Standing up from a chair using your arms (e.g., wheelchair, bedside chair)? 3  -EM 3  -BW    Climbing 3-5 steps with a railing? 2  -EM 2  -BW    To walk in hospital room? 3  -EM 2  -BW    AM-PAC 6 Clicks Score (PT) 16  -EM 16  -BW    Highest Level of Mobility Goal 5 --> Static standing  -EM 5 --> Static standing  -BW      Row Name 11/01/24 1204          Functional Assessment    Outcome Measure Options AM-PAC 6 Clicks Daily Activity (OT)  -BC               User Key  (r) = Recorded By, (t) = Taken By, (c) = Cosigned By      Initials Name Provider Type    Shawna Koenig, PT Physical Therapist    Ana Luisa Jesus, RN Registered Nurse    Marcell Rhoades OT Occupational Therapist                    Occupational Therapy Education       Title: PT OT SLP Therapies (In Progress)       Topic: Occupational Therapy (Done)       Point: ADL training (Done)       Description:   Instruct learner(s) on proper safety adaptation and remediation techniques during self care or transfers.   Instruct in proper use of assistive devices.                  Learning Progress Summary            Patient Acceptance,  E,TB, VU by  at 10/30/2024 0506    Acceptance, E, NR,NL by CE at 10/29/2024 1250                      Point: Precautions (Done)       Description:   Instruct learner(s) on prescribed precautions during self-care and functional transfers.                  Learning Progress Summary            Patient Acceptance, E,TB, VU by  at 10/30/2024 0506    Acceptance, E, NR,NL by CE at 10/29/2024 1250                      Point: Body mechanics (Done)       Description:   Instruct learner(s) on proper positioning and spine alignment during self-care, functional mobility activities and/or exercises.                  Learning Progress Summary            Patient Acceptance, E,TB, VU by  at 10/30/2024 0506    Acceptance, E, NR,NL by CE at 10/29/2024 1250                                      User Key       Initials Effective Dates Name Provider Type Discipline    CE 10/17/22 -  Dee Cardona OT Occupational Therapist OT     05/29/24 -  Kel Benson RN Registered Nurse Nurse                  OT Recommendation and Plan  Therapy Frequency (OT): 3 times/wk  Plan of Care Review  Plan of Care Reviewed With: patient  Progress: improving  Outcome Evaluation: Pt participated well in therapy today w/ significant improvement in performance and function with bed mobility, seated EOB ADLs, and stands/txfrs w/ walker. Pt would continue to benefit from skilled OT services with DC REC: SNF and pending progress and future tolerance may progress daily and be safe to DC home w HHS. Education on importance of OOB daily up to chair for meals. Keep [facility]/SNF recs today.     Time Calculation:         Time Calculation- OT       Row Name 11/01/24 1205             Time Calculation- OT    OT Start Time 1005  -BC      OT Stop Time 1022  -BC      OT Time Calculation (min) 17 min  -BC      Total Timed Code Minutes- OT 17 minute(s)  -BC      OT Received On 11/01/24  -BC      OT - Next Appointment 11/05/24  -BC         Timed Charges    26990 - OT Self  Care/Mgmt Minutes 17  -BC         Total Minutes    Timed Charges Total Minutes 17  -BC       Total Minutes 17  -BC                User Key  (r) = Recorded By, (t) = Taken By, (c) = Cosigned By      Initials Name Provider Type    Marcell Rhoades OT Occupational Therapist                  Therapy Charges for Today       Code Description Service Date Service Provider Modifiers Qty    62092797911 HC OT SELF CARE/MGMT/TRAIN EA 15 MIN 11/1/2024 Marcell Olivera OT GO 1                 Marcell Olivera OT  11/1/2024

## 2024-11-01 NOTE — PLAN OF CARE
Goal Outcome Evaluation:  Plan of Care Reviewed With: patient        Progress: improving  Outcome Evaluation: Pt participated well in therapy today w/ significant improvement in performance and function with bed mobility, seated EOB ADLs, and stands/txfrs w/ walker. Pt would continue to benefit from skilled OT services with DC REC: SNF and pending progress and future tolerance may progress daily and be safe to DC home w HHS. Education on importance of OOB daily up to chair for meals. Keep [facility]/SNF recs today.    Anticipated Discharge Disposition (OT): skilled nursing facility

## 2024-11-01 NOTE — THERAPY TREATMENT NOTE
Patient Name: Donnie Bishop  : 1975    MRN: [id number]                              Today's Date: 2024       Admit Date: 10/22/2024    Visit Dx:     ICD-10-CM ICD-9-CM   1. Epigastric abdominal pain  R10.13 789.06   2. Acute gastritis without hemorrhage, unspecified gastritis type  K29.00 535.00     Patient Active Problem List   Diagnosis    Multiple-type hyperlipidemia    Type 2 diabetes mellitus with diabetic neuropathy, with long-term current use of insulin    Neuropathy    Vitamin D deficiency    Benign essential HTN    Inability to attain erection    Injury of acoustic nerve    Hypogonadism in male    Anxiety    Chest pain    New onset of congestive heart failure    NSTEMI, initial episode of care    Diabetes mellitus type 1.5, managed as type 1    Chronic combined systolic and diastolic heart failure    Gastritis    Leukocytosis    Epigastric abdominal pain    Abscess of liver    Klebsiella pneumoniae (k. pneumoniae) as the cause of diseases classified elsewhere    Hypotension    Transaminitis     Past Medical History:   Diagnosis Date    Anxiety     CHF (congestive heart failure)     Cough     Inability to attain erection     Injury of acoustic nerve     Myalgia     Type II diabetes mellitus     Venous insufficiency     Viral illness     Vitamin D deficiency      Past Surgical History:   Procedure Laterality Date    CARDIAC CATHETERIZATION N/A 2023    Procedure: Left Heart Cath;  Surgeon: Kaylee Kay MD;  Location: [facility] CATH INVASIVE LOCATION;  Service: Cardiology;  Laterality: N/A;    CARDIAC CATHETERIZATION N/A 2023    Procedure: Coronary angiography;  Surgeon: Kaylee Kay MD;  Location: [facility] CATH INVASIVE LOCATION;  Service: Cardiology;  Laterality: N/A;    ENDOSCOPY N/A 10/24/2024    Procedure: ESOPHAGOGASTRODUODENOSCOPY with biopsies;  Surgeon: Elma Wiggins MD;  Location: [facility] ENDOSCOPY;  Service: Gastroenterology;  Laterality: N/A;  pre-abdominal pain  post-normal  "     General Information       Row Name 11/01/24 1140          Physical Therapy Time and Intention    Document Type therapy note (daily note)  -EM     Mode of Treatment co-treatment;occupational therapy;physical therapy;other (see comments)  -EM       Row Name 11/01/24 1140          General Information    Patient Profile Reviewed yes  -EM     Existing Precautions/Restrictions fall  -EM       Row Name 11/01/24 1140          Safety Issues/Impairments Affecting Functional Mobility    Comment, Safety Issues/Impairments (Mobility) Co treatment medically appropriate and necessary due to patient acuity level, activity tolerance and safety of patient and staff. Treatment is focusing on progression of care and goals established in the POC.  -EM               User Key  (r) = Recorded By, (t) = Taken By, (c) = Cosigned By      Initials Name Provider Type    Shawna Koenig PT Physical Therapist                   Mobility       Row Name 11/01/24 1140          Bed Mobility    Supine-Sit [place] (Bed Mobility) moderate assist (50% patient effort)  -EM     Comment, (Bed Mobility) cues to attempt rolling to side prior to coming up to EOB, pt states that \"doesn't work\", slow to initiate movement  -EM       Row Name 11/01/24 1140          Sit-Stand Transfer    Sit-Stand [place] (Transfers) contact guard  -EM     Assistive Device (Sit-Stand Transfers) walker, front-wheeled  -EM       Row Name 11/01/24 1140          Gait/Stairs (Locomotion)    [place] Level (Gait) minimum assist (75% patient effort)  -EM     Assistive Device (Gait) walker, front-wheeled  -EM     Distance in Feet (Gait) 40  -EM     Deviations/Abnormal Patterns (Gait) rudy decreased;stride length decreased  -EM               User Key  (r) = Recorded By, (t) = Taken By, (c) = Cosigned By      Initials Name Provider Type    Shawna Koenig PT Physical Therapist                   Obj/Interventions    No documentation.                  " "Goals/Plan    No documentation.                  Clinical Impression       Row Name 11/01/24 1142          Pain    Pain Location abdomen  -EM     Pre/Posttreatment Pain Comment pt states his abdomen is \"sore\", did not rate on numeric scale  -EM       Row Name 11/01/24 1142          Plan of Care Review    Plan of Care Reviewed With patient  -EM     Outcome Evaluation Pt awake and alert, agreeable to therapy, premedicated prior to therapy session after discussion with RN. Patient performed bed mobility with modA, sit to stand with CGA, ambulated in hallway with rwx and Brittny. Pt demonstrates improvement in mobility and activity tolerance today. d/c recommendation is SNU.  -EM       Row Name 11/01/24 1142          Positioning and Restraints    Pre-Treatment Position in bed  -EM     Post Treatment Position chair  -EM     In Chair reclined;call light within reach;exit alarm on  -EM               User Key  (r) = Recorded By, (t) = Taken By, (c) = Cosigned By      Initials Name Provider Type    Shawna Koenig PT Physical Therapist                   Outcome Measures       Row Name 11/01/24 1147 11/01/24 0800       How much help from another person do you currently need...    Turning from your back to your side while in flat bed without using bedrails? 3  -EM 3  -BW    Moving from lying on back to sitting on the side of a flat bed without bedrails? 2  -EM 3  -BW    Moving to and from a bed to a chair (including a wheelchair)? 3  -EM 3  -BW    Standing up from a chair using your arms (e.g., wheelchair, bedside chair)? 3  -EM 3  -BW    Climbing 3-5 steps with a railing? 2  -EM 2  -BW    To walk in hospital room? 3  -EM 2  -BW    AM-PAC 6 Clicks Score (PT) 16  -EM 16  -BW    Highest Level of Mobility Goal 5 --> Static standing  -EM 5 --> Static standing  -BW              User Key  (r) = Recorded By, (t) = Taken By, (c) = Cosigned By      Initials Name Provider Type    Shawna Koenig PT Physical Therapist    " Ana Luisa Jesus, RN Registered Nurse                                 Physical Therapy Education       Title: PT OT SLP Therapies (In Progress)       Topic: Physical Therapy (In Progress)       Point: Mobility training (Done)       Learning Progress Summary            Patient Acceptance, E, VU by EM at 11/1/2024 1148    Acceptance, TB,E, NR by ST at 10/30/2024 1541    Acceptance, E,TB, VU by WS at 10/30/2024 0506    Acceptance, E, VU,NR by SV at 10/29/2024 1346    Acceptance, E,TB, NR by CS at 10/26/2024 1436                      Point: Home exercise program (Done)       Learning Progress Summary            Patient Acceptance, E,TB, VU by WS at 10/30/2024 0506                      Point: Body mechanics (In Progress)       Learning Progress Summary            Patient Acceptance, TB,E, NR by ST at 10/30/2024 1541    Acceptance, E,TB, VU by WS at 10/30/2024 0506    Acceptance, E,TB, NR by CS at 10/26/2024 1436                      Point: Precautions (Done)       Learning Progress Summary            Patient Acceptance, E,TB, VU by  at 10/30/2024 0506                                      User Key       Initials Effective Dates Name Provider Type Discipline    EM 06/16/21 -  Shawna Akins, PT Physical Therapist PT    SV 07/11/23 -  Mahogany Horn, PT Physical Therapist PT    ST 09/22/22 -  Mary Weber, PT Physical Therapist PT    CS 09/22/22 -  Pam Vega, PT Physical Therapist PT     05/29/24 -  Kel Benson, RN Registered Nurse Nurse                  PT Recommendation and Plan     Outcome Evaluation: Pt awake and alert, agreeable to therapy, premedicated prior to therapy session after discussion with RN. Patient performed bed mobility with modA, sit to stand with CGA, ambulated in hallway with rwx and Brittny. Pt demonstrates improvement in mobility and activity tolerance today. d/c recommendation is SNU.     Time Calculation:         PT Charges       Row Name 11/01/24 1148             Time  Calculation    Start Time 1005  -EM      Stop Time 1022  -EM      Time Calculation (min) 17 min  -EM      PT Received On 11/01/24  -EM      PT - Next Appointment 11/04/24  -EM         Time Calculation- PT    Total Timed Code Minutes- PT 17 minute(s)  -EM         Timed Charges    63022 - PT Therapeutic Activity Minutes 17  -EM         Total Minutes    Timed Charges Total Minutes 17  -EM       Total Minutes 17  -EM                User Key  (r) = Recorded By, (t) = Taken By, (c) = Cosigned By      Initials Name Provider Type    EM Shawna Akins PT Physical Therapist                  Therapy Charges for Today       Code Description Service Date Service Provider Modifiers Qty    20129043055 HC PT THERAPEUTIC ACT EA 15 MIN 11/1/2024 Shawna Akins PT GP 1            PT G-Codes  Outcome Measure Options: AM-PAC 6 Clicks Daily Activity (OT)  AM-PAC 6 Clicks Score (PT): 16  AM-PAC 6 Clicks Score (OT): 12       Shawna Akins PT  11/1/2024

## 2024-11-01 NOTE — PLAN OF CARE
Problem: Sepsis/Septic Shock  Goal: Optimal Coping  Outcome: Progressing     Problem: Sepsis/Septic Shock  Goal: Blood Glucose Level Within Target Range  Outcome: Progressing     Problem: Pain Acute  Goal: Optimal Pain Control and Function  Outcome: Progressing  Intervention: Prevent or Manage Pain  Recent Flowsheet Documentation  Taken 11/1/2024 0014 by Scar Pozo RN  Medication Review/Management: medications reviewed   Goal Outcome Evaluation:

## 2024-11-02 LAB
ALBUMIN SERPL-MCNC: 2.8 G/DL (ref 3.5–5.2)
ALBUMIN/GLOB SERPL: 0.9 G/DL
ALP SERPL-CCNC: 129 U/L (ref 39–117)
ALT SERPL W P-5'-P-CCNC: 40 U/L (ref 1–41)
ANION GAP SERPL CALCULATED.3IONS-SCNC: 12 MMOL/L (ref 5–15)
AST SERPL-CCNC: 18 U/L (ref 1–40)
BACTERIA SPEC AEROBE CULT: NORMAL
BACTERIA SPEC AEROBE CULT: NORMAL
BACTERIA SPEC ANAEROBE CULT: NORMAL
BILIRUB SERPL-MCNC: 0.4 MG/DL (ref 0–1.2)
BUN SERPL-MCNC: 18 MG/DL (ref 6–20)
BUN/CREAT SERPL: 18.6 (ref 7–25)
CALCIUM SPEC-SCNC: 8.6 MG/DL (ref 8.6–10.5)
CHLORIDE SERPL-SCNC: 92 MMOL/L (ref 98–107)
CO2 SERPL-SCNC: 26 MMOL/L (ref 22–29)
CREAT SERPL-MCNC: 0.97 MG/DL (ref 0.76–1.27)
DEPRECATED RDW RBC AUTO: 40.9 FL (ref 37–54)
EGFRCR SERPLBLD CKD-EPI 2021: 96.9 ML/MIN/1.73
ERYTHROCYTE [DISTWIDTH] IN BLOOD BY AUTOMATED COUNT: 12.5 % (ref 12.3–15.4)
GLOBULIN UR ELPH-MCNC: 3.2 GM/DL
GLUCOSE BLDC GLUCOMTR-MCNC: 226 MG/DL (ref 70–130)
GLUCOSE BLDC GLUCOMTR-MCNC: 239 MG/DL (ref 70–130)
GLUCOSE BLDC GLUCOMTR-MCNC: 250 MG/DL (ref 70–130)
GLUCOSE BLDC GLUCOMTR-MCNC: 251 MG/DL (ref 70–130)
GLUCOSE BLDC GLUCOMTR-MCNC: 257 MG/DL (ref 70–130)
GLUCOSE SERPL-MCNC: 233 MG/DL (ref 65–99)
HCT VFR BLD AUTO: 31.9 % (ref 37.5–51)
HGB BLD-MCNC: 10.5 G/DL (ref 13–17.7)
MAGNESIUM SERPL-MCNC: 1.8 MG/DL (ref 1.6–2.6)
MCH RBC QN AUTO: 30.2 PG (ref 26.6–33)
MCHC RBC AUTO-ENTMCNC: 32.9 G/DL (ref 31.5–35.7)
MCV RBC AUTO: 91.7 FL (ref 79–97)
OSMOLALITY UR: 682 MOSM/KG
PHOSPHATE SERPL-MCNC: 3 MG/DL (ref 2.5–4.5)
PLATELET # BLD AUTO: 536 10*3/MM3 (ref 140–450)
PMV BLD AUTO: 10 FL (ref 6–12)
POTASSIUM SERPL-SCNC: 4.2 MMOL/L (ref 3.5–5.2)
PROT SERPL-MCNC: 6 G/DL (ref 6–8.5)
RBC # BLD AUTO: 3.48 10*6/MM3 (ref 4.14–5.8)
SODIUM SERPL-SCNC: 130 MMOL/L (ref 136–145)
SODIUM UR-SCNC: 108 MMOL/L
WBC NRBC COR # BLD AUTO: 10.84 10*3/MM3 (ref 3.4–10.8)

## 2024-11-02 PROCEDURE — 82948 REAGENT STRIP/BLOOD GLUCOSE: CPT

## 2024-11-02 PROCEDURE — 84100 ASSAY OF PHOSPHORUS: CPT | Performed by: STUDENT IN AN ORGANIZED HEALTH CARE EDUCATION/TRAINING PROGRAM

## 2024-11-02 PROCEDURE — 84300 ASSAY OF URINE SODIUM: CPT | Performed by: INTERNAL MEDICINE

## 2024-11-02 PROCEDURE — 99232 SBSQ HOSP IP/OBS MODERATE 35: CPT | Performed by: INTERNAL MEDICINE

## 2024-11-02 PROCEDURE — 63710000001 INSULIN LISPRO (HUMAN) PER 5 UNITS: Performed by: INTERNAL MEDICINE

## 2024-11-02 PROCEDURE — 63710000001 INSULIN LISPRO (HUMAN) PER 5 UNITS: Performed by: HOSPITALIST

## 2024-11-02 PROCEDURE — 25010000002 HEPARIN (PORCINE) PER 1000 UNITS: Performed by: STUDENT IN AN ORGANIZED HEALTH CARE EDUCATION/TRAINING PROGRAM

## 2024-11-02 PROCEDURE — 63710000001 ONDANSETRON ODT 4 MG TABLET DISPERSIBLE: Performed by: INTERNAL MEDICINE

## 2024-11-02 PROCEDURE — 63710000001 INSULIN GLARGINE PER 5 UNITS: Performed by: HOSPITALIST

## 2024-11-02 PROCEDURE — 83935 ASSAY OF URINE OSMOLALITY: CPT | Performed by: INTERNAL MEDICINE

## 2024-11-02 PROCEDURE — 25010000002 CEFTRIAXONE PER 250 MG: Performed by: HOSPITALIST

## 2024-11-02 PROCEDURE — 63710000001 INSULIN LISPRO (HUMAN) PER 5 UNITS: Performed by: STUDENT IN AN ORGANIZED HEALTH CARE EDUCATION/TRAINING PROGRAM

## 2024-11-02 PROCEDURE — 85027 COMPLETE CBC AUTOMATED: CPT | Performed by: INTERNAL MEDICINE

## 2024-11-02 PROCEDURE — 99231 SBSQ HOSP IP/OBS SF/LOW 25: CPT | Performed by: SURGERY

## 2024-11-02 PROCEDURE — 80053 COMPREHEN METABOLIC PANEL: CPT | Performed by: STUDENT IN AN ORGANIZED HEALTH CARE EDUCATION/TRAINING PROGRAM

## 2024-11-02 PROCEDURE — 83735 ASSAY OF MAGNESIUM: CPT | Performed by: STUDENT IN AN ORGANIZED HEALTH CARE EDUCATION/TRAINING PROGRAM

## 2024-11-02 RX ORDER — INSULIN LISPRO 100 [IU]/ML
5 INJECTION, SOLUTION INTRAVENOUS; SUBCUTANEOUS
Status: DISCONTINUED | OUTPATIENT
Start: 2024-11-02 | End: 2024-11-03

## 2024-11-02 RX ORDER — INSULIN LISPRO 100 [IU]/ML
2-9 INJECTION, SOLUTION INTRAVENOUS; SUBCUTANEOUS
Status: DISCONTINUED | OUTPATIENT
Start: 2024-11-02 | End: 2024-11-05

## 2024-11-02 RX ADMIN — OXYCODONE HYDROCHLORIDE 5 MG: 5 TABLET ORAL at 08:13

## 2024-11-02 RX ADMIN — HEPARIN SODIUM 5000 UNITS: 5000 INJECTION INTRAVENOUS; SUBCUTANEOUS at 21:13

## 2024-11-02 RX ADMIN — INSULIN LISPRO 4 UNITS: 100 INJECTION, SOLUTION INTRAVENOUS; SUBCUTANEOUS at 21:12

## 2024-11-02 RX ADMIN — INSULIN GLARGINE 15 UNITS: 100 INJECTION, SOLUTION SUBCUTANEOUS at 21:12

## 2024-11-02 RX ADMIN — VENLAFAXINE HYDROCHLORIDE 75 MG: 75 CAPSULE, EXTENDED RELEASE ORAL at 08:14

## 2024-11-02 RX ADMIN — SENNOSIDES AND DOCUSATE SODIUM 2 TABLET: 50; 8.6 TABLET ORAL at 08:14

## 2024-11-02 RX ADMIN — INSULIN LISPRO 4 UNITS: 100 INJECTION, SOLUTION INTRAVENOUS; SUBCUTANEOUS at 12:15

## 2024-11-02 RX ADMIN — ONDANSETRON 4 MG: 4 TABLET, ORALLY DISINTEGRATING ORAL at 21:23

## 2024-11-02 RX ADMIN — CEFTRIAXONE 2000 MG: 2 INJECTION, POWDER, FOR SOLUTION INTRAMUSCULAR; INTRAVENOUS at 14:49

## 2024-11-02 RX ADMIN — CARVEDILOL 6.25 MG: 6.25 TABLET, FILM COATED ORAL at 08:13

## 2024-11-02 RX ADMIN — NALOXEGOL OXALATE 25 MG: 25 TABLET, FILM COATED ORAL at 06:14

## 2024-11-02 RX ADMIN — POLYETHYLENE GLYCOL 3350 17 G: 17 POWDER, FOR SOLUTION ORAL at 08:13

## 2024-11-02 RX ADMIN — INSULIN LISPRO 6 UNITS: 100 INJECTION, SOLUTION INTRAVENOUS; SUBCUTANEOUS at 16:44

## 2024-11-02 RX ADMIN — MUPIROCIN 1 APPLICATION: 20 OINTMENT TOPICAL at 08:13

## 2024-11-02 RX ADMIN — HEPARIN SODIUM 5000 UNITS: 5000 INJECTION INTRAVENOUS; SUBCUTANEOUS at 14:55

## 2024-11-02 RX ADMIN — HEPARIN SODIUM 5000 UNITS: 5000 INJECTION INTRAVENOUS; SUBCUTANEOUS at 06:14

## 2024-11-02 RX ADMIN — INSULIN LISPRO 5 UNITS: 100 INJECTION, SOLUTION INTRAVENOUS; SUBCUTANEOUS at 12:16

## 2024-11-02 RX ADMIN — PANTOPRAZOLE SODIUM 40 MG: 40 TABLET, DELAYED RELEASE ORAL at 08:14

## 2024-11-02 RX ADMIN — PANTOPRAZOLE SODIUM 40 MG: 40 TABLET, DELAYED RELEASE ORAL at 16:45

## 2024-11-02 RX ADMIN — OXYCODONE HYDROCHLORIDE 5 MG: 5 TABLET ORAL at 01:32

## 2024-11-02 RX ADMIN — INSULIN LISPRO 4 UNITS: 100 INJECTION, SOLUTION INTRAVENOUS; SUBCUTANEOUS at 08:13

## 2024-11-02 RX ADMIN — CARVEDILOL 6.25 MG: 6.25 TABLET, FILM COATED ORAL at 21:12

## 2024-11-02 RX ADMIN — POLYETHYLENE GLYCOL 3350 17 G: 17 POWDER, FOR SOLUTION ORAL at 21:13

## 2024-11-02 RX ADMIN — SENNOSIDES AND DOCUSATE SODIUM 2 TABLET: 50; 8.6 TABLET ORAL at 21:12

## 2024-11-02 RX ADMIN — INSULIN LISPRO 5 UNITS: 100 INJECTION, SOLUTION INTRAVENOUS; SUBCUTANEOUS at 17:00

## 2024-11-02 RX ADMIN — INSULIN LISPRO 4 UNITS: 100 INJECTION, SOLUTION INTRAVENOUS; SUBCUTANEOUS at 08:15

## 2024-11-02 NOTE — PROGRESS NOTES
Name: Donnie Bishop ADMIT: 10/22/2024   : 1975  PCP: Juju Kennedy MD    MRN: [id number] LOS: 9 days   AGE/SEX: 49 y.o. male  ROOM: [facility]     Subjective   Subjective   Seems to be feeling better in general.    Objective   Objective   Vital Signs  Temp:  [97.7 °F (36.5 °C)-98.7 °F (37.1 °C)] 98.2 °F (36.8 °C)  Heart Rate:  [88-94] 94  Resp:  [18-27] 27  BP: ()/(60-81) 110/70  SpO2:  [93 %-97 %] 96 %  on   ;   Device (Oxygen Therapy): room air  Body mass index is 26.26 kg/m².  Physical Exam  General: not in distress, ill-appearing,  CV: Regular rate and rhythm, no murmurs rubs or gallops  Lungs: Diminished at bases, no wheezing, no crackles, right upper quadrant drain in place.  Abdomen: Soft, distended, tenderness to palpation  Extremities: Trace edema in lower extremities,, no cyanosis     Results Review     I reviewed the patient's new clinical results.  Results from last 7 days   Lab Units 24  0616 24  0635 10/31/24  0542 10/30/24  0639   WBC 10*3/mm3 10.84* 11.82* 11.35* 14.38*   HEMOGLOBIN g/dL 10.5* 10.7* 11.1* 10.6*   PLATELETS 10*3/mm3 536* 471* 392 364     Results from last 7 days   Lab Units 24  0616 24  0635 10/31/24  0542 10/30/24  2148 10/30/24  0639 10/29/24  0652 10/28/24  2059 10/28/24  1959   SODIUM mmol/L 130* 134* 135*  --  135* 136 143 141   POTASSIUM mmol/L 4.2 3.7 4.1 4.0 3.1* 3.9 4.5 4.3   CHLORIDE mmol/L 92* 97* 100  --  102 107 110* 108*   CO2 mmol/L 26.0 27.0 25.6  --  21.4* 16.6* 16.0* 16.6*   BUN mg/dL 18 18 17  --  23* 30* 35* 35*   CREATININE mg/dL 0.97 0.99 1.04  --  0.78 1.14 1.06 1.08   GLUCOSE mg/dL 233* 126* 155*  --  122* 176* 159* 161*   EGFR mL/min/1.73 96.9 94.6 89.1  --  110.7 79.8 87.1 85.2   ALBUMIN g/dL 2.8* 2.7* 2.2*  --  2.2* 1.7* 2.0* 3.6   BILIRUBIN mg/dL 0.4 0.5 0.8  --  0.8 0.7 0.7 0.7   ALK PHOS U/L 129* 130* 138*  --  149* 189* 222* 229*   AST (SGOT) U/L 18 28 40  --  55* 154* 199* 229*   ALT (SGPT) U/L 40 53* 68*  --  83*  135* 159* 164*     Results from last 7 days   Lab Units 11/02/24  0616 11/01/24  0635 10/31/24  0542 10/30/24  1719 10/30/24  0639   CALCIUM mg/dL 8.6 8.7 8.9  --  9.0   ALBUMIN g/dL 2.8* 2.7* 2.2*  --  2.2*   MAGNESIUM mg/dL 1.8 1.7 1.5*  --  1.7   PHOSPHORUS mg/dL 3.0 3.5 2.6 2.3* 2.1*     Glucose   Date/Time Value Ref Range Status   11/02/2024 1141 239 (H) 70 - 130 mg/dL Final   11/02/2024 0758 257 (H) 70 - 130 mg/dL Final   11/02/2024 0627 250 (H) 70 - 130 mg/dL Final   11/01/2024 2013 219 (H) 70 - 130 mg/dL Final   11/01/2024 1839 252 (H) 70 - 130 mg/dL Final   11/01/2024 1124 224 (H) 70 - 130 mg/dL Final   11/01/2024 0645 125 70 - 130 mg/dL Final       MRI abdomen w wo contrast mrcp  MRI AND MRCP ABDOMEN WITH AND WITHOUT CONTRAST     HISTORY: Upper abdominal pain for a week, HIDA scan in late October  concerning for chronic cholecystitis with subsequent CT abdomen and  pelvis showing large right subcapsular hepatic fluid collection status  post percutaneous drainage demonstrating bilious output growing  Klebsiella     TECHNIQUE: Multiplanar multisequence MRI an MRCP of the abdomen was  performed before and after the IV administration of MultiHance.     COMPARISON: Multiple CT abdomen pelvis dating back to 8/31/2024 and HIDA  scan 10/25/2024     FINDINGS: Please note evaluation significantly suboptimal, particularly  the pre and postcontrast weighted T1 fat-sat imaging.     Asymmetric pulmonary consolidation with right pleural effusion is only  partially seen and cannot be evaluated. Ill-defined T2 hypointensity is  present within the gallbladder which has a thickened appearance,  hyperenhancing appearance with adjacent stranding which contacts  portions of the distal stomach and duodenum as well. Loculated appearing  collection contiguous with the duodenum inferior to the gallbladder  fossa measures up to 2.5 x 1.8 x 3.1 cm (previously 3 x 3.4 x 3.4 cm on  10/26/2024).. No intra or extrahepatic bili duct  dilation is present.  Main pancreatic duct is not distended.     There is a nonenhancing T2 hyperintense lesion within the inferior  hepatic lobe measuring 1.6 cm, new since 8/31/2024. There is a small  amount of fluid along the superior and anterior aspects of the right  hepatic lobe corresponding with the previously seen subcapsular  collection on 10/26/2024. It is overall significantly decreased in size,  measuring up to approximately 0.7 cm in thickness and coronal imaging  (previously 7.3 cm on 10/26/2024). Presumed drainage catheter is  visualized over the right aspect of the abdominal wall; however, is not  well evaluated on MRI. Somewhat nodular thickening of the bilateral  adrenal glands with overall preservation adreniform shape, as seen since  12/17/2023.     IMPRESSION  1.  Significant interval decrease in size of the previously seen  subcapsular collection status post percutaneous drainage placement.  Please note evaluation for current location of the drain is suboptimal  on MRI. A second loculated appearing collection contiguous with the  duodenum and inferior to the gallbladder fossa also appears to have  mildly decreased in size since 10/26/2024. Given findings on recent  fluid sampling, primary differential considerations include abscess  and/or biloma and can be further evaluated with HIDA scan if clinically  indicated.  2.  The gallbladder has an inflamed appearance with T2 hyperintense  sludge and/or small stones. The gallbladder is otherwise nondistended.  No intra or extrahepatic bili ductal dilation with common bile duct  grossly similar in size that seen on 8/31/2024. Inflammation contacts  portions of the distal stomach and duodenum and differential  considerations include cholecystitis, gastritis and/or duodenitis.  Correlation with patient history is recommended with follow-up HIDA scan  and/or endoscopy if clinically indicated.  3.  Asymmetric pulmonary consolidation right pleural  effusion is only  partially seen and cannot be evaluated. Pneumonia cannot be excluded in  the appropriate context and correlation with patient history is  recommend follow-up chest CT if clinically indicated. At least continued  attention on follow-up is recommended to ensure resolution  4.  Nonenhancing T2 hyperintense lesion within the inferior right  hepatic lobe which is new since 8/31/2024, nonspecific and may represent  local extension of biloma and/or infected fluid in the liver. Continued  close interval follow-up is recommended to ensure resolution and/or  stability.  5.  Other findings as above.        This report was finalized on 10/31/2024 9:09 AM by Dr. Michael Avelar M.D on Workstation: [id number]       Scheduled Medications  carvedilol, 6.25 mg, Oral, Q12H  cefTRIAXone, 2,000 mg, Intravenous, Q24H  heparin (porcine), 5,000 Units, Subcutaneous, Q8H  insulin glargine, 15 Units, Subcutaneous, Nightly  insulin lispro, 2-9 Units, Subcutaneous, 4x Daily AC & at Bedtime  insulin lispro, 5 Units, Subcutaneous, TID With Meals  mupirocin, 1 Application, Each Nare, BID  Naloxegol Oxalate, 25 mg, Oral, QAM  pantoprazole, 40 mg, Oral, BID AC  senna-docusate sodium, 2 tablet, Oral, BID   And  polyethylene glycol, 17 g, Oral, BID  venlafaxine XR, 75 mg, Oral, Daily    Infusions     Diet  Diet: Diabetic; Consistent Carbohydrate; Fluid Consistency: Thin (IDDSI 0)    I have personally reviewed     [x]  Laboratory   [x]  Microbiology   [x]  Radiology   [x]  EKG/Telemetry  []  Cardiology/Vascular   []  Pathology    []  Records       Assessment/Plan     Active Hospital Problems    Diagnosis  POA    **Epigastric abdominal pain [R10.13]  Yes    Abscess of liver [K75.0]  Yes    Klebsiella pneumoniae (k. pneumoniae) as the cause of diseases classified elsewhere [B96.1]  Yes    Hypotension [I95.9]  Yes    Transaminitis [R74.01]  Yes    Leukocytosis [D72.829]  Yes    Chronic combined systolic and diastolic heart failure  [I50.42]  Yes    Anxiety [F41.9]  Yes    Multiple-type hyperlipidemia [E78.2]  Yes    Type 2 diabetes mellitus with diabetic neuropathy, with long-term current use of insulin [E11.40, Z79.4]  Not Applicable    Benign essential HTN [I10]  Yes      Resolved Hospital Problems    Diagnosis Date Resolved POA    Acute kidney injury [N17.9] 10/30/2024 No       49 y.o. man with diabetes, HTN, hyperlipidemia, chronic systolic and diastolic heart failure (non-ischemic) and anxiety who presented with severe abdominal/epigastric pain.  EGD was negative.  CT scan demonstrated a large right subcapsular fluid collection with mass effect on liver.  He underwent drainage of liver fluid collection by IR on 10/28/2024.  Culture thus far positive for rare growth Klebsiella pneumoniae.    Sodium low, renal following  BS elevated will increase insulin  No ERCP planned this admission  Cholecystectomy planned, timing of which unclear  Antibiotics per ID  Drain remains in place      Continue outpatient follow-up regarding acoustic neuroma.  MRI here showed possible vestibular schwannoma that will need monitoring.  Heparin SC for DVT prophylaxis.  Full code.  Discussed with patient.  Anticipate discharge to SNU facility per PT recommendation.  Will need precert.  Expected discharge date/ time has not been documented.      Steven Rivera MD  [facility]  11/02/24  12:36 EDT

## 2024-11-02 NOTE — PROGRESS NOTES
ID note for liver abscess  S: feels okay. No sig pain. AF. Tolearting abx    Temp:  [97.7 °F (36.5 °C)-98.7 °F (37.1 °C)] 98.2 °F (36.8 °C)  Heart Rate:  [88-94] 94  Resp:  [18-27] 27  BP: ()/(60-81) 110/70  GENERAL: Awake and alert, in no acute distress.   HEENT: Oropharynx is clear. Hearing is grossly normal.   EYES: . No conjunctival injection. No lid lag.   LUNGS:normal respiratory effort.   SKIN: no cutaneous eruptions in exposed areas  PSYCHIATRIC: Appropriate mood, affect, insight, and judgment.     Cr 0.97  WBC 10.8  Glc 125-257    Microbiology:  10/23 BCx: negative  10/25 RPP: negative  10/28 Liver Abscess Cx: rare growth Kleb pneumo (susc all abx tested exc ampicillin)  10/28 BCx: NGTD      ASSESSMENT/PLAN:  Liver abscess due to Klebsiella  ?  Biloma  ?  Cholecystitis  Uncontrolled DM2 -last A1c 9.4%      For liver abscess due to Klebsiella, cont with ceftriaxone 2 g IV every 24 hours with duration TBD.    Plan dc on po abx when closer to dc  No ercp necessary per GI but will need cholecystectomy eventually per gen surg  ID will follow

## 2024-11-02 NOTE — PROGRESS NOTES
Chief complaint: Follow-up cholecystitis, subcapsular biloma    Subjective: Overall feels better today.  Had some pain yesterday but was able to tolerate a bit of diet.  Drain output only about 10 cc bilious material.    Review of systems:  Constitutional: Denies fever, chills, change in weight  Respiratory: Denies cough or wheezing    Physical exam:  Afebrile with stable vitals  General: Awake and alert, no distress  Head: Normocephalic, atraumatic  Eyes: Extraocular movements intact, no icterus  Neck: Supple, trachea midline  Respiratory: No use of accessory muscles, good bilateral chest expansion  Gastrointestinal:, Mild right upper quadrant tenderness, some tenderness around his drain site  Extremities: No peripheral edema, no deformity  Skin: Warm and dry    White blood cell count improving, 10.8 today, hemoglobin stable.  Chemistry is unremarkable.    Assessment and plan:  -Cholecystitis and infected biloma, status post drain placement  -Drain output fairly low and on most recent imaging collections improving  -Will eventually need cholecystectomy  -Monitor drain output    Jarrod Prado MD  General and Endoscopic Surgery  [facility]    [address], [address]  [place], [place], [address]  P: [phone]  F: [phone]

## 2024-11-02 NOTE — PLAN OF CARE
Goal Outcome Evaluation:            Patient tolerated abx,  Urine sample submitted

## 2024-11-02 NOTE — PROGRESS NOTES
[facility] Progress Note      Patient Name: Donnie Bishop  : 1975  MRN: [id number]  Primary Care Physician:  Juju Kennedy MD  Date of admission: 10/22/2024    Subjective     Interval History:   F/u TENISHA    Review of Systems:   UOP 1800  Less orthopnea  BP 90/70    Objective     Vitals:   Temp:  [97.7 °F (36.5 °C)-98.7 °F (37.1 °C)] 98.2 °F (36.8 °C)  Heart Rate:  [88-94] 94  Resp:  [18-27] 27  BP: ()/(60-81) 110/70    Intake/Output Summary (Last 24 hours) at 2024 1423  Last data filed at 2024 0600  Gross per 24 hour   Intake 120 ml   Output 1810 ml   Net -1690 ml       Physical Exam:    General Appearance: frail comfortable AAM on RA   Neck: supple, no JVD  Lungs: CTA bilat no rales  Heart: RRR, normal S1 and S2  Abdomen: soft, nontender, nondistended  Extremities: no edema, cyanosis or clubbing       Scheduled Meds:     carvedilol, 6.25 mg, Oral, Q12H  cefTRIAXone, 2,000 mg, Intravenous, Q24H  heparin (porcine), 5,000 Units, Subcutaneous, Q8H  insulin glargine, 15 Units, Subcutaneous, Nightly  insulin lispro, 2-9 Units, Subcutaneous, 4x Daily AC & at Bedtime  insulin lispro, 5 Units, Subcutaneous, TID With Meals  mupirocin, 1 Application, Each Nare, BID  Naloxegol Oxalate, 25 mg, Oral, QAM  pantoprazole, 40 mg, Oral, BID AC  senna-docusate sodium, 2 tablet, Oral, BID   And  polyethylene glycol, 17 g, Oral, BID  venlafaxine XR, 75 mg, Oral, Daily      IV Meds:        Results Reviewed:   I have personally reviewed the results from the time of this admission to 2024 14:23 EDT     Results from last 7 days   Lab Units 24  0616 24  0635 10/31/24  0542   SODIUM mmol/L 130* 134* 135*   POTASSIUM mmol/L 4.2 3.7 4.1   CHLORIDE mmol/L 92* 97* 100   CO2 mmol/L 26.0 27.0 25.6   BUN mg/dL 18 18 17   CREATININE mg/dL 0.97 0.99 1.04   CALCIUM mg/dL 8.6 8.7 8.9   BILIRUBIN mg/dL 0.4 0.5 0.8   ALK PHOS U/L 129* 130* 138*   ALT (SGPT) U/L 40 53* 68*   AST (SGOT)  U/L 18 28 40   GLUCOSE mg/dL 233* 126* 155*     Estimated Creatinine Clearance: 101.1 mL/min (by C-G formula based on SCr of 0.97 mg/dL).  Results from last 7 days   Lab Units 11/02/24  0616 11/01/24 0635 10/31/24  0542   MAGNESIUM mg/dL 1.8 1.7 1.5*   PHOSPHORUS mg/dL 3.0 3.5 2.6     Results from last 7 days   Lab Units 10/30/24  0639   URIC ACID mg/dL 3.4     Results from last 7 days   Lab Units 11/02/24  0616 11/01/24  0635 10/31/24  0542 10/30/24  0639 10/29/24  0652   WBC 10*3/mm3 10.84* 11.82* 11.35* 14.38* 15.53*   HEMOGLOBIN g/dL 10.5* 10.7* 11.1* 10.6* 11.6*   PLATELETS 10*3/mm3 536* 471* 392 364 370     Results from last 7 days   Lab Units 10/28/24  1959 10/28/24  1129   INR  1.13* 1.0       Assessment / Plan     ASSESSMENT:  Acute kidney injury.  Multifactorial including hypovolemia, hypotension, impaired renal autoregulation due to Jardiance, Entresto, Aldactone.  Resolved.  Cr ~ 1.  Appears fairly euvolemic but hyponatremia worse, Na down to 130 despite giving lasix yesterday.  He is on effexor (can cause SIADH)  Cholecystitis with large right perihepatic fluid collection.  Status post IR drainage with drain left in 10/28/2024.  On Rocephin per ID.  Transaminitis improving.  Off statin.  MRCP noted.  White blood cell count falling.  Heart failure reduced ejection fraction.  EF 49%.  (Improved from 35% a year ago).  Anemia.  Hemoglobin stable 10.5  Encephalopathy.  Improving.  Diabetes mellitus type 2.  HTN - SBP 90s to low 100s on low dose coreg     PLAN:  Fluid restrict 1500 cc/day  Check Jey/osm, uric acid, AM cortisol  Hold diuretic today       Connor Crandall MD  11/02/24  14:23 EDT    [facility]  [phone]

## 2024-11-02 NOTE — PLAN OF CARE
Goal Outcome Evaluation:    The patient is alert and oriented x3. Able to move all extremities and follows commands. Pupils are PERRLA. Normal sinus on the moinitors. Room air. Drain has had minimal output. Urine output is adequate.

## 2024-11-03 ENCOUNTER — APPOINTMENT (OUTPATIENT)
Dept: CT IMAGING | Facility: HOSPITAL | Age: 49
DRG: 853 | End: 2024-11-03
Payer: COMMERCIAL

## 2024-11-03 LAB
ALBUMIN SERPL-MCNC: 2.6 G/DL (ref 3.5–5.2)
ALBUMIN/GLOB SERPL: 0.7 G/DL
ALP SERPL-CCNC: 116 U/L (ref 39–117)
ALT SERPL W P-5'-P-CCNC: 30 U/L (ref 1–41)
ANION GAP SERPL CALCULATED.3IONS-SCNC: 9 MMOL/L (ref 5–15)
AST SERPL-CCNC: 13 U/L (ref 1–40)
BILIRUB SERPL-MCNC: 0.3 MG/DL (ref 0–1.2)
BUN SERPL-MCNC: 14 MG/DL (ref 6–20)
BUN/CREAT SERPL: 14.7 (ref 7–25)
CALCIUM SPEC-SCNC: 8.6 MG/DL (ref 8.6–10.5)
CHLORIDE SERPL-SCNC: 96 MMOL/L (ref 98–107)
CO2 SERPL-SCNC: 28 MMOL/L (ref 22–29)
CORTIS SERPL-MCNC: 10.4 MCG/DL
CREAT SERPL-MCNC: 0.95 MG/DL (ref 0.76–1.27)
DEPRECATED RDW RBC AUTO: 42.7 FL (ref 37–54)
EGFRCR SERPLBLD CKD-EPI 2021: 99.3 ML/MIN/1.73
ERYTHROCYTE [DISTWIDTH] IN BLOOD BY AUTOMATED COUNT: 12.5 % (ref 12.3–15.4)
GLOBULIN UR ELPH-MCNC: 3.6 GM/DL
GLUCOSE BLDC GLUCOMTR-MCNC: 103 MG/DL (ref 70–130)
GLUCOSE BLDC GLUCOMTR-MCNC: 128 MG/DL (ref 70–130)
GLUCOSE BLDC GLUCOMTR-MCNC: 235 MG/DL (ref 70–130)
GLUCOSE BLDC GLUCOMTR-MCNC: 280 MG/DL (ref 70–130)
GLUCOSE BLDC GLUCOMTR-MCNC: 44 MG/DL (ref 70–130)
GLUCOSE SERPL-MCNC: 258 MG/DL (ref 65–99)
HCT VFR BLD AUTO: 32 % (ref 37.5–51)
HGB BLD-MCNC: 9.9 G/DL (ref 13–17.7)
MAGNESIUM SERPL-MCNC: 1.9 MG/DL (ref 1.6–2.6)
MCH RBC QN AUTO: 29 PG (ref 26.6–33)
MCHC RBC AUTO-ENTMCNC: 30.9 G/DL (ref 31.5–35.7)
MCV RBC AUTO: 93.8 FL (ref 79–97)
PHOSPHATE SERPL-MCNC: 2.5 MG/DL (ref 2.5–4.5)
PLATELET # BLD AUTO: 588 10*3/MM3 (ref 140–450)
PMV BLD AUTO: 9.9 FL (ref 6–12)
POTASSIUM SERPL-SCNC: 4.2 MMOL/L (ref 3.5–5.2)
PROT SERPL-MCNC: 6.2 G/DL (ref 6–8.5)
RBC # BLD AUTO: 3.41 10*6/MM3 (ref 4.14–5.8)
SODIUM SERPL-SCNC: 133 MMOL/L (ref 136–145)
URATE SERPL-MCNC: 3.5 MG/DL (ref 3.4–7)
WBC NRBC COR # BLD AUTO: 8.94 10*3/MM3 (ref 3.4–10.8)

## 2024-11-03 PROCEDURE — 25010000002 HEPARIN (PORCINE) PER 1000 UNITS: Performed by: STUDENT IN AN ORGANIZED HEALTH CARE EDUCATION/TRAINING PROGRAM

## 2024-11-03 PROCEDURE — 82948 REAGENT STRIP/BLOOD GLUCOSE: CPT

## 2024-11-03 PROCEDURE — 99232 SBSQ HOSP IP/OBS MODERATE 35: CPT | Performed by: INTERNAL MEDICINE

## 2024-11-03 PROCEDURE — 80053 COMPREHEN METABOLIC PANEL: CPT | Performed by: STUDENT IN AN ORGANIZED HEALTH CARE EDUCATION/TRAINING PROGRAM

## 2024-11-03 PROCEDURE — 83735 ASSAY OF MAGNESIUM: CPT | Performed by: STUDENT IN AN ORGANIZED HEALTH CARE EDUCATION/TRAINING PROGRAM

## 2024-11-03 PROCEDURE — 82533 TOTAL CORTISOL: CPT | Performed by: INTERNAL MEDICINE

## 2024-11-03 PROCEDURE — 25510000001 IOPAMIDOL 61 % SOLUTION: Performed by: HOSPITALIST

## 2024-11-03 PROCEDURE — 63710000001 INSULIN GLARGINE PER 5 UNITS: Performed by: HOSPITALIST

## 2024-11-03 PROCEDURE — 74177 CT ABD & PELVIS W/CONTRAST: CPT

## 2024-11-03 PROCEDURE — 84550 ASSAY OF BLOOD/URIC ACID: CPT | Performed by: INTERNAL MEDICINE

## 2024-11-03 PROCEDURE — 63710000001 INSULIN LISPRO (HUMAN) PER 5 UNITS: Performed by: HOSPITALIST

## 2024-11-03 PROCEDURE — 25010000002 ONDANSETRON PER 1 MG: Performed by: INTERNAL MEDICINE

## 2024-11-03 PROCEDURE — 85027 COMPLETE CBC AUTOMATED: CPT | Performed by: HOSPITALIST

## 2024-11-03 PROCEDURE — 99232 SBSQ HOSP IP/OBS MODERATE 35: CPT | Performed by: SURGERY

## 2024-11-03 PROCEDURE — 25010000002 CEFTRIAXONE PER 250 MG: Performed by: HOSPITALIST

## 2024-11-03 PROCEDURE — 84100 ASSAY OF PHOSPHORUS: CPT | Performed by: STUDENT IN AN ORGANIZED HEALTH CARE EDUCATION/TRAINING PROGRAM

## 2024-11-03 RX ORDER — INSULIN LISPRO 100 [IU]/ML
7 INJECTION, SOLUTION INTRAVENOUS; SUBCUTANEOUS
Status: DISCONTINUED | OUTPATIENT
Start: 2024-11-03 | End: 2024-11-04

## 2024-11-03 RX ORDER — FUROSEMIDE 20 MG/1
20 TABLET ORAL DAILY
Status: DISCONTINUED | OUTPATIENT
Start: 2024-11-04 | End: 2024-11-07

## 2024-11-03 RX ORDER — IOPAMIDOL 612 MG/ML
100 INJECTION, SOLUTION INTRAVASCULAR
Status: COMPLETED | OUTPATIENT
Start: 2024-11-03 | End: 2024-11-03

## 2024-11-03 RX ADMIN — INSULIN LISPRO 7 UNITS: 100 INJECTION, SOLUTION INTRAVENOUS; SUBCUTANEOUS at 08:34

## 2024-11-03 RX ADMIN — DEXTROSE MONOHYDRATE 25 G: 25 INJECTION, SOLUTION INTRAVENOUS at 16:27

## 2024-11-03 RX ADMIN — SENNOSIDES AND DOCUSATE SODIUM 2 TABLET: 50; 8.6 TABLET ORAL at 08:33

## 2024-11-03 RX ADMIN — CARVEDILOL 6.25 MG: 6.25 TABLET, FILM COATED ORAL at 08:33

## 2024-11-03 RX ADMIN — INSULIN LISPRO 6 UNITS: 100 INJECTION, SOLUTION INTRAVENOUS; SUBCUTANEOUS at 08:33

## 2024-11-03 RX ADMIN — HEPARIN SODIUM 5000 UNITS: 5000 INJECTION INTRAVENOUS; SUBCUTANEOUS at 20:46

## 2024-11-03 RX ADMIN — SENNOSIDES AND DOCUSATE SODIUM 2 TABLET: 50; 8.6 TABLET ORAL at 20:45

## 2024-11-03 RX ADMIN — VENLAFAXINE HYDROCHLORIDE 75 MG: 75 CAPSULE, EXTENDED RELEASE ORAL at 08:34

## 2024-11-03 RX ADMIN — ONDANSETRON 4 MG: 2 INJECTION INTRAMUSCULAR; INTRAVENOUS at 08:42

## 2024-11-03 RX ADMIN — NALOXEGOL OXALATE 25 MG: 25 TABLET, FILM COATED ORAL at 08:34

## 2024-11-03 RX ADMIN — CARVEDILOL 6.25 MG: 6.25 TABLET, FILM COATED ORAL at 20:45

## 2024-11-03 RX ADMIN — POLYETHYLENE GLYCOL 3350 17 G: 17 POWDER, FOR SOLUTION ORAL at 20:45

## 2024-11-03 RX ADMIN — INSULIN LISPRO 7 UNITS: 100 INJECTION, SOLUTION INTRAVENOUS; SUBCUTANEOUS at 12:02

## 2024-11-03 RX ADMIN — PANTOPRAZOLE SODIUM 40 MG: 40 TABLET, DELAYED RELEASE ORAL at 08:33

## 2024-11-03 RX ADMIN — INSULIN GLARGINE 21 UNITS: 100 INJECTION, SOLUTION SUBCUTANEOUS at 20:46

## 2024-11-03 RX ADMIN — MUPIROCIN 1 APPLICATION: 20 OINTMENT TOPICAL at 08:33

## 2024-11-03 RX ADMIN — OXYCODONE HYDROCHLORIDE 5 MG: 5 TABLET ORAL at 08:44

## 2024-11-03 RX ADMIN — INSULIN LISPRO 4 UNITS: 100 INJECTION, SOLUTION INTRAVENOUS; SUBCUTANEOUS at 12:02

## 2024-11-03 RX ADMIN — PANTOPRAZOLE SODIUM 40 MG: 40 TABLET, DELAYED RELEASE ORAL at 17:13

## 2024-11-03 RX ADMIN — OXYCODONE HYDROCHLORIDE 5 MG: 5 TABLET ORAL at 03:31

## 2024-11-03 RX ADMIN — HEPARIN SODIUM 5000 UNITS: 5000 INJECTION INTRAVENOUS; SUBCUTANEOUS at 05:49

## 2024-11-03 RX ADMIN — POLYETHYLENE GLYCOL 3350 17 G: 17 POWDER, FOR SOLUTION ORAL at 08:34

## 2024-11-03 RX ADMIN — HEPARIN SODIUM 5000 UNITS: 5000 INJECTION INTRAVENOUS; SUBCUTANEOUS at 13:02

## 2024-11-03 RX ADMIN — CEFTRIAXONE 2000 MG: 2 INJECTION, POWDER, FOR SOLUTION INTRAMUSCULAR; INTRAVENOUS at 13:02

## 2024-11-03 RX ADMIN — IOPAMIDOL 85 ML: 612 INJECTION, SOLUTION INTRAVENOUS at 15:08

## 2024-11-03 NOTE — PROGRESS NOTES
[facility] Progress Note      Patient Name: Donnie Bishop  : 1975  MRN: [id number]  Primary Care Physician:  Juju Kennedy MD  Date of admission: 10/22/2024    Subjective     Interval History:   F/u TENISHA     Review of Systems:   Denies dyspnea or nausea     Objective     Vitals:   Temp:  [98.2 °F (36.8 °C)-98.5 °F (36.9 °C)] 98.3 °F (36.8 °C)  Heart Rate:  [88-98] 88  Resp:  [18-25] 23  BP: ()/(63-75) 109/68    Intake/Output Summary (Last 24 hours) at 11/3/2024 1423  Last data filed at 11/3/2024 1204  Gross per 24 hour   Intake 830 ml   Output 1500 ml   Net -670 ml       Physical Exam:    General Appearance: frail AAM comfortable on RA, speech/mentation slow  Neck: supple, no JVD  Lungs: CTA bilat no rales  Heart: RRR, normal S1 and S2  Abdomen: soft, nontender, nondistended  Extremities: no edema, cyanosis or clubbing        Scheduled Meds:     carvedilol, 6.25 mg, Oral, Q12H  cefTRIAXone, 2,000 mg, Intravenous, Q24H  heparin (porcine), 5,000 Units, Subcutaneous, Q8H  insulin glargine, 21 Units, Subcutaneous, Nightly  insulin lispro, 2-9 Units, Subcutaneous, 4x Daily AC & at Bedtime  insulin lispro, 7 Units, Subcutaneous, TID With Meals  mupirocin, 1 Application, Each Nare, BID  Naloxegol Oxalate, 25 mg, Oral, QAM  pantoprazole, 40 mg, Oral, BID AC  senna-docusate sodium, 2 tablet, Oral, BID   And  polyethylene glycol, 17 g, Oral, BID  venlafaxine XR, 75 mg, Oral, Daily      IV Meds:        Results Reviewed:   I have personally reviewed the results from the time of this admission to 11/3/2024 14:23 EST     Results from last 7 days   Lab Units 24  0554 24  0616 24  0635   SODIUM mmol/L 133* 130* 134*   POTASSIUM mmol/L 4.2 4.2 3.7   CHLORIDE mmol/L 96* 92* 97*   CO2 mmol/L 28.0 26.0 27.0   BUN mg/dL 14 18 18   CREATININE mg/dL 0.95 0.97 0.99   CALCIUM mg/dL 8.6 8.6 8.7   BILIRUBIN mg/dL 0.3 0.4 0.5   ALK PHOS U/L 116 129* 130*   ALT (SGPT) U/L 30 40 53*    AST (SGOT) U/L 13 18 28   GLUCOSE mg/dL 258* 233* 126*     Estimated Creatinine Clearance: 101.3 mL/min (by C-G formula based on SCr of 0.95 mg/dL).  Results from last 7 days   Lab Units 11/03/24  0554 11/02/24  0616 11/01/24  0635   MAGNESIUM mg/dL 1.9 1.8 1.7   PHOSPHORUS mg/dL 2.5 3.0 3.5     Results from last 7 days   Lab Units 11/03/24  0554 10/30/24  0639   URIC ACID mg/dL 3.5 3.4     Results from last 7 days   Lab Units 11/03/24  0554 11/02/24  0616 11/01/24  0635 10/31/24  0542 10/30/24  0639   WBC 10*3/mm3 8.94 10.84* 11.82* 11.35* 14.38*   HEMOGLOBIN g/dL 9.9* 10.5* 10.7* 11.1* 10.6*   PLATELETS 10*3/mm3 588* 536* 471* 392 364     Results from last 7 days   Lab Units 10/28/24  1959 10/28/24  1129   INR  1.13* 1.0       Assessment / Plan     ASSESSMENT:  Acute kidney injury.  Multifactorial including hypovolemia, hypotension, impaired renal autoregulation due to Jardiance, Entresto, Aldactone.  Resolved.  Cr ~ 1.  Appears fairly euvolemic but we may need to resume low dose loop diuretic in AM to maintain vol status (not now with SBP 90s).  CXR last week did show some central congestion and hyponatremia is likely hypervolemic in nature, but improved some with fluid restriction (133)  Cholecystitis with large right perihepatic fluid collection.  Status post IR drainage with drain left in 10/28/2024.  On Rocephin per ID.  Transaminitis improving.  Off statin.  MRCP noted.  White blood cell count falling.  Heart failure reduced ejection fraction.  EF 49%.  (Improved from 35% a year ago).  Anemia.  Hemoglobin down slightly 9.9  Encephalopathy.  Mentation remains bit slow.  Diabetes mellitus type 2.  Hypotension - SBP 90s to low 100s on low dose coreg     PLAN:  Continue fluid restriction 1500 cc/day  Resume lasix 20 mg daily in AM  Repeat CXR in AM      Connor Crandall MD  11/03/24  14:23 EST    [facility]  [phone]

## 2024-11-03 NOTE — PROGRESS NOTES
ID note for liver abscess  S: feels okay.  Pain is improving. AF. Tolearting abx    Temp:  [98.2 °F (36.8 °C)-98.5 °F (36.9 °C)] 98.5 °F (36.9 °C)  Heart Rate:  [88-98] 88  Resp:  [18-27] 25  BP: ()/(63-75) 115/75  GENERAL: Awake and alert, in no acute distress.   HEENT: Oropharynx is clear. Hearing is grossly normal.   EYES: . No conjunctival injection. No lid lag.   LUNGS:normal respiratory effort.   SKIN: no cutaneous eruptions in exposed areas  PSYCHIATRIC: Appropriate mood, affect, insight, and judgment.     Cr 0.95  WBC 8.9  Glc 239-280    Microbiology:  10/23 BCx: negative  10/25 RPP: negative  10/28 Liver Abscess Cx: rare growth Kleb pneumo (susc all abx tested exc ampicillin)  10/28 BCx: NGTD      ASSESSMENT/PLAN:  Liver abscess due to Klebsiella  ?  Biloma  ?  Cholecystitis  Uncontrolled DM2 -last A1c 9.4%      For liver abscess due to Klebsiella, cont with ceftriaxone 2 g IV every 24 hours with duration TBD.    Plan dc on po abx when closer to dc  He does not appear to need ERCP but eventually will need cholecystectomy.  Timing still to be determined.  ID will follow

## 2024-11-03 NOTE — NURSING NOTE
Pt Glucose was 44.  Gave dextrose Iv. Glucose is now 103. Pt was asymptomatic during this episode. PT refused his scheduled Insulin. Notified MD.Will continue to monitor.

## 2024-11-03 NOTE — PROGRESS NOTES
Name: Donnie Bishop ADMIT: 10/22/2024   : 1975  PCP: Juju Kennedy MD    MRN: [id number] LOS: 10 days   AGE/SEX: 49 y.o. male  ROOM: [facility]     Subjective   Subjective   Still having abdominal pain maybe a little worse today than yesterday. No vomiting.    Objective   Objective   Vital Signs  Temp:  [98.2 °F (36.8 °C)-98.7 °F (37.1 °C)] 98.5 °F (36.9 °C)  Heart Rate:  [88-98] 88  Resp:  [18-27] 25  BP: ()/(63-75) 115/75  SpO2:  [90 %-97 %] 95 %  on   ;   Device (Oxygen Therapy): room air  Body mass index is 25.78 kg/m².  Physical Exam  General: not in distress, ill-appearing,  CV: Regular rate and rhythm, no murmurs rubs or gallops  Lungs: Diminished at bases, no wheezing, no crackles  Abdomen: Soft, less distended, mild tenderness to palpation, RUQ drain in place.  Extremities: Trace edema in lower extremities,, no cyanosis     Results Review     I reviewed the patient's new clinical results.  Results from last 7 days   Lab Units 24  0554 24  0616 2435 10/31/24  0542   WBC 10*3/mm3 8.94 10.84* 11.82* 11.35*   HEMOGLOBIN g/dL 9.9* 10.5* 10.7* 11.1*   PLATELETS 10*3/mm3 588* 536* 471* 392     Results from last 7 days   Lab Units 24  0554 24  0616 24  0635 10/31/24  0542 10/30/24  2148 10/30/24  0639 10/29/24  0652 10/28/24  2059   SODIUM mmol/L 133* 130* 134* 135*  --  135* 136 143   POTASSIUM mmol/L 4.2 4.2 3.7 4.1 4.0 3.1* 3.9 4.5   CHLORIDE mmol/L 96* 92* 97* 100  --  102 107 110*   CO2 mmol/L 28.0 26.0 27.0 25.6  --  21.4* 16.6* 16.0*   BUN mg/dL 14 18 18 17  --  23* 30* 35*   CREATININE mg/dL 0.95 0.97 0.99 1.04  --  0.78 1.14 1.06   GLUCOSE mg/dL 258* 233* 126* 155*  --  122* 176* 159*   EGFR mL/min/1.73 99.3 96.9 94.6 89.1  --  110.7 79.8 87.1   ALBUMIN g/dL 2.6* 2.8* 2.7* 2.2*  --  2.2* 1.7* 2.0*   BILIRUBIN mg/dL 0.3 0.4 0.5 0.8  --  0.8 0.7 0.7   ALK PHOS U/L 116 129* 130* 138*  --  149* 189* 222*   AST (SGOT) U/L 13 18 28 40  --  55* 154* 199*    ALT (SGPT) U/L 30 40 53* 68*  --  83* 135* 159*     Results from last 7 days   Lab Units 11/03/24  0554 11/02/24  0616 11/01/24  0635 10/31/24  0542   CALCIUM mg/dL 8.6 8.6 8.7 8.9   ALBUMIN g/dL 2.6* 2.8* 2.7* 2.2*   MAGNESIUM mg/dL 1.9 1.8 1.7 1.5*   PHOSPHORUS mg/dL 2.5 3.0 3.5 2.6     Glucose   Date/Time Value Ref Range Status   11/03/2024 0724 280 (H) 70 - 130 mg/dL Final   11/02/2024 2041 226 (H) 70 - 130 mg/dL Final   11/02/2024 1527 251 (H) 70 - 130 mg/dL Final   11/02/2024 1141 239 (H) 70 - 130 mg/dL Final   11/02/2024 0758 257 (H) 70 - 130 mg/dL Final   11/02/2024 0627 250 (H) 70 - 130 mg/dL Final   11/01/2024 2013 219 (H) 70 - 130 mg/dL Final       MRI abdomen w wo contrast mrcp  MRI AND MRCP ABDOMEN WITH AND WITHOUT CONTRAST     HISTORY: Upper abdominal pain for a week, HIDA scan in late October  concerning for chronic cholecystitis with subsequent CT abdomen and  pelvis showing large right subcapsular hepatic fluid collection status  post percutaneous drainage demonstrating bilious output growing  Klebsiella     TECHNIQUE: Multiplanar multisequence MRI an MRCP of the abdomen was  performed before and after the IV administration of MultiHance.     COMPARISON: Multiple CT abdomen pelvis dating back to 8/31/2024 and HIDA  scan 10/25/2024     FINDINGS: Please note evaluation significantly suboptimal, particularly  the pre and postcontrast weighted T1 fat-sat imaging.     Asymmetric pulmonary consolidation with right pleural effusion is only  partially seen and cannot be evaluated. Ill-defined T2 hypointensity is  present within the gallbladder which has a thickened appearance,  hyperenhancing appearance with adjacent stranding which contacts  portions of the distal stomach and duodenum as well. Loculated appearing  collection contiguous with the duodenum inferior to the gallbladder  fossa measures up to 2.5 x 1.8 x 3.1 cm (previously 3 x 3.4 x 3.4 cm on  10/26/2024).. No intra or extrahepatic bili duct  dilation is present.  Main pancreatic duct is not distended.     There is a nonenhancing T2 hyperintense lesion within the inferior  hepatic lobe measuring 1.6 cm, new since 8/31/2024. There is a small  amount of fluid along the superior and anterior aspects of the right  hepatic lobe corresponding with the previously seen subcapsular  collection on 10/26/2024. It is overall significantly decreased in size,  measuring up to approximately 0.7 cm in thickness and coronal imaging  (previously 7.3 cm on 10/26/2024). Presumed drainage catheter is  visualized over the right aspect of the abdominal wall; however, is not  well evaluated on MRI. Somewhat nodular thickening of the bilateral  adrenal glands with overall preservation adreniform shape, as seen since  12/17/2023.     IMPRESSION  1.  Significant interval decrease in size of the previously seen  subcapsular collection status post percutaneous drainage placement.  Please note evaluation for current location of the drain is suboptimal  on MRI. A second loculated appearing collection contiguous with the  duodenum and inferior to the gallbladder fossa also appears to have  mildly decreased in size since 10/26/2024. Given findings on recent  fluid sampling, primary differential considerations include abscess  and/or biloma and can be further evaluated with HIDA scan if clinically  indicated.  2.  The gallbladder has an inflamed appearance with T2 hyperintense  sludge and/or small stones. The gallbladder is otherwise nondistended.  No intra or extrahepatic bili ductal dilation with common bile duct  grossly similar in size that seen on 8/31/2024. Inflammation contacts  portions of the distal stomach and duodenum and differential  considerations include cholecystitis, gastritis and/or duodenitis.  Correlation with patient history is recommended with follow-up HIDA scan  and/or endoscopy if clinically indicated.  3.  Asymmetric pulmonary consolidation right pleural  effusion is only  partially seen and cannot be evaluated. Pneumonia cannot be excluded in  the appropriate context and correlation with patient history is  recommend follow-up chest CT if clinically indicated. At least continued  attention on follow-up is recommended to ensure resolution  4.  Nonenhancing T2 hyperintense lesion within the inferior right  hepatic lobe which is new since 8/31/2024, nonspecific and may represent  local extension of biloma and/or infected fluid in the liver. Continued  close interval follow-up is recommended to ensure resolution and/or  stability.  5.  Other findings as above.        This report was finalized on 10/31/2024 9:09 AM by Dr. Michael Avelar M.D on Workstation: [id number]       Scheduled Medications  carvedilol, 6.25 mg, Oral, Q12H  cefTRIAXone, 2,000 mg, Intravenous, Q24H  heparin (porcine), 5,000 Units, Subcutaneous, Q8H  insulin glargine, 15 Units, Subcutaneous, Nightly  insulin lispro, 2-9 Units, Subcutaneous, 4x Daily AC & at Bedtime  insulin lispro, 5 Units, Subcutaneous, TID With Meals  mupirocin, 1 Application, Each Nare, BID  Naloxegol Oxalate, 25 mg, Oral, QAM  pantoprazole, 40 mg, Oral, BID AC  senna-docusate sodium, 2 tablet, Oral, BID   And  polyethylene glycol, 17 g, Oral, BID  venlafaxine XR, 75 mg, Oral, Daily    Infusions     Diet  Diet: Diabetic, Fluid Restriction (240 mL/tray); Consistent Carbohydrate; 1500 mL/day; Fluid Consistency: Thin (IDDSI 0)    I have personally reviewed     [x]  Laboratory   [x]  Microbiology   [x]  Radiology   [x]  EKG/Telemetry  []  Cardiology/Vascular   []  Pathology    []  Records       Assessment/Plan     Active Hospital Problems    Diagnosis  POA    **Epigastric abdominal pain [R10.13]  Yes    Abscess of liver [K75.0]  Yes    Klebsiella pneumoniae (k. pneumoniae) as the cause of diseases classified elsewhere [B96.1]  Yes    Hypotension [I95.9]  Yes    Transaminitis [R74.01]  Yes    Leukocytosis [D72.829]  Yes    Chronic  combined systolic and diastolic heart failure [I50.42]  Yes    Anxiety [F41.9]  Yes    Multiple-type hyperlipidemia [E78.2]  Yes    Type 2 diabetes mellitus with diabetic neuropathy, with long-term current use of insulin [E11.40, Z79.4]  Not Applicable    Benign essential HTN [I10]  Yes      Resolved Hospital Problems    Diagnosis Date Resolved POA    Acute kidney injury [N17.9] 10/30/2024 No       49 y.o. man with diabetes, HTN, hyperlipidemia, chronic systolic and diastolic heart failure (non-ischemic) and anxiety who presented with severe abdominal/epigastric pain.  EGD was negative.  CT scan demonstrated a large right subcapsular fluid collection with mass effect on liver.  He underwent drainage of liver fluid collection by IR on 10/28/2024.  Culture thus far positive for rare growth Klebsiella pneumoniae.    WBC has normalized  Sodium improved    Blood sugar still quite elevated. Received 14 units correctional factor insulin yesterday. Will increase basal/bolus insulin accordingly    Await surgery follow-up recommendations.  Evidently no ERCP planned for this admission.  He will need cholecystectomy timing of which has not been determined.  RUQ drain remains in place.  Remains on Rocephin per ID with plans to transition to oral antibiotic at discharge.      Continue outpatient follow-up regarding acoustic neuroma.  MRI here showed possible vestibular schwannoma that will need monitoring.  Heparin SC for DVT prophylaxis.  Full code.  Discussed with patient.  Anticipate discharge to SNU facility per PT recommendation.  Will need precert.  Expected discharge date/ time has not been documented.      Steven Rivera MD  [facility]  11/03/24  07:31 EST

## 2024-11-03 NOTE — PLAN OF CARE
Problem: Adult Inpatient Plan of Care  Goal: Plan of Care Review  Recent Flowsheet Documentation  Taken 11/3/2024 1852 by Poornima Del Valle RN  Outcome Evaluation: VSS. AOX4, Deaf L ear, [place] in right, Room air, ACHS,Drain in RUQ, Urinal . BM on 10-31-24. IV ABX given. Makes need known, Bed alarm on. Call light with in reach  Goal: Absence of Hospital-Acquired Illness or Injury  Intervention: Identify and Manage Fall Risk  Recent Flowsheet Documentation  Taken 11/3/2024 1600 by Poornima Del Valle RN  Safety Promotion/Fall Prevention:   safety round/check completed   nonskid shoes/slippers when out of bed   fall prevention program maintained  Taken 11/3/2024 1200 by Poornima Del Valle RN  Safety Promotion/Fall Prevention:   fall prevention program maintained   safety round/check completed  Taken 11/3/2024 1000 by Poornima Del Valle RN  Safety Promotion/Fall Prevention:   safety round/check completed   nonskid shoes/slippers when out of bed   fall prevention program maintained  Taken 11/3/2024 0800 by Poornima Del Valle RN  Safety Promotion/Fall Prevention:   fall prevention program maintained   nonskid shoes/slippers when out of bed   safety round/check completed  Intervention: Prevent Skin Injury  Recent Flowsheet Documentation  Taken 11/3/2024 1800 by Poornima Del Valle RN  Body Position: position changed independently  Taken 11/3/2024 1600 by Poornima Del Valle RN  Body Position:   position changed independently   left  Taken 11/3/2024 1200 by Poornima Del Valle RN  Body Position:   position changed independently   sitting up in bed  Taken 11/3/2024 1000 by Poornima Del Valle RN  Body Position: position changed independently  Taken 11/3/2024 0800 by Poornima Del Valle RN  Body Position: position changed independently  Skin Protection: incontinence pads utilized  Intervention: Prevent and Manage VTE (Venous Thromboembolism) Risk  Recent Flowsheet Documentation  Taken 11/3/2024 0800 by Poornima Del Valle RN  VTE  Prevention/Management: (Heparin) other (see comments)  Intervention: Prevent Infection  Recent Flowsheet Documentation  Taken 11/3/2024 1600 by Poornima Del Valle RN  Infection Prevention:   single patient room provided   hand hygiene promoted  Taken 11/3/2024 1200 by Poornima Del Valle RN  Infection Prevention: single patient room provided  Taken 11/3/2024 1000 by Poornima Del Valle RN  Infection Prevention: single patient room provided  Taken 11/3/2024 0800 by Poornima Del Valle RN  Infection Prevention: hand hygiene promoted  Goal: Optimal Comfort and Wellbeing  Intervention: Provide Person-Centered Care  Recent Flowsheet Documentation  Taken 11/3/2024 0800 by Poornima Del Valle RN  Trust Relationship/Rapport:   choices provided   care explained   Goal Outcome Evaluation:              Outcome Evaluation: VSS. AOX4, Deaf L ear, [place] in right, Room air, ACHS,Drain in RUQ, Urinal . BM on 10-31-24. IV ABX given. Makes need known, Bed alarm on. Call light with in reach

## 2024-11-03 NOTE — PLAN OF CARE
Goal Outcome Evaluation:      The patient is alert and oriented x4. Able to move all extremities and follows commands. Pupils are PERRLA. Normal sinus on the monitors. Room air. Drain had 3mL output. Urine output adequate. See MAR. See morning labs.

## 2024-11-03 NOTE — PROGRESS NOTES
Chief complaint: Follow-up cholecystitis, subcapsular biloma    Subjective: Making slow progress.  Tolerating some diet.  Drain with minimal output, only about 3 cc from what I can gather.    Review of systems:  Constitutional: Denies fever, chills, change in weight  Respiratory: Denies cough or wheezing    Physical exam:  Afebrile with stable vital  General: Awake and alert, no distress  Head: Normocephalic, atraumatic  Eyes: Extraocular movements intact, no icterus  Neck: Supple, trachea midline  Respiratory: No use of accessory muscles, good bilateral chest expansion  Gastrointestinal: Soft, tender around the drain, otherwise fairly soft  Extremities: No peripheral edema, no deformity  Skin: Warm and dry    White count in the normal range, hemoglobin stable, chemistries unremarkable    Assessment and plan:  -Cholecystitis, Klebsiella infected biloma, status post drainage  -Drain output is low, hopefully this means collection is clearing up, but I think repeating imaging to verify this would be appropriate  -Eventual cholecystectomy, timing to be determined    Jarrod Prado MD  General and Endoscopic Surgery  [facility]    [address], [address]  [place], [place], [address]  P: [phone]  F: [phone]

## 2024-11-03 NOTE — PROGRESS NOTES
"    Patient Name: Donnie iBshop  :1975  49 y.o.      Patient Care Team:  Juju Kennedy MD as PCP - General (Family Medicine)    Chief Complaint:   CM  Interval History:   No changes. No complaints. Cholecystectomy being considered      Objective   Vital Signs  Temp:  [98.2 °F (36.8 °C)-98.5 °F (36.9 °C)] 98.3 °F (36.8 °C)  Heart Rate:  [88-98] 88  Resp:  [18-25] 23  BP: ()/(63-75) 109/68    Intake/Output Summary (Last 24 hours) at 11/3/2024 1247  Last data filed at 11/3/2024 1204  Gross per 24 hour   Intake 830 ml   Output 1500 ml   Net -670 ml     Flowsheet Rows      Flowsheet Row First Filed Value   Admission Height 170.2 cm (67\") Documented at 10/22/2024 1641   Admission Weight 76.2 kg (168 lb) Documented at 10/22/2024 1641            Physical Exam:   General Appearance:    Alert, cooperative, in no acute distress   Lungs:     Clear to auscultation.  Normal respiratory effort and rate.      Heart:    Regular rhythm and normal rate, normal S1 and S2, no murmurs, gallops or rubs.     Chest Wall:    No abnormalities observed   Abdomen:     Soft, nontender, positive bowel sounds.     Extremities:   no cyanosis, clubbing or edema.  No marked joint deformities.  Adequate musculoskeletal strength.       Results Review:    Results from last 7 days   Lab Units 24  0554   SODIUM mmol/L 133*   POTASSIUM mmol/L 4.2   CHLORIDE mmol/L 96*   CO2 mmol/L 28.0   BUN mg/dL 14   CREATININE mg/dL 0.95   GLUCOSE mg/dL 258*   CALCIUM mg/dL 8.6     Results from last 7 days   Lab Units 10/28/24  0045 10/27/24  2205   HSTROP T ng/L 30* 28*     Results from last 7 days   Lab Units 24  0554   WBC 10*3/mm3 8.94   HEMOGLOBIN g/dL 9.9*   HEMATOCRIT % 32.0*   PLATELETS 10*3/mm3 588*     Results from last 7 days   Lab Units 10/28/24  1959 10/28/24  1129   INR  1.13* 1.0   APTT seconds 28.8  --      Results from last 7 days   Lab Units 24  0554   MAGNESIUM mg/dL 1.9                   Medication Review: "   carvedilol, 6.25 mg, Oral, Q12H  cefTRIAXone, 2,000 mg, Intravenous, Q24H  heparin (porcine), 5,000 Units, Subcutaneous, Q8H  insulin glargine, 21 Units, Subcutaneous, Nightly  insulin lispro, 2-9 Units, Subcutaneous, 4x Daily AC & at Bedtime  insulin lispro, 7 Units, Subcutaneous, TID With Meals  mupirocin, 1 Application, Each Nare, BID  Naloxegol Oxalate, 25 mg, Oral, QAM  pantoprazole, 40 mg, Oral, BID AC  senna-docusate sodium, 2 tablet, Oral, BID   And  polyethylene glycol, 17 g, Oral, BID  venlafaxine XR, 75 mg, Oral, Daily              Assessment & Plan   NICM with recovered EF, appears euvolemic though continues to note restricted breathing.  Bigeminy, improved on coreg 6.25  Biloma/gall bladder sludge/subcapsular fluid collection s/p [id number] growing klebsiella  Hypotension resolved. Midodrine stopped    Resting, no changes  Will see as needed.     Kalyee Kay MD  [facility]  11/03/24  12:47 EST

## 2024-11-04 ENCOUNTER — APPOINTMENT (OUTPATIENT)
Dept: GENERAL RADIOLOGY | Facility: HOSPITAL | Age: 49
DRG: 853 | End: 2024-11-04
Payer: COMMERCIAL

## 2024-11-04 LAB
ALBUMIN SERPL-MCNC: 3 G/DL (ref 3.5–5.2)
ALBUMIN/GLOB SERPL: 0.8 G/DL
ALP SERPL-CCNC: 108 U/L (ref 39–117)
ALT SERPL W P-5'-P-CCNC: 27 U/L (ref 1–41)
ANION GAP SERPL CALCULATED.3IONS-SCNC: 8.3 MMOL/L (ref 5–15)
AST SERPL-CCNC: 13 U/L (ref 1–40)
BILIRUB SERPL-MCNC: 0.4 MG/DL (ref 0–1.2)
BUN SERPL-MCNC: 10 MG/DL (ref 6–20)
BUN/CREAT SERPL: 11.6 (ref 7–25)
CALCIUM SPEC-SCNC: 9.2 MG/DL (ref 8.6–10.5)
CHLORIDE SERPL-SCNC: 97 MMOL/L (ref 98–107)
CO2 SERPL-SCNC: 26.7 MMOL/L (ref 22–29)
CREAT SERPL-MCNC: 0.86 MG/DL (ref 0.76–1.27)
EGFRCR SERPLBLD CKD-EPI 2021: 107.5 ML/MIN/1.73
GLOBULIN UR ELPH-MCNC: 3.6 GM/DL
GLUCOSE BLDC GLUCOMTR-MCNC: 106 MG/DL (ref 70–130)
GLUCOSE BLDC GLUCOMTR-MCNC: 203 MG/DL (ref 70–130)
GLUCOSE BLDC GLUCOMTR-MCNC: 347 MG/DL (ref 70–130)
GLUCOSE BLDC GLUCOMTR-MCNC: 350 MG/DL (ref 70–130)
GLUCOSE SERPL-MCNC: 385 MG/DL (ref 65–99)
MAGNESIUM SERPL-MCNC: 1.8 MG/DL (ref 1.6–2.6)
PHOSPHATE SERPL-MCNC: 3 MG/DL (ref 2.5–4.5)
POTASSIUM SERPL-SCNC: 4.7 MMOL/L (ref 3.5–5.2)
PROT SERPL-MCNC: 6.6 G/DL (ref 6–8.5)
SODIUM SERPL-SCNC: 132 MMOL/L (ref 136–145)

## 2024-11-04 PROCEDURE — 63710000001 INSULIN LISPRO (HUMAN) PER 5 UNITS: Performed by: INTERNAL MEDICINE

## 2024-11-04 PROCEDURE — 84100 ASSAY OF PHOSPHORUS: CPT | Performed by: STUDENT IN AN ORGANIZED HEALTH CARE EDUCATION/TRAINING PROGRAM

## 2024-11-04 PROCEDURE — 25010000002 MORPHINE PER 10 MG: Performed by: NURSE PRACTITIONER

## 2024-11-04 PROCEDURE — 99233 SBSQ HOSP IP/OBS HIGH 50: CPT | Performed by: STUDENT IN AN ORGANIZED HEALTH CARE EDUCATION/TRAINING PROGRAM

## 2024-11-04 PROCEDURE — 71045 X-RAY EXAM CHEST 1 VIEW: CPT

## 2024-11-04 PROCEDURE — 82948 REAGENT STRIP/BLOOD GLUCOSE: CPT

## 2024-11-04 PROCEDURE — 63710000001 INSULIN GLARGINE PER 5 UNITS: Performed by: INTERNAL MEDICINE

## 2024-11-04 PROCEDURE — 63710000001 INSULIN LISPRO (HUMAN) PER 5 UNITS: Performed by: HOSPITALIST

## 2024-11-04 PROCEDURE — 99231 SBSQ HOSP IP/OBS SF/LOW 25: CPT | Performed by: STUDENT IN AN ORGANIZED HEALTH CARE EDUCATION/TRAINING PROGRAM

## 2024-11-04 PROCEDURE — 25010000002 HEPARIN (PORCINE) PER 1000 UNITS: Performed by: STUDENT IN AN ORGANIZED HEALTH CARE EDUCATION/TRAINING PROGRAM

## 2024-11-04 PROCEDURE — 80053 COMPREHEN METABOLIC PANEL: CPT | Performed by: STUDENT IN AN ORGANIZED HEALTH CARE EDUCATION/TRAINING PROGRAM

## 2024-11-04 PROCEDURE — 83735 ASSAY OF MAGNESIUM: CPT | Performed by: STUDENT IN AN ORGANIZED HEALTH CARE EDUCATION/TRAINING PROGRAM

## 2024-11-04 PROCEDURE — 25010000002 CEFTRIAXONE PER 250 MG: Performed by: HOSPITALIST

## 2024-11-04 RX ORDER — TORSEMIDE 20 MG/1
20 TABLET ORAL DAILY
Status: DISCONTINUED | OUTPATIENT
Start: 2024-11-04 | End: 2024-11-04

## 2024-11-04 RX ORDER — INSULIN LISPRO 100 [IU]/ML
6 INJECTION, SOLUTION INTRAVENOUS; SUBCUTANEOUS
Status: DISCONTINUED | OUTPATIENT
Start: 2024-11-04 | End: 2024-11-05

## 2024-11-04 RX ORDER — MORPHINE SULFATE 2 MG/ML
2 INJECTION, SOLUTION INTRAMUSCULAR; INTRAVENOUS ONCE
Status: COMPLETED | OUTPATIENT
Start: 2024-11-04 | End: 2024-11-04

## 2024-11-04 RX ADMIN — INSULIN GLARGINE 10 UNITS: 100 INJECTION, SOLUTION SUBCUTANEOUS at 13:59

## 2024-11-04 RX ADMIN — HEPARIN SODIUM 5000 UNITS: 5000 INJECTION INTRAVENOUS; SUBCUTANEOUS at 21:00

## 2024-11-04 RX ADMIN — INSULIN LISPRO 6 UNITS: 100 INJECTION, SOLUTION INTRAVENOUS; SUBCUTANEOUS at 16:48

## 2024-11-04 RX ADMIN — MUPIROCIN 1 APPLICATION: 20 OINTMENT TOPICAL at 19:48

## 2024-11-04 RX ADMIN — NALOXEGOL OXALATE 25 MG: 25 TABLET, FILM COATED ORAL at 06:21

## 2024-11-04 RX ADMIN — FUROSEMIDE 20 MG: 20 TABLET ORAL at 08:17

## 2024-11-04 RX ADMIN — HEPARIN SODIUM 5000 UNITS: 5000 INJECTION INTRAVENOUS; SUBCUTANEOUS at 13:59

## 2024-11-04 RX ADMIN — PANTOPRAZOLE SODIUM 40 MG: 40 TABLET, DELAYED RELEASE ORAL at 06:21

## 2024-11-04 RX ADMIN — CARVEDILOL 6.25 MG: 6.25 TABLET, FILM COATED ORAL at 19:48

## 2024-11-04 RX ADMIN — VENLAFAXINE HYDROCHLORIDE 75 MG: 75 CAPSULE, EXTENDED RELEASE ORAL at 08:17

## 2024-11-04 RX ADMIN — INSULIN LISPRO 7 UNITS: 100 INJECTION, SOLUTION INTRAVENOUS; SUBCUTANEOUS at 08:18

## 2024-11-04 RX ADMIN — MUPIROCIN 1 APPLICATION: 20 OINTMENT TOPICAL at 08:17

## 2024-11-04 RX ADMIN — INSULIN LISPRO 7 UNITS: 100 INJECTION, SOLUTION INTRAVENOUS; SUBCUTANEOUS at 21:00

## 2024-11-04 RX ADMIN — OXYCODONE HYDROCHLORIDE 5 MG: 5 TABLET ORAL at 01:35

## 2024-11-04 RX ADMIN — CARVEDILOL 6.25 MG: 6.25 TABLET, FILM COATED ORAL at 08:17

## 2024-11-04 RX ADMIN — CEFTRIAXONE 2000 MG: 2 INJECTION, POWDER, FOR SOLUTION INTRAMUSCULAR; INTRAVENOUS at 13:59

## 2024-11-04 RX ADMIN — OXYCODONE HYDROCHLORIDE 5 MG: 5 TABLET ORAL at 19:48

## 2024-11-04 RX ADMIN — HEPARIN SODIUM 5000 UNITS: 5000 INJECTION INTRAVENOUS; SUBCUTANEOUS at 06:21

## 2024-11-04 RX ADMIN — INSULIN LISPRO 8 UNITS: 100 INJECTION, SOLUTION INTRAVENOUS; SUBCUTANEOUS at 08:17

## 2024-11-04 RX ADMIN — INSULIN LISPRO 7 UNITS: 100 INJECTION, SOLUTION INTRAVENOUS; SUBCUTANEOUS at 11:37

## 2024-11-04 RX ADMIN — INSULIN GLARGINE 10 UNITS: 100 INJECTION, SOLUTION SUBCUTANEOUS at 21:00

## 2024-11-04 RX ADMIN — PANTOPRAZOLE SODIUM 40 MG: 40 TABLET, DELAYED RELEASE ORAL at 16:48

## 2024-11-04 RX ADMIN — INSULIN LISPRO 4 UNITS: 100 INJECTION, SOLUTION INTRAVENOUS; SUBCUTANEOUS at 11:37

## 2024-11-04 RX ADMIN — MORPHINE SULFATE 2 MG: 2 INJECTION, SOLUTION INTRAMUSCULAR; INTRAVENOUS at 21:23

## 2024-11-04 NOTE — PROGRESS NOTES
Name: Donnie Bishop ADMIT: 10/22/2024   : 1975  PCP: Juju Kennedy MD    MRN: [id number] LOS: 11 days   AGE/SEX: 49 y.o. male  ROOM: [facility]     Subjective   Subjective   Chief Complaint   Patient presents with    Abdominal Pain     Not reporting any chest pain palpitation shortness of breath nausea or vomiting.     Objective   Objective   Vital Signs  Temp:  [97.7 °F (36.5 °C)-98.8 °F (37.1 °C)] 98.1 °F (36.7 °C)  Heart Rate:  [84] 84  Resp:  [22-28] 24  BP: (104-111)/(65-79) 107/65  SpO2:  [95 %] 95 %  on   ;   Device (Oxygen Therapy): room air  Body mass index is 24.94 kg/m².    Physical Exam  Vitals and nursing note reviewed.   Constitutional:       General: He is not in acute distress.     Appearance: He is not diaphoretic.   HENT:      Head: Atraumatic.   Eyes:      General: No scleral icterus.  Cardiovascular:      Rate and Rhythm: Normal rate and regular rhythm.   Pulmonary:      Effort: Pulmonary effort is normal.      Breath sounds: No wheezing.   Abdominal:      General: There is no distension.      Palpations: Abdomen is soft.   Musculoskeletal:         General: No swelling.   Skin:     General: Skin is warm and dry.   Neurological:      Mental Status: He is alert. Mental status is at baseline.   Psychiatric:         Mood and Affect: Mood normal.         Behavior: Behavior normal.       Results Review  I reviewed the patient's new clinical results.    Results from last 7 days   Lab Units 24  0554 24  0616 24  0635 10/31/24  0542   WBC 10*3/mm3 8.94 10.84* 11.82* 11.35*   HEMOGLOBIN g/dL 9.9* 10.5* 10.7* 11.1*   PLATELETS 10*3/mm3 588* 536* 471* 392     Results from last 7 days   Lab Units 24  0653 24  0554 24  0616 24  0635   SODIUM mmol/L 132* 133* 130* 134*   POTASSIUM mmol/L 4.7 4.2 4.2 3.7   CHLORIDE mmol/L 97* 96* 92* 97*   CO2 mmol/L 26.7 28.0 26.0 27.0   BUN mg/dL 10 14 18 18   CREATININE mg/dL 0.86 0.95 0.97 0.99   GLUCOSE mg/dL 385* 258* 233*  126*   EGFR mL/min/1.73 107.5 99.3 96.9 94.6     Results from last 7 days   Lab Units 11/04/24  0653 11/03/24  0554 11/02/24  0616 11/01/24  0635   ALBUMIN g/dL 3.0* 2.6* 2.8* 2.7*   BILIRUBIN mg/dL 0.4 0.3 0.4 0.5   ALK PHOS U/L 108 116 129* 130*   AST (SGOT) U/L 13 13 18 28   ALT (SGPT) U/L 27 30 40 53*     Results from last 7 days   Lab Units 11/04/24  0653 11/03/24  0554 11/02/24  0616 11/01/24  0635   CALCIUM mg/dL 9.2 8.6 8.6 8.7   ALBUMIN g/dL 3.0* 2.6* 2.8* 2.7*   MAGNESIUM mg/dL 1.8 1.9 1.8 1.7   PHOSPHORUS mg/dL 3.0 2.5 3.0 3.5         Glucose   Date/Time Value Ref Range Status   11/04/2024 1106 203 (H) 70 - 130 mg/dL Final   11/04/2024 0817 350 (H) 70 - 130 mg/dL Final   11/03/2024 1930 128 70 - 130 mg/dL Final   11/03/2024 1652 103 70 - 130 mg/dL Final   11/03/2024 1622 44 (C) 70 - 130 mg/dL Final   11/03/2024 1140 235 (H) 70 - 130 mg/dL Final   11/03/2024 0724 280 (H) 70 - 130 mg/dL Final       XR Chest 1 View    Result Date: 11/4/2024  As described.  This report was finalized on 11/4/2024 8:34 AM by Dr. Hubert Reid M.D on Workstation: [id number]       I have personally reviewed all medications:  Scheduled Medications  carvedilol, 6.25 mg, Oral, Q12H  cefTRIAXone, 2,000 mg, Intravenous, Q24H  furosemide, 20 mg, Oral, Daily  heparin (porcine), 5,000 Units, Subcutaneous, Q8H  insulin glargine, 21 Units, Subcutaneous, Nightly  insulin lispro, 2-9 Units, Subcutaneous, 4x Daily AC & at Bedtime  insulin lispro, 7 Units, Subcutaneous, TID With Meals  mupirocin, 1 Application, Each Nare, BID  Naloxegol Oxalate, 25 mg, Oral, QAM  pantoprazole, 40 mg, Oral, BID AC  senna-docusate sodium, 2 tablet, Oral, BID   And  polyethylene glycol, 17 g, Oral, BID  venlafaxine XR, 75 mg, Oral, Daily      Infusions     Diet  Diet: Diabetic, Fluid Restriction (240 mL/tray); Consistent Carbohydrate; 1500 mL/day; Fluid Consistency: Thin (IDDSI 0)       Assessment/Plan     Active Hospital Problems    Diagnosis  POA     **Epigastric abdominal pain [R10.13]  Yes    Abscess of liver [K75.0]  Yes    Klebsiella pneumoniae (k. pneumoniae) as the cause of diseases classified elsewhere [B96.1]  Yes    Hypotension [I95.9]  Yes    Transaminitis [R74.01]  Yes    Leukocytosis [D72.829]  Yes    Chronic combined systolic and diastolic heart failure [I50.42]  Yes    Anxiety [F41.9]  Yes    Multiple-type hyperlipidemia [E78.2]  Yes    Type 2 diabetes mellitus with diabetic neuropathy, with long-term current use of insulin [E11.40, Z79.4]  Not Applicable    Benign essential HTN [I10]  Yes      Resolved Hospital Problems    Diagnosis Date Resolved POA    Acute kidney injury [N17.9] 10/30/2024 No       49 y.o. male admitted with Epigastric abdominal pain.    Liver abscess/biloma: Status post IR drainage and drain placement 10/28.  Klebsiella.  Continue Rocephin.  Timing duration to be determined.  Infectious disease following.  Cholecystitis: No immediate plan for ERCP.  GI evaluated and he did have a EGD earlier this admission on 10/24.  Eventual cholecystectomy, timing TBD.  Surgery following.  Hypotension: Had improved with resuscitation and midodrine.  The midodrine has since been stopped.  Pulmonology evaluated.  TENISHA: Multifactorial and now improved.  Lasix has been resumed by nephrology.  They stated was okay for him to [facility] for CHF at discharge.  Nephrology evaluated . plan follow-up in 1 week in clinic.  NICM/bigeminy: EF 49%.  On Coreg and Lasix.  Home Entresto, Aldactone, and Jardiance are held.  Cardiology evaluated.   Diabetes: A1c 9.4.  Was on Jardiance metformin and insulin prior to admission.  Had hypo and hyperglycemia yesterday.  Change Lantus to twice daily 10 units and Premeal to 6 units plus SSI.  Monitor requirements.  Hypertension: See above  PPX: Subcutaneous heparin  Disposition: TBD    Expected Discharge Date: 11/8/2024; Expected Discharge Time:      Girma Tobias MD  [facility]  11/04/24  12:59 EST    Dictated portions of note using Dragon dictation software.  Copied text in this note has been reviewed by me and remains accurate as of 11/04/24

## 2024-11-04 NOTE — PROGRESS NOTES
[facility] Progress Note      Patient Name: Donnie Bishop  : 1975  MRN: [id number]  Primary Care Physician:  Juju Kennedy MD  Date of admission: 10/22/2024    Subjective     Interval History:   F/u TENISHA     Review of Systems:   Denies dyspnea or nausea     Objective     Vitals:   Temp:  [97.7 °F (36.5 °C)-98.8 °F (37.1 °C)] 98.2 °F (36.8 °C)  Heart Rate:  [84] 84  Resp:  [22-28] 22  BP: (104-111)/(65-79) 110/66    Intake/Output Summary (Last 24 hours) at 2024 0851  Last data filed at 2024 0820  Gross per 24 hour   Intake 600 ml   Output 1830 ml   Net -1230 ml       Physical Exam:    General Appearance: frail AAM comfortable on RA, speech/mentation slow  Neck: supple, no JVD  Lungs: CTA bilat no rales  Heart: RRR, normal S1 and S2  Abdomen: soft, nontender, nondistended  Extremities: no edema, cyanosis or clubbing        Scheduled Meds:     carvedilol, 6.25 mg, Oral, Q12H  cefTRIAXone, 2,000 mg, Intravenous, Q24H  furosemide, 20 mg, Oral, Daily  heparin (porcine), 5,000 Units, Subcutaneous, Q8H  insulin glargine, 21 Units, Subcutaneous, Nightly  insulin lispro, 2-9 Units, Subcutaneous, 4x Daily AC & at Bedtime  insulin lispro, 7 Units, Subcutaneous, TID With Meals  mupirocin, 1 Application, Each Nare, BID  Naloxegol Oxalate, 25 mg, Oral, QAM  pantoprazole, 40 mg, Oral, BID AC  senna-docusate sodium, 2 tablet, Oral, BID   And  polyethylene glycol, 17 g, Oral, BID  venlafaxine XR, 75 mg, Oral, Daily      IV Meds:        Results Reviewed:   I have personally reviewed the results from the time of this admission to 2024 08:51 EST     Results from last 7 days   Lab Units 24  0653 24  0554 24  0616   SODIUM mmol/L 132* 133* 130*   POTASSIUM mmol/L 4.7 4.2 4.2   CHLORIDE mmol/L 97* 96* 92*   CO2 mmol/L 26.7 28.0 26.0   BUN mg/dL 10 14 18   CREATININE mg/dL 0.86 0.95 0.97   CALCIUM mg/dL 9.2 8.6 8.6   BILIRUBIN mg/dL 0.4 0.3 0.4   ALK PHOS U/L 108 116  129*   ALT (SGPT) U/L 27 30 40   AST (SGOT) U/L 13 13 18   GLUCOSE mg/dL 385* 258* 233*     Estimated Creatinine Clearance: 108.3 mL/min (by C-G formula based on SCr of 0.86 mg/dL).  Results from last 7 days   Lab Units 11/04/24  0653 11/03/24  0554 11/02/24  0616   MAGNESIUM mg/dL 1.8 1.9 1.8   PHOSPHORUS mg/dL 3.0 2.5 3.0     Results from last 7 days   Lab Units 11/03/24  0554 10/30/24  0639   URIC ACID mg/dL 3.5 3.4     Results from last 7 days   Lab Units 11/03/24  0554 11/02/24  0616 11/01/24  0635 10/31/24  0542 10/30/24  0639   WBC 10*3/mm3 8.94 10.84* 11.82* 11.35* 14.38*   HEMOGLOBIN g/dL 9.9* 10.5* 10.7* 11.1* 10.6*   PLATELETS 10*3/mm3 588* 536* 471* 392 364     Results from last 7 days   Lab Units 10/28/24  1959 10/28/24  1129   INR  1.13* 1.0       Assessment / Plan     ASSESSMENT:  Acute kidney injury.  Multifactorial including hypovolemia, hypotension, impaired renal autoregulation due to Jardiance, Entresto, Aldactone.  Resolved.  Cr ~ 1.   Cholecystitis with large right perihepatic fluid collection.  Status post IR drainage with drain left in 10/28/2024.  On Rocephin per ID.  Transaminitis improving.  Off statin.  MRCP noted.  White blood cell count falling.  Heart failure reduced ejection fraction.  EF 49%.  (Improved from 35% a year ago).  Anemia.    Encephalopathy.  Mentation remains bit slow.  Diabetes mellitus type 2.  Hypotension - SBP 90s to low 100s on low dose coreg     PLAN:  Chest x-ray reviewed today showed signs of vascular congestion  Will start Lasix at 20 mg p.o. daily  Okay for from nephrology standpoint to start goal-directed therapy for heart failure(ACE/ARB/SGLT2) at discharge  May discharge home from our standpoint will arrange follow-up in nephrology clinic in 1 week      Evan Pat MD  11/04/24  08:51 EST    [facility]  [phone]

## 2024-11-04 NOTE — PLAN OF CARE
Problem: Adult Inpatient Plan of Care  Goal: Plan of Care Review  Recent Flowsheet Documentation  Taken 11/4/2024 1710 by Poornima Del Valle RN  Outcome Evaluation: VSS,AOX4, urinal. Bm today,Drain. IV abx,Assist x1 to BSC NPO @ midnight for IR drain tomorrow. Makes needs known .Call light within reach. Bed alarm on  Goal: Absence of Hospital-Acquired Illness or Injury  Intervention: Identify and Manage Fall Risk  Recent Flowsheet Documentation  Taken 11/4/2024 1600 by Poornima Del Valle RN  Safety Promotion/Fall Prevention:   safety round/check completed   nonskid shoes/slippers when out of bed   fall prevention program maintained  Taken 11/4/2024 1400 by Poornima Del Valle RN  Safety Promotion/Fall Prevention:   safety round/check completed   nonskid shoes/slippers when out of bed  Taken 11/4/2024 1200 by Poornima Del Valle RN  Safety Promotion/Fall Prevention:   safety round/check completed   nonskid shoes/slippers when out of bed   fall prevention program maintained  Taken 11/4/2024 1000 by Poornima Del Valle RN  Safety Promotion/Fall Prevention:   safety round/check completed   nonskid shoes/slippers when out of bed   fall prevention program maintained  Taken 11/4/2024 0800 by Poornima Del Valle RN  Safety Promotion/Fall Prevention: safety round/check completed  Intervention: Prevent Skin Injury  Recent Flowsheet Documentation  Taken 11/4/2024 1600 by Poornima Del Valle RN  Body Position: left  Taken 11/4/2024 1400 by Poornima Del Valle RN  Body Position: position changed independently  Taken 11/4/2024 1200 by Poornima Del Valle RN  Body Position: sitting up in bed  Taken 11/4/2024 1000 by Poornima Del Valle RN  Body Position:   foot of bed elevated   heels elevated  Taken 11/4/2024 0800 by Poornima Del Valle RN  Body Position:   position changed independently   right  Intervention: Prevent and Manage VTE (Venous Thromboembolism) Risk  Recent Flowsheet Documentation  Taken 11/4/2024 1400 by Poornima Del Valle RN  VTE  Prevention/Management: (subq hep) other (see comments)  Goal: Optimal Comfort and Wellbeing  Intervention: Provide Person-Centered Care  Recent Flowsheet Documentation  Taken 11/4/2024 1400 by Poornima Del Valle RN  Trust Relationship/Rapport:   choices provided   care explained  Taken 11/4/2024 0800 by Poornima Del Valle, RN  Trust Relationship/Rapport:   choices provided   care explained   Goal Outcome Evaluation:              Outcome Evaluation: VSS,AOX4, urinal. Bm today,Drain. IV abx,Assist x1 to BSC NPO @ midnight for IR drain tomorrow. Makes needs known .Call light within reach. Bed alarm on

## 2024-11-04 NOTE — PROGRESS NOTES
LOS: 11 days     Chief Complaint: Liver abscess    Interval History: Patient reports he is doing well this morning.  Denies any acute complaints.  Denies any nausea, vomiting or diarrhea.  Tolerating antibiotic.    Vital Signs  Temp:  [97.7 °F (36.5 °C)-98.8 °F (37.1 °C)] 98.2 °F (36.8 °C)  Heart Rate:  [84] 84  Resp:  [22-28] 22  BP: (104-111)/(65-79) 110/66    Physical Exam:  General: In no acute distress  HEENT: Oropharynx clear, moist mucous membranes  Respiratory: Normal work of breathing  GI: Abdominal drain in place.  Skin: No rashes or lesions in exposed areas  Extremities: No edema  Access: Peripheral IV    Antibiotics:  Anti-Infectives (From admission, onward)      Ordered     Dose/Rate Route Frequency Start Stop    10/31/24 1253  cefTRIAXone (ROCEPHIN) 2,000 mg in sodium chloride 0.9 % 100 mL MBP        Ordering Provider: Steven Rivera MD    2,000 mg  200 mL/hr over 30 Minutes Intravenous Every 24 Hours 10/31/24 1345 11/10/24 1344    10/23/24 1411  piperacillin-tazobactam (ZOSYN) 3.375 g IVPB in 100 mL NS MBP (CD)        Ordering Provider: Delaney Rosario APRN    3.375 g  over 30 Minutes Intravenous Once 10/23/24 1500 10/23/24 1656             Results Review:     I reviewed the patient's new clinical results.    Lab Results   Component Value Date    WBC 8.94 11/03/2024    HGB 9.9 (L) 11/03/2024    HCT 32.0 (L) 11/03/2024    MCV 93.8 11/03/2024     (H) 11/03/2024     Lab Results   Component Value Date    GLUCOSE 385 (H) 11/04/2024    BUN 10 11/04/2024    CREATININE 0.86 11/04/2024    EGFRIFNONA 93 10/09/2020    EGFRIFAFRI >60 08/26/2022    BCR 11.6 11/04/2024    CO2 26.7 11/04/2024    CALCIUM 9.2 11/04/2024    PROTENTOTREF 6.7 07/12/2023    ALBUMIN 3.0 (L) 11/04/2024    LABIL2 2.5 07/12/2023    AST 13 11/04/2024    ALT 27 11/04/2024       Microbiology:  10/23 BCx: negative  10/25 RPP: negative  10/28 Liver Abscess Cx: rare growth Kleb pneumo (susc all abx tested exc ampicillin)  10/28 BCx:  NGTD    Assessment    Liver abscess due to Klebsiella  Biloma  Cholecystitis  Uncontrolled DM2 -last A1c 9.4%  (All new to me today)     Continue ceftriaxone 2 g daily targeting Klebsiella from liver abscess.  Duration to be determined based on surgical plans and repeat imaging.  Plan is for oral antibiotics closer to discharge.    ID will follow.

## 2024-11-04 NOTE — SIGNIFICANT NOTE
11/04/24 1525   OTHER   Discipline occupational therapist   Rehab Time/Intention   Session Not Performed patient/family declined, not feeling well  (Pt declines OT today due to upset stomach, RN aware.)   Recommendation   OT - Next Appointment 11/05/24

## 2024-11-04 NOTE — CASE MANAGEMENT/SOCIAL WORK
Continued Stay Note  [facility]     Patient Name: Donnie Bishop  MRN: [id number]  Today's Date: 11/4/2024    Admit Date: 10/22/2024    Plan: Pending snf choice. Will faisal precert   Discharge Plan       Row Name 11/04/24 1335       Plan    Plan Pending snf choice. Will faisal precert    Plan Comments Spoke with patient at bedside. Reviewed PT recommendations of snf. vebralizes undertsaninf. States he will speak with family and give some choices 11/5. Instructed patient on need for choice and precert. verbalizes understanding.                    Expected Discharge Date and Time       Expected Discharge Date Expected Discharge Time    Nov 8, 2024               Laura Junior RN

## 2024-11-04 NOTE — SIGNIFICANT NOTE
11/04/24 1428   OTHER   Discipline physical therapist   Rehab Time/Intention   Session Not Performed patient/family declined treatment  (attempted to see this afternoon. pt declines, states he is not feeling well, his stomach is upset. Pt requests for therapy to follow up tomorrow.  RN notified)   Recommendation   PT - Next Appointment 11/05/24

## 2024-11-04 NOTE — PLAN OF CARE
Goal Outcome Evaluation:  Plan of Care Reviewed With: patient           Outcome Evaluation: vss, pain controlled by prn pain meds, drain CDI, pt had 2 bm during shift. pt rested well during shift. labs in am pt awaiting for snf @ d/c

## 2024-11-04 NOTE — PROGRESS NOTES
"General Surgery Progress Note    CC: Cholecystitis, infected subcapsular biloma     S: Patient doing well today.  Has had a little bit of nausea but denies vomiting and is otherwise tolerating his diet.  Has had bowel movements.  Denies any abdominal pain right now.    O:/65 (BP Location: Right arm, Patient Position: Lying)   Pulse 84   Temp 98.1 °F (36.7 °C) (Oral)   Resp 24   Ht 170 cm (66.93\")   Wt 72.1 kg (158 lb 14.4 oz)   SpO2 95%   BMI 24.94 kg/m²     Intake & Output (last day)         11/03 0701 11/04 0700 11/04 0701 11/05 0700    P.O. 840     I.V. (mL/kg)      Total Intake(mL/kg) 840 (11.7)     Urine (mL/kg/hr) 1325 (0.8) 500 (1)    Drains 5     Stool 0     Total Output 1330 500    Net -490 -500          Urine Unmeasured Occurrence 1 x     Stool Unmeasured Occurrence 2 x             GENERAL: Awake, alert, interactive, cooperative, answering questions appropriately  HEENT: EOMI, moist mucus membranes   CHEST: normal work of breathing on room air  CARDIAC: Regular rate  GI: Abd soft, non distended, nontender; RUQ IR drain with small amount of bilious output  EXTREMITIES: MOCK, no cyanosis, no lower extremity edema  SKIN: Warm and dry    LABS  Results from last 7 days   Lab Units 11/03/24  0554 11/02/24  0616 11/01/24  0635 10/30/24  0639 10/29/24  0652   WBC 10*3/mm3 8.94 10.84* 11.82*   < > 15.53*   HEMOGLOBIN g/dL 9.9* 10.5* 10.7*   < > 11.6*   HEMATOCRIT % 32.0* 31.9* 32.5*   < > 37.1*   PLATELETS 10*3/mm3 588* 536* 471*   < > 370   MONOCYTES % %  --   --   --   --  9.9   EOSINOPHIL % %  --   --   --   --  0.0*    < > = values in this interval not displayed.     Results from last 7 days   Lab Units 11/04/24  0653 11/03/24  0554 11/02/24  0616   SODIUM mmol/L 132* 133* 130*   POTASSIUM mmol/L 4.7 4.2 4.2   CHLORIDE mmol/L 97* 96* 92*   CO2 mmol/L 26.7 28.0 26.0   BUN mg/dL 10 14 18   CREATININE mg/dL 0.86 0.95 0.97   CALCIUM mg/dL 9.2 8.6 8.6   BILIRUBIN mg/dL 0.4 0.3 0.4   ALK PHOS U/L 108 " 116 129*   ALT (SGPT) U/L 27 30 40   AST (SGOT) U/L 13 13 18   GLUCOSE mg/dL 385* 258* 233*     Results from last 7 days   Lab Units 10/28/24  1959   INR  1.13*   APTT seconds 28.8   Magnesium 1.8  Phosphorus 3.0    IMAGING:    -CT demonstrating contracted gallbladder with mild hyperenhancing wall.  EGD performed 10/24 negative for abnormalities; August 2024 CT had distended gallbladder but no other findings of cholecystitis.  -HIDA scan 10/24 demonstrating filling but with decreased ejection fraction 19% concerning for possible chronic cholecystitis.   -CTA abdomen pelvis 10/26 to evaluate worsening RUQ pain and possible mesenteric ischemia demonstrated a large right subcapsular fluid collection with mass effect on the liver; no active extravasation  -s/p CT-guided IR drainage 10/28 with thick, turbid bilious output from drain - bile leak vs abscess in the differential, cause unclear but may be related to cholecystitis.  -MRCP 10/30/24 study limited; nondistended, inflamed appearing gallbladder containing stones present; subcapsular fluid collection decreased as expected with drainage; also a second loculated collection appearing contiguous with the duodenum and inferior to the gallbladder fossa has decreased in size.  Nonenhancing lesion in the inferior right hepatic lobe new since August, may be related to biloma, for which follow up is recommended    CT abdomen pelvis 11/4/2024 images and report reviewed, most of his subcapsular fluid collection has decreased but there is still a significant amount of fluid in the superior aspect, the gallbladder has a thick wall without distention, there is no biliary dilation, the proximal duodenum appears less thickened, there is right middle and lower lobe atelectasis and a small pleural effusion, and left lower lobe atelectasis    A/P: 49 y.o. male with multiple medical problems including CHF, hypertension, hyperlipidemia, type 2 diabetes mellitus with A1c 9.4, presenting  with abdominal pain, found to have possible cholecystitis, with Klebsiella infected biloma.      Patient continues to clinically improve. Tolerating diet, having bowel function. Pain adequately controlled. Afebrile with WBC and LFTs normalized. Drain output has been minimal.  Will ask IR for evaluation given the significant undrained component of the subcapsular biloma noted on repeat CT scan.  I would like to see if this could be percutaneously drained prior to cholecystectomy.    Addendum: Discussed with Dr. Morrell. Plan for IR evaluation tomorrow for drainage of undrained biloma. NPO after midnight.     Manuela Wisdom MD  General, Robotic and Endoscopic Surgery  [facility]    [address], [address]  [place], [place], [address]  P: [phone]  F: [phone]

## 2024-11-04 NOTE — PAYOR COMM NOTE
"Donnie Ornelas (49 y.o. Male)                            ATTENTION CONTINUED CLINICALS CASE [id number] STARTING 11/2 - 11/4                             REPLY TO [facility] DEPT  [phone] OR CALL   ELAN GARRETT LPN           Date of Birth   1975    Social Security Number       [address] [address]    Home Phone   [phone]    MRN   [id number]       Caodaism   Spiritism    Marital Status                               Admission Date   10/22/24    Admission Type   Emergency    Admitting Provider   James Alexandra MD    Attending Provider   Girma Tobias MD    Department, Room/Bed   [facility], N339/1       Discharge Date       Discharge Disposition       Discharge Destination                                 Attending Provider: Girma Tobias MD    Allergies: No Known Allergies    Isolation: None   Infection: None   Code Status: CPR    Ht: 170 cm (66.93\")   Wt: 72.1 kg (158 lb 14.4 oz)    Admission Cmt: None   Principal Problem: Epigastric abdominal pain [R10.13]                   Active Insurance as of 10/22/2024       Primary Coverage       Payor Plan Insurance Group Employer/Plan Group    [employer]HE[employer] BLUE CROSS ANTHEM BLUE CROSS BLUE SHIELD PPO [id number]       Payor Plan Address Payor Plan Phone Number Payor Plan Fax Number Effective Dates    [address] [phone]  1/1/2019 - None Entered    [place] [address]         Subscriber Name Subscriber Birth Date Member ID       DONNIE ORNELAS 1975 [id number]                     Emergency Contacts        (Rel.) Home Phone Work Phone Mobile Phone    [place] (Brother) -- -- [phone]    Charity Ornelas (Spouse) [phone] -- [phone]    joie montero (Relative) -- -- [phone]    Daniel Ornelas (Father) -- -- [phone]              Vital Signs (last 3 days)       Date/Time Temp Temp src Pulse Resp BP Patient Position SpO2    11/04/24 1059 98.1 (36.7) Oral " -- 24 107/65 Lying --    11/04/24 0700 98.2 (36.8) Oral -- 22 110/66 Lying --    11/04/24 0309 98.1 (36.7) Oral -- 28 104/65 Sitting --    11/03/24 2344 98.8 (37.1) Oral -- 28 111/70 Lying --    11/03/24 1700 -- -- -- -- -- -- 95    11/03/24 1627 97.7 (36.5) Oral 84 28 111/79 Lying --    11/03/24 1119 98.3 (36.8) Oral -- 23 109/68 Lying --    11/03/24 0720 98.5 (36.9) Oral -- 25 115/75 Sitting --    11/03/24 0455 98.2 (36.8) Oral -- -- -- -- --    11/03/24 0300 -- -- 88 -- -- -- 95    11/03/24 0200 -- -- 90 -- -- -- 96    11/03/24 0100 -- -- 94 -- -- -- 94    11/03/24 0024 98.4 (36.9) Oral 96 18 108/63 Lying 90    11/02/24 2300 -- -- 97 -- -- -- 95    11/02/24 2200 -- -- 98 -- -- -- 95    11/02/24 2100 -- -- 90 -- -- -- 94    11/02/24 2001 98.3 (36.8) Oral 91 -- 98/75 -- 94    11/02/24 2000 -- -- 92 -- 98/75 -- 94    11/02/24 1830 -- -- 98 -- -- -- 95    11/02/24 1800 -- -- 95 -- -- -- 96    11/02/24 1730 -- -- 94 -- -- -- 94    11/02/24 1700 -- -- 91 -- -- -- 94    11/02/24 1630 -- -- 90 -- -- -- 94    11/02/24 1600 -- -- 90 -- 104/69 -- 94    11/02/24 1530 -- -- 94 -- -- -- 94    11/02/24 1524 98.4 (36.9) Oral -- 25 96/64 Lying --    11/02/24 1500 -- -- 96 -- -- -- 94    11/02/24 1430 -- -- 95 -- -- -- 96    11/02/24 1400 -- -- 92 -- -- -- 95    11/02/24 1330 -- -- 93 -- -- -- 95    11/02/24 1300 -- -- 93 -- -- -- 96    11/02/24 1230 -- -- 90 -- -- -- 94    11/02/24 1200 -- -- 88 -- 92/72 -- 96    11/02/24 1140 98.2 (36.8) Oral -- 27 110/70 Lying --    11/02/24 1130 -- -- 89 -- -- -- 93    11/02/24 1100 -- -- 88 -- -- -- 96    11/02/24 1030 -- -- 89 -- -- -- 96    11/02/24 1000 -- -- 92 -- -- -- 94    11/02/24 0930 -- -- 94 -- -- -- 96    11/02/24 0900 -- -- 92 -- -- -- 95    11/02/24 0830 -- -- 90 -- -- -- 97    11/02/24 0800 -- -- 93 -- -- -- 94    11/02/24 0756 98.7 (37.1) Oral -- 20 106/70 Lying --    11/02/24 0730 -- -- 91 -- -- -- 94    11/02/24 0700 -- -- 90 -- -- -- 96    11/02/24 0630 -- -- 88 -- -- -- 96     11/02/24 0600 -- -- 90 -- -- -- 97    11/02/24 0331 98.2 (36.8) Oral 89 18 107/72 Lying 95    11/02/24 0300 -- -- 88 -- -- -- 93    11/02/24 0200 -- -- 92 -- -- -- 94    11/02/24 0100 -- -- 91 -- -- -- 94    11/02/24 0005 98.2 (36.8) Oral 93 18 98/65 Lying 95    11/02/24 0000 -- -- 88 -- -- -- 97    11/01/24 2300 -- -- 91 -- -- -- 96    11/01/24 2127 -- -- -- -- 98/60 -- --    11/01/24 1928 98.1 (36.7) Oral 94 18 96/67 Lying 96    11/01/24 1500 97.7 (36.5) Oral -- -- 110/81 Lying 93    11/01/24 1200 -- -- 97 -- 104/78 -- 97    11/01/24 1124 98.1 (36.7) Axillary 98 -- 100/69 Sitting 95    11/01/24 0744 98.4 (36.9) Oral -- -- 119/72 Lying --    11/01/24 0349 98.2 (36.8) Oral 92 18 111/56 Lying 93    11/01/24 0009 98.4 (36.9) Oral 93 18 115/76 Lying 93          Oxygen Therapy (last 3 days)       Date/Time SpO2 Device (Oxygen Therapy) Flow (L/min) (Oxygen Therapy) Oxygen Concentration (%) ETCO2 (mmHg)    11/04/24 1059 -- room air -- -- --    11/04/24 0700 -- room air -- -- --    11/04/24 0011 -- room air -- -- --    11/03/24 2045 -- room air -- -- --    11/03/24 1700 95 room air -- -- --    11/03/24 0800 -- room air -- -- --    11/03/24 0300 95 -- -- -- --    11/03/24 0200 96 room air -- -- --    11/03/24 0100 94 -- -- -- --    11/03/24 0024 90 -- -- -- --    11/02/24 2300 95 -- -- -- --    11/02/24 2200 95 -- -- -- --    11/02/24 2100 94 -- -- -- --    11/02/24 2001 94 -- -- -- --    11/02/24 2000 94 room air -- -- --    11/02/24 1830 95 -- -- -- --    11/02/24 1800 96 -- -- -- --    11/02/24 1730 94 -- -- -- --    11/02/24 1700 94 -- -- -- --    11/02/24 1630 94 -- -- -- --    11/02/24 1600 94 -- -- -- --    11/02/24 1530 94 -- -- -- --    11/02/24 1500 94 -- -- -- --    11/02/24 1430 96 -- -- -- --    11/02/24 1400 95 room air -- -- --    11/02/24 1330 95 -- -- -- --    11/02/24 1300 96 -- -- -- --    11/02/24 1230 94 -- -- -- --    11/02/24 1200 96 -- -- -- --    11/02/24 1130 93 -- -- -- --    11/02/24 1100 96  -- -- -- --    11/02/24 1030 96 -- -- -- --    11/02/24 1000 94 -- -- -- --    11/02/24 0930 96 -- -- -- --    11/02/24 0900 95 -- -- -- --    11/02/24 0830 97 -- -- -- --    11/02/24 0800 94 room air -- -- --    11/02/24 0730 94 -- -- -- --    11/02/24 0700 96 -- -- -- --    11/02/24 0630 96 -- -- -- --    11/02/24 0600 97 -- -- -- --    11/02/24 0331 95 -- -- -- --    11/02/24 0300 93 -- -- -- --    11/02/24 0200 94 room air -- -- --    11/02/24 0100 94 -- -- -- --    11/02/24 0005 95 -- -- -- --    11/02/24 0000 97 -- -- -- --    11/01/24 2300 96 -- -- -- --    11/01/24 2000 -- room air -- -- --    11/01/24 1928 96 -- -- -- --    11/01/24 1500 93 -- -- -- --    11/01/24 1400 -- room air -- -- --    11/01/24 1200 97 room air -- -- --    11/01/24 1124 95 -- -- -- --    11/01/24 0349 93 -- -- -- --    11/01/24 0009 93 -- -- -- --          Lines, Drains & Airways       Active LDAs       Name Placement date Placement time Site Days    Peripheral IV 10/25/24 Posterior;Right Forearm 10/25/24  --  Forearm  10    Closed/Suction Drain 1 RUQ Pigtail 12 Fr. 10/28/24  1300  RUQ  7                  Orders (last 72 hrs)        Start     Ordered    11/05/24 0600  Comprehensive Metabolic Panel  Morning Draw         11/04/24 1315    11/05/24 0600  Magnesium  Morning Draw         11/04/24 1315    11/05/24 0600  Phosphorus  Morning Draw         11/04/24 1315    11/05/24 0600  CBC (No Diff)  Morning Draw         11/04/24 1315    11/04/24 1800  insulin lispro (HUMALOG/ADMELOG) injection 6 Units  3 Times Daily With Meals         11/04/24 1311    11/04/24 1400  insulin glargine (LANTUS, SEMGLEE) injection 10 Units  Every 12 Hours Scheduled         11/04/24 1311    11/04/24 1109  POC Glucose Once  PROCEDURE ONCE        Comments: Complete no more than 45 minutes prior to patient eating      11/04/24 1106    11/04/24 0945  torsemide (DEMADEX) tablet 20 mg  Daily,   Status:  Discontinued         11/04/24 0854    11/04/24 0900  furosemide  (LASIX) tablet 20 mg  Daily         11/03/24 1427    11/04/24 0820  POC Glucose Once  PROCEDURE ONCE        Comments: Complete no more than 45 minutes prior to patient eating      11/04/24 0817    11/04/24 0600  XR Chest 1 View  1 Time Imaging         11/03/24 1427    11/03/24 2100  insulin glargine (LANTUS, SEMGLEE) injection 21 Units  Nightly,   Status:  Discontinued         11/03/24 0734    11/03/24 1932  POC Glucose Once  PROCEDURE ONCE        Comments: Complete no more than 45 minutes prior to patient eating      11/03/24 1930    11/03/24 1654  POC Glucose Once  PROCEDURE ONCE        Comments: Complete no more than 45 minutes prior to patient eating      11/03/24 1652    11/03/24 1625  POC Glucose Once  PROCEDURE ONCE        Comments: Complete no more than 45 minutes prior to patient eating      11/03/24 1622    11/03/24 1600  iopamidol (ISOVUE-300) 61 % injection 100 mL  Once in Imaging         11/03/24 1508    11/03/24 1203  CT Abdomen Pelvis With Contrast  1 Time Imaging         11/03/24 1202    11/03/24 1143  POC Glucose Once  PROCEDURE ONCE        Comments: Complete no more than 45 minutes prior to patient eating      11/03/24 1140    11/03/24 0830  insulin lispro (HUMALOG/ADMELOG) injection 7 Units  3 Times Daily With Meals,   Status:  Discontinued         11/03/24 0734    11/03/24 0727  POC Glucose Once  PROCEDURE ONCE        Comments: Complete no more than 45 minutes prior to patient eating      11/03/24 0724    11/03/24 0600  CBC (No Diff)  Morning Draw         11/02/24 0820    11/03/24 0600  Uric Acid  Morning Draw         11/02/24 1433    11/03/24 0600  Cortisol  Morning Draw         11/02/24 1433    11/02/24 2100  insulin glargine (LANTUS, SEMGLEE) injection 15 Units  Nightly,   Status:  Discontinued         11/02/24 1206    11/02/24 2043  POC Glucose Once  PROCEDURE ONCE        Comments: Complete no more than 45 minutes prior to patient eating      11/02/24 2041    11/02/24 1800  insulin lispro  (HUMALOG/ADMELOG) injection 5 Units  3 Times Daily With Meals,   Status:  Discontinued         11/02/24 1206    11/02/24 1529  POC Glucose Once  PROCEDURE ONCE        Comments: Complete no more than 45 minutes prior to patient eating      11/02/24 1527    11/02/24 1434  Sodium, Urine, Random - Urine, Clean Catch  Once         11/02/24 1433    11/02/24 1434  Osmolality, Urine - Urine, Clean Catch  Once         11/02/24 1433    11/02/24 1427  Diet: Diabetic, Fluid Restriction (240 mL/tray); Consistent Carbohydrate; 1500 mL/day; Fluid Consistency: Thin (IDDSI 0)  Diet Effective Now         11/02/24 1426    11/02/24 1300  insulin lispro (HUMALOG/ADMELOG) injection 2-9 Units  4 Times Daily Before Meals & Nightly         11/02/24 1206    11/02/24 1143  POC Glucose Once  PROCEDURE ONCE        Comments: Complete no more than 45 minutes prior to patient eating      11/02/24 1141    11/02/24 0815  Irrigate Drain  Continuous        Comments: Irrigate drain 4 times a day with 10 cc sterile normal saline.    11/02/24 0814    11/02/24 0815  Measure drainage output every shift  Continuous         11/02/24 0814    11/02/24 0815  Notify patients physician if drain comes out unexpectedly  Continuous         11/02/24 0814    11/02/24 0801  POC Glucose Once  PROCEDURE ONCE        Comments: Complete no more than 45 minutes prior to patient eating      11/02/24 0758    11/02/24 0629  POC Glucose Once  PROCEDURE ONCE        Comments: Complete no more than 45 minutes prior to patient eating      11/02/24 0627    11/02/24 0600  CBC (No Diff)  Morning Draw         11/01/24 0944    11/01/24 2015  POC Glucose Once  PROCEDURE ONCE        Comments: Complete no more than 45 minutes prior to patient eating      11/01/24 2013    11/01/24 1842  POC Glucose Once  PROCEDURE ONCE        Comments: Complete no more than 45 minutes prior to patient eating      11/01/24 1839    10/31/24 2100  carvedilol (COREG) tablet 6.25 mg  Every 12 Hours Scheduled       "   10/31/24 1504    10/31/24 1345  cefTRIAXone (ROCEPHIN) 2,000 mg in sodium chloride 0.9 % 100 mL MBP  Every 24 Hours         10/31/24 1253    10/31/24 0912  Strict Intake & Output  Every Shift       10/31/24 0911    10/31/24 0615  mupirocin (BACTROBAN) 2 % nasal ointment 1 Application  2 Times Daily         10/31/24 0518    10/31/24 0519  Daily CHG Bath While in ICU  Daily      Comments: Use for admission bath & daily bath for duration of critical care stay    10/31/24 0518    10/29/24 2100  insulin glargine (LANTUS, SEMGLEE) injection 10 Units  Nightly,   Status:  Discontinued         10/29/24 1644    10/29/24 1430  Naloxegol Oxalate (MOVANTIK) tablet 25 mg  Every Morning         10/29/24 1237    10/29/24 1000  Incentive Spirometry  Every 4 Hours While Awake       10/29/24 0648    10/28/24 1400  heparin (porcine) 5000 UNIT/ML injection 5,000 Units  Every 8 Hours Scheduled         10/28/24 1223    10/27/24 1200  insulin lispro (HUMALOG/ADMELOG) injection 4 Units  3 Times Daily With Meals,   Status:  Discontinued         10/27/24 1102    10/27/24 0900  polyethylene glycol (MIRALAX) packet 17 g  2 Times Daily        Placed in \"And\" Linked Group    10/27/24 0700    10/27/24 0900  sennosides-docusate (PERICOLACE) 8.6-50 MG per tablet 2 tablet  2 Times Daily        Placed in \"And\" Linked Group    10/27/24 0700    10/27/24 0700  bisacodyl (DULCOLAX) EC tablet 5 mg  Daily PRN        Placed in \"And\" Linked Group    10/27/24 0700    10/27/24 0700  bisacodyl (DULCOLAX) suppository 10 mg  Daily PRN        Placed in \"And\" Linked Group    10/27/24 0700    10/27/24 0659  oxyCODONE (ROXICODONE) immediate release tablet 5 mg  Every 4 Hours PRN         10/27/24 0659    10/26/24 1800  Incentive Spirometry  Every 4 Hours While Awake       10/26/24 1617    10/26/24 0600  Comprehensive Metabolic Panel  Daily,   Status:  Canceled       10/25/24 1013    10/26/24 0600  Magnesium  Daily,   Status:  Canceled       10/25/24 1914    10/26/24 " "0600  Phosphorus  Daily,   Status:  Canceled       10/25/24 1914    10/24/24 1730  pantoprazole (PROTONIX) EC tablet 40 mg  2 Times Daily Before Meals         10/24/24 1302    10/23/24 0900  venlafaxine XR (EFFEXOR-XR) 24 hr capsule 75 mg  Daily         10/22/24 2242    10/23/24 0830  influenza virus vacc split PF FLUZONE 0.5 mL  During Hospitalization         10/22/24 2332    10/23/24 0800  Oral Care  2 Times Daily       10/22/24 2026    10/23/24 0727  Daily Weights  Daily       10/23/24 0726    10/23/24 0725  Potassium Replacement - Follow Nurse / BPA Driven Protocol  As Needed         10/23/24 0726    10/23/24 0725  Phosphorus Replacement - Follow Nurse / BPA Driven Protocol  As Needed         10/23/24 0726    10/23/24 0725  Calcium Replacement - Follow Nurse / BPA Driven Protocol  As Needed         10/23/24 0726    10/23/24 0000  Vital Signs  Every 4 Hours       10/22/24 2026    10/22/24 2241  hydrOXYzine (ATARAX) tablet 10 mg  Every 8 Hours PRN         10/22/24 2242    10/22/24 2200  POC Glucose 4x Daily Before Meals & at Bedtime  4 Times Daily Before Meals & at Bedtime      Comments: Complete no more than 45 minutes prior to patient eating      10/22/24 2113    10/22/24 2200  insulin lispro (HUMALOG/ADMELOG) injection 2-7 Units  4 Times Daily Before Meals & Nightly,   Status:  Discontinued         10/22/24 2113    10/22/24 2113  naloxone (NARCAN) injection 0.4 mg  Every 5 Minutes PRN        Placed in \"And\" Linked Group    10/22/24 2113    10/22/24 2112  dextrose (GLUTOSE) oral gel 15 g  Every 15 Minutes PRN         10/22/24 2113    10/22/24 2112  dextrose (D50W) (25 g/50 mL) IV injection 25 g  Every 15 Minutes PRN         10/22/24 2113    10/22/24 2112  glucagon (GLUCAGEN) injection 1 mg  Every 15 Minutes PRN         10/22/24 2113    10/22/24 2025  melatonin tablet 2.5 mg  Nightly PRN         10/22/24 2026    10/22/24 2025  Intake & Output  Every Shift       10/22/24 2026    10/22/24 2024  ondansetron ODT " "(ZOFRAN-ODT) disintegrating tablet 4 mg  Every 6 Hours PRN        Placed in \"Or\" Linked Group    10/22/24 2026    10/22/24 2024  ondansetron (ZOFRAN) injection 4 mg  Every 6 Hours PRN        Placed in \"Or\" Linked Group    10/22/24 2026    10/22/24 1551  sodium chloride 0.9 % flush 10 mL  As Needed         10/22/24 1552    Unscheduled  Follow Hypoglycemia Standing Orders For Blood Glucose <70 & Notify Provider of Treatment  As Needed      Comments: Follow Hypoglycemia Orders As Outlined in Process Instructions (Open Order Report to View Full Instructions)  Notify Provider Any Time Hypoglycemia Treatment is Administered    10/22/24 2113                     Physician Progress Notes (last 72 hours)        Manuela Wisdom MD at 11/04/24 1411          General Surgery Progress Note    CC: Cholecystitis, infected subcapsular biloma     S: Patient doing well today.  Has had a little bit of nausea but denies vomiting and is otherwise tolerating his diet.  Has had bowel movements.  Denies any abdominal pain right now.    O:/65 (BP Location: Right arm, Patient Position: Lying)   Pulse 84   Temp 98.1 °F (36.7 °C) (Oral)   Resp 24   Ht 170 cm (66.93\")   Wt 72.1 kg (158 lb 14.4 oz)   SpO2 95%   BMI 24.94 kg/m²     Intake & Output (last day)         11/03 0701  11/04 0700 11/04 0701  11/05 0700    P.O. 840     I.V. (mL/kg)      Total Intake(mL/kg) 840 (11.7)     Urine (mL/kg/hr) 1325 (0.8) 500 (1)    Drains 5     Stool 0     Total Output 1330 500    Net -490 -500          Urine Unmeasured Occurrence 1 x     Stool Unmeasured Occurrence 2 x             GENERAL: Awake, alert, interactive, cooperative, answering questions appropriately  HEENT: EOMI, moist mucus membranes   CHEST: normal work of breathing on room air  CARDIAC: Regular rate  GI: Abd soft, non distended, nontender; RUQ IR drain with small amount of bilious output  EXTREMITIES: MOCK, no cyanosis, no lower extremity edema  SKIN: Warm and dry    LABS  Results " from last 7 days   Lab Units 11/03/24  0554 11/02/24  0616 11/01/24  0635 10/30/24  0639 10/29/24  0652   WBC 10*3/mm3 8.94 10.84* 11.82*   < > 15.53*   HEMOGLOBIN g/dL 9.9* 10.5* 10.7*   < > 11.6*   HEMATOCRIT % 32.0* 31.9* 32.5*   < > 37.1*   PLATELETS 10*3/mm3 588* 536* 471*   < > 370   MONOCYTES % %  --   --   --   --  9.9   EOSINOPHIL % %  --   --   --   --  0.0*    < > = values in this interval not displayed.     Results from last 7 days   Lab Units 11/04/24  0653 11/03/24  0554 11/02/24  0616   SODIUM mmol/L 132* 133* 130*   POTASSIUM mmol/L 4.7 4.2 4.2   CHLORIDE mmol/L 97* 96* 92*   CO2 mmol/L 26.7 28.0 26.0   BUN mg/dL 10 14 18   CREATININE mg/dL 0.86 0.95 0.97   CALCIUM mg/dL 9.2 8.6 8.6   BILIRUBIN mg/dL 0.4 0.3 0.4   ALK PHOS U/L 108 116 129*   ALT (SGPT) U/L 27 30 40   AST (SGOT) U/L 13 13 18   GLUCOSE mg/dL 385* 258* 233*     Results from last 7 days   Lab Units 10/28/24  1959   INR  1.13*   APTT seconds 28.8   Magnesium 1.8  Phosphorus 3.0    IMAGING:    -CT demonstrating contracted gallbladder with mild hyperenhancing wall.  EGD performed 10/24 negative for abnormalities; August 2024 CT had distended gallbladder but no other findings of cholecystitis.  -HIDA scan 10/24 demonstrating filling but with decreased ejection fraction 19% concerning for possible chronic cholecystitis.   -CTA abdomen pelvis 10/26 to evaluate worsening RUQ pain and possible mesenteric ischemia demonstrated a large right subcapsular fluid collection with mass effect on the liver; no active extravasation  -s/p CT-guided IR drainage 10/28 with thick, turbid bilious output from drain - bile leak vs abscess in the differential, cause unclear but may be related to cholecystitis.  -MRCP 10/30/24 study limited; nondistended, inflamed appearing gallbladder containing stones present; subcapsular fluid collection decreased as expected with drainage; also a second loculated collection appearing contiguous with the duodenum and inferior  to the gallbladder fossa has decreased in size.  Nonenhancing lesion in the inferior right hepatic lobe new since August, may be related to biloma, for which follow up is recommended    CT abdomen pelvis 2024 images and report reviewed, most of his subcapsular fluid collection has decreased but there is still a significant amount of fluid in the superior aspect, the gallbladder has a thick wall without distention, there is no biliary dilation, the proximal duodenum appears less thickened, there is right middle and lower lobe atelectasis and a small pleural effusion, and left lower lobe atelectasis    A/P: 49 y.o. male with multiple medical problems including CHF, hypertension, hyperlipidemia, type 2 diabetes mellitus with A1c 9.4, presenting with abdominal pain, found to have possible cholecystitis, with Klebsiella infected biloma.      Patient continues to clinically improve. Tolerating diet, having bowel function. Pain adequately controlled. Afebrile with WBC and LFTs normalized. Drain output has been minimal.  Will ask IR for evaluation given the significant undrained component of the subcapsular biloma noted on repeat CT scan.  I would like to see if this could be percutaneously drained prior to cholecystectomy.    Manuela Wisdom MD  General, Robotic and Endoscopic Surgery  [facility]    [address], [address]  [place], [place], [address]  P: [phone]  F: [phone]       Electronically signed by Manuela Wisdom MD at [phone]       Girma Tobias MD at 24 [phone]              Name: Donnie Bishop ADMIT: 10/22/2024   : 1975  PCP: Juju Kennedy MD    MRN: [id number] LOS: 11 days   AGE/SEX: 49 y.o. male  ROOM: [facility]     Subjective   Subjective   Chief Complaint   Patient presents with    Abdominal Pain     Not reporting any chest pain palpitation shortness of breath nausea or vomiting.    Objective   Objective   Vital Signs  Temp:  [97.7 °F (36.5 °C)-98.8 °F  (37.1 °C)] 98.1 °F (36.7 °C)  Heart Rate:  [84] 84  Resp:  [22-28] 24  BP: (104-111)/(65-79) 107/65  SpO2:  [95 %] 95 %  on   ;   Device (Oxygen Therapy): room air  Body mass index is 24.94 kg/m².    Physical Exam  Vitals and nursing note reviewed.   Constitutional:       General: He is not in acute distress.     Appearance: He is not diaphoretic.   HENT:      Head: Atraumatic.   Eyes:      General: No scleral icterus.  Cardiovascular:      Rate and Rhythm: Normal rate and regular rhythm.   Pulmonary:      Effort: Pulmonary effort is normal.      Breath sounds: No wheezing.   Abdominal:      General: There is no distension.      Palpations: Abdomen is soft.   Musculoskeletal:         General: No swelling.   Skin:     General: Skin is warm and dry.   Neurological:      Mental Status: He is alert. Mental status is at baseline.   Psychiatric:         Mood and Affect: Mood normal.         Behavior: Behavior normal.       Results Review  I reviewed the patient's new clinical results.    Results from last 7 days   Lab Units 11/03/24  0554 11/02/24  0616 11/01/24  0635 10/31/24  0542   WBC 10*3/mm3 8.94 10.84* 11.82* 11.35*   HEMOGLOBIN g/dL 9.9* 10.5* 10.7* 11.1*   PLATELETS 10*3/mm3 588* 536* 471* 392     Results from last 7 days   Lab Units 11/04/24  0653 11/03/24  0554 11/02/24  0616 11/01/24  0635   SODIUM mmol/L 132* 133* 130* 134*   POTASSIUM mmol/L 4.7 4.2 4.2 3.7   CHLORIDE mmol/L 97* 96* 92* 97*   CO2 mmol/L 26.7 28.0 26.0 27.0   BUN mg/dL 10 14 18 18   CREATININE mg/dL 0.86 0.95 0.97 0.99   GLUCOSE mg/dL 385* 258* 233* 126*   EGFR mL/min/1.73 107.5 99.3 96.9 94.6     Results from last 7 days   Lab Units 11/04/24  0653 11/03/24  0554 11/02/24  0616 11/01/24  0635   ALBUMIN g/dL 3.0* 2.6* 2.8* 2.7*   BILIRUBIN mg/dL 0.4 0.3 0.4 0.5   ALK PHOS U/L 108 116 129* 130*   AST (SGOT) U/L 13 13 18 28   ALT (SGPT) U/L 27 30 40 53*     Results from last 7 days   Lab Units 11/04/24  0653 11/03/24  0554 11/02/24  0616  11/01/24  0635   CALCIUM mg/dL 9.2 8.6 8.6 8.7   ALBUMIN g/dL 3.0* 2.6* 2.8* 2.7*   MAGNESIUM mg/dL 1.8 1.9 1.8 1.7   PHOSPHORUS mg/dL 3.0 2.5 3.0 3.5         Glucose   Date/Time Value Ref Range Status   11/04/2024 1106 203 (H) 70 - 130 mg/dL Final   11/04/2024 0817 350 (H) 70 - 130 mg/dL Final   11/03/2024 1930 128 70 - 130 mg/dL Final   11/03/2024 1652 103 70 - 130 mg/dL Final   11/03/2024 1622 44 (C) 70 - 130 mg/dL Final   11/03/2024 1140 235 (H) 70 - 130 mg/dL Final   11/03/2024 0724 280 (H) 70 - 130 mg/dL Final       XR Chest 1 View    Result Date: 11/4/2024  As described.  This report was finalized on 11/4/2024 8:34 AM by Dr. Hubert Reid M.D on Workstation: [id number]       I have personally reviewed all medications:  Scheduled Medications  carvedilol, 6.25 mg, Oral, Q12H  cefTRIAXone, 2,000 mg, Intravenous, Q24H  furosemide, 20 mg, Oral, Daily  heparin (porcine), 5,000 Units, Subcutaneous, Q8H  insulin glargine, 21 Units, Subcutaneous, Nightly  insulin lispro, 2-9 Units, Subcutaneous, 4x Daily AC & at Bedtime  insulin lispro, 7 Units, Subcutaneous, TID With Meals  mupirocin, 1 Application, Each Nare, BID  Naloxegol Oxalate, 25 mg, Oral, QAM  pantoprazole, 40 mg, Oral, BID AC  senna-docusate sodium, 2 tablet, Oral, BID   And  polyethylene glycol, 17 g, Oral, BID  venlafaxine XR, 75 mg, Oral, Daily      Infusions     Diet  Diet: Diabetic, Fluid Restriction (240 mL/tray); Consistent Carbohydrate; 1500 mL/day; Fluid Consistency: Thin (IDDSI 0)      Assessment/Plan     Active Hospital Problems    Diagnosis  POA    **Epigastric abdominal pain [R10.13]  Yes    Abscess of liver [K75.0]  Yes    Klebsiella pneumoniae (k. pneumoniae) as the cause of diseases classified elsewhere [B96.1]  Yes    Hypotension [I95.9]  Yes    Transaminitis [R74.01]  Yes    Leukocytosis [D72.829]  Yes    Chronic combined systolic and diastolic heart failure [I50.42]  Yes    Anxiety [F41.9]  Yes    Multiple-type hyperlipidemia [E78.2]   Yes    Type 2 diabetes mellitus with diabetic neuropathy, with long-term current use of insulin [E11.40, Z79.4]  Not Applicable    Benign essential HTN [I10]  Yes      Resolved Hospital Problems    Diagnosis Date Resolved POA    Acute kidney injury [N17.9] 10/30/2024 No       49 y.o. male admitted with Epigastric abdominal pain.    Liver abscess/biloma: Status post IR drainage and drain placement 10/28.  Klebsiella.  Continue Rocephin.  Timing duration to be determined.  Infectious disease following.  Cholecystitis: No immediate plan for ERCP.  GI evaluated and he did have a EGD earlier this admission on 10/24.  Eventual cholecystectomy, timing TBD.  Surgery following.  Hypotension: Had improved with resuscitation and midodrine.  The midodrine has since been stopped.  Pulmonology evaluated.  TENISHA: Multifactorial and now improved.  Lasix has been resumed by nephrology.  They stated was okay for him to dart GDMT for CHF at discharge.  Nephrology evaluated . plan follow-up in 1 week in clinic.  NICM/bigeminy: EF 49%.  On Coreg and Lasix.  Home Entresto, Aldactone, and Jardiance are held.  Cardiology evaluated.   Diabetes: A1c 9.4.  Was on Jardiance metformin and insulin prior to admission.  Had hypo and hyperglycemia yesterday.  Change Lantus to twice daily 10 units and Premeal to 6 units plus SSI.  Monitor requirements.  Hypertension: See above  PPX: Subcutaneous heparin  Disposition: TBD    Expected Discharge Date: 2024; Expected Discharge Time:      Girma Tobias MD  [facility]  24  12:59 EST    Dictated portions of note using Dragon dictation software.  Copied text in this note has been reviewed by me and remains accurate as of 24    Electronically signed by Girma Tobias MD at 24 1312       Evan Pat MD at 24 0856              [facility] Progress Note      Patient Name: Donnie Bishop  : 1975  MRN:  [id number]  Primary Care Physician:  Juju Kennedy MD  Date of admission: 10/22/2024    Subjective     Interval History:   F/u TENISHA     Review of Systems:   Denies dyspnea or nausea     Objective     Vitals:   Temp:  [97.7 °F (36.5 °C)-98.8 °F (37.1 °C)] 98.2 °F (36.8 °C)  Heart Rate:  [84] 84  Resp:  [22-28] 22  BP: (104-111)/(65-79) 110/66    Intake/Output Summary (Last 24 hours) at 11/4/2024 0851  Last data filed at 11/4/2024 0820  Gross per 24 hour   Intake 600 ml   Output 1830 ml   Net -1230 ml       Physical Exam:    General Appearance: frail AAM comfortable on RA, speech/mentation slow  Neck: supple, no JVD  Lungs: CTA bilat no rales  Heart: RRR, normal S1 and S2  Abdomen: soft, nontender, nondistended  Extremities: no edema, cyanosis or clubbing        Scheduled Meds:     carvedilol, 6.25 mg, Oral, Q12H  cefTRIAXone, 2,000 mg, Intravenous, Q24H  furosemide, 20 mg, Oral, Daily  heparin (porcine), 5,000 Units, Subcutaneous, Q8H  insulin glargine, 21 Units, Subcutaneous, Nightly  insulin lispro, 2-9 Units, Subcutaneous, 4x Daily AC & at Bedtime  insulin lispro, 7 Units, Subcutaneous, TID With Meals  mupirocin, 1 Application, Each Nare, BID  Naloxegol Oxalate, 25 mg, Oral, QAM  pantoprazole, 40 mg, Oral, BID AC  senna-docusate sodium, 2 tablet, Oral, BID   And  polyethylene glycol, 17 g, Oral, BID  venlafaxine XR, 75 mg, Oral, Daily      IV Meds:        Results Reviewed:   I have personally reviewed the results from the time of this admission to 11/4/2024 08:51 EST     Results from last 7 days   Lab Units 11/04/24  0653 11/03/24  0554 11/02/24  0616   SODIUM mmol/L 132* 133* 130*   POTASSIUM mmol/L 4.7 4.2 4.2   CHLORIDE mmol/L 97* 96* 92*   CO2 mmol/L 26.7 28.0 26.0   BUN mg/dL 10 14 18   CREATININE mg/dL 0.86 0.95 0.97   CALCIUM mg/dL 9.2 8.6 8.6   BILIRUBIN mg/dL 0.4 0.3 0.4   ALK PHOS U/L 108 116 129*   ALT (SGPT) U/L 27 30 40   AST (SGOT) U/L 13 13 18   GLUCOSE mg/dL 385* 258* 233*     Estimated  Creatinine Clearance: 108.3 mL/min (by C-G formula based on SCr of 0.86 mg/dL).  Results from last 7 days   Lab Units 11/04/24  0653 11/03/24  0554 11/02/24  0616   MAGNESIUM mg/dL 1.8 1.9 1.8   PHOSPHORUS mg/dL 3.0 2.5 3.0     Results from last 7 days   Lab Units 11/03/24  0554 10/30/24  0639   URIC ACID mg/dL 3.5 3.4     Results from last 7 days   Lab Units 11/03/24  0554 11/02/24  0616 11/01/24  0635 10/31/24  0542 10/30/24  0639   WBC 10*3/mm3 8.94 10.84* 11.82* 11.35* 14.38*   HEMOGLOBIN g/dL 9.9* 10.5* 10.7* 11.1* 10.6*   PLATELETS 10*3/mm3 588* 536* 471* 392 364     Results from last 7 days   Lab Units 10/28/24  1959 10/28/24  1129   INR  1.13* 1.0       Assessment / Plan     ASSESSMENT:  Acute kidney injury.  Multifactorial including hypovolemia, hypotension, impaired renal autoregulation due to Jardiance, Entresto, Aldactone.  Resolved.  Cr ~ 1.   Cholecystitis with large right perihepatic fluid collection.  Status post IR drainage with drain left in 10/28/2024.  On Rocephin per ID.  Transaminitis improving.  Off statin.  MRCP noted.  White blood cell count falling.  Heart failure reduced ejection fraction.  EF 49%.  (Improved from 35% a year ago).  Anemia.    Encephalopathy.  Mentation remains bit slow.  Diabetes mellitus type 2.  Hypotension - SBP 90s to low 100s on low dose coreg     PLAN:  Chest x-ray reviewed today showed signs of vascular congestion  Will start Lasix at 20 mg p.o. daily  Okay for from nephrology standpoint to start goal-directed therapy for heart failure(ACE/ARB/SGLT2) at discharge  May discharge home from our standpoint will arrange follow-up in nephrology clinic in 1 week      Evan Pat MD  11/04/24  08:51 [facility]    [facility]  [phone]    Electronically signed by Evan Pat MD at 11/04/24 0855       Fredrick Ferro DO at 11/04/24 0843           LOS: 11 days     Chief Complaint: Liver abscess    Interval History: Patient reports he  is doing well this morning.  Denies any acute complaints.  Denies any nausea, vomiting or diarrhea.  Tolerating antibiotic.    Vital Signs  Temp:  [97.7 °F (36.5 °C)-98.8 °F (37.1 °C)] 98.2 °F (36.8 °C)  Heart Rate:  [84] 84  Resp:  [22-28] 22  BP: (104-111)/(65-79) 110/66    Physical Exam:  General: In no acute distress  HEENT: Oropharynx clear, moist mucous membranes  Respiratory: Normal work of breathing  GI: Abdominal drain in place.  Skin: No rashes or lesions in exposed areas  Extremities: No edema  Access: Peripheral IV    Antibiotics:  Anti-Infectives (From admission, onward)      Ordered     Dose/Rate Route Frequency Start Stop    10/31/24 1253  cefTRIAXone (ROCEPHIN) 2,000 mg in sodium chloride 0.9 % 100 mL MBP        Ordering Provider: Steven Rivera MD    2,000 mg  200 mL/hr over 30 Minutes Intravenous Every 24 Hours 10/31/24 1345 11/10/24 1344    10/23/24 1411  piperacillin-tazobactam (ZOSYN) 3.375 g IVPB in 100 mL NS MBP (CD)        Ordering Provider: Delaney Rosario APRN    3.375 g  over 30 Minutes Intravenous Once 10/23/24 1500 10/23/24 1656             Results Review:     I reviewed the patient's new clinical results.    Lab Results   Component Value Date    WBC 8.94 11/03/2024    HGB 9.9 (L) 11/03/2024    HCT 32.0 (L) 11/03/2024    MCV 93.8 11/03/2024     (H) 11/03/2024     Lab Results   Component Value Date    GLUCOSE 385 (H) 11/04/2024    BUN 10 11/04/2024    CREATININE 0.86 11/04/2024    EGFRIFNONA 93 10/09/2020    EGFRIFAFRI >60 08/26/2022    BCR 11.6 11/04/2024    CO2 26.7 11/04/2024    CALCIUM 9.2 11/04/2024    PROTENTOTREF 6.7 07/12/2023    ALBUMIN 3.0 (L) 11/04/2024    LABIL2 2.5 07/12/2023    AST 13 11/04/2024    ALT 27 11/04/2024       Microbiology:  10/23 BCx: negative  10/25 RPP: negative  10/28 Liver Abscess Cx: rare growth Kleb pneumo (susc all abx tested exc ampicillin)  10/28 BCx: NGTD    Assessment    Liver abscess due to  Klebsiella  Biloma  Cholecystitis  Uncontrolled DM2 -last A1c 9.4%  (All new to me today)     Continue ceftriaxone 2 g daily targeting Klebsiella from liver abscess.  Duration to be determined based on surgical plans and repeat imaging.  Plan is for oral antibiotics closer to discharge.    ID will follow.       Electronically signed by Fredrick Ferro DO at 24 0845       Connor Crandall MD at 24 1423              [facility] Progress Note      Patient Name: Donnie Bishop  : 1975  MRN: [id number]  Primary Care Physician:  Juju Kennedy MD  Date of admission: 10/22/2024    Subjective     Interval History:   F/u TENISHA     Review of Systems:   Denies dyspnea or nausea     Objective     Vitals:   Temp:  [98.2 °F (36.8 °C)-98.5 °F (36.9 °C)] 98.3 °F (36.8 °C)  Heart Rate:  [88-98] 88  Resp:  [18-25] 23  BP: ()/(63-75) 109/68    Intake/Output Summary (Last 24 hours) at 11/3/2024 1423  Last data filed at 11/3/2024 1204  Gross per 24 hour   Intake 830 ml   Output 1500 ml   Net -670 ml       Physical Exam:    General Appearance: frail AAM comfortable on RA, speech/mentation slow  Neck: supple, no JVD  Lungs: CTA bilat no rales  Heart: RRR, normal S1 and S2  Abdomen: soft, nontender, nondistended  Extremities: no edema, cyanosis or clubbing        Scheduled Meds:     carvedilol, 6.25 mg, Oral, Q12H  cefTRIAXone, 2,000 mg, Intravenous, Q24H  heparin (porcine), 5,000 Units, Subcutaneous, Q8H  insulin glargine, 21 Units, Subcutaneous, Nightly  insulin lispro, 2-9 Units, Subcutaneous, 4x Daily AC & at Bedtime  insulin lispro, 7 Units, Subcutaneous, TID With Meals  mupirocin, 1 Application, Each Nare, BID  Naloxegol Oxalate, 25 mg, Oral, QAM  pantoprazole, 40 mg, Oral, BID AC  senna-docusate sodium, 2 tablet, Oral, BID   And  polyethylene glycol, 17 g, Oral, BID  venlafaxine XR, 75 mg, Oral, Daily      IV Meds:        Results Reviewed:   I have personally reviewed  the results from the time of this admission to 11/3/2024 14:23 EST     Results from last 7 days   Lab Units 11/03/24  0554 11/02/24  0616 11/01/24  0635   SODIUM mmol/L 133* 130* 134*   POTASSIUM mmol/L 4.2 4.2 3.7   CHLORIDE mmol/L 96* 92* 97*   CO2 mmol/L 28.0 26.0 27.0   BUN mg/dL 14 18 18   CREATININE mg/dL 0.95 0.97 0.99   CALCIUM mg/dL 8.6 8.6 8.7   BILIRUBIN mg/dL 0.3 0.4 0.5   ALK PHOS U/L 116 129* 130*   ALT (SGPT) U/L 30 40 53*   AST (SGOT) U/L 13 18 28   GLUCOSE mg/dL 258* 233* 126*     Estimated Creatinine Clearance: 101.3 mL/min (by C-G formula based on SCr of 0.95 mg/dL).  Results from last 7 days   Lab Units 11/03/24  0554 11/02/24  0616 11/01/24  0635   MAGNESIUM mg/dL 1.9 1.8 1.7   PHOSPHORUS mg/dL 2.5 3.0 3.5     Results from last 7 days   Lab Units 11/03/24  0554 10/30/24  0639   URIC ACID mg/dL 3.5 3.4     Results from last 7 days   Lab Units 11/03/24  0554 11/02/24  0616 11/01/24  0635 10/31/24  0542 10/30/24  0639   WBC 10*3/mm3 8.94 10.84* 11.82* 11.35* 14.38*   HEMOGLOBIN g/dL 9.9* 10.5* 10.7* 11.1* 10.6*   PLATELETS 10*3/mm3 588* 536* 471* 392 364     Results from last 7 days   Lab Units 10/28/24  1959 10/28/24  1129   INR  1.13* 1.0       Assessment / Plan     ASSESSMENT:  Acute kidney injury.  Multifactorial including hypovolemia, hypotension, impaired renal autoregulation due to Jardiance, Entresto, Aldactone.  Resolved.  Cr ~ 1.  Appears fairly euvolemic but we may need to resume low dose loop diuretic in AM to maintain vol status (not now with SBP 90s).  CXR last week did show some central congestion and hyponatremia is likely hypervolemic in nature, but improved some with fluid restriction (133)  Cholecystitis with large right perihepatic fluid collection.  Status post IR drainage with drain left in 10/28/2024.  On Rocephin per ID.  Transaminitis improving.  Off statin.  MRCP noted.  White blood cell count falling.  Heart failure reduced ejection fraction.  EF 49%.  (Improved from 35%  "a year ago).  Anemia.  Hemoglobin down slightly 9.9  Encephalopathy.  Mentation remains bit slow.  Diabetes mellitus type 2.  Hypotension - SBP 90s to low 100s on low dose coreg     PLAN:  Continue fluid restriction 1500 cc/day  Resume lasix 20 mg daily in AM  Repeat CXR in AM      Connor Crandall MD  24  14:23 EST    [facility]  [phone]    Electronically signed by Connor Crandall MD at 24 4847       Kaylee Kay MD at 24 1247              Patient Name: Donnie Bishop  :1975  49 y.o.      Patient Care Team:  Juju Kennedy MD as PCP - General (Family Medicine)    Chief Complaint:   CM  Interval History:   No changes. No complaints. Cholecystectomy being considered      Objective   Vital Signs  Temp:  [98.2 °F (36.8 °C)-98.5 °F (36.9 °C)] 98.3 °F (36.8 °C)  Heart Rate:  [88-98] 88  Resp:  [18-25] 23  BP: ()/(63-75) 109/68    Intake/Output Summary (Last 24 hours) at 11/3/2024 1247  Last data filed at 11/3/2024 1204  Gross per 24 hour   Intake 830 ml   Output 1500 ml   Net -670 ml     Flowsheet Rows      Flowsheet Row First Filed Value   Admission Height 170.2 cm (67\") Documented at 10/22/2024 1641   Admission Weight 76.2 kg (168 lb) Documented at 10/22/2024 1641            Physical Exam:   General Appearance:    Alert, cooperative, in no acute distress   Lungs:     Clear to auscultation.  Normal respiratory effort and rate.      Heart:    Regular rhythm and normal rate, normal S1 and S2, no murmurs, gallops or rubs.     Chest Wall:    No abnormalities observed   Abdomen:     Soft, nontender, positive bowel sounds.     Extremities:   no cyanosis, clubbing or edema.  No marked joint deformities.  Adequate musculoskeletal strength.       Results Review:    Results from last 7 days   Lab Units 24  0554   SODIUM mmol/L 133*   POTASSIUM mmol/L 4.2   CHLORIDE mmol/L 96*   CO2 mmol/L 28.0   BUN mg/dL 14   CREATININE mg/dL 0.95   GLUCOSE " mg/dL 258*   CALCIUM mg/dL 8.6     Results from last 7 days   Lab Units 10/28/24  0045 10/27/24  2205   HSTROP T ng/L 30* 28*     Results from last 7 days   Lab Units 11/03/24  0554   WBC 10*3/mm3 8.94   HEMOGLOBIN g/dL 9.9*   HEMATOCRIT % 32.0*   PLATELETS 10*3/mm3 588*     Results from last 7 days   Lab Units 10/28/24  1959 10/28/24  1129   INR  1.13* 1.0   APTT seconds 28.8  --      Results from last 7 days   Lab Units 11/03/24  0554   MAGNESIUM mg/dL 1.9                   Medication Review:   carvedilol, 6.25 mg, Oral, Q12H  cefTRIAXone, 2,000 mg, Intravenous, Q24H  heparin (porcine), 5,000 Units, Subcutaneous, Q8H  insulin glargine, 21 Units, Subcutaneous, Nightly  insulin lispro, 2-9 Units, Subcutaneous, 4x Daily AC & at Bedtime  insulin lispro, 7 Units, Subcutaneous, TID With Meals  mupirocin, 1 Application, Each Nare, BID  Naloxegol Oxalate, 25 mg, Oral, QAM  pantoprazole, 40 mg, Oral, BID AC  senna-docusate sodium, 2 tablet, Oral, BID   And  polyethylene glycol, 17 g, Oral, BID  venlafaxine XR, 75 mg, Oral, Daily              Assessment & Plan   NICM with recovered EF, appears euvolemic though continues to note restricted breathing.  Bigeminy, improved on coreg 6.25  Biloma/gall bladder sludge/subcapsular fluid collection s/p [id number] growing klebsiella  Hypotension resolved. Midodrine stopped    Resting, no changes  Will see as needed.     Kaylee Kay MD  [facility]  11/03/24  12:47 EST      Electronically signed by Kaylee Kay MD at 11/03/24 1248       Jarrod Prado MD at 11/03/24 1200          Chief complaint: Follow-up cholecystitis, subcapsular biloma    Subjective: Making slow progress.  Tolerating some diet.  Drain with minimal output, only about 3 cc from what I can gather.    Review of systems:  Constitutional: Denies fever, chills, change in weight  Respiratory: Denies cough or wheezing    Physical exam:  Afebrile with stable vital  General: Awake and alert, no  distress  Head: Normocephalic, atraumatic  Eyes: Extraocular movements intact, no icterus  Neck: Supple, trachea midline  Respiratory: No use of accessory muscles, good bilateral chest expansion  Gastrointestinal: Soft, tender around the drain, otherwise fairly soft  Extremities: No peripheral edema, no deformity  Skin: Warm and dry    White count in the normal range, hemoglobin stable, chemistries unremarkable    Assessment and plan:  -Cholecystitis, Klebsiella infected biloma, status post drainage  -Drain output is low, hopefully this means collection is clearing up, but I think repeating imaging to verify this would be appropriate  -Eventual cholecystectomy, timing to be determined    Jarrod Prado MD  General and Endoscopic Surgery  [facility]    [address], [address]  [place], [place], [address]  P: [phone]  F: [phone]        Electronically signed by Jarrod Prado MD at 11/03/24 1202       Son Moreira MD at 11/03/24 1042          ID note for liver abscess  S: feels okay.  Pain is improving. AF. Tolearting abx    Temp:  [98.2 °F (36.8 °C)-98.5 °F (36.9 °C)] 98.5 °F (36.9 °C)  Heart Rate:  [88-98] 88  Resp:  [18-27] 25  BP: ()/(63-75) 115/75  GENERAL: Awake and alert, in no acute distress.   HEENT: Oropharynx is clear. Hearing is grossly normal.   EYES: . No conjunctival injection. No lid lag.   LUNGS:normal respiratory effort.   SKIN: no cutaneous eruptions in exposed areas  PSYCHIATRIC: Appropriate mood, affect, insight, and judgment.     Cr 0.95  WBC 8.9  Glc 239-280    Microbiology:  10/23 BCx: negative  10/25 RPP: negative  10/28 Liver Abscess Cx: rare growth Kleb pneumo (susc all abx tested exc ampicillin)  10/28 BCx: NGTD      ASSESSMENT/PLAN:  Liver abscess due to Klebsiella  ?  Biloma  ?  Cholecystitis  Uncontrolled DM2 -last A1c 9.4%      For liver abscess due to Klebsiella, cont with ceftriaxone 2 g IV every 24 hours with duration TBD.    Plan dc on  po abx when closer to dc  He does not appear to need ERCP but eventually will need cholecystectomy.  Timing still to be determined.  ID will follow        Electronically signed by Son Moreira MD at 24 1106       Steven Rivera MD at 24 0731              Name: Donnie Bishop ADMIT: 10/22/2024   : 1975  PCP: Juju Kennedy MD    MRN: [id number] LOS: 10 days   AGE/SEX: 49 y.o. male  ROOM: [facility]     Subjective   Subjective   Still having abdominal pain maybe a little worse today than yesterday. No vomiting.    Objective   Objective   Vital Signs  Temp:  [98.2 °F (36.8 °C)-98.7 °F (37.1 °C)] 98.5 °F (36.9 °C)  Heart Rate:  [88-98] 88  Resp:  [18-27] 25  BP: ()/(63-75) 115/75  SpO2:  [90 %-97 %] 95 %  on   ;   Device (Oxygen Therapy): room air  Body mass index is 25.78 kg/m².  Physical Exam  General: not in distress, ill-appearing,  CV: Regular rate and rhythm, no murmurs rubs or gallops  Lungs: Diminished at bases, no wheezing, no crackles  Abdomen: Soft, less distended, mild tenderness to palpation, RUQ drain in place.  Extremities: Trace edema in lower extremities,, no cyanosis     Results Review     I reviewed the patient's new clinical results.  Results from last 7 days   Lab Units 24  0554 24  0616 2435 10/31/24  0542   WBC 10*3/mm3 8.94 10.84* 11.82* 11.35*   HEMOGLOBIN g/dL 9.9* 10.5* 10.7* 11.1*   PLATELETS 10*3/mm3 588* 536* 471* 392     Results from last 7 days   Lab Units 24  0554 24  0616 24  0635 10/31/24  0542 10/30/24  2148 10/30/24  0639 10/29/24  0652 10/28/24  2059   SODIUM mmol/L 133* 130* 134* 135*  --  135* 136 143   POTASSIUM mmol/L 4.2 4.2 3.7 4.1 4.0 3.1* 3.9 4.5   CHLORIDE mmol/L 96* 92* 97* 100  --  102 107 110*   CO2 mmol/L 28.0 26.0 27.0 25.6  --  21.4* 16.6* 16.0*   BUN mg/dL 14 18 18 17  --  23* 30* 35*   CREATININE mg/dL 0.95 0.97 0.99 1.04  --  0.78 1.14 1.06   GLUCOSE mg/dL 258* 233* 126* 155*  --  122* 176*  159*   EGFR mL/min/1.73 99.3 96.9 94.6 89.1  --  110.7 79.8 87.1   ALBUMIN g/dL 2.6* 2.8* 2.7* 2.2*  --  2.2* 1.7* 2.0*   BILIRUBIN mg/dL 0.3 0.4 0.5 0.8  --  0.8 0.7 0.7   ALK PHOS U/L 116 129* 130* 138*  --  149* 189* 222*   AST (SGOT) U/L 13 18 28 40  --  55* 154* 199*   ALT (SGPT) U/L 30 40 53* 68*  --  83* 135* 159*     Results from last 7 days   Lab Units 11/03/24  0554 11/02/24  0616 11/01/24  0635 10/31/24  0542   CALCIUM mg/dL 8.6 8.6 8.7 8.9   ALBUMIN g/dL 2.6* 2.8* 2.7* 2.2*   MAGNESIUM mg/dL 1.9 1.8 1.7 1.5*   PHOSPHORUS mg/dL 2.5 3.0 3.5 2.6     Glucose   Date/Time Value Ref Range Status   11/03/2024 0724 280 (H) 70 - 130 mg/dL Final   11/02/2024 2041 226 (H) 70 - 130 mg/dL Final   11/02/2024 1527 251 (H) 70 - 130 mg/dL Final   11/02/2024 1141 239 (H) 70 - 130 mg/dL Final   11/02/2024 0758 257 (H) 70 - 130 mg/dL Final   11/02/2024 0627 250 (H) 70 - 130 mg/dL Final   11/01/2024 2013 219 (H) 70 - 130 mg/dL Final       MRI abdomen w wo contrast mrcp  MRI AND MRCP ABDOMEN WITH AND WITHOUT CONTRAST     HISTORY: Upper abdominal pain for a week, HIDA scan in late October  concerning for chronic cholecystitis with subsequent CT abdomen and  pelvis showing large right subcapsular hepatic fluid collection status  post percutaneous drainage demonstrating bilious output growing  Klebsiella     TECHNIQUE: Multiplanar multisequence MRI an MRCP of the abdomen was  performed before and after the IV administration of MultiHance.     COMPARISON: Multiple CT abdomen pelvis dating back to 8/31/2024 and HIDA  scan 10/25/2024     FINDINGS: Please note evaluation significantly suboptimal, particularly  the pre and postcontrast weighted T1 fat-sat imaging.     Asymmetric pulmonary consolidation with right pleural effusion is only  partially seen and cannot be evaluated. Ill-defined T2 hypointensity is  present within the gallbladder which has a thickened appearance,  hyperenhancing appearance with adjacent stranding which  contacts  portions of the distal stomach and duodenum as well. Loculated appearing  collection contiguous with the duodenum inferior to the gallbladder  fossa measures up to 2.5 x 1.8 x 3.1 cm (previously 3 x 3.4 x 3.4 cm on  10/26/2024).. No intra or extrahepatic bili duct dilation is present.  Main pancreatic duct is not distended.     There is a nonenhancing T2 hyperintense lesion within the inferior  hepatic lobe measuring 1.6 cm, new since 8/31/2024. There is a small  amount of fluid along the superior and anterior aspects of the right  hepatic lobe corresponding with the previously seen subcapsular  collection on 10/26/2024. It is overall significantly decreased in size,  measuring up to approximately 0.7 cm in thickness and coronal imaging  (previously 7.3 cm on 10/26/2024). Presumed drainage catheter is  visualized over the right aspect of the abdominal wall; however, is not  well evaluated on MRI. Somewhat nodular thickening of the bilateral  adrenal glands with overall preservation adreniform shape, as seen since  12/17/2023.     IMPRESSION  1.  Significant interval decrease in size of the previously seen  subcapsular collection status post percutaneous drainage placement.  Please note evaluation for current location of the drain is suboptimal  on MRI. A second loculated appearing collection contiguous with the  duodenum and inferior to the gallbladder fossa also appears to have  mildly decreased in size since 10/26/2024. Given findings on recent  fluid sampling, primary differential considerations include abscess  and/or biloma and can be further evaluated with HIDA scan if clinically  indicated.  2.  The gallbladder has an inflamed appearance with T2 hyperintense  sludge and/or small stones. The gallbladder is otherwise nondistended.  No intra or extrahepatic bili ductal dilation with common bile duct  grossly similar in size that seen on 8/31/2024. Inflammation contacts  portions of the distal stomach  and duodenum and differential  considerations include cholecystitis, gastritis and/or duodenitis.  Correlation with patient history is recommended with follow-up HIDA scan  and/or endoscopy if clinically indicated.  3.  Asymmetric pulmonary consolidation right pleural effusion is only  partially seen and cannot be evaluated. Pneumonia cannot be excluded in  the appropriate context and correlation with patient history is  recommend follow-up chest CT if clinically indicated. At least continued  attention on follow-up is recommended to ensure resolution  4.  Nonenhancing T2 hyperintense lesion within the inferior right  hepatic lobe which is new since 8/31/2024, nonspecific and may represent  local extension of biloma and/or infected fluid in the liver. Continued  close interval follow-up is recommended to ensure resolution and/or  stability.  5.  Other findings as above.        This report was finalized on 10/31/2024 9:09 AM by Dr. Michael Avelar M.D on Workstation: [id number]       Scheduled Medications  carvedilol, 6.25 mg, Oral, Q12H  cefTRIAXone, 2,000 mg, Intravenous, Q24H  heparin (porcine), 5,000 Units, Subcutaneous, Q8H  insulin glargine, 15 Units, Subcutaneous, Nightly  insulin lispro, 2-9 Units, Subcutaneous, 4x Daily AC & at Bedtime  insulin lispro, 5 Units, Subcutaneous, TID With Meals  mupirocin, 1 Application, Each Nare, BID  Naloxegol Oxalate, 25 mg, Oral, QAM  pantoprazole, 40 mg, Oral, BID AC  senna-docusate sodium, 2 tablet, Oral, BID   And  polyethylene glycol, 17 g, Oral, BID  venlafaxine XR, 75 mg, Oral, Daily    Infusions     Diet  Diet: Diabetic, Fluid Restriction (240 mL/tray); Consistent Carbohydrate; 1500 mL/day; Fluid Consistency: Thin (IDDSI 0)    I have personally reviewed     [x]  Laboratory   [x]  Microbiology   [x]  Radiology   [x]  EKG/Telemetry  []  Cardiology/Vascular   []  Pathology    []  Records      Assessment/Plan     Active Hospital Problems    Diagnosis  POA     **Epigastric abdominal pain [R10.13]  Yes    Abscess of liver [K75.0]  Yes    Klebsiella pneumoniae (k. pneumoniae) as the cause of diseases classified elsewhere [B96.1]  Yes    Hypotension [I95.9]  Yes    Transaminitis [R74.01]  Yes    Leukocytosis [D72.829]  Yes    Chronic combined systolic and diastolic heart failure [I50.42]  Yes    Anxiety [F41.9]  Yes    Multiple-type hyperlipidemia [E78.2]  Yes    Type 2 diabetes mellitus with diabetic neuropathy, with long-term current use of insulin [E11.40, Z79.4]  Not Applicable    Benign essential HTN [I10]  Yes      Resolved Hospital Problems    Diagnosis Date Resolved POA    Acute kidney injury [N17.9] 10/30/2024 No       49 y.o. man with diabetes, HTN, hyperlipidemia, chronic systolic and diastolic heart failure (non-ischemic) and anxiety who presented with severe abdominal/epigastric pain.  EGD was negative.  CT scan demonstrated a large right subcapsular fluid collection with mass effect on liver.  He underwent drainage of liver fluid collection by IR on 10/28/2024.  Culture thus far positive for rare growth Klebsiella pneumoniae.    WBC has normalized  Sodium improved    Blood sugar still quite elevated. Received 14 units correctional factor insulin yesterday. Will increase basal/bolus insulin accordingly    Await surgery follow-up recommendations.  Evidently no ERCP planned for this admission.  He will need cholecystectomy timing of which has not been determined.  RUQ drain remains in place.  Remains on Rocephin per ID with plans to transition to oral antibiotic at discharge.      Continue outpatient follow-up regarding acoustic neuroma.  MRI here showed possible vestibular schwannoma that will need monitoring.  Heparin SC for DVT prophylaxis.  Full code.  Discussed with patient.  Anticipate discharge to SNU facility per PT recommendation.  Will need precert.  Expected discharge date/ time has not been documented.      Steven Rivera MD  [facility]  24  07:31 EST    Electronically signed by Steven Rivera MD at 24 0914       Connor Crandall MD at 24 1423              [facility] Progress Note      Patient Name: Donnie Bishop  : 1975  MRN: [id number]  Primary Care Physician:  Juju Kennedy MD  Date of admission: 10/22/2024    Subjective     Interval History:   F/u TENISHA    Review of Systems:   UOP 1800  Less orthopnea  BP 90/70    Objective     Vitals:   Temp:  [97.7 °F (36.5 °C)-98.7 °F (37.1 °C)] 98.2 °F (36.8 °C)  Heart Rate:  [88-94] 94  Resp:  [18-27] 27  BP: ()/(60-81) 110/70    Intake/Output Summary (Last 24 hours) at 2024 1423  Last data filed at 2024 0600  Gross per 24 hour   Intake 120 ml   Output 1810 ml   Net -1690 ml       Physical Exam:    General Appearance: frail comfortable AAM on RA   Neck: supple, no JVD  Lungs: CTA bilat no rales  Heart: RRR, normal S1 and S2  Abdomen: soft, nontender, nondistended  Extremities: no edema, cyanosis or clubbing       Scheduled Meds:     carvedilol, 6.25 mg, Oral, Q12H  cefTRIAXone, 2,000 mg, Intravenous, Q24H  heparin (porcine), 5,000 Units, Subcutaneous, Q8H  insulin glargine, 15 Units, Subcutaneous, Nightly  insulin lispro, 2-9 Units, Subcutaneous, 4x Daily AC & at Bedtime  insulin lispro, 5 Units, Subcutaneous, TID With Meals  mupirocin, 1 Application, Each Nare, BID  Naloxegol Oxalate, 25 mg, Oral, QAM  pantoprazole, 40 mg, Oral, BID AC  senna-docusate sodium, 2 tablet, Oral, BID   And  polyethylene glycol, 17 g, Oral, BID  venlafaxine XR, 75 mg, Oral, Daily      IV Meds:        Results Reviewed:   I have personally reviewed the results from the time of this admission to 2024 14:23 EDT     Results from last 7 days   Lab Units 24  0616 24  0635 10/31/24  0542   SODIUM mmol/L 130* 134* 135*   POTASSIUM mmol/L 4.2 3.7 4.1   CHLORIDE mmol/L 92* 97* 100   CO2 mmol/L 26.0 27.0 25.6   BUN mg/dL 18 18 17    CREATININE mg/dL 0.97 0.99 1.04   CALCIUM mg/dL 8.6 8.7 8.9   BILIRUBIN mg/dL 0.4 0.5 0.8   ALK PHOS U/L 129* 130* 138*   ALT (SGPT) U/L 40 53* 68*   AST (SGOT) U/L 18 28 40   GLUCOSE mg/dL 233* 126* 155*     Estimated Creatinine Clearance: 101.1 mL/min (by C-G formula based on SCr of 0.97 mg/dL).  Results from last 7 days   Lab Units 11/02/24  0616 11/01/24  0635 10/31/24  0542   MAGNESIUM mg/dL 1.8 1.7 1.5*   PHOSPHORUS mg/dL 3.0 3.5 2.6     Results from last 7 days   Lab Units 10/30/24  0639   URIC ACID mg/dL 3.4     Results from last 7 days   Lab Units 11/02/24  0616 11/01/24  0635 10/31/24  0542 10/30/24  0639 10/29/24  0652   WBC 10*3/mm3 10.84* 11.82* 11.35* 14.38* 15.53*   HEMOGLOBIN g/dL 10.5* 10.7* 11.1* 10.6* 11.6*   PLATELETS 10*3/mm3 536* 471* 392 364 370     Results from last 7 days   Lab Units 10/28/24  1959 10/28/24  1129   INR  1.13* 1.0       Assessment / Plan     ASSESSMENT:  Acute kidney injury.  Multifactorial including hypovolemia, hypotension, impaired renal autoregulation due to Jardiance, Entresto, Aldactone.  Resolved.  Cr ~ 1.  Appears fairly euvolemic but hyponatremia worse, Na down to 130 despite giving lasix yesterday.  He is on effexor (can cause SIADH)  Cholecystitis with large right perihepatic fluid collection.  Status post IR drainage with drain left in 10/28/2024.  On Rocephin per ID.  Transaminitis improving.  Off statin.  MRCP noted.  White blood cell count falling.  Heart failure reduced ejection fraction.  EF 49%.  (Improved from 35% a year ago).  Anemia.  Hemoglobin stable 10.5  Encephalopathy.  Improving.  Diabetes mellitus type 2.  HTN - SBP 90s to low 100s on low dose coreg     PLAN:  Fluid restrict 1500 cc/day  Check Jey/osm, uric acid, AM cortisol  Hold diuretic today       Connor Crandall MD  11/02/24  14:23 EDT    [facility]\""  [phone]    Electronically signed by Connor Crandall MD at 11/02/24 [address] [place] [address],  Son Magaña MD at 11/02/24 1200          ID note for liver abscess  S: feels okay. No sig pain. AF. Tolearting abx    Temp:  [97.7 °F (36.5 °C)-98.7 °F (37.1 °C)] 98.2 °F (36.8 °C)  Heart Rate:  [88-94] 94  Resp:  [18-27] 27  BP: ()/(60-81) 110/70  GENERAL: Awake and alert, in no acute distress.   HEENT: Oropharynx is clear. Hearing is grossly normal.   EYES: . No conjunctival injection. No lid lag.   LUNGS:normal respiratory effort.   SKIN: no cutaneous eruptions in exposed areas  PSYCHIATRIC: Appropriate mood, affect, insight, and judgment.     Cr 0.97  WBC 10.8  Glc 125-257    Microbiology:  10/23 BCx: negative  10/25 RPP: negative  10/28 Liver Abscess Cx: rare growth Kleb pneumo (susc all abx tested exc ampicillin)  10/28 BCx: NGTD      ASSESSMENT/PLAN:  Liver abscess due to Klebsiella  ?  Biloma  ?  Cholecystitis  Uncontrolled DM2 -last A1c 9.4%      For liver abscess due to Klebsiella, cont with ceftriaxone 2 g IV every 24 hours with duration TBD.    Plan dc on po abx when closer to dc  No ercp necessary per GI but will need cholecystectomy eventually per gen surg  ID will follow        Electronically signed by Son Moreira MD at 11/02/24 1202       Jarrod Prado MD at 11/02/24 1058          Chief complaint: Follow-up cholecystitis, subcapsular biloma    Subjective: Overall feels better today.  Had some pain yesterday but was able to tolerate a bit of diet.  Drain output only about 10 cc bilious material.    Review of systems:  Constitutional: Denies fever, chills, change in weight  Respiratory: Denies cough or wheezing    Physical exam:  Afebrile with stable vitals  General: Awake and alert, no distress  Head: Normocephalic, atraumatic  Eyes: Extraocular movements intact, no icterus  Neck: Supple, trachea midline  Respiratory: No use of accessory muscles, good bilateral chest expansion  Gastrointestinal:, Mild right upper quadrant tenderness, some tenderness around his drain  site  Extremities: No peripheral edema, no deformity  Skin: Warm and dry    White blood cell count improving, 10.8 today, hemoglobin stable.  Chemistry is unremarkable.    Assessment and plan:  -Cholecystitis and infected biloma, status post drain placement  -Drain output fairly low and on most recent imaging collections improving  -Will eventually need cholecystectomy  -Monitor drain output    Jarrod Prado MD  General and Endoscopic Surgery  [facility]    [address], [address]  [place], [place], [address]  P: [phone]  F: [phone]        Electronically signed by Jarrod Prado MD at 24 1100       Steven Rivera MD at 24 0819              Name: Donnie Bishop ADMIT: 10/22/2024   : 1975  PCP: Juju Kennedy MD    MRN: [id number] LOS: 9 days   AGE/SEX: 49 y.o. male  ROOM: [facility]     Subjective   Subjective   Seems to be feeling better in general.    Objective   Objective   Vital Signs  Temp:  [97.7 °F (36.5 °C)-98.7 °F (37.1 °C)] 98.2 °F (36.8 °C)  Heart Rate:  [88-94] 94  Resp:  [18-27] 27  BP: ()/(60-81) 110/70  SpO2:  [93 %-97 %] 96 %  on   ;   Device (Oxygen Therapy): room air  Body mass index is 26.26 kg/m².  Physical Exam  General: not in distress, ill-appearing,  CV: Regular rate and rhythm, no murmurs rubs or gallops  Lungs: Diminished at bases, no wheezing, no crackles, right upper quadrant drain in place.  Abdomen: Soft, distended, tenderness to palpation  Extremities: Trace edema in lower extremities,, no cyanosis     Results Review     I reviewed the patient's new clinical results.  Results from last 7 days   Lab Units 24  0616 24  0635 10/31/24  0542 10/30/24  0639   WBC 10*3/mm3 10.84* 11.82* 11.35* 14.38*   HEMOGLOBIN g/dL 10.5* 10.7* 11.1* 10.6*   PLATELETS 10*3/mm3 536* 471* 392 364     Results from last 7 days   Lab Units 24  0616 24  0635 10/31/24  0542 10/30/24  2148 10/30/24  0639 10/29/24  0652 10/28/24  2057  10/28/24  1959   SODIUM mmol/L 130* 134* 135*  --  135* 136 143 141   POTASSIUM mmol/L 4.2 3.7 4.1 4.0 3.1* 3.9 4.5 4.3   CHLORIDE mmol/L 92* 97* 100  --  102 107 110* 108*   CO2 mmol/L 26.0 27.0 25.6  --  21.4* 16.6* 16.0* 16.6*   BUN mg/dL 18 18 17  --  23* 30* 35* 35*   CREATININE mg/dL 0.97 0.99 1.04  --  0.78 1.14 1.06 1.08   GLUCOSE mg/dL 233* 126* 155*  --  122* 176* 159* 161*   EGFR mL/min/1.73 96.9 94.6 89.1  --  110.7 79.8 87.1 85.2   ALBUMIN g/dL 2.8* 2.7* 2.2*  --  2.2* 1.7* 2.0* 3.6   BILIRUBIN mg/dL 0.4 0.5 0.8  --  0.8 0.7 0.7 0.7   ALK PHOS U/L 129* 130* 138*  --  149* 189* 222* 229*   AST (SGOT) U/L 18 28 40  --  55* 154* 199* 229*   ALT (SGPT) U/L 40 53* 68*  --  83* 135* 159* 164*     Results from last 7 days   Lab Units 11/02/24  0616 11/01/24  0635 10/31/24  0542 10/30/24  1719 10/30/24  0639   CALCIUM mg/dL 8.6 8.7 8.9  --  9.0   ALBUMIN g/dL 2.8* 2.7* 2.2*  --  2.2*   MAGNESIUM mg/dL 1.8 1.7 1.5*  --  1.7   PHOSPHORUS mg/dL 3.0 3.5 2.6 2.3* 2.1*     Glucose   Date/Time Value Ref Range Status   11/02/2024 1141 239 (H) 70 - 130 mg/dL Final   11/02/2024 0758 257 (H) 70 - 130 mg/dL Final   11/02/2024 0627 250 (H) 70 - 130 mg/dL Final   11/01/2024 2013 219 (H) 70 - 130 mg/dL Final   11/01/2024 1839 252 (H) 70 - 130 mg/dL Final   11/01/2024 1124 224 (H) 70 - 130 mg/dL Final   11/01/2024 0645 125 70 - 130 mg/dL Final       MRI abdomen w wo contrast mrcp  MRI AND MRCP ABDOMEN WITH AND WITHOUT CONTRAST     HISTORY: Upper abdominal pain for a week, HIDA scan in late October  concerning for chronic cholecystitis with subsequent CT abdomen and  pelvis showing large right subcapsular hepatic fluid collection status  post percutaneous drainage demonstrating bilious output growing  Klebsiella     TECHNIQUE: Multiplanar multisequence MRI an MRCP of the abdomen was  performed before and after the IV administration of MultiHance.     COMPARISON: Multiple CT abdomen pelvis dating back to 8/31/2024 and HIDA  scan  10/25/2024     FINDINGS: Please note evaluation significantly suboptimal, particularly  the pre and postcontrast weighted T1 fat-sat imaging.     Asymmetric pulmonary consolidation with right pleural effusion is only  partially seen and cannot be evaluated. Ill-defined T2 hypointensity is  present within the gallbladder which has a thickened appearance,  hyperenhancing appearance with adjacent stranding which contacts  portions of the distal stomach and duodenum as well. Loculated appearing  collection contiguous with the duodenum inferior to the gallbladder  fossa measures up to 2.5 x 1.8 x 3.1 cm (previously 3 x 3.4 x 3.4 cm on  10/26/2024).. No intra or extrahepatic bili duct dilation is present.  Main pancreatic duct is not distended.     There is a nonenhancing T2 hyperintense lesion within the inferior  hepatic lobe measuring 1.6 cm, new since 8/31/2024. There is a small  amount of fluid along the superior and anterior aspects of the right  hepatic lobe corresponding with the previously seen subcapsular  collection on 10/26/2024. It is overall significantly decreased in size,  measuring up to approximately 0.7 cm in thickness and coronal imaging  (previously 7.3 cm on 10/26/2024). Presumed drainage catheter is  visualized over the right aspect of the abdominal wall; however, is not  well evaluated on MRI. Somewhat nodular thickening of the bilateral  adrenal glands with overall preservation adreniform shape, as seen since  12/17/2023.     IMPRESSION  1.  Significant interval decrease in size of the previously seen  subcapsular collection status post percutaneous drainage placement.  Please note evaluation for current location of the drain is suboptimal  on MRI. A second loculated appearing collection contiguous with the  duodenum and inferior to the gallbladder fossa also appears to have  mildly decreased in size since 10/26/2024. Given findings on recent  fluid sampling, primary differential considerations  include abscess  and/or biloma and can be further evaluated with HIDA scan if clinically  indicated.  2.  The gallbladder has an inflamed appearance with T2 hyperintense  sludge and/or small stones. The gallbladder is otherwise nondistended.  No intra or extrahepatic bili ductal dilation with common bile duct  grossly similar in size that seen on 8/31/2024. Inflammation contacts  portions of the distal stomach and duodenum and differential  considerations include cholecystitis, gastritis and/or duodenitis.  Correlation with patient history is recommended with follow-up HIDA scan  and/or endoscopy if clinically indicated.  3.  Asymmetric pulmonary consolidation right pleural effusion is only  partially seen and cannot be evaluated. Pneumonia cannot be excluded in  the appropriate context and correlation with patient history is  recommend follow-up chest CT if clinically indicated. At least continued  attention on follow-up is recommended to ensure resolution  4.  Nonenhancing T2 hyperintense lesion within the inferior right  hepatic lobe which is new since 8/31/2024, nonspecific and may represent  local extension of biloma and/or infected fluid in the liver. Continued  close interval follow-up is recommended to ensure resolution and/or  stability.  5.  Other findings as above.        This report was finalized on 10/31/2024 9:09 AM by Dr. Michael Avelar M.D on Workstation: [id number]       Scheduled Medications  carvedilol, 6.25 mg, Oral, Q12H  cefTRIAXone, 2,000 mg, Intravenous, Q24H  heparin (porcine), 5,000 Units, Subcutaneous, Q8H  insulin glargine, 15 Units, Subcutaneous, Nightly  insulin lispro, 2-9 Units, Subcutaneous, 4x Daily AC & at Bedtime  insulin lispro, 5 Units, Subcutaneous, TID With Meals  mupirocin, 1 Application, Each Nare, BID  Naloxegol Oxalate, 25 mg, Oral, QAM  pantoprazole, 40 mg, Oral, BID AC  senna-docusate sodium, 2 tablet, Oral, BID   And  polyethylene glycol, 17 g, Oral, BID  venlafaxine  XR, 75 mg, Oral, Daily    Infusions     Diet  Diet: Diabetic; Consistent Carbohydrate; Fluid Consistency: Thin (IDDSI 0)    I have personally reviewed     [x]  Laboratory   [x]  Microbiology   [x]  Radiology   [x]  EKG/Telemetry  []  Cardiology/Vascular   []  Pathology    []  Records      Assessment/Plan     Active Hospital Problems    Diagnosis  POA    **Epigastric abdominal pain [R10.13]  Yes    Abscess of liver [K75.0]  Yes    Klebsiella pneumoniae (k. pneumoniae) as the cause of diseases classified elsewhere [B96.1]  Yes    Hypotension [I95.9]  Yes    Transaminitis [R74.01]  Yes    Leukocytosis [D72.829]  Yes    Chronic combined systolic and diastolic heart failure [I50.42]  Yes    Anxiety [F41.9]  Yes    Multiple-type hyperlipidemia [E78.2]  Yes    Type 2 diabetes mellitus with diabetic neuropathy, with long-term current use of insulin [E11.40, Z79.4]  Not Applicable    Benign essential HTN [I10]  Yes      Resolved Hospital Problems    Diagnosis Date Resolved POA    Acute kidney injury [N17.9] 10/30/2024 No       49 y.o. man with diabetes, HTN, hyperlipidemia, chronic systolic and diastolic heart failure (non-ischemic) and anxiety who presented with severe abdominal/epigastric pain.  EGD was negative.  CT scan demonstrated a large right subcapsular fluid collection with mass effect on liver.  He underwent drainage of liver fluid collection by IR on 10/28/2024.  Culture thus far positive for rare growth Klebsiella pneumoniae.    Sodium low, renal following  BS elevated will increase insulin  No ERCP planned this admission  Cholecystectomy planned, timing of which unclear  Antibiotics per ID  Drain remains in place      Continue outpatient follow-up regarding acoustic neuroma.  MRI here showed possible vestibular schwannoma that will need monitoring.  Heparin SC for DVT prophylaxis.  Full code.  Discussed with patient.  Anticipate discharge to SNU facility per PT recommendation.  Will need precert.  Expected  discharge date/ time has not been documented.      Steven Rivera MD  [facility]  11/02/24  12:36 EDT    Electronically signed by Steven Rivera MD at 11/02/24 1236          Consult Notes (last 72 hours)        Evie Ken MD at 11/01/24 1905        Consult Orders    1. Inpatient Gastroenterology Consult [[id number]] ordered by Manuela Wisdom MD at 10/31/24 1279                 GI CONSULT  NOTE:    Referring Provider: Dr. Wisdom    Chief complaint: Abdominal pain    Subjective .     History of present illness:    49 years old male, who got admitted because of significant right upper quadrant pain which progressively gotten worse associated with the nausea vomiting initially came into the emergency room with a white cell count 13,000, CT abdominal and pelvis showed a significant gastric wall thickening involving the antrum and adjacent fat area, did undergo upper endoscopy lamination by Dr. Lauren Brylee on October 24 with unremarkable examination, he had a HIDA scan performed at that time which showed low ejection fraction of 19% possible: Cholecystitis, later CT demonstrated large right subcapsular fluid collection with a mass effect on the liver underwent CT-guided drain placement on October 28 with a thick turbid bilious material was drained initially he had a significant drainage close to 1500 cc which progressively decreased have grown Klebsiella.  We have been consulted for possible ERCP.    Patient was seen yesterday as evening as well when he was complaining significant discomfort and pain however today his pain is better less nauseous tolerating p.o.  He has been having a bowel movement.  We did review his labs as well as imaging along with the MRCP.,  His liver function test did peak to  and ALT to 164 bilirubin 2.6 which is now normalized.  Alkaline phosphatase more than 222 which is now slowly improving    Endo History:  October 24, 2024.  EGD was unremarkable    Past  Medical History:  Past Medical History:   Diagnosis Date    Anxiety     CHF (congestive heart failure)     Cough     Inability to attain erection     Injury of acoustic nerve     Myalgia     Type II diabetes mellitus     Venous insufficiency     Viral illness     Vitamin D deficiency        Past Surgical History:  Past Surgical History:   Procedure Laterality Date    CARDIAC CATHETERIZATION N/A 9/13/2023    Procedure: Left Heart Cath;  Surgeon: Kaylee Kay MD;  Location: [facility] CATH INVASIVE LOCATION;  Service: Cardiology;  Laterality: N/A;    CARDIAC CATHETERIZATION N/A 9/13/2023    Procedure: Coronary angiography;  Surgeon: Kaylee Kay MD;  Location: [facility] CATH INVASIVE LOCATION;  Service: Cardiology;  Laterality: N/A;    ENDOSCOPY N/A 10/24/2024    Procedure: ESOPHAGOGASTRODUODENOSCOPY with biopsies;  Surgeon: Elma Wiggins MD;  Location: [facility] ENDOSCOPY;  Service: Gastroenterology;  Laterality: N/A;  pre-abdominal pain  post-normal       Social History:  Social History     Tobacco Use    Smoking status: Never    Smokeless tobacco: Never   Vaping Use    Vaping status: Never Used   Substance Use Topics    Alcohol use: Never    Drug use: Never       Family History:  Family History   Problem Relation Age of Onset    Diabetes Mother        Medications:  Medications Prior to Admission   Medication Sig Dispense Refill Last Dose/Taking    melatonin 5 MG tablet tablet Take 1 tablet by mouth At Night As Needed (insomnia). 90 tablet 1 10/22/2024    ondansetron (ZOFRAN) 8 MG tablet Take 1 tablet by mouth Every 8 (Eight) Hours As Needed for Nausea or Vomiting. 8 tablet 0 10/22/2024    spironolactone (ALDACTONE) 25 MG tablet TAKE 1 TABLET BY MOUTH EVERY DAY 90 tablet 1 10/22/2024    venlafaxine XR (EFFEXOR-XR) 150 MG 24 hr capsule Take 1 capsule by mouth Daily. (Patient taking differently: Take 75 mg by mouth Daily.) 90 capsule 1 10/22/2024    vitamin D (ERGOCALCIFEROL) 1.25 MG (33256 UT) capsule capsule Take 1  capsule by mouth 1 (One) Time Per Week.   10/22/2024    atorvastatin (LIPITOR) 40 MG tablet Take 1 tablet by mouth Daily. 90 tablet 3 Morning    carvedilol (COREG) 25 MG tablet Take 1 tablet by mouth Every 12 (Twelve) Hours. 180 tablet 1     empagliflozin (JARDIANCE) 10 MG tablet tablet Take 1 tablet by mouth Daily. 90 tablet 1     hydrOXYzine (ATARAX) 10 MG tablet TAKE 1 TABLET BY MOUTH EVERY 8 HOURS AS NEEDED FOR ANXIETY. 270 tablet 1     metFORMIN (GLUCOPHAGE) 1000 MG tablet Take 1 tablet by mouth 2 (Two) Times a Day With Meals. 180 tablet 3 Morning    NovoLOG FlexPen 100 UNIT/ML solution pen-injector sc pen INJECT 5 UNITS INTO THE SKIN 3 (THREE) TIMES DAILY WITH MEALS PLUS UP TO 15 UNITS SLIDING SCALE.   Morning    sacubitril-valsartan (ENTRESTO) 24-26 MG tablet Take 1 tablet by mouth 2 (Two) Times a Day. 180 tablet 1        Scheduled Meds:carvedilol, 6.25 mg, Oral, Q12H  cefTRIAXone, 2,000 mg, Intravenous, Q24H  heparin (porcine), 5,000 Units, Subcutaneous, Q8H  insulin glargine, 10 Units, Subcutaneous, Nightly  insulin lispro, 2-7 Units, Subcutaneous, 4x Daily AC & at Bedtime  insulin lispro, 4 Units, Subcutaneous, TID With Meals  mupirocin, 1 Application, Each Nare, BID  Naloxegol Oxalate, 25 mg, Oral, QAM  pantoprazole, 40 mg, Oral, BID AC  senna-docusate sodium, 2 tablet, Oral, BID   And  polyethylene glycol, 17 g, Oral, BID  venlafaxine XR, 75 mg, Oral, Daily      Continuous Infusions:   PRN Meds:.  senna-docusate sodium **AND** polyethylene glycol **AND** bisacodyl **AND** bisacodyl    Calcium Replacement - Follow Nurse / BPA Driven Protocol    dextrose    dextrose    glucagon (human recombinant)    hydrOXYzine    influenza vaccine    melatonin    [DISCONTINUED] Morphine **AND** naloxone    ondansetron ODT **OR** ondansetron    oxyCODONE    Phosphorus Replacement - Follow Nurse / BPA Driven Protocol    Potassium Replacement - Follow Nurse / BPA Driven Protocol    sodium chloride    ALLERGIES:  Patient has  no known allergies.    ROS:  Review of Systems  As mentioned above in H&P  Objective     Vital Signs:   Temp:  [97.7 °F (36.5 °C)-98.4 °F (36.9 °C)] 97.7 °F (36.5 °C)  Heart Rate:  [92-98] 97  Resp:  [18] 18  BP: (100-119)/(56-81) 110/81    Physical Exam:      General Appearance:    Awake and alert, in no acute distress   Head:    Normocephalic, without obvious abnormality, atraumatic   Eyes:            Conjunctivae normal, anicteric sclera   Ears:    Ears appear intact with no abnormalities noted   Throat:   No oral lesions, no thrush, oral mucosa moist   Neck:   No adenopathy, supple, no thyromegaly, no JVD   Lungs:     Clear to auscultation bilaterally, respirations regular, even and unlabored    Heart:    Regular rhythm and normal rate, normal S1 and S2, no            murmur, no gallop, no rub   Chest Wall:    No abnormalities observed   Abdomen:   Significantly tender right upper quadrant area with a CARLA drain noticed with the purulent turbid colored material.   Rectal:     Deferred   Extremities:   Moves all extremities well, no edema, no cyanosis, no             redness   Pulses:   Pulses palpable and equal bilaterally   Skin:   No bleeding, bruising or rash, no jaundice   Lymph nodes:   No palpable adenopathy   Neurologic:   Cranial nerves 2 - 12 grossly intact, no asterixis, sensation intact       Results Review:   I reviewed the patient's labs and imaging.  CBC  Results from last 7 days   Lab Units 11/01/24 0635 10/31/24  0542 10/30/24  0639 10/29/24  0652 10/28/24  1959 10/27/24  2310 10/27/24  0329   RBC 10*6/mm3 3.59* 3.73* 3.64* 3.99* 4.21 4.02* 4.25   WBC 10*3/mm3 11.82* 11.35* 14.38* 15.53* 16.23* 14.14* 10.64   HEMOGLOBIN g/dL 10.7* 11.1* 10.6* 11.6* 12.3* 12.0* 12.7*   PLATELETS 10*3/mm3 471* 392 364 370 382 349 352       CMP  Results from last 7 days   Lab Units 11/01/24  0635 10/31/24  0542 10/30/24  2148 10/30/24  0639 10/29/24  0652 10/29/24  0036 10/28/24  2059 10/28/24  1959 10/27/24  2205    SODIUM mmol/L 134* 135*  --  135* 136  --  143 141 137   POTASSIUM mmol/L 3.7 4.1 4.0 3.1* 3.9  --  4.5 4.3 3.9   CHLORIDE mmol/L 97* 100  --  102 107  --  110* 108* 106   CO2 mmol/L 27.0 25.6  --  21.4* 16.6*  --  16.0* 16.6* 19.0*   BUN mg/dL 18 17  --  23* 30*  --  35* 35* 28*   CREATININE mg/dL 0.99 1.04  --  0.78 1.14  --  1.06 1.08 0.93   GLUCOSE mg/dL 126* 155*  --  122* 176*  --  159* 161* 177*   ALBUMIN g/dL 2.7* 2.2*  --  2.2* 1.7*  --  2.0* 3.6 2.5*   BILIRUBIN mg/dL 0.5 0.8  --  0.8 0.7  --  0.7 0.7 0.7   ALK PHOS U/L 130* 138*  --  149* 189*  --  222* 229* 217*   AST (SGOT) U/L 28 40  --  55* 154*  --  199* 229* 121*   ALT (SGPT) U/L 53* 68*  --  83* 135*  --  159* 164* 121*   AMMONIA umol/L  --   --   --   --   --  17  --   --   --        Amylase and Lipase  Results from last 7 days   Lab Units 10/27/24  0329   LIPASE U/L 11*       CRP     Results from last 7 days   Lab Units 11/01/24  0635   CRP mg/dL 7.02*       Imaging Results (Last 24 Hours)       ** No results found for the last 24 hours. **               ASSESSMENT:  49 years old male who has a multiple medical problem including CHF type 2 diabetes got admitted with a epigastric upper abdominal pain with negative EGD later CTA shown large right subcapsular fluid collection status post of drain placement by IR.  Noticed to have abnormal liver function test which are progressively improving.    Suspect patient likely have acute on chronic cholecystitis with subcapsular fluid collection likely related to gallbladder etiology.  Doubt he has a bile leak, it will be relatively unusual to have a bile duct injury without any intervention, Other possibly could be a liver abscess. EGD was unrevealing and without any obvious duodenal injury or trauma.  Clinically he is improving and his drain is also progressively decreasing with initial drain was almost close to 1500 now it is down to 30 cc.  His abdominal pain has also improved.    Acute on chronic  cholecystitis  Nonischemic cardiomyopathy, diabetes, combined diastolic systolic heart failure  Principal Problem:    Epigastric abdominal pain  Active Problems:    Multiple-type hyperlipidemia    Type 2 diabetes mellitus with diabetic neuropathy, with long-term current use of insulin    Benign essential HTN    Anxiety    Chronic combined systolic and diastolic heart failure    Leukocytosis    Abscess of liver    Klebsiella pneumoniae (k. pneumoniae) as the cause of diseases classified elsewhere    Hypotension    Transaminitis       PLAN:  At this point given significant decrease in drainage as well as no significant bilious looking drain and LFTs are progressively improving there is no immediate need for ERCP.  Eventually he will need cholecystectomy with IOC.  Continue with the antibiotic as per infectious disease.  We will be available if needed.    I discussed the patients findings and my recommendations with the patient.  Evie Ken MD  11/01/24  19:05 EDT              Electronically signed by Evie Ken MD at 11/01/24 [id number]

## 2024-11-05 ENCOUNTER — APPOINTMENT (OUTPATIENT)
Dept: INTERVENTIONAL RADIOLOGY/VASCULAR | Facility: HOSPITAL | Age: 49
DRG: 853 | End: 2024-11-05
Payer: COMMERCIAL

## 2024-11-05 LAB
ALBUMIN SERPL-MCNC: 2.9 G/DL (ref 3.5–5.2)
ALBUMIN/GLOB SERPL: 0.9 G/DL
ALP SERPL-CCNC: 113 U/L (ref 39–117)
ALT SERPL W P-5'-P-CCNC: 23 U/L (ref 1–41)
ANION GAP SERPL CALCULATED.3IONS-SCNC: 9.7 MMOL/L (ref 5–15)
AST SERPL-CCNC: 15 U/L (ref 1–40)
BILIRUB SERPL-MCNC: 0.3 MG/DL (ref 0–1.2)
BUN SERPL-MCNC: 11 MG/DL (ref 6–20)
BUN/CREAT SERPL: 12.2 (ref 7–25)
CALCIUM SPEC-SCNC: 9 MG/DL (ref 8.6–10.5)
CHLORIDE SERPL-SCNC: 96 MMOL/L (ref 98–107)
CO2 SERPL-SCNC: 26.3 MMOL/L (ref 22–29)
CREAT SERPL-MCNC: 0.9 MG/DL (ref 0.76–1.27)
DEPRECATED RDW RBC AUTO: 43.3 FL (ref 37–54)
EGFRCR SERPLBLD CKD-EPI 2021: 106 ML/MIN/1.73
ERYTHROCYTE [DISTWIDTH] IN BLOOD BY AUTOMATED COUNT: 12.7 % (ref 12.3–15.4)
GLOBULIN UR ELPH-MCNC: 3.4 GM/DL
GLUCOSE BLDC GLUCOMTR-MCNC: 245 MG/DL (ref 70–130)
GLUCOSE BLDC GLUCOMTR-MCNC: 276 MG/DL (ref 70–130)
GLUCOSE BLDC GLUCOMTR-MCNC: 419 MG/DL (ref 70–130)
GLUCOSE SERPL-MCNC: 282 MG/DL (ref 65–99)
HCT VFR BLD AUTO: 31.9 % (ref 37.5–51)
HGB BLD-MCNC: 9.9 G/DL (ref 13–17.7)
MAGNESIUM SERPL-MCNC: 1.7 MG/DL (ref 1.6–2.6)
MCH RBC QN AUTO: 29.4 PG (ref 26.6–33)
MCHC RBC AUTO-ENTMCNC: 31 G/DL (ref 31.5–35.7)
MCV RBC AUTO: 94.7 FL (ref 79–97)
PHOSPHATE SERPL-MCNC: 3.1 MG/DL (ref 2.5–4.5)
PLATELET # BLD AUTO: 677 10*3/MM3 (ref 140–450)
PMV BLD AUTO: 10 FL (ref 6–12)
POTASSIUM SERPL-SCNC: 4.9 MMOL/L (ref 3.5–5.2)
PROT SERPL-MCNC: 6.3 G/DL (ref 6–8.5)
RBC # BLD AUTO: 3.37 10*6/MM3 (ref 4.14–5.8)
SODIUM SERPL-SCNC: 132 MMOL/L (ref 136–145)
WBC NRBC COR # BLD AUTO: 7.72 10*3/MM3 (ref 3.4–10.8)

## 2024-11-05 PROCEDURE — 85027 COMPLETE CBC AUTOMATED: CPT | Performed by: INTERNAL MEDICINE

## 2024-11-05 PROCEDURE — C1729 CATH, DRAINAGE: HCPCS

## 2024-11-05 PROCEDURE — 99153 MOD SED SAME PHYS/QHP EA: CPT

## 2024-11-05 PROCEDURE — 99152 MOD SED SAME PHYS/QHP 5/>YRS: CPT

## 2024-11-05 PROCEDURE — 25510000001 IOPAMIDOL PER 1 ML: Performed by: INTERNAL MEDICINE

## 2024-11-05 PROCEDURE — 25010000002 CEFTRIAXONE PER 250 MG: Performed by: HOSPITALIST

## 2024-11-05 PROCEDURE — 99231 SBSQ HOSP IP/OBS SF/LOW 25: CPT | Performed by: STUDENT IN AN ORGANIZED HEALTH CARE EDUCATION/TRAINING PROGRAM

## 2024-11-05 PROCEDURE — 63710000001 INSULIN GLARGINE PER 5 UNITS: Performed by: INTERNAL MEDICINE

## 2024-11-05 PROCEDURE — 63710000001 INSULIN LISPRO (HUMAN) PER 5 UNITS: Performed by: INTERNAL MEDICINE

## 2024-11-05 PROCEDURE — 84100 ASSAY OF PHOSPHORUS: CPT | Performed by: INTERNAL MEDICINE

## 2024-11-05 PROCEDURE — 25010000002 MIDAZOLAM PER 1 MG: Performed by: RADIOLOGY

## 2024-11-05 PROCEDURE — 82948 REAGENT STRIP/BLOOD GLUCOSE: CPT

## 2024-11-05 PROCEDURE — 25010000002 HEPARIN (PORCINE) PER 1000 UNITS: Performed by: STUDENT IN AN ORGANIZED HEALTH CARE EDUCATION/TRAINING PROGRAM

## 2024-11-05 PROCEDURE — 0F9030Z DRAINAGE OF LIVER WITH DRAINAGE DEVICE, PERCUTANEOUS APPROACH: ICD-10-PCS | Performed by: RADIOLOGY

## 2024-11-05 PROCEDURE — 97530 THERAPEUTIC ACTIVITIES: CPT

## 2024-11-05 PROCEDURE — 83735 ASSAY OF MAGNESIUM: CPT | Performed by: INTERNAL MEDICINE

## 2024-11-05 PROCEDURE — 49424 ASSESS CYST CONTRAST INJECT: CPT

## 2024-11-05 PROCEDURE — 80053 COMPREHEN METABOLIC PANEL: CPT | Performed by: INTERNAL MEDICINE

## 2024-11-05 PROCEDURE — 99232 SBSQ HOSP IP/OBS MODERATE 35: CPT | Performed by: STUDENT IN AN ORGANIZED HEALTH CARE EDUCATION/TRAINING PROGRAM

## 2024-11-05 PROCEDURE — 25010000002 FENTANYL CITRATE (PF) 50 MCG/ML SOLUTION: Performed by: RADIOLOGY

## 2024-11-05 PROCEDURE — 25010000002 LIDOCAINE PF 1% 1 % SOLUTION: Performed by: RADIOLOGY

## 2024-11-05 PROCEDURE — 75894 X-RAYS TRANSCATH THERAPY: CPT

## 2024-11-05 PROCEDURE — 75984 XRAY CONTROL CATHETER CHANGE: CPT

## 2024-11-05 PROCEDURE — C1769 GUIDE WIRE: HCPCS

## 2024-11-05 RX ORDER — SIMETHICONE 80 MG
80 TABLET,CHEWABLE ORAL 4 TIMES DAILY PRN
Status: DISCONTINUED | OUTPATIENT
Start: 2024-11-05 | End: 2024-11-14 | Stop reason: HOSPADM

## 2024-11-05 RX ORDER — LIDOCAINE HYDROCHLORIDE 10 MG/ML
INJECTION, SOLUTION EPIDURAL; INFILTRATION; INTRACAUDAL; PERINEURAL AS NEEDED
Status: COMPLETED | OUTPATIENT
Start: 2024-11-05 | End: 2024-11-05

## 2024-11-05 RX ORDER — LIDOCAINE HYDROCHLORIDE 20 MG/ML
JELLY TOPICAL AS NEEDED
Status: COMPLETED | OUTPATIENT
Start: 2024-11-05 | End: 2024-11-05

## 2024-11-05 RX ORDER — MIDAZOLAM HYDROCHLORIDE 1 MG/ML
INJECTION, SOLUTION INTRAMUSCULAR; INTRAVENOUS AS NEEDED
Status: COMPLETED | OUTPATIENT
Start: 2024-11-05 | End: 2024-11-05

## 2024-11-05 RX ORDER — INSULIN LISPRO 100 [IU]/ML
2-7 INJECTION, SOLUTION INTRAVENOUS; SUBCUTANEOUS
Status: DISCONTINUED | OUTPATIENT
Start: 2024-11-05 | End: 2024-11-14 | Stop reason: HOSPADM

## 2024-11-05 RX ORDER — IOPAMIDOL 510 MG/ML
100 INJECTION, SOLUTION INTRAVASCULAR
Status: COMPLETED | OUTPATIENT
Start: 2024-11-05 | End: 2024-11-05

## 2024-11-05 RX ORDER — FENTANYL CITRATE 50 UG/ML
INJECTION, SOLUTION INTRAMUSCULAR; INTRAVENOUS AS NEEDED
Status: COMPLETED | OUTPATIENT
Start: 2024-11-05 | End: 2024-11-05

## 2024-11-05 RX ORDER — INSULIN LISPRO 100 [IU]/ML
8 INJECTION, SOLUTION INTRAVENOUS; SUBCUTANEOUS
Status: DISCONTINUED | OUTPATIENT
Start: 2024-11-05 | End: 2024-11-06

## 2024-11-05 RX ADMIN — INSULIN GLARGINE 10 UNITS: 100 INJECTION, SOLUTION SUBCUTANEOUS at 08:25

## 2024-11-05 RX ADMIN — HEPARIN SODIUM 5000 UNITS: 5000 INJECTION INTRAVENOUS; SUBCUTANEOUS at 05:45

## 2024-11-05 RX ADMIN — LIDOCAINE HYDROCHLORIDE 6 ML: 20 JELLY TOPICAL at 12:08

## 2024-11-05 RX ADMIN — OXYCODONE HYDROCHLORIDE 5 MG: 5 TABLET ORAL at 23:37

## 2024-11-05 RX ADMIN — FUROSEMIDE 20 MG: 20 TABLET ORAL at 08:25

## 2024-11-05 RX ADMIN — INSULIN GLARGINE 15 UNITS: 100 INJECTION, SOLUTION SUBCUTANEOUS at 21:32

## 2024-11-05 RX ADMIN — MIDAZOLAM 1 MG: 1 INJECTION INTRAMUSCULAR; INTRAVENOUS at 12:18

## 2024-11-05 RX ADMIN — CEFTRIAXONE 2000 MG: 2 INJECTION, POWDER, FOR SOLUTION INTRAMUSCULAR; INTRAVENOUS at 14:10

## 2024-11-05 RX ADMIN — OXYCODONE HYDROCHLORIDE 5 MG: 5 TABLET ORAL at 08:24

## 2024-11-05 RX ADMIN — HEPARIN SODIUM 5000 UNITS: 5000 INJECTION INTRAVENOUS; SUBCUTANEOUS at 14:09

## 2024-11-05 RX ADMIN — OXYCODONE HYDROCHLORIDE 5 MG: 5 TABLET ORAL at 16:23

## 2024-11-05 RX ADMIN — PANTOPRAZOLE SODIUM 40 MG: 40 TABLET, DELAYED RELEASE ORAL at 16:17

## 2024-11-05 RX ADMIN — HYDROXYZINE HYDROCHLORIDE 10 MG: 10 TABLET ORAL at 03:52

## 2024-11-05 RX ADMIN — INSULIN LISPRO 8 UNITS: 100 INJECTION, SOLUTION INTRAVENOUS; SUBCUTANEOUS at 16:18

## 2024-11-05 RX ADMIN — INSULIN LISPRO 3 UNITS: 100 INJECTION, SOLUTION INTRAVENOUS; SUBCUTANEOUS at 21:32

## 2024-11-05 RX ADMIN — SENNOSIDES AND DOCUSATE SODIUM 2 TABLET: 50; 8.6 TABLET ORAL at 20:03

## 2024-11-05 RX ADMIN — HEPARIN SODIUM 5000 UNITS: 5000 INJECTION INTRAVENOUS; SUBCUTANEOUS at 21:32

## 2024-11-05 RX ADMIN — FENTANYL CITRATE 25 MCG: 50 INJECTION, SOLUTION INTRAMUSCULAR; INTRAVENOUS at 12:31

## 2024-11-05 RX ADMIN — INSULIN LISPRO 7 UNITS: 100 INJECTION, SOLUTION INTRAVENOUS; SUBCUTANEOUS at 16:17

## 2024-11-05 RX ADMIN — LIDOCAINE HYDROCHLORIDE 5 ML: 10 INJECTION, SOLUTION EPIDURAL; INFILTRATION; INTRACAUDAL; PERINEURAL at 12:43

## 2024-11-05 RX ADMIN — OXYCODONE HYDROCHLORIDE 5 MG: 5 TABLET ORAL at 00:43

## 2024-11-05 RX ADMIN — NALOXEGOL OXALATE 25 MG: 25 TABLET, FILM COATED ORAL at 08:24

## 2024-11-05 RX ADMIN — FENTANYL CITRATE 25 MCG: 50 INJECTION, SOLUTION INTRAMUSCULAR; INTRAVENOUS at 12:18

## 2024-11-05 RX ADMIN — VENLAFAXINE HYDROCHLORIDE 75 MG: 75 CAPSULE, EXTENDED RELEASE ORAL at 08:25

## 2024-11-05 RX ADMIN — PANTOPRAZOLE SODIUM 40 MG: 40 TABLET, DELAYED RELEASE ORAL at 08:24

## 2024-11-05 RX ADMIN — Medication 10 ML: at 08:25

## 2024-11-05 RX ADMIN — CARVEDILOL 6.25 MG: 6.25 TABLET, FILM COATED ORAL at 20:03

## 2024-11-05 RX ADMIN — IOPAMIDOL 15 ML: 510 INJECTION, SOLUTION INTRAVASCULAR at 12:51

## 2024-11-05 RX ADMIN — POLYETHYLENE GLYCOL 3350 17 G: 17 POWDER, FOR SOLUTION ORAL at 20:03

## 2024-11-05 NOTE — PLAN OF CARE
Goal Outcome Evaluation:  Plan of Care Reviewed With: patient      Patient A&O x4, VSS, on RA. Patient had drain exchanged in IR this afternoon. Plan for patient to be NPO at MN again to take patient to CT tomorrow to try to reposition the drain. Pain medication given per MAR. Patient NPO for first two blood sugar checks of the day, high reading at dinner time check. Insulin given per MAR. Patient voiding per urinal.   Progress: improving

## 2024-11-05 NOTE — PROGRESS NOTES
"General Surgery Progress Note    CC: Cholecystitis, infected subcapsular biloma     S: Patient with no new symptoms. Abdominal pain is controlled. No NV. Tolerated lunch today. Reports a bowel movement this morning.    O:/86 (BP Location: Right arm, Patient Position: Lying)   Pulse 79   Temp 97.9 °F (36.6 °C) (Oral)   Resp 24   Ht 170.2 cm (67\")   Wt 71.7 kg (158 lb)   SpO2 96%   BMI 24.75 kg/m²     Intake & Output (last day)         11/04 0701 11/05 0700 11/05 0701 11/06 0700    P.O. 120     IV Piggyback  100    Total Intake(mL/kg) 120 (1.7) 100 (1.4)    Urine (mL/kg/hr) 1050 (0.6) 1500 (2.4)    Drains 0 0    Stool 0     Total Output 1050 1500    Net -930 -1400          Stool Unmeasured Occurrence 1 x             GENERAL: Awake, alert, interactive, cooperative, answering questions appropriately  HEENT: EOMI, moist mucus membranes   CHEST: normal work of breathing on room air  CARDIAC: Regular rate  GI: Abd soft, non distended, nontender; RUQ IR drain with new bulb with small amount of clear serous fluid in bulb.  EXTREMITIES: MOCK, no cyanosis, no lower extremity edema  SKIN: Warm and dry    LABS  Results from last 7 days   Lab Units 11/05/24  0607 11/03/24  0554 11/02/24  0616   WBC 10*3/mm3 7.72 8.94 10.84*   HEMOGLOBIN g/dL 9.9* 9.9* 10.5*   HEMATOCRIT % 31.9* 32.0* 31.9*   PLATELETS 10*3/mm3 677* 588* 536*     Results from last 7 days   Lab Units 11/05/24  0607 11/04/24  0653 11/03/24  0554   SODIUM mmol/L 132* 132* 133*   POTASSIUM mmol/L 4.9 4.7 4.2   CHLORIDE mmol/L 96* 97* 96*   CO2 mmol/L 26.3 26.7 28.0   BUN mg/dL 11 10 14   CREATININE mg/dL 0.90 0.86 0.95   CALCIUM mg/dL 9.0 9.2 8.6   BILIRUBIN mg/dL 0.3 0.4 0.3   ALK PHOS U/L 113 108 116   ALT (SGPT) U/L 23 27 30   AST (SGOT) U/L 15 13 13   GLUCOSE mg/dL 282* 385* 258*         Magnesium 1.7  Phosphorus 3.1    IMAGING:    -CT demonstrating contracted gallbladder with mild hyperenhancing wall.  EGD performed 10/24 negative for abnormalities; " August 2024 CT had distended gallbladder but no other findings of cholecystitis.  -HIDA scan 10/24 demonstrating filling but with decreased ejection fraction 19% concerning for possible chronic cholecystitis.   -CTA abdomen pelvis 10/26 to evaluate worsening RUQ pain and possible mesenteric ischemia demonstrated a large right subcapsular fluid collection with mass effect on the liver; no active extravasation  -s/p CT-guided IR drainage 10/28 with thick, turbid bilious output from drain - bile leak vs abscess in the differential, cause unclear but may be related to cholecystitis.  -MRCP 10/30/24 study limited; nondistended, inflamed appearing gallbladder containing stones present; subcapsular fluid collection decreased as expected with drainage; also a second loculated collection appearing contiguous with the duodenum and inferior to the gallbladder fossa has decreased in size.  Nonenhancing lesion in the inferior right hepatic lobe new since August, may be related to biloma, for which follow up is recommended    CT abdomen pelvis 11/4/2024 images and report reviewed, most of his subcapsular fluid collection has decreased but there is still a significant amount of fluid in the superior aspect, the gallbladder has a thick wall without distention, there is no biliary dilation, the proximal duodenum appears less thickened, there is right middle and lower lobe atelectasis and a small pleural effusion, and left lower lobe atelectasis    Assessment/Plan: 49 y.o. male with multiple medical problems including CHF, hypertension, hyperlipidemia, type 2 diabetes mellitus with A1c 9.4, presenting with abdominal pain, found to have possible cholecystitis, with Klebsiella infected biloma.      Patient clinically stable.  Discussed with Dr. Morrell who plans to attempt CT guided drainage as patient was unable to have his extant drain manipulated into the superior subcapsular fluid collection in IR today. Ok for diet, NPO after  midnight for repeat drainage attempt tomorrow. Eventually planning for cholecystectomy following control of biloma and infection given evidence of cholecystitis on recent workup.      Addendum: Discussed with Dr. Morrell. Patient s/p successful drain placement with drain flowing freely. Will monitor drain output and tentatively plan for cholecystectomy in the next few days.    Manuela Wisdom MD  General, Robotic and Endoscopic Surgery  [facility]    [address], [address]  [place], [place], [address]  P: [phone]  F: [phone]

## 2024-11-05 NOTE — PROGRESS NOTES
Name: Donnie Bishop ADMIT: 10/22/2024   : 1975  PCP: Juju Kennedy MD    MRN: [id number] LOS: 12 days   AGE/SEX: 49 y.o. male  ROOM: [facility]     Subjective   Subjective   Chief Complaint   Patient presents with    Abdominal Pain     He had some abdominal pain and gas pain last night.  Not reporting any vomiting.  He had some nausea which was controlled with medications.  No chest pain palpitations or shortness of breath reported.     Objective   Objective   Vital Signs  Temp:  [97.8 °F (36.6 °C)-98.3 °F (36.8 °C)] 98.2 °F (36.8 °C)  Heart Rate:  [77-92] 77  Resp:  [24-28] 28  BP: (100-117)/(64-70) 100/70  SpO2:  [93 %-97 %] 93 %  on   ;   Device (Oxygen Therapy): room air  Body mass index is 24.94 kg/m².    Physical Exam  Vitals and nursing note reviewed.   Constitutional:       General: He is not in acute distress.     Appearance: He is not diaphoretic.   HENT:      Head: Atraumatic.   Eyes:      General: No scleral icterus.  Cardiovascular:      Rate and Rhythm: Normal rate and regular rhythm.   Pulmonary:      Effort: Pulmonary effort is normal.      Breath sounds: No wheezing.   Abdominal:      General: There is no distension.      Palpations: Abdomen is soft.   Musculoskeletal:         General: No swelling.   Skin:     General: Skin is warm and dry.   Neurological:      Mental Status: He is alert. Mental status is at baseline.   Psychiatric:         Mood and Affect: Mood normal.         Behavior: Behavior normal.       Results Review  I reviewed the patient's new clinical results.    Results from last 7 days   Lab Units 24  0607 24  0554 24  0616 24  0635   WBC 10*3/mm3 7.72 8.94 10.84* 11.82*   HEMOGLOBIN g/dL 9.9* 9.9* 10.5* 10.7*   PLATELETS 10*3/mm3 677* 588* 536* 471*     Results from last 7 days   Lab Units 24  0607 24  0653 24  0554 24  0616   SODIUM mmol/L 132* 132* 133* 130*   POTASSIUM mmol/L 4.9 4.7 4.2 4.2   CHLORIDE mmol/L 96* 97* 96* 92*    CO2 mmol/L 26.3 26.7 28.0 26.0   BUN mg/dL 11 10 14 18   CREATININE mg/dL 0.90 0.86 0.95 0.97   GLUCOSE mg/dL 282* 385* 258* 233*   EGFR mL/min/1.73 106.0 107.5 99.3 96.9     Results from last 7 days   Lab Units 11/05/24  0607 11/04/24  0653 11/03/24  0554 11/02/24  0616   ALBUMIN g/dL 2.9* 3.0* 2.6* 2.8*   BILIRUBIN mg/dL 0.3 0.4 0.3 0.4   ALK PHOS U/L 113 108 116 129*   AST (SGOT) U/L 15 13 13 18   ALT (SGPT) U/L 23 27 30 40     Results from last 7 days   Lab Units 11/05/24  0607 11/04/24  0653 11/03/24  0554 11/02/24  0616   CALCIUM mg/dL 9.0 9.2 8.6 8.6   ALBUMIN g/dL 2.9* 3.0* 2.6* 2.8*   MAGNESIUM mg/dL 1.7 1.8 1.9 1.8   PHOSPHORUS mg/dL 3.1 3.0 2.5 3.0         Glucose   Date/Time Value Ref Range Status   11/05/2024 0741 276 (H) 70 - 130 mg/dL Final   11/04/2024 2032 347 (H) 70 - 130 mg/dL Final   11/04/2024 1634 106 70 - 130 mg/dL Final   11/04/2024 1106 203 (H) 70 - 130 mg/dL Final   11/04/2024 0817 350 (H) 70 - 130 mg/dL Final   11/03/2024 1930 128 70 - 130 mg/dL Final   11/03/2024 1652 103 70 - 130 mg/dL Final       XR Chest 1 View    Result Date: 11/4/2024  As described.  This report was finalized on 11/4/2024 8:34 AM by Dr. Hbuert Reid M.D on Workstation: [facility]       I have personally reviewed all medications:  Scheduled Medications  carvedilol, 6.25 mg, Oral, Q12H  cefTRIAXone, 2,000 mg, Intravenous, Q24H  furosemide, 20 mg, Oral, Daily  heparin (porcine), 5,000 Units, Subcutaneous, Q8H  insulin glargine, 10 Units, Subcutaneous, Q12H  insulin lispro, 2-9 Units, Subcutaneous, 4x Daily AC & at Bedtime  insulin lispro, 6 Units, Subcutaneous, TID With Meals  Naloxegol Oxalate, 25 mg, Oral, QAM  pantoprazole, 40 mg, Oral, BID AC  senna-docusate sodium, 2 tablet, Oral, BID   And  polyethylene glycol, 17 g, Oral, BID  venlafaxine XR, 75 mg, Oral, Daily      Infusions     Diet  NPO Diet NPO Type: Sips with Meds       Assessment/Plan     Active Hospital Problems    Diagnosis  POA    **Epigastric  abdominal pain [R10.13]  Yes    Abscess of liver [K75.0]  Yes    Klebsiella pneumoniae (k. pneumoniae) as the cause of diseases classified elsewhere [B96.1]  Yes    Hypotension [I95.9]  Yes    Transaminitis [R74.01]  Yes    Leukocytosis [D72.829]  Yes    Chronic combined systolic and diastolic heart failure [I50.42]  Yes    Anxiety [F41.9]  Yes    Multiple-type hyperlipidemia [E78.2]  Yes    Type 2 diabetes mellitus with diabetic neuropathy, with long-term current use of insulin [E11.40, Z79.4]  Not Applicable    Benign essential HTN [I10]  Yes      Resolved Hospital Problems    Diagnosis Date Resolved POA    Acute kidney injury [N17.9] 10/30/2024 No       49 y.o. male admitted with Epigastric abdominal pain.    Liver abscess/biloma: Status post IR drainage and drain placement 10/28.  Additional IR procedure planned today.  Klebsiella.  Continue Rocephin.  Timing duration to be determined.  Infectious disease following.  Cholecystitis: No immediate plan for ERCP.  GI evaluated and he did have a EGD earlier this admission on 10/24.  Eventual cholecystectomy, timing TBD.  Surgery following.  Hypotension: Had improved with resuscitation and midodrine.  The midodrine has since been stopped.  Pulmonology evaluated.  TENISHA: Multifactorial and now improved.  Lasix has been resumed by nephrology.  They stated was okay for him to start GDMT for CHF at discharge.  Blood pressure is borderline low today so do not anticipate adding any new medication today.  Nephrology evaluated . Plan follow-up in 1 week in clinic.  NICM/bigeminy: EF 49%.  On Coreg and Lasix.  Home Entresto, Aldactone, and Jardiance are held.  Cardiology evaluated.   Diabetes: A1c 9.4.  Was on Jardiance metformin and insulin prior to admission.  Had hypo and hyperglycemia this admission.  Increase Lantus to 15 units twice daily.  Change Premeal to 8 units plus SSI.  Monitor requirements..  Hypertension: See above  PPX: Subcutaneous heparin  Disposition:  SNF/TBD    Expected Discharge Date: 11/8/2024; Expected Discharge Time:      Girma Tobias MD  [facility]  11/05/24  10:03 EST    Dictated portions of note using Dragon dictation software.  Copied text in this note has been reviewed by me and remains accurate as of 11/05/24

## 2024-11-05 NOTE — NURSING NOTE
Pt arrived to radiology bay 16 for an exchange/repositioning of abscess drain/catheter with Dr Morrell

## 2024-11-05 NOTE — H&P (VIEW-ONLY)
Name: Donnie Bishop ADMIT: 10/22/2024   : 1975  PCP: Juju Kennedy MD    MRN: [id number] LOS: 12 days   AGE/SEX: 49 y.o. male  ROOM: [facility]     Subjective   Subjective   Chief Complaint   Patient presents with    Abdominal Pain     He had some abdominal pain and gas pain last night.  Not reporting any vomiting.  He had some nausea which was controlled with medications.  No chest pain palpitations or shortness of breath reported.     Objective   Objective   Vital Signs  Temp:  [97.8 °F (36.6 °C)-98.3 °F (36.8 °C)] 98.2 °F (36.8 °C)  Heart Rate:  [77-92] 77  Resp:  [24-28] 28  BP: (100-117)/(64-70) 100/70  SpO2:  [93 %-97 %] 93 %  on   ;   Device (Oxygen Therapy): room air  Body mass index is 24.94 kg/m².    Physical Exam  Vitals and nursing note reviewed.   Constitutional:       General: He is not in acute distress.     Appearance: He is not diaphoretic.   HENT:      Head: Atraumatic.   Eyes:      General: No scleral icterus.  Cardiovascular:      Rate and Rhythm: Normal rate and regular rhythm.   Pulmonary:      Effort: Pulmonary effort is normal.      Breath sounds: No wheezing.   Abdominal:      General: There is no distension.      Palpations: Abdomen is soft.   Musculoskeletal:         General: No swelling.   Skin:     General: Skin is warm and dry.   Neurological:      Mental Status: He is alert. Mental status is at baseline.   Psychiatric:         Mood and Affect: Mood normal.         Behavior: Behavior normal.       Results Review  I reviewed the patient's new clinical results.    Results from last 7 days   Lab Units 24  0607 24  0554 24  0616 24  0635   WBC 10*3/mm3 7.72 8.94 10.84* 11.82*   HEMOGLOBIN g/dL 9.9* 9.9* 10.5* 10.7*   PLATELETS 10*3/mm3 677* 588* 536* 471*     Results from last 7 days   Lab Units 24  0607 24  0653 24  0554 24  0616   SODIUM mmol/L 132* 132* 133* 130*   POTASSIUM mmol/L 4.9 4.7 4.2 4.2   CHLORIDE mmol/L 96* 97* 96* 92*    CO2 mmol/L 26.3 26.7 28.0 26.0   BUN mg/dL 11 10 14 18   CREATININE mg/dL 0.90 0.86 0.95 0.97   GLUCOSE mg/dL 282* 385* 258* 233*   EGFR mL/min/1.73 106.0 107.5 99.3 96.9     Results from last 7 days   Lab Units 11/05/24  0607 11/04/24  0653 11/03/24  0554 11/02/24  0616   ALBUMIN g/dL 2.9* 3.0* 2.6* 2.8*   BILIRUBIN mg/dL 0.3 0.4 0.3 0.4   ALK PHOS U/L 113 108 116 129*   AST (SGOT) U/L 15 13 13 18   ALT (SGPT) U/L 23 27 30 40     Results from last 7 days   Lab Units 11/05/24  0607 11/04/24  0653 11/03/24  0554 11/02/24  0616   CALCIUM mg/dL 9.0 9.2 8.6 8.6   ALBUMIN g/dL 2.9* 3.0* 2.6* 2.8*   MAGNESIUM mg/dL 1.7 1.8 1.9 1.8   PHOSPHORUS mg/dL 3.1 3.0 2.5 3.0         Glucose   Date/Time Value Ref Range Status   11/05/2024 0741 276 (H) 70 - 130 mg/dL Final   11/04/2024 2032 347 (H) 70 - 130 mg/dL Final   11/04/2024 1634 106 70 - 130 mg/dL Final   11/04/2024 1106 203 (H) 70 - 130 mg/dL Final   11/04/2024 0817 350 (H) 70 - 130 mg/dL Final   11/03/2024 1930 128 70 - 130 mg/dL Final   11/03/2024 1652 103 70 - 130 mg/dL Final       XR Chest 1 View    Result Date: 11/4/2024  As described.  This report was finalized on 11/4/2024 8:34 AM by Dr. Hubert Reid M.D on Workstation: [employer]       I have personally reviewed all medications:  Scheduled Medications  carvedilol, 6.25 mg, Oral, Q12H  cefTRIAXone, 2,000 mg, Intravenous, Q24H  furosemide, 20 mg, Oral, Daily  heparin (porcine), 5,000 Units, Subcutaneous, Q8H  insulin glargine, 10 Units, Subcutaneous, Q12H  insulin lispro, 2-9 Units, Subcutaneous, 4x Daily AC & at Bedtime  insulin lispro, 6 Units, Subcutaneous, TID With Meals  Naloxegol Oxalate, 25 mg, Oral, QAM  pantoprazole, 40 mg, Oral, BID AC  senna-docusate sodium, 2 tablet, Oral, BID   And  polyethylene glycol, 17 g, Oral, BID  venlafaxine XR, 75 mg, Oral, Daily      Infusions     Diet  NPO Diet NPO Type: Sips with Meds       Assessment/Plan     Active Hospital Problems    Diagnosis  POA    **Epigastric  abdominal pain [R10.13]  Yes    Abscess of liver [K75.0]  Yes    Klebsiella pneumoniae (k. pneumoniae) as the cause of diseases classified elsewhere [B96.1]  Yes    Hypotension [I95.9]  Yes    Transaminitis [R74.01]  Yes    Leukocytosis [D72.829]  Yes    Chronic combined systolic and diastolic heart failure [I50.42]  Yes    Anxiety [F41.9]  Yes    Multiple-type hyperlipidemia [E78.2]  Yes    Type 2 diabetes mellitus with diabetic neuropathy, with long-term current use of insulin [E11.40, Z79.4]  Not Applicable    Benign essential HTN [I10]  Yes      Resolved Hospital Problems    Diagnosis Date Resolved POA    Acute kidney injury [N17.9] 10/30/2024 No       49 y.o. male admitted with Epigastric abdominal pain.    Liver abscess/biloma: Status post IR drainage and drain placement 10/28.  Additional IR procedure planned today.  Klebsiella.  Continue Rocephin.  Timing duration to be determined.  Infectious disease following.  Cholecystitis: No immediate plan for ERCP.  GI evaluated and he did have a EGD earlier this admission on 10/24.  Eventual cholecystectomy, timing TBD.  Surgery following.  Hypotension: Had improved with resuscitation and midodrine.  The midodrine has since been stopped.  Pulmonology evaluated.  TENISHA: Multifactorial and now improved.  Lasix has been resumed by nephrology.  They stated was okay for him to start GDMT for CHF at discharge.  Blood pressure is borderline low today so do not anticipate adding any new medication today.  Nephrology evaluated . Plan follow-up in 1 week in clinic.  NICM/bigeminy: EF 49%.  On Coreg and Lasix.  Home Entresto, Aldactone, and Jardiance are held.  Cardiology evaluated.   Diabetes: A1c 9.4.  Was on Jardiance metformin and insulin prior to admission.  Had hypo and hyperglycemia this admission.  Increase Lantus to 15 units twice daily.  Change Premeal to 8 units plus SSI.  Monitor requirements..  Hypertension: See above  PPX: Subcutaneous heparin  Disposition:  SNF/TBD    Expected Discharge Date: 11/8/2024; Expected Discharge Time:      Girma Tobias MD  [facility]  11/05/24  10:03 EST    Dictated portions of note using Dragon dictation software.  Copied text in this note has been reviewed by me and remains accurate as of 11/05/24

## 2024-11-05 NOTE — PLAN OF CARE
Goal Outcome Evaluation:  Plan of Care Reviewed With: patient           Outcome Evaluation: Pt seen for PT this afternoon. He is agreeable to therapy this afternoon, but tired after procedure earlier today.  Pt slow to respond at times and increased time required to intiate tasks. pt able to transition to EOB w min/mod A x 2. Pt stood w CGA/HHA x 2. Pt reports mild dizziness upon standing. Pt able to take several side steps toward HOB prior to sitting back down. /74 sitting EOB. He returned to supine w mod A x 2. Limited by fatigue. Will continue to progress activity as tolerated.

## 2024-11-05 NOTE — PLAN OF CARE
Goal Outcome Evaluation:  Plan of Care Reviewed With: patient        Progress: improving  Outcome Evaluation: NPO since midnight. No output from drain. Pain meds given x 2. Anxiety meds given x 1, calm now.

## 2024-11-05 NOTE — PROGRESS NOTES
[facility] Progress Note      Patient Name: Donnie Bishop  : 1975  MRN: [id number]  Primary Care Physician:  Juju Kennedy MD  Date of admission: 10/22/2024    Subjective     Interval History:   F/u TENISHA     Review of Systems:   Denies dyspnea or nausea   Plan noted for IR drainage of abdominal fluid collection today  Objective     Vitals:   Temp:  [97.5 °F (36.4 °C)-98.3 °F (36.8 °C)] 97.9 °F (36.6 °C)  Heart Rate:  [77-92] 79  Resp:  [10-28] 24  BP: (100-125)/(64-86) 110/86  Flow (L/min) (Oxygen Therapy):  [2] 2    Intake/Output Summary (Last 24 hours) at 2024 1631  Last data filed at 2024 1539  Gross per 24 hour   Intake 220 ml   Output 2050 ml   Net -1830 ml       Physical Exam:    General Appearance: frail AAM comfortable on RA, speech/mentation slow  Neck: supple, no JVD  Lungs: CTA bilat no rales  Heart: RRR, normal S1 and S2  Abdomen: soft, nontender, nondistended  Extremities: no edema, cyanosis or clubbing        Scheduled Meds:     carvedilol, 6.25 mg, Oral, Q12H  cefTRIAXone, 2,000 mg, Intravenous, Q24H  furosemide, 20 mg, Oral, Daily  heparin (porcine), 5,000 Units, Subcutaneous, Q8H  insulin glargine, 15 Units, Subcutaneous, Q12H  insulin lispro, 2-7 Units, Subcutaneous, 4x Daily AC & at Bedtime  insulin lispro, 8 Units, Subcutaneous, TID With Meals  Naloxegol Oxalate, 25 mg, Oral, QAM  pantoprazole, 40 mg, Oral, BID AC  senna-docusate sodium, 2 tablet, Oral, BID   And  polyethylene glycol, 17 g, Oral, BID  venlafaxine XR, 75 mg, Oral, Daily      IV Meds:        Results Reviewed:   I have personally reviewed the results from the time of this admission to 2024 16:31 EST     Results from last 7 days   Lab Units 24  0607 24  0653 24  0554   SODIUM mmol/L 132* 132* 133*   POTASSIUM mmol/L 4.9 4.7 4.2   CHLORIDE mmol/L 96* 97* 96*   CO2 mmol/L 26.3 26.7 28.0   BUN mg/dL 11 10 14   CREATININE mg/dL 0.90 0.86 0.95   CALCIUM mg/dL 9.0 9.2  8.6   BILIRUBIN mg/dL 0.3 0.4 0.3   ALK PHOS U/L 113 108 116   ALT (SGPT) U/L 23 27 30   AST (SGOT) U/L 15 13 13   GLUCOSE mg/dL 282* 385* 258*     Estimated Creatinine Clearance: 102.9 mL/min (by C-G formula based on SCr of 0.9 mg/dL).  Results from last 7 days   Lab Units 11/05/24  0607 11/04/24  0653 11/03/24  0554   MAGNESIUM mg/dL 1.7 1.8 1.9   PHOSPHORUS mg/dL 3.1 3.0 2.5     Results from last 7 days   Lab Units 11/03/24  0554 10/30/24  0639   URIC ACID mg/dL 3.5 3.4     Results from last 7 days   Lab Units 11/05/24  0607 11/03/24  0554 11/02/24  0616 11/01/24  0635 10/31/24  0542   WBC 10*3/mm3 7.72 8.94 10.84* 11.82* 11.35*   HEMOGLOBIN g/dL 9.9* 9.9* 10.5* 10.7* 11.1*   PLATELETS 10*3/mm3 677* 588* 536* 471* 392             Assessment / Plan     ASSESSMENT:  Acute kidney injury.  Multifactorial including hypovolemia, hypotension, impaired renal autoregulation due to Jardiance, Entresto, Aldactone.  Resolved.  Cr ~ 1.   Cholecystitis with large right perihepatic fluid collection.  Plan noted for IR drainage today.  On Rocephin per ID.  Transaminitis improving.  Off statin.  MRCP noted.  White blood cell count falling.  Heart failure reduced ejection fraction.  EF 49%.  (Improved from 35% a year ago).  Anemia.    Encephalopathy.  Mentation remains bit slow.  Diabetes mellitus type 2.  Hypotension - SBP 90s to low 100s on low dose coreg     PLAN:  Continue current therapy for now  Labs in a.m.      Evan Pat MD  11/05/24  16:31 [facility]    [facility]  [phone]

## 2024-11-05 NOTE — PROGRESS NOTES
Nutrition Services    Patient Name: Donnie Bishop  YOB: 1975  MRN: [id number]  Admission date: 10/22/2024    PROGRESS NOTE      Encounter Information: Checking in on PO intake. Pt made NPO for biloma drainage.        PO Diet: NPO Diet NPO Type: Sips with Meds   PO Supplements: -   PO Intake:  50-75%       Current nutrition support: -   Nutrition support review: -       Labs (reviewed below): Reviewed, management per attending         GI Function:  + BM on 11/4       Nutrition Intervention Updates: Resume diet when able and encourage good PO intake.        Results from last 7 days   Lab Units 11/05/24  0607 11/04/24  0653 11/03/24  0554   SODIUM mmol/L 132* 132* 133*   POTASSIUM mmol/L 4.9 4.7 4.2   CHLORIDE mmol/L 96* 97* 96*   CO2 mmol/L 26.3 26.7 28.0   BUN mg/dL 11 10 14   CREATININE mg/dL 0.90 0.86 0.95   CALCIUM mg/dL 9.0 9.2 8.6   BILIRUBIN mg/dL 0.3 0.4 0.3   ALK PHOS U/L 113 108 116   ALT (SGPT) U/L 23 27 30   AST (SGOT) U/L 15 13 13   GLUCOSE mg/dL 282* 385* 258*     Results from last 7 days   Lab Units 11/05/24  0607 11/04/24  0653 11/03/24  0554   MAGNESIUM mg/dL 1.7 1.8 1.9   PHOSPHORUS mg/dL 3.1 3.0 2.5   HEMOGLOBIN g/dL 9.9*  --  9.9*   HEMATOCRIT % 31.9*  --  32.0*     COVID19   Date Value Ref Range Status   10/25/2024 Not Detected Not Detected - Ref. Range Final     Lab Results   Component Value Date    HGBA1C 9.40 (H) 10/24/2024       Wt Readings from Last 10 Encounters:   11/04/24 0651 72.1 kg (158 lb 14.4 oz)   11/03/24 0543 74.5 kg (164 lb 3.9 oz)   11/02/24 0600 75.9 kg (167 lb 5.3 oz)   11/01/24 0700 77.7 kg (171 lb 4.8 oz)   10/31/24 0250 78.8 kg (173 lb 11.6 oz)   10/30/24 0540 81.3 kg (179 lb 3.7 oz)   10/30/24 0332 81.3 kg (179 lb 3.7 oz)   10/29/24 0700 85.3 kg (188 lb)   10/28/24 0524 85.2 kg (187 lb 13.3 oz)   10/26/24 1125 81 kg (178 lb 9.2 oz)   10/24/24 0558 81 kg (178 lb 9.6 oz)   10/22/24 1641 76.2 kg (168 lb)   08/31/24 0027 77.1 kg (170 lb)   08/09/24 1344 77.8 kg (171  lb 9.6 oz)   07/25/24 1328 74.8 kg (165 lb)   07/15/24 1551 75.3 kg (166 lb)   06/05/24 1152 76.7 kg (169 lb)   02/09/24 1305 75.7 kg (166 lb 12.8 oz)   01/12/24 1405 75.3 kg (166 lb)   12/26/23 2316 71.2 kg (157 lb)   12/26/23 1801 79.4 kg (175 lb)   12/21/23 1219 79.4 kg (175 lb)       RD to follow up per protocol.    Electronically signed by:  Mel Adams RD  11/05/24 09:45 EST

## 2024-11-05 NOTE — THERAPY TREATMENT NOTE
Patient Name: Donnie Bishop  : 1975    MRN: [id number]                              Today's Date: 2024       Admit Date: 10/22/2024    Visit Dx:     ICD-10-CM ICD-9-CM   1. Epigastric abdominal pain  R10.13 789.06   2. Acute gastritis without hemorrhage, unspecified gastritis type  K29.00 535.00     Patient Active Problem List   Diagnosis    Multiple-type hyperlipidemia    Type 2 diabetes mellitus with diabetic neuropathy, with long-term current use of insulin    Neuropathy    Vitamin D deficiency    Benign essential HTN    Inability to attain erection    Injury of acoustic nerve    Hypogonadism in male    Anxiety    Chest pain    New onset of congestive heart failure    NSTEMI, initial episode of care    Diabetes mellitus type 1.5, managed as type 1    Chronic combined systolic and diastolic heart failure    Gastritis    Leukocytosis    Epigastric abdominal pain    Abscess of liver    Klebsiella pneumoniae (k. pneumoniae) as the cause of diseases classified elsewhere    Hypotension    Transaminitis     Past Medical History:   Diagnosis Date    Anxiety     CHF (congestive heart failure)     Cough     Inability to attain erection     Injury of acoustic nerve     Myalgia     Type II diabetes mellitus     Venous insufficiency     Viral illness     Vitamin D deficiency      Past Surgical History:   Procedure Laterality Date    CARDIAC CATHETERIZATION N/A 2023    Procedure: Left Heart Cath;  Surgeon: Kaylee Kay MD;  Location: [facility] CATH INVASIVE LOCATION;  Service: Cardiology;  Laterality: N/A;    CARDIAC CATHETERIZATION N/A 2023    Procedure: Coronary angiography;  Surgeon: Kaylee Kay MD;  Location: [facility] CATH INVASIVE LOCATION;  Service: Cardiology;  Laterality: N/A;    ENDOSCOPY N/A 10/24/2024    Procedure: ESOPHAGOGASTRODUODENOSCOPY with biopsies;  Surgeon: Elma Wiggins MD;  Location: [facility] ENDOSCOPY;  Service: Gastroenterology;  Laterality: N/A;  pre-abdominal pain  post-normal       General Information       Row Name 11/05/24 1531          Physical Therapy Time and Intention    Document Type therapy note (daily note)  -EJ     Mode of Treatment physical therapy  -EJ       Row Name 11/05/24 1531          General Information    Existing Precautions/Restrictions fall  -EJ               User Key  (r) = Recorded By, (t) = Taken By, (c) = Cosigned By      Initials Name Provider Type    Kanika Ruiz, PT Physical Therapist                   Mobility       Row Name 11/05/24 1531          Bed Mobility    Supine-Sit Sigel (Bed Mobility) verbal cues;minimum assist (75% patient effort);moderate assist (50% patient effort)  -EJ     Sit-Supine Sigel (Bed Mobility) verbal cues;moderate assist (50% patient effort);2 person assist  -EJ     Assistive Device (Bed Mobility) head of bed elevated;bed rails  -EJ     Comment, (Bed Mobility) slow to initiate movement  -EJ       Row Name 11/05/24 1531          Sit-Stand Transfer    Sit-Stand Sigel (Transfers) verbal cues;contact guard;2 person assist  -EJ     Assistive Device (Sit-Stand Transfers) --  HHA x 2  -EJ       [facility] Name 11/05/24 1531          Gait/Stairs (Locomotion)    Sigel Level (Gait) verbal cues;minimum assist (75% patient effort);2 person assist  -EJ     Assistive Device (Gait) --  HHA x 2  -EJ     Distance in Feet (Gait) 5  -EJ     Deviations/Abnormal Patterns (Gait) rudy decreased;stride length decreased  -EJ     Bilateral Gait Deviations forward flexed posture;heel strike decreased  -EJ     Comment, (Gait/Stairs) several side steps toward HOB, reports mild dizziness, slow to respond to questions at times  -EJ               User Key  (r) = Recorded By, (t) = Taken By, (c) = Cosigned By      Initials Name Provider Type    Kanika Ruiz, PT Physical Therapist                   Obj/Interventions    No documentation.                  Goals/Plan    No documentation.                  Clinical Impression        Row Name 11/05/24 1533          Pain    Pretreatment Pain Rating 5/10  -EJ     Posttreatment Pain Rating 5/10  -EJ     Pain Location abdomen  -EJ       Row Name 11/05/24 1533          Plan of Care Review    Plan of Care Reviewed With patient  -EJ     Outcome Evaluation Pt seen for PT this afternoon. He is agreeable to therapy this afternoon, but tired after procedure earlier today.  Pt slow to respond at times and increased time required to intiate tasks. pt able to transition to EOB w min/mod A x 2. Pt stood w CGA/HHA x 2. Pt reports mild dizziness upon standing. Pt able to take several side steps toward HOB prior to sitting back down. /74 sitting EOB. He returned to supine w mod A x 2. Limited by fatigue. Will continue to progress activity as tolerated.  -EJ       Row Name 11/05/24 1533          Positioning and Restraints    Pre-Treatment Position in bed  -EJ     Post Treatment Position bed  -EJ     In Bed notified nsg;supine;call light within reach;encouraged to call for assist;exit alarm on  -EJ               User Key  (r) = Recorded By, (t) = Taken By, (c) = Cosigned By      Initials Name Provider Type    EJ Kanika Wooten, PT Physical Therapist                   Outcome Measures       Row Name 11/05/24 1537 11/05/24 0824       How much help from another person do you currently need...    Turning from your back to your side while in flat bed without using bedrails? 3  -EJ 3  -CT    Moving from lying on back to sitting on the side of a flat bed without bedrails? 3  -EJ 3  -CT    Moving to and from a bed to a chair (including a wheelchair)? 3  -EJ 3  -CT    Standing up from a chair using your arms (e.g., wheelchair, bedside chair)? 3  -EJ 3  -CT    Climbing 3-5 steps with a railing? 2  -EJ 2  -CT    To walk in hospital room? 2  -EJ 3  -CT    AM-PAC 6 Clicks Score (PT) 16  -EJ 17  -CT    Highest Level of Mobility Goal 5 --> Static standing  -EJ 5 --> Static standing  -CT              User Key  (r) =  Recorded By, (t) = Taken By, (c) = Cosigned By      Initials Name Provider Type    EJ Kanika Wooten, PT Physical Therapist    CT Dee Chand, RN Registered Nurse                                 Physical Therapy Education       Title: PT OT SLP Therapies (In Progress)       Topic: Physical Therapy (In Progress)       Point: Mobility training (Done)       Learning Progress Summary            Patient Acceptance, E, VU by EM at 11/1/2024 1148    Acceptance, TB,E, NR by ST at 10/30/2024 1541    Acceptance, E,TB, VU by  at 10/30/2024 0506    Acceptance, E, VU,NR by  at 10/29/2024 1346    Acceptance, E,TB, NR by  at 10/26/2024 1436                      Point: Home exercise program (Done)       Learning Progress Summary            Patient Acceptance, E,TB, VU by  at 10/30/2024 0506                      Point: Body mechanics (In Progress)       Learning Progress Summary            Patient Acceptance, TB,E, NR by ST at 10/30/2024 1541    Acceptance, E,TB, VU by  at 10/30/2024 0506    Acceptance, E,TB, NR by  at 10/26/2024 1436                      Point: Precautions (Done)       Learning Progress Summary            Patient Acceptance, E,TB, VU by  at 10/30/2024 0506                                      User Key       Initials Effective Dates Name Provider Type Discipline    EM 06/16/21 -  Shawna Akins, PT Physical Therapist PT    SV 07/11/23 -  Mahogany Horn, PT Physical Therapist PT    ST 09/22/22 -  Mary Weber PT Physical Therapist PT     09/22/22 -  Pam Vega, PT Physical Therapist PT     05/29/24 -  Kel Benson, RN Registered Nurse Nurse                  PT Recommendation and Plan     Outcome Evaluation: Pt seen for PT this afternoon. He is agreeable to therapy this afternoon, but tired after procedure earlier today.  Pt slow to respond at times and increased time required to intiate tasks. pt able to transition to EOB w min/mod A x 2. Pt stood w CGA/HHA x 2. Pt  reports mild dizziness upon standing. Pt able to take several side steps toward HOB prior to sitting back down. /74 sitting EOB. He returned to supine w mod A x 2. Limited by fatigue. Will continue to progress activity as tolerated.     Time Calculation:         PT Charges       Row Name 11/05/24 1538             Time Calculation    Start Time 1516  -EJ      Stop Time 1531  -EJ      Time Calculation (min) 15 min  -EJ      PT Received On 11/05/24  -EJ      PT - Next Appointment 11/06/24  -EJ                User Key  (r) = Recorded By, (t) = Taken By, (c) = Cosigned By      Initials Name Provider Type    EJ Kanika Wooten, PT Physical Therapist                  Therapy Charges for Today       Code Description Service Date Service Provider Modifiers Qty    72228784483  PT THERAPEUTIC ACT EA 15 MIN 11/5/2024 Kanika Wooten, PT GP 1    47971878405 HC PT THER SUPP EA 15 MIN 11/5/2024 Kanika Wooten, PT GP 1            PT G-Codes  Outcome Measure Options: AM-PAC 6 Clicks Daily Activity (OT)  AM-PAC 6 Clicks Score (PT): 16  AM-PAC 6 Clicks Score (OT): 17       Kanika Wooten PT  11/5/2024

## 2024-11-05 NOTE — PROGRESS NOTES
LOS: 12 days     Chief Complaint: Liver abscess    Interval History: Tolerating antibiotics well.  Remains afebrile.  Currently NPO.    Vital Signs  Temp:  [97.8 °F (36.6 °C)-98.3 °F (36.8 °C)] 98.2 °F (36.8 °C)  Heart Rate:  [77-92] 77  Resp:  [24-28] 28  BP: (100-117)/(64-70) 100/70    Physical Exam:  General: In no acute distress  HEENT: Oropharynx clear, moist mucous membranes  Respiratory: Normal work of breathing  GI: Abdominal drain in place.  Skin: No rashes or lesions in exposed areas  Extremities: No edema  Access: Peripheral IV    Antibiotics:  Anti-Infectives (From admission, onward)      Ordered     Dose/Rate Route Frequency Start Stop    10/31/24 1253  cefTRIAXone (ROCEPHIN) 2,000 mg in sodium chloride 0.9 % 100 mL MBP        Ordering Provider: Steven Rivera MD    2,000 mg  200 mL/hr over 30 Minutes Intravenous Every 24 Hours 10/31/24 1345 11/10/24 1344    10/23/24 1411  piperacillin-tazobactam (ZOSYN) 3.375 g IVPB in 100 mL NS MBP (CD)        Ordering Provider: Delaney Rosario APRN    3.375 g  over 30 Minutes Intravenous Once 10/23/24 1500 10/23/24 1656             Results Review:     I reviewed the patient's new clinical results.    Lab Results   Component Value Date    WBC 7.72 11/05/2024    HGB 9.9 (L) 11/05/2024    HCT 31.9 (L) 11/05/2024    MCV 94.7 11/05/2024     (H) 11/05/2024     Lab Results   Component Value Date    GLUCOSE 282 (H) 11/05/2024    BUN 11 11/05/2024    CREATININE 0.90 11/05/2024    EGFRIFNONA 93 10/09/2020    EGFRIFAFRI >60 08/26/2022    BCR 12.2 11/05/2024    CO2 26.3 11/05/2024    CALCIUM 9.0 11/05/2024    PROTENTOTREF 6.7 07/12/2023    ALBUMIN 2.9 (L) 11/05/2024    LABIL2 2.5 07/12/2023    AST 15 11/05/2024    ALT 23 11/05/2024       Microbiology:  10/23 BCx: negative  10/25 RPP: negative  10/28 Liver Abscess Cx: rare growth Kleb pneumo (susc all abx tested exc ampicillin)  10/28 BCx: NGTD    Assessment    Liver abscess due to  Klebsiella  Biloma  Cholecystitis  Uncontrolled DM2 -last A1c 9.4%    Plans for biloma drainage today.  Continue ceftriaxone 2 g daily.  Duration to be determined.  Plan is for oral antibiotics closer to discharge.    ID will follow.

## 2024-11-06 ENCOUNTER — APPOINTMENT (OUTPATIENT)
Dept: CT IMAGING | Facility: HOSPITAL | Age: 49
DRG: 853 | End: 2024-11-06
Payer: COMMERCIAL

## 2024-11-06 LAB
GLUCOSE BLDC GLUCOMTR-MCNC: 126 MG/DL (ref 70–130)
GLUCOSE BLDC GLUCOMTR-MCNC: 143 MG/DL (ref 70–130)
GLUCOSE BLDC GLUCOMTR-MCNC: 278 MG/DL (ref 70–130)
GLUCOSE BLDC GLUCOMTR-MCNC: 288 MG/DL (ref 70–130)

## 2024-11-06 PROCEDURE — 97530 THERAPEUTIC ACTIVITIES: CPT

## 2024-11-06 PROCEDURE — 25010000002 HEPARIN (PORCINE) PER 1000 UNITS: Performed by: STUDENT IN AN ORGANIZED HEALTH CARE EDUCATION/TRAINING PROGRAM

## 2024-11-06 PROCEDURE — 99231 SBSQ HOSP IP/OBS SF/LOW 25: CPT | Performed by: STUDENT IN AN ORGANIZED HEALTH CARE EDUCATION/TRAINING PROGRAM

## 2024-11-06 PROCEDURE — 99153 MOD SED SAME PHYS/QHP EA: CPT

## 2024-11-06 PROCEDURE — 99232 SBSQ HOSP IP/OBS MODERATE 35: CPT | Performed by: INTERNAL MEDICINE

## 2024-11-06 PROCEDURE — 25010000002 HYDROMORPHONE PER 4 MG: Performed by: INTERNAL MEDICINE

## 2024-11-06 PROCEDURE — 25810000003 SODIUM CHLORIDE 0.9 % SOLUTION: Performed by: HOSPITALIST

## 2024-11-06 PROCEDURE — 63710000001 INSULIN GLARGINE PER 5 UNITS: Performed by: INTERNAL MEDICINE

## 2024-11-06 PROCEDURE — 25010000002 MIDAZOLAM PER 1 MG: Performed by: RADIOLOGY

## 2024-11-06 PROCEDURE — 82948 REAGENT STRIP/BLOOD GLUCOSE: CPT

## 2024-11-06 PROCEDURE — 49406 IMAGE CATH FLUID PERI/RETRO: CPT

## 2024-11-06 PROCEDURE — 63710000001 INSULIN LISPRO (HUMAN) PER 5 UNITS: Performed by: INTERNAL MEDICINE

## 2024-11-06 PROCEDURE — 25010000002 LIDOCAINE 1 % SOLUTION: Performed by: RADIOLOGY

## 2024-11-06 PROCEDURE — 99152 MOD SED SAME PHYS/QHP 5/>YRS: CPT

## 2024-11-06 PROCEDURE — 25010000002 CEFTRIAXONE PER 250 MG: Performed by: INTERNAL MEDICINE

## 2024-11-06 PROCEDURE — 25010000002 FENTANYL CITRATE (PF) 50 MCG/ML SOLUTION: Performed by: RADIOLOGY

## 2024-11-06 RX ORDER — INSULIN LISPRO 100 [IU]/ML
6 INJECTION, SOLUTION INTRAVENOUS; SUBCUTANEOUS
Status: DISCONTINUED | OUTPATIENT
Start: 2024-11-06 | End: 2024-11-11

## 2024-11-06 RX ORDER — LIDOCAINE HYDROCHLORIDE 10 MG/ML
20 INJECTION, SOLUTION INFILTRATION; PERINEURAL ONCE
Status: COMPLETED | OUTPATIENT
Start: 2024-11-06 | End: 2024-11-06

## 2024-11-06 RX ORDER — MIDAZOLAM HYDROCHLORIDE 1 MG/ML
INJECTION, SOLUTION INTRAMUSCULAR; INTRAVENOUS AS NEEDED
Status: COMPLETED | OUTPATIENT
Start: 2024-11-06 | End: 2024-11-06

## 2024-11-06 RX ORDER — SODIUM CHLORIDE 9 MG/ML
75 INJECTION, SOLUTION INTRAVENOUS CONTINUOUS
Status: ACTIVE | OUTPATIENT
Start: 2024-11-06 | End: 2024-11-06

## 2024-11-06 RX ORDER — HYDROMORPHONE HYDROCHLORIDE 1 MG/ML
0.5 INJECTION, SOLUTION INTRAMUSCULAR; INTRAVENOUS; SUBCUTANEOUS ONCE AS NEEDED
Status: COMPLETED | OUTPATIENT
Start: 2024-11-06 | End: 2024-11-06

## 2024-11-06 RX ORDER — FENTANYL CITRATE 50 UG/ML
INJECTION, SOLUTION INTRAMUSCULAR; INTRAVENOUS AS NEEDED
Status: COMPLETED | OUTPATIENT
Start: 2024-11-06 | End: 2024-11-06

## 2024-11-06 RX ADMIN — SODIUM CHLORIDE 75 ML/HR: 9 INJECTION, SOLUTION INTRAVENOUS at 08:50

## 2024-11-06 RX ADMIN — FENTANYL CITRATE 25 MCG: 50 INJECTION, SOLUTION INTRAMUSCULAR; INTRAVENOUS at 15:33

## 2024-11-06 RX ADMIN — SENNOSIDES AND DOCUSATE SODIUM 2 TABLET: 50; 8.6 TABLET ORAL at 08:26

## 2024-11-06 RX ADMIN — LIDOCAINE HYDROCHLORIDE 20 ML: 10 INJECTION, SOLUTION INFILTRATION; PERINEURAL at 15:38

## 2024-11-06 RX ADMIN — OXYCODONE HYDROCHLORIDE 5 MG: 5 TABLET ORAL at 06:58

## 2024-11-06 RX ADMIN — HEPARIN SODIUM 5000 UNITS: 5000 INJECTION INTRAVENOUS; SUBCUTANEOUS at 21:21

## 2024-11-06 RX ADMIN — INSULIN GLARGINE 15 UNITS: 100 INJECTION, SOLUTION SUBCUTANEOUS at 20:32

## 2024-11-06 RX ADMIN — FENTANYL CITRATE 25 MCG: 50 INJECTION, SOLUTION INTRAMUSCULAR; INTRAVENOUS at 15:37

## 2024-11-06 RX ADMIN — HYDROMORPHONE HYDROCHLORIDE 0.5 MG: 1 INJECTION, SOLUTION INTRAMUSCULAR; INTRAVENOUS; SUBCUTANEOUS at 21:19

## 2024-11-06 RX ADMIN — VENLAFAXINE HYDROCHLORIDE 75 MG: 75 CAPSULE, EXTENDED RELEASE ORAL at 08:27

## 2024-11-06 RX ADMIN — INSULIN LISPRO 4 UNITS: 100 INJECTION, SOLUTION INTRAVENOUS; SUBCUTANEOUS at 08:26

## 2024-11-06 RX ADMIN — POLYETHYLENE GLYCOL 3350 17 G: 17 POWDER, FOR SOLUTION ORAL at 08:26

## 2024-11-06 RX ADMIN — INSULIN LISPRO 4 UNITS: 100 INJECTION, SOLUTION INTRAVENOUS; SUBCUTANEOUS at 11:57

## 2024-11-06 RX ADMIN — MIDAZOLAM 1 MG: 1 INJECTION INTRAMUSCULAR; INTRAVENOUS at 15:33

## 2024-11-06 RX ADMIN — CEFTRIAXONE 2000 MG: 2 INJECTION, POWDER, FOR SOLUTION INTRAMUSCULAR; INTRAVENOUS at 17:33

## 2024-11-06 RX ADMIN — OXYCODONE HYDROCHLORIDE 5 MG: 5 TABLET ORAL at 18:17

## 2024-11-06 RX ADMIN — PANTOPRAZOLE SODIUM 40 MG: 40 TABLET, DELAYED RELEASE ORAL at 08:27

## 2024-11-06 RX ADMIN — HEPARIN SODIUM 5000 UNITS: 5000 INJECTION INTRAVENOUS; SUBCUTANEOUS at 05:15

## 2024-11-06 RX ADMIN — FUROSEMIDE 20 MG: 20 TABLET ORAL at 08:26

## 2024-11-06 RX ADMIN — PANTOPRAZOLE SODIUM 40 MG: 40 TABLET, DELAYED RELEASE ORAL at 17:33

## 2024-11-06 RX ADMIN — INSULIN LISPRO 6 UNITS: 100 INJECTION, SOLUTION INTRAVENOUS; SUBCUTANEOUS at 17:33

## 2024-11-06 RX ADMIN — INSULIN GLARGINE 15 UNITS: 100 INJECTION, SOLUTION SUBCUTANEOUS at 08:27

## 2024-11-06 RX ADMIN — NALOXEGOL OXALATE 25 MG: 25 TABLET, FILM COATED ORAL at 08:29

## 2024-11-06 RX ADMIN — CARVEDILOL 6.25 MG: 6.25 TABLET, FILM COATED ORAL at 20:23

## 2024-11-06 NOTE — H&P (VIEW-ONLY)
"General Surgery Progress Note    CC: Cholecystitis, infected subcapsular biloma     S: No new complaints. Patient feeling ok today, reports some RUQ pain. Denies nausea. Asking when he can eat.    O:/67 (BP Location: Right arm, Patient Position: Lying)   Pulse 90   Temp 98.5 °F (36.9 °C) (Oral)   Resp 26   Ht 170.2 cm (67\")   Wt 72.6 kg (160 lb 0.9 oz)   SpO2 93%   BMI 25.07 kg/m²     Intake & Output (last day)         11/05 0701 11/06 0700 11/06 0701 11/07 0700    P.O.      IV Piggyback 100     Total Intake(mL/kg) 100 (1.4)     Urine (mL/kg/hr) 2300 (1.3)     Drains 0     Stool      Total Output 2300     Net -2200                   GENERAL: Awake, alert, interactive, cooperative, answering questions appropriately  HEENT: EOMI, moist mucus membranes   CHEST: normal work of breathing on room air  CARDIAC: Regular rate  GI: Abd soft, non distended, mildly tender in RUQ; RUQ IR drain with small amount of clear serous fluid in bulb  EXTREMITIES: MOCK, no cyanosis   SKIN: Warm and dry    LABS  Results from last 7 days   Lab Units 11/05/24  0607 11/03/24  0554 11/02/24  0616   WBC 10*3/mm3 7.72 8.94 10.84*   HEMOGLOBIN g/dL 9.9* 9.9* 10.5*   HEMATOCRIT % 31.9* 32.0* 31.9*   PLATELETS 10*3/mm3 677* 588* 536*     Results from last 7 days   Lab Units 11/05/24  0607 11/04/24  0653 11/03/24  0554   SODIUM mmol/L 132* 132* 133*   POTASSIUM mmol/L 4.9 4.7 4.2   CHLORIDE mmol/L 96* 97* 96*   CO2 mmol/L 26.3 26.7 28.0   BUN mg/dL 11 10 14   CREATININE mg/dL 0.90 0.86 0.95   CALCIUM mg/dL 9.0 9.2 8.6   BILIRUBIN mg/dL 0.3 0.4 0.3   ALK PHOS U/L 113 108 116   ALT (SGPT) U/L 23 27 30   AST (SGOT) U/L 15 13 13   GLUCOSE mg/dL 282* 385* 258*           IMAGING:    -CT demonstrating contracted gallbladder with mild hyperenhancing wall.  EGD performed 10/24 negative for abnormalities; August 2024 CT had distended gallbladder but no other findings of cholecystitis.  -HIDA scan 10/24 demonstrating filling but with decreased " ejection fraction 19% concerning for possible chronic cholecystitis.   -CTA abdomen pelvis 10/26 to evaluate worsening RUQ pain and possible mesenteric ischemia demonstrated a large right subcapsular fluid collection with mass effect on the liver; no active extravasation  -s/p CT-guided IR drainage 10/28 with thick, turbid bilious output from drain - bile leak vs abscess in the differential, cause unclear but may be related to cholecystitis.  -MRCP 10/30/24 study limited; nondistended, inflamed appearing gallbladder containing stones present; subcapsular fluid collection decreased as expected with drainage; also a second loculated collection appearing contiguous with the duodenum and inferior to the gallbladder fossa has decreased in size.  Nonenhancing lesion in the inferior right hepatic lobe new since August, may be related to biloma, for which follow up is recommended    CT abdomen pelvis 11/4/2024 images and report reviewed, most of his subcapsular fluid collection has decreased but there is still a significant amount of fluid in the superior aspect, the gallbladder has a thick wall without distention, there is no biliary dilation, the proximal duodenum appears less thickened, there is right middle and lower lobe atelectasis and a small pleural effusion, and left lower lobe atelectasis    Assessment/Plan: 49 y.o. male with multiple medical problems including CHF, hypertension, hyperlipidemia, type 2 diabetes mellitus with A1c 9.4, presenting with abdominal pain, found to have possible cholecystitis, with Klebsiella infected biloma.      Patient remains clinically stable with normalized WBC and LFTs, tolerating diet, having bowel function, pain controlled.    Plan for repeat IR evaluation for drainage of retained subcapsular biloma today. Although spontaneous biloma can be definitively managed with percutaneous drainage and antibiotic therapy, given evidence of cholecystitis on his workup, cholecystectomy is  warranted. Will determine timing based on drain outputs and likely follow up CT.     Manuela Wisdom MD  General, Robotic and Endoscopic Surgery  [facility]    [address], [address]  [place], [place], [address]  P: [phone]  F: [phone]

## 2024-11-06 NOTE — PROGRESS NOTES
Name: Donnie Bishop ADMIT: 10/22/2024   : 1975  PCP: Juju Kennedy MD    MRN: [id number] LOS: 13 days   AGE/SEX: 49 y.o. male  ROOM: [facility]     Subjective   Subjective   Chief Complaint   Patient presents with    Abdominal Pain     He had some nausea which was controlled with medications.  No chest pain palpitations or shortness of breath reported.     Objective   Objective   Vital Signs  Temp:  [97.9 °F (36.6 °C)-98.6 °F (37 °C)] 98.5 °F (36.9 °C)  Heart Rate:  [77-90] 77  Resp:  [10-28] 26  BP: (100-125)/(63-86) 104/68  SpO2:  [92 %-100 %] 96 %  on  Flow (L/min) (Oxygen Therapy):  [2] 2;   Device (Oxygen Therapy): room air  Body mass index is 25.07 kg/m².    Physical Exam  Vitals and nursing note reviewed.   Constitutional:       General: He is not in acute distress.     Appearance: He is not diaphoretic.   HENT:      Head: Atraumatic.   Eyes:      General: No scleral icterus.  Cardiovascular:      Rate and Rhythm: Normal rate and regular rhythm.   Pulmonary:      Effort: Pulmonary effort is normal.      Breath sounds: No wheezing.   Abdominal:      General: There is no distension.      Palpations: Abdomen is soft.   Musculoskeletal:         General: No swelling.   Skin:     General: Skin is warm and dry.   Neurological:      Mental Status: He is alert. Mental status is at baseline.   Psychiatric:         Mood and Affect: Mood normal.         Behavior: Behavior normal.       Results Review  I reviewed the patient's new clinical results.    Results from last 7 days   Lab Units 24  0607 24  0554 24  0616 24  0635   WBC 10*3/mm3 7.72 8.94 10.84* 11.82*   HEMOGLOBIN g/dL 9.9* 9.9* 10.5* 10.7*   PLATELETS 10*3/mm3 677* 588* 536* 471*     Results from last 7 days   Lab Units 24  0607 24  0653 24  0554 24  0616   SODIUM mmol/L 132* 132* 133* 130*   POTASSIUM mmol/L 4.9 4.7 4.2 4.2   CHLORIDE mmol/L 96* 97* 96* 92*   CO2 mmol/L 26.3 26.7 28.0 26.0   BUN mg/dL 11  10 14 18   CREATININE mg/dL 0.90 0.86 0.95 0.97   GLUCOSE mg/dL 282* 385* 258* 233*   EGFR mL/min/1.73 106.0 107.5 99.3 96.9     Results from last 7 days   Lab Units 11/05/24  0607 11/04/24  0653 11/03/24  0554 11/02/24  0616   ALBUMIN g/dL 2.9* 3.0* 2.6* 2.8*   BILIRUBIN mg/dL 0.3 0.4 0.3 0.4   ALK PHOS U/L 113 108 116 129*   AST (SGOT) U/L 15 13 13 18   ALT (SGPT) U/L 23 27 30 40     Results from last 7 days   Lab Units 11/05/24  0607 11/04/24  0653 11/03/24  0554 11/02/24  0616   CALCIUM mg/dL 9.0 9.2 8.6 8.6   ALBUMIN g/dL 2.9* 3.0* 2.6* 2.8*   MAGNESIUM mg/dL 1.7 1.8 1.9 1.8   PHOSPHORUS mg/dL 3.1 3.0 2.5 3.0         Glucose   Date/Time Value Ref Range Status   11/06/2024 0655 288 (H) 70 - 130 mg/dL Final   11/05/2024 2102 245 (H) 70 - 130 mg/dL Final   11/05/2024 1543 419 (C) 70 - 130 mg/dL Final   11/05/2024 0741 276 (H) 70 - 130 mg/dL Final   11/04/2024 2032 347 (H) 70 - 130 mg/dL Final   11/04/2024 1634 106 70 - 130 mg/dL Final   11/04/2024 1106 203 (H) 70 - 130 mg/dL Final       IR Abscess drain - simple    Result Date: 11/5/2024   Unsuccessful repositioning of catheter into nondraining superior subcapsular collection; catheter was exchanged for a new drain. CT-guided separate access to remaining collection planned for tomorrow as discussed with Dr. Wisdom.   This report was finalized on 11/5/2024 7:33 PM by Dr. Gilmer Morrell M.D on Workstation: [id number]       I have personally reviewed all medications:  Scheduled Medications  carvedilol, 6.25 mg, Oral, Q12H  cefTRIAXone, 2,000 mg, Intravenous, Q24H  furosemide, 20 mg, Oral, Daily  heparin (porcine), 5,000 Units, Subcutaneous, Q8H  insulin glargine, 15 Units, Subcutaneous, Q12H  insulin lispro, 2-7 Units, Subcutaneous, 4x Daily AC & at Bedtime  insulin lispro, 8 Units, Subcutaneous, TID With Meals  Naloxegol Oxalate, 25 mg, Oral, QAM  pantoprazole, 40 mg, Oral, BID AC  senna-docusate sodium, 2 tablet, Oral, BID   And  polyethylene glycol, 17 g, Oral,  BID  venlafaxine XR, 75 mg, Oral, Daily      Infusions  sodium chloride, 75 mL/hr, Last Rate: 75 mL/hr (11/06/24 0850)      Diet  NPO Diet NPO Type: Sips with Meds       Assessment/Plan     Active Hospital Problems    Diagnosis  POA    **Epigastric abdominal pain [R10.13]  Yes    Abscess of liver [K75.0]  Yes    Klebsiella pneumoniae (k. pneumoniae) as the cause of diseases classified elsewhere [B96.1]  Yes    Hypotension [I95.9]  Yes    Transaminitis [R74.01]  Yes    Leukocytosis [D72.829]  Yes    Chronic combined systolic and diastolic heart failure [I50.42]  Yes    Anxiety [F41.9]  Yes    Multiple-type hyperlipidemia [E78.2]  Yes    Type 2 diabetes mellitus with diabetic neuropathy, with long-term current use of insulin [E11.40, Z79.4]  Not Applicable    Benign essential HTN [I10]  Yes      Resolved Hospital Problems    Diagnosis Date Resolved POA    Acute kidney injury [N17.9] 10/30/2024 No       49 y.o. male admitted with Epigastric abdominal pain.    Liver abscess/biloma: Status post IR drainage and drain placement 10/28.  Status post IR drain exchange 11/5.  Additional IR drain procedure planned.  Klebsiella.  Continue Rocephin.  Timing duration to be determined.  Infectious disease following.  Cholecystitis: No immediate plan for ERCP.  GI evaluated and he did have a EGD earlier this admission on 10/24.  Eventual cholecystectomy, timing TBD.  Surgery following.  Hypotension: Had improved with resuscitation and midodrine.  The midodrine has since been stopped.  Pulmonology evaluated.  TENISHA: Multifactorial and now improved.  Lasix has been resumed by nephrology.  They stated was okay for him to start GDMT for CHF at discharge. Nephrology evaluated . Plan follow-up in 1 week in clinic.  NICM/bigeminy: EF 49%.  On Coreg and Lasix.  Home Entresto, Aldactone, and Jardiance are held.  Cardiology evaluated.   Diabetes: A1c 9.4.  Was on Jardiance metformin and insulin prior to admission.  Had hypo and hyperglycemia  this admission.  Continue Lantus, decrease Premeal to 6 units plus SSI.  Monitoring requirements.  Hypertension: See above  PPX: Subcutaneous heparin  Disposition: SNF/TBD    Expected Discharge Date: 11/8/2024; Expected Discharge Time:      Girma Tobias MD  [facility]  11/06/24  11:23 EST    Dictated portions of note using Dragon dictation software.  Copied text in this note has been reviewed by me and remains accurate as of 11/06/24

## 2024-11-06 NOTE — CASE MANAGEMENT/SOCIAL WORK
Continued Stay Note  [facility]     Patient Name: Donnie Bishop  MRN: [id number]  Today's Date: 11/6/2024    Admit Date: 10/22/2024    Plan: Pending progress with PT   Discharge Plan       Row Name 11/06/24 1259       Plan    Plan Pending progress with PT    Plan Comments Patient walked 160 feet with PT today. Spoke with patient at beside. He states he is hoping to progress to no needs at dc and will discuss closer to dc whether he wants home health or needs dme at that time.                       Expected Discharge Date and Time       Expected Discharge Date Expected Discharge Time    Nov 8, 2024               Laura Junior RN

## 2024-11-06 NOTE — PROGRESS NOTES
LOS: 13 days     ID PROGRESS NOTE    Chief Complaint: Liver abscess and biloma due to Klebsiella    Interval History: No acute events.  No fever.  He went to IR yesterday for drain exchange.    Medications:    Current Facility-Administered Medications:     sennosides-docusate (PERICOLACE) 8.6-50 MG per tablet 2 tablet, 2 tablet, Oral, BID, 2 tablet at 11/06/24 0826 **AND** polyethylene glycol (MIRALAX) packet 17 g, 17 g, Oral, BID, 17 g at 11/06/24 0826 **AND** bisacodyl (DULCOLAX) EC tablet 5 mg, 5 mg, Oral, Daily PRN **AND** bisacodyl (DULCOLAX) suppository 10 mg, 10 mg, Rectal, Daily PRN, Yaritza Hurtado MD, 10 mg at 10/30/24 1841    Calcium Replacement - Follow Nurse / BPA Driven Protocol, , Does not apply, PRN, Elma Wiggins MD    carvedilol (COREG) tablet 6.25 mg, 6.25 mg, Oral, Q12H, Kaylee Kay MD, 6.25 mg at 11/05/24 2003    cefTRIAXone (ROCEPHIN) 2,000 mg in sodium chloride 0.9 % 100 mL MBP, 2,000 mg, Intravenous, Q24H, Steven Rivera MD, Last Rate: 200 mL/hr at 11/05/24 1410, 2,000 mg at 11/05/24 1410    dextrose (D50W) (25 g/50 mL) IV injection 25 g, 25 g, Intravenous, Q15 Min PRN, Elma Wiggins MD, 25 g at 11/03/24 1627    dextrose (GLUTOSE) oral gel 15 g, 15 g, Oral, Q15 Min PRN, Elma Wiggins MD    furosemide (LASIX) tablet 20 mg, 20 mg, Oral, Daily, Connor Crandall MD, 20 mg at 11/06/24 0826    glucagon (GLUCAGEN) injection 1 mg, 1 mg, Intramuscular, Q15 Min PRN, Elma Wiggins MD    heparin (porcine) 5000 UNIT/ML injection 5,000 Units, 5,000 Units, Subcutaneous, Q8H, Yaritza Hurtado MD, 5,000 Units at 11/06/24 0515    hydrOXYzine (ATARAX) tablet 10 mg, 10 mg, Oral, Q8H PRN, Elma Wiggins MD, 10 mg at 11/05/24 0352    influenza virus vacc split PF FLUZONE 0.5 mL, 0.5 mL, Intramuscular, During Hospitalization, Elma Wiggins MD    insulin glargine (LANTUS, SEMGLEE) injection 15 Units, 15 Units, Subcutaneous, Q12H, Girma Tobias MD, 15 Units at  11/06/24 0827    insulin lispro (HUMALOG/ADMELOG) injection 2-7 Units, 2-7 Units, Subcutaneous, 4x Daily AC & at Bedtime, Girma Tobias MD, 4 Units at 11/06/24 0826    insulin lispro (HUMALOG/ADMELOG) injection 8 Units, 8 Units, Subcutaneous, TID With Meals, Girma Tobias MD, 8 Units at 11/05/24 1618    melatonin tablet 2.5 mg, 2.5 mg, Oral, Nightly PRN, Elma Wiggins MD    Naloxegol Oxalate (MOVANTIK) tablet 25 mg, 25 mg, Oral, QAM, Yaritza Hurtado MD, 25 mg at 11/06/24 0829    [DISCONTINUED] morphine injection 1 mg, 1 mg, Intravenous, Q4H PRN, 1 mg at 10/23/24 0832 **AND** naloxone (NARCAN) injection 0.4 mg, 0.4 mg, Intravenous, Q5 Min PRN, Elam Wiggins MD    ondansetron ODT (ZOFRAN-ODT) disintegrating tablet 4 mg, 4 mg, Oral, Q6H PRN, 4 mg at 11/02/24 2123 **OR** ondansetron (ZOFRAN) injection 4 mg, 4 mg, Intravenous, Q6H PRN, Elma Wiggins MD, 4 mg at 11/03/24 0842    oxyCODONE (ROXICODONE) immediate release tablet 5 mg, 5 mg, Oral, Q4H PRN, Steven Rivera MD, 5 mg at 11/06/24 0658    pantoprazole (PROTONIX) EC tablet 40 mg, 40 mg, Oral, BID AC, Elma Wiggins MD, 40 mg at 11/06/24 0827    Phosphorus Replacement - Follow Nurse / BPA Driven Protocol, , Does not apply, Rosalie DOWD Lauren C., MD    Potassium Replacement - Follow Nurse / BPA Driven Protocol, , Does not apply, Rosalie DOWD Lauren C., MD    simethicone (MYLICON) chewable tablet 80 mg, 80 mg, Oral, 4x Daily PRN, Girma Tobias MD    sodium chloride 0.9 % flush 10 mL, 10 mL, Intravenous, PRN, Elma Wiggins MD, 10 mL at 11/05/24 0825    sodium chloride 0.9 % infusion, 75 mL/hr, Intravenous, Continuous, Evan Pat MD, Last Rate: 75 mL/hr at 11/06/24 0850, 75 mL/hr at 11/06/24 0850    venlafaxine XR (EFFEXOR-XR) 24 hr capsule 75 mg, 75 mg, Oral, Daily, Elma Wiggins MD, 75 mg at 11/06/24 0827      Vital Signs  Temp:  [97.5 °F (36.4 °C)-98.6 °F (37 °C)] 98.5 °F (36.9 °C)  Heart Rate:  [77-90]  77  Resp:  [10-28] 26  BP: (100-125)/(63-86) 104/68    Physical Exam:  General: Awake, alert, sitting up in bed  Eyes: No scleral icterus  Respiratory: Normal work of breathing without wheezing  GI: Abdominal drain in place with mostly clear fluid.  Skin: No rashes   Extremities: No edema    Results Review:    CBC, CMP, and blood cultures reviewed today    Lab Results   Component Value Date    WBC 7.72 11/05/2024    HGB 9.9 (L) 11/05/2024    HCT 31.9 (L) 11/05/2024    MCV 94.7 11/05/2024     (H) 11/05/2024     Lab Results   Component Value Date    GLUCOSE 282 (H) 11/05/2024    CALCIUM 9.0 11/05/2024     (L) 11/05/2024    K 4.9 11/05/2024    CO2 26.3 11/05/2024    CL 96 (L) 11/05/2024    BUN 11 11/05/2024    CREATININE 0.90 11/05/2024    EGFRRESULT 68.0 07/12/2023    EGFR 106.0 11/05/2024    BCR 12.2 11/05/2024    ANIONGAP 9.7 11/05/2024     Lab Results   Component Value Date    ALT 23 11/05/2024     Lab Results   Component Value Date    BILITOT 0.3 11/05/2024     Lab Results   Component Value Date    HGBA1C 9.40 (H) 10/24/2024     Lab Results   Component Value Date    CRP 7.02 (H) 11/01/2024       Microbiology:  10/23 BCx: negative  10/25 RPP: negative  10/28 Liver Abscess Cx: rare growth Kleb pneumo (susc all abx tested exc ampicillin)  10/28 BCx: negative    New Radiology:  11/3 CT scan abdomen/pelvis: Significant decrease in the size of the perihepatic and subcapsular fluid collection.  Thickened gallbladder wall.    Assessment    Liver abscess due to Klebsiella  Biloma  Cholecystitis  Uncontrolled DM2 -last A1c 9.4%    He is gradually improving.  General surgery note reviewed and patient will need cholecystectomy.  Timing TBD.    While in the hospital, continue ceftriaxone 2 g IV every 24 hours; however, closer to the time of discharge I should be able to change him over to an oral antibiotic such as Augmentin to complete the course.  Final stop date TBD.    ID will follow.

## 2024-11-06 NOTE — PROGRESS NOTES
"General Surgery Progress Note    CC: Cholecystitis, infected subcapsular biloma     S: No new complaints. Patient feeling ok today, reports some RUQ pain. Denies nausea. Asking when he can eat.    O:/67 (BP Location: Right arm, Patient Position: Lying)   Pulse 90   Temp 98.5 °F (36.9 °C) (Oral)   Resp 26   Ht 170.2 cm (67\")   Wt 72.6 kg (160 lb 0.9 oz)   SpO2 93%   BMI 25.07 kg/m²     Intake & Output (last day)         11/05 0701 11/06 0700 11/06 0701 11/07 0700    P.O.      IV Piggyback 100     Total Intake(mL/kg) 100 (1.4)     Urine (mL/kg/hr) 2300 (1.3)     Drains 0     Stool      Total Output 2300     Net -2200                   GENERAL: Awake, alert, interactive, cooperative, answering questions appropriately  HEENT: EOMI, moist mucus membranes   CHEST: normal work of breathing on room air  CARDIAC: Regular rate  GI: Abd soft, non distended, mildly tender in RUQ; RUQ IR drain with small amount of clear serous fluid in bulb  EXTREMITIES: MOCK, no cyanosis   SKIN: Warm and dry    LABS  Results from last 7 days   Lab Units 11/05/24  0607 11/03/24  0554 11/02/24  0616   WBC 10*3/mm3 7.72 8.94 10.84*   HEMOGLOBIN g/dL 9.9* 9.9* 10.5*   HEMATOCRIT % 31.9* 32.0* 31.9*   PLATELETS 10*3/mm3 677* 588* 536*     Results from last 7 days   Lab Units 11/05/24  0607 11/04/24  0653 11/03/24  0554   SODIUM mmol/L 132* 132* 133*   POTASSIUM mmol/L 4.9 4.7 4.2   CHLORIDE mmol/L 96* 97* 96*   CO2 mmol/L 26.3 26.7 28.0   BUN mg/dL 11 10 14   CREATININE mg/dL 0.90 0.86 0.95   CALCIUM mg/dL 9.0 9.2 8.6   BILIRUBIN mg/dL 0.3 0.4 0.3   ALK PHOS U/L 113 108 116   ALT (SGPT) U/L 23 27 30   AST (SGOT) U/L 15 13 13   GLUCOSE mg/dL 282* 385* 258*           IMAGING:    -CT demonstrating contracted gallbladder with mild hyperenhancing wall.  EGD performed 10/24 negative for abnormalities; August 2024 CT had distended gallbladder but no other findings of cholecystitis.  -HIDA scan 10/24 demonstrating filling but with decreased " ejection fraction 19% concerning for possible chronic cholecystitis.   -CTA abdomen pelvis 10/26 to evaluate worsening RUQ pain and possible mesenteric ischemia demonstrated a large right subcapsular fluid collection with mass effect on the liver; no active extravasation  -s/p CT-guided IR drainage 10/28 with thick, turbid bilious output from drain - bile leak vs abscess in the differential, cause unclear but may be related to cholecystitis.  -MRCP 10/30/24 study limited; nondistended, inflamed appearing gallbladder containing stones present; subcapsular fluid collection decreased as expected with drainage; also a second loculated collection appearing contiguous with the duodenum and inferior to the gallbladder fossa has decreased in size.  Nonenhancing lesion in the inferior right hepatic lobe new since August, may be related to biloma, for which follow up is recommended    CT abdomen pelvis 11/4/2024 images and report reviewed, most of his subcapsular fluid collection has decreased but there is still a significant amount of fluid in the superior aspect, the gallbladder has a thick wall without distention, there is no biliary dilation, the proximal duodenum appears less thickened, there is right middle and lower lobe atelectasis and a small pleural effusion, and left lower lobe atelectasis    Assessment/Plan: 49 y.o. male with multiple medical problems including CHF, hypertension, hyperlipidemia, type 2 diabetes mellitus with A1c 9.4, presenting with abdominal pain, found to have possible cholecystitis, with Klebsiella infected biloma.      Patient remains clinically stable with normalized WBC and LFTs, tolerating diet, having bowel function, pain controlled.    Plan for repeat IR evaluation for drainage of retained subcapsular biloma today. Although spontaneous biloma can be definitively managed with percutaneous drainage and antibiotic therapy, given evidence of cholecystitis on his workup, cholecystectomy is  warranted. Will determine timing based on drain outputs and likely follow up CT.     Manuela Wisdom MD  General, Robotic and Endoscopic Surgery  [facility]    [address], [address]  [place], [place], [address]  P: [phone]  F: [phone]

## 2024-11-06 NOTE — PLAN OF CARE
Goal Outcome Evaluation:  Plan of Care Reviewed With: patient        Progress: improving  Outcome Evaluation: Pt tolerates OT today. He is able to work on increasing his functional mobility/transfers, UE exercises, and dressing skills. He is able to don his own socks while sitting EOB with extra time and difficulty. He is agreeable to sitting up in the chair but otherwise declined other ADLs during this session. Encouraged him to continue to get OOB, mobilize with nsg.    Anticipated Discharge Disposition (OT): skilled nursing facility

## 2024-11-06 NOTE — THERAPY TREATMENT NOTE
Patient Name: Donnie Bishop  : 1975    MRN: [id number]                              Today's Date: 2024       Admit Date: 10/22/2024    Visit Dx:     ICD-10-CM ICD-9-CM   1. Epigastric abdominal pain  R10.13 789.06   2. Acute gastritis without hemorrhage, unspecified gastritis type  K29.00 535.00     Patient Active Problem List   Diagnosis    Multiple-type hyperlipidemia    Type 2 diabetes mellitus with diabetic neuropathy, with long-term current use of insulin    Neuropathy    Vitamin D deficiency    Benign essential HTN    Inability to attain erection    Injury of acoustic nerve    Hypogonadism in male    Anxiety    Chest pain    New onset of congestive heart failure    NSTEMI, initial episode of care    Diabetes mellitus type 1.5, managed as type 1    Chronic combined systolic and diastolic heart failure    Gastritis    Leukocytosis    Epigastric abdominal pain    Abscess of liver    Klebsiella pneumoniae (k. pneumoniae) as the cause of diseases classified elsewhere    Hypotension    Transaminitis     Past Medical History:   Diagnosis Date    Anxiety     CHF (congestive heart failure)     Cough     Inability to attain erection     Injury of acoustic nerve     Myalgia     Type II diabetes mellitus     Venous insufficiency     Viral illness     Vitamin D deficiency      Past Surgical History:   Procedure Laterality Date    CARDIAC CATHETERIZATION N/A 2023    Procedure: Left Heart Cath;  Surgeon: Kaylee Kay MD;  Location: [facility] CATH INVASIVE LOCATION;  Service: Cardiology;  Laterality: N/A;    CARDIAC CATHETERIZATION N/A 2023    Procedure: Coronary angiography;  Surgeon: Kaylee Kay MD;  Location: [facility] CATH INVASIVE LOCATION;  Service: Cardiology;  Laterality: N/A;    ENDOSCOPY N/A 10/24/2024    Procedure: ESOPHAGOGASTRODUODENOSCOPY with biopsies;  Surgeon: Elma Wiggins MD;  Location: [facility] ENDOSCOPY;  Service: Gastroenterology;  Laterality: N/A;  pre-abdominal pain  post-normal       General Information       Row Name 11/06/24 1218          OT Time and Intention    Document Type therapy note (daily note)  -     Mode of Treatment occupational therapy;co-treatment  co treat completed 2/2 pt's poor activity/therapy tolerance since admission, variety of level of assist between X1-2 therapists from session to session.  -     Patient Effort adequate  -       Row Name 11/06/24 1218          General Information    Patient Profile Reviewed yes  -     Existing Precautions/Restrictions fall  -       Row Name 11/06/24 1218          Cognition    Orientation Status (Cognition) oriented x 3  -       Row Name 11/06/24 1218          Safety Issues/Impairments Affecting Functional Mobility    Impairments Affecting Function (Mobility) balance;cognition;endurance/activity tolerance;strength  -     Comment, Safety Issues/Impairments (Mobility) PT/OT cotreatment medically appropriate and necessary due to patient acuity level, to maximize therapeutic benefit due to impaired act tolerance, and for safety of patient and staff. Treatment focused on progression of care and goals established in POC.  -               User Key  (r) = Recorded By, (t) = Taken By, (c) = Cosigned By      Initials Name Provider Type     Mikki Cevallos, OT Occupational Therapist                     Mobility/ADL's       Row Name 11/06/24 1219          Bed Mobility    Supine-Sit [place] (Bed Mobility) verbal cues;contact guard;minimum assist (75% patient effort)  -       Row Name 11/06/24 1219          Sit-Stand Transfer    Sit-Stand [place] (Transfers) verbal cues;minimum assist (75% patient effort)  -     Assistive Device (Sit-Stand Transfers) walker, front-wheeled  -       Row Name 11/06/24 1219          Functional Mobility    Functional Mobility- Ind. Level contact guard assist  -     Functional Mobility- Device walker, front-wheeled  -     Functional Mobility- Comment in room and into posada to  increase activity tolerance. Encouraged ADLs while up mobilizing but pt declines.  -       Row Name 11/06/24 1219          Lower Body Dressing Assessment/Training    [place] Level (Lower Body Dressing) don;socks;standby assist  -     Position (Lower Body Dressing) edge of bed sitting  -               User Key  (r) = Recorded By, (t) = Taken By, (c) = Cosigned By      Initials Name Provider Type     Mikki Cevallos OT Occupational Therapist                   Obj/Interventions       [facility] Name 11/06/24 1220          Shoulder (Therapeutic Exercise)    Shoulder (Therapeutic Exercise) AROM (active range of motion)  -     Shoulder AROM (Therapeutic Exercise) bilateral;flexion;extension  forward press  -       Row Name 11/06/24 1220          Motor Skills    Therapeutic Exercise shoulder  -[facility] Name 11/06/24 1220          Balance    Static Sitting Balance standby assist  -     Dynamic Sitting Balance standby assist  -     Position, Sitting Balance sitting edge of bed  -     Static Standing Balance contact guard  -     Dynamic Standing Balance contact guard  -     Position/Device Used, Standing Balance supported;walker, rolling  -               User Key  (r) = Recorded By, (t) = Taken By, (c) = Cosigned By      Initials Name Provider Type     Mikki Cevallos OT Occupational Therapist                   Goals/Plan    No documentation.                  Clinical Impression       [facility] Name 11/06/24 1220          Pain Assessment    Pre/Posttreatment Pain Comment no c/o of pain  -[facility] Name 11/06/24 1220          Plan of Care Review    Plan of Care Reviewed With patient  -     Progress improving  -     Outcome Evaluation Pt tolerates OT today. He is able to work on increasing his functional mobility/transfers, UE exercises, and dressing skills. He is able to don his own socks while sitting EOB with extra time and difficulty. He is agreeable to sitting up in the chair but  otherwise declined other ADLs during this session. Encouraged him to continue to get OOB, mobilize with nsg.  -       Row Name 11/06/24 1220          Therapy Plan Review/Discharge Plan (OT)    Anticipated Discharge Disposition (OT) skilled nursing facility  -       Row Name 11/06/24 1220          Positioning and Restraints    Pre-Treatment Position in bed  -SM     Post Treatment Position chair  -SM     In Chair reclined;call light within reach;encouraged to call for assist;exit alarm on;notified nsg  -SM               User Key  (r) = Recorded By, (t) = Taken By, (c) = Cosigned By      Initials Name Provider Type    Mikki Roblero, OT Occupational Therapist                   Outcome Measures       Row Name 11/06/24 1222          How much help from another is currently needed...    Putting on and taking off regular lower body clothing? 3  -SM     Bathing (including washing, rinsing, and drying) 2  -SM     Toileting (which includes using toilet bed pan or urinal) 3  -SM     Putting on and taking off regular upper body clothing 3  -SM     Taking care of personal grooming (such as brushing teeth) 3  -SM     Eating meals 4  -SM     AM-PAC 6 Clicks Score (OT) 18  -SM       Row Name 11/06/24 1208 11/06/24 0800       How much help from another person do you currently need...    Turning from your back to your side while in flat bed without using bedrails? 3  -EJ 3  -MR    Moving from lying on back to sitting on the side of a flat bed without bedrails? 3  -EJ 3  -MR    Moving to and from a bed to a chair (including a wheelchair)? 3  -EJ 3  -MR    Standing up from a chair using your arms (e.g., wheelchair, bedside chair)? 3  -EJ 3  -MR    Climbing 3-5 steps with a railing? 2  -EJ 2  -MR    To walk in hospital room? 3  -EJ 2  -MR    AM-PAC 6 Clicks Score (PT) 17  -EJ 16  -MR    Highest Level of Mobility Goal 5 --> Static standing  -EJ 5 --> Static standing  -MR      Row Name 11/06/24 1222          Functional Assessment     Outcome Measure Options AM-PAC 6 Clicks Daily Activity (OT)  -               User Key  (r) = Recorded By, (t) = Taken By, (c) = Cosigned By      Initials Name Provider Type    Kanika Ruiz, PT Physical Therapist    Mikki Roblero, OT Occupational Therapist    Ailyn Omalley, RN Registered Nurse                    Occupational Therapy Education       Title: PT OT SLP Therapies (In Progress)       Topic: Occupational Therapy (Done)       Point: ADL training (Done)       Description:   Instruct learner(s) on proper safety adaptation and remediation techniques during self care or transfers.   Instruct in proper use of assistive devices.                  Learning Progress Summary            Patient Acceptance, E,TB, VU by  at 10/30/2024 0506    Acceptance, E, NR,NL by  at 10/29/2024 1250                      Point: Precautions (Done)       Description:   Instruct learner(s) on prescribed precautions during self-care and functional transfers.                  Learning Progress Summary            Patient Acceptance, E,TB, VU by  at 10/30/2024 0506    Acceptance, E, NR,NL by CE at 10/29/2024 1250                      Point: Body mechanics (Done)       Description:   Instruct learner(s) on proper positioning and spine alignment during self-care, functional mobility activities and/or exercises.                  Learning Progress Summary            Patient Acceptance, E,TB, VU by  at 10/30/2024 0506    Acceptance, E, NR,NL by CE at 10/29/2024 1250                                      User Key       Initials Effective Dates Name Provider Type Discipline     10/17/22 -  Dee Cardona OT Occupational Therapist OT     05/29/24 -  Kel Benson, RN Registered Nurse Nurse                  OT Recommendation and Plan     Plan of Care Review  Plan of Care Reviewed With: patient  Progress: improving  Outcome Evaluation: Pt tolerates OT today. He is able to work on increasing his functional  mobility/transfers, UE exercises, and dressing skills. He is able to don his own socks while sitting EOB with extra time and difficulty. He is agreeable to sitting up in the chair but otherwise declined other ADLs during this session. Encouraged him to continue to get OOB, mobilize with nsg.     Time Calculation:         Time Calculation- OT       Row Name 11/06/24 1223             Time Calculation- OT    OT Start Time 1023  -      OT Stop Time 1038  -      OT Time Calculation (min) 15 min  -SM      OT Received On 11/06/24  -      OT - Next Appointment 11/08/24  -         Timed Charges    61670 - OT Therapeutic Activity Minutes 15  -SM         Total Minutes    Timed Charges Total Minutes 15  -SM       Total Minutes 15  -SM                User Key  (r) = Recorded By, (t) = Taken By, (c) = Cosigned By      Initials Name Provider Type    Mikki Roblero OT Occupational Therapist                  Therapy Charges for Today       Code Description Service Date Service Provider Modifiers Qty    34804257224 HC OT THERAPEUTIC ACT EA 15 MIN 11/6/2024 Mikki Cevallos OT GO 1                 Mikki Cevallos OT  11/6/2024

## 2024-11-06 NOTE — PROGRESS NOTES
[facility] Progress Note      Patient Name: Donnie Bishop  : 1975  MRN: [id number]  Primary Care Physician:  Juju Kennedy MD  Date of admission: 10/22/2024    Subjective     Interval History:   F/u TENISHA     Review of Systems:   Denies dyspnea or nausea   Plan for repeat IR evaluation for drainage of retained subcapsular biloma today  N.p.o. this morning  Objective     Vitals:   Temp:  [97.5 °F (36.4 °C)-98.6 °F (37 °C)] 98.5 °F (36.9 °C)  Heart Rate:  [77-90] 77  Resp:  [10-28] 26  BP: (100-125)/(63-86) 104/68  Flow (L/min) (Oxygen Therapy):  [2] 2    Intake/Output Summary (Last 24 hours) at 2024 1026  Last data filed at 2024 0458  Gross per 24 hour   Intake 100 ml   Output 2300 ml   Net -2200 ml       Physical Exam:    General Appearance: frail AAM comfortable on RA, speech/mentation slow  Neck: supple, no JVD  Lungs: CTA bilat no rales  Heart: RRR, normal S1 and S2  Abdomen: soft, nontender, nondistended  Extremities: no edema, cyanosis or clubbing        Scheduled Meds:     carvedilol, 6.25 mg, Oral, Q12H  cefTRIAXone, 2,000 mg, Intravenous, Q24H  furosemide, 20 mg, Oral, Daily  heparin (porcine), 5,000 Units, Subcutaneous, Q8H  insulin glargine, 15 Units, Subcutaneous, Q12H  insulin lispro, 2-7 Units, Subcutaneous, 4x Daily AC & at Bedtime  insulin lispro, 8 Units, Subcutaneous, TID With Meals  Naloxegol Oxalate, 25 mg, Oral, QAM  pantoprazole, 40 mg, Oral, BID AC  senna-docusate sodium, 2 tablet, Oral, BID   And  polyethylene glycol, 17 g, Oral, BID  venlafaxine XR, 75 mg, Oral, Daily      IV Meds:   sodium chloride, 75 mL/hr, Last Rate: 75 mL/hr (24 0850)        Results Reviewed:   I have personally reviewed the results from the time of this admission to 2024 10:26 EST     Results from last 7 days   Lab Units 24  0607 24  0653 24  0554   SODIUM mmol/L 132* 132* 133*   POTASSIUM mmol/L 4.9 4.7 4.2   CHLORIDE mmol/L 96* 97* 96*   CO2  mmol/L 26.3 26.7 28.0   BUN mg/dL 11 10 14   CREATININE mg/dL 0.90 0.86 0.95   CALCIUM mg/dL 9.0 9.2 8.6   BILIRUBIN mg/dL 0.3 0.4 0.3   ALK PHOS U/L 113 108 116   ALT (SGPT) U/L 23 27 30   AST (SGOT) U/L 15 13 13   GLUCOSE mg/dL 282* 385* 258*     Estimated Creatinine Clearance: 104.2 mL/min (by C-G formula based on SCr of 0.9 mg/dL).  Results from last 7 days   Lab Units 11/05/24  0607 11/04/24  0653 11/03/24  0554   MAGNESIUM mg/dL 1.7 1.8 1.9   PHOSPHORUS mg/dL 3.1 3.0 2.5     Results from last 7 days   Lab Units 11/03/24  0554   URIC ACID mg/dL 3.5     Results from last 7 days   Lab Units 11/05/24  0607 11/03/24  0554 11/02/24  0616 11/01/24  0635 10/31/24  0542   WBC 10*3/mm3 7.72 8.94 10.84* 11.82* 11.35*   HEMOGLOBIN g/dL 9.9* 9.9* 10.5* 10.7* 11.1*   PLATELETS 10*3/mm3 677* 588* 536* 471* 392             Assessment / Plan     ASSESSMENT:  Acute kidney injury.  Multifactorial including hypovolemia, hypotension, impaired renal autoregulation due to Jardiance, Entresto, Aldactone.  Resolved.  Cr ~ 1.   Cholecystitis with large right perihepatic fluid collection.  Plan noted for IR drainage today.  On Rocephin per ID.  Transaminitis improving.  Off statin.  MRCP noted.  Heart failure reduced ejection fraction.  EF 49%.  (Improved from 35% a year ago).  Anemia.    Encephalopathy.  Mentation remains bit slow.  Diabetes mellitus type 2.  Hypotension -resolved    PLAN:  Will continue current therapy for now  Will start gentle IV hydration with normal saline at 75 cc an hour given patient is n.p.o.  We will give total of 1 L    Evan Pat MD  11/06/24  10:26 [facility]    [facility]  [phone]

## 2024-11-06 NOTE — PLAN OF CARE
Patient A&O x4, VSS, on RA. Patient on NPO at MN for CT guided drain repositioning. Pain medication given per MAR. Patient bloog sugar elevated with insulin coverage. Patient voiding per urinal. Will continue to monitor.

## 2024-11-06 NOTE — PLAN OF CARE
Goal Outcome Evaluation:  Plan of Care Reviewed With: patient        Progress: improving  Outcome Evaluation: Pt agreeable to PT this AM. He is doing well today and showing progress w therapy. Pt able to transition to EOB w CGA/Brittny. He stood w min A using Rwx. Pt able to increase ambulation distance, ambulating approx 160 ft w CGA and rwx. Slow pace, but steady. Pt w no c/o during activity. He is agreeable to sit up in chair at end of session. All lines intact and needs in reach. Will continue to progress mobility as tolerated.

## 2024-11-06 NOTE — THERAPY TREATMENT NOTE
Patient Name: Donnie Bishop  : 1975    MRN: [id number]                              Today's Date: 2024       Admit Date: 10/22/2024    Visit Dx:     ICD-10-CM ICD-9-CM   1. Epigastric abdominal pain  R10.13 789.06   2. Acute gastritis without hemorrhage, unspecified gastritis type  K29.00 535.00     Patient Active Problem List   Diagnosis    Multiple-type hyperlipidemia    Type 2 diabetes mellitus with diabetic neuropathy, with long-term current use of insulin    Neuropathy    Vitamin D deficiency    Benign essential HTN    Inability to attain erection    Injury of acoustic nerve    Hypogonadism in male    Anxiety    Chest pain    New onset of congestive heart failure    NSTEMI, initial episode of care    Diabetes mellitus type 1.5, managed as type 1    Chronic combined systolic and diastolic heart failure    Gastritis    Leukocytosis    Epigastric abdominal pain    Abscess of liver    Klebsiella pneumoniae (k. pneumoniae) as the cause of diseases classified elsewhere    Hypotension    Transaminitis     Past Medical History:   Diagnosis Date    Anxiety     CHF (congestive heart failure)     Cough     Inability to attain erection     Injury of acoustic nerve     Myalgia     Type II diabetes mellitus     Venous insufficiency     Viral illness     Vitamin D deficiency      Past Surgical History:   Procedure Laterality Date    CARDIAC CATHETERIZATION N/A 2023    Procedure: Left Heart Cath;  Surgeon: Kaylee Kay MD;  Location: [facility] CATH INVASIVE LOCATION;  Service: Cardiology;  Laterality: N/A;    CARDIAC CATHETERIZATION N/A 2023    Procedure: Coronary angiography;  Surgeon: Kaylee Kay MD;  Location: [facility] CATH INVASIVE LOCATION;  Service: Cardiology;  Laterality: N/A;    ENDOSCOPY N/A 10/24/2024    Procedure: ESOPHAGOGASTRODUODENOSCOPY with biopsies;  Surgeon: Elma Wiggins MD;  Location: [facility] ENDOSCOPY;  Service: Gastroenterology;  Laterality: N/A;  pre-abdominal pain  post-normal       General Information       [facility] Name 11/06/24 1206          Physical Therapy Time and Intention    Document Type therapy note (daily note)  -EJ     Mode of Treatment physical therapy  -EJ               User Key  (r) = Recorded By, (t) = Taken By, (c) = Cosigned By      Initials Name Provider Type    Kanika Ruiz, PT Physical Therapist                   Mobility       Row Name 11/06/24 1206          Bed Mobility    Supine-Sit [place] (Bed Mobility) verbal cues;contact guard;minimum assist (75% patient effort)  -EJ     Assistive Device (Bed Mobility) head of bed elevated;bed rails  -EJ       Row Name 11/06/24 1206          Sit-Stand Transfer    Sit-Stand [place] (Transfers) verbal cues;minimum assist (75% patient effort)  -EJ     Assistive Device (Sit-Stand Transfers) walker, front-wheeled  -EJ     Comment, (Sit-Stand Transfer) cues for hand placement  -EJ       [facility] Name 11/06/24 1206          Gait/Stairs (Locomotion)    [place] Level (Gait) verbal cues;contact guard  -EJ     Assistive Device (Gait) walker, front-wheeled  -EJ     Distance in Feet (Gait) 160  -EJ     Deviations/Abnormal Patterns (Gait) rudy decreased;stride length decreased  -EJ     Comment, (Gait/Stairs) no unsteadiness noted  -EJ               User Key  (r) = Recorded By, (t) = Taken By, (c) = Cosigned By      Initials Name Provider Type    EJ Kanika Wooten, PT Physical Therapist                   Obj/Interventions    No documentation.                  Goals/Plan    No documentation.                  Clinical Impression       [facility] Name 11/06/24 1207          Pain    Pretreatment Pain Rating 0/10 - no pain  -EJ     Posttreatment Pain Rating 0/10 - no pain  -EJ       Row Name 11/06/24 1207          Plan of Care Review    Plan of Care Reviewed With patient  -EJ     Progress improving  -EJ     Outcome Evaluation Pt agreeable to PT this AM. He is doing well today and showing progress w therapy. Pt able to transition to  EOB w CGA/Brittny. He stood w min A using Rwx. Pt able to increase ambulation distance, ambulating approx 160 ft w CGA and rwx. Slow pace, but steady. Pt w no c/o during activity. He is agreeable to sit up in chair at end of session. All lines intact and needs in reach. Will continue to progress mobility as tolerated.  -EJ       Row Name 11/06/24 1207          Positioning and Restraints    Pre-Treatment Position in bed  -EJ     Post Treatment Position chair  -EJ     In Chair notified nsg;sitting;call light within reach;encouraged to call for assist;exit alarm on  -EJ               User Key  (r) = Recorded By, (t) = Taken By, (c) = Cosigned By      Initials Name Provider Type    Kanika Ruiz, PT Physical Therapist                   Outcome Measures       Row Name 11/06/24 1208 11/06/24 0800       How much help from another person do you currently need...    Turning from your back to your side while in flat bed without using bedrails? 3  -EJ 3  -MR    Moving from lying on back to sitting on the side of a flat bed without bedrails? 3  -EJ 3  -MR    Moving to and from a bed to a chair (including a wheelchair)? 3  -EJ 3  -MR    Standing up from a chair using your arms (e.g., wheelchair, bedside chair)? 3  -EJ 3  -MR    Climbing 3-5 steps with a railing? 2  -EJ 2  -MR    To walk in hospital room? 3  -EJ 2  -MR    AM-PAC 6 Clicks Score (PT) 17  -EJ 16  -MR    Highest Level of Mobility Goal 5 --> Static standing  -EJ 5 --> Static standing  -MR              User Key  (r) = Recorded By, (t) = Taken By, (c) = Cosigned By      Initials Name Provider Type    Kanika Ruiz, PT Physical Therapist    Ailyn Omalley, RN Registered Nurse                                 Physical Therapy Education       Title: PT OT SLP Therapies (In Progress)       Topic: Physical Therapy (In Progress)       Point: Mobility training (Done)       Learning Progress Summary            Patient Acceptance, PATTI, VU by EM at 11/1/2024 5366     Acceptance, TB,E, NR by ST at 10/30/2024 1541    Acceptance, E,TB, VU by WS at 10/30/2024 0506    Acceptance, E, VU,NR by  at 10/29/2024 1346    Acceptance, E,TB, NR by CS at 10/26/2024 1436                      Point: Home exercise program (Done)       Learning Progress Summary            Patient Acceptance, E,TB, VU by WS at 10/30/2024 0506                      Point: Body mechanics (In Progress)       Learning Progress Summary            Patient Acceptance, TB,E, NR by ST at 10/30/2024 1541    Acceptance, E,TB, VU by WS at 10/30/2024 0506    Acceptance, E,TB, NR by CS at 10/26/2024 1436                      Point: Precautions (Done)       Learning Progress Summary            Patient Acceptance, E,TB, VU by  at 10/30/2024 0506                                      User Key       Initials Effective Dates Name Provider Type Discipline    EM 06/16/21 -  Shawna Akins, PT Physical Therapist PT    SV 07/11/23 -  Mahogany Horn, PT Physical Therapist PT    ST 09/22/22 -  Mary Weber, PT Physical Therapist PT    CS 09/22/22 -  Pam Vega, PT Physical Therapist PT     05/29/24 -  Kel Benson, RN Registered Nurse Nurse                  PT Recommendation and Plan     Progress: improving  Outcome Evaluation: Pt agreeable to PT this AM. He is doing well today and showing progress w therapy. Pt able to transition to EOB w CGA/Brittny. He stood w min A using Rwx. Pt able to increase ambulation distance, ambulating approx 160 ft w CGA and rwx. Slow pace, but steady. Pt w no c/o during activity. He is agreeable to sit up in chair at end of session. All lines intact and needs in reach. Will continue to progress mobility as tolerated.     Time Calculation:         PT Charges       Row Name 11/06/24 1209             Time Calculation    Start Time 1022  -EJ      Stop Time 1038  -EJ      Time Calculation (min) 16 min  -EJ      PT Received On 11/06/24  -EJ      PT - Next Appointment 11/07/24  -EJ                 User Key  (r) = Recorded By, (t) = Taken By, (c) = Cosigned By      Initials Name Provider Type    Kanika Ruiz, PT Physical Therapist                  Therapy Charges for Today       Code Description Service Date Service Provider Modifiers Qty    09335583315  PT THERAPEUTIC ACT EA 15 MIN 11/5/2024 Kanika Wooten, PT GP 1    [id number]  PT THER SUPP EA 15 MIN 11/5/2024 Kanika Wooten, PT GP 1    [id number]  PT THERAPEUTIC ACT EA 15 MIN 11/6/2024 Kanika Wooten, PT GP 1            PT G-Codes  Outcome Measure Options: AM-PAC 6 Clicks Daily Activity (OT)  AM-PAC 6 Clicks Score (PT): 17  AM-PAC 6 Clicks Score (OT): 17       Kanika Wooten, PT  11/6/2024

## 2024-11-07 PROBLEM — K80.12 CALCULUS OF GALLBLADDER WITH ACUTE ON CHRONIC CHOLECYSTITIS WITHOUT OBSTRUCTION: Status: ACTIVE | Noted: 2024-10-22

## 2024-11-07 LAB
ALBUMIN SERPL-MCNC: 3 G/DL (ref 3.5–5.2)
ALBUMIN/GLOB SERPL: 0.8 G/DL
ALP SERPL-CCNC: 106 U/L (ref 39–117)
ALT SERPL W P-5'-P-CCNC: 18 U/L (ref 1–41)
ANION GAP SERPL CALCULATED.3IONS-SCNC: 8.9 MMOL/L (ref 5–15)
AST SERPL-CCNC: 16 U/L (ref 1–40)
BILIRUB SERPL-MCNC: 0.3 MG/DL (ref 0–1.2)
BUN SERPL-MCNC: 13 MG/DL (ref 6–20)
BUN/CREAT SERPL: 14.6 (ref 7–25)
CALCIUM SPEC-SCNC: 9.4 MG/DL (ref 8.6–10.5)
CHLORIDE SERPL-SCNC: 95 MMOL/L (ref 98–107)
CO2 SERPL-SCNC: 29.1 MMOL/L (ref 22–29)
CREAT SERPL-MCNC: 0.89 MG/DL (ref 0.76–1.27)
DEPRECATED RDW RBC AUTO: 39.2 FL (ref 37–54)
EGFRCR SERPLBLD CKD-EPI 2021: 106.4 ML/MIN/1.73
ERYTHROCYTE [DISTWIDTH] IN BLOOD BY AUTOMATED COUNT: 12 % (ref 12.3–15.4)
GLOBULIN UR ELPH-MCNC: 4 GM/DL
GLUCOSE BLDC GLUCOMTR-MCNC: 131 MG/DL (ref 70–130)
GLUCOSE BLDC GLUCOMTR-MCNC: 214 MG/DL (ref 70–130)
GLUCOSE BLDC GLUCOMTR-MCNC: 287 MG/DL (ref 70–130)
GLUCOSE BLDC GLUCOMTR-MCNC: 386 MG/DL (ref 70–130)
GLUCOSE SERPL-MCNC: 338 MG/DL (ref 65–99)
HCT VFR BLD AUTO: 31.7 % (ref 37.5–51)
HGB BLD-MCNC: 10.3 G/DL (ref 13–17.7)
MAGNESIUM SERPL-MCNC: 1.5 MG/DL (ref 1.6–2.6)
MCH RBC QN AUTO: 29.5 PG (ref 26.6–33)
MCHC RBC AUTO-ENTMCNC: 32.5 G/DL (ref 31.5–35.7)
MCV RBC AUTO: 90.8 FL (ref 79–97)
PHOSPHATE SERPL-MCNC: 4 MG/DL (ref 2.5–4.5)
PLATELET # BLD AUTO: 724 10*3/MM3 (ref 140–450)
PMV BLD AUTO: 9.7 FL (ref 6–12)
POTASSIUM SERPL-SCNC: 4.8 MMOL/L (ref 3.5–5.2)
PROT SERPL-MCNC: 7 G/DL (ref 6–8.5)
RBC # BLD AUTO: 3.49 10*6/MM3 (ref 4.14–5.8)
SODIUM SERPL-SCNC: 133 MMOL/L (ref 136–145)
WBC NRBC COR # BLD AUTO: 6 10*3/MM3 (ref 3.4–10.8)

## 2024-11-07 PROCEDURE — 80053 COMPREHEN METABOLIC PANEL: CPT | Performed by: INTERNAL MEDICINE

## 2024-11-07 PROCEDURE — 63710000001 INSULIN LISPRO (HUMAN) PER 5 UNITS: Performed by: INTERNAL MEDICINE

## 2024-11-07 PROCEDURE — 83735 ASSAY OF MAGNESIUM: CPT | Performed by: INTERNAL MEDICINE

## 2024-11-07 PROCEDURE — 63710000001 INSULIN GLARGINE PER 5 UNITS: Performed by: INTERNAL MEDICINE

## 2024-11-07 PROCEDURE — 25010000002 MAGNESIUM SULFATE 2 GM/50ML SOLUTION: Performed by: INTERNAL MEDICINE

## 2024-11-07 PROCEDURE — 25010000002 CEFTRIAXONE PER 250 MG: Performed by: INTERNAL MEDICINE

## 2024-11-07 PROCEDURE — 25010000002 HEPARIN (PORCINE) PER 1000 UNITS: Performed by: STUDENT IN AN ORGANIZED HEALTH CARE EDUCATION/TRAINING PROGRAM

## 2024-11-07 PROCEDURE — 25010000002 ONDANSETRON PER 1 MG: Performed by: INTERNAL MEDICINE

## 2024-11-07 PROCEDURE — 99232 SBSQ HOSP IP/OBS MODERATE 35: CPT | Performed by: INTERNAL MEDICINE

## 2024-11-07 PROCEDURE — 82948 REAGENT STRIP/BLOOD GLUCOSE: CPT

## 2024-11-07 PROCEDURE — 85027 COMPLETE CBC AUTOMATED: CPT | Performed by: INTERNAL MEDICINE

## 2024-11-07 PROCEDURE — 84100 ASSAY OF PHOSPHORUS: CPT | Performed by: INTERNAL MEDICINE

## 2024-11-07 PROCEDURE — 99231 SBSQ HOSP IP/OBS SF/LOW 25: CPT | Performed by: STUDENT IN AN ORGANIZED HEALTH CARE EDUCATION/TRAINING PROGRAM

## 2024-11-07 RX ORDER — OXYCODONE HYDROCHLORIDE 5 MG/1
5 TABLET ORAL EVERY 4 HOURS PRN
Status: DISCONTINUED | OUTPATIENT
Start: 2024-11-07 | End: 2024-11-08

## 2024-11-07 RX ORDER — MAGNESIUM SULFATE HEPTAHYDRATE 40 MG/ML
2 INJECTION, SOLUTION INTRAVENOUS
Status: DISPENSED | OUTPATIENT
Start: 2024-11-07 | End: 2024-11-07

## 2024-11-07 RX ADMIN — INSULIN GLARGINE 15 UNITS: 100 INJECTION, SOLUTION SUBCUTANEOUS at 09:24

## 2024-11-07 RX ADMIN — OXYCODONE HYDROCHLORIDE 5 MG: 5 TABLET ORAL at 22:10

## 2024-11-07 RX ADMIN — OXYCODONE HYDROCHLORIDE 5 MG: 5 TABLET ORAL at 04:26

## 2024-11-07 RX ADMIN — INSULIN LISPRO 4 UNITS: 100 INJECTION, SOLUTION INTRAVENOUS; SUBCUTANEOUS at 12:20

## 2024-11-07 RX ADMIN — INSULIN LISPRO 3 UNITS: 100 INJECTION, SOLUTION INTRAVENOUS; SUBCUTANEOUS at 16:44

## 2024-11-07 RX ADMIN — INSULIN LISPRO 6 UNITS: 100 INJECTION, SOLUTION INTRAVENOUS; SUBCUTANEOUS at 09:24

## 2024-11-07 RX ADMIN — HEPARIN SODIUM 5000 UNITS: 5000 INJECTION INTRAVENOUS; SUBCUTANEOUS at 14:05

## 2024-11-07 RX ADMIN — INSULIN LISPRO 6 UNITS: 100 INJECTION, SOLUTION INTRAVENOUS; SUBCUTANEOUS at 12:20

## 2024-11-07 RX ADMIN — OXYCODONE HYDROCHLORIDE 5 MG: 5 TABLET ORAL at 09:24

## 2024-11-07 RX ADMIN — VENLAFAXINE HYDROCHLORIDE 75 MG: 75 CAPSULE, EXTENDED RELEASE ORAL at 09:24

## 2024-11-07 RX ADMIN — PANTOPRAZOLE SODIUM 40 MG: 40 TABLET, DELAYED RELEASE ORAL at 16:43

## 2024-11-07 RX ADMIN — NALOXEGOL OXALATE 25 MG: 25 TABLET, FILM COATED ORAL at 06:47

## 2024-11-07 RX ADMIN — CEFTRIAXONE 2000 MG: 2 INJECTION, POWDER, FOR SOLUTION INTRAMUSCULAR; INTRAVENOUS at 14:05

## 2024-11-07 RX ADMIN — MAGNESIUM SULFATE HEPTAHYDRATE 2 G: 40 INJECTION, SOLUTION INTRAVENOUS at 12:20

## 2024-11-07 RX ADMIN — POLYETHYLENE GLYCOL 3350 17 G: 17 POWDER, FOR SOLUTION ORAL at 21:25

## 2024-11-07 RX ADMIN — HEPARIN SODIUM 5000 UNITS: 5000 INJECTION INTRAVENOUS; SUBCUTANEOUS at 21:25

## 2024-11-07 RX ADMIN — HEPARIN SODIUM 5000 UNITS: 5000 INJECTION INTRAVENOUS; SUBCUTANEOUS at 06:49

## 2024-11-07 RX ADMIN — INSULIN LISPRO 6 UNITS: 100 INJECTION, SOLUTION INTRAVENOUS; SUBCUTANEOUS at 16:44

## 2024-11-07 RX ADMIN — FUROSEMIDE 20 MG: 20 TABLET ORAL at 09:25

## 2024-11-07 RX ADMIN — ONDANSETRON 4 MG: 2 INJECTION INTRAMUSCULAR; INTRAVENOUS at 22:22

## 2024-11-07 RX ADMIN — INSULIN GLARGINE 15 UNITS: 100 INJECTION, SOLUTION SUBCUTANEOUS at 21:25

## 2024-11-07 RX ADMIN — MAGNESIUM SULFATE HEPTAHYDRATE 2 G: 40 INJECTION, SOLUTION INTRAVENOUS at 16:44

## 2024-11-07 RX ADMIN — PANTOPRAZOLE SODIUM 40 MG: 40 TABLET, DELAYED RELEASE ORAL at 06:47

## 2024-11-07 RX ADMIN — OXYCODONE HYDROCHLORIDE 5 MG: 5 TABLET ORAL at 00:25

## 2024-11-07 RX ADMIN — SENNOSIDES AND DOCUSATE SODIUM 2 TABLET: 50; 8.6 TABLET ORAL at 21:25

## 2024-11-07 RX ADMIN — CARVEDILOL 6.25 MG: 6.25 TABLET, FILM COATED ORAL at 21:25

## 2024-11-07 NOTE — PLAN OF CARE
Goal Outcome Evaluation:  Plan of Care Reviewed With: patient        Progress: improving        Pt a/o x4, RA, VSS. Removed R sided CARLA drain today. Pt NPO @ [facility] for lap freddie in AM. Mag currently being replaced. OHRACIO.

## 2024-11-07 NOTE — PROGRESS NOTES
Revisited patient to discuss possible cholecystectomy. Answered his questions. He states he has thought about his options and also spoke to his family friend who had their gallbladder out. He understands the risks and would like to proceed with surgery tomorrow. NPO after midnight.    Manuela Wisdom M.D.  General, Robotic, and Endoscopic Surgery  [facility]    [address], [address]  [place], [place], [address]  P: [phone]  F: [phone]

## 2024-11-07 NOTE — PROGRESS NOTES
Name: Donnie Bishop ADMIT: 10/22/2024   : 1975  PCP: Juju Kennedy MD    MRN: [id number] LOS: 14 days   AGE/SEX: 49 y.o. male  ROOM: [facility]     Subjective   Subjective   Chief Complaint   Patient presents with    Abdominal Pain     No CP SOA NV currently.     Objective   Objective   Vital Signs  Temp:  [97.1 °F (36.2 °C)-98 °F (36.7 °C)] 98 °F (36.7 °C)  Heart Rate:  [73-89] 80  Resp:  [8-27] 20  BP: ()/(64-83) 111/65  SpO2:  [92 %-100 %] 92 %  on   ;   Device (Oxygen Therapy): room air  Body mass index is 24.72 kg/m².    Physical Exam  Vitals and nursing note reviewed.   Constitutional:       General: He is not in acute distress.     Appearance: He is not diaphoretic.   HENT:      Head: Atraumatic.   Eyes:      General: No scleral icterus.  Cardiovascular:      Rate and Rhythm: Normal rate and regular rhythm.   Pulmonary:      Effort: Pulmonary effort is normal.      Breath sounds: No wheezing.   Abdominal:      General: There is no distension.      Palpations: Abdomen is soft.   Musculoskeletal:         General: No swelling.   Skin:     General: Skin is warm and dry.   Neurological:      Mental Status: He is alert. Mental status is at baseline.   Psychiatric:         Mood and Affect: Mood normal.         Behavior: Behavior normal.       Results Review  I reviewed the patient's new clinical results.    Results from last 7 days   Lab Units 24  0624  0607 24  0554 24  0616   WBC 10*3/mm3 6.00 7.72 8.94 10.84*   HEMOGLOBIN g/dL 10.3* 9.9* 9.9* 10.5*   PLATELETS 10*3/mm3 724* 677* 588* 536*     Results from last 7 days   Lab Units 24  0628 24  0607 24  0653 24  0554   SODIUM mmol/L 133* 132* 132* 133*   POTASSIUM mmol/L 4.8 4.9 4.7 4.2   CHLORIDE mmol/L 95* 96* 97* 96*   CO2 mmol/L 29.1* 26.3 26.7 28.0   BUN mg/dL 13 11 10 14   CREATININE mg/dL 0.89 0.90 0.86 0.95   GLUCOSE mg/dL 338* 282* 385* 258*   EGFR mL/min/1.73 106.4 106.0 107.5 99.3      Results from last 7 days   Lab Units 11/07/24  0628 11/05/24  0607 11/04/24  0653 11/03/24  0554   ALBUMIN g/dL 3.0* 2.9* 3.0* 2.6*   BILIRUBIN mg/dL 0.3 0.3 0.4 0.3   ALK PHOS U/L 106 113 108 116   AST (SGOT) U/L 16 15 13 13   ALT (SGPT) U/L 18 23 27 30     Results from last 7 days   Lab Units 11/07/24  0628 11/05/24  0607 11/04/24  0653 11/03/24  0554   CALCIUM mg/dL 9.4 9.0 9.2 8.6   ALBUMIN g/dL 3.0* 2.9* 3.0* 2.6*   MAGNESIUM mg/dL 1.5* 1.7 1.8 1.9   PHOSPHORUS mg/dL 4.0 3.1 3.0 2.5         Glucose   Date/Time Value Ref Range Status   11/07/2024 0629 386 (H) 70 - 130 mg/dL Final   11/06/2024 2006 143 (H) 70 - 130 mg/dL Final   11/06/2024 1732 126 70 - 130 mg/dL Final   11/06/2024 1141 278 (H) 70 - 130 mg/dL Final   11/06/2024 0655 288 (H) 70 - 130 mg/dL Final   11/05/2024 2102 245 (H) 70 - 130 mg/dL Final   11/05/2024 1543 419 (C) 70 - 130 mg/dL Final       CT Guided Percutaneous Drain Peritoneal    Result Date: 11/6/2024  Successful drain placement into residual perihepatic collection. Follow-up CT prior to drain removal recommended.   This report was finalized on 11/6/2024 6:10 PM by Dr. Gilmer Morrell M.D on Workstation: [id number]      IR Abscess drain - simple    Result Date: 11/5/2024   Unsuccessful repositioning of catheter into nondraining superior subcapsular collection; catheter was exchanged for a new drain. CT-guided separate access to remaining collection planned for tomorrow as discussed with Dr. Wisdom.   This report was finalized on 11/5/2024 7:33 PM by Dr. Gilmer Morrell M.D on Workstation: [id number]       I have personally reviewed all medications:  Scheduled Medications  carvedilol, 6.25 mg, Oral, Q12H  cefTRIAXone, 2,000 mg, Intravenous, Q24H  heparin (porcine), 5,000 Units, Subcutaneous, Q8H  insulin glargine, 15 Units, Subcutaneous, Q12H  insulin lispro, 2-7 Units, Subcutaneous, 4x Daily AC & at Bedtime  insulin lispro, 6 Units, Subcutaneous, TID With Meals  Naloxegol Oxalate, 25 mg, Oral,  QAM  pantoprazole, 40 mg, Oral, BID AC  senna-docusate sodium, 2 tablet, Oral, BID   And  polyethylene glycol, 17 g, Oral, BID  venlafaxine XR, 75 mg, Oral, Daily      Infusions       Diet  Diet: Diabetic, Fluid Restriction (240 mL/tray); Consistent Carbohydrate; 1500 mL/day; Fluid Consistency: Thin (IDDSI 0)       Assessment/Plan     Active Hospital Problems    Diagnosis  POA    **Epigastric abdominal pain [R10.13]  Yes    Abscess of liver [K75.0]  Yes    Klebsiella pneumoniae (k. pneumoniae) as the cause of diseases classified elsewhere [B96.1]  Yes    Hypotension [I95.9]  Yes    Transaminitis [R74.01]  Yes    Leukocytosis [D72.829]  Yes    Chronic combined systolic and diastolic heart failure [I50.42]  Yes    Calculus of gallbladder with acute on chronic cholecystitis without obstruction [K80.12]  Unknown    Anxiety [F41.9]  Yes    Multiple-type hyperlipidemia [E78.2]  Yes    Type 2 diabetes mellitus with diabetic neuropathy, with long-term current use of insulin [E11.40, Z79.4]  Not Applicable    Benign essential HTN [I10]  Yes      Resolved Hospital Problems    Diagnosis Date Resolved POA    Acute kidney injury [N17.9] 10/30/2024 No       49 y.o. male admitted with Epigastric abdominal pain.    Liver abscess/biloma: Status post IR drainage and drain placement 10/28.  Status post IR drain exchange 11/5.  Status post IR drain placement 11/6.  Klebsiella.  Continue Rocephin.  Timing duration to be determined. CT will be needed prior to removal. Infectious disease following.  Cholecystitis: No immediate plan for ERCP.  GI evaluated and he did have a EGD earlier this admission on 10/24.  Eventual cholecystectomy, timing TBD.  Surgery following.  Hypotension: Had improved with resuscitation and midodrine.  The midodrine has since been stopped.  Pulmonology evaluated.  TENISHA: Multifactorial and now improved.  Nephrology stated was okay for him to start GDMT for CHF at discharge as tolerated. Nephrology following. Plan  follow-up in 1 week in clinic.  NICM/bigeminy: EF 49%.  On Coreg.  Home Entresto, Aldactone, and Jardiance are held.  Cardiology evaluated.   Diabetes: A1c 9.4.  Was on Jardiance metformin and insulin prior to admission.  Had hypo and hyperglycemia this admission.  Continue Lantus, Premeal 6 units plus SSI.  Monitoring requirements.  Hypertension: Low. Lasix held.  PPX: Subcutaneous heparin  Disposition: SNF/TBD    Expected Discharge Date: 11/8/2024; Expected Discharge Time:      Girma Tobias MD  [facility]  11/07/24  10:05 EST    Dictated portions of note using Dragon dictation software.  Copied text in this note has been reviewed by me and remains accurate as of 11/07/24

## 2024-11-07 NOTE — PAYOR COMM NOTE
"Donnie Ornelas (49 y.o. Male)                                    ATTENTION; CONTINUED CLINICALS CASE [id number]                                 REPLY TO UR DEPT  [phone] OR CALL  ELAN GARRETT LPTOM           Date of Birth   1975    Social Security Number       Address   [address] [place] [address]  [facility] [address]    Home Phone   [phone]    MRN   [id number]       Yarsani   Yazidi    Marital Status                               Admission Date   10/22/24    Admission Type   Emergency    Admitting Provider   James Alexandra MD    Attending Provider   Girma Tobias MD    Department, Room/Bed   [facility], N339/1       Discharge Date       Discharge Disposition       Discharge Destination                                 Attending Provider: Girma Tobias MD    Allergies: No Known Allergies    Isolation: None   Infection: None   Code Status: CPR    Ht: 170.2 cm (67\")   Wt: 71.6 kg (157 lb 13.6 oz)    Admission Cmt: None   Principal Problem: Epigastric abdominal pain [R10.13]                   Active Insurance as of 10/22/2024       Primary Coverage       Payor Plan Insurance Group Employer/Plan Group    [employer]HEM BLUE CROSS AN[employer]M BLUE CROSS BLUE SHIELD PPO [id number]       Payor Plan Address Payor Plan Phone Number Payor Plan Fax Number Effective Dates    [id number] [phone]  1/1/2019 - None Entered    [place] [address]         Subscriber Name Subscriber Birth Date Member ID       DONNIE ORNELAS 1975 [id number]                     Emergency Contacts        (Rel.) Home Phone Work Phone Mobile Phone    Murphy Padilla (Brother) -- -- [phone]    Charity Ornelas (Spouse) [phone] -- [phone]    joie montero (Relative) -- -- [phone]    Daniel Ornelas (Father) -- -- [phone]              Vital Signs (last day)       Date/Time Temp Temp src Pulse Resp BP Patient Position SpO2    11/07/24 1151 98.7 (37.1) Oral -- 20 100/66 " Lying --    11/07/24 0924 -- -- -- -- 111/65 -- --    11/07/24 0812 98 (36.7) Oral -- 20 90/64 Lying --    11/07/24 0338 97.1 (36.2) -- 80 20 102/66 Lying 92    11/07/24 0022 -- -- -- 20 -- -- --    11/07/24 0000 97.8 (36.6) -- 89 -- 109/68 Lying 93    11/06/24 2007 97.7 (36.5) -- 84 20 124/80 Lying 95    11/06/24 1718 97.5 (36.4) Oral 73 18 127/83 Lying 96    11/06/24 1608 -- -- 80 16 115/75 Lying 97    11/06/24 1602 -- -- 80 15 118/75 -- 100    11/06/24 15:57:13 -- -- 80 8 111/73 -- 100    11/06/24 15:53:36 -- -- 82 11 116/76 -- 100    11/06/24 15:49:45 -- -- 82 12 110/74 -- 100    11/06/24 15:44:45 -- -- 84 10 108/71 -- 100    11/06/24 15:40:47 -- -- 79 10 109/68 -- 100    11/06/24 15:36:43 -- -- 77 10 118/73 -- 100    11/06/24 15:34:03 -- -- 81 13 113/72 -- 100    11/06/24 15:20:41 -- -- 81 12 114/72 -- 96    11/06/24 1500 -- -- 79 11 105/75 Lying 95    11/06/24 1139 97.9 (36.6) Oral 82 27 104/70 Sitting 96    11/06/24 0827 -- -- 77 -- -- -- --    11/06/24 0826 -- -- -- -- 104/68 -- --    11/06/24 0800 -- -- 81 -- 104/68 -- 96    11/06/24 0731 98.5 (36.9) Oral -- 26 104/67 Lying --    11/06/24 0458 98.2 (36.8) Oral 90 22 112/81 Lying 93          Oxygen Therapy (last day)       Date/Time SpO2 Device (Oxygen Therapy) Flow (L/min) (Oxygen Therapy) Oxygen Concentration (%) ETCO2 (mmHg)    11/07/24 1151 -- room air -- -- --    11/07/24 0812 -- room air -- -- --    11/07/24 0338 92 room air -- -- --    11/07/24 0022 -- room air -- -- --    11/07/24 0000 93 -- -- -- --    11/06/24 2014 -- room air -- -- --    11/06/24 2007 95 room air -- -- --    11/06/24 1718 96 -- -- -- --    11/06/24 1608 97 room air -- -- --    11/06/24 1602 100 -- -- -- --    11/06/24 15:57:13 100 -- -- -- --    11/06/24 15:53:36 100 -- -- -- --    11/06/24 15:49:45 100 -- -- -- --    11/06/24 15:44:45 100 -- -- -- --    11/06/24 15:40:47 100 -- -- -- --    11/06/24 15:36:43 100 -- -- -- --    11/06/24 15:34:03 100 -- -- -- --    11/06/24 15:20:41  96 -- -- -- --    11/06/24 1500 95 room air -- -- --    11/06/24 1400 -- room air -- -- --    11/06/24 1139 96 -- -- -- --    11/06/24 0800 96 room air -- -- --    11/06/24 0458 93 -- -- -- --          Lines, Drains & Airways       Active LDAs       Name Placement date Placement time Site Days    Peripheral IV 11/05/24 1120 Left;Posterior Hand 11/05/24  1120  Hand  2    Closed/Suction Drain Superior;Medial Abdomen Bulb 11/06/24  1750  Abdomen  less than 1                  Current Facility-Administered Medications   Medication Dose Route Frequency Provider Last Rate Last Admin    sennosides-docusate (PERICOLACE) 8.6-50 MG per tablet 2 tablet  2 tablet Oral BID Yaritza Hurtado MD   2 tablet at 11/06/24 0826    And    polyethylene glycol (MIRALAX) packet 17 g  17 g Oral BID Yaritza Hurtado MD   17 g at 11/06/24 0826    And    bisacodyl (DULCOLAX) EC tablet 5 mg  5 mg Oral Daily PRN Yaritza Hurtado MD        And    bisacodyl (DULCOLAX) suppository 10 mg  10 mg Rectal Daily PRN Yaritza Hurtado MD   10 mg at 10/30/24 1841    Calcium Replacement - Follow Nurse / BPA Driven Protocol   Does not apply PRN Elma Wiggins MD        carvedilol (COREG) tablet 6.25 mg  6.25 mg Oral Q12H aKylee Kay MD   6.25 mg at 11/06/24 2023    cefTRIAXone (ROCEPHIN) 2,000 mg in sodium chloride 0.9 % 100 mL MBP  2,000 mg Intravenous Q24H Kashmir Carlos  mL/hr at 11/07/24 1405 2,000 mg at 11/07/24 1405    dextrose (D50W) (25 g/50 mL) IV injection 25 g  25 g Intravenous Q15 Min PRN Elma Wiggins MD   25 g at 11/03/24 1627    dextrose (GLUTOSE) oral gel 15 g  15 g Oral Q15 Min PRN Elma Wiggins MD        glucagon (GLUCAGEN) injection 1 mg  1 mg Intramuscular Q15 Min PRN Elma Wiggins MD        heparin (porcine) 5000 UNIT/ML injection 5,000 Units  5,000 Units Subcutaneous Q8H Yaritza Hurtado MD   5,000 Units at 11/07/24 1405    hydrOXYzine (ATARAX) tablet 10 mg  10 mg Oral Q8H PRN  Elma Wiggins MD   10 mg at 11/05/24 0352    influenza virus vacc split PF FLUZONE 0.5 mL  0.5 mL Intramuscular During Hospitalization Elma Wiggins MD        insulin glargine (LANTUS, SEMGLEE) injection 15 Units  15 Units Subcutaneous Q12H Girma Tobias MD   15 Units at 11/07/24 0924    insulin lispro (HUMALOG/ADMELOG) injection 2-7 Units  2-7 Units Subcutaneous 4x Daily AC & at Bedtime Girma Tobias MD   4 Units at 11/07/24 1220    insulin lispro (HUMALOG/ADMELOG) injection 6 Units  6 Units Subcutaneous TID With Meals Girma Tobias MD   6 Units at 11/07/24 1220    Magnesium Standard Dose Replacement - Follow Nurse / BPA Driven Protocol   Does not apply PRN Girma Tobias MD        magnesium sulfate 2g/50 mL (PREMIX) infusion  2 g Intravenous Q2H Girma Tobias MD   2 g at 11/07/24 1220    melatonin tablet 2.5 mg  2.5 mg Oral Nightly PRN Elma Wiggins MD        Naloxegol Oxalate (MOVANTIK) tablet 25 mg  25 mg Oral QA Yaritza Hurtado MD   25 mg at 11/07/24 0647    naloxone (NARCAN) injection 0.4 mg  0.4 mg Intravenous Q5 Min PRN Elma Wiggins MD        ondansetron ODT (ZOFRAN-ODT) disintegrating tablet 4 mg  4 mg Oral Q6H PRN Elma Wiggins MD   4 mg at 11/02/24 2123    Or    ondansetron (ZOFRAN) injection 4 mg  4 mg Intravenous Q6H PRN Elma Wiggins MD   4 mg at 11/03/24 0842    oxyCODONE (ROXICODONE) immediate release tablet 5 mg  5 mg Oral Q4H PRN Girma Tobias MD        pantoprazole (PROTONIX) EC tablet 40 mg  40 mg Oral BID AC Elma Wiggins MD   40 mg at 11/07/24 0647    Phosphorus Replacement - Follow Nurse / BPA Driven Protocol   Does not apply Elma Ramos MD        Potassium Replacement - Follow Nurse / BPA Driven Protocol   Does not apply PRN Elma Wiggins MD        simethicone (MYLICON) chewable tablet 80 mg  80 mg Oral 4x Daily PRN Girma Tobias MD        sodium chloride 0.9 % flush 10 mL  10 mL Intravenous PRN  Elma Wiggins MD   10 mL at 11/05/24 0825    venlafaxine XR (EFFEXOR-XR) 24 hr capsule 75 mg  75 mg Oral Daily Elma Wiggins MD   75 mg at 11/07/24 0924     Orders (last 24 hrs)        Start     Ordered    11/08/24 0600  Renal Function Panel  Morning Draw         11/07/24 0930    11/08/24 0600  CBC (No Diff)  Morning Draw         11/07/24 1014    11/08/24 0600  Magnesium  Morning Draw         11/07/24 1014    11/08/24 0600  Hepatic Function Panel  Morning Draw         11/07/24 1014    11/08/24 0600  Magnesium  Morning Draw         11/07/24 1116    11/07/24 1215  magnesium sulfate 2g/50 mL (PREMIX) infusion  Every 2 Hours         11/07/24 1116    11/07/24 1156  POC Glucose Once  PROCEDURE ONCE        Comments: Complete no more than 45 minutes prior to patient eating      11/07/24 1153    11/07/24 1016  CMS Certification  Once         11/07/24 1016    11/07/24 1013  Magnesium Standard Dose Replacement - Follow Nurse / BPA Driven Protocol  As Needed         11/07/24 1013    11/07/24 1012  oxyCODONE (ROXICODONE) immediate release tablet 5 mg  Every 4 Hours PRN         11/07/24 1012    11/07/24 0954  Case request  Once         11/07/24 0955    11/07/24 0953  Remove Drains / Tubes  Once        Comments: Remove right lateral (blue) IR drain and place gauze bandage.    11/07/24 0953    11/07/24 0631  POC Glucose Once  PROCEDURE ONCE        Comments: Complete no more than 45 minutes prior to patient eating      11/07/24 0629    11/07/24 0600  CBC (No Diff)  Morning Draw         11/06/24 1128    11/07/24 0600  Comprehensive Metabolic Panel  Morning Draw         11/06/24 1128    11/07/24 0600  Magnesium  Morning Draw         11/06/24 1128    11/07/24 0600  Phosphorus  Morning Draw         11/06/24 2230    11/07/24 0429  Irrigate Drain  Continuous        Comments: Irrigate drain 2 times a day with 10cc sterile normal saline    11/07/24 0428    11/07/24 0429  Measure drainage output every shift  Continuous          11/07/24 0428 11/07/24 0429  Notify patients physician if drain comes out unexpectedly  Continuous         11/07/24 0428 11/06/24 2232  Irrigate Drain  Continuous        Comments: Irrigate both CARLA drains 4 times a day with 10 cc sterile normal saline.    11/06/24 2232 11/06/24 2042  HYDROmorphone (DILAUDID) injection 0.5 mg  Once As Needed         11/06/24 2042 11/06/24 2009  POC Glucose Once  PROCEDURE ONCE        Comments: Complete no more than 45 minutes prior to patient eating      11/06/24 2006 11/06/24 1734  POC Glucose Once  PROCEDURE ONCE        Comments: Complete no more than 45 minutes prior to patient eating      11/06/24 1732 11/06/24 1643  Diet: Diabetic, Fluid Restriction (240 mL/tray); Consistent Carbohydrate; 1500 mL/day; Fluid Consistency: Thin (IDDSI 0)  Diet Effective Now         11/06/24 1643    11/06/24 1630  lidocaine (XYLOCAINE) 1 % injection 20 mL  Once         11/06/24 1543    11/06/24 1533  fentaNYL citrate (PF) (SUBLIMAZE) injection  As Needed         11/06/24 1533    11/06/24 1533  midazolam (VERSED) injection  As Needed         11/06/24 1533    11/06/24 1433  Obtain Informed Consent  Once         11/06/24 1432    11/06/24 1433  No Lab Testing Needed  Once         11/06/24 1432    11/06/24 1215  insulin lispro (HUMALOG/ADMELOG) injection 6 Units  3 Times Daily With Meals         11/06/24 1126    11/06/24 0945  sodium chloride 0.9 % infusion  Continuous         11/06/24 0845    11/05/24 2100  insulin glargine (LANTUS, SEMGLEE) injection 15 Units  Every 12 Hours Scheduled         11/05/24 1006    11/05/24 1130  insulin lispro (HUMALOG/ADMELOG) injection 2-7 Units  4 Times Daily Before Meals & Nightly         11/05/24 1006    11/05/24 1003  simethicone (MYLICON) chewable tablet 80 mg  4 Times Daily PRN         11/05/24 1003    11/04/24 0900  furosemide (LASIX) tablet 20 mg  Daily,   Status:  Discontinued         11/03/24 1427    10/31/24 2100  carvedilol (COREG) tablet 6.25  "mg  Every 12 Hours Scheduled         10/31/24 1504    10/31/24 1345  cefTRIAXone (ROCEPHIN) 2,000 mg in sodium chloride 0.9 % 100 mL MBP  Every 24 Hours         10/31/24 1253    10/31/24 0912  Strict Intake & Output  Every Shift       10/31/24 0911    10/31/24 0519  Daily CHG Bath While in ICU  Daily      Comments: Use for admission bath & daily bath for duration of critical care stay    10/31/24 0518    10/29/24 1430  Naloxegol Oxalate (MOVANTIK) tablet 25 mg  Every Morning         10/29/24 1237    10/29/24 1000  Incentive Spirometry  Every 4 Hours While Awake       10/29/24 0648    10/28/24 1400  heparin (porcine) 5000 UNIT/ML injection 5,000 Units  Every 8 Hours Scheduled         10/28/24 1223    10/27/24 0900  polyethylene glycol (MIRALAX) packet 17 g  2 Times Daily        Placed in \"And\" Linked Group    10/27/24 0700    10/27/24 0900  sennosides-docusate (PERICOLACE) 8.6-50 MG per tablet 2 tablet  2 Times Daily        Placed in \"And\" Linked Group    10/27/24 0700    10/27/24 0700  bisacodyl (DULCOLAX) EC tablet 5 mg  Daily PRN        Placed in \"And\" Linked Group    10/27/24 0700    10/27/24 0700  bisacodyl (DULCOLAX) suppository 10 mg  Daily PRN        Placed in \"And\" Linked Group    10/27/24 0700    10/27/24 0659  oxyCODONE (ROXICODONE) immediate release tablet 5 mg  Every 4 Hours PRN,   Status:  Discontinued         10/27/24 0659    10/26/24 1800  Incentive Spirometry  Every 4 Hours While Awake       10/26/24 1617    10/24/24 1730  pantoprazole (PROTONIX) EC tablet 40 mg  2 Times Daily Before Meals         10/24/24 1302    10/23/24 0900  venlafaxine XR (EFFEXOR-XR) 24 hr capsule 75 mg  Daily         10/22/24 2242    10/23/24 0830  influenza virus vacc split PF FLUZONE 0.5 mL  During Hospitalization         10/22/24 2332    10/23/24 0800  Oral Care  2 Times Daily       10/22/24 2026    10/23/24 0727  Daily Weights  Daily       10/23/24 0726    10/23/24 0725  Potassium Replacement - Follow Nurse / BPA Driven " "Protocol  As Needed         10/23/24 0726    10/23/24 0725  Phosphorus Replacement - Follow Nurse / BPA Driven Protocol  As Needed         10/23/24 0726    10/23/24 0725  Calcium Replacement - Follow Nurse / BPA Driven Protocol  As Needed         10/23/24 0726    10/23/24 0000  Vital Signs  Every 4 Hours       10/22/24 2026    10/22/24 2241  hydrOXYzine (ATARAX) tablet 10 mg  Every 8 Hours PRN         10/22/24 2242    10/22/24 2200  POC Glucose 4x Daily Before Meals & at Bedtime  4 Times Daily Before Meals & at Bedtime      Comments: Complete no more than 45 minutes prior to patient eating      10/22/24 2113    10/22/24 2113  naloxone (NARCAN) injection 0.4 mg  Every 5 Minutes PRN        Placed in \"And\" Linked Group    10/22/24 2113    10/22/24 2112  dextrose (GLUTOSE) oral gel 15 g  Every 15 Minutes PRN         10/22/24 2113    10/22/24 2112  dextrose (D50W) (25 g/50 mL) IV injection 25 g  Every 15 Minutes PRN         10/22/24 2113    10/22/24 2112  glucagon (GLUCAGEN) injection 1 mg  Every 15 Minutes PRN         10/22/24 2113    10/22/24 2025  melatonin tablet 2.5 mg  Nightly PRN         10/22/24 2026    10/22/24 2025  Intake & Output  Every Shift       10/22/24 2026    10/22/24 2024  ondansetron ODT (ZOFRAN-ODT) disintegrating tablet 4 mg  Every 6 Hours PRN        Placed in \"Or\" Linked Group    10/22/24 2026    10/22/24 2024  ondansetron (ZOFRAN) injection 4 mg  Every 6 Hours PRN        Placed in \"Or\" Linked Group    10/22/24 2026    10/22/24 1551  sodium chloride 0.9 % flush 10 mL  As Needed         10/22/24 1552    Unscheduled  Follow Hypoglycemia Standing Orders For Blood Glucose <70 & Notify Provider of Treatment  As Needed      Comments: Follow Hypoglycemia Orders As Outlined in Process Instructions (Open Order Report to View Full Instructions)  Notify Provider Any Time Hypoglycemia Treatment is Administered    10/22/24 2113                     Physician Progress Notes (last 48 hours)        Girma Tobias " MD TOMMY at 24 1005              Name: Donnie Bishop ADMIT: 10/22/2024   : 1975  PCP: Juju Kennedy MD    MRN: [id number] LOS: 14 days   AGE/SEX: 49 y.o. male  ROOM: [facility]     Subjective   Subjective   Chief Complaint   Patient presents with    Abdominal Pain     No CP SOA NV currently.    Objective   Objective   Vital Signs  Temp:  [97.1 °F (36.2 °C)-98 °F (36.7 °C)] 98 °F (36.7 °C)  Heart Rate:  [73-89] 80  Resp:  [8-27] 20  BP: ()/(64-83) 111/65  SpO2:  [92 %-100 %] 92 %  on   ;   Device (Oxygen Therapy): room air  Body mass index is 24.72 kg/m².    Physical Exam  Vitals and nursing note reviewed.   Constitutional:       General: He is not in acute distress.     Appearance: He is not diaphoretic.   HENT:      Head: Atraumatic.   Eyes:      General: No scleral icterus.  Cardiovascular:      Rate and Rhythm: Normal rate and regular rhythm.   Pulmonary:      Effort: Pulmonary effort is normal.      Breath sounds: No wheezing.   Abdominal:      General: There is no distension.      Palpations: Abdomen is soft.   Musculoskeletal:         General: No swelling.   Skin:     General: Skin is warm and dry.   Neurological:      Mental Status: He is alert. Mental status is at baseline.   Psychiatric:         Mood and Affect: Mood normal.         Behavior: Behavior normal.       Results Review  I reviewed the patient's new clinical results.    Results from last 7 days   Lab Units 24  0628 24  0607 24  0554 24  0616   WBC 10*3/mm3 6.00 7.72 8.94 10.84*   HEMOGLOBIN g/dL 10.3* 9.9* 9.9* 10.5*   PLATELETS 10*3/mm3 724* 677* 588* 536*     Results from last 7 days   Lab Units 24  0628 24  0607 24  0653 24  0554   SODIUM mmol/L 133* 132* 132* 133*   POTASSIUM mmol/L 4.8 4.9 4.7 4.2   CHLORIDE mmol/L 95* 96* 97* 96*   CO2 mmol/L 29.1* 26.3 26.7 28.0   BUN mg/dL 13 11 10 14   CREATININE mg/dL 0.89 0.90 0.86 0.95   GLUCOSE mg/dL 338* 282* 385* 258*   EGFR mL/min/1.73  106.4 106.0 107.5 99.3     Results from last 7 days   Lab Units 11/07/24  0628 11/05/24  0607 11/04/24  0653 11/03/24  0554   ALBUMIN g/dL 3.0* 2.9* 3.0* 2.6*   BILIRUBIN mg/dL 0.3 0.3 0.4 0.3   ALK PHOS U/L 106 113 108 116   AST (SGOT) U/L 16 15 13 13   ALT (SGPT) U/L 18 23 27 30     Results from last 7 days   Lab Units 11/07/24  0628 11/05/24  0607 11/04/24  0653 11/03/24  0554   CALCIUM mg/dL 9.4 9.0 9.2 8.6   ALBUMIN g/dL 3.0* 2.9* 3.0* 2.6*   MAGNESIUM mg/dL 1.5* 1.7 1.8 1.9   PHOSPHORUS mg/dL 4.0 3.1 3.0 2.5         Glucose   Date/Time Value Ref Range Status   11/07/2024 0629 386 (H) 70 - 130 mg/dL Final   11/06/2024 2006 143 (H) 70 - 130 mg/dL Final   11/06/2024 1732 126 70 - 130 mg/dL Final   11/06/2024 1141 278 (H) 70 - 130 mg/dL Final   11/06/2024 0655 288 (H) 70 - 130 mg/dL Final   11/05/2024 2102 245 (H) 70 - 130 mg/dL Final   11/05/2024 1543 419 (C) 70 - 130 mg/dL Final       CT Guided Percutaneous Drain Peritoneal    Result Date: 11/6/2024  Successful drain placement into residual perihepatic collection. Follow-up CT prior to drain removal recommended.   This report was finalized on 11/6/2024 6:10 PM by Dr. Gilmer Morrell M.D on Workstation: [id number]      IR Abscess drain - simple    Result Date: 11/5/2024   Unsuccessful repositioning of catheter into nondraining superior subcapsular collection; catheter was exchanged for a new drain. CT-guided separate access to remaining collection planned for tomorrow as discussed with Dr. Wisdom.   This report was finalized on 11/5/2024 7:33 PM by Dr. Gilmer Morrell M.D on Workstation: [id number]       I have personally reviewed all medications:  Scheduled Medications  carvedilol, 6.25 mg, Oral, Q12H  cefTRIAXone, 2,000 mg, Intravenous, Q24H  heparin (porcine), 5,000 Units, Subcutaneous, Q8H  insulin glargine, 15 Units, Subcutaneous, Q12H  insulin lispro, 2-7 Units, Subcutaneous, 4x Daily AC & at Bedtime  insulin lispro, 6 Units, Subcutaneous, TID With Meals  Naloxegol  Oxalate, 25 mg, Oral, QAM  pantoprazole, 40 mg, Oral, BID AC  senna-docusate sodium, 2 tablet, Oral, BID   And  polyethylene glycol, 17 g, Oral, BID  venlafaxine XR, 75 mg, Oral, Daily      Infusions       Diet  Diet: Diabetic, Fluid Restriction (240 mL/tray); Consistent Carbohydrate; 1500 mL/day; Fluid Consistency: Thin (IDDSI 0)      Assessment/Plan     Active Hospital Problems    Diagnosis  POA    **Epigastric abdominal pain [R10.13]  Yes    Abscess of liver [K75.0]  Yes    Klebsiella pneumoniae (k. pneumoniae) as the cause of diseases classified elsewhere [B96.1]  Yes    Hypotension [I95.9]  Yes    Transaminitis [R74.01]  Yes    Leukocytosis [D72.829]  Yes    Chronic combined systolic and diastolic heart failure [I50.42]  Yes    Calculus of gallbladder with acute on chronic cholecystitis without obstruction [K80.12]  Unknown    Anxiety [F41.9]  Yes    Multiple-type hyperlipidemia [E78.2]  Yes    Type 2 diabetes mellitus with diabetic neuropathy, with long-term current use of insulin [E11.40, Z79.4]  Not Applicable    Benign essential HTN [I10]  Yes      Resolved Hospital Problems    Diagnosis Date Resolved POA    Acute kidney injury [N17.9] 10/30/2024 No       49 y.o. male admitted with Epigastric abdominal pain.    Liver abscess/biloma: Status post IR drainage and drain placement 10/28.  Status post IR drain exchange 11/5.  Status post IR drain placement 11/6.  Klebsiella.  Continue Rocephin.  Timing duration to be determined. CT will be needed prior to removal. Infectious disease following.  Cholecystitis: No immediate plan for ERCP.  GI evaluated and he did have a EGD earlier this admission on 10/24.  Eventual cholecystectomy, timing TBD.  Surgery following.  Hypotension: Had improved with resuscitation and midodrine.  The midodrine has since been stopped.  Pulmonology evaluated.  TENISHA: Multifactorial and now improved.  Nephrology stated was okay for him to start GDMT for CHF at discharge as tolerated.  "Nephrology following. Plan follow-up in 1 week in clinic.  NICM/bigeminy: EF 49%.  On Coreg.  Home Entresto, Aldactone, and Jardiance are held.  Cardiology evaluated.   Diabetes: A1c 9.4.  Was on Jardiance metformin and insulin prior to admission.  Had hypo and hyperglycemia this admission.  Continue Lantus, Premeal 6 units plus SSI.  Monitoring requirements.  Hypertension: Low. Lasix held.  PPX: Subcutaneous heparin  Disposition: SNF/TBD    Expected Discharge Date: 11/8/2024; Expected Discharge Time:      Girma Tobias MD  [facility]  11/07/24  10:05 EST    Dictated portions of note using Dragon dictation software.  Copied text in this note has been reviewed by me and remains accurate as of 11/07/24    Electronically signed by Girma Tobias MD at 11/07/24 1015       Manuela Wisdom MD at 11/07/24 0945          General Surgery Progress Note    CC: Cholecystitis, infected subcapsular biloma     S: Feels a little sore at drain sites but pain controlled. Denies NV. Tolerating diet. Having bowel movements. No fevers. Discussed possible cholecystectomy with him. Patient hesitant to proceed and would like time to consider.    O:/65   Pulse 80   Temp 98 °F (36.7 °C) (Oral)   Resp 20   Ht 170.2 cm (67\")   Wt 71.6 kg (157 lb 13.6 oz)   SpO2 92%   BMI 24.72 kg/m²     Intake & Output (last day)         11/06 0701 11/07 0700 11/07 0701 11/08 0700    P.O. 680     I.V. (mL/kg) 1807.5 (25.2)     IV Piggyback      Total Intake(mL/kg) 2487.5 (34.7)     Urine (mL/kg/hr) 2175 (1.3)     Drains 200     Total Output 2375     Net +112.5                   GENERAL: Awake, alert, interactive, cooperative, answering questions appropriately  HEENT: EOMI, moist mucus membranes  CHEST: normal work of breathing on room air  CARDIAC: Regular rate   GI: Abd soft, non distended, mildly tender at drain sites; right lateral drain empty; superior drain with small amount of pale green clear " fluid  EXTREMITIES: MOCK, no cyanosis   SKIN: Warm and dry    LABS  Results from last 7 days   Lab Units 11/07/24  0628 11/05/24  0607 11/03/24  0554   WBC 10*3/mm3 6.00 7.72 8.94   HEMOGLOBIN g/dL 10.3* 9.9* 9.9*   HEMATOCRIT % 31.7* 31.9* 32.0*   PLATELETS 10*3/mm3 724* 677* 588*     Results from last 7 days   Lab Units 11/07/24  0628 11/05/24  0607 11/04/24  0653   SODIUM mmol/L 133* 132* 132*   POTASSIUM mmol/L 4.8 4.9 4.7   CHLORIDE mmol/L 95* 96* 97*   CO2 mmol/L 29.1* 26.3 26.7   BUN mg/dL 13 11 10   CREATININE mg/dL 0.89 0.90 0.86   CALCIUM mg/dL 9.4 9.0 9.2   BILIRUBIN mg/dL 0.3 0.3 0.4   ALK PHOS U/L 106 113 108   ALT (SGPT) U/L 18 23 27   AST (SGOT) U/L 16 15 13   GLUCOSE mg/dL 338* 282* 385*           IMAGING:    -CT demonstrating contracted gallbladder with mild hyperenhancing wall.  EGD performed 10/24 negative for abnormalities; August 2024 CT had distended gallbladder but no other findings of cholecystitis.  -HIDA scan 10/24 demonstrating filling but with decreased ejection fraction 19% concerning for possible chronic cholecystitis.   -CTA abdomen pelvis 10/26 to evaluate worsening RUQ pain and possible mesenteric ischemia demonstrated a large right subcapsular fluid collection with mass effect on the liver; no active extravasation  -s/p CT-guided IR drainage 10/28 with thick, turbid bilious output from drain - bile leak vs abscess in the differential, cause unclear but may be related to cholecystitis.  -MRCP 10/30/24 study limited; nondistended, inflamed appearing gallbladder containing stones present; subcapsular fluid collection decreased as expected with drainage; also a second loculated collection appearing contiguous with the duodenum and inferior to the gallbladder fossa has decreased in size.  Nonenhancing lesion in the inferior right hepatic lobe new since August, may be related to biloma, for which follow up is recommended  -CT abdomen pelvis 11/4/2024 - most of his subcapsular fluid  collection has decreased but there is still a significant amount of fluid in the superior aspect, the gallbladder has a thick wall without distention, there is no biliary dilation, the proximal duodenum appears less thickened, there is right middle and lower lobe atelectasis and a small pleural effusion, and left lower lobe atelectasis    CT guided perc drain images and report from yesterday reviewed- successful placement of drain into the fluid collection noted with approximately 20cc bright yellow cloudy fluid removed    Assessment/Plan: 49 y.o. male with multiple medical problems including CHF, hypertension, hyperlipidemia, type 2 diabetes mellitus with A1c 9.4, presenting with abdominal pain, found to have possible cholecystitis, with Klebsiella infected biloma.      Doing well following antibiotic treatment and drainage of infected biloma.   Patient remains clinically stable with normalized WBC and LFTs, tolerating diet, having bowel function, pain controlled.    Discontinue right lateral IR drain today. Continue superior drain.  Discussed management options going forward. I recommend laparoscopic possible open cholecystectomy with intraoperative cholangiogram. I explained to the patient the rationale as I suspect his cholecystitis played some role in his spontaneous biloma, without other source identifiable. With evidence of cholecystitis on his workup, gallbladder removal is indicated to prevent further complications.  I discussed with the patient the nature of the procedure, the risks (including but not limited to bleeding, infection, injury to surrounding structures, risk for conversion to open procedure, risk of injury to the biliary tree or postoperative bile leak, changes in bowel function accompanying cholecystectomy, risks with anesthesia), benefits, alternatives (conservative treatment with drainage of his infected biloma and antibiotics alone and monitoring for signs and symptoms concerning for  worsening cholecystitis), and postoperative expectations. The patient would like to consider his options at this time, stating he feels like he is just getting over his infection and he is not sure how he feels about surgery. I will revisit to follow up his decision.    Manuela Wisdom MD  General, Robotic and Endoscopic Surgery  [facility]    [address], [address]  [place], [place], [address]  P: [phone]  F: [phone]       Electronically signed by Manuela Wisdom MD at 24 0952       Evan Pat MD at 24 0929              [facility] Progress Note      Patient Name: Donnie Bishop  : 1975  MRN: [id number]  Primary Care Physician:  Juju Kennedy MD  Date of admission: 10/22/2024    Subjective     Interval History:   F/u TENISHA     Review of Systems:   Denies dyspnea or nausea   Went to IR yesterday for drainage of intra-abdominal biloma doing well overall  Blood pressure is soft  Objective     Vitals:   Temp:  [97.1 °F (36.2 °C)-98 °F (36.7 °C)] 98 °F (36.7 °C)  Heart Rate:  [73-89] 80  Resp:  [8-27] 20  BP: ()/(64-83) 111/65    Intake/Output Summary (Last 24 hours) at 2024 0929  Last data filed at 2024 0651  Gross per 24 hour   Intake 2487.5 ml   Output 2175 ml   Net 312.5 ml       Physical Exam:    General Appearance: frail AAM comfortable on RA, speech/mentation slow  Neck: supple, no JVD  Lungs: CTA bilat no rales  Heart: RRR, normal S1 and S2  Abdomen: soft, nontender, nondistended  Extremities: no edema, cyanosis or clubbing        Scheduled Meds:     carvedilol, 6.25 mg, Oral, Q12H  cefTRIAXone, 2,000 mg, Intravenous, Q24H  furosemide, 20 mg, Oral, Daily  heparin (porcine), 5,000 Units, Subcutaneous, Q8H  insulin glargine, 15 Units, Subcutaneous, Q12H  insulin lispro, 2-7 Units, Subcutaneous, 4x Daily AC & at Bedtime  insulin lispro, 6 Units, Subcutaneous, TID With Meals  Naloxegol Oxalate, 25 mg, Oral,  QAM  pantoprazole, 40 mg, Oral, BID AC  senna-docusate sodium, 2 tablet, Oral, BID   And  polyethylene glycol, 17 g, Oral, BID  venlafaxine XR, 75 mg, Oral, Daily      IV Meds:          Results Reviewed:   I have personally reviewed the results from the time of this admission to 11/7/2024 09:29 EST     Results from last 7 days   Lab Units 11/07/24  0628 11/05/24  0607 11/04/24  0653   SODIUM mmol/L 133* 132* 132*   POTASSIUM mmol/L 4.8 4.9 4.7   CHLORIDE mmol/L 95* 96* 97*   CO2 mmol/L 29.1* 26.3 26.7   BUN mg/dL 13 11 10   CREATININE mg/dL 0.89 0.90 0.86   CALCIUM mg/dL 9.4 9.0 9.2   BILIRUBIN mg/dL 0.3 0.3 0.4   ALK PHOS U/L 106 113 108   ALT (SGPT) U/L 18 23 27   AST (SGOT) U/L 16 15 13   GLUCOSE mg/dL 338* 282* 385*     Estimated Creatinine Clearance: 103.9 mL/min (by C-G formula based on SCr of 0.89 mg/dL).  Results from last 7 days   Lab Units 11/07/24  0628 11/05/24  0607 11/04/24  0653   MAGNESIUM mg/dL 1.5* 1.7 1.8   PHOSPHORUS mg/dL 4.0 3.1 3.0     Results from last 7 days   Lab Units 11/03/24  0554   URIC ACID mg/dL 3.5     Results from last 7 days   Lab Units 11/07/24  0628 11/05/24  0607 11/03/24  0554 11/02/24  0616 11/01/24  0635   WBC 10*3/mm3 6.00 7.72 8.94 10.84* 11.82*   HEMOGLOBIN g/dL 10.3* 9.9* 9.9* 10.5* 10.7*   PLATELETS 10*3/mm3 724* 677* 588* 536* 471*             Assessment / Plan     ASSESSMENT:  Acute kidney injury.  Multifactorial including hypovolemia, hypotension, impaired renal autoregulation due to Jardiance, Entresto, Aldactone.  Resolved.  Cr ~ 1.   Cholecystitis with large right perihepatic fluid collection.  Plan noted for IR drainage today.  On Rocephin per ID.  Transaminitis improving.  Off statin.  MRCP noted.  Heart failure reduced ejection fraction.  EF 49%.  (Improved from 35% a year ago).  Anemia.    Encephalopathy.  Mentation remains bit slow.  Diabetes mellitus type 2.  Hypotension -resolved    PLAN:  Kidney function is acceptable and sodium level is improving  Continue  current therapy for now  Encourage p.o. intake  Will hold Lasix given soft blood pressure  Labs in a.m.    Evan Pat MD  11/07/24  09:29 [facility]    [facility]  [phone]    Electronically signed by Evan Pat MD at 11/07/24 0930       Kashmir Carlos MD at 11/07/24 0923           LOS: 14 days     ID PROGRESS NOTE    Chief Complaint: Liver abscess and biloma due to Klebsiella    Interval History: No acute events.  No fever.  He went to IR yesterday for drain placement to biloma and 20 cc removed.  D/W RN.    Medications:    Current Facility-Administered Medications:     sennosides-docusate (PERICOLACE) 8.6-50 MG per tablet 2 tablet, 2 tablet, Oral, BID, 2 tablet at 11/06/24 0826 **AND** polyethylene glycol (MIRALAX) packet 17 g, 17 g, Oral, BID, 17 g at 11/06/24 0826 **AND** bisacodyl (DULCOLAX) EC tablet 5 mg, 5 mg, Oral, Daily PRN **AND** bisacodyl (DULCOLAX) suppository 10 mg, 10 mg, Rectal, Daily PRN, Yaritza Hurtado MD, 10 mg at 10/30/24 1841    Calcium Replacement - Follow Nurse / BPA Driven Protocol, , Does not apply, PRN, Elma Wiggins MD    carvedilol (COREG) tablet 6.25 mg, 6.25 mg, Oral, Q12H, Kaylee Kay MD, 6.25 mg at 11/06/24 2023    cefTRIAXone (ROCEPHIN) 2,000 mg in sodium chloride 0.9 % 100 mL MBP, 2,000 mg, Intravenous, Q24H, Kashmir Carlos MD, Last Rate: 200 mL/hr at 11/06/24 1733, 2,000 mg at 11/06/24 1733    dextrose (D50W) (25 g/50 mL) IV injection 25 g, 25 g, Intravenous, Q15 Min PRN, Elma Wiggins MD, 25 g at 11/03/24 1627    dextrose (GLUTOSE) oral gel 15 g, 15 g, Oral, Q15 Min PRN, Elma Wiggins MD    furosemide (LASIX) tablet 20 mg, 20 mg, Oral, Daily, Connor Crandall MD, 20 mg at 11/06/24 0826    glucagon (GLUCAGEN) injection 1 mg, 1 mg, Intramuscular, Q15 Min PRN, Elma Wiggins MD    heparin (porcine) 5000 UNIT/ML injection 5,000 Units, 5,000 Units, Subcutaneous, Q8H, Genesis,  Yaritza MORALES MD, 5,000 Units at 11/07/24 0649    hydrOXYzine (ATARAX) tablet 10 mg, 10 mg, Oral, Q8H PRN, Elma Wiggins MD, 10 mg at 11/05/24 0352    influenza virus vacc split PF FLUZONE 0.5 mL, 0.5 mL, Intramuscular, During Hospitalization, Elma Wiggins MD    insulin glargine (LANTUS, SEMGLEE) injection 15 Units, 15 Units, Subcutaneous, Q12H, Girma Tobias MD, 15 Units at 11/06/24 2032    insulin lispro (HUMALOG/ADMELOG) injection 2-7 Units, 2-7 Units, Subcutaneous, 4x Daily AC & at Bedtime, Girma Tobias MD, 4 Units at 11/06/24 1157    insulin lispro (HUMALOG/ADMELOG) injection 6 Units, 6 Units, Subcutaneous, TID With Meals, Girma Tobias MD, 6 Units at 11/06/24 1733    melatonin tablet 2.5 mg, 2.5 mg, Oral, Nightly PRN, Elma Wiggins MD    Naloxegol Oxalate (MOVANTIK) tablet 25 mg, 25 mg, Oral, QA, Yaritza Hurtado MD, 25 mg at 11/07/24 0647    [DISCONTINUED] morphine injection 1 mg, 1 mg, Intravenous, Q4H PRN, 1 mg at 10/23/24 0832 **AND** naloxone (NARCAN) injection 0.4 mg, 0.4 mg, Intravenous, Q5 Min PRN, Elma Wiggins MD    ondansetron ODT (ZOFRAN-ODT) disintegrating tablet 4 mg, 4 mg, Oral, Q6H PRN, 4 mg at 11/02/24 2123 **OR** ondansetron (ZOFRAN) injection 4 mg, 4 mg, Intravenous, Q6H PRN, Elma Wiggins MD, 4 mg at 11/03/24 0842    oxyCODONE (ROXICODONE) immediate release tablet 5 mg, 5 mg, Oral, Q4H PRN, Steven Rivera MD, 5 mg at 11/07/24 0426    pantoprazole (PROTONIX) EC tablet 40 mg, 40 mg, Oral, BID AC, Elma Wiggins MD, 40 mg at 11/07/24 0647    Phosphorus Replacement - Follow Nurse / BPA Driven Protocol, , Does not apply, Rosalie DOWD Lauren C., MD    Potassium Replacement - Follow Nurse / BPA Driven Protocol, , Does not apply, Rosalie DOWD Lauren C., MD    simethicone (MYLICON) chewable tablet 80 mg, 80 mg, Oral, 4x Daily PRN, Girma Tobias MD    sodium chloride 0.9 % flush 10 mL, 10 mL, Intravenous, PRN, Elma Wiggins MD, 10 mL at  11/05/24 0825    venlafaxine XR (EFFEXOR-XR) 24 hr capsule 75 mg, 75 mg, Oral, Daily, Elma Wiggins MD, 75 mg at 11/06/24 0827      Vital Signs  Temp:  [97.1 °F (36.2 °C)-98 °F (36.7 °C)] 98 °F (36.7 °C)  Heart Rate:  [73-89] 80  Resp:  [8-27] 20  BP: ()/(64-83) 90/64    Physical Exam:  General: No acute distress, resting comfortably  Eyes: No scleral icterus  Respiratory: Normal work of breathing   GI: Abdominal drains in place with mostly clear fluid.  Skin: No rashes   Extremities: No edema    Results Review:    CBC, CMP, and blood cultures reviewed today    Lab Results   Component Value Date    WBC 6.00 11/07/2024    HGB 10.3 (L) 11/07/2024    HCT 31.7 (L) 11/07/2024    MCV 90.8 11/07/2024     (H) 11/07/2024     Lab Results   Component Value Date    GLUCOSE 338 (H) 11/07/2024    CALCIUM 9.4 11/07/2024     (L) 11/07/2024    K 4.8 11/07/2024    CO2 29.1 (H) 11/07/2024    CL 95 (L) 11/07/2024    BUN 13 11/07/2024    CREATININE 0.89 11/07/2024    EGFRRESULT 68.0 07/12/2023    EGFR 106.4 11/07/2024    BCR 14.6 11/07/2024    ANIONGAP 8.9 11/07/2024     Lab Results   Component Value Date    ALT 18 11/07/2024     Lab Results   Component Value Date    BILITOT 0.3 11/07/2024     Lab Results   Component Value Date    HGBA1C 9.40 (H) 10/24/2024     Lab Results   Component Value Date    CRP 7.02 (H) 11/01/2024       Microbiology:  10/23 BCx: negative  10/25 RPP: negative  10/28 Liver Abscess Cx: rare growth Kleb pneumo (susc all abx tested exc ampicillin)  10/28 BCx: negative    Prior radiology:  11/3 CT scan abdomen/pelvis: Significant decrease in the size of the perihepatic and subcapsular fluid collection.  Thickened gallbladder wall.    Assessment    Liver abscess due to Klebsiella  Biloma  Cholecystitis  Uncontrolled DM2 -last A1c 9.4%    He is gradually improving. Drains remain in place. Thanks to IR for their help.  General surgery note reviewed and patient will need cholecystectomy with exact  timing TBD.      While in the hospital, continue ceftriaxone 2 g IV every 24 hours; however, closer to the time of discharge I should be able to change him over to an oral antibiotic such as Augmentin to complete the course.  Final stop date remains TBD.     ID will follow.       Electronically signed by Kashmir Carlos MD at 24 0924       Girma Tobias MD at 24 1123              Name: Donnie Bishop ADMIT: 10/22/2024   : 1975  PCP: Juju Kennedy MD    MRN: [id number] LOS: 13 days   AGE/SEX: 49 y.o. male  ROOM: [facility]     Subjective   Subjective   Chief Complaint   Patient presents with    Abdominal Pain     He had some nausea which was controlled with medications.  No chest pain palpitations or shortness of breath reported.    Objective   Objective   Vital Signs  Temp:  [97.9 °F (36.6 °C)-98.6 °F (37 °C)] 98.5 °F (36.9 °C)  Heart Rate:  [77-90] 77  Resp:  [10-28] 26  BP: (100-125)/(63-86) 104/68  SpO2:  [92 %-100 %] 96 %  on  Flow (L/min) (Oxygen Therapy):  [2] 2;   Device (Oxygen Therapy): room air  Body mass index is 25.07 kg/m².    Physical Exam  Vitals and nursing note reviewed.   Constitutional:       General: He is not in acute distress.     Appearance: He is not diaphoretic.   HENT:      Head: Atraumatic.   Eyes:      General: No scleral icterus.  Cardiovascular:      Rate and Rhythm: Normal rate and regular rhythm.   Pulmonary:      Effort: Pulmonary effort is normal.      Breath sounds: No wheezing.   Abdominal:      General: There is no distension.      Palpations: Abdomen is soft.   Musculoskeletal:         General: No swelling.   Skin:     General: Skin is warm and dry.   Neurological:      Mental Status: He is alert. Mental status is at baseline.   Psychiatric:         Mood and Affect: Mood normal.         Behavior: Behavior normal.       Results Review  I reviewed the patient's new clinical results.    Results from last 7 days   Lab Units 24  0607  11/03/24  0554 11/02/24  0616 11/01/24  0635   WBC 10*3/mm3 7.72 8.94 10.84* 11.82*   HEMOGLOBIN g/dL 9.9* 9.9* 10.5* 10.7*   PLATELETS 10*3/mm3 677* 588* 536* 471*     Results from last 7 days   Lab Units 11/05/24  0607 11/04/24  0653 11/03/24  0554 11/02/24  0616   SODIUM mmol/L 132* 132* 133* 130*   POTASSIUM mmol/L 4.9 4.7 4.2 4.2   CHLORIDE mmol/L 96* 97* 96* 92*   CO2 mmol/L 26.3 26.7 28.0 26.0   BUN mg/dL 11 10 14 18   CREATININE mg/dL 0.90 0.86 0.95 0.97   GLUCOSE mg/dL 282* 385* 258* 233*   EGFR mL/min/1.73 106.0 107.5 99.3 96.9     Results from last 7 days   Lab Units 11/05/24  0607 11/04/24  0653 11/03/24  0554 11/02/24  0616   ALBUMIN g/dL 2.9* 3.0* 2.6* 2.8*   BILIRUBIN mg/dL 0.3 0.4 0.3 0.4   ALK PHOS U/L 113 108 116 129*   AST (SGOT) U/L 15 13 13 18   ALT (SGPT) U/L 23 27 30 40     Results from last 7 days   Lab Units 11/05/24  0607 11/04/24  0653 11/03/24  0554 11/02/24  0616   CALCIUM mg/dL 9.0 9.2 8.6 8.6   ALBUMIN g/dL 2.9* 3.0* 2.6* 2.8*   MAGNESIUM mg/dL 1.7 1.8 1.9 1.8   PHOSPHORUS mg/dL 3.1 3.0 2.5 3.0         Glucose   Date/Time Value Ref Range Status   11/06/2024 0655 288 (H) 70 - 130 mg/dL Final   11/05/2024 2102 245 (H) 70 - 130 mg/dL Final   11/05/2024 1543 419 (C) 70 - 130 mg/dL Final   11/05/2024 0741 276 (H) 70 - 130 mg/dL Final   11/04/2024 2032 347 (H) 70 - 130 mg/dL Final   11/04/2024 1634 106 70 - 130 mg/dL Final   11/04/2024 1106 203 (H) 70 - 130 mg/dL Final       IR Abscess drain - simple    Result Date: 11/5/2024   Unsuccessful repositioning of catheter into nondraining superior subcapsular collection; catheter was exchanged for a new drain. CT-guided separate access to remaining collection planned for tomorrow as discussed with Dr. Wisdom.   This report was finalized on 11/5/2024 7:33 PM by Dr. Gilmer Morrell M.D on Workstation: [id number]       I have personally reviewed all medications:  Scheduled Medications  carvedilol, 6.25 mg, Oral, Q12H  cefTRIAXone, 2,000 mg, Intravenous,  Q24H  furosemide, 20 mg, Oral, Daily  heparin (porcine), 5,000 Units, Subcutaneous, Q8H  insulin glargine, 15 Units, Subcutaneous, Q12H  insulin lispro, 2-7 Units, Subcutaneous, 4x Daily AC & at Bedtime  insulin lispro, 8 Units, Subcutaneous, TID With Meals  Naloxegol Oxalate, 25 mg, Oral, QAM  pantoprazole, 40 mg, Oral, BID AC  senna-docusate sodium, 2 tablet, Oral, BID   And  polyethylene glycol, 17 g, Oral, BID  venlafaxine XR, 75 mg, Oral, Daily      Infusions  sodium chloride, 75 mL/hr, Last Rate: 75 mL/hr (11/06/24 0850)      Diet  NPO Diet NPO Type: Sips with Meds      Assessment/Plan     Active Hospital Problems    Diagnosis  POA    **Epigastric abdominal pain [R10.13]  Yes    Abscess of liver [K75.0]  Yes    Klebsiella pneumoniae (k. pneumoniae) as the cause of diseases classified elsewhere [B96.1]  Yes    Hypotension [I95.9]  Yes    Transaminitis [R74.01]  Yes    Leukocytosis [D72.829]  Yes    Chronic combined systolic and diastolic heart failure [I50.42]  Yes    Anxiety [F41.9]  Yes    Multiple-type hyperlipidemia [E78.2]  Yes    Type 2 diabetes mellitus with diabetic neuropathy, with long-term current use of insulin [E11.40, Z79.4]  Not Applicable    Benign essential HTN [I10]  Yes      Resolved Hospital Problems    Diagnosis Date Resolved POA    Acute kidney injury [N17.9] 10/30/2024 No       49 y.o. male admitted with Epigastric abdominal pain.    Liver abscess/biloma: Status post IR drainage and drain placement 10/28.  Status post IR drain exchange 11/5.  Additional IR drain procedure planned.  Klebsiella.  Continue Rocephin.  Timing duration to be determined.  Infectious disease following.  Cholecystitis: No immediate plan for ERCP.  GI evaluated and he did have a EGD earlier this admission on 10/24.  Eventual cholecystectomy, timing TBD.  Surgery following.  Hypotension: Had improved with resuscitation and midodrine.  The midodrine has since been stopped.  Pulmonology evaluated.  TENISHA: Multifactorial  and now improved.  Lasix has been resumed by nephrology.  They stated was okay for him to start GDMT for CHF at discharge. Nephrology evaluated . Plan follow-up in 1 week in clinic.  NICM/bigeminy: EF 49%.  On Coreg and Lasix.  Home Entresto, Aldactone, and Jardiance are held.  Cardiology evaluated.   Diabetes: A1c 9.4.  Was on Jardiance metformin and insulin prior to admission.  Had hypo and hyperglycemia this admission.  Continue Lantus, decrease Premeal to 6 units plus SSI.  Monitoring requirements.  Hypertension: See above  PPX: Subcutaneous heparin  Disposition: SNF/TBD    Expected Discharge Date: 2024; Expected Discharge Time:      Girma Tobias MD  [facility]  24  11:23 EST    Dictated portions of note using Dragon dictation software.  Copied text in this note has been reviewed by me and remains accurate as of 24    Electronically signed by Girma Tobias MD at 24 1127       Evan Pat MD at 24 1026              [facility] Progress Note      Patient Name: Donnie Bishop  : 1975  MRN: [id number]  Primary Care Physician:  Juju Kennedy MD  Date of admission: 10/22/2024    Subjective     Interval History:   F/u TENISHA     Review of Systems:   Denies dyspnea or nausea   Plan for repeat IR evaluation for drainage of retained subcapsular biloma today  N.p.o. this morning  Objective     Vitals:   Temp:  [97.5 °F (36.4 °C)-98.6 °F (37 °C)] 98.5 °F (36.9 °C)  Heart Rate:  [77-90] 77  Resp:  [10-28] 26  BP: (100-125)/(63-86) 104/68  Flow (L/min) (Oxygen Therapy):  [2] 2    Intake/Output Summary (Last 24 hours) at 2024 1026  Last data filed at 2024 0458  Gross per 24 hour   Intake 100 ml   Output 2300 ml   Net -2200 ml       Physical Exam:    General Appearance: frail AAM comfortable on RA, speech/mentation slow  Neck: supple, no JVD  Lungs: CTA bilat no rales  Heart: RRR, normal S1 and S2  Abdomen: soft,  nontender, nondistended  Extremities: no edema, cyanosis or clubbing        Scheduled Meds:     carvedilol, 6.25 mg, Oral, Q12H  cefTRIAXone, 2,000 mg, Intravenous, Q24H  furosemide, 20 mg, Oral, Daily  heparin (porcine), 5,000 Units, Subcutaneous, Q8H  insulin glargine, 15 Units, Subcutaneous, Q12H  insulin lispro, 2-7 Units, Subcutaneous, 4x Daily AC & at Bedtime  insulin lispro, 8 Units, Subcutaneous, TID With Meals  Naloxegol Oxalate, 25 mg, Oral, QAM  pantoprazole, 40 mg, Oral, BID AC  senna-docusate sodium, 2 tablet, Oral, BID   And  polyethylene glycol, 17 g, Oral, BID  venlafaxine XR, 75 mg, Oral, Daily      IV Meds:   sodium chloride, 75 mL/hr, Last Rate: 75 mL/hr (11/06/24 0850)        Results Reviewed:   I have personally reviewed the results from the time of this admission to 11/6/2024 10:26 EST     Results from last 7 days   Lab Units 11/05/24  0607 11/04/24  0653 11/03/24  0554   SODIUM mmol/L 132* 132* 133*   POTASSIUM mmol/L 4.9 4.7 4.2   CHLORIDE mmol/L 96* 97* 96*   CO2 mmol/L 26.3 26.7 28.0   BUN mg/dL 11 10 14   CREATININE mg/dL 0.90 0.86 0.95   CALCIUM mg/dL 9.0 9.2 8.6   BILIRUBIN mg/dL 0.3 0.4 0.3   ALK PHOS U/L 113 108 116   ALT (SGPT) U/L 23 27 30   AST (SGOT) U/L 15 13 13   GLUCOSE mg/dL 282* 385* 258*     Estimated Creatinine Clearance: 104.2 mL/min (by C-G formula based on SCr of 0.9 mg/dL).  Results from last 7 days   Lab Units 11/05/24  0607 11/04/24  0653 11/03/24  0554   MAGNESIUM mg/dL 1.7 1.8 1.9   PHOSPHORUS mg/dL 3.1 3.0 2.5     Results from last 7 days   Lab Units 11/03/24  0554   URIC ACID mg/dL 3.5     Results from last 7 days   Lab Units 11/05/24  0607 11/03/24  0554 11/02/24  0616 11/01/24  0635 10/31/24  0542   WBC 10*3/mm3 7.72 8.94 10.84* 11.82* 11.35*   HEMOGLOBIN g/dL 9.9* 9.9* 10.5* 10.7* 11.1*   PLATELETS 10*3/mm3 677* 588* 536* 471* 392             Assessment / Plan     ASSESSMENT:  Acute kidney injury.  Multifactorial including hypovolemia, hypotension, impaired  renal autoregulation due to Jardiance, Entresto, Aldactone.  Resolved.  Cr ~ 1.   Cholecystitis with large right perihepatic fluid collection.  Plan noted for IR drainage today.  On Rocephin per ID.  Transaminitis improving.  Off statin.  MRCP noted.  Heart failure reduced ejection fraction.  EF 49%.  (Improved from 35% a year ago).  Anemia.    Encephalopathy.  Mentation remains bit slow.  Diabetes mellitus type 2.  Hypotension -resolved    PLAN:  Will continue current therapy for now  Will start gentle IV hydration with normal saline at 75 cc an hour given patient is n.p.o.  We will give total of 1 L    Evan Pat MD  11/06/24  10:26 [facility]    [facility]  [phone]    Electronically signed by Evan Pat MD at 11/06/24 1028       Kashmir Carlos MD at 11/06/24 0919           LOS: 13 days     ID PROGRESS NOTE    Chief Complaint: Liver abscess and biloma due to Klebsiella    Interval History: No acute events.  No fever.  He went to IR yesterday for drain exchange.    Medications:    Current Facility-Administered Medications:     sennosides-docusate (PERICOLACE) 8.6-50 MG per tablet 2 tablet, 2 tablet, Oral, BID, 2 tablet at 11/06/24 0826 **AND** polyethylene glycol (MIRALAX) packet 17 g, 17 g, Oral, BID, 17 g at 11/06/24 0826 **AND** bisacodyl (DULCOLAX) EC tablet 5 mg, 5 mg, Oral, Daily PRN **AND** bisacodyl (DULCOLAX) suppository 10 mg, 10 mg, Rectal, Daily PRN, Yaritza Hurtado MD, 10 mg at 10/30/24 1841    Calcium Replacement - Follow Nurse / BPA Driven Protocol, , Does not apply, PRN, Elma Wiggins MD    carvedilol (COREG) tablet 6.25 mg, 6.25 mg, Oral, Q12H, Kaylee Kay MD, 6.25 mg at 11/05/24 2003    cefTRIAXone (ROCEPHIN) 2,000 mg in sodium chloride 0.9 % 100 mL MBP, 2,000 mg, Intravenous, Q24H, Steven Rivera MD, Last Rate: 200 mL/hr at 11/05/24 1410, 2,000 mg at 11/05/24 1410    dextrose (D50W) (25 g/50 mL) IV injection 25 g, 25 g,  Intravenous, Q15 Min PRN, Elma Wiggins MD, 25 g at 11/03/24 1627    dextrose (GLUTOSE) oral gel 15 g, 15 g, Oral, Q15 Min PRN, Elma Wiggins MD    furosemide (LASIX) tablet 20 mg, 20 mg, Oral, Daily, Connor Crandall MD, 20 mg at 11/06/24 0826    glucagon (GLUCAGEN) injection 1 mg, 1 mg, Intramuscular, Q15 Min PRN, Elma Wiggins MD    heparin (porcine) 5000 UNIT/ML injection 5,000 Units, 5,000 Units, Subcutaneous, Q8H, Yaritza Hurtado MD, 5,000 Units at 11/06/24 0515    hydrOXYzine (ATARAX) tablet 10 mg, 10 mg, Oral, Q8H PRN, Elma Wiggins MD, 10 mg at 11/05/24 0352    influenza virus vacc split PF FLUZONE 0.5 mL, 0.5 mL, Intramuscular, During Hospitalization, Elma Wiggins MD    insulin glargine (LANTUS, SEMGLEE) injection 15 Units, 15 Units, Subcutaneous, Q12H, Girma Tobias MD, 15 Units at 11/06/24 0827    insulin lispro (HUMALOG/ADMELOG) injection 2-7 Units, 2-7 Units, Subcutaneous, 4x Daily AC & at Bedtime, Girma Tobias MD, 4 Units at 11/06/24 0826    insulin lispro (HUMALOG/ADMELOG) injection 8 Units, 8 Units, Subcutaneous, TID With Meals, Girma Tobias MD, 8 Units at 11/05/24 1618    melatonin tablet 2.5 mg, 2.5 mg, Oral, Nightly PRN, Elma Wiggins MD    Naloxegol Oxalate (MOVANTIK) tablet 25 mg, 25 mg, Oral, QAM, Yaritza Hurtado MD, 25 mg at 11/06/24 0829    [DISCONTINUED] morphine injection 1 mg, 1 mg, Intravenous, Q4H PRN, 1 mg at 10/23/24 0832 **AND** naloxone (NARCAN) injection 0.4 mg, 0.4 mg, Intravenous, Q5 Min PRN, Elma Wiggins MD    ondansetron ODT (ZOFRAN-ODT) disintegrating tablet 4 mg, 4 mg, Oral, Q6H PRN, 4 mg at 11/02/24 2123 **OR** ondansetron (ZOFRAN) injection 4 mg, 4 mg, Intravenous, Q6H PRN, Elma Wiggins MD, 4 mg at 11/03/24 0842    oxyCODONE (ROXICODONE) immediate release tablet 5 mg, 5 mg, Oral, Q4H PRN, Steven Rivera MD, 5 mg at 11/06/24 0658    pantoprazole (PROTONIX) EC tablet 40 mg, 40 mg, Oral, BID AC,  Elma Wiggins MD, 40 mg at 11/06/24 0827    Phosphorus Replacement - Follow Nurse / BPA Driven Protocol, , Does not apply, Rosalie DOWD Lauren C., MD    Potassium Replacement - Follow Nurse / BPA Driven Protocol, , Does not apply, Rosalie DOWD Lauren C., MD    simethicone (MYLICON) chewable tablet 80 mg, 80 mg, Oral, 4x Daily PRN, Girma Tobias MD    sodium chloride 0.9 % flush 10 mL, 10 mL, Intravenous, PRN, Elma Wiggins MD, 10 mL at 11/05/24 0825    sodium chloride 0.9 % infusion, 75 mL/hr, Intravenous, Continuous, Evan Pat MD, Last Rate: 75 mL/hr at 11/06/24 0850, 75 mL/hr at 11/06/24 0850    venlafaxine XR (EFFEXOR-XR) 24 hr capsule 75 mg, 75 mg, Oral, Daily, Elma Wiggins MD, 75 mg at 11/06/24 0827      Vital Signs  Temp:  [97.5 °F (36.4 °C)-98.6 °F (37 °C)] 98.5 °F (36.9 °C)  Heart Rate:  [77-90] 77  Resp:  [10-28] 26  BP: (100-125)/(63-86) 104/68    Physical Exam:  General: Awake, alert, sitting up in bed  Eyes: No scleral icterus  Respiratory: Normal work of breathing without wheezing  GI: Abdominal drain in place with mostly clear fluid.  Skin: No rashes   Extremities: No edema    Results Review:    CBC, CMP, and blood cultures reviewed today    Lab Results   Component Value Date    WBC 7.72 11/05/2024    HGB 9.9 (L) 11/05/2024    HCT 31.9 (L) 11/05/2024    MCV 94.7 11/05/2024     (H) 11/05/2024     Lab Results   Component Value Date    GLUCOSE 282 (H) 11/05/2024    CALCIUM 9.0 11/05/2024     (L) 11/05/2024    K 4.9 11/05/2024    CO2 26.3 11/05/2024    CL 96 (L) 11/05/2024    BUN 11 11/05/2024    CREATININE 0.90 11/05/2024    EGFRRESULT 68.0 07/12/2023    EGFR 106.0 11/05/2024    BCR 12.2 11/05/2024    ANIONGAP 9.7 11/05/2024     Lab Results   Component Value Date    ALT 23 11/05/2024     Lab Results   Component Value Date    BILITOT 0.3 11/05/2024     Lab Results   Component Value Date    HGBA1C 9.40 (H) 10/24/2024     Lab Results   Component Value Date    CRP  "7.02 (H) 11/01/2024       Microbiology:  10/23 BCx: negative  10/25 RPP: negative  10/28 Liver Abscess Cx: rare growth Kleb pneumo (susc all abx tested exc ampicillin)  10/28 BCx: negative    New Radiology:  11/3 CT scan abdomen/pelvis: Significant decrease in the size of the perihepatic and subcapsular fluid collection.  Thickened gallbladder wall.    Assessment    Liver abscess due to Klebsiella  Biloma  Cholecystitis  Uncontrolled DM2 -last A1c 9.4%    He is gradually improving.  General surgery note reviewed and patient will need cholecystectomy.  Timing TBD.    While in the hospital, continue ceftriaxone 2 g IV every 24 hours; however, closer to the time of discharge I should be able to change him over to an oral antibiotic such as Augmentin to complete the course.  Final stop date TBD.    ID will follow.       Electronically signed by Kashmir Carlos MD at 11/06/24 0922       Manuela Wisdom MD at 11/06/24 0810          General Surgery Progress Note    CC: Cholecystitis, infected subcapsular biloma     S: No new complaints. Patient feeling ok today, reports some RUQ pain. Denies nausea. Asking when he can eat.    O:/67 (BP Location: Right arm, Patient Position: Lying)   Pulse 90   Temp 98.5 °F (36.9 °C) (Oral)   Resp 26   Ht 170.2 cm (67\")   Wt 72.6 kg (160 lb 0.9 oz)   SpO2 93%   BMI 25.07 kg/m²     Intake & Output (last day)         11/05 0701 11/06 0700 11/06 0701 11/07 0700    P.O.      IV Piggyback 100     Total Intake(mL/kg) 100 (1.4)     Urine (mL/kg/hr) 2300 (1.3)     Drains 0     Stool      Total Output 2300     Net -2200                   GENERAL: Awake, alert, interactive, cooperative, answering questions appropriately  HEENT: EOMI, moist mucus membranes   CHEST: normal work of breathing on room air  CARDIAC: Regular rate  GI: Abd soft, non distended, mildly tender in RUQ; RUQ IR drain with small amount of clear serous fluid in bulb  EXTREMITIES: MOCK, no cyanosis   SKIN: " Warm and dry    LABS  Results from last 7 days   Lab Units 11/05/24  0607 11/03/24  0554 11/02/24  0616   WBC 10*3/mm3 7.72 8.94 10.84*   HEMOGLOBIN g/dL 9.9* 9.9* 10.5*   HEMATOCRIT % 31.9* 32.0* 31.9*   PLATELETS 10*3/mm3 677* 588* 536*     Results from last 7 days   Lab Units 11/05/24  0607 11/04/24  0653 11/03/24  0554   SODIUM mmol/L 132* 132* 133*   POTASSIUM mmol/L 4.9 4.7 4.2   CHLORIDE mmol/L 96* 97* 96*   CO2 mmol/L 26.3 26.7 28.0   BUN mg/dL 11 10 14   CREATININE mg/dL 0.90 0.86 0.95   CALCIUM mg/dL 9.0 9.2 8.6   BILIRUBIN mg/dL 0.3 0.4 0.3   ALK PHOS U/L 113 108 116   ALT (SGPT) U/L 23 27 30   AST (SGOT) U/L 15 13 13   GLUCOSE mg/dL 282* 385* 258*           IMAGING:    -CT demonstrating contracted gallbladder with mild hyperenhancing wall.  EGD performed 10/24 negative for abnormalities; August 2024 CT had distended gallbladder but no other findings of cholecystitis.  -HIDA scan 10/24 demonstrating filling but with decreased ejection fraction 19% concerning for possible chronic cholecystitis.   -CTA abdomen pelvis 10/26 to evaluate worsening RUQ pain and possible mesenteric ischemia demonstrated a large right subcapsular fluid collection with mass effect on the liver; no active extravasation  -s/p CT-guided IR drainage 10/28 with thick, turbid bilious output from drain - bile leak vs abscess in the differential, cause unclear but may be related to cholecystitis.  -MRCP 10/30/24 study limited; nondistended, inflamed appearing gallbladder containing stones present; subcapsular fluid collection decreased as expected with drainage; also a second loculated collection appearing contiguous with the duodenum and inferior to the gallbladder fossa has decreased in size.  Nonenhancing lesion in the inferior right hepatic lobe new since August, may be related to biloma, for which follow up is recommended    CT abdomen pelvis 11/4/2024 images and report reviewed, most of his subcapsular fluid collection has decreased  but there is still a significant amount of fluid in the superior aspect, the gallbladder has a thick wall without distention, there is no biliary dilation, the proximal duodenum appears less thickened, there is right middle and lower lobe atelectasis and a small pleural effusion, and left lower lobe atelectasis    Assessment/Plan: 49 y.o. male with multiple medical problems including CHF, hypertension, hyperlipidemia, type 2 diabetes mellitus with A1c 9.4, presenting with abdominal pain, found to have possible cholecystitis, with Klebsiella infected biloma.      Patient remains clinically stable with normalized WBC and LFTs, tolerating diet, having bowel function, pain controlled.    Plan for repeat IR evaluation for drainage of retained subcapsular biloma today. Although spontaneous biloma can be definitively managed with percutaneous drainage and antibiotic therapy, given evidence of cholecystitis on his workup, cholecystectomy is warranted. Will determine timing based on drain outputs and likely follow up CT.     Manuela Wisdom MD  General, Robotic and Endoscopic Surgery  [facility]    [address], [address]  [place], [place], [address]  P: [phone]  F: [phone]       Electronically signed by Manuela Wisdom MD at 24 0835       Evan Pat MD at 24 1631              [facility] Progress Note      Patient Name: Donnie Bishop  : 1975  MRN: [id number]  Primary Care Physician:  Juju Kennedy MD  Date of admission: 10/22/2024    Subjective     Interval History:   F/u TENISHA     Review of Systems:   Denies dyspnea or nausea   Plan noted for IR drainage of abdominal fluid collection today  Objective     Vitals:   Temp:  [97.5 °F (36.4 °C)-98.3 °F (36.8 °C)] 97.9 °F (36.6 °C)  Heart Rate:  [77-92] 79  Resp:  [10-28] 24  BP: (100-125)/(64-86) 110/86  Flow (L/min) (Oxygen Therapy):  [2] 2    Intake/Output Summary (Last 24 hours) at 2024  1631  Last data filed at 11/5/2024 1539  Gross per 24 hour   Intake 220 ml   Output 2050 ml   Net -1830 ml       Physical Exam:    General Appearance: frail AAM comfortable on RA, speech/mentation slow  Neck: supple, no JVD  Lungs: CTA bilat no rales  Heart: RRR, normal S1 and S2  Abdomen: soft, nontender, nondistended  Extremities: no edema, cyanosis or clubbing        Scheduled Meds:     carvedilol, 6.25 mg, Oral, Q12H  cefTRIAXone, 2,000 mg, Intravenous, Q24H  furosemide, 20 mg, Oral, Daily  heparin (porcine), 5,000 Units, Subcutaneous, Q8H  insulin glargine, 15 Units, Subcutaneous, Q12H  insulin lispro, 2-7 Units, Subcutaneous, 4x Daily AC & at Bedtime  insulin lispro, 8 Units, Subcutaneous, TID With Meals  Naloxegol Oxalate, 25 mg, Oral, QAM  pantoprazole, 40 mg, Oral, BID AC  senna-docusate sodium, 2 tablet, Oral, BID   And  polyethylene glycol, 17 g, Oral, BID  venlafaxine XR, 75 mg, Oral, Daily      IV Meds:        Results Reviewed:   I have personally reviewed the results from the time of this admission to 11/5/2024 16:31 EST     Results from last 7 days   Lab Units 11/05/24  0607 11/04/24  0653 11/03/24  0554   SODIUM mmol/L 132* 132* 133*   POTASSIUM mmol/L 4.9 4.7 4.2   CHLORIDE mmol/L 96* 97* 96*   CO2 mmol/L 26.3 26.7 28.0   BUN mg/dL 11 10 14   CREATININE mg/dL 0.90 0.86 0.95   CALCIUM mg/dL 9.0 9.2 8.6   BILIRUBIN mg/dL 0.3 0.4 0.3   ALK PHOS U/L 113 108 116   ALT (SGPT) U/L 23 27 30   AST (SGOT) U/L 15 13 13   GLUCOSE mg/dL 282* 385* 258*     Estimated Creatinine Clearance: 102.9 mL/min (by C-G formula based on SCr of 0.9 mg/dL).  Results from last 7 days   Lab Units 11/05/24  0607 11/04/24  0653 11/03/24  0554   MAGNESIUM mg/dL 1.7 1.8 1.9   PHOSPHORUS mg/dL 3.1 3.0 2.5     Results from last 7 days   Lab Units 11/03/24  0554 10/30/24  0639   URIC ACID mg/dL 3.5 3.4     Results from last 7 days   Lab Units 11/05/24  0607 11/03/24  0554 11/02/24  0616 11/01/24  0635 10/31/24  0542   WBC 10*3/mm3 7.72  "8.94 10.84* 11.82* 11.35*   HEMOGLOBIN g/dL 9.9* 9.9* 10.5* 10.7* 11.1*   PLATELETS 10*3/mm3 677* 588* 536* 471* 392             Assessment / Plan     ASSESSMENT:  Acute kidney injury.  Multifactorial including hypovolemia, hypotension, impaired renal autoregulation due to Jardiance, Entresto, Aldactone.  Resolved.  Cr ~ 1.   Cholecystitis with large right perihepatic fluid collection.  Plan noted for IR drainage today.  On Rocephin per ID.  Transaminitis improving.  Off statin.  MRCP noted.  White blood cell count falling.  Heart failure reduced ejection fraction.  EF 49%.  (Improved from 35% a year ago).  Anemia.    Encephalopathy.  Mentation remains bit slow.  Diabetes mellitus type 2.  Hypotension - SBP 90s to low 100s on low dose coreg     PLAN:  Continue current therapy for now  Labs in a.m.      Evan Pat MD  11/05/24  16:31 [facility]    [facility]  [phone]    Electronically signed by Evan Pat MD at 11/05/24 6114       Manuela Wisdom MD at 11/05/24 1548          General Surgery Progress Note    CC: Cholecystitis, infected subcapsular biloma     S: Patient with no new symptoms. Abdominal pain is controlled. No NV. Tolerated lunch today. Reports a bowel movement this morning.    O:/86 (BP Location: Right arm, Patient Position: Lying)   Pulse 79   Temp 97.9 °F (36.6 °C) (Oral)   Resp 24   Ht 170.2 cm (67\")   Wt 71.7 kg (158 lb)   SpO2 96%   BMI 24.75 kg/m²     Intake & Output (last day)         11/04 0701  11/05 0700 11/05 0701  11/06 0700    P.O. 120     IV Piggyback  100    Total Intake(mL/kg) 120 (1.7) 100 (1.4)    Urine (mL/kg/hr) 1050 (0.6) 1500 (2.4)    Drains 0 0    Stool 0     Total Output 1050 1500    Net -930 -1400          Stool Unmeasured Occurrence 1 x             GENERAL: Awake, alert, interactive, cooperative, answering questions appropriately  HEENT: EOMI, moist mucus membranes   CHEST: normal work of breathing on room air  CARDIAC: " Regular rate  GI: Abd soft, non distended, nontender; RUQ IR drain with new bulb with small amount of clear serous fluid in bulb.  EXTREMITIES: MOCK, no cyanosis, no lower extremity edema  SKIN: Warm and dry    LABS  Results from last 7 days   Lab Units 11/05/24  0607 11/03/24  0554 11/02/24  0616   WBC 10*3/mm3 7.72 8.94 10.84*   HEMOGLOBIN g/dL 9.9* 9.9* 10.5*   HEMATOCRIT % 31.9* 32.0* 31.9*   PLATELETS 10*3/mm3 677* 588* 536*     Results from last 7 days   Lab Units 11/05/24  0607 11/04/24  0653 11/03/24  0554   SODIUM mmol/L 132* 132* 133*   POTASSIUM mmol/L 4.9 4.7 4.2   CHLORIDE mmol/L 96* 97* 96*   CO2 mmol/L 26.3 26.7 28.0   BUN mg/dL 11 10 14   CREATININE mg/dL 0.90 0.86 0.95   CALCIUM mg/dL 9.0 9.2 8.6   BILIRUBIN mg/dL 0.3 0.4 0.3   ALK PHOS U/L 113 108 116   ALT (SGPT) U/L 23 27 30   AST (SGOT) U/L 15 13 13   GLUCOSE mg/dL 282* 385* 258*         Magnesium 1.7  Phosphorus 3.1    IMAGING:    -CT demonstrating contracted gallbladder with mild hyperenhancing wall.  EGD performed 10/24 negative for abnormalities; August 2024 CT had distended gallbladder but no other findings of cholecystitis.  -HIDA scan 10/24 demonstrating filling but with decreased ejection fraction 19% concerning for possible chronic cholecystitis.   -CTA abdomen pelvis 10/26 to evaluate worsening RUQ pain and possible mesenteric ischemia demonstrated a large right subcapsular fluid collection with mass effect on the liver; no active extravasation  -s/p CT-guided IR drainage 10/28 with thick, turbid bilious output from drain - bile leak vs abscess in the differential, cause unclear but may be related to cholecystitis.  -MRCP 10/30/24 study limited; nondistended, inflamed appearing gallbladder containing stones present; subcapsular fluid collection decreased as expected with drainage; also a second loculated collection appearing contiguous with the duodenum and inferior to the gallbladder fossa has decreased in size.  Nonenhancing lesion in  the inferior right hepatic lobe new since August, may be related to biloma, for which follow up is recommended    CT abdomen pelvis 11/4/2024 images and report reviewed, most of his subcapsular fluid collection has decreased but there is still a significant amount of fluid in the superior aspect, the gallbladder has a thick wall without distention, there is no biliary dilation, the proximal duodenum appears less thickened, there is right middle and lower lobe atelectasis and a small pleural effusion, and left lower lobe atelectasis    Assessment/Plan: 49 y.o. male with multiple medical problems including CHF, hypertension, hyperlipidemia, type 2 diabetes mellitus with A1c 9.4, presenting with abdominal pain, found to have possible cholecystitis, with Klebsiella infected biloma.      Patient clinically stable.  Discussed with Dr. Morrell who plans to attempt CT guided drainage as patient was unable to have his extant drain manipulated into the superior subcapsular fluid collection in IR today. Ok for diet, NPO after midnight for repeat drainage attempt tomorrow. Eventually planning for cholecystectomy following control of biloma and infection given evidence of cholecystitis on recent workup.      Addendum: Discussed with Dr. Morrell. Patient s/p successful drain placement with drain flowing freely. Will monitor drain output and tentatively plan for cholecystectomy in the next few days.    Manuela Wisdom MD  General, Robotic and Endoscopic Surgery  [facility]    [address], [address]  [place], [place], [address]  P: [phone]  F: [phone]       Electronically signed by Manuela Wisdom MD at 11/06/24 9119        Laura Junior RN     Case Management     Case Management/Social Work     Signed     Date of Service: 11/06/24 1300  Creation Time: 11/06/24 1300     Signed         Continued Stay Note  [facility]     Patient Name: Donnie Bishop                    MRN:  [id number]  Today's Date: 11/6/2024                Admit Date: 10/22/2024     Plan: Pending progress with PT    Discharge Plan         Row Name 11/06/24 1259           Plan     Plan Pending progress with PT     Plan Comments Patient walked 160 feet with PT today. Spoke with patient at beside. He states he is hoping to progress to no needs at dc and will discuss closer to dc whether he wants home health or needs dme at that time.                              Expected Discharge Date and Time         Expected Discharge Date Expected Discharge Time     Nov 8, 2024                     Laura Junior RN

## 2024-11-07 NOTE — PROGRESS NOTES
[facility] Progress Note      Patient Name: Donnie Bishop  : 1975  MRN: [id number]  Primary Care Physician:  Juju Kennedy MD  Date of admission: 10/22/2024    Subjective     Interval History:   F/u TENISHA     Review of Systems:   Denies dyspnea or nausea   Went to IR yesterday for drainage of intra-abdominal biloma doing well overall  Blood pressure is soft  Objective     Vitals:   Temp:  [97.1 °F (36.2 °C)-98 °F (36.7 °C)] 98 °F (36.7 °C)  Heart Rate:  [73-89] 80  Resp:  [8-27] 20  BP: ()/(64-83) 111/65    Intake/Output Summary (Last 24 hours) at 2024 0929  Last data filed at 2024 0651  Gross per 24 hour   Intake 2487.5 ml   Output 2175 ml   Net 312.5 ml       Physical Exam:    General Appearance: frail AAM comfortable on RA, speech/mentation slow  Neck: supple, no JVD  Lungs: CTA bilat no rales  Heart: RRR, normal S1 and S2  Abdomen: soft, nontender, nondistended  Extremities: no edema, cyanosis or clubbing        Scheduled Meds:     carvedilol, 6.25 mg, Oral, Q12H  cefTRIAXone, 2,000 mg, Intravenous, Q24H  furosemide, 20 mg, Oral, Daily  heparin (porcine), 5,000 Units, Subcutaneous, Q8H  insulin glargine, 15 Units, Subcutaneous, Q12H  insulin lispro, 2-7 Units, Subcutaneous, 4x Daily AC & at Bedtime  insulin lispro, 6 Units, Subcutaneous, TID With Meals  Naloxegol Oxalate, 25 mg, Oral, QAM  pantoprazole, 40 mg, Oral, BID AC  senna-docusate sodium, 2 tablet, Oral, BID   And  polyethylene glycol, 17 g, Oral, BID  venlafaxine XR, 75 mg, Oral, Daily      IV Meds:          Results Reviewed:   I have personally reviewed the results from the time of this admission to 2024 09:29 EST     Results from last 7 days   Lab Units 24  0628 24  0607 24  0653   SODIUM mmol/L 133* 132* 132*   POTASSIUM mmol/L 4.8 4.9 4.7   CHLORIDE mmol/L 95* 96* 97*   CO2 mmol/L 29.1* 26.3 26.7   BUN mg/dL 13 11 10   CREATININE mg/dL 0.89 0.90 0.86   CALCIUM mg/dL 9.4 9.0  9.2   BILIRUBIN mg/dL 0.3 0.3 0.4   ALK PHOS U/L 106 113 108   ALT (SGPT) U/L 18 23 27   AST (SGOT) U/L 16 15 13   GLUCOSE mg/dL 338* 282* 385*     Estimated Creatinine Clearance: 103.9 mL/min (by C-G formula based on SCr of 0.89 mg/dL).  Results from last 7 days   Lab Units 11/07/24  0628 11/05/24  0607 11/04/24  0653   MAGNESIUM mg/dL 1.5* 1.7 1.8   PHOSPHORUS mg/dL 4.0 3.1 3.0     Results from last 7 days   Lab Units 11/03/24  0554   URIC ACID mg/dL 3.5     Results from last 7 days   Lab Units 11/07/24  0628 11/05/24  0607 11/03/24  0554 11/02/24  0616 11/01/24  0635   WBC 10*3/mm3 6.00 7.72 8.94 10.84* 11.82*   HEMOGLOBIN g/dL 10.3* 9.9* 9.9* 10.5* 10.7*   PLATELETS 10*3/mm3 724* 677* 588* 536* 471*             Assessment / Plan     ASSESSMENT:  Acute kidney injury.  Multifactorial including hypovolemia, hypotension, impaired renal autoregulation due to Jardiance, Entresto, Aldactone.  Resolved.  Cr ~ 1.   Cholecystitis with large right perihepatic fluid collection.  Plan noted for IR drainage today.  On Rocephin per ID.  Transaminitis improving.  Off statin.  MRCP noted.  Heart failure reduced ejection fraction.  EF 49%.  (Improved from 35% a year ago).  Anemia.    Encephalopathy.  Mentation remains bit slow.  Diabetes mellitus type 2.  Hypotension -resolved    PLAN:  Kidney function is acceptable and sodium level is improving  Continue current therapy for now  Encourage p.o. intake  Will hold Lasix given soft blood pressure  Labs in a.m.    Evan Pat MD  11/07/24  09:29 [facility]    [facility]  [phone]

## 2024-11-07 NOTE — PLAN OF CARE
Goal Outcome Evaluation:  Plan of Care Reviewed With: patient        Progress: no change  Outcome Evaluation: vitals stable.  pt expressed pain.  meds given per orders.  CARLA drains present.  will continue to monitor.

## 2024-11-07 NOTE — PROGRESS NOTES
"General Surgery Progress Note    CC: Cholecystitis, infected subcapsular biloma     S: Feels a little sore at drain sites but pain controlled. Denies NV. Tolerating diet. Having bowel movements. No fevers. Discussed possible cholecystectomy with him. Patient hesitant to proceed and would like time to consider.    O:/65   Pulse 80   Temp 98 °F (36.7 °C) (Oral)   Resp 20   Ht 170.2 cm (67\")   Wt 71.6 kg (157 lb 13.6 oz)   SpO2 92%   BMI 24.72 kg/m²     Intake & Output (last day)         11/06 0701 11/07 0700 11/07 0701 11/08 0700    P.O. 680     I.V. (mL/kg) 1807.5 (25.2)     IV Piggyback      Total Intake(mL/kg) 2487.5 (34.7)     Urine (mL/kg/hr) 2175 (1.3)     Drains 200     Total Output 2375     Net +112.5                   GENERAL: Awake, alert, interactive, cooperative, answering questions appropriately  HEENT: EOMI, moist mucus membranes  CHEST: normal work of breathing on room air  CARDIAC: Regular rate   GI: Abd soft, non distended, mildly tender at drain sites; right lateral drain empty; superior drain with small amount of pale green clear fluid  EXTREMITIES: MOCK, no cyanosis   SKIN: Warm and dry    LABS  Results from last 7 days   Lab Units 11/07/24  0628 11/05/24  0607 11/03/24  0554   WBC 10*3/mm3 6.00 7.72 8.94   HEMOGLOBIN g/dL 10.3* 9.9* 9.9*   HEMATOCRIT % 31.7* 31.9* 32.0*   PLATELETS 10*3/mm3 724* 677* 588*     Results from last 7 days   Lab Units 11/07/24  0628 11/05/24  0607 11/04/24  0653   SODIUM mmol/L 133* 132* 132*   POTASSIUM mmol/L 4.8 4.9 4.7   CHLORIDE mmol/L 95* 96* 97*   CO2 mmol/L 29.1* 26.3 26.7   BUN mg/dL 13 11 10   CREATININE mg/dL 0.89 0.90 0.86   CALCIUM mg/dL 9.4 9.0 9.2   BILIRUBIN mg/dL 0.3 0.3 0.4   ALK PHOS U/L 106 113 108   ALT (SGPT) U/L 18 23 27   AST (SGOT) U/L 16 15 13   GLUCOSE mg/dL 338* 282* 385*           IMAGING:    -CT demonstrating contracted gallbladder with mild hyperenhancing wall.  EGD performed 10/24 negative for abnormalities; August 2024 CT " had distended gallbladder but no other findings of cholecystitis.  -HIDA scan 10/24 demonstrating filling but with decreased ejection fraction 19% concerning for possible chronic cholecystitis.   -CTA abdomen pelvis 10/26 to evaluate worsening RUQ pain and possible mesenteric ischemia demonstrated a large right subcapsular fluid collection with mass effect on the liver; no active extravasation  -s/p CT-guided IR drainage 10/28 with thick, turbid bilious output from drain - bile leak vs abscess in the differential, cause unclear but may be related to cholecystitis.  -MRCP 10/30/24 study limited; nondistended, inflamed appearing gallbladder containing stones present; subcapsular fluid collection decreased as expected with drainage; also a second loculated collection appearing contiguous with the duodenum and inferior to the gallbladder fossa has decreased in size.  Nonenhancing lesion in the inferior right hepatic lobe new since August, may be related to biloma, for which follow up is recommended  -CT abdomen pelvis 11/4/2024 - most of his subcapsular fluid collection has decreased but there is still a significant amount of fluid in the superior aspect, the gallbladder has a thick wall without distention, there is no biliary dilation, the proximal duodenum appears less thickened, there is right middle and lower lobe atelectasis and a small pleural effusion, and left lower lobe atelectasis    CT guided perc drain images and report from yesterday reviewed- successful placement of drain into the fluid collection noted with approximately 20cc bright yellow cloudy fluid removed    Assessment/Plan: 49 y.o. male with multiple medical problems including CHF, hypertension, hyperlipidemia, type 2 diabetes mellitus with A1c 9.4, presenting with abdominal pain, found to have possible cholecystitis, with Klebsiella infected biloma.      Doing well following antibiotic treatment and drainage of infected biloma.   Patient remains  clinically stable with normalized WBC and LFTs, tolerating diet, having bowel function, pain controlled.    Discontinue right lateral IR drain today. Continue superior drain.  Discussed management options going forward. I recommend laparoscopic possible open cholecystectomy with intraoperative cholangiogram. I explained to the patient the rationale as I suspect his cholecystitis played some role in his spontaneous biloma, without other source identifiable. With evidence of cholecystitis on his workup, gallbladder removal is indicated to prevent further complications.  I discussed with the patient the nature of the procedure, the risks (including but not limited to bleeding, infection, injury to surrounding structures, risk for conversion to open procedure, risk of injury to the biliary tree or postoperative bile leak, changes in bowel function accompanying cholecystectomy, risks with anesthesia), benefits, alternatives (conservative treatment with drainage of his infected biloma and antibiotics alone and monitoring for signs and symptoms concerning for worsening cholecystitis), and postoperative expectations. The patient would like to consider his options at this time, stating he feels like he is just getting over his infection and he is not sure how he feels about surgery. I will revisit to follow up his decision.    Manuela Wisdom MD  General, Robotic and Endoscopic Surgery  [facility]    [address], [address]  [place], [place], [address]  P: [phone]  F: [phone]

## 2024-11-07 NOTE — PROGRESS NOTES
LOS: 14 days     ID PROGRESS NOTE    Chief Complaint: Liver abscess and biloma due to Klebsiella    Interval History: No acute events.  No fever.  He went to IR yesterday for drain placement to biloma and 20 cc removed.  D/W RN.    Medications:    Current Facility-Administered Medications:     sennosides-docusate (PERICOLACE) 8.6-50 MG per tablet 2 tablet, 2 tablet, Oral, BID, 2 tablet at 11/06/24 0826 **AND** polyethylene glycol (MIRALAX) packet 17 g, 17 g, Oral, BID, 17 g at 11/06/24 0826 **AND** bisacodyl (DULCOLAX) EC tablet 5 mg, 5 mg, Oral, Daily PRN **AND** bisacodyl (DULCOLAX) suppository 10 mg, 10 mg, Rectal, Daily PRN, Yaritza Hurtado MD, 10 mg at 10/30/24 1841    Calcium Replacement - Follow Nurse / BPA Driven Protocol, , Does not apply, PRN, Elma Wiggins MD    carvedilol (COREG) tablet 6.25 mg, 6.25 mg, Oral, Q12H, Kaylee Kay MD, 6.25 mg at 11/06/24 2023    cefTRIAXone (ROCEPHIN) 2,000 mg in sodium chloride 0.9 % 100 mL MBP, 2,000 mg, Intravenous, Q24H, Kashmir Carlos MD, Last Rate: 200 mL/hr at 11/06/24 1733, 2,000 mg at 11/06/24 1733    dextrose (D50W) (25 g/50 mL) IV injection 25 g, 25 g, Intravenous, Q15 Min PRN, Elma Wiggins MD, 25 g at 11/03/24 1627    dextrose (GLUTOSE) oral gel 15 g, 15 g, Oral, Q15 Min PRN, Elma Wiggins MD    furosemide (LASIX) tablet 20 mg, 20 mg, Oral, Daily, Connor Crandall MD, 20 mg at 11/06/24 0826    glucagon (GLUCAGEN) injection 1 mg, 1 mg, Intramuscular, Q15 Min PRN, Elma Wiggins MD    heparin (porcine) 5000 UNIT/ML injection 5,000 Units, 5,000 Units, Subcutaneous, Q8H, Yaritza Hurtado MD, 5,000 Units at 11/07/24 0649    hydrOXYzine (ATARAX) tablet 10 mg, 10 mg, Oral, Q8H PRN, Elma Wiggins MD, 10 mg at 11/05/24 0352    influenza virus vacc split PF FLUZONE 0.5 mL, 0.5 mL, Intramuscular, During Hospitalization, Elma Wiggins MD    insulin glargine (LANTUS, SEMGLEE) injection 15 Units, 15 Units,  Subcutaneous, Q12H, Girma Tobias MD, 15 Units at 11/06/24 2032    insulin lispro (HUMALOG/ADMELOG) injection 2-7 Units, 2-7 Units, Subcutaneous, 4x Daily AC & at Bedtime, Girma Tobias MD, 4 Units at 11/06/24 1157    insulin lispro (HUMALOG/ADMELOG) injection 6 Units, 6 Units, Subcutaneous, TID With Meals, Girma Tobias MD, 6 Units at 11/06/24 1733    melatonin tablet 2.5 mg, 2.5 mg, Oral, Nightly PRN, Elma Wiggins MD    Naloxegol Oxalate (MOVANTIK) tablet 25 mg, 25 mg, Oral, QAGenesis EVANS Bokhodir S, MD, 25 mg at 11/07/24 0647    [DISCONTINUED] morphine injection 1 mg, 1 mg, Intravenous, Q4H PRN, 1 mg at 10/23/24 0832 **AND** naloxone (NARCAN) injection 0.4 mg, 0.4 mg, Intravenous, Q5 Min PRN, Elma Wiggins MD    ondansetron ODT (ZOFRAN-ODT) disintegrating tablet 4 mg, 4 mg, Oral, Q6H PRN, 4 mg at 11/02/24 2123 **OR** ondansetron (ZOFRAN) injection 4 mg, 4 mg, Intravenous, Q6H PRN, Elma Wiggins MD, 4 mg at 11/03/24 0842    oxyCODONE (ROXICODONE) immediate release tablet 5 mg, 5 mg, Oral, Q4H PRN, Steven Rivera MD, 5 mg at 11/07/24 0426    pantoprazole (PROTONIX) EC tablet 40 mg, 40 mg, Oral, BID AC, Elma Wiggins MD, 40 mg at 11/07/24 0647    Phosphorus Replacement - Follow Nurse / BPA Driven Protocol, , Does not apply, Rosalie DOWD Lauren C., MD    Potassium Replacement - Follow Nurse / BPA Driven Protocol, , Does not apply, Rosalie DOWD Lauren C., MD    simethicone (MYLICON) chewable tablet 80 mg, 80 mg, Oral, 4x Daily PRN, Girma Tobias MD    sodium chloride 0.9 % flush 10 mL, 10 mL, Intravenous, PRN, Elma Wiggins MD, 10 mL at 11/05/24 0825    venlafaxine XR (EFFEXOR-XR) 24 hr capsule 75 mg, 75 mg, Oral, Daily, Elma Wiggins MD, 75 mg at 11/06/24 0827      Vital Signs  Temp:  [97.1 °F (36.2 °C)-98 °F (36.7 °C)] 98 °F (36.7 °C)  Heart Rate:  [73-89] 80  Resp:  [8-27] 20  BP: ()/(64-83) 90/64    Physical Exam:  General: No acute distress, resting  comfortably  Eyes: No scleral icterus  Respiratory: Normal work of breathing   GI: Abdominal drains in place with mostly clear fluid.  Skin: No rashes   Extremities: No edema    Results Review:    CBC, CMP, and blood cultures reviewed today    Lab Results   Component Value Date    WBC 6.00 11/07/2024    HGB 10.3 (L) 11/07/2024    HCT 31.7 (L) 11/07/2024    MCV 90.8 11/07/2024     (H) 11/07/2024     Lab Results   Component Value Date    GLUCOSE 338 (H) 11/07/2024    CALCIUM 9.4 11/07/2024     (L) 11/07/2024    K 4.8 11/07/2024    CO2 29.1 (H) 11/07/2024    CL 95 (L) 11/07/2024    BUN 13 11/07/2024    CREATININE 0.89 11/07/2024    EGFRRESULT 68.0 07/12/2023    EGFR 106.4 11/07/2024    BCR 14.6 11/07/2024    ANIONGAP 8.9 11/07/2024     Lab Results   Component Value Date    ALT 18 11/07/2024     Lab Results   Component Value Date    BILITOT 0.3 11/07/2024     Lab Results   Component Value Date    HGBA1C 9.40 (H) 10/24/2024     Lab Results   Component Value Date    CRP 7.02 (H) 11/01/2024       Microbiology:  10/23 BCx: negative  10/25 RPP: negative  10/28 Liver Abscess Cx: rare growth Kleb pneumo (susc all abx tested exc ampicillin)  10/28 BCx: negative    Prior radiology:  11/3 CT scan abdomen/pelvis: Significant decrease in the size of the perihepatic and subcapsular fluid collection.  Thickened gallbladder wall.    Assessment    Liver abscess due to Klebsiella  Biloma  Cholecystitis  Uncontrolled DM2 -last A1c 9.4%    He is gradually improving. Drains remain in place. Thanks to IR for their help.  General surgery note reviewed and patient will need cholecystectomy with exact timing TBD.      While in the hospital, continue ceftriaxone 2 g IV every 24 hours; however, closer to the time of discharge I should be able to change him over to an oral antibiotic such as Augmentin to complete the course.  Final stop date remains TBD.     ID will follow.

## 2024-11-08 ENCOUNTER — APPOINTMENT (OUTPATIENT)
Dept: GENERAL RADIOLOGY | Facility: HOSPITAL | Age: 49
DRG: 853 | End: 2024-11-08
Payer: COMMERCIAL

## 2024-11-08 ENCOUNTER — ANESTHESIA EVENT (OUTPATIENT)
Dept: PERIOP | Facility: HOSPITAL | Age: 49
End: 2024-11-08
Payer: COMMERCIAL

## 2024-11-08 ENCOUNTER — ANESTHESIA (OUTPATIENT)
Dept: PERIOP | Facility: HOSPITAL | Age: 49
End: 2024-11-08
Payer: COMMERCIAL

## 2024-11-08 LAB
ALBUMIN SERPL-MCNC: 3.2 G/DL (ref 3.5–5.2)
ALP SERPL-CCNC: 466 U/L (ref 39–117)
ALT SERPL W P-5'-P-CCNC: 88 U/L (ref 1–41)
ANION GAP SERPL CALCULATED.3IONS-SCNC: 10.2 MMOL/L (ref 5–15)
AST SERPL-CCNC: 203 U/L (ref 1–40)
BILIRUB CONJ SERPL-MCNC: <0.2 MG/DL (ref 0–0.3)
BILIRUB INDIRECT SERPL-MCNC: ABNORMAL MG/DL
BILIRUB SERPL-MCNC: 0.3 MG/DL (ref 0–1.2)
BUN SERPL-MCNC: 15 MG/DL (ref 6–20)
BUN/CREAT SERPL: 16.3 (ref 7–25)
CALCIUM SPEC-SCNC: 9.3 MG/DL (ref 8.6–10.5)
CHLORIDE SERPL-SCNC: 94 MMOL/L (ref 98–107)
CO2 SERPL-SCNC: 27.8 MMOL/L (ref 22–29)
CREAT SERPL-MCNC: 0.92 MG/DL (ref 0.76–1.27)
DEPRECATED RDW RBC AUTO: 40.7 FL (ref 37–54)
EGFRCR SERPLBLD CKD-EPI 2021: 103.2 ML/MIN/1.73
ERYTHROCYTE [DISTWIDTH] IN BLOOD BY AUTOMATED COUNT: 12.5 % (ref 12.3–15.4)
GLUCOSE BLDC GLUCOMTR-MCNC: 139 MG/DL (ref 70–130)
GLUCOSE BLDC GLUCOMTR-MCNC: 157 MG/DL (ref 70–130)
GLUCOSE BLDC GLUCOMTR-MCNC: 172 MG/DL (ref 70–130)
GLUCOSE BLDC GLUCOMTR-MCNC: 248 MG/DL (ref 70–130)
GLUCOSE BLDC GLUCOMTR-MCNC: 333 MG/DL (ref 70–130)
GLUCOSE SERPL-MCNC: 232 MG/DL (ref 65–99)
HCT VFR BLD AUTO: 32.7 % (ref 37.5–51)
HGB BLD-MCNC: 10.7 G/DL (ref 13–17.7)
MAGNESIUM SERPL-MCNC: 2 MG/DL (ref 1.6–2.6)
MCH RBC QN AUTO: 29.8 PG (ref 26.6–33)
MCHC RBC AUTO-ENTMCNC: 32.7 G/DL (ref 31.5–35.7)
MCV RBC AUTO: 91.1 FL (ref 79–97)
PHOSPHATE SERPL-MCNC: 3.6 MG/DL (ref 2.5–4.5)
PLATELET # BLD AUTO: 694 10*3/MM3 (ref 140–450)
PMV BLD AUTO: 9.5 FL (ref 6–12)
POTASSIUM SERPL-SCNC: 5.1 MMOL/L (ref 3.5–5.2)
PROT SERPL-MCNC: 7.1 G/DL (ref 6–8.5)
RBC # BLD AUTO: 3.59 10*6/MM3 (ref 4.14–5.8)
SODIUM SERPL-SCNC: 132 MMOL/L (ref 136–145)
WBC NRBC COR # BLD AUTO: 8.58 10*3/MM3 (ref 3.4–10.8)

## 2024-11-08 PROCEDURE — 25510000001 IOPAMIDOL 61 % SOLUTION: Performed by: STUDENT IN AN ORGANIZED HEALTH CARE EDUCATION/TRAINING PROGRAM

## 2024-11-08 PROCEDURE — 88304 TISSUE EXAM BY PATHOLOGIST: CPT | Performed by: STUDENT IN AN ORGANIZED HEALTH CARE EDUCATION/TRAINING PROGRAM

## 2024-11-08 PROCEDURE — 0FJ44ZZ INSPECTION OF GALLBLADDER, PERCUTANEOUS ENDOSCOPIC APPROACH: ICD-10-PCS | Performed by: STUDENT IN AN ORGANIZED HEALTH CARE EDUCATION/TRAINING PROGRAM

## 2024-11-08 PROCEDURE — 25010000002 FENTANYL CITRATE (PF) 50 MCG/ML SOLUTION: Performed by: NURSE ANESTHETIST, CERTIFIED REGISTERED

## 2024-11-08 PROCEDURE — 25010000002 LIDOCAINE 2% SOLUTION: Performed by: NURSE ANESTHETIST, CERTIFIED REGISTERED

## 2024-11-08 PROCEDURE — 25010000002 FENTANYL CITRATE (PF) 50 MCG/ML SOLUTION: Performed by: STUDENT IN AN ORGANIZED HEALTH CARE EDUCATION/TRAINING PROGRAM

## 2024-11-08 PROCEDURE — P9041 ALBUMIN (HUMAN),5%, 50ML: HCPCS | Performed by: NURSE ANESTHETIST, CERTIFIED REGISTERED

## 2024-11-08 PROCEDURE — 47605 CHOLECYSTECTOMY W/CHOLANG: CPT | Performed by: STUDENT IN AN ORGANIZED HEALTH CARE EDUCATION/TRAINING PROGRAM

## 2024-11-08 PROCEDURE — 80048 BASIC METABOLIC PNL TOTAL CA: CPT | Performed by: HOSPITALIST

## 2024-11-08 PROCEDURE — 25010000002 CEFTRIAXONE PER 250 MG: Performed by: STUDENT IN AN ORGANIZED HEALTH CARE EDUCATION/TRAINING PROGRAM

## 2024-11-08 PROCEDURE — 63710000001 INSULIN LISPRO (HUMAN) PER 5 UNITS: Performed by: STUDENT IN AN ORGANIZED HEALTH CARE EDUCATION/TRAINING PROGRAM

## 2024-11-08 PROCEDURE — 0FT40ZZ RESECTION OF GALLBLADDER, OPEN APPROACH: ICD-10-PCS | Performed by: STUDENT IN AN ORGANIZED HEALTH CARE EDUCATION/TRAINING PROGRAM

## 2024-11-08 PROCEDURE — 84100 ASSAY OF PHOSPHORUS: CPT | Performed by: HOSPITALIST

## 2024-11-08 PROCEDURE — 25010000002 SUGAMMADEX 200 MG/2ML SOLUTION: Performed by: NURSE ANESTHETIST, CERTIFIED REGISTERED

## 2024-11-08 PROCEDURE — 25010000002 ALBUMIN HUMAN 5% PER 50 ML: Performed by: NURSE ANESTHETIST, CERTIFIED REGISTERED

## 2024-11-08 PROCEDURE — 63710000001 INSULIN GLARGINE PER 5 UNITS: Performed by: STUDENT IN AN ORGANIZED HEALTH CARE EDUCATION/TRAINING PROGRAM

## 2024-11-08 PROCEDURE — 25810000003 SODIUM CHLORIDE PER 500 ML: Performed by: STUDENT IN AN ORGANIZED HEALTH CARE EDUCATION/TRAINING PROGRAM

## 2024-11-08 PROCEDURE — BF101ZZ FLUOROSCOPY OF BILE DUCTS USING LOW OSMOLAR CONTRAST: ICD-10-PCS | Performed by: STUDENT IN AN ORGANIZED HEALTH CARE EDUCATION/TRAINING PROGRAM

## 2024-11-08 PROCEDURE — 99231 SBSQ HOSP IP/OBS SF/LOW 25: CPT | Performed by: STUDENT IN AN ORGANIZED HEALTH CARE EDUCATION/TRAINING PROGRAM

## 2024-11-08 PROCEDURE — 80076 HEPATIC FUNCTION PANEL: CPT | Performed by: INTERNAL MEDICINE

## 2024-11-08 PROCEDURE — 25010000002 PROPOFOL 10 MG/ML EMULSION: Performed by: NURSE ANESTHETIST, CERTIFIED REGISTERED

## 2024-11-08 PROCEDURE — 25010000002 HEPARIN (PORCINE) PER 1000 UNITS: Performed by: STUDENT IN AN ORGANIZED HEALTH CARE EDUCATION/TRAINING PROGRAM

## 2024-11-08 PROCEDURE — 25010000002 HYDROMORPHONE PER 4 MG: Performed by: NURSE ANESTHETIST, CERTIFIED REGISTERED

## 2024-11-08 PROCEDURE — 83735 ASSAY OF MAGNESIUM: CPT | Performed by: INTERNAL MEDICINE

## 2024-11-08 PROCEDURE — 25810000003 LACTATED RINGERS PER 1000 ML: Performed by: STUDENT IN AN ORGANIZED HEALTH CARE EDUCATION/TRAINING PROGRAM

## 2024-11-08 PROCEDURE — 25010000002 ONDANSETRON PER 1 MG: Performed by: NURSE ANESTHETIST, CERTIFIED REGISTERED

## 2024-11-08 PROCEDURE — 0 BUPIVACAINE LIPOSOME 1.3 % SUSPENSION: Performed by: STUDENT IN AN ORGANIZED HEALTH CARE EDUCATION/TRAINING PROGRAM

## 2024-11-08 PROCEDURE — 25010000002 PHENYLEPHRINE 10 MG/ML SOLUTION: Performed by: NURSE ANESTHETIST, CERTIFIED REGISTERED

## 2024-11-08 PROCEDURE — 82948 REAGENT STRIP/BLOOD GLUCOSE: CPT

## 2024-11-08 PROCEDURE — 99232 SBSQ HOSP IP/OBS MODERATE 35: CPT | Performed by: INTERNAL MEDICINE

## 2024-11-08 PROCEDURE — 25010000002 MAGNESIUM SULFATE PER 500 MG OF MAGNESIUM: Performed by: NURSE ANESTHETIST, CERTIFIED REGISTERED

## 2024-11-08 PROCEDURE — 25010000002 ONDANSETRON PER 1 MG: Performed by: STUDENT IN AN ORGANIZED HEALTH CARE EDUCATION/TRAINING PROGRAM

## 2024-11-08 PROCEDURE — 47605 CHOLECYSTECTOMY W/CHOLANG: CPT | Performed by: SURGERY

## 2024-11-08 PROCEDURE — 85027 COMPLETE CBC AUTOMATED: CPT | Performed by: INTERNAL MEDICINE

## 2024-11-08 PROCEDURE — C9290 INJ, BUPIVACAINE LIPOSOME: HCPCS | Performed by: STUDENT IN AN ORGANIZED HEALTH CARE EDUCATION/TRAINING PROGRAM

## 2024-11-08 PROCEDURE — 74300 X-RAY BILE DUCTS/PANCREAS: CPT

## 2024-11-08 DEVICE — ABSORBABLE HEMOSTAT (OXIDIZED REGENERATED CELLULOSE)
Type: IMPLANTABLE DEVICE | Site: ABDOMEN | Status: FUNCTIONAL
Brand: SURGICEL

## 2024-11-08 DEVICE — ARISTA AH ABSORBABLE HEMOSTATIC PARTICLES, 3G BELLOWS CONTAINER
Type: IMPLANTABLE DEVICE | Site: ABDOMEN | Status: FUNCTIONAL
Brand: ARISTA

## 2024-11-08 RX ORDER — HYDROCODONE BITARTRATE AND ACETAMINOPHEN 5; 325 MG/1; MG/1
1 TABLET ORAL ONCE AS NEEDED
Status: DISCONTINUED | OUTPATIENT
Start: 2024-11-08 | End: 2024-11-08 | Stop reason: HOSPADM

## 2024-11-08 RX ORDER — ONDANSETRON 2 MG/ML
4 INJECTION INTRAMUSCULAR; INTRAVENOUS ONCE AS NEEDED
Status: DISCONTINUED | OUTPATIENT
Start: 2024-11-08 | End: 2024-11-08 | Stop reason: HOSPADM

## 2024-11-08 RX ORDER — EPHEDRINE SULFATE 50 MG/ML
5 INJECTION, SOLUTION INTRAVENOUS ONCE AS NEEDED
Status: DISCONTINUED | OUTPATIENT
Start: 2024-11-08 | End: 2024-11-08 | Stop reason: HOSPADM

## 2024-11-08 RX ORDER — HYDROMORPHONE HYDROCHLORIDE 1 MG/ML
0.5 INJECTION, SOLUTION INTRAMUSCULAR; INTRAVENOUS; SUBCUTANEOUS
Status: DISCONTINUED | OUTPATIENT
Start: 2024-11-08 | End: 2024-11-08 | Stop reason: HOSPADM

## 2024-11-08 RX ORDER — LABETALOL HYDROCHLORIDE 5 MG/ML
5 INJECTION, SOLUTION INTRAVENOUS
Status: DISCONTINUED | OUTPATIENT
Start: 2024-11-08 | End: 2024-11-08 | Stop reason: HOSPADM

## 2024-11-08 RX ORDER — PROPOFOL 10 MG/ML
VIAL (ML) INTRAVENOUS AS NEEDED
Status: DISCONTINUED | OUTPATIENT
Start: 2024-11-08 | End: 2024-11-08 | Stop reason: SURG

## 2024-11-08 RX ORDER — DROPERIDOL 2.5 MG/ML
0.62 INJECTION, SOLUTION INTRAMUSCULAR; INTRAVENOUS
Status: DISCONTINUED | OUTPATIENT
Start: 2024-11-08 | End: 2024-11-08 | Stop reason: HOSPADM

## 2024-11-08 RX ORDER — SODIUM CHLORIDE 9 MG/ML
30 INJECTION, SOLUTION INTRAVENOUS CONTINUOUS PRN
Status: ACTIVE | OUTPATIENT
Start: 2024-11-08 | End: 2024-11-11

## 2024-11-08 RX ORDER — FENTANYL CITRATE 50 UG/ML
50 INJECTION, SOLUTION INTRAMUSCULAR; INTRAVENOUS
Status: DISCONTINUED | OUTPATIENT
Start: 2024-11-08 | End: 2024-11-08 | Stop reason: HOSPADM

## 2024-11-08 RX ORDER — SODIUM CHLORIDE 0.9 % (FLUSH) 0.9 %
3 SYRINGE (ML) INJECTION EVERY 12 HOURS SCHEDULED
Status: DISCONTINUED | OUTPATIENT
Start: 2024-11-08 | End: 2024-11-08 | Stop reason: HOSPADM

## 2024-11-08 RX ORDER — FENTANYL CITRATE 50 UG/ML
INJECTION, SOLUTION INTRAMUSCULAR; INTRAVENOUS AS NEEDED
Status: DISCONTINUED | OUTPATIENT
Start: 2024-11-08 | End: 2024-11-08 | Stop reason: SURG

## 2024-11-08 RX ORDER — HYDROMORPHONE HCL/0.9% NACL/PF 10 MG/50ML
PATIENT CONTROLLED ANALGESIA SYRINGE INTRAVENOUS CONTINUOUS
Status: DISCONTINUED | OUTPATIENT
Start: 2024-11-08 | End: 2024-11-09

## 2024-11-08 RX ORDER — ONDANSETRON 2 MG/ML
INJECTION INTRAMUSCULAR; INTRAVENOUS AS NEEDED
Status: DISCONTINUED | OUTPATIENT
Start: 2024-11-08 | End: 2024-11-08 | Stop reason: SURG

## 2024-11-08 RX ORDER — SODIUM CHLORIDE 9 MG/ML
75 INJECTION, SOLUTION INTRAVENOUS CONTINUOUS
Status: ACTIVE | OUTPATIENT
Start: 2024-11-08 | End: 2024-11-09

## 2024-11-08 RX ORDER — FENTANYL CITRATE 50 UG/ML
50 INJECTION, SOLUTION INTRAMUSCULAR; INTRAVENOUS ONCE AS NEEDED
Status: COMPLETED | OUTPATIENT
Start: 2024-11-08 | End: 2024-11-08

## 2024-11-08 RX ORDER — HYDRALAZINE HYDROCHLORIDE 20 MG/ML
5 INJECTION INTRAMUSCULAR; INTRAVENOUS
Status: DISCONTINUED | OUTPATIENT
Start: 2024-11-08 | End: 2024-11-08 | Stop reason: HOSPADM

## 2024-11-08 RX ORDER — BUPIVACAINE HYDROCHLORIDE AND EPINEPHRINE 2.5; 5 MG/ML; UG/ML
INJECTION, SOLUTION INFILTRATION; PERINEURAL AS NEEDED
Status: DISCONTINUED | OUTPATIENT
Start: 2024-11-08 | End: 2024-11-08 | Stop reason: HOSPADM

## 2024-11-08 RX ORDER — PROMETHAZINE HYDROCHLORIDE 25 MG/1
25 TABLET ORAL ONCE AS NEEDED
Status: DISCONTINUED | OUTPATIENT
Start: 2024-11-08 | End: 2024-11-08 | Stop reason: HOSPADM

## 2024-11-08 RX ORDER — MAGNESIUM SULFATE HEPTAHYDRATE 500 MG/ML
INJECTION, SOLUTION INTRAMUSCULAR; INTRAVENOUS AS NEEDED
Status: DISCONTINUED | OUTPATIENT
Start: 2024-11-08 | End: 2024-11-08 | Stop reason: SURG

## 2024-11-08 RX ORDER — IPRATROPIUM BROMIDE AND ALBUTEROL SULFATE 2.5; .5 MG/3ML; MG/3ML
3 SOLUTION RESPIRATORY (INHALATION) ONCE AS NEEDED
Status: DISCONTINUED | OUTPATIENT
Start: 2024-11-08 | End: 2024-11-08 | Stop reason: HOSPADM

## 2024-11-08 RX ORDER — NALOXONE HCL 0.4 MG/ML
0.1 VIAL (ML) INJECTION
Status: DISCONTINUED | OUTPATIENT
Start: 2024-11-08 | End: 2024-11-14 | Stop reason: HOSPADM

## 2024-11-08 RX ORDER — SODIUM CHLORIDE 9 MG/ML
INJECTION, SOLUTION INTRAVENOUS AS NEEDED
Status: DISCONTINUED | OUTPATIENT
Start: 2024-11-08 | End: 2024-11-08 | Stop reason: HOSPADM

## 2024-11-08 RX ORDER — IOPAMIDOL 612 MG/ML
INJECTION, SOLUTION INTRAVASCULAR AS NEEDED
Status: DISCONTINUED | OUTPATIENT
Start: 2024-11-08 | End: 2024-11-08 | Stop reason: HOSPADM

## 2024-11-08 RX ORDER — ALBUMIN, HUMAN INJ 5% 5 %
SOLUTION INTRAVENOUS CONTINUOUS PRN
Status: DISCONTINUED | OUTPATIENT
Start: 2024-11-08 | End: 2024-11-08 | Stop reason: SURG

## 2024-11-08 RX ORDER — FAMOTIDINE 10 MG/ML
20 INJECTION, SOLUTION INTRAVENOUS ONCE
Status: COMPLETED | OUTPATIENT
Start: 2024-11-08 | End: 2024-11-08

## 2024-11-08 RX ORDER — PHENYLEPHRINE HYDROCHLORIDE 10 MG/ML
INJECTION INTRAVENOUS AS NEEDED
Status: DISCONTINUED | OUTPATIENT
Start: 2024-11-08 | End: 2024-11-08 | Stop reason: SURG

## 2024-11-08 RX ORDER — SODIUM CHLORIDE, SODIUM LACTATE, POTASSIUM CHLORIDE, CALCIUM CHLORIDE 600; 310; 30; 20 MG/100ML; MG/100ML; MG/100ML; MG/100ML
9 INJECTION, SOLUTION INTRAVENOUS CONTINUOUS
Status: DISCONTINUED | OUTPATIENT
Start: 2024-11-08 | End: 2024-11-08

## 2024-11-08 RX ORDER — MAGNESIUM HYDROXIDE 1200 MG/15ML
LIQUID ORAL AS NEEDED
Status: DISCONTINUED | OUTPATIENT
Start: 2024-11-08 | End: 2024-11-08 | Stop reason: HOSPADM

## 2024-11-08 RX ORDER — MIDAZOLAM HYDROCHLORIDE 1 MG/ML
1 INJECTION, SOLUTION INTRAMUSCULAR; INTRAVENOUS
Status: DISCONTINUED | OUTPATIENT
Start: 2024-11-08 | End: 2024-11-08 | Stop reason: HOSPADM

## 2024-11-08 RX ORDER — SODIUM CHLORIDE 0.9 % (FLUSH) 0.9 %
3-10 SYRINGE (ML) INJECTION AS NEEDED
Status: DISCONTINUED | OUTPATIENT
Start: 2024-11-08 | End: 2024-11-08 | Stop reason: HOSPADM

## 2024-11-08 RX ORDER — KETAMINE HCL IN NACL, ISO-OSM 100MG/10ML
SYRINGE (ML) INJECTION AS NEEDED
Status: DISCONTINUED | OUTPATIENT
Start: 2024-11-08 | End: 2024-11-08 | Stop reason: SURG

## 2024-11-08 RX ORDER — PROMETHAZINE HYDROCHLORIDE 25 MG/1
25 SUPPOSITORY RECTAL ONCE AS NEEDED
Status: DISCONTINUED | OUTPATIENT
Start: 2024-11-08 | End: 2024-11-08 | Stop reason: HOSPADM

## 2024-11-08 RX ORDER — DIPHENHYDRAMINE HYDROCHLORIDE 50 MG/ML
12.5 INJECTION INTRAMUSCULAR; INTRAVENOUS
Status: DISCONTINUED | OUTPATIENT
Start: 2024-11-08 | End: 2024-11-08 | Stop reason: HOSPADM

## 2024-11-08 RX ORDER — OXYCODONE AND ACETAMINOPHEN 7.5; 325 MG/1; MG/1
1 TABLET ORAL EVERY 4 HOURS PRN
Status: DISCONTINUED | OUTPATIENT
Start: 2024-11-08 | End: 2024-11-08 | Stop reason: HOSPADM

## 2024-11-08 RX ORDER — LIDOCAINE HYDROCHLORIDE 20 MG/ML
INJECTION, SOLUTION INFILTRATION; PERINEURAL AS NEEDED
Status: DISCONTINUED | OUTPATIENT
Start: 2024-11-08 | End: 2024-11-08 | Stop reason: SURG

## 2024-11-08 RX ORDER — LIDOCAINE HYDROCHLORIDE 10 MG/ML
0.5 INJECTION, SOLUTION INFILTRATION; PERINEURAL ONCE AS NEEDED
Status: DISCONTINUED | OUTPATIENT
Start: 2024-11-08 | End: 2024-11-08 | Stop reason: HOSPADM

## 2024-11-08 RX ORDER — FLUMAZENIL 0.1 MG/ML
0.2 INJECTION INTRAVENOUS AS NEEDED
Status: DISCONTINUED | OUTPATIENT
Start: 2024-11-08 | End: 2024-11-08 | Stop reason: HOSPADM

## 2024-11-08 RX ORDER — ROCURONIUM BROMIDE 10 MG/ML
INJECTION, SOLUTION INTRAVENOUS AS NEEDED
Status: DISCONTINUED | OUTPATIENT
Start: 2024-11-08 | End: 2024-11-08 | Stop reason: SURG

## 2024-11-08 RX ORDER — NALOXONE HCL 0.4 MG/ML
0.2 VIAL (ML) INJECTION AS NEEDED
Status: DISCONTINUED | OUTPATIENT
Start: 2024-11-08 | End: 2024-11-08 | Stop reason: HOSPADM

## 2024-11-08 RX ADMIN — FENTANYL CITRATE 50 MCG: 50 INJECTION, SOLUTION INTRAMUSCULAR; INTRAVENOUS at 08:21

## 2024-11-08 RX ADMIN — FENTANYL CITRATE 50 MCG: 50 INJECTION, SOLUTION INTRAMUSCULAR; INTRAVENOUS at 07:06

## 2024-11-08 RX ADMIN — PHENYLEPHRINE HYDROCHLORIDE 100 MCG: 10 INJECTION INTRAVENOUS at 07:55

## 2024-11-08 RX ADMIN — PHENYLEPHRINE HYDROCHLORIDE 100 MCG: 10 INJECTION INTRAVENOUS at 07:49

## 2024-11-08 RX ADMIN — SENNOSIDES AND DOCUSATE SODIUM 2 TABLET: 50; 8.6 TABLET ORAL at 20:46

## 2024-11-08 RX ADMIN — POLYETHYLENE GLYCOL 3350 17 G: 17 POWDER, FOR SOLUTION ORAL at 20:46

## 2024-11-08 RX ADMIN — ONDANSETRON 4 MG: 2 INJECTION INTRAMUSCULAR; INTRAVENOUS at 09:39

## 2024-11-08 RX ADMIN — SUGAMMADEX 150 MG: 100 INJECTION, SOLUTION INTRAVENOUS at 10:01

## 2024-11-08 RX ADMIN — ROCURONIUM BROMIDE 70 MG: 10 INJECTION, SOLUTION INTRAVENOUS at 07:40

## 2024-11-08 RX ADMIN — Medication 20 MG: at 09:30

## 2024-11-08 RX ADMIN — HEPARIN SODIUM 5000 UNITS: 5000 INJECTION INTRAVENOUS; SUBCUTANEOUS at 13:59

## 2024-11-08 RX ADMIN — Medication 30 MG: at 08:54

## 2024-11-08 RX ADMIN — SODIUM CHLORIDE, SODIUM LACTATE, POTASSIUM CHLORIDE, CALCIUM CHLORIDE 9 ML/HR: 20; 30; 600; 310 INJECTION, SOLUTION INTRAVENOUS at 07:07

## 2024-11-08 RX ADMIN — ROCURONIUM BROMIDE 20 MG: 10 INJECTION, SOLUTION INTRAVENOUS at 08:57

## 2024-11-08 RX ADMIN — FENTANYL CITRATE 50 MCG: 50 INJECTION, SOLUTION INTRAMUSCULAR; INTRAVENOUS at 10:28

## 2024-11-08 RX ADMIN — ONDANSETRON 4 MG: 2 INJECTION INTRAMUSCULAR; INTRAVENOUS at 14:55

## 2024-11-08 RX ADMIN — CARVEDILOL 6.25 MG: 6.25 TABLET, FILM COATED ORAL at 04:52

## 2024-11-08 RX ADMIN — Medication: at 14:41

## 2024-11-08 RX ADMIN — FAMOTIDINE 20 MG: 10 INJECTION INTRAVENOUS at 07:05

## 2024-11-08 RX ADMIN — PHENYLEPHRINE HYDROCHLORIDE 100 MCG: 10 INJECTION INTRAVENOUS at 08:00

## 2024-11-08 RX ADMIN — ALBUMIN (HUMAN): 12.5 INJECTION, SOLUTION INTRAVENOUS at 09:04

## 2024-11-08 RX ADMIN — INSULIN GLARGINE 15 UNITS: 100 INJECTION, SOLUTION SUBCUTANEOUS at 20:46

## 2024-11-08 RX ADMIN — CEFTRIAXONE 2000 MG: 2 INJECTION, POWDER, FOR SOLUTION INTRAMUSCULAR; INTRAVENOUS at 13:59

## 2024-11-08 RX ADMIN — PROPOFOL 160 MG: 10 INJECTION, EMULSION INTRAVENOUS at 07:40

## 2024-11-08 RX ADMIN — FENTANYL CITRATE 50 MCG: 50 INJECTION, SOLUTION INTRAMUSCULAR; INTRAVENOUS at 07:35

## 2024-11-08 RX ADMIN — LIDOCAINE HYDROCHLORIDE 60 MG: 20 INJECTION, SOLUTION INFILTRATION; PERINEURAL at 07:40

## 2024-11-08 RX ADMIN — SODIUM CHLORIDE 75 ML/HR: 9 INJECTION, SOLUTION INTRAVENOUS at 14:00

## 2024-11-08 RX ADMIN — MAGNESIUM SULFATE HEPTAHYDRATE 2 G: 500 INJECTION, SOLUTION INTRAMUSCULAR; INTRAVENOUS at 08:34

## 2024-11-08 RX ADMIN — HYDROMORPHONE HYDROCHLORIDE 0.5 MG: 1 INJECTION, SOLUTION INTRAMUSCULAR; INTRAVENOUS; SUBCUTANEOUS at 10:23

## 2024-11-08 RX ADMIN — CARVEDILOL 6.25 MG: 6.25 TABLET, FILM COATED ORAL at 20:46

## 2024-11-08 RX ADMIN — INSULIN LISPRO 2 UNITS: 100 INJECTION, SOLUTION INTRAVENOUS; SUBCUTANEOUS at 20:46

## 2024-11-08 RX ADMIN — ROCURONIUM BROMIDE 30 MG: 10 INJECTION, SOLUTION INTRAVENOUS at 08:20

## 2024-11-08 RX ADMIN — HEPARIN SODIUM 5000 UNITS: 5000 INJECTION INTRAVENOUS; SUBCUTANEOUS at 21:28

## 2024-11-08 RX ADMIN — INSULIN LISPRO 6 UNITS: 100 INJECTION, SOLUTION INTRAVENOUS; SUBCUTANEOUS at 14:00

## 2024-11-08 NOTE — OP NOTE
Operative Note :  Manuela Wisdom MD    Patient Name and :  Donnie Bishop  1975    Procedure Date:   24    Pre-op Diagnosis:  Chronic cholecystitis with cholelithiasis  Infected subcapsular liver biloma with Klebsiella  CHF  Hypertension  Hyperlipidemia  Type II mellitus    Post-Operative Diagnosis:  Acute and chronic cholecystitis with cholelithiasis  Gallbladder perforation into the liver  Infected subcapsular liver biloma with Klebsiella  CHF  Hypertension  Hyperlipidemia  Type II mellitus    Procedure:   Laparoscopic converted to open cholecystectomy  Intraoperative cholangiogram  Repair of anterior duodenal serosal defect  Placement of right upper quadrant drain near cystic plate    Surgeon:   Manuela Wisdom MD    Assistant:   Mg Isabel MD was responsible for performing the following activities: Retraction, Suction, Irrigation, Suturing, Closing, and assistance with gallbladder and infundibular dissection  and their skilled assistance was necessary for the success of this case.    Assistant: Amanda Lopez RNFA was responsible for performing the following activities: Retraction, Suction, Placing Dressing, and Held/Positioned Camera and their skilled assistance was necessary for the success of this case.    Anesthesia:    General (general endotracheal tube)    Estimated Blood Loss:   100ml    Specimens:   Gallbladder    Complications:   None    Indications:  Patient is a 49-year-old gentleman who presented to the hospital with right upper quadrant pain and apparent chronic cholecystitis.  Initially thought to have gastritis or duodenitis but was noted to have abnormal findings on EGD.  Developed spontaneous biloma for which he underwent percutaneous drainage and antibiotic treatment targeted toward Klebsiella.  Ultimately felt that his gallbladder inflammation was responsible for his biloma.  After informed consent was obtained, he elected to proceed with cholecystectomy.    Findings:    Thin adhesions of the omentum to the anterior abdominal wall which were removed laparoscopically and secondary to the percutaneous drains that were placed.  At the liver and cystic plate these adhesions were dense and the gallbladder was densely adherent to the duodenum.  The decision was made to perform an open procedure.  After open procedure, the gallbladder was identified and freed from the duodenum.  There was a small duodenal serosal defect that was oversewn with three 3-0 silk lembert sutures.   The infundibular dissection was performed and there was noted to be a single cystic duct and single cystic artery entering the gallbladder.  The cystic artery was tied off with 0 silks and clipped on the stay side.  The cystic duct was clipped 3 times on the stay side.  Prior to this an intraoperative cholangiogram was performed demonstrating adequate flow into a cystic duct remnant, common bile duct, bilateral hepatic ducts, and duodenum without filling defects or delay.  A right upper quadrant drain was placed in this area of the cystic plate along with 3 g of Katherine.  Abdominal incision was closed in multiple layers.    Description of procedure:  After informed consent was obtained, the patient was brought to the operating room and placed supine on the operating table.  Anesthesia was induced.  A Williamson catheter was placed by the nursing staff.  The abdomen was prepped and draped in usual sterile fashion.  A timeout was performed.      The abdomen was entered using a direct optical view technique with a 5 mm trocar in the right upper quadrant.  Local anesthetic was injected and the 11 blade was used to make a 5 mm incision through which the trocar was advanced.  All layers of the abdominal wall were visualized as the trocar was advanced into the peritoneal cavity.  The abdomen was insufflated and inspected and there was no evidence of injury from entry.  Subsequently a 12 mm subxiphoid port and a 5 mm right  subcostal port were placed under direct visualization following local anesthetic injection.    Thin adhesions of the omentum to the anterior abdominal wall which were removed laparoscopically and secondary to the percutaneous drains that were placed.  At the liver and cystic plate these adhesions were dense and the gallbladder was densely adherent to the duodenum.  The decision was made to perform an open procedure to be able to safely dissect the gallbladder away from the liver and duodenum as well as to perform the infundibular dissection safely within the severely inflamed field.    A Kocher incision was created between the 2 superior ports using a 10 blade.  The subcutaneous tissues were dissected using Bovie cautery down to the fascia.  The fascia was opened and the muscle split.  The incision was extended laterally and medially and the abdomen entered.  Bovie electrocautery was used to free the omental adhesions to allow us to place the Bookwalter retractor.    Finger fracturing was performed to free the omental adhesions to the anterior surface gallbladder.  The gallbladder was identified.  It was carefully and meticulously dissected free from the duodenum using a combination of Kitner dissection, finger fracturing, right angle dissection, Metzenbaums, and Bovie.  Once it was freed, the bottom third of the cystic plate was cleared using finger fracturing and then a dome down approach was used to free the gallbladder from the cystic plate.  The gallbladder had perforated into the liver bed.  There was spillage of bile and some necrotic tissue from the gallbladder was removed from the abdomen.  We were able to perform the end infundibular dissection and identified the artery and the duct. The cystic artery was tied off with 0 silks and clipped on the stay side.  The cystic duct was clipped 3 times on the stay side.  Prior to this an intraoperative cholangiogram was performed demonstrating adequate flow into a  cystic duct remnant, common bile duct, bilateral hepatic ducts, and duodenum without filling defects or delay.   The gallbladder was sent to pathology for evaluation.  Subsequently the inspected and irrigated.  The cystic plate was inspected and hemostasis was achieved using Bovie electrocautery and 3 g of Katherine powder.  The anterior surface of the liver was inspected and appeared without laceration or bile leak or bleed.  The NG tube was palpated within the stomach and secured in place.  The clips at the infundibular dissection appeared in good position and there was no bleeding or bile leak from this area either.  The 19 Turkmen round Pérez drain was placed through the right lateral abdominal wall and secured with the end in the area of the cystic plate.  The end was cut short to allow slow retraction of the drain over time if needed.  The soft counts were correct.  The Bookwalter was removed and the abdomen closed.  0 PDS single running sutures were used to close the muscle in 2 layers.  Then the skin and soft tissue were closed using staples.  The 5 mm trocar at the periumbilical site was also closed using staples.  The field was cleaned and dried.  Sterile dressing was applied.  All counts were correct at the end of the case.  Patient tolerated procedure without complication.  He was awoken and transferred to PACU in stable condition.    Manuela Wisdom M.D.  General, Robotic, and Endoscopic Surgery  [facility], [place], operated by [facility]    [address], [address]  [place], [place], [address]  P: [phone]  F: [phone]

## 2024-11-08 NOTE — PROGRESS NOTES
Name: Donnie Bishop ADMIT: 10/22/2024   : 1975  PCP: Juju Kennedy MD    MRN: [id number] LOS: 15 days   AGE/SEX: 49 y.o. male  ROOM: [facility]     Subjective   Subjective   Chief Complaint   Patient presents with    Abdominal Pain     Saw in recovery.  Patient was alert.  He was not speaking too much but he did report pain.  It was in the surgical area.  Not reporting nausea.  Not reporting any shortness of breath.     Objective   Objective   Vital Signs  Temp:  [97.7 °F (36.5 °C)-98.4 °F (36.9 °C)] 98.3 °F (36.8 °C)  Heart Rate:  [83-98] 94  Resp:  [12-21] 12  BP: (109-155)/(68-87) 127/78  SpO2:  [87 %-100 %] 99 %  on  Flow (L/min) (Oxygen Therapy):  [2-10] 2;   Device (Oxygen Therapy): nasal cannula  Body mass index is 24.72 kg/m².    Physical Exam  Vitals and nursing note reviewed.   Constitutional:       Appearance: He is not diaphoretic.   HENT:      Head: Atraumatic.      Mouth/Throat:      Comments: NG  Eyes:      General: No scleral icterus.  Cardiovascular:      Rate and Rhythm: Normal rate and regular rhythm.   Pulmonary:      Effort: Pulmonary effort is normal.      Breath sounds: Decreased breath sounds present. No wheezing.   Abdominal:      Palpations: Abdomen is soft.      Tenderness: There is abdominal tenderness.   Musculoskeletal:         General: No swelling.   Skin:     General: Skin is warm and dry.   Neurological:      Mental Status: He is alert. Mental status is at baseline.   Psychiatric:         Mood and Affect: Mood normal.         Behavior: Behavior normal.       Results Review  I reviewed the patient's new clinical results.    Results from last 7 days   Lab Units 24  03424  0624  0554   WBC 10*3/mm3 8.58 6.00 7.72 8.94   HEMOGLOBIN g/dL 10.7* 10.3* 9.9* 9.9*   PLATELETS 10*3/mm3 694* 724* 677* 588*     Results from last 7 days   Lab Units 24  0340 24  0624  0607 24  0653   SODIUM mmol/L 132* 133* 132* 132*    POTASSIUM mmol/L 5.1 4.8 4.9 4.7   CHLORIDE mmol/L 94* 95* 96* 97*   CO2 mmol/L 27.8 29.1* 26.3 26.7   BUN mg/dL 15 13 11 10   CREATININE mg/dL 0.92 0.89 0.90 0.86   GLUCOSE mg/dL 232* 338* 282* 385*   EGFR mL/min/1.73 103.2 106.4 106.0 107.5     Results from last 7 days   Lab Units 11/08/24  0340 11/07/24  0628 11/05/24  0607 11/04/24  0653   ALBUMIN g/dL 3.2* 3.0* 2.9* 3.0*   BILIRUBIN mg/dL 0.3 0.3 0.3 0.4   ALK PHOS U/L 466* 106 113 108   AST (SGOT) U/L 203* 16 15 13   ALT (SGPT) U/L 88* 18 23 27     Results from last 7 days   Lab Units 11/08/24  0340 11/07/24 0628 11/05/24  0607 11/04/24  0653   CALCIUM mg/dL 9.3 9.4 9.0 9.2   ALBUMIN g/dL 3.2* 3.0* 2.9* 3.0*   MAGNESIUM mg/dL 2.0 1.5* 1.7 1.8   PHOSPHORUS mg/dL 3.6 4.0 3.1 3.0         Glucose   Date/Time Value Ref Range Status   11/08/2024 1032 333 (H) 70 - 130 mg/dL Final   11/08/2024 0641 248 (H) 70 - 130 mg/dL Final   11/08/2024 0008 139 (H) 70 - 130 mg/dL Final   11/07/2024 2053 131 (H) 70 - 130 mg/dL Final   11/07/2024 1535 214 (H) 70 - 130 mg/dL Final   11/07/2024 1153 287 (H) 70 - 130 mg/dL Final   11/07/2024 0629 386 (H) 70 - 130 mg/dL Final       CT Guided Percutaneous Drain Peritoneal    Result Date: 11/6/2024  Successful drain placement into residual perihepatic collection. Follow-up CT prior to drain removal recommended.   This report was finalized on 11/6/2024 6:10 PM by Dr. Gilmer Morrell M.D on Workstation: [id number]       I have personally reviewed all medications:  Scheduled Medications  carvedilol, 6.25 mg, Oral, Q12H  cefTRIAXone, 2,000 mg, Intravenous, Q24H  heparin (porcine), 5,000 Units, Subcutaneous, Q8H  insulin glargine, 15 Units, Subcutaneous, Q12H  insulin lispro, 2-7 Units, Subcutaneous, 4x Daily AC & at Bedtime  insulin lispro, 6 Units, Subcutaneous, TID With Meals  Naloxegol Oxalate, 25 mg, Oral, QAM  pantoprazole, 40 mg, Oral, BID AC  senna-docusate sodium, 2 tablet, Oral, BID   And  polyethylene glycol, 17 g, Oral,  BID  venlafaxine XR, 75 mg, Oral, Daily      Infusions  HYDROmorphone HCl-NaCl,   Pharmacy Consult,   sodium chloride, 30 mL/hr        Diet  NPO Diet NPO Type: Sips with Meds       Assessment/Plan     Active Hospital Problems    Diagnosis  POA    **Epigastric abdominal pain [R10.13]  Yes    Abscess of liver [K75.0]  Yes    Klebsiella pneumoniae (k. pneumoniae) as the cause of diseases classified elsewhere [B96.1]  Yes    Hypotension [I95.9]  Yes    Transaminitis [R74.01]  Yes    Leukocytosis [D72.829]  Yes    Chronic combined systolic and diastolic heart failure [I50.42]  Yes    Calculus of gallbladder with acute on chronic cholecystitis without obstruction [K80.12]  Unknown    Anxiety [F41.9]  Yes    Multiple-type hyperlipidemia [E78.2]  Yes    Type 2 diabetes mellitus with diabetic neuropathy, with long-term current use of insulin [E11.40, Z79.4]  Not Applicable    Benign essential HTN [I10]  Yes      Resolved Hospital Problems    Diagnosis Date Resolved POA    Acute kidney injury [N17.9] 10/30/2024 No       49 y.o. male admitted with Epigastric abdominal pain.    Liver abscess/biloma: Status post IR drainage and drain placement 10/28.  Status post IR drain exchange 11/5.  Status post IR drain placement 11/6.  Klebsiella.  Continue Rocephin.  Timing duration to be determined. CT was recommended prior to removal of drain. Infectious disease following.  Cholecystitis: GI evaluated and he did have a EGD earlier this admission on 10/24.  Status post open cholecystectomy and right upper quadrant drain placement 11/8.  No filling deficits were noted on the intraoperative cholangiogram.  Surgery following.  Hypotension: Had improved with resuscitation and midodrine.  The midodrine has since been stopped.  Pulmonology evaluated.  TENISHA: Multifactorial and now improved.  Nephrology stated was okay for him to start GDMT for CHF at discharge as tolerated. Nephrology following. Plan follow-up in 1 week in  clinic.  NICM/bigeminy: EF 49%.  On Coreg.  Home Entresto, Aldactone, and Jardiance are held.  Cardiology evaluated.   Diabetes: A1c 9.4.  Was on Jardiance metformin and insulin prior to admission.  Had hypo and hyperglycemia this admission.  Continue insulin and monitor requirements.  Adjust as needed.  Hypertension: Not grossly elevated.  Lasix held.  PPX: Subcutaneous heparin  Disposition: SNF/TBD    Expected Discharge Date: 11/17/2024; Expected Discharge Time:  3:00 PM     Girma Tobias MD  [facility]  11/08/24  13:07 EST    Dictated portions of note using Dragon dictation software.  Copied text in this note has been reviewed by me and remains accurate as of 11/08/24

## 2024-11-08 NOTE — PROGRESS NOTES
[facility] Progress Note      Patient Name: Donnie Bishop  : 1975  MRN: [id number]  Primary Care Physician:  Juju Kennedy MD  Date of admission: 10/22/2024    Subjective     Interval History:   F/u TENISHA     Review of Systems:   Denies dyspnea or nausea   Underwent open cholecystectomy today with repair of anterior duodenal serosal defect  Seen after surgery.  No new complaints pain is controlled  Objective     Vitals:   Temp:  [97.7 °F (36.5 °C)-98.4 °F (36.9 °C)] 98.3 °F (36.8 °C)  Heart Rate:  [83-98] 94  Resp:  [12-21] 12  BP: (109-155)/(68-87) 127/78  Flow (L/min) (Oxygen Therapy):  [2-10] 2    Intake/Output Summary (Last 24 hours) at 2024 1354  Last data filed at 2024 1144  Gross per 24 hour   Intake 1410 ml   Output 260 ml   Net 1150 ml       Physical Exam:    General Appearance: frail AAM comfortable on RA, speech/mentation slow  Neck: supple, no JVD  Lungs: CTA bilat no rales  Heart: RRR, normal S1 and S2  Abdomen: soft, nontender, nondistended  Extremities: no edema, cyanosis or clubbing        Scheduled Meds:     carvedilol, 6.25 mg, Oral, Q12H  cefTRIAXone, 2,000 mg, Intravenous, Q24H  heparin (porcine), 5,000 Units, Subcutaneous, Q8H  insulin glargine, 15 Units, Subcutaneous, Q12H  insulin lispro, 2-7 Units, Subcutaneous, 4x Daily AC & at Bedtime  insulin lispro, 6 Units, Subcutaneous, TID With Meals  Naloxegol Oxalate, 25 mg, Oral, QAM  pantoprazole, 40 mg, Oral, BID AC  senna-docusate sodium, 2 tablet, Oral, BID   And  polyethylene glycol, 17 g, Oral, BID  venlafaxine XR, 75 mg, Oral, Daily      IV Meds:   HYDROmorphone HCl-NaCl,   Pharmacy Consult,   sodium chloride, 30 mL/hr          Results Reviewed:   I have personally reviewed the results from the time of this admission to 2024 13:54 EST     Results from last 7 days   Lab Units 24  0340 24  0628 24  0607   SODIUM mmol/L 132* 133* 132*   POTASSIUM mmol/L 5.1 4.8 4.9   CHLORIDE  mmol/L 94* 95* 96*   CO2 mmol/L 27.8 29.1* 26.3   BUN mg/dL 15 13 11   CREATININE mg/dL 0.92 0.89 0.90   CALCIUM mg/dL 9.3 9.4 9.0   BILIRUBIN mg/dL 0.3 0.3 0.3   ALK PHOS U/L 466* 106 113   ALT (SGPT) U/L 88* 18 23   AST (SGOT) U/L 203* 16 15   GLUCOSE mg/dL 232* 338* 282*     Estimated Creatinine Clearance: 100.5 mL/min (by C-G formula based on SCr of 0.92 mg/dL).  Results from last 7 days   Lab Units 11/08/24  0340 11/07/24  0628 11/05/24  0607   MAGNESIUM mg/dL 2.0 1.5* 1.7   PHOSPHORUS mg/dL 3.6 4.0 3.1     Results from last 7 days   Lab Units 11/03/24  0554   URIC ACID mg/dL 3.5     Results from last 7 days   Lab Units 11/08/24  0340 11/07/24  0628 11/05/24  0607 11/03/24  0554 11/02/24  0616   WBC 10*3/mm3 8.58 6.00 7.72 8.94 10.84*   HEMOGLOBIN g/dL 10.7* 10.3* 9.9* 9.9* 10.5*   PLATELETS 10*3/mm3 694* 724* 677* 588* 536*             Assessment / Plan     ASSESSMENT:  Acute kidney injury.  Multifactorial including hypovolemia, hypotension, impaired renal autoregulation due to Jardiance, Entresto, Aldactone.  Resolved.  Cr ~ 1.   Cholecystitis with large right perihepatic fluid collection.  Plan noted for IR drainage today.  On Rocephin per ID.  Transaminitis improving.  Off statin.  MRCP noted.  Heart failure reduced ejection fraction.  EF 49%.  (Improved from 35% a year ago).  Anemia.    Encephalopathy.  Mentation remains bit slow.  Diabetes mellitus type 2.  Hypotension -resolved    PLAN:  Kidney function is overall stable but sodium dropping likely secondary to decreased oral intake in addition to possible ADH release from pain  Will start gentle IV hydration with normal saline  Continue to hold Lasix  Labs in a.m.    Evan Pat MD  11/08/24  13:54 EST    [facility]  [phone]

## 2024-11-08 NOTE — PLAN OF CARE
Pt a/o x4, RA, VSS. CARLA drain minimal output overnight. Pt NPO @ MN for lap freddie in AM. CHG bath given; consent in the chart. PRN pain given overnight.

## 2024-11-08 NOTE — PROGRESS NOTES
LOS: 15 days     ID PROGRESS NOTE    Chief Complaint: Liver abscess and biloma due to Klebsiella    Interval History: I saw the patient in the recovery room after what ended up being an open cholecystectomy.  He is partially sedated so cannot provide a history.  D/W RN.    Medications:    Current Facility-Administered Medications:     [Transfer Hold] sennosides-docusate (PERICOLACE) 8.6-50 MG per tablet 2 tablet, 2 tablet, Oral, BID, 2 tablet at 11/07/24 2125 **AND** [Transfer Hold] polyethylene glycol (MIRALAX) packet 17 g, 17 g, Oral, BID, 17 g at 11/07/24 2125 **AND** [Transfer Hold] bisacodyl (DULCOLAX) EC tablet 5 mg, 5 mg, Oral, Daily PRN **AND** [Transfer Hold] bisacodyl (DULCOLAX) suppository 10 mg, 10 mg, Rectal, Daily PRN, Yaritza Hurtado MD, 10 mg at 10/30/24 1841    [Transfer Hold] Calcium Replacement - Follow Nurse / BPA Driven Protocol, , Does not apply, PRN, Elma Wiggins MD    carvedilol (COREG) tablet 6.25 mg, 6.25 mg, Oral, Q12H, Kaylee Kay MD, 6.25 mg at 11/08/24 0452    cefTRIAXone (ROCEPHIN) 2,000 mg in sodium chloride 0.9 % 100 mL MBP, 2,000 mg, Intravenous, Q24H, Kashmir Carlos MD, Last Rate: 200 mL/hr at 11/07/24 1405, 2,000 mg at 11/07/24 1405    [Transfer Hold] dextrose (D50W) (25 g/50 mL) IV injection 25 g, 25 g, Intravenous, Q15 Min PRN, Elma Wiggins MD, 25 g at 11/03/24 1627    [Transfer Hold] dextrose (GLUTOSE) oral gel 15 g, 15 g, Oral, Q15 Min PRN, Elma Wiggins MD    diphenhydrAMINE (BENADRYL) injection 12.5 mg, 12.5 mg, Intravenous, Q15 Min PRN, McKechnie, Ericka Cierra, CRNA    droperidol (INAPSINE) injection 0.625 mg, 0.625 mg, Intravenous, Q20 Min PRN **OR** droperidol (INAPSINE) injection 0.625 mg, 0.625 mg, Intramuscular, Q20 Min PRN, Ericka Borja CRNA    ePHEDrine injection 5 mg, 5 mg, Intravenous, Once PRN, Ericka Borja CRNA    fentaNYL citrate (PF) (SUBLIMAZE) injection 50 mcg, 50 mcg, Intravenous, Q5 Min PRN, Ericka Borja  Cierra, CRNA, 50 mcg at 11/08/24 1028    flumazenil (ROMAZICON) injection 0.2 mg, 0.2 mg, Intravenous, PRN, Ericka Borja CRNA    [Transfer Hold] glucagon (GLUCAGEN) injection 1 mg, 1 mg, Intramuscular, Q15 Min PRN, Elma Wiggins MD    [Transfer Hold] heparin (porcine) 5000 UNIT/ML injection 5,000 Units, 5,000 Units, Subcutaneous, Q8H, Yaritza Hurtado MD, 5,000 Units at 11/07/24 2125    hydrALAZINE (APRESOLINE) injection 5 mg, 5 mg, Intravenous, Q10 Min PRN, Ericka Borja CRNA    HYDROcodone-acetaminophen (NORCO) 5-325 MG per tablet 1 tablet, 1 tablet, Oral, Once PRN, Ericka Borja CRNA    HYDROmorphone (DILAUDID) injection 0.5 mg, 0.5 mg, Intravenous, Q5 Min PRN, Ericka Borja CRNA, 0.5 mg at 11/08/24 1023    [Transfer Hold] hydrOXYzine (ATARAX) tablet 10 mg, 10 mg, Oral, Q8H PRN, Elma Wiggins MD, 10 mg at 11/05/24 0352    [Transfer Hold] influenza virus vacc split PF FLUZONE 0.5 mL, 0.5 mL, Intramuscular, During Hospitalization, Elma Wiggins MD    [Transfer Hold] insulin glargine (LANTUS, SEMGLEE) injection 15 Units, 15 Units, Subcutaneous, Q12H, iGrma Tobias MD, 15 Units at 11/07/24 2125    [Transfer Hold] insulin lispro (HUMALOG/ADMELOG) injection 2-7 Units, 2-7 Units, Subcutaneous, 4x Daily AC & at Bedtime, Girma Tobias MD, 3 Units at 11/07/24 1644    [Transfer Hold] insulin lispro (HUMALOG/ADMELOG) injection 6 Units, 6 Units, Subcutaneous, TID With Meals, Girma Tobias MD, 6 Units at 11/07/24 1644    ipratropium-albuterol (DUO-NEB) nebulizer solution 3 mL, 3 mL, Nebulization, Once PRN, Ericka Borja CRNA    labetalol (NORMODYNE,TRANDATE) injection 5 mg, 5 mg, Intravenous, Q5 Min PRN, Ericka Borja CRNA    lactated ringers infusion, 9 mL/hr, Intravenous, Continuous, Troy Rogers MD, Last Rate: 9 mL/hr at 11/08/24 0730, Restarted at 11/08/24 1014    [Transfer Hold] Magnesium Standard Dose Replacement - Follow Nurse / BPA  Driven Protocol, , Does not apply, PRN, Girma Tobias MD    [Transfer Hold] melatonin tablet 2.5 mg, 2.5 mg, Oral, Nightly PRN, Elma Wiggins MD    [Transfer Hold] Naloxegol Oxalate (MOVANTIK) tablet 25 mg, 25 mg, Oral, Genesis COFFEY Bokhodir S, MD, 25 mg at 11/07/24 0647    naloxone (NARCAN) injection 0.2 mg, 0.2 mg, Intravenous, PRN, Ericka Borja CRNA    [DISCONTINUED] morphine injection 1 mg, 1 mg, Intravenous, Q4H PRN, 1 mg at 10/23/24 0832 **AND** [Transfer Hold] naloxone (NARCAN) injection 0.4 mg, 0.4 mg, Intravenous, Q5 Min PRN, Elma Wiggins MD    [Transfer Hold] ondansetron ODT (ZOFRAN-ODT) disintegrating tablet 4 mg, 4 mg, Oral, Q6H PRN, 4 mg at 11/02/24 2123 **OR** [Transfer Hold] ondansetron (ZOFRAN) injection 4 mg, 4 mg, Intravenous, Q6H PRN, Elma Wiggins MD, 4 mg at 11/07/24 2222    ondansetron (ZOFRAN) injection 4 mg, 4 mg, Intravenous, Once PRN, Ericka Borja CRNA    [Transfer Hold] oxyCODONE (ROXICODONE) immediate release tablet 5 mg, 5 mg, Oral, Q4H PRN, Girma Tobias MD, 5 mg at 11/07/24 2210    oxyCODONE-acetaminophen (PERCOCET) 7.5-325 MG per tablet 1 tablet, 1 tablet, Oral, Q4H PRN, Ericka Borja CRNA    [Transfer Hold] pantoprazole (PROTONIX) EC tablet 40 mg, 40 mg, Oral, BID AC, Elma Wiggins MD, 40 mg at 11/07/24 1643    [Transfer Hold] Phosphorus Replacement - Follow Nurse / BPA Driven Protocol, , Does not apply, PRRosalie SANTOS Lauren C., MD    Potassium Replacement - Follow Nurse / BPA Driven Protocol, , Does not apply, PRN, Elma Wiggins MD    promethazine (PHENERGAN) suppository 25 mg, 25 mg, Rectal, Once PRN **OR** promethazine (PHENERGAN) tablet 25 mg, 25 mg, Oral, Once PRN, Ericka Borja CRNA    [Transfer Hold] simethicone (MYLICON) chewable tablet 80 mg, 80 mg, Oral, 4x Daily PRN, Girma Tobias MD    [Transfer Hold] sodium chloride 0.9 % flush 10 mL, 10 mL, Intravenous, PRN, Elma Wiggins MD, 10 mL at  11/05/24 0825    [Transfer Hold] venlafaxine XR (EFFEXOR-XR) 24 hr capsule 75 mg, 75 mg, Oral, Daily, Elma Wiggins MD, 75 mg at 11/07/24 0924      Vital Signs  Temp:  [97.7 °F (36.5 °C)-98.7 °F (37.1 °C)] 98.3 °F (36.8 °C)  Heart Rate:  [83-98] 95  Resp:  [13-21] 13  BP: (100-155)/(66-84) 133/84    Physical Exam:  General: Awake, partially sedated in the PACU  Eyes: No scleral icterus  Respiratory: Normal work of breathing with breathing mask present  GI: Bandaged, CARLA drain in place  Skin: No rashes       Results Review:    CBC, CMP, and Mg++ reviewed today    Lab Results   Component Value Date    WBC 8.58 11/08/2024    HGB 10.7 (L) 11/08/2024    HCT 32.7 (L) 11/08/2024    MCV 91.1 11/08/2024     (H) 11/08/2024     Lab Results   Component Value Date    GLUCOSE 232 (H) 11/08/2024    CALCIUM 9.3 11/08/2024     (L) 11/08/2024    K 5.1 11/08/2024    CO2 27.8 11/08/2024    CL 94 (L) 11/08/2024    BUN 15 11/08/2024    CREATININE 0.92 11/08/2024    EGFRRESULT 68.0 07/12/2023    EGFR 103.2 11/08/2024    BCR 16.3 11/08/2024    ANIONGAP 10.2 11/08/2024     Lab Results   Component Value Date    ALT 88 (H) 11/08/2024     Lab Results   Component Value Date    BILITOT 0.3 11/08/2024     Lab Results   Component Value Date    HGBA1C 9.40 (H) 10/24/2024     Lab Results   Component Value Date    CRP 7.02 (H) 11/01/2024     Mag 2    Microbiology:  10/23 BCx: negative  10/25 RPP: negative  10/28 Liver Abscess Cx: rare growth Kleb pneumo (susc all abx tested exc ampicillin)  10/28 BCx: negative    Prior radiology:  11/3 CT scan abdomen/pelvis: Significant decrease in the size of the perihepatic and subcapsular fluid collection.  Thickened gallbladder wall.    Assessment    Liver abscess due to Klebsiella  Biloma  Cholecystitis status post open cholecystectomy on 11/8/2024  Uncontrolled DM2 -last A1c 9.4%    He is gradually improving with antibiotics and IR guided drainage of his fluid collections.  Then on 11/8/2024  (today), he underwent open cholecystectomy.  I plan to treat him with antibiotics through 11/15/2024.  While in the hospital, continue ceftriaxone 2 g IV every 24 hours.  However, when he is ready for discharge she can be changed to Augmentin 875-125 mg p.o. twice daily to complete the course.     Thank you for allowing me to be involved in the care of this patient. Infectious diseases will sign off at this time with antibiotics plan in place, but please call me at [phone] if any further ID questions or new ID concerns.

## 2024-11-08 NOTE — PROGRESS NOTES
"General Surgery Progress Note    CC: Cholecystitis, infected subcapsular biloma     S: No new complaints. Patient feels nervous about surgery but wishes to proceed.    O:BP (P) 109/74 (BP Location: Right arm, Patient Position: Lying)   Pulse 84   Temp 97.7 °F (36.5 °C) (Oral)   Resp (P) 16   Ht 170.2 cm (67\")   Wt 71.6 kg (157 lb 13.6 oz)   SpO2 93%   BMI 24.72 kg/m²     Intake & Output (last day)         11/07 0701 11/08 0700 11/08 0701 11/09 0700    P.O. 840     I.V. (mL/kg)      Total Intake(mL/kg) 840 (11.7)     Urine (mL/kg/hr) 0 (0)     Drains 30     Total Output 30     Net +810           Urine Unmeasured Occurrence 1 x             GENERAL: Awake, alert, interactive, cooperative, answering questions appropriately  HEENT: EOMI, moist mucus membranes  CHEST: normal work of breathing on room air  CARDIAC: Regular rate   GI: Abd soft, non distended, mildly tender at drain site; abd drain with small amount of pale green cloudy fluid  EXTREMITIES: MOCK, no cyanosis   SKIN: Warm and dry    LABS  Results from last 7 days   Lab Units 11/08/24  0340 11/07/24 0628 11/05/24 0607   WBC 10*3/mm3 8.58 6.00 7.72   HEMOGLOBIN g/dL 10.7* 10.3* 9.9*   HEMATOCRIT % 32.7* 31.7* 31.9*   PLATELETS 10*3/mm3 694* 724* 677*     Results from last 7 days   Lab Units 11/08/24  0340 11/07/24 0628 11/05/24  0607   SODIUM mmol/L 132* 133* 132*   POTASSIUM mmol/L 5.1 4.8 4.9   CHLORIDE mmol/L 94* 95* 96*   CO2 mmol/L 27.8 29.1* 26.3   BUN mg/dL 15 13 11   CREATININE mg/dL 0.92 0.89 0.90   CALCIUM mg/dL 9.3 9.4 9.0   BILIRUBIN mg/dL 0.3 0.3 0.3   ALK PHOS U/L 466* 106 113   ALT (SGPT) U/L 88* 18 23   AST (SGOT) U/L 203* 16 15   GLUCOSE mg/dL 232* 338* 282*         IMAGING:    -CT demonstrating contracted gallbladder with mild hyperenhancing wall.  EGD performed 10/24 negative for abnormalities; August 2024 CT had distended gallbladder but no other findings of cholecystitis.  -HIDA scan 10/24 demonstrating filling but with decreased " ejection fraction 19% concerning for possible chronic cholecystitis.   -CTA abdomen pelvis 10/26 to evaluate worsening RUQ pain and possible mesenteric ischemia demonstrated a large right subcapsular fluid collection with mass effect on the liver; no active extravasation  -s/p CT-guided IR drainage 10/28 with thick, turbid bilious output from drain - bile leak vs abscess in the differential, cause unclear but may be related to cholecystitis.  -MRCP 10/30/24 study limited; nondistended, inflamed appearing gallbladder containing stones present; subcapsular fluid collection decreased as expected with drainage; also a second loculated collection appearing contiguous with the duodenum and inferior to the gallbladder fossa has decreased in size.  Nonenhancing lesion in the inferior right hepatic lobe new since August, may be related to biloma, for which follow up is recommended  -CT abdomen pelvis 11/4/2024 - most of his subcapsular fluid collection has decreased but there is still a significant amount of fluid in the superior aspect, the gallbladder has a thick wall without distention, there is no biliary dilation, the proximal duodenum appears less thickened, there is right middle and lower lobe atelectasis and a small pleural effusion, and left lower lobe atelectasis  -CT guided perc drain 11/6- successful placement of drain into the fluid collection noted with approximately 20cc bright yellow cloudy fluid removed    Assessment/Plan: 49 y.o. male with multiple medical problems including CHF, hypertension, hyperlipidemia, type 2 diabetes mellitus with A1c 9.4, presenting with abdominal pain, found to have possible cholecystitis, with Klebsiella infected biloma.      Stable following treatment of infected biloma with antibiotics and drainage.   Plan cholecystectomy today.    Mnauela Wisdom MD  General, Robotic and Endoscopic Surgery  [facility]    [address], [address]  [place], [place],  [address]  P: [phone]  F: [phone]

## 2024-11-08 NOTE — ANESTHESIA POSTPROCEDURE EVALUATION
"Patient: Donnie Bishop    Procedure Summary       Date: 11/08/24 Room / Location: [facility] OR [address] [place] [address] MAIN OR    Anesthesia Start: 0730 Anesthesia Stop: 1014    Procedure: LAPAROSCOPIC CONVERTED TO OPEN CHOLECYSTECTOMY WITH INTRAOPERATIVE CHOLANGIOGRAM AND RIGHT UPPER QUADRANT DRAIN PLACEMENT (Abdomen) Diagnosis:       Calculus of gallbladder with acute on chronic cholecystitis without obstruction      (Calculus of gallbladder with acute on chronic cholecystitis without obstruction [K80.12])    Surgeons: Manuela Wisdom MD Provider: Troy Rogers MD    Anesthesia Type: general ASA Status: 3            Anesthesia Type: general    Vitals  Vitals Value Taken Time   /84 11/08/24 1230   Temp 36.8 °C (98.3 °F) 11/08/24 1012   Pulse 94 11/08/24 1230   Resp 12 11/08/24 1215   SpO2 99 % 11/08/24 1230           Post Anesthesia Care and Evaluation    Level of consciousness: awake and alert  Pain management: adequate  Anesthetic complications: No anesthetic complications    Cardiovascular status: acceptable  Respiratory status: acceptable  Hydration status: acceptable    Comments: /78 (BP Location: Left arm, Patient Position: Lying)   Pulse 94   Temp 36.8 °C (98.3 °F) (Oral)   Resp 12   Ht 170.2 cm (67\")   Wt 71.6 kg (157 lb 13.6 oz)   SpO2 99%   BMI 24.72 kg/m²       "

## 2024-11-08 NOTE — PROGRESS NOTES
"[facility] Clinical Pharmacy Services: PCA Consult     Donnie Bishop has a pharmacy consult to assess opioid medication per Dr. Wisdom's request based on the current protocol \"Pharmacist to discontinue PRN opioid pain medications at connection of PCA. If there is no basal rate on PCA, long-acting opioids may be continued. Scheduled opioids for pain are allowed while weaning patient off of PCA but PRN opioids should be limited to breakthrough pain only.\"     The following PRN medications have been discontinued per the protocol above:    Oxycodone IR 5mg q4hprn moderate pain    Judy Ram, PharmD  Clinical Pharmacist    "

## 2024-11-08 NOTE — ANESTHESIA PREPROCEDURE EVALUATION
Anesthesia Evaluation     Patient summary reviewed and Nursing notes reviewed                Airway   Mallampati: II  TM distance: >3 FB  Neck ROM: full  Dental - normal exam     Pulmonary    Cardiovascular     ECG reviewed    (+) hypertension poorly controlled, CHF Systolic <55%, hyperlipidemia    ROS comment: ·  Left ventricular systolic function is low normal. Calculated left ventricular EF = 49.7%  ·  The following left ventricular wall segments are hypokinetic: apical septal and apex hypokinetic.  ·  Left ventricular diastolic function was normal.  ·  Estimated right ventricular systolic pressure from tricuspid regurgitation is mildly elevated (35-45 mmHg). Calculated right ventricular systolic pressure from tricuspid regurgitation is 40 mmHg.  ·  Mild mitral valve regurgitation is present.      Neuro/Psych  (+) psychiatric history Anxiety  GI/Hepatic/Renal/Endo    (+) diabetes mellitus type 2 poorly controlled    Musculoskeletal     Abdominal    Substance History      OB/GYN          Other                          Anesthesia Plan    ASA 3     general     (I have reviewed the patient's history with the patient and the chart, including all pertinent laboratory results and imaging. I have explained the risks of anesthesia including but not limited to dental damage, corneal abrasion, nerve injury, MI, stroke, and death. Questions asked and answered. Anesthetic plan discussed with patient and team as indicated. Patient expressed understanding of the above.  )  intravenous induction     Anesthetic plan, risks, benefits, and alternatives have been provided, discussed and informed consent has been obtained with: patient.        CODE STATUS:    Level Of Support Discussed With: Patient  Code Status (Patient has no pulse and is not breathing): CPR (Attempt to Resuscitate)  Medical Interventions (Patient has pulse or is breathing): Full Support

## 2024-11-08 NOTE — SIGNIFICANT NOTE
11/08/24 0853   OTHER   Discipline physical therapist   Rehab Time/Intention   Session Not Performed patient unavailable for treatment  (pt off floor to OR today, will follow up tomorrow.)   Recommendation   PT - Next Appointment 11/09/24

## 2024-11-08 NOTE — ANESTHESIA PROCEDURE NOTES
Airway  Urgency: elective    Date/Time: 11/8/2024 7:43 AM  Airway not difficult    General Information and Staff    Patient location during procedure: OR  Anesthesiologist: Troy Rogers MD  CRNA/CAA: Ericka Borja CRNA    Indications and Patient Condition  Indications for airway management: airway protection    Preoxygenated: yes  MILS maintained throughout  Mask difficulty assessment: 1 - vent by mask    Final Airway Details  Final airway type: endotracheal airway      Successful airway: ETT  Cuffed: yes   Successful intubation technique: direct laryngoscopy  Endotracheal tube insertion site: oral  Blade: Enrique  Blade size: 2  ETT size (mm): 7.5  Cormack-Lehane Classification: grade I - full view of glottis  Placement verified by: chest auscultation and capnometry   Cuff volume (mL): 8  Measured from: lips  ETT/EBT  to lips (cm): 23  Number of attempts at approach: 1  Assessment: lips, teeth, and gum same as pre-op and atraumatic intubation    Additional Comments  Pt preoxygenated, SIVI, bag mask vent, ATETI, dentition as before

## 2024-11-09 LAB
ALBUMIN SERPL-MCNC: 3.2 G/DL (ref 3.5–5.2)
ALBUMIN/GLOB SERPL: 0.8 G/DL
ALP SERPL-CCNC: 313 U/L (ref 39–117)
ALT SERPL W P-5'-P-CCNC: 56 U/L (ref 1–41)
ANION GAP SERPL CALCULATED.3IONS-SCNC: 10.1 MMOL/L (ref 5–15)
AST SERPL-CCNC: 47 U/L (ref 1–40)
BASOPHILS # BLD AUTO: 0.06 10*3/MM3 (ref 0–0.2)
BASOPHILS NFR BLD AUTO: 0.5 % (ref 0–1.5)
BILIRUB SERPL-MCNC: 0.5 MG/DL (ref 0–1.2)
BUN SERPL-MCNC: 14 MG/DL (ref 6–20)
BUN/CREAT SERPL: 16.9 (ref 7–25)
CALCIUM SPEC-SCNC: 9.7 MG/DL (ref 8.6–10.5)
CHLORIDE SERPL-SCNC: 101 MMOL/L (ref 98–107)
CO2 SERPL-SCNC: 26.9 MMOL/L (ref 22–29)
CREAT SERPL-MCNC: 0.83 MG/DL (ref 0.76–1.27)
CYTO UR: NORMAL
DEPRECATED RDW RBC AUTO: 40.1 FL (ref 37–54)
EGFRCR SERPLBLD CKD-EPI 2021: 108.6 ML/MIN/1.73
EOSINOPHIL # BLD AUTO: 0.03 10*3/MM3 (ref 0–0.4)
EOSINOPHIL NFR BLD AUTO: 0.3 % (ref 0.3–6.2)
ERYTHROCYTE [DISTWIDTH] IN BLOOD BY AUTOMATED COUNT: 12.3 % (ref 12.3–15.4)
GLOBULIN UR ELPH-MCNC: 3.8 GM/DL
GLUCOSE BLDC GLUCOMTR-MCNC: 131 MG/DL (ref 70–130)
GLUCOSE BLDC GLUCOMTR-MCNC: 162 MG/DL (ref 70–130)
GLUCOSE BLDC GLUCOMTR-MCNC: 172 MG/DL (ref 70–130)
GLUCOSE BLDC GLUCOMTR-MCNC: 190 MG/DL (ref 70–130)
GLUCOSE BLDC GLUCOMTR-MCNC: 225 MG/DL (ref 70–130)
GLUCOSE SERPL-MCNC: 169 MG/DL (ref 65–99)
HCT VFR BLD AUTO: 33.5 % (ref 37.5–51)
HGB BLD-MCNC: 11.3 G/DL (ref 13–17.7)
IMM GRANULOCYTES # BLD AUTO: 0.05 10*3/MM3 (ref 0–0.05)
IMM GRANULOCYTES NFR BLD AUTO: 0.5 % (ref 0–0.5)
LAB AP CASE REPORT: NORMAL
LYMPHOCYTES # BLD AUTO: 0.84 10*3/MM3 (ref 0.7–3.1)
LYMPHOCYTES NFR BLD AUTO: 7.7 % (ref 19.6–45.3)
MCH RBC QN AUTO: 30.3 PG (ref 26.6–33)
MCHC RBC AUTO-ENTMCNC: 33.7 G/DL (ref 31.5–35.7)
MCV RBC AUTO: 89.8 FL (ref 79–97)
MONOCYTES # BLD AUTO: 0.95 10*3/MM3 (ref 0.1–0.9)
MONOCYTES NFR BLD AUTO: 8.7 % (ref 5–12)
NEUTROPHILS NFR BLD AUTO: 82.3 % (ref 42.7–76)
NEUTROPHILS NFR BLD AUTO: 9.02 10*3/MM3 (ref 1.7–7)
NRBC BLD AUTO-RTO: 0 /100 WBC (ref 0–0.2)
PATH REPORT.FINAL DX SPEC: NORMAL
PATH REPORT.GROSS SPEC: NORMAL
PHOSPHATE SERPL-MCNC: 4.2 MG/DL (ref 2.5–4.5)
PLATELET # BLD AUTO: 696 10*3/MM3 (ref 140–450)
PMV BLD AUTO: 9.5 FL (ref 6–12)
POTASSIUM SERPL-SCNC: 5.2 MMOL/L (ref 3.5–5.2)
PROT SERPL-MCNC: 7 G/DL (ref 6–8.5)
RBC # BLD AUTO: 3.73 10*6/MM3 (ref 4.14–5.8)
SODIUM SERPL-SCNC: 138 MMOL/L (ref 136–145)
WBC NRBC COR # BLD AUTO: 10.95 10*3/MM3 (ref 3.4–10.8)

## 2024-11-09 PROCEDURE — 99024 POSTOP FOLLOW-UP VISIT: CPT | Performed by: SURGERY

## 2024-11-09 PROCEDURE — 82948 REAGENT STRIP/BLOOD GLUCOSE: CPT

## 2024-11-09 PROCEDURE — 25010000002 HYDROMORPHONE PER 4 MG: Performed by: SURGERY

## 2024-11-09 PROCEDURE — 63710000001 INSULIN LISPRO (HUMAN) PER 5 UNITS: Performed by: STUDENT IN AN ORGANIZED HEALTH CARE EDUCATION/TRAINING PROGRAM

## 2024-11-09 PROCEDURE — 25010000002 HEPARIN (PORCINE) PER 1000 UNITS: Performed by: STUDENT IN AN ORGANIZED HEALTH CARE EDUCATION/TRAINING PROGRAM

## 2024-11-09 PROCEDURE — 63710000001 INSULIN GLARGINE PER 5 UNITS: Performed by: STUDENT IN AN ORGANIZED HEALTH CARE EDUCATION/TRAINING PROGRAM

## 2024-11-09 PROCEDURE — 25010000002 CEFTRIAXONE PER 250 MG: Performed by: STUDENT IN AN ORGANIZED HEALTH CARE EDUCATION/TRAINING PROGRAM

## 2024-11-09 PROCEDURE — 84100 ASSAY OF PHOSPHORUS: CPT | Performed by: HOSPITALIST

## 2024-11-09 PROCEDURE — 85025 COMPLETE CBC W/AUTO DIFF WBC: CPT | Performed by: HOSPITALIST

## 2024-11-09 PROCEDURE — 25810000003 SODIUM CHLORIDE 0.9 % SOLUTION: Performed by: HOSPITALIST

## 2024-11-09 PROCEDURE — 80053 COMPREHEN METABOLIC PANEL: CPT | Performed by: INTERNAL MEDICINE

## 2024-11-09 RX ORDER — HYDROMORPHONE HCL/0.9% NACL/PF 10 MG/50ML
PATIENT CONTROLLED ANALGESIA SYRINGE INTRAVENOUS CONTINUOUS
Status: DISCONTINUED | OUTPATIENT
Start: 2024-11-09 | End: 2024-11-11

## 2024-11-09 RX ORDER — HYDROMORPHONE HYDROCHLORIDE 1 MG/ML
0.5 INJECTION, SOLUTION INTRAMUSCULAR; INTRAVENOUS; SUBCUTANEOUS ONCE
Status: COMPLETED | OUTPATIENT
Start: 2024-11-09 | End: 2024-11-09

## 2024-11-09 RX ORDER — HYDROMORPHONE HCL/0.9% NACL/PF 10 MG/50ML
PATIENT CONTROLLED ANALGESIA SYRINGE INTRAVENOUS CONTINUOUS
Status: CANCELLED | OUTPATIENT
Start: 2024-11-09 | End: 2024-11-11

## 2024-11-09 RX ADMIN — HEPARIN SODIUM 5000 UNITS: 5000 INJECTION INTRAVENOUS; SUBCUTANEOUS at 21:17

## 2024-11-09 RX ADMIN — INSULIN GLARGINE 15 UNITS: 100 INJECTION, SOLUTION SUBCUTANEOUS at 09:25

## 2024-11-09 RX ADMIN — SODIUM CHLORIDE 75 ML/HR: 9 INJECTION, SOLUTION INTRAVENOUS at 03:55

## 2024-11-09 RX ADMIN — PANTOPRAZOLE SODIUM 40 MG: 40 TABLET, DELAYED RELEASE ORAL at 18:38

## 2024-11-09 RX ADMIN — INSULIN LISPRO 3 UNITS: 100 INJECTION, SOLUTION INTRAVENOUS; SUBCUTANEOUS at 09:25

## 2024-11-09 RX ADMIN — Medication: at 22:00

## 2024-11-09 RX ADMIN — HEPARIN SODIUM 5000 UNITS: 5000 INJECTION INTRAVENOUS; SUBCUTANEOUS at 13:24

## 2024-11-09 RX ADMIN — HEPARIN SODIUM 5000 UNITS: 5000 INJECTION INTRAVENOUS; SUBCUTANEOUS at 05:55

## 2024-11-09 RX ADMIN — INSULIN LISPRO 2 UNITS: 100 INJECTION, SOLUTION INTRAVENOUS; SUBCUTANEOUS at 21:16

## 2024-11-09 RX ADMIN — SENNOSIDES AND DOCUSATE SODIUM 2 TABLET: 50; 8.6 TABLET ORAL at 21:16

## 2024-11-09 RX ADMIN — HYDROMORPHONE HYDROCHLORIDE 0.5 MG: 1 INJECTION, SOLUTION INTRAMUSCULAR; INTRAVENOUS; SUBCUTANEOUS at 05:54

## 2024-11-09 RX ADMIN — POLYETHYLENE GLYCOL 3350 17 G: 17 POWDER, FOR SOLUTION ORAL at 21:16

## 2024-11-09 RX ADMIN — CEFTRIAXONE 2000 MG: 2 INJECTION, POWDER, FOR SOLUTION INTRAMUSCULAR; INTRAVENOUS at 13:24

## 2024-11-09 RX ADMIN — INSULIN LISPRO 2 UNITS: 100 INJECTION, SOLUTION INTRAVENOUS; SUBCUTANEOUS at 12:51

## 2024-11-09 RX ADMIN — INSULIN GLARGINE 15 UNITS: 100 INJECTION, SOLUTION SUBCUTANEOUS at 21:16

## 2024-11-09 NOTE — PROGRESS NOTES
Name: Donnie Bishop ADMIT: 10/22/2024   : 1975  PCP: Juju Kennedy MD    MRN: [id number] LOS: 16 days   AGE/SEX: 49 y.o. male  ROOM: [facility]     Subjective   Subjective   Chief Complaint   Patient presents with    Abdominal Pain     Patient resting in bed, asleep but awakens to voice. Complaining of being thirsty.      Objective   Objective   Vital Signs  Temp:  [98 °F (36.7 °C)-98.5 °F (36.9 °C)] 98.5 °F (36.9 °C)  Heart Rate:  [92-99] 95  Resp:  [14-16] 14  BP: (100-131)/(67-93) 102/67  SpO2:  [95 %-100 %] 99 %  on  Flow (L/min) (Oxygen Therapy):  [2] 2;   Device (Oxygen Therapy): nasal cannula  Body mass index is 26.66 kg/m².    Physical Exam  Vitals and nursing note reviewed.   Constitutional:       Appearance: He is not diaphoretic.   HENT:      Head: Atraumatic.      Mouth/Throat:      Comments: NG  Eyes:      General: No scleral icterus.  Cardiovascular:      Rate and Rhythm: Normal rate and regular rhythm.   Pulmonary:      Effort: Pulmonary effort is normal.      Breath sounds: Decreased breath sounds present. No wheezing.   Abdominal:      Palpations: Abdomen is soft.      Tenderness: There is abdominal tenderness.   Musculoskeletal:         General: No swelling.   Skin:     General: Skin is warm and dry.   Neurological:      Mental Status: He is alert. Mental status is at baseline.   Psychiatric:         Mood and Affect: Mood normal.         Behavior: Behavior normal.       Results Review  I reviewed the patient's new clinical results.    Results from last 7 days   Lab Units 24  03424  03424  0607   WBC 10*3/mm3 10.95* 8.58 6.00 7.72   HEMOGLOBIN g/dL 11.3* 10.7* 10.3* 9.9*   PLATELETS 10*3/mm3 696* 694* 724* 677*     Results from last 7 days   Lab Units 24  03424  03424  0624  0607   SODIUM mmol/L 138 132* 133* 132*   POTASSIUM mmol/L 5.2 5.1 4.8 4.9   CHLORIDE mmol/L 101 94* 95* 96*   CO2 mmol/L 26.9 27.8 29.1* 26.3   BUN  mg/dL 14 15 13 11   CREATININE mg/dL 0.83 0.92 0.89 0.90   GLUCOSE mg/dL 169* 232* 338* 282*   EGFR mL/min/1.73 108.6 103.2 106.4 106.0     Results from last 7 days   Lab Units 11/09/24  0342 11/08/24  0340 11/07/24  0628 11/05/24  0607   ALBUMIN g/dL 3.2* 3.2* 3.0* 2.9*   BILIRUBIN mg/dL 0.5 0.3 0.3 0.3   ALK PHOS U/L 313* 466* 106 113   AST (SGOT) U/L 47* 203* 16 15   ALT (SGPT) U/L 56* 88* 18 23     Results from last 7 days   Lab Units 11/09/24  0342 11/08/24  0340 11/07/24  0628 11/05/24  0607 11/04/24  0653   CALCIUM mg/dL 9.7 9.3 9.4 9.0 9.2   ALBUMIN g/dL 3.2* 3.2* 3.0* 2.9* 3.0*   MAGNESIUM mg/dL  --  2.0 1.5* 1.7 1.8   PHOSPHORUS mg/dL 4.2 3.6 4.0 3.1 3.0         Glucose   Date/Time Value Ref Range Status   11/09/2024 1224 190 (H) 70 - 130 mg/dL Final   11/09/2024 0858 225 (H) 70 - 130 mg/dL Final   11/09/2024 0619 172 (H) 70 - 130 mg/dL Final   11/08/2024 2309 157 (H) 70 - 130 mg/dL Final   11/08/2024 2036 172 (H) 70 - 130 mg/dL Final   11/08/2024 1032 333 (H) 70 - 130 mg/dL Final   11/08/2024 0641 248 (H) 70 - 130 mg/dL Final       No radiology results for the last day    I have personally reviewed all medications:  Scheduled Medications  carvedilol, 6.25 mg, Oral, Q12H  cefTRIAXone, 2,000 mg, Intravenous, Q24H  heparin (porcine), 5,000 Units, Subcutaneous, Q8H  insulin glargine, 15 Units, Subcutaneous, Q12H  insulin lispro, 2-7 Units, Subcutaneous, 4x Daily AC & at Bedtime  insulin lispro, 6 Units, Subcutaneous, TID With Meals  Naloxegol Oxalate, 25 mg, Oral, QAM  pantoprazole, 40 mg, Oral, BID AC  senna-docusate sodium, 2 tablet, Oral, BID   And  polyethylene glycol, 17 g, Oral, BID  venlafaxine XR, 75 mg, Oral, Daily      Infusions  HYDROmorphone HCl-NaCl,   Pharmacy Consult,   sodium chloride, 30 mL/hr        Diet  NPO Diet NPO Type: Sips with Meds       Assessment/Plan     Active Hospital Problems    Diagnosis  POA    **Epigastric abdominal pain [R10.13]  Yes    Abscess of liver [K75.0]  Yes     Klebsiella pneumoniae (k. pneumoniae) as the cause of diseases classified elsewhere [B96.1]  Yes    Hypotension [I95.9]  Yes    Transaminitis [R74.01]  Yes    Leukocytosis [D72.829]  Yes    Chronic combined systolic and diastolic heart failure [I50.42]  Yes    Calculus of gallbladder with acute on chronic cholecystitis without obstruction [K80.12]  Unknown    Anxiety [F41.9]  Yes    Multiple-type hyperlipidemia [E78.2]  Yes    Type 2 diabetes mellitus with diabetic neuropathy, with long-term current use of insulin [E11.40, Z79.4]  Not Applicable    Benign essential HTN [I10]  Yes      Resolved Hospital Problems    Diagnosis Date Resolved POA    Acute kidney injury [N17.9] 10/30/2024 No       49 y.o. male admitted with Epigastric abdominal pain.    Liver abscess/biloma: Status post IR drainage and drain placement 10/28.  Status post IR drain exchange 11/5.  Status post IR drain placement 11/6.  Klebsiella.  Continue Rocephin.  Abx through 11/15/24- oral abx plan in place if he is ready to discharge. CT was recommended prior to removal of drain. Infectious disease following.  Cholecystitis: GI evaluated and he did have a EGD earlier this admission on 10/24.  Status post open cholecystectomy and right upper quadrant drain placement 11/8.  No filling deficits were noted on the intraoperative cholangiogram.  Surgery following.  Hypotension: Had improved with resuscitation and midodrine.  The midodrine has since been stopped.  Pulmonology evaluated.  TENISHA: Multifactorial and now improved.  Nephrology stated was okay for him to start GDMT for CHF at discharge as tolerated. Nephrology following. Plan follow-up in 1 week in clinic.  NICM/bigeminy: EF 49%.  On Coreg.  Home Entresto, Aldactone, and Jardiance are held.  Cardiology evaluated.   Diabetes: A1c 9.4.  Was on Jardiance metformin and insulin prior to admission.  Had hypo and hyperglycemia this admission.  Continue insulin and monitor requirements.  Adjust as  needed.  Hypertension: Not grossly elevated.  Lasix held. Coreg.   PPX: Subcutaneous heparin  Disposition: SNF/TBD    Expected Discharge Date: 11/17/2024; Expected Discharge Time: 12:00 PM     Nanda Cody MD  [facility]  11/09/24  15:43 EST    Dictated portions of note using Dragon dictation software.  Copied text in this note has been reviewed by me and remains accurate as of 11/09/24

## 2024-11-09 NOTE — PROGRESS NOTES
Postoperative day 1 from open cholecystectomy with intraoperative cholangiogram.  Continue PCA for pain control.  DC Williamson catheter.  Minimal output from NG tube and denies nausea.  Passing flatus.  Dressing is clean dry and intact.  CARLA drain with small amount of serosanguineous fluid.  Clamp NG tube and if residual is less than 200 cc at 6 hours then okay to remove and start clear liquid diet.  Encourage ambulation.

## 2024-11-09 NOTE — PROGRESS NOTES
[facility] Progress Note      Patient Name: Donnie Bishop  : 1975  MRN: [id number]  Primary Care Physician:  Juju Kennedy MD  Date of admission: 10/22/2024    Subjective     Interval History:   F/u TENISHA     Review of Systems:   Denies dyspnea or nausea   Underwent open cholecystectomy on 2024 with repair of anterior duodenal serosal defect  Doing well.  NPO.  Pain is controlled  Objective     Vitals:   Temp:  [98.2 °F (36.8 °C)-98.8 °F (37.1 °C)] 98.2 °F (36.8 °C)  Heart Rate:  [92-99] 96  Resp:  [12-21] 14  BP: (100-155)/(67-93) 123/74  Flow (L/min) (Oxygen Therapy):  [2-10] 2    Intake/Output Summary (Last 24 hours) at 2024 1004  Last data filed at 2024 0508  Gross per 24 hour   Intake 1587.5 ml   Output 1365 ml   Net 222.5 ml       Physical Exam:    General Appearance: frail AAM comfortable on RA, speech/mentation slow  Neck: supple, no JVD  Lungs: CTA bilat no rales  Heart: RRR, normal S1 and S2  Abdomen: soft, nontender, nondistended  Extremities: no edema, cyanosis or clubbing        Scheduled Meds:     carvedilol, 6.25 mg, Oral, Q12H  cefTRIAXone, 2,000 mg, Intravenous, Q24H  heparin (porcine), 5,000 Units, Subcutaneous, Q8H  insulin glargine, 15 Units, Subcutaneous, Q12H  insulin lispro, 2-7 Units, Subcutaneous, 4x Daily AC & at Bedtime  insulin lispro, 6 Units, Subcutaneous, TID With Meals  Naloxegol Oxalate, 25 mg, Oral, QAM  pantoprazole, 40 mg, Oral, BID AC  senna-docusate sodium, 2 tablet, Oral, BID   And  polyethylene glycol, 17 g, Oral, BID  venlafaxine XR, 75 mg, Oral, Daily      IV Meds:   HYDROmorphone HCl-NaCl,   Pharmacy Consult,   sodium chloride, 30 mL/hr  sodium chloride, 75 mL/hr, Last Rate: 75 mL/hr (24 0355)          Results Reviewed:   I have personally reviewed the results from the time of this admission to 2024 10:04 EST     Results from last 7 days   Lab Units 24  0342 24  0340 24  0628   SODIUM mmol/L  138 132* 133*   POTASSIUM mmol/L 5.2 5.1 4.8   CHLORIDE mmol/L 101 94* 95*   CO2 mmol/L 26.9 27.8 29.1*   BUN mg/dL 14 15 13   CREATININE mg/dL 0.83 0.92 0.89   CALCIUM mg/dL 9.7 9.3 9.4   BILIRUBIN mg/dL 0.5 0.3 0.3   ALK PHOS U/L 313* 466* 106   ALT (SGPT) U/L 56* 88* 18   AST (SGOT) U/L 47* 203* 16   GLUCOSE mg/dL 169* 232* 338*     Estimated Creatinine Clearance: 120.1 mL/min (by C-G formula based on SCr of 0.83 mg/dL).  Results from last 7 days   Lab Units 11/09/24  0342 11/08/24  0340 11/07/24  0628 11/05/24  0607   MAGNESIUM mg/dL  --  2.0 1.5* 1.7   PHOSPHORUS mg/dL 4.2 3.6 4.0 3.1     Results from last 7 days   Lab Units 11/03/24  0554   URIC ACID mg/dL 3.5     Results from last 7 days   Lab Units 11/09/24  0342 11/08/24  0340 11/07/24  0628 11/05/24  0607 11/03/24  0554   WBC 10*3/mm3 10.95* 8.58 6.00 7.72 8.94   HEMOGLOBIN g/dL 11.3* 10.7* 10.3* 9.9* 9.9*   PLATELETS 10*3/mm3 696* 694* 724* 677* 588*             Assessment / Plan     ASSESSMENT:  Acute kidney injury.  Multifactorial including hypovolemia, hypotension, impaired renal autoregulation due to Jardiance, Entresto, Aldactone.  Resolved.  Cr ~ 1.   Cholecystitis with large right perihepatic fluid collection.  Plan noted for IR drainage today.  On Rocephin per ID.  Transaminitis improving.  Off statin.  MRCP noted.  Heart failure reduced ejection fraction.  EF 49%.  (Improved from 35% a year ago).  Anemia.    Encephalopathy.  Mentation remains bit slow.  Diabetes mellitus type 2.  Hypotension -resolved    PLAN:  Kidney function is overall stable  Continue gentle IV hydration with normal saline  Continue to hold Lasix  Labs in a.m.    Evan Pat MD  11/09/24  10:04 [facility]    [facility]  [phone]

## 2024-11-09 NOTE — PLAN OF CARE
Goal Outcome Evaluation:  Plan of Care Reviewed With: patient        Progress: no change  Outcome Evaluation: pt a&ox4 overnight; VSS; no s/s of distres; pt expressed pain throughout night; states PCA pump not working for him; call placed to [facility] for new orders; orders placed in computer; island dressing remains in place without new drainage; CARLA drain had 30 mL Output; 350 urine output and 150 mL from NG tube; will continue to monitor;       Problem: Fall Injury Risk  Goal: Absence of Fall and Fall-Related Injury  Intervention: Promote Injury-Free Environment  Recent Flowsheet Documentation  Taken 11/9/2024 0400 by Nancy White, RN  Safety Promotion/Fall Prevention: safety round/check completed  Taken 11/9/2024 0200 by Nancy White RN  Safety Promotion/Fall Prevention: safety round/check completed  Taken 11/9/2024 0000 by Nancy White, RN  Safety Promotion/Fall Prevention: safety round/check completed  Taken 11/8/2024 2200 by Nancy White RN  Safety Promotion/Fall Prevention: safety round/check completed  Taken 11/8/2024 2000 by Nancy White RN  Safety Promotion/Fall Prevention: safety round/check completed

## 2024-11-10 LAB
ANION GAP SERPL CALCULATED.3IONS-SCNC: 13.7 MMOL/L (ref 5–15)
BUN SERPL-MCNC: 12 MG/DL (ref 6–20)
BUN/CREAT SERPL: 14.5 (ref 7–25)
CALCIUM SPEC-SCNC: 9.8 MG/DL (ref 8.6–10.5)
CHLORIDE SERPL-SCNC: 95 MMOL/L (ref 98–107)
CO2 SERPL-SCNC: 25.3 MMOL/L (ref 22–29)
CREAT SERPL-MCNC: 0.83 MG/DL (ref 0.76–1.27)
DEPRECATED RDW RBC AUTO: 40.2 FL (ref 37–54)
EGFRCR SERPLBLD CKD-EPI 2021: 108.6 ML/MIN/1.73
ERYTHROCYTE [DISTWIDTH] IN BLOOD BY AUTOMATED COUNT: 12.2 % (ref 12.3–15.4)
GLUCOSE BLDC GLUCOMTR-MCNC: 153 MG/DL (ref 70–130)
GLUCOSE BLDC GLUCOMTR-MCNC: 185 MG/DL (ref 70–130)
GLUCOSE BLDC GLUCOMTR-MCNC: 193 MG/DL (ref 70–130)
GLUCOSE BLDC GLUCOMTR-MCNC: 198 MG/DL (ref 70–130)
GLUCOSE BLDC GLUCOMTR-MCNC: 215 MG/DL (ref 70–130)
GLUCOSE BLDC GLUCOMTR-MCNC: 68 MG/DL (ref 70–130)
GLUCOSE SERPL-MCNC: 173 MG/DL (ref 65–99)
HCT VFR BLD AUTO: 29 % (ref 37.5–51)
HGB BLD-MCNC: 9.1 G/DL (ref 13–17.7)
MCH RBC QN AUTO: 28.8 PG (ref 26.6–33)
MCHC RBC AUTO-ENTMCNC: 31.4 G/DL (ref 31.5–35.7)
MCV RBC AUTO: 91.8 FL (ref 79–97)
PLATELET # BLD AUTO: 530 10*3/MM3 (ref 140–450)
PMV BLD AUTO: 9.9 FL (ref 6–12)
POTASSIUM SERPL-SCNC: 4.3 MMOL/L (ref 3.5–5.2)
RBC # BLD AUTO: 3.16 10*6/MM3 (ref 4.14–5.8)
SODIUM SERPL-SCNC: 134 MMOL/L (ref 136–145)
WBC NRBC COR # BLD AUTO: 10.34 10*3/MM3 (ref 3.4–10.8)

## 2024-11-10 PROCEDURE — 25010000002 CEFTRIAXONE PER 250 MG: Performed by: STUDENT IN AN ORGANIZED HEALTH CARE EDUCATION/TRAINING PROGRAM

## 2024-11-10 PROCEDURE — 99024 POSTOP FOLLOW-UP VISIT: CPT | Performed by: SURGERY

## 2024-11-10 PROCEDURE — 80048 BASIC METABOLIC PNL TOTAL CA: CPT | Performed by: STUDENT IN AN ORGANIZED HEALTH CARE EDUCATION/TRAINING PROGRAM

## 2024-11-10 PROCEDURE — 25010000002 HEPARIN (PORCINE) PER 1000 UNITS: Performed by: STUDENT IN AN ORGANIZED HEALTH CARE EDUCATION/TRAINING PROGRAM

## 2024-11-10 PROCEDURE — 82948 REAGENT STRIP/BLOOD GLUCOSE: CPT

## 2024-11-10 PROCEDURE — 85027 COMPLETE CBC AUTOMATED: CPT | Performed by: STUDENT IN AN ORGANIZED HEALTH CARE EDUCATION/TRAINING PROGRAM

## 2024-11-10 PROCEDURE — 63710000001 INSULIN GLARGINE PER 5 UNITS: Performed by: STUDENT IN AN ORGANIZED HEALTH CARE EDUCATION/TRAINING PROGRAM

## 2024-11-10 PROCEDURE — 97164 PT RE-EVAL EST PLAN CARE: CPT

## 2024-11-10 PROCEDURE — 63710000001 INSULIN LISPRO (HUMAN) PER 5 UNITS: Performed by: STUDENT IN AN ORGANIZED HEALTH CARE EDUCATION/TRAINING PROGRAM

## 2024-11-10 PROCEDURE — 97110 THERAPEUTIC EXERCISES: CPT

## 2024-11-10 RX ADMIN — NALOXEGOL OXALATE 25 MG: 25 TABLET, FILM COATED ORAL at 08:36

## 2024-11-10 RX ADMIN — INSULIN LISPRO 3 UNITS: 100 INJECTION, SOLUTION INTRAVENOUS; SUBCUTANEOUS at 21:20

## 2024-11-10 RX ADMIN — PANTOPRAZOLE SODIUM 40 MG: 40 TABLET, DELAYED RELEASE ORAL at 18:59

## 2024-11-10 RX ADMIN — HEPARIN SODIUM 5000 UNITS: 5000 INJECTION INTRAVENOUS; SUBCUTANEOUS at 14:33

## 2024-11-10 RX ADMIN — HEPARIN SODIUM 5000 UNITS: 5000 INJECTION INTRAVENOUS; SUBCUTANEOUS at 21:21

## 2024-11-10 RX ADMIN — INSULIN GLARGINE 15 UNITS: 100 INJECTION, SOLUTION SUBCUTANEOUS at 21:21

## 2024-11-10 RX ADMIN — INSULIN LISPRO 2 UNITS: 100 INJECTION, SOLUTION INTRAVENOUS; SUBCUTANEOUS at 08:47

## 2024-11-10 RX ADMIN — CEFTRIAXONE 2000 MG: 2 INJECTION, POWDER, FOR SOLUTION INTRAMUSCULAR; INTRAVENOUS at 14:33

## 2024-11-10 RX ADMIN — INSULIN LISPRO 6 UNITS: 100 INJECTION, SOLUTION INTRAVENOUS; SUBCUTANEOUS at 08:47

## 2024-11-10 RX ADMIN — HEPARIN SODIUM 5000 UNITS: 5000 INJECTION INTRAVENOUS; SUBCUTANEOUS at 05:31

## 2024-11-10 RX ADMIN — VENLAFAXINE HYDROCHLORIDE 75 MG: 75 CAPSULE, EXTENDED RELEASE ORAL at 08:36

## 2024-11-10 RX ADMIN — INSULIN LISPRO 6 UNITS: 100 INJECTION, SOLUTION INTRAVENOUS; SUBCUTANEOUS at 11:47

## 2024-11-10 RX ADMIN — CARVEDILOL 6.25 MG: 6.25 TABLET, FILM COATED ORAL at 08:36

## 2024-11-10 RX ADMIN — CARVEDILOL 6.25 MG: 6.25 TABLET, FILM COATED ORAL at 21:20

## 2024-11-10 RX ADMIN — PANTOPRAZOLE SODIUM 40 MG: 40 TABLET, DELAYED RELEASE ORAL at 08:36

## 2024-11-10 RX ADMIN — INSULIN GLARGINE 15 UNITS: 100 INJECTION, SOLUTION SUBCUTANEOUS at 08:36

## 2024-11-10 RX ADMIN — INSULIN LISPRO 2 UNITS: 100 INJECTION, SOLUTION INTRAVENOUS; SUBCUTANEOUS at 11:46

## 2024-11-10 NOTE — PLAN OF CARE
Problem: Fall Injury Risk  Goal: Absence of Fall and Fall-Related Injury  Intervention: Identify and Manage Contributors  Recent Flowsheet Documentation  Taken 11/10/2024 0600 by Katiuska Sanchez RN  Medication Review/Management: medications reviewed  Taken 11/10/2024 0400 by Katiuska Sanchez RN  Medication Review/Management: medications reviewed  Taken 11/10/2024 0200 by Katiuska Sanchez RN  Medication Review/Management: medications reviewed  Taken 11/10/2024 0000 by Katiuska Sanchez RN  Medication Review/Management: medications reviewed  Taken 11/9/2024 2200 by Katiuska Sanchez RN  Medication Review/Management: medications reviewed  Taken 11/9/2024 2000 by Katiuska Sanchez RN  Medication Review/Management: medications reviewed     Problem: Sepsis/Septic Shock  Goal: Optimal Coping  Outcome: Progressing     Problem: Adult Inpatient Plan of Care  Goal: Absence of Hospital-Acquired Illness or Injury  Intervention: Identify and Manage Fall Risk  Recent Flowsheet Documentation  Taken 11/10/2024 0600 by Katiuska Sanchez RN  Safety Promotion/Fall Prevention:   activity supervised   lighting adjusted   nonskid shoes/slippers when out of bed   safety round/check completed   room organization consistent  Taken 11/10/2024 0400 by Katiuska Sanchez RN  Safety Promotion/Fall Prevention:   activity supervised   lighting adjusted   nonskid shoes/slippers when out of bed   safety round/check completed   room organization consistent  Taken 11/10/2024 0200 by Katiuska Sanchez RN  Safety Promotion/Fall Prevention:   activity supervised   lighting adjusted   nonskid shoes/slippers when out of bed   safety round/check completed   room organization consistent  Taken 11/10/2024 0000 by Katiuska Sanchez RN  Safety Promotion/Fall Prevention:   activity supervised   lighting adjusted   nonskid shoes/slippers when out of bed   safety round/check completed   room organization consistent  Taken 11/9/2024 2200 by Katiuska Sanchez RN  Safety  Promotion/Fall Prevention:   activity supervised   lighting adjusted   nonskid shoes/slippers when out of bed   safety round/check completed   room organization consistent  Taken 11/9/2024 2000 by Katiuska Sanchez RN  Safety Promotion/Fall Prevention:   activity supervised   lighting adjusted   nonskid shoes/slippers when out of bed   safety round/check completed   room organization consistent  Intervention: Prevent Skin Injury  Recent Flowsheet Documentation  Taken 11/10/2024 0200 by Katiuska Sanchez RN  Body Position: position changed independently  Skin Protection:   drying agents applied   incontinence pads utilized   transparent dressing maintained  Taken 11/10/2024 0000 by Katiuska Sanchez RN  Body Position:   position changed independently   weight shifting  Taken 11/9/2024 2000 by Katiuska Sanchez RN  Body Position: position changed independently  Skin Protection:   drying agents applied   incontinence pads utilized   transparent dressing maintained  Intervention: Prevent and Manage VTE (Venous Thromboembolism) Risk  Recent Flowsheet Documentation  Taken 11/9/2024 2000 by Katiuska Sanchez RN  VTE Prevention/Management: (heparin sbq)   SCDs (sequential compression devices) off   patient refused intervention  Intervention: Prevent Infection  Recent Flowsheet Documentation  Taken 11/10/2024 0600 by Katiuska Sanchez RN  Infection Prevention: hand hygiene promoted  Taken 11/10/2024 0400 by Katiuska Sanchez RN  Infection Prevention: hand hygiene promoted  Taken 11/10/2024 0200 by Katiuska Sanchez RN  Infection Prevention: hand hygiene promoted  Taken 11/10/2024 0000 by Katiuska Sanchez RN  Infection Prevention: hand hygiene promoted  Taken 11/9/2024 2200 by Katiuska Sanchez RN  Infection Prevention: hand hygiene promoted  Taken 11/9/2024 2000 by Katiuska Sanchez RN  Infection Prevention: hand hygiene promoted  Goal: Optimal Comfort and Wellbeing  Intervention: Monitor Pain and Promote Comfort  Recent Flowsheet  Documentation  Taken 11/10/2024 0600 by Katiuska Sanchez RN  Pain Management Interventions: pain pump in use  Taken 11/9/2024 2000 by Katiuska Sanchez RN  Pain Management Interventions: pain pump in use  Intervention: Provide Person-Centered Care  Recent Flowsheet Documentation  Taken 11/10/2024 0200 by Katiuska Sanchez RN  Trust Relationship/Rapport:   care explained   choices provided   thoughts/feelings acknowledged  Taken 11/9/2024 2000 by Katiuska Sanchez RN  Trust Relationship/Rapport:   care explained   choices provided   thoughts/feelings acknowledged     Problem: Sepsis/Septic Shock  Goal: Blood Glucose Level Within Target Range  Intervention: Optimize Glycemic Control  Recent Flowsheet Documentation  Taken 11/10/2024 0200 by Katiuska Sanchez RN  Hyperglycemia Management: blood glucose monitored  Goal: Absence of Infection Signs and Symptoms  Intervention: Initiate Sepsis Management  Recent Flowsheet Documentation  Taken 11/10/2024 0600 by Katiuska Sanchez RN  Infection Prevention: hand hygiene promoted  Taken 11/10/2024 0400 by Katiuska Sanchez RN  Infection Prevention: hand hygiene promoted  Taken 11/10/2024 0200 by Katiuska Sanchez RN  Infection Prevention: hand hygiene promoted  Taken 11/10/2024 0000 by Katiuska Sanchez RN  Infection Prevention: hand hygiene promoted  Taken 11/9/2024 2200 by Katiuska Sanchez RN  Infection Prevention: hand hygiene promoted  Taken 11/9/2024 2000 by Katiuska Sanchez RN  Infection Prevention: hand hygiene promoted  Intervention: Promote Recovery  Recent Flowsheet Documentation  Taken 11/10/2024 0200 by Katiuska Sanchez RN  Activity Management: activity encouraged  Taken 11/9/2024 2000 by Katiuska Sanchez RN  Activity Management: activity encouraged     Problem: Fall Injury Risk  Goal: Absence of Fall and Fall-Related Injury  Intervention: Identify and Manage Contributors  Recent Flowsheet Documentation  Taken 11/10/2024 0600 by Katiuska Sanchez RN  Medication  Review/Management: medications reviewed  Taken 11/10/2024 0400 by Katiuska Sanchez RN  Medication Review/Management: medications reviewed  Taken 11/10/2024 0200 by Katiuska Sanchez RN  Medication Review/Management: medications reviewed  Taken 11/10/2024 0000 by Katiuska Sanchez RN  Medication Review/Management: medications reviewed  Taken 11/9/2024 2200 by Katiuska Sanchez RN  Medication Review/Management: medications reviewed  Taken 11/9/2024 2000 by Katiuska Sanchez RN  Medication Review/Management: medications reviewed  Intervention: Promote Injury-Free Environment  Recent Flowsheet Documentation  Taken 11/10/2024 0600 by Katiuska Sanchez RN  Safety Promotion/Fall Prevention:   activity supervised   lighting adjusted   nonskid shoes/slippers when out of bed   safety round/check completed   room organization consistent  Taken 11/10/2024 0400 by Katiuska Sanchez RN  Safety Promotion/Fall Prevention:   activity supervised   lighting adjusted   nonskid shoes/slippers when out of bed   safety round/check completed   room organization consistent  Taken 11/10/2024 0200 by Katiuska Sanchez RN  Safety Promotion/Fall Prevention:   activity supervised   lighting adjusted   nonskid shoes/slippers when out of bed   safety round/check completed   room organization consistent  Taken 11/10/2024 0000 by Katiuska Sanchez RN  Safety Promotion/Fall Prevention:   activity supervised   lighting adjusted   nonskid shoes/slippers when out of bed   safety round/check completed   room organization consistent  Taken 11/9/2024 2200 by Katiuska Sanchez RN  Safety Promotion/Fall Prevention:   activity supervised   lighting adjusted   nonskid shoes/slippers when out of bed   safety round/check completed   room organization consistent  Taken 11/9/2024 2000 by Katiuska Sanchez RN  Safety Promotion/Fall Prevention:   activity supervised   lighting adjusted   nonskid shoes/slippers when out of bed   safety round/check completed   room organization  consistent     Problem: Pain Acute  Goal: Optimal Pain Control and Function  Intervention: Optimize Psychosocial Wellbeing  Recent Flowsheet Documentation  Taken 11/10/2024 0200 by Katiuska Sanchez RN  Diversional Activities: television  Taken 11/9/2024 2000 by Katiuska Sanchez RN  Diversional Activities: television  Intervention: Develop Pain Management Plan  Recent Flowsheet Documentation  Taken 11/10/2024 0600 by Katiuska Sanchez RN  Pain Management Interventions: pain pump in use  Taken 11/9/2024 2000 by Katiuska Sanchez RN  Pain Management Interventions: pain pump in use  Intervention: Prevent or Manage Pain  Recent Flowsheet Documentation  Taken 11/10/2024 0600 by Katiuska Sanchez RN  Medication Review/Management: medications reviewed  Taken 11/10/2024 0400 by Katiuska Sanchez RN  Medication Review/Management: medications reviewed  Taken 11/10/2024 0200 by Katiuska Sanchez RN  Medication Review/Management: medications reviewed  Taken 11/10/2024 0000 by Katiuska Sanchez RN  Medication Review/Management: medications reviewed  Taken 11/9/2024 2200 by Katiuska Sanchez RN  Medication Review/Management: medications reviewed  Taken 11/9/2024 2000 by Katiuska Sanchez RN  Bowel Elimination Promotion: (medications) other (see comments)  Medication Review/Management: medications reviewed     Problem: Sepsis/Septic Shock  Goal: Blood Glucose Level Within Target Range  Intervention: Optimize Glycemic Control  Recent Flowsheet Documentation  Taken 11/10/2024 0200 by Katiuska Sanchez RN  Hyperglycemia Management: blood glucose monitored  Goal: Absence of Infection Signs and Symptoms  Intervention: Initiate Sepsis Management  Recent Flowsheet Documentation  Taken 11/10/2024 0600 by Katiuska Sanchez RN  Infection Prevention: hand hygiene promoted  Taken 11/10/2024 0400 by Katiuska Sanchez RN  Infection Prevention: hand hygiene promoted  Taken 11/10/2024 0200 by Katiuska Sanchez RN  Infection Prevention: hand hygiene  promoted  Taken 11/10/2024 0000 by Katiuska Sanchez RN  Infection Prevention: hand hygiene promoted  Taken 11/9/2024 2200 by Katiuska Sanchez RN  Infection Prevention: hand hygiene promoted  Taken 11/9/2024 2000 by Katiuska Sanchez RN  Infection Prevention: hand hygiene promoted  Intervention: Promote Recovery  Recent Flowsheet Documentation  Taken 11/10/2024 0200 by Katiuska Sanchez RN  Activity Management: activity encouraged  Taken 11/9/2024 2000 by Katiuska Sanchez RN  Activity Management: activity encouraged     Problem: Skin Injury Risk Increased  Goal: Skin Health and Integrity  Intervention: Optimize Skin Protection  Recent Flowsheet Documentation  Taken 11/10/2024 0200 by Katiuska Sanchez RN  Activity Management: activity encouraged  Pressure Reduction Techniques: frequent weight shift encouraged  Head of Bed (HOB) Positioning: HOB at 30-45 degrees  Pressure Reduction Devices:   specialty bed utilized   pressure-redistributing mattress utilized  Skin Protection:   drying agents applied   incontinence pads utilized   transparent dressing maintained  Taken 11/10/2024 0000 by Katiuska Sanchez RN  Head of Bed (HOB) Positioning: HOB at 30-45 degrees  Taken 11/9/2024 2000 by Katiuska Sanchez RN  Activity Management: activity encouraged  Pressure Reduction Techniques: frequent weight shift encouraged  Head of Bed (HOB) Positioning: HOB at 45 degrees  Pressure Reduction Devices: specialty bed utilized  Skin Protection:   drying agents applied   incontinence pads utilized   transparent dressing maintained     Problem: Pain Acute  Goal: Optimal Pain Control and Function  Intervention: Optimize Psychosocial Wellbeing  Recent Flowsheet Documentation  Taken 11/10/2024 0200 by Katiuska Sanchez RN  Diversional Activities: television  Taken 11/9/2024 2000 by Katiuska Sanchez RN  Diversional Activities: television  Intervention: Develop Pain Management Plan  Recent Flowsheet Documentation  Taken 11/10/2024 0600 by Daniel  LUCIO Harp  Pain Management Interventions: pain pump in use  Taken 11/9/2024 2000 by Katiuska Sanchez RN  Pain Management Interventions: pain pump in use  Intervention: Prevent or Manage Pain  Recent Flowsheet Documentation  Taken 11/10/2024 0600 by Katiuska Sanchez RN  Medication Review/Management: medications reviewed  Taken 11/10/2024 0400 by Katiuska Sanchez RN  Medication Review/Management: medications reviewed  Taken 11/10/2024 0200 by Katiuska Sanchez RN  Medication Review/Management: medications reviewed  Taken 11/10/2024 0000 by Katiuska Sanchez RN  Medication Review/Management: medications reviewed  Taken 11/9/2024 2200 by Katiuska Sanchez RN  Medication Review/Management: medications reviewed  Taken 11/9/2024 2000 by Katiuska Sanchez RN  Bowel Elimination Promotion: (medications) other (see comments)  Medication Review/Management: medications reviewed     Problem: Adult Inpatient Plan of Care  Goal: Absence of Hospital-Acquired Illness or Injury  Intervention: Identify and Manage Fall Risk  Recent Flowsheet Documentation  Taken 11/10/2024 0600 by Katiuska Sanchez RN  Safety Promotion/Fall Prevention:   activity supervised   lighting adjusted   nonskid shoes/slippers when out of bed   safety round/check completed   room organization consistent  Taken 11/10/2024 0400 by Katiuska Sanchez RN  Safety Promotion/Fall Prevention:   activity supervised   lighting adjusted   nonskid shoes/slippers when out of bed   safety round/check completed   room organization consistent  Taken 11/10/2024 0200 by Katiuska Sanchez RN  Safety Promotion/Fall Prevention:   activity supervised   lighting adjusted   nonskid shoes/slippers when out of bed   safety round/check completed   room organization consistent  Taken 11/10/2024 0000 by Katiuska Sanchez RN  Safety Promotion/Fall Prevention:   activity supervised   lighting adjusted   nonskid shoes/slippers when out of bed   safety round/check completed   room organization  consistent  Taken 11/9/2024 2200 by Katiuska Sanchez RN  Safety Promotion/Fall Prevention:   activity supervised   lighting adjusted   nonskid shoes/slippers when out of bed   safety round/check completed   room organization consistent  Taken 11/9/2024 2000 by Katiuska Sanchez RN  Safety Promotion/Fall Prevention:   activity supervised   lighting adjusted   nonskid shoes/slippers when out of bed   safety round/check completed   room organization consistent  Intervention: Prevent Skin Injury  Recent Flowsheet Documentation  Taken 11/10/2024 0200 by Katiuska Sanchez RN  Body Position: position changed independently  Skin Protection:   drying agents applied   incontinence pads utilized   transparent dressing maintained  Taken 11/10/2024 0000 by Katiuska Sanchez RN  Body Position:   position changed independently   weight shifting  Taken 11/9/2024 2000 by Katiuska Sanchez RN  Body Position: position changed independently  Skin Protection:   drying agents applied   incontinence pads utilized   transparent dressing maintained  Intervention: Prevent and Manage VTE (Venous Thromboembolism) Risk  Recent Flowsheet Documentation  Taken 11/9/2024 2000 by Katiuska Sanchez RN  VTE Prevention/Management: (heparin sbq)   SCDs (sequential compression devices) off   patient refused intervention  Intervention: Prevent Infection  Recent Flowsheet Documentation  Taken 11/10/2024 0600 by Katiuska Sanchez RN  Infection Prevention: hand hygiene promoted  Taken 11/10/2024 0400 by Katiuska Sanchez RN  Infection Prevention: hand hygiene promoted  Taken 11/10/2024 0200 by Katiuska Sanchez RN  Infection Prevention: hand hygiene promoted  Taken 11/10/2024 0000 by Katiuska Sanchez RN  Infection Prevention: hand hygiene promoted  Taken 11/9/2024 2200 by Katiuska Sanchez RN  Infection Prevention: hand hygiene promoted  Taken 11/9/2024 2000 by Katiuska Sanchez RN  Infection Prevention: hand hygiene promoted  Goal: Optimal Comfort and  Wellbeing  Intervention: Monitor Pain and Promote Comfort  Recent Flowsheet Documentation  Taken 11/10/2024 0600 by Katiuska Sanchez, RN  Pain Management Interventions: pain pump in use  Taken 11/9/2024 2000 by Katiuska Sanchez RN  Pain Management Interventions: pain pump in use  Intervention: Provide Person-Centered Care  Recent Flowsheet Documentation  Taken 11/10/2024 0200 by Katiuska Sanchez, RN  Trust Relationship/Rapport:   care explained   choices provided   thoughts/feelings acknowledged  Taken 11/9/2024 2000 by Katiuska Sanchez, RN  Trust Relationship/Rapport:   care explained   choices provided   thoughts/feelings acknowledged   Goal Outcome Evaluation:

## 2024-11-10 NOTE — PROGRESS NOTES
Postoperative day 2 from open cholecystectomy with intraoperative cholangiogram.  Control is improved and he is doing well on the PCA.  He would like to wait 1 more day before switching to oral narcotics.  Advance diet to regular diet.  CARLA drain to bulb suction.  Dressing to be removed tomorrow for evaluation of the wound.

## 2024-11-10 NOTE — THERAPY EVALUATION
Patient Name: Donnie Bishop  : 1975    MRN: [id number]                              Today's Date: 11/10/2024       Admit Date: 10/22/2024    Visit Dx:     ICD-10-CM ICD-9-CM   1. Epigastric abdominal pain  R10.13 789.06   2. Acute gastritis without hemorrhage, unspecified gastritis type  K29.00 535.00   3. Calculus of gallbladder with acute on chronic cholecystitis without obstruction  K80.12 574.00     574.10     Patient Active Problem List   Diagnosis    Multiple-type hyperlipidemia    Type 2 diabetes mellitus with diabetic neuropathy, with long-term current use of insulin    Neuropathy    Vitamin D deficiency    Benign essential HTN    Inability to attain erection    Injury of acoustic nerve    Hypogonadism in male    Anxiety    Chest pain    New onset of congestive heart failure    NSTEMI, initial episode of care    Diabetes mellitus type 1.5, managed as type 1    Chronic combined systolic and diastolic heart failure    Gastritis    Leukocytosis    Epigastric abdominal pain    Abscess of liver    Klebsiella pneumoniae (k. pneumoniae) as the cause of diseases classified elsewhere    Hypotension    Transaminitis    Calculus of gallbladder with acute on chronic cholecystitis without obstruction     Past Medical History:   Diagnosis Date    Anxiety     CHF (congestive heart failure)     Cough     Inability to attain erection     Injury of acoustic nerve     Myalgia     Type II diabetes mellitus     Venous insufficiency     Viral illness     Vitamin D deficiency      Past Surgical History:   Procedure Laterality Date    CARDIAC CATHETERIZATION N/A 2023    Procedure: Left Heart Cath;  Surgeon: Kaylee Kay MD;  Location: [facility] CATH INVASIVE LOCATION;  Service: Cardiology;  Laterality: N/A;    CARDIAC CATHETERIZATION N/A 2023    Procedure: Coronary angiography;  Surgeon: Kaylee Kay MD;  Location:  [facility] CATH INVASIVE LOCATION;  Service: Cardiology;  Laterality: N/A;    ENDOSCOPY N/A 10/24/2024     Procedure: ESOPHAGOGASTRODUODENOSCOPY with biopsies;  Surgeon: Elma Wiggins MD;  Location: [facility] ENDOSCOPY;  Service: Gastroenterology;  Laterality: N/A;  pre-abdominal pain  post-normal      General Information       Row Name 11/10/24 0948          Physical Therapy Time and Intention    Document Type therapy note (daily note);re-evaluation  -PC     Mode of Treatment physical therapy  -PC       Row Name 11/10/24 0948          General Information    Existing Precautions/Restrictions fall  -PC               User Key  (r) = Recorded By, (t) = Taken By, (c) = Cosigned By      Initials Name Provider Type    PC Monae Oreilly, PT Physical Therapist                   Mobility       Row Name 11/10/24 0948          Bed Mobility    Supine-Sit Taliaferro (Bed Mobility) moderate assist (50% patient effort)  -PC       Row Name 11/10/24 0948          Sit-Stand Transfer    Sit-Stand Taliaferro (Transfers) minimum assist (75% patient effort)  -PC     Assistive Device (Sit-Stand Transfers) walker, front-wheeled  -PC       Row Name 11/10/24 0948          Gait/Stairs (Locomotion)    Taliaferro Level (Gait) minimum assist (75% patient effort)  -PC     Assistive Device (Gait) walker, front-wheeled  -PC     Distance in Feet (Gait) 80  -PC     Deviations/Abnormal Patterns (Gait) gait speed decreased;rudy decreased;stride length decreased  -PC     Bilateral Gait Deviations forward flexed posture  -PC               User Key  (r) = Recorded By, (t) = Taken By, (c) = Cosigned By      Initials Name Provider Type    PC Monae Oreilly, PT Physical Therapist                   Obj/Interventions       Row Name 11/10/24 0949          Range of Motion Comprehensive    General Range of Motion no range of motion deficits identified  -PC       Row Name 11/10/24 0949          Strength Comprehensive (MMT)    Comment, General Manual Muscle Testing (MMT) Assessment general weakness present, grossly 3/5 B LEs  -PC       Row Name 11/10/24  0949          Balance    Static Sitting Balance standby assist  -PC     Dynamic Sitting Balance standby assist  -PC     Position, Sitting Balance sitting edge of bed  -PC     Dynamic Standing Balance contact guard;minimal assist  -PC     Position/Device Used, Standing Balance walker, rolling  -PC               User Key  (r) = Recorded By, (t) = Taken By, (c) = Cosigned By      Initials Name Provider Type    PC Monae Oreilly, PT Physical Therapist                   Goals/Plan       Row Name 11/10/24 0955          Bed Mobility Goal 1 (PT)    Activity/Assistive Device (Bed Mobility Goal 1, PT) bed mobility activities, all  -PC     Robeson Level/Cues Needed (Bed Mobility Goal 1, PT) contact guard required  -PC     Time Frame (Bed Mobility Goal 1, PT) 1 week  -PC       Row Name 11/10/24 0955          Transfer Goal 1 (PT)    Activity/Assistive Device (Transfer Goal 1, PT) sit-to-stand/stand-to-sit;bed-to-chair/chair-to-bed  -PC     Robeson Level/Cues Needed (Transfer Goal 1, PT) contact guard required  -PC     Time Frame (Transfer Goal 1, PT) 1 week  -PC       Row Name 11/10/24 0955          Gait Training Goal 1 (PT)    Activity/Assistive Device (Gait Training Goal 1, PT) gait (walking locomotion);decrease fall risk;increase endurance/gait distance  -PC     Robeson Level (Gait Training Goal 1, PT) standby assist  -PC     Distance (Gait Training Goal 1, PT) 100  -PC     Time Frame (Gait Training Goal 1, PT) 1 week  -PC       Row Name 11/10/24 0955          Therapy Assessment/Plan (PT)    Planned Therapy Interventions (PT) balance training;bed mobility training;gait training;strengthening;transfer training  -PC               User Key  (r) = Recorded By, (t) = Taken By, (c) = Cosigned By      Initials Name Provider Type    PC Monae Oreilly, PT Physical Therapist                   Clinical Impression       Row Name 11/10/24 0951          Pain    Pain Location flank  -PC     Pain Side/Orientation right;upper   -PC     Pain Management Interventions premedicated for activity;positioning techniques utilized  -PC     Response to Pain Interventions activity participation with increased pain  -PC     Pre/Posttreatment Pain Comment pt with significant pain incision site, had a difficult time with supine to sit  -PC       Row Name 11/10/24 0951          Plan of Care Review    Plan of Care Reviewed With patient  -PC     Outcome Evaluation Pt is POD 2 open cholecystectomy, he presents with pain, weakness, and impaired functional mobility, he will benefit from PT to address. Pt was able to get our of bed today and walk 80 ft with a rolling walker, slow, antalgic gait with forward flexed posture, relying heavily on rolling walker, PT will cont to follow  -PC       Row Name 11/10/24 0951          Therapy Assessment/Plan (PT)    Therapy Frequency (PT) 5 times/wk  -PC       Row Name 11/10/24 0951          Positioning and Restraints    Pre-Treatment Position in bed  -PC     Post Treatment Position chair  -PC     In Chair reclined;call light within reach;encouraged to call for assist;exit alarm on;notified nsg  -PC               User Key  (r) = Recorded By, (t) = Taken By, (c) = Cosigned By      Initials Name Provider Type    PC Monae Oreilly, PT Physical Therapist                   Outcome Measures       Row Name 11/10/24 0956          How much help from another person do you currently need...    Turning from your back to your side while in flat bed without using bedrails? 3  -PC     Moving from lying on back to sitting on the side of a flat bed without bedrails? 2  -PC     Moving to and from a bed to a chair (including a wheelchair)? 3  -PC     Standing up from a chair using your arms (e.g., wheelchair, bedside chair)? 3  -PC     Climbing 3-5 steps with a railing? 2  -PC     To walk in hospital room? 3  -PC     AM-PAC 6 Clicks Score (PT) 16  -PC     Highest Level of Mobility Goal 5 --> Static standing  -PC               User Key  (r)  = Recorded By, (t) = Taken By, (c) = Cosigned By      Initials Name Provider Type    PC Monae Oreilly, PT Physical Therapist                                 Physical Therapy Education       Title: PT OT SLP Therapies (Done)       Topic: Physical Therapy (Done)       Point: Mobility training (Done)       Learning Progress Summary            Patient Acceptance, E,D, DU by  at 11/10/2024 0956    Acceptance, E, VU by  at 11/1/2024 1148    Acceptance, TB,E, NR by ST at 10/30/2024 1541    Acceptance, E,TB, VU by WS at 10/30/2024 0506    Acceptance, E, VU,NR by  at 10/29/2024 1346    Acceptance, E,TB, NR by  at 10/26/2024 1436                      Point: Home exercise program (Done)       Learning Progress Summary            Patient Acceptance, E,TB, VU by  at 10/30/2024 0506                      Point: Body mechanics (Done)       Learning Progress Summary            Patient Acceptance, E,D, DU by  at 11/10/2024 0956    Acceptance, TB,E, NR by  at 10/30/2024 1541    Acceptance, E,TB, VU by  at 10/30/2024 0506    Acceptance, E,TB, NR by  at 10/26/2024 1436                      Point: Precautions (Done)       Learning Progress Summary            Patient Acceptance, E,D, DU by  at 11/10/2024 0956    Acceptance, E,TB, VU by  at 10/30/2024 0506                                      User Key       Initials Effective Dates Name Provider Type Discipline     06/16/21 -  Monae Oreilly, PT Physical Therapist PT    EM 06/16/21 -  Shawna Akins, PT Physical Therapist PT    SV 07/11/23 -  Mahogany Horn, PT Physical Therapist PT    ST 09/22/22 -  Mary Weber, PT Physical Therapist PT    CS 09/22/22 -  Pam Vega, PT Physical Therapist PT    WS 05/29/24 -  Kel Benson, RN Registered Nurse Nurse                  PT Recommendation and Plan  Planned Therapy Interventions (PT): balance training, bed mobility training, gait training, strengthening, transfer training  Outcome Evaluation: Pt  is POD 2 open cholecystectomy, he presents with pain, weakness, and impaired functional mobility, he will benefit from PT to address. Pt was able to get our of bed today and walk 80 ft with a rolling walker, slow, antalgic gait with forward flexed posture, relying heavily on rolling walker, PT will cont to follow     Time Calculation:         PT Charges       Row Name 11/10/24 0957             Time Calculation    Start Time 0910  -PC      Stop Time 0937  -PC      Time Calculation (min) 27 min  -PC      PT Received On 11/10/24  -PC      PT - Next Appointment 11/11/24  -PC      PT Goal Re-Cert Due Date 11/17/24  -PC                User Key  (r) = Recorded By, (t) = Taken By, (c) = Cosigned By      Initials Name Provider Type    PC Monae Oreilly PT Physical Therapist                  Therapy Charges for Today       Code Description Service Date Service Provider Modifiers Qty    [id number]  PT RE-EVAL ESTABLISHED PLAN 2 11/10/2024 Monae Oreilly, PT GP 1    [id number]  PT THER PROC EA 15 MIN 11/10/2024 Monae Oreilly, PT GP 2            PT G-Codes  Outcome Measure Options: AM-PAC 6 Clicks Daily Activity (OT)  AM-PAC 6 Clicks Score (PT): 16  AM-PAC 6 Clicks Score (OT): 18  PT Discharge Summary  Anticipated Discharge Disposition (PT): skilled nursing facility, home (depends on progress)    Monae Oreilly, JAQUELINE  11/10/2024

## 2024-11-10 NOTE — PLAN OF CARE
Goal Outcome Evaluation:  Plan of Care Reviewed With: patient           Outcome Evaluation: Pt is POD 2 open cholecystectomy, he presents with pain, weakness, and impaired functional mobility, he will benefit from PT to address. Pt was able to get our of bed today and walk 80 ft with a rolling walker, slow, antalgic gait with forward flexed posture, relying heavily on rolling walker, PT will cont to follow    Anticipated Discharge Disposition (PT): skilled nursing facility, home (depends on progress)

## 2024-11-10 NOTE — PROGRESS NOTES
[facility] Progress Note      Patient Name: Donnie Bishop  : 1975  MRN: [id number]  Primary Care Physician:  Juju Kennedy MD  Date of admission: 10/22/2024    Subjective     Interval History:   F/u TENISHA     Review of Systems:   Denies dyspnea or nausea   Advancing diet.  No new issues noted today.  Objective     Vitals:   Temp:  [97.4 °F (36.3 °C)-100.2 °F (37.9 °C)] 98.9 °F (37.2 °C)  Heart Rate:  [] 92  Resp:  [16-28] 28  BP: (100-116)/(62-77) 106/63  Flow (L/min) (Oxygen Therapy):  [2] 2    Intake/Output Summary (Last 24 hours) at 11/10/2024 1029  Last data filed at 11/10/2024 0945  Gross per 24 hour   Intake 1251 ml   Output 1040 ml   Net 211 ml       Physical Exam:    General Appearance: frail AAM comfortable on RA, speech/mentation slow  Neck: supple, no JVD  Lungs: CTA bilat no rales  Heart: RRR, normal S1 and S2  Abdomen: soft, nontender, nondistended  Extremities: no edema, cyanosis or clubbing        Scheduled Meds:     carvedilol, 6.25 mg, Oral, Q12H  cefTRIAXone, 2,000 mg, Intravenous, Q24H  heparin (porcine), 5,000 Units, Subcutaneous, Q8H  insulin glargine, 15 Units, Subcutaneous, Q12H  insulin lispro, 2-7 Units, Subcutaneous, 4x Daily AC & at Bedtime  insulin lispro, 6 Units, Subcutaneous, TID With Meals  Naloxegol Oxalate, 25 mg, Oral, QAM  pantoprazole, 40 mg, Oral, BID AC  senna-docusate sodium, 2 tablet, Oral, BID   And  polyethylene glycol, 17 g, Oral, BID  venlafaxine XR, 75 mg, Oral, Daily      IV Meds:   HYDROmorphone HCl-NaCl,   Pharmacy Consult,   sodium chloride, 30 mL/hr          Results Reviewed:   I have personally reviewed the results from the time of this admission to 11/10/2024 10:29 EST     Results from last 7 days   Lab Units 11/10/24  0626 24  0342 24  0340 24  0628   SODIUM mmol/L 134* 138 132* 133*   POTASSIUM mmol/L 4.3 5.2 5.1 4.8   CHLORIDE mmol/L 95* 101 94* 95*   CO2 mmol/L 25.3 26.9 27.8 29.1*   BUN mg/dL 12 14  15 13   CREATININE mg/dL 0.83 0.83 0.92 0.89   CALCIUM mg/dL 9.8 9.7 9.3 9.4   BILIRUBIN mg/dL  --  0.5 0.3 0.3   ALK PHOS U/L  --  313* 466* 106   ALT (SGPT) U/L  --  56* 88* 18   AST (SGOT) U/L  --  47* 203* 16   GLUCOSE mg/dL 173* 169* 232* 338*     Estimated Creatinine Clearance: 120.1 mL/min (by C-G formula based on SCr of 0.83 mg/dL).  Results from last 7 days   Lab Units 11/09/24  0342 11/08/24  0340 11/07/24 0628 11/05/24  0607   MAGNESIUM mg/dL  --  2.0 1.5* 1.7   PHOSPHORUS mg/dL 4.2 3.6 4.0 3.1           Results from last 7 days   Lab Units 11/10/24  0626 11/09/24  0342 11/08/24 0340 11/07/24 0628 11/05/24  0607   WBC 10*3/mm3 10.34 10.95* 8.58 6.00 7.72   HEMOGLOBIN g/dL 9.1* 11.3* 10.7* 10.3* 9.9*   PLATELETS 10*3/mm3 530* 696* 694* 724* 677*             Assessment / Plan     ASSESSMENT:  Acute kidney injury.  Multifactorial including hypovolemia, hypotension, impaired renal autoregulation due to Jardiance, Entresto, Aldactone.  Resolved.  Cr ~ 1.   Cholecystitis with large right perihepatic fluid collection.  Plan noted for IR drainage today.  On Rocephin per ID.  Transaminitis improving.  Off statin.  MRCP noted.  Heart failure reduced ejection fraction.  EF 49%.  (Improved from 35% a year ago).  Anemia.    Encephalopathy.  Mentation remains bit slow.  Diabetes mellitus type 2.  Hypotension -resolved    PLAN:  Kidney function is overall stable.  Encourage p.o. intake  Labs in a.m.    Evan Pat MD  11/10/24  10:29 [facility]    [facility]  [phone]

## 2024-11-10 NOTE — PROGRESS NOTES
Name: Donnie Bishop ADMIT: 10/22/2024   : 1975  PCP: Juju Kennedy MD    MRN: [id number] LOS: 17 days   AGE/SEX: 49 y.o. male  ROOM: [facility]     Subjective   Subjective   Chief Complaint   Patient presents with    Abdominal Pain     Patient resting in bed, he is comfortable in no apparent distress. NG tube removed and he is tolerating a diet. D/w RN - minimal output per CARLA drain (5cc overnight).     Objective   Objective   Vital Signs  Temp:  [97.4 °F (36.3 °C)-100.2 °F (37.9 °C)] 97.5 °F (36.4 °C)  Heart Rate:  [] 92  Resp:  [16-28] 28  BP: (100-119)/(62-77) 119/67  SpO2:  [92 %-98 %] 93 %  on  Flow (L/min) (Oxygen Therapy):  [2] 2;   Device (Oxygen Therapy): nasal cannula  Body mass index is 26.66 kg/m².    Physical Exam  Vitals and nursing note reviewed.   Constitutional:       Appearance: He is not diaphoretic.   HENT:      Head: Atraumatic.   Eyes:      General: No scleral icterus.  Cardiovascular:      Rate and Rhythm: Normal rate and regular rhythm.   Pulmonary:      Effort: Pulmonary effort is normal.      Breath sounds: Decreased breath sounds present. No wheezing.   Abdominal:      Palpations: Abdomen is soft.      Tenderness: There is abdominal tenderness (improved).   Musculoskeletal:         General: No swelling.   Skin:     General: Skin is warm and dry.   Neurological:      Mental Status: He is alert. Mental status is at baseline.   Psychiatric:         Mood and Affect: Mood normal.         Behavior: Behavior normal.       Results Review  I reviewed the patient's new clinical results.    Results from last 7 days   Lab Units 11/10/24  0626 11/09/24  03424  0628   WBC 10*3/mm3 10.34 10.95* 8.58 6.00   HEMOGLOBIN g/dL 9.1* 11.3* 10.7* 10.3*   PLATELETS 10*3/mm3 530* 696* 694* 724*     Results from last 7 days   Lab Units 11/10/24  0624  03424  03424  0628   SODIUM mmol/L 134* 138 132* 133*   POTASSIUM mmol/L 4.3 5.2 5.1 4.8   CHLORIDE  mmol/L 95* 101 94* 95*   CO2 mmol/L 25.3 26.9 27.8 29.1*   BUN mg/dL 12 14 15 13   CREATININE mg/dL 0.83 0.83 0.92 0.89   GLUCOSE mg/dL 173* 169* 232* 338*   EGFR mL/min/1.73 108.6 108.6 103.2 106.4     Results from last 7 days   Lab Units 11/09/24  0342 11/08/24  0340 11/07/24  0628 11/05/24  0607   ALBUMIN g/dL 3.2* 3.2* 3.0* 2.9*   BILIRUBIN mg/dL 0.5 0.3 0.3 0.3   ALK PHOS U/L 313* 466* 106 113   AST (SGOT) U/L 47* 203* 16 15   ALT (SGPT) U/L 56* 88* 18 23     Results from last 7 days   Lab Units 11/10/24  0626 11/09/24  0342 11/08/24  0340 11/07/24  0628 11/05/24  0607 11/04/24  0653   CALCIUM mg/dL 9.8 9.7 9.3 9.4 9.0 9.2   ALBUMIN g/dL  --  3.2* 3.2* 3.0* 2.9* 3.0*   MAGNESIUM mg/dL  --   --  2.0 1.5* 1.7 1.8   PHOSPHORUS mg/dL  --  4.2 3.6 4.0 3.1 3.0         Glucose   Date/Time Value Ref Range Status   11/10/2024 1547 68 (L) 70 - 130 mg/dL Final   11/10/2024 1135 193 (H) 70 - 130 mg/dL Final   11/10/2024 0841 198 (H) 70 - 130 mg/dL Final   11/10/2024 0608 185 (H) 70 - 130 mg/dL Final   11/09/2024 2037 162 (H) 70 - 130 mg/dL Final   11/09/2024 1733 131 (H) 70 - 130 mg/dL Final   11/09/2024 1224 190 (H) 70 - 130 mg/dL Final       No radiology results for the last day    I have personally reviewed all medications:  Scheduled Medications  carvedilol, 6.25 mg, Oral, Q12H  cefTRIAXone, 2,000 mg, Intravenous, Q24H  heparin (porcine), 5,000 Units, Subcutaneous, Q8H  insulin glargine, 15 Units, Subcutaneous, Q12H  insulin lispro, 2-7 Units, Subcutaneous, 4x Daily AC & at Bedtime  insulin lispro, 6 Units, Subcutaneous, TID With Meals  Naloxegol Oxalate, 25 mg, Oral, QAM  pantoprazole, 40 mg, Oral, BID AC  senna-docusate sodium, 2 tablet, Oral, BID   And  polyethylene glycol, 17 g, Oral, BID  venlafaxine XR, 75 mg, Oral, Daily      Infusions  HYDROmorphone HCl-NaCl,   Pharmacy Consult,   sodium chloride, 30 mL/hr        Diet  Diet: Regular/House, Gastrointestinal; Low Irritant; Fluid Consistency: Thin (IDDSI 0)        Assessment/Plan     Active Hospital Problems    Diagnosis  POA    **Epigastric abdominal pain [R10.13]  Yes    Abscess of liver [K75.0]  Yes    Klebsiella pneumoniae (k. pneumoniae) as the cause of diseases classified elsewhere [B96.1]  Yes    Hypotension [I95.9]  Yes    Transaminitis [R74.01]  Yes    Leukocytosis [D72.829]  Yes    Chronic combined systolic and diastolic heart failure [I50.42]  Yes    Calculus of gallbladder with acute on chronic cholecystitis without obstruction [K80.12]  Unknown    Anxiety [F41.9]  Yes    Multiple-type hyperlipidemia [E78.2]  Yes    Type 2 diabetes mellitus with diabetic neuropathy, with long-term current use of insulin [E11.40, Z79.4]  Not Applicable    Benign essential HTN [I10]  Yes      Resolved Hospital Problems    Diagnosis Date Resolved POA    Acute kidney injury [N17.9] 10/30/2024 No       49 y.o. male admitted with Epigastric abdominal pain.    Liver abscess/biloma: Status post IR drainage and drain placement 10/28.  Status post IR drain exchange 11/5.  Status post IR drain placement 11/6.  Klebsiella.  Continue Rocephin.  Abx through 11/15/24- oral abx plan in place if he is ready to discharge. Infectious disease has signed off- po abx plan in place.  Cholecystitis: GI evaluated and he did have a EGD earlier this admission on 10/24.  Status post open cholecystectomy and right upper quadrant drain placement 11/8.  No filling deficits were noted on the intraoperative cholangiogram.  Surgery following. NGT removed, diet advanced. Patient remains on PCA per surgery.  Hypotension: Had improved with resuscitation and midodrine.  The midodrine has since been stopped.  Pulmonology evaluated.  TENISHA: Multifactorial and now improved.  Nephrology stated was okay for him to start GDMT for CHF at discharge as tolerated. Nephrology following. Plan follow-up in 1 week in clinic.  NICM/bigeminy: EF 49%.  On Coreg.  Home Entresto, Aldactone, and Jardiance are held.  Cardiology evaluated.    Diabetes: A1c 9.4.  Was on Jardiance metformin and insulin prior to admission.  Had hypo and hyperglycemia this admission.  Continue insulin and monitor requirements.  Adjust as needed.  Hypertension: Not grossly elevated.  Lasix held. Coreg.   PPX: Subcutaneous heparin  Disposition: SNF/TBD    Expected Discharge Date: 11/17/2024; Expected Discharge Time: 12:00 PM     Nanda Cody MD  [facility]  11/10/24  15:53 EST    Dictated portions of note using Dragon dictation software.  Copied text in this note has been reviewed by me and remains accurate as of 11/10/24

## 2024-11-10 NOTE — PLAN OF CARE
Problem: Fall Injury Risk  Goal: Absence of Fall and Fall-Related Injury  Outcome: Progressing  Intervention: Identify and Manage Contributors  Recent Flowsheet Documentation  Taken 11/9/2024 1800 by Pam Taylor RN  Medication Review/Management: medications reviewed  Taken 11/9/2024 1600 by Pam Taylor RN  Medication Review/Management: medications reviewed  Taken 11/9/2024 1400 by Pam Taylor RN  Medication Review/Management: medications reviewed  Taken 11/9/2024 1200 by Pam Taylor RN  Medication Review/Management: medications reviewed  Taken 11/9/2024 1000 by Pam Taylor RN  Medication Review/Management: medications reviewed  Taken 11/9/2024 0800 by Pam Taylor RN  Medication Review/Management: medications reviewed  Intervention: Promote Injury-Free Environment  Recent Flowsheet Documentation  Taken 11/9/2024 1800 by Pam Taylor RN  Safety Promotion/Fall Prevention:   clutter free environment maintained   assistive device/personal items within reach   safety round/check completed   room organization consistent   nonskid shoes/slippers when out of bed   lighting adjusted  Taken 11/9/2024 1600 by Pam Taylor RN  Safety Promotion/Fall Prevention:   clutter free environment maintained   assistive device/personal items within reach   safety round/check completed   room organization consistent   nonskid shoes/slippers when out of bed   lighting adjusted  Taken 11/9/2024 1400 by Pam Taylor RN  Safety Promotion/Fall Prevention:   clutter free environment maintained   assistive device/personal items within reach   safety round/check completed   room organization consistent   nonskid shoes/slippers when out of bed   lighting adjusted  Taken 11/9/2024 1200 by Pam Taylor RN  Safety Promotion/Fall Prevention:   clutter free environment maintained   assistive device/personal items within reach   safety round/check completed   room organization  consistent   nonskid shoes/slippers when out of bed   lighting adjusted  Taken 11/9/2024 1000 by Pam Taylor RN  Safety Promotion/Fall Prevention:   clutter free environment maintained   assistive device/personal items within reach   safety round/check completed   room organization consistent   nonskid shoes/slippers when out of bed   lighting adjusted  Taken 11/9/2024 0800 by Pam Taylor RN  Safety Promotion/Fall Prevention: safety round/check completed     Problem: Sepsis/Septic Shock  Goal: Optimal Coping  Outcome: Progressing  Intervention: Support Patient and Family Response  Recent Flowsheet Documentation  Taken 11/9/2024 1400 by Pam Taylor RN  Supportive Measures: active listening utilized  Family/Support System Care: self-care encouraged  Taken 11/9/2024 0800 by Pam Taylor RN  Supportive Measures: active listening utilized  Family/Support System Care: self-care encouraged  Goal: Absence of Bleeding  Outcome: Progressing  Intervention: Monitor and Manage Bleeding  Recent Flowsheet Documentation  Taken 11/9/2024 1400 by Pam Taylor RN  Bleeding Management: dressing monitored  Taken 11/9/2024 0800 by Pam Taylor RN  Bleeding Precautions: blood pressure closely monitored  Bleeding Management: dressing monitored  Goal: Blood Glucose Level Within Target Range  Outcome: Progressing  Intervention: Optimize Glycemic Control  Recent Flowsheet Documentation  Taken 11/9/2024 1400 by Pam Taylor RN  Hyperglycemia Management: blood glucose monitored  Hypoglycemia Management: blood glucose monitored  Taken 11/9/2024 0800 by Pam Taylor RN  Hyperglycemia Management: blood glucose monitored  Hypoglycemia Management: blood glucose monitored  Goal: Absence of Infection Signs and Symptoms  Outcome: Progressing  Intervention: Initiate Sepsis Management  Recent Flowsheet Documentation  Taken 11/9/2024 1800 by Pam Taylor RN  Infection Prevention: single patient room  provided  Taken 11/9/2024 1600 by Pam Taylor RN  Infection Prevention: single patient room provided  Taken 11/9/2024 1400 by Pam Taylor RN  Infection Prevention: single patient room provided  Taken 11/9/2024 1200 by Pam Taylor RN  Infection Prevention: single patient room provided  Taken 11/9/2024 1000 by Pam Taylor RN  Infection Prevention: single patient room provided  Taken 11/9/2024 0800 by Pam Taylor RN  Infection Prevention: single patient room provided  Intervention: Promote Stabilization  Recent Flowsheet Documentation  Taken 11/9/2024 1400 by Pam Taylor RN  Fluid/Electrolyte Management: fluids restricted  Taken 11/9/2024 0800 by Pam Taylor RN  Fluid/Electrolyte Management: fluids restricted  Intervention: Promote Recovery  Recent Flowsheet Documentation  Taken 11/9/2024 1800 by Pam Taylor RN  Activity Management: activity encouraged  Taken 11/9/2024 1600 by Pam Taylor RN  Activity Management: activity encouraged  Taken 11/9/2024 1400 by Pam Taylor RN  Activity Management: activity encouraged  Taken 11/9/2024 1200 by Pam Taylor RN  Activity Management: activity encouraged  Taken 11/9/2024 1000 by Pam Taylor RN  Activity Management: activity encouraged  Taken 11/9/2024 0800 by Pam Taylor RN  Activity Management: activity encouraged  Goal: Optimal Nutrition Delivery  Outcome: Progressing     Problem: Skin Injury Risk Increased  Goal: Skin Health and Integrity  Outcome: Progressing  Intervention: Optimize Skin Protection  Recent Flowsheet Documentation  Taken 11/9/2024 1800 by Pam Taylor RN  Activity Management: activity encouraged  Head of Bed (HOB) Positioning: HOB elevated  Taken 11/9/2024 1600 by Pam Taylor RN  Activity Management: activity encouraged  Head of Bed (HOB) Positioning: HOB elevated  Taken 11/9/2024 1400 by Pam Taylor RN  Activity Management: activity  "encouraged  Pressure Reduction Techniques: frequent weight shift encouraged  Head of Bed (HOB) Positioning: HOB elevated  Pressure Reduction Devices: specialty bed utilized  Skin Protection: incontinence pads utilized  Taken 11/9/2024 1200 by Pam Taylor RN  Activity Management: activity encouraged  Head of Bed (HOB) Positioning: HOB elevated  Taken 11/9/2024 1000 by Pam Taylor RN  Activity Management: activity encouraged  Head of Bed (HOB) Positioning: HOB elevated  Taken 11/9/2024 0800 by Pam Taylor RN  Activity Management: activity encouraged  Pressure Reduction Techniques: frequent weight shift encouraged  Head of Bed (HOB) Positioning: HOB elevated  Pressure Reduction Devices: specialty bed utilized  Skin Protection: incontinence pads utilized     Problem: Pain Acute  Goal: Optimal Pain Control and Function  Outcome: Progressing  Intervention: Optimize Psychosocial Wellbeing  Recent Flowsheet Documentation  Taken 11/9/2024 1400 by Pam Taylor RN  Supportive Measures: active listening utilized  Diversional Activities:   television   smartphone  Taken 11/9/2024 0800 by Pam Taylor RN  Supportive Measures: active listening utilized  Diversional Activities:   television   smartphone  Intervention: Develop Pain Management Plan  Recent Flowsheet Documentation  Taken 11/9/2024 1800 by Pam Taylor RN  Pain Management Interventions: (educated pt about PCA and that he was able to use it more. Pt stated he \"didn't want to get more sleepy\" from the PCA medication. He said he would find a \"balance\" and understood more medication to control his pain would make him sleepy.)   pain pump in use   other (see comments)  Taken 11/9/2024 1600 by Pam Taylor RN  Pain Management Interventions: (educated pt about PCA and that he was able to use it more. Pt stated he \"didn't want to get more sleepy\" from the PCA medication. He said he would find a \"balance\" and understood more " medication to control his pain would make him sleepy.)   pain pump in use   other (see comments)  Taken 11/9/2024 1400 by Pam Taylor RN  Pain Management Interventions: pain pump in use  Taken 11/9/2024 0800 by Pam Taylor RN  Pain Management Interventions: pain pump in use  Intervention: Prevent or Manage Pain  Recent Flowsheet Documentation  Taken 11/9/2024 1800 by Pam Taylor RN  Medication Review/Management: medications reviewed  Taken 11/9/2024 1600 by Pam Taylor RN  Medication Review/Management: medications reviewed  Taken 11/9/2024 1400 by Pam Taylor RN  Medication Review/Management: medications reviewed  Taken 11/9/2024 1200 by Pam Taylor RN  Medication Review/Management: medications reviewed  Taken 11/9/2024 1000 by Pam Taylor RN  Medication Review/Management: medications reviewed  Taken 11/9/2024 0800 by Pam Taylor RN  Medication Review/Management: medications reviewed     Problem: Adult Inpatient Plan of Care  Goal: Plan of Care Review  Outcome: Progressing  Goal: Patient-Specific Goal (Individualized)  Outcome: Progressing  Goal: Absence of Hospital-Acquired Illness or Injury  Outcome: Progressing  Intervention: Identify and Manage Fall Risk  Recent Flowsheet Documentation  Taken 11/9/2024 1800 by Pam Taylor RN  Safety Promotion/Fall Prevention:   clutter free environment maintained   assistive device/personal items within reach   safety round/check completed   room organization consistent   nonskid shoes/slippers when out of bed   lighting adjusted  Taken 11/9/2024 1600 by Pam Taylor RN  Safety Promotion/Fall Prevention:   clutter free environment maintained   assistive device/personal items within reach   safety round/check completed   room organization consistent   nonskid shoes/slippers when out of bed   lighting adjusted  Taken 11/9/2024 1400 by Pam Taylor RN  Safety Promotion/Fall Prevention:   clutter free  environment maintained   assistive device/personal items within reach   safety round/check completed   room organization consistent   nonskid shoes/slippers when out of bed   lighting adjusted  Taken 11/9/2024 1200 by Pam Taylor RN  Safety Promotion/Fall Prevention:   clutter free environment maintained   assistive device/personal items within reach   safety round/check completed   room organization consistent   nonskid shoes/slippers when out of bed   lighting adjusted  Taken 11/9/2024 1000 by Pam Taylor RN  Safety Promotion/Fall Prevention:   clutter free environment maintained   assistive device/personal items within reach   safety round/check completed   room organization consistent   nonskid shoes/slippers when out of bed   lighting adjusted  Taken 11/9/2024 0800 by Pam Taylor RN  Safety Promotion/Fall Prevention: safety round/check completed  Intervention: Prevent Skin Injury  Recent Flowsheet Documentation  Taken 11/9/2024 1800 by Pam Taylor RN  Body Position: position changed independently  Taken 11/9/2024 1600 by Pam Taylor RN  Body Position: position changed independently  Taken 11/9/2024 1400 by Pam Taylor RN  Body Position: position changed independently  Skin Protection: incontinence pads utilized  Taken 11/9/2024 1200 by Pam Taylor RN  Body Position: position changed independently  Taken 11/9/2024 1000 by Pam Taylor RN  Body Position: position changed independently  Taken 11/9/2024 0800 by Pam Taylor RN  Body Position: position changed independently  Skin Protection: incontinence pads utilized  Intervention: Prevent and Manage VTE (Venous Thromboembolism) Risk  Recent Flowsheet Documentation  Taken 11/9/2024 1400 by Pam Taylor RN  VTE Prevention/Management: (subq heparin)   SCDs (sequential compression devices) off   patient refused intervention   other (see comments)  Taken 11/9/2024 0800 by Pam Taylor RN  VTE  "Prevention/Management: (subq heparin)   SCDs (sequential compression devices) off   other (see comments)  Intervention: Prevent Infection  Recent Flowsheet Documentation  Taken 11/9/2024 1800 by Pam Taylor RN  Infection Prevention: single patient room provided  Taken 11/9/2024 1600 by Pam Taylor RN  Infection Prevention: single patient room provided  Taken 11/9/2024 1400 by Pam Taylor RN  Infection Prevention: single patient room provided  Taken 11/9/2024 1200 by Pam Taylor RN  Infection Prevention: single patient room provided  Taken 11/9/2024 1000 by Pam Taylor RN  Infection Prevention: single patient room provided  Taken 11/9/2024 0800 by Pam Taylor RN  Infection Prevention: single patient room provided  Goal: Optimal Comfort and Wellbeing  Outcome: Progressing  Intervention: Monitor Pain and Promote Comfort  Recent Flowsheet Documentation  Taken 11/9/2024 1800 by Pam Taylor RN  Pain Management Interventions: (educated pt about PCA and that he was able to use it more. Pt stated he \"didn't want to get more sleepy\" from the PCA medication. He said he would find a \"balance\" and understood more medication to control his pain would make him sleepy.)   pain pump in use   other (see comments)  Taken 11/9/2024 1600 by Pam Taylor RN  Pain Management Interventions: (educated pt about PCA and that he was able to use it more. Pt stated he \"didn't want to get more sleepy\" from the PCA medication. He said he would find a \"balance\" and understood more medication to control his pain would make him sleepy.)   pain pump in use   other (see comments)  Taken 11/9/2024 1400 by Pam Taylor RN  Pain Management Interventions: pain pump in use  Taken 11/9/2024 0800 by Pam Taylor RN  Pain Management Interventions: pain pump in use  Intervention: Provide Person-Centered Care  Recent Flowsheet Documentation  Taken 11/9/2024 1400 by Pam Taylor RN  Trust " "Relationship/Rapport:   care explained   questions answered   thoughts/feelings acknowledged  Taken 11/9/2024 0800 by Pam Taylor RN  Trust Relationship/Rapport:   care explained   questions answered   thoughts/feelings acknowledged  Goal: Readiness for Transition of Care  Outcome: Progressing   Goal Outcome Evaluation:   Pt's main focus for the day was pain control. He began the day in a bit of pain but indicated he was doing well in the later morning and early afternoon. Pt began to experience pain in the later afternoon after waking up. Pt was educated he could press the PCA more for pain control, but did not want to be sleepy anymore. He understood that he would need to find a \"balance\" with the PCA pump to control pain and stay awake. Pt was able to rest and sleep throughout the day. NG tube was removed at 1830 per surgeons orders. Pt is now able to drink water and tolerated it well.                                         "

## 2024-11-11 LAB
ALBUMIN SERPL-MCNC: 2.9 G/DL (ref 3.5–5.2)
ANION GAP SERPL CALCULATED.3IONS-SCNC: 12.4 MMOL/L (ref 5–15)
BUN SERPL-MCNC: 9 MG/DL (ref 6–20)
BUN/CREAT SERPL: 12.5 (ref 7–25)
CALCIUM SPEC-SCNC: 9.6 MG/DL (ref 8.6–10.5)
CHLORIDE SERPL-SCNC: 95 MMOL/L (ref 98–107)
CO2 SERPL-SCNC: 25.6 MMOL/L (ref 22–29)
CREAT SERPL-MCNC: 0.72 MG/DL (ref 0.76–1.27)
DEPRECATED RDW RBC AUTO: 39.4 FL (ref 37–54)
EGFRCR SERPLBLD CKD-EPI 2021: 113.4 ML/MIN/1.73
ERYTHROCYTE [DISTWIDTH] IN BLOOD BY AUTOMATED COUNT: 12.1 % (ref 12.3–15.4)
GLUCOSE BLDC GLUCOMTR-MCNC: 119 MG/DL (ref 70–130)
GLUCOSE BLDC GLUCOMTR-MCNC: 130 MG/DL (ref 70–130)
GLUCOSE SERPL-MCNC: 163 MG/DL (ref 65–99)
HCT VFR BLD AUTO: 28.8 % (ref 37.5–51)
HGB BLD-MCNC: 9.4 G/DL (ref 13–17.7)
MCH RBC QN AUTO: 29.4 PG (ref 26.6–33)
MCHC RBC AUTO-ENTMCNC: 32.6 G/DL (ref 31.5–35.7)
MCV RBC AUTO: 90 FL (ref 79–97)
PHOSPHATE SERPL-MCNC: 3.1 MG/DL (ref 2.5–4.5)
PLATELET # BLD AUTO: 465 10*3/MM3 (ref 140–450)
PMV BLD AUTO: 9.9 FL (ref 6–12)
POTASSIUM SERPL-SCNC: 4 MMOL/L (ref 3.5–5.2)
RBC # BLD AUTO: 3.2 10*6/MM3 (ref 4.14–5.8)
SODIUM SERPL-SCNC: 133 MMOL/L (ref 136–145)
WBC NRBC COR # BLD AUTO: 6.85 10*3/MM3 (ref 3.4–10.8)

## 2024-11-11 PROCEDURE — 80069 RENAL FUNCTION PANEL: CPT | Performed by: HOSPITALIST

## 2024-11-11 PROCEDURE — 82948 REAGENT STRIP/BLOOD GLUCOSE: CPT

## 2024-11-11 PROCEDURE — 99024 POSTOP FOLLOW-UP VISIT: CPT | Performed by: STUDENT IN AN ORGANIZED HEALTH CARE EDUCATION/TRAINING PROGRAM

## 2024-11-11 PROCEDURE — 25010000002 CEFTRIAXONE PER 250 MG: Performed by: STUDENT IN AN ORGANIZED HEALTH CARE EDUCATION/TRAINING PROGRAM

## 2024-11-11 PROCEDURE — 63710000001 INSULIN GLARGINE PER 5 UNITS: Performed by: STUDENT IN AN ORGANIZED HEALTH CARE EDUCATION/TRAINING PROGRAM

## 2024-11-11 PROCEDURE — 63710000001 INSULIN LISPRO (HUMAN) PER 5 UNITS: Performed by: STUDENT IN AN ORGANIZED HEALTH CARE EDUCATION/TRAINING PROGRAM

## 2024-11-11 PROCEDURE — 63710000001 INSULIN LISPRO (HUMAN) PER 5 UNITS: Performed by: HOSPITALIST

## 2024-11-11 PROCEDURE — 85027 COMPLETE CBC AUTOMATED: CPT | Performed by: STUDENT IN AN ORGANIZED HEALTH CARE EDUCATION/TRAINING PROGRAM

## 2024-11-11 PROCEDURE — 97530 THERAPEUTIC ACTIVITIES: CPT

## 2024-11-11 PROCEDURE — 25010000002 HEPARIN (PORCINE) PER 1000 UNITS: Performed by: STUDENT IN AN ORGANIZED HEALTH CARE EDUCATION/TRAINING PROGRAM

## 2024-11-11 RX ORDER — OXYCODONE HYDROCHLORIDE 5 MG/1
5 TABLET ORAL EVERY 4 HOURS PRN
Status: DISCONTINUED | OUTPATIENT
Start: 2024-11-11 | End: 2024-11-14 | Stop reason: HOSPADM

## 2024-11-11 RX ORDER — BACLOFEN 10 MG/1
5 TABLET ORAL ONCE
Status: COMPLETED | OUTPATIENT
Start: 2024-11-11 | End: 2024-11-11

## 2024-11-11 RX ORDER — METHOCARBAMOL 750 MG/1
750 TABLET, FILM COATED ORAL 4 TIMES DAILY
Status: DISCONTINUED | OUTPATIENT
Start: 2024-11-11 | End: 2024-11-14 | Stop reason: HOSPADM

## 2024-11-11 RX ORDER — OXYCODONE HYDROCHLORIDE 5 MG/1
10 TABLET ORAL EVERY 4 HOURS PRN
Status: DISCONTINUED | OUTPATIENT
Start: 2024-11-11 | End: 2024-11-14 | Stop reason: HOSPADM

## 2024-11-11 RX ORDER — HYDROMORPHONE HYDROCHLORIDE 1 MG/ML
0.25 INJECTION, SOLUTION INTRAMUSCULAR; INTRAVENOUS; SUBCUTANEOUS EVERY 4 HOURS PRN
Status: DISCONTINUED | OUTPATIENT
Start: 2024-11-11 | End: 2024-11-13

## 2024-11-11 RX ORDER — INSULIN LISPRO 100 [IU]/ML
5 INJECTION, SOLUTION INTRAVENOUS; SUBCUTANEOUS
Status: DISCONTINUED | OUTPATIENT
Start: 2024-11-11 | End: 2024-11-14 | Stop reason: HOSPADM

## 2024-11-11 RX ORDER — ACETAMINOPHEN 500 MG
1000 TABLET ORAL EVERY 6 HOURS PRN
Status: DISCONTINUED | OUTPATIENT
Start: 2024-11-11 | End: 2024-11-14 | Stop reason: HOSPADM

## 2024-11-11 RX ADMIN — INSULIN LISPRO 5 UNITS: 100 INJECTION, SOLUTION INTRAVENOUS; SUBCUTANEOUS at 17:20

## 2024-11-11 RX ADMIN — INSULIN LISPRO 2 UNITS: 100 INJECTION, SOLUTION INTRAVENOUS; SUBCUTANEOUS at 08:09

## 2024-11-11 RX ADMIN — HEPARIN SODIUM 5000 UNITS: 5000 INJECTION INTRAVENOUS; SUBCUTANEOUS at 14:21

## 2024-11-11 RX ADMIN — INSULIN GLARGINE 15 UNITS: 100 INJECTION, SOLUTION SUBCUTANEOUS at 10:05

## 2024-11-11 RX ADMIN — CEFTRIAXONE 2000 MG: 2 INJECTION, POWDER, FOR SOLUTION INTRAMUSCULAR; INTRAVENOUS at 14:21

## 2024-11-11 RX ADMIN — OXYCODONE HYDROCHLORIDE 5 MG: 5 TABLET ORAL at 12:02

## 2024-11-11 RX ADMIN — VENLAFAXINE HYDROCHLORIDE 75 MG: 75 CAPSULE, EXTENDED RELEASE ORAL at 10:06

## 2024-11-11 RX ADMIN — HEPARIN SODIUM 5000 UNITS: 5000 INJECTION INTRAVENOUS; SUBCUTANEOUS at 21:09

## 2024-11-11 RX ADMIN — BACLOFEN 5 MG: 10 TABLET ORAL at 05:11

## 2024-11-11 RX ADMIN — METHOCARBAMOL 750 MG: 750 TABLET ORAL at 14:20

## 2024-11-11 RX ADMIN — INSULIN GLARGINE 15 UNITS: 100 INJECTION, SOLUTION SUBCUTANEOUS at 21:08

## 2024-11-11 RX ADMIN — INSULIN LISPRO 6 UNITS: 100 INJECTION, SOLUTION INTRAVENOUS; SUBCUTANEOUS at 08:10

## 2024-11-11 RX ADMIN — OXYCODONE HYDROCHLORIDE 5 MG: 5 TABLET ORAL at 21:09

## 2024-11-11 RX ADMIN — METHOCARBAMOL 750 MG: 750 TABLET ORAL at 21:10

## 2024-11-11 RX ADMIN — PANTOPRAZOLE SODIUM 40 MG: 40 TABLET, DELAYED RELEASE ORAL at 16:43

## 2024-11-11 RX ADMIN — Medication 2.5 MG: at 21:08

## 2024-11-11 RX ADMIN — CARVEDILOL 6.25 MG: 6.25 TABLET, FILM COATED ORAL at 10:06

## 2024-11-11 RX ADMIN — PANTOPRAZOLE SODIUM 40 MG: 40 TABLET, DELAYED RELEASE ORAL at 08:10

## 2024-11-11 RX ADMIN — HEPARIN SODIUM 5000 UNITS: 5000 INJECTION INTRAVENOUS; SUBCUTANEOUS at 05:11

## 2024-11-11 RX ADMIN — INSULIN LISPRO 6 UNITS: 100 INJECTION, SOLUTION INTRAVENOUS; SUBCUTANEOUS at 12:03

## 2024-11-11 RX ADMIN — SENNOSIDES AND DOCUSATE SODIUM 2 TABLET: 50; 8.6 TABLET ORAL at 21:08

## 2024-11-11 NOTE — PLAN OF CARE
The patient is alert and oriented. Able to move all extremities and follows commands. Pupils PERRLA. Normal sinus. Room air. Urine output adequate. Drain put out 5mL. Complaints of pain and muscle spasms at surgical site. See MAR for treatment. See morning labs.    Goal Outcome Evaluation:  Plan of Care Reviewed With: patient        Progress: no change

## 2024-11-11 NOTE — PAYOR COMM NOTE
"Donnie Ornelas (49 y.o. Male)                           ATTENTION CONTINUED CLINICALS CASE [id number]                          REPLY TO [facility] DEPT  [phone] OR CALL   ELAN GARRETT LPN           Date of Birth   1975    Social Security Number       Address   [address] [place] [address]  [place] [id number]    Home Phone   [phone]    MRN   [id number]       [employer]   Evangel[employer]    Marital Status                               Admission Date   10/22/24    Admission Type   Emergency    Admitting Provider   James Alexandra MD    Attending Provider   Jareth Bustillo MD    Department, Room/Bed   [facility], N339/1       Discharge Date       Discharge Disposition       Discharge Destination                                 Attending Provider: Jareth Bustillo MD    Allergies: No Known Allergies    Isolation: None   Infection: None   Code Status: CPR    Ht: 170.2 cm (67\")   Wt: 77.2 kg (170 lb 3.1 oz)    Admission Cmt: None   Principal Problem: Epigastric abdominal pain [R10.13]                   Active Insurance as of 10/22/2024       Primary Coverage       Payor Plan Insurance Group Employer/Plan Group    [employer]HE[employer] BLUE CROSS ANTHEM BLUE CROSS BLUE SHIELD PPO [id number]       Payor Plan Address Payor Plan Phone Number Payor Plan Fax Number Effective Dates    [address] [phone]  1/1/2019 - None Entered    [facility] [id number]         Subscriber Name Subscriber Birth Date Member ID       DONNIE ORNELAS 1975 [id number]                     Emergency Contacts        (Rel.) Home Phone Work Phone Mobile Phone    Murphy Padilla (Brother) -- -- [phone]    Charity Ornelas (Spouse) [phone] -- [phone]    joie montero (Relative) -- -- [phone]    Daniel Ornelas (Father) -- -- [phone]              Vital Signs (last day)       Date/Time Temp Temp src Pulse Resp BP Patient Position SpO2    11/11/24 1438 97.6 (36.4) Oral -- 22 110/67 Lying --    11/11/24 " 1006 -- -- 87 -- 124/71 -- --    11/11/24 0700 -- -- 90 -- -- -- 93    11/11/24 0600 -- -- 86 -- -- -- 93    11/11/24 0500 -- -- 84 -- -- -- 100    11/11/24 0400 -- -- 84 -- -- -- 93    11/11/24 0300 -- -- 88 -- -- -- 90    11/11/24 0200 -- -- 86 -- -- -- 95    11/11/24 0100 -- -- 86 -- -- -- 93    11/11/24 0000 -- -- 87 -- -- -- 93    11/10/24 2300 -- -- 85 -- -- -- 94    11/10/24 2200 -- -- 85 -- -- -- 93    11/10/24 2100 -- -- 89 -- -- -- 96    11/10/24 2003 98.1 (36.7) Oral 85 22 118/78 Lying 96    11/10/24 1544 97.5 (36.4) Oral -- 28 119/67 Lying --    11/10/24 1248 97.4 (36.3) Oral -- 28 115/71 Lying --    11/10/24 0945 98.9 (37.2) Oral -- 28 -- -- --    11/10/24 0836 -- -- 92 -- 106/63 -- --    11/10/24 0600 -- -- -- 18 -- -- --    11/10/24 0440 -- -- 108 -- 116/77 -- 93    11/10/24 0100 -- -- 96 -- -- -- 98    11/10/24 0045 -- -- 100 16 -- -- 93    11/10/24 0039 -- -- 97 -- -- -- --          Oxygen Therapy (last day)       Date/Time SpO2 Device (Oxygen Therapy) Flow (L/min) (Oxygen Therapy) Oxygen Concentration (%) ETCO2 (mmHg)    11/11/24 1438 -- room air -- -- --    11/11/24 0700 93 -- -- -- --    11/11/24 0600 93 -- -- -- --    11/11/24 0500 100 -- -- -- --    11/11/24 0400 93 -- -- -- --    11/11/24 0300 90 -- -- -- --    11/11/24 0200 95 room air -- -- --    11/11/24 0100 93 -- -- -- --    11/11/24 0000 93 -- -- -- --    11/10/24 2300 94 -- -- -- --    11/10/24 2200 93 -- -- -- --    11/10/24 2100 96 -- -- -- --    11/10/24 2003 96 -- -- -- --    11/10/24 2000 -- nasal cannula 2 -- --    11/10/24 1544 -- nasal cannula 2 -- --    11/10/24 1248 -- nasal cannula 2 -- --    11/10/24 0945 -- nasal cannula 2 -- --    11/10/24 0800 -- nasal cannula 2 -- --    11/10/24 0440 93 -- -- -- --    11/10/24 0200 -- nasal cannula 2 -- --    11/10/24 0100 98 nasal cannula 2 -- --    11/10/24 0045 93 -- -- -- --          Lines, Drains & Airways       Active LDAs       Name Placement date Placement time Site Days     Peripheral IV 11/08/24 0658 Left;Posterior Wrist 11/08/24  0658  Wrist  3    Closed/Suction Drain Lateral RLQ Bulb 19 Fr. 11/08/24  0935  RLQ  3                  Current Facility-Administered Medications   Medication Dose Route Frequency Provider Last Rate Last Admin    acetaminophen (TYLENOL) tablet 1,000 mg  1,000 mg Oral Q6H PRN Manuela Wisdom MD        sennosides-docusate (PERICOLACE) 8.6-50 MG per tablet 2 tablet  2 tablet Oral BID Manuela Wisdom MD   2 tablet at 11/09/24 2116    And    polyethylene glycol (MIRALAX) packet 17 g  17 g Oral BID Manuela Wisdom MD   17 g at 11/09/24 2116    And    bisacodyl (DULCOLAX) EC tablet 5 mg  5 mg Oral Daily PRN Manuela Wisdom MD        And    bisacodyl (DULCOLAX) suppository 10 mg  10 mg Rectal Daily PRN Manuela Wisdom MD   10 mg at 10/30/24 1841    Calcium Replacement - Follow Nurse / BPA Driven Protocol   Does not apply PRN Manuela Wisdom MD        carvedilol (COREG) tablet 6.25 mg  6.25 mg Oral Q12H Manuela Wisdom MD   6.25 mg at 11/11/24 1006    cefTRIAXone (ROCEPHIN) 2,000 mg in sodium chloride 0.9 % 100 mL MBP  2,000 mg Intravenous Q24H Manuela Wisdom  mL/hr at 11/11/24 1421 2,000 mg at 11/11/24 1421    dextrose (D50W) (25 g/50 mL) IV injection 25 g  25 g Intravenous Q15 Min PRN Manuela Wisdom MD   25 g at 11/03/24 1627    dextrose (GLUTOSE) oral gel 15 g  15 g Oral Q15 Min PRN Manuela Wisdom MD        glucagon (GLUCAGEN) injection 1 mg  1 mg Intramuscular Q15 Min PRN Manuela Wisdom MD        heparin (porcine) 5000 UNIT/ML injection 5,000 Units  5,000 Units Subcutaneous Q8H Manuela Wisdom MD   5,000 Units at 11/11/24 1421    HYDROmorphone (DILAUDID) injection 0.25 mg  0.25 mg Intravenous Q4H PRN Manuela Wisdom MD        hydrOXYzine (ATARAX) tablet 10 mg  10 mg Oral Q8H PRN Manuela Wisdom MD   10 mg at 11/05/24 0352    influenza virus vacc split PF FLUZONE 0.5 mL  0.5 mL Intramuscular During Hospitalization Artis  MD Manuela        insulin glargine (LANTUS, SEMGLEE) injection 15 Units  15 Units Subcutaneous Q12H Manuela Wisdom MD   15 Units at 11/11/24 1005    insulin lispro (HUMALOG/ADMELOG) injection 2-7 Units  2-7 Units Subcutaneous 4x Daily AC & at Bedtime Manuela Wisdom MD   2 Units at 11/11/24 0809    insulin lispro (HUMALOG/ADMELOG) injection 5 Units  5 Units Subcutaneous TID With Meals Jareth Bustillo MD        Magnesium Standard Dose Replacement - Follow Nurse / BPA Driven Protocol   Does not apply PRN Manuela Wisdom MD        melatonin tablet 2.5 mg  2.5 mg Oral Nightly PRN Manuela Wisdom MD        methocarbamol (ROBAXIN) tablet 750 mg  750 mg Oral 4x Daily Manuela Wisdom MD   750 mg at 11/11/24 1420    Naloxegol Oxalate (MOVANTIK) tablet 25 mg  25 mg Oral QAM Manuela Wisdom MD   25 mg at 11/10/24 0836    naloxone (NARCAN) injection 0.1 mg  0.1 mg Intravenous Q5 Min PRN Manuela Wsidom MD        naloxone (NARCAN) injection 0.4 mg  0.4 mg Intravenous Q5 Min PRN Manuela Wisdom MD        ondansetron ODT (ZOFRAN-ODT) disintegrating tablet 4 mg  4 mg Oral Q6H PRN Manuela Wisdom MD   4 mg at 11/02/24 2123    Or    ondansetron (ZOFRAN) injection 4 mg  4 mg Intravenous Q6H PRN Manuela Wisdom MD   4 mg at 11/08/24 1455    oxyCODONE (ROXICODONE) immediate release tablet 10 mg  10 mg Oral Q4H PRN Manuela Wisdom MD        oxyCODONE (ROXICODONE) immediate release tablet 5 mg  5 mg Oral Q4H PRN Manuela Wisdom MD   5 mg at 11/11/24 1202    pantoprazole (PROTONIX) EC tablet 40 mg  40 mg Oral BID AC Manuela Wisdom MD   40 mg at 11/11/24 0810    Pharmacy Consult   Does not apply Continuous PRN Manuela Wisdom MD        Phosphorus Replacement - Follow Nurse / BPA Driven Protocol   Does not apply Manuela Ramirez MD        Potassium Replacement - Follow Nurse / BPA Driven Protocol   Does not apply Manuela Ramirez MD        simethicone (MYLICON) chewable tablet 80 mg  80 mg Oral 4x  Daily PRN Manuela Wisdom MD        sodium chloride 0.9 % flush 10 mL  10 mL Intravenous PRN Manuela Wisdom MD   10 mL at 11/05/24 0825    venlafaxine XR (EFFEXOR-XR) 24 hr capsule 75 mg  75 mg Oral Daily Manuela Wisdom MD   75 mg at 11/11/24 1006     Orders (last 24 hrs)        Start     Ordered    11/11/24 1800  insulin lispro (HUMALOG/ADMELOG) injection 5 Units  3 Times Daily With Meals         11/11/24 1226    11/11/24 1245  methocarbamol (ROBAXIN) tablet 750 mg  4 Times Daily         11/11/24 1153    11/11/24 1153  acetaminophen (TYLENOL) tablet 1,000 mg  Every 6 Hours PRN         11/11/24 1153    11/11/24 1152  HYDROmorphone (DILAUDID) injection 0.25 mg  Every 4 Hours PRN         11/11/24 1153    11/11/24 1152  oxyCODONE (ROXICODONE) immediate release tablet 10 mg  Every 4 Hours PRN         11/11/24 1153    11/11/24 1152  oxyCODONE (ROXICODONE) immediate release tablet 5 mg  Every 4 Hours PRN         11/11/24 1153    11/11/24 1152  Remove Drains / Tubes  Once        Comments: Discontinue right upper quadrant drain and place gauze bandage.    11/11/24 1152    11/11/24 1105  POC Glucose Once  PROCEDURE ONCE        Comments: Complete no more than 45 minutes prior to patient eating      11/11/24 1102    11/11/24 0903  Diet: Gastrointestinal, Diabetic; Consistent Carbohydrate; Low Irritant; Fluid Consistency: Thin (IDDSI 0)  Diet Effective Now        Comments: No carbonation at this time per surgeon.    11/11/24 0903    11/11/24 0600  Renal Function Panel  Morning Draw         11/10/24 1030    11/11/24 0415  baclofen (LIORESAL) tablet 5 mg  Once         11/11/24 0328    11/10/24 2142  POC Glucose Once  PROCEDURE ONCE        Comments: Complete no more than 45 minutes prior to patient eating      11/10/24 2109    11/10/24 1859  POC Glucose Once  PROCEDURE ONCE        Comments: Complete no more than 45 minutes prior to patient eating      11/10/24 1857    11/10/24 1549  POC Glucose Once  PROCEDURE ONCE         Comments: Complete no more than 45 minutes prior to patient eating      11/10/24 1547    11/10/24 0600  CBC (No Diff)  Daily       11/09/24 1751    11/10/24 0600  Basic Metabolic Panel  Daily       11/09/24 1751    11/09/24 0700  HYDROmorphone (DILAUDID) PCA 0.2 mg/ml 50 mL syringe  Continuous,   Status:  Discontinued         11/09/24 0602    11/08/24 1800  Clear & Record PCA Settings  2x Daily PCA       11/08/24 1256    11/08/24 1257  Drain Care  Every Shift      Comments: Empty drain, record strict output, recharge, every shift and as needed.    11/08/24 1256    11/08/24 1256  Pharmacy Consult  Continuous PRN         11/08/24 1256    11/08/24 1256  naloxone (NARCAN) injection 0.1 mg  Every 5 Minutes PRN         11/08/24 1256    11/08/24 1256  sodium chloride 0.9 % infusion  Continuous PRN         11/08/24 1256    11/07/24 1013  Magnesium Standard Dose Replacement - Follow Nurse / BPA Driven Protocol  As Needed         11/07/24 1013    11/06/24 1215  insulin lispro (HUMALOG/ADMELOG) injection 6 Units  3 Times Daily With Meals,   Status:  Discontinued         11/06/24 1126    11/05/24 2100  insulin glargine (LANTUS, SEMGLEE) injection 15 Units  Every 12 Hours Scheduled         11/05/24 1006    11/05/24 1130  insulin lispro (HUMALOG/ADMELOG) injection 2-7 Units  4 Times Daily Before Meals & Nightly         11/05/24 1006    11/05/24 1003  simethicone (MYLICON) chewable tablet 80 mg  4 Times Daily PRN         11/05/24 1003    10/31/24 2100  carvedilol (COREG) tablet 6.25 mg  Every 12 Hours Scheduled         10/31/24 1504    10/31/24 1345  cefTRIAXone (ROCEPHIN) 2,000 mg in sodium chloride 0.9 % 100 mL MBP  Every 24 Hours         10/31/24 1253    10/31/24 0912  Strict Intake & Output  Every Shift       10/31/24 0911    10/31/24 0519  Daily CHG Bath While in ICU  Daily      Comments: Use for admission bath & daily bath for duration of critical care stay    10/31/24 0518    10/29/24 4960  Naloxegol Oxalate (MOVANTIK)  "tablet 25 mg  Every Morning         10/29/24 1237    10/29/24 1000  Incentive Spirometry  Every 4 Hours While Awake       10/29/24 0648    10/28/24 1400  heparin (porcine) 5000 UNIT/ML injection 5,000 Units  Every 8 Hours Scheduled         10/28/24 1223    10/27/24 0900  polyethylene glycol (MIRALAX) packet 17 g  2 Times Daily        Placed in \"And\" Linked Group    10/27/24 0700    10/27/24 0900  sennosides-docusate (PERICOLACE) 8.6-50 MG per tablet 2 tablet  2 Times Daily        Placed in \"And\" Linked Group    10/27/24 0700    10/27/24 0700  bisacodyl (DULCOLAX) EC tablet 5 mg  Daily PRN        Placed in \"And\" Linked Group    10/27/24 0700    10/27/24 0700  bisacodyl (DULCOLAX) suppository 10 mg  Daily PRN        Placed in \"And\" Linked Group    10/27/24 0700    10/26/24 1800  Incentive Spirometry  Every 4 Hours While Awake       10/26/24 1617    10/24/24 1730  pantoprazole (PROTONIX) EC tablet 40 mg  2 Times Daily Before Meals         10/24/24 1302    10/23/24 0900  venlafaxine XR (EFFEXOR-XR) 24 hr capsule 75 mg  Daily         10/22/24 2242    10/23/24 0830  influenza virus vacc split PF FLUZONE 0.5 mL  During Hospitalization         10/22/24 2332    10/23/24 0800  Oral Care  2 Times Daily       10/22/24 2026    10/23/24 0727  Daily Weights  Daily       10/23/24 0726    10/23/24 0725  Potassium Replacement - Follow Nurse / BPA Driven Protocol  As Needed         10/23/24 0726    10/23/24 0725  Phosphorus Replacement - Follow Nurse / BPA Driven Protocol  As Needed         10/23/24 0726    10/23/24 0725  Calcium Replacement - Follow Nurse / BPA Driven Protocol  As Needed         10/23/24 0726    10/23/24 0000  Vital Signs  Every 4 Hours       10/22/24 2026    10/22/24 2241  hydrOXYzine (ATARAX) tablet 10 mg  Every 8 Hours PRN         10/22/24 2242    10/22/24 2200  POC Glucose 4x Daily Before Meals & at Bedtime  4 Times Daily Before Meals & at Bedtime      Comments: Complete no more than 45 minutes prior to patient " "eating      10/22/24 2113    10/22/24 2113  naloxone (NARCAN) injection 0.4 mg  Every 5 Minutes PRN        Placed in \"And\" Linked Group    10/22/24 2113    10/22/24 2112  dextrose (GLUTOSE) oral gel 15 g  Every 15 Minutes PRN         10/22/24 2113    10/22/24 2112  dextrose (D50W) (25 g/50 mL) IV injection 25 g  Every 15 Minutes PRN         10/22/24 2113    10/22/24 2112  glucagon (GLUCAGEN) injection 1 mg  Every 15 Minutes PRN         10/22/24 2113    10/22/24 2025  melatonin tablet 2.5 mg  Nightly PRN         10/22/24 2026    10/22/24 2025  Intake & Output  Every Shift       10/22/24 2026    10/22/24 2024  ondansetron ODT (ZOFRAN-ODT) disintegrating tablet 4 mg  Every 6 Hours PRN        Placed in \"Or\" Linked Group    10/22/24 2026    10/22/24 2024  ondansetron (ZOFRAN) injection 4 mg  Every 6 Hours PRN        Placed in \"Or\" Linked Group    10/22/24 2026    10/22/24 1551  sodium chloride 0.9 % flush 10 mL  As Needed         10/22/24 1552    Unscheduled  Follow Hypoglycemia Standing Orders For Blood Glucose <70 & Notify Provider of Treatment  As Needed      Comments: Follow Hypoglycemia Orders As Outlined in Process Instructions (Open Order Report to View Full Instructions)  Notify Provider Any Time Hypoglycemia Treatment is Administered    10/22/24 2113    Unscheduled  Oxygen Therapy- Nasal Cannula; Titrate 1-6 LPM Per SpO2; 90 - 95%  Continuous PRN       11/08/24 1256    Unscheduled  Bladder Scan if Patient Unable to Void 4-6 Hours After Catheter Removal  As Needed         11/08/24 1256    Unscheduled  If Bladder Scan Volume is Less Than 500mL & Patient is Without Symptoms of Bladder Discomfort / Distention Monitor Every 1-2 Hours for Spontaneous Void  As Needed       11/08/24 1256    Unscheduled  Straight Cath Every 4-6 Hours As Needed If Patient is Unable to Void After 4-6 Hours, Bladder Scan Volume is Greater Than 500mL & Patient Has Symptoms of Bladder Discomfort / Distention  As Needed       11/08/24 1256    " Unscheduled  Schedule / Prompt Voiding For Patients With Urinary Incontinence  As Needed       24 1256    Unscheduled  Bladder Scan if Patient Unable to Void 4-6 Hours After Catheter Removal  As Needed         24 1319    Unscheduled  If Bladder Scan Volume is Less Than 500mL & Patient is Without Symptoms of Bladder Discomfort / Distention Monitor Every 1-2 Hours for Spontaneous Void  As Needed       24 1319    Unscheduled  Straight Cath Every 4-6 Hours As Needed If Patient is Unable to Void After 4-6 Hours, Bladder Scan Volume is Greater Than 500mL & Patient Has Symptoms of Bladder Discomfort / Distention  As Needed       24 1319    Unscheduled  Schedule / Prompt Voiding For Patients With Urinary Incontinence  As Needed       24 1319                       Physician Progress Notes (last 24 hours)        Jareth Bustillo MD at 24 1212            Name: Donnie Bishop ADMIT: 10/22/2024   : 1975  PCP: Juju Kennedy MD    MRN: [id number] LOS: 18 days   AGE/SEX: 49 y.o. male  ROOM: [facility]     Subjective   Subjective   Tolerating a diet. States his abdomen is sore.    Objective   Objective   Vital Signs  Temp:  [97.4 °F (36.3 °C)-98.1 °F (36.7 °C)] 98.1 °F (36.7 °C)  Heart Rate:  [84-90] 87  Resp:  [22-28] 22  BP: (115-124)/(67-78) 124/71  SpO2:  [90 %-100 %] 93 %  on  Flow (L/min) (Oxygen Therapy):  [2] 2;   Device (Oxygen Therapy): room air  Body mass index is 26.66 kg/m².    Physical Exam  Constitutional:       General: He is not in acute distress.     Appearance: He is ill-appearing (chronic). He is not toxic-appearing.   HENT:      Head: Normocephalic and atraumatic.   Cardiovascular:      Rate and Rhythm: Normal rate and regular rhythm.   Pulmonary:      Effort: Pulmonary effort is normal. No respiratory distress.      Breath sounds: Decreased breath sounds present.   Abdominal:      Palpations: Abdomen is soft.      Tenderness: There is no guarding or rebound.    Musculoskeletal:         General: No swelling.      Right lower leg: No edema.      Left lower leg: No edema.   Skin:     General: Skin is warm and dry.   Neurological:      General: No focal deficit present.      Mental Status: He is alert and oriented to person, place, and time.   Psychiatric:         Mood and Affect: Affect is flat.         Behavior: Behavior normal.     Results Review  I reviewed the patient's new clinical results.  Results from last 7 days   Lab Units 11/11/24  0557 11/10/24  0626 11/09/24  0342 11/08/24  0340   WBC 10*3/mm3 6.85 10.34 10.95* 8.58   HEMOGLOBIN g/dL 9.4* 9.1* 11.3* 10.7*   PLATELETS 10*3/mm3 465* 530* 696* 694*     Results from last 7 days   Lab Units 11/11/24  0557 11/10/24  0626 11/09/24  0342 11/08/24  0340   SODIUM mmol/L 133* 134* 138 132*   POTASSIUM mmol/L 4.0 4.3 5.2 5.1   CHLORIDE mmol/L 95* 95* 101 94*   CO2 mmol/L 25.6 25.3 26.9 27.8   BUN mg/dL 9 12 14 15   CREATININE mg/dL 0.72* 0.83 0.83 0.92   GLUCOSE mg/dL 163* 173* 169* 232*     Lab Results   Component Value Date    ANIONGAP 12.4 11/11/2024     Estimated Creatinine Clearance: 138.5 mL/min (A) (by C-G formula based on SCr of 0.72 mg/dL (L)).   Lab Results   Component Value Date    EGFR 113.4 11/11/2024     Results from last 7 days   Lab Units 11/11/24 0557 11/09/24 0342 11/08/24 0340 11/07/24  0628 11/05/24  0607   ALBUMIN g/dL 2.9* 3.2* 3.2* 3.0* 2.9*   BILIRUBIN mg/dL  --  0.5 0.3 0.3 0.3   ALK PHOS U/L  --  313* 466* 106 113   AST (SGOT) U/L  --  47* 203* 16 15   ALT (SGPT) U/L  --  56* 88* 18 23     Results from last 7 days   Lab Units 11/11/24 0557 11/10/24  0626 11/09/24  0342 11/08/24  0340 11/07/24  0628 11/05/24  0607   CALCIUM mg/dL 9.6 9.8 9.7 9.3 9.4 9.0   ALBUMIN g/dL 2.9*  --  3.2* 3.2* 3.0* 2.9*   MAGNESIUM mg/dL  --   --   --  2.0 1.5* 1.7   PHOSPHORUS mg/dL 3.1  --  4.2 3.6 4.0 3.1       Glucose   Date/Time Value Ref Range Status   11/11/2024 1102 130 70 - 130 mg/dL Final   11/10/2024 7920  215 (H) 70 - 130 mg/dL Final   11/10/2024 1857 153 (H) 70 - 130 mg/dL Final   11/10/2024 1547 68 (L) 70 - 130 mg/dL Final   11/10/2024 1135 193 (H) 70 - 130 mg/dL Final   11/10/2024 0841 198 (H) 70 - 130 mg/dL Final   11/10/2024 0608 185 (H) 70 - 130 mg/dL Final       No radiology results for the last day    Scheduled Meds  carvedilol, 6.25 mg, Oral, Q12H  cefTRIAXone, 2,000 mg, Intravenous, Q24H  heparin (porcine), 5,000 Units, Subcutaneous, Q8H  insulin glargine, 15 Units, Subcutaneous, Q12H  insulin lispro, 2-7 Units, Subcutaneous, 4x Daily AC & at Bedtime  insulin lispro, 6 Units, Subcutaneous, TID With Meals  methocarbamol, 750 mg, Oral, 4x Daily  Naloxegol Oxalate, 25 mg, Oral, QAM  pantoprazole, 40 mg, Oral, BID AC  senna-docusate sodium, 2 tablet, Oral, BID   And  polyethylene glycol, 17 g, Oral, BID  venlafaxine XR, 75 mg, Oral, Daily    Continuous Infusions  Pharmacy Consult,   sodium chloride, 30 mL/hr    PRN Meds    acetaminophen    senna-docusate sodium **AND** polyethylene glycol **AND** bisacodyl **AND** bisacodyl    Calcium Replacement - Follow Nurse / BPA Driven Protocol    dextrose    dextrose    glucagon (human recombinant)    HYDROmorphone    hydrOXYzine    influenza vaccine    Magnesium Standard Dose Replacement - Follow Nurse / BPA Driven Protocol    melatonin    naloxone    [DISCONTINUED] Morphine **AND** naloxone    ondansetron ODT **OR** ondansetron    oxyCODONE    oxyCODONE    Pharmacy Consult    Phosphorus Replacement - Follow Nurse / BPA Driven Protocol    Potassium Replacement - Follow Nurse / BPA Driven Protocol    simethicone    sodium chloride    sodium chloride    Pharmacy Consult,   sodium chloride, 30 mL/hr    Diet  Diet: Gastrointestinal, Diabetic; Consistent Carbohydrate; Low Irritant; Fluid Consistency: Thin (IDDSI 0)      Assessment/Plan     Active Hospital Problems    Diagnosis  POA    **Epigastric abdominal pain [R10.13]  Yes    Abscess of liver [K75.0]  Yes    Klebsiella  pneumoniae (k. pneumoniae) as the cause of diseases classified elsewhere [B96.1]  Yes    Hypotension [I95.9]  Yes    Transaminitis [R74.01]  Yes    Leukocytosis [D72.829]  Yes    Chronic combined systolic and diastolic heart failure [I50.42]  Yes    Calculus of gallbladder with acute on chronic cholecystitis without obstruction [K80.12]  Unknown    Anxiety [F41.9]  Yes    Multiple-type hyperlipidemia [E78.2]  Yes    Type 2 diabetes mellitus with diabetic neuropathy, with long-term current use of insulin [E11.40, Z79.4]  Not Applicable    Benign essential HTN [I10]  Yes      Resolved Hospital Problems    Diagnosis Date Resolved POA    Acute kidney injury [N17.9] 10/30/2024 No     49 y.o. male admitted with Epigastric abdominal pain.     Liver abscess/biloma: Status post IR drainage and drain placement 10/28.  Status post IR drain exchange 11/5.  Status post IR drain placement 11/6.  Klebsiella.  Continue Rocephin.  Abx through 11/15/24- oral abx plan in place if he is ready to discharge. Infectious disease has signed off- po abx plan in place.  Cholecystitis: GI evaluated and he had EGD earlier this admission on 10/24.  Status post open cholecystectomy and right upper quadrant drain placement 11/8.  No filling deficits were noted on the intraoperative cholangiogram.  Surgery following. NGT removed, diet advanced. Drain to come out. Surgery discontinuing PCA and switching to oral pain medications. Diet: Gastrointestinal, Diabetic; Consistent Carbohydrate; Low Irritant; Fluid Consistency: Thin (IDDSI 0)   Hypotension: Had improved with resuscitation and midodrine.  The midodrine has since been stopped.  Pulmonology evaluated.  TENISHA: Multifactorial and now improved.  Nephrology stated was okay for him to start GDMT for CHF at discharge as tolerated. Nephrology following. Plan follow-up in 1 week in clinic.  NICM/bigeminy: EF 49%.  On Coreg.  Home Entresto, Aldactone, and Jardiance are held.  Cardiology following.  "  Diabetes: A1c 9.4.  Was on Jardiance metformin and insulin prior to admission.  Had hypo and hyperglycemia this admission.  Continue insulin and monitor requirements. Reduce mealtime insulin (had hypoglycemia yesterday afternoon)  Hypertension: Not grossly elevated.  Lasix held. Coreg.    PPX: Subcutaneous heparin    Discharge  TBD  Expected Discharge Date: 11/13/2024; Expected Discharge Time: 12:00 PM    Discussed with patient and nursing staff    Jareth Bustillo MD  [facility]  11/11/24    Electronically signed by Jareth Bustillo MD at 11/11/24 1225       Manuela Wisdom MD at 11/11/24 1151          General Surgery Progress Note    CC: Postop day 3 laparoscopic converted to open cholecystectomy with intraoperative cholangiogram and right upper quadrant drain placement for cholecystitis with infected subcapsular biloma    S: Patient states he is feeling okay today.  Says he did not sleep last night but his pain is controlled, he is having no nausea or vomiting, tolerating a regular diet, and reports he had a bowel movement.    O:/71   Pulse 87   Temp 98.1 °F (36.7 °C) (Oral)   Resp 22   Ht 170.2 cm (67\")   Wt 77.2 kg (170 lb 3.1 oz)   SpO2 93%   BMI 26.66 kg/m²     Intake & Output (last day)         11/10 0701  11/11 0700 11/11 0701  11/12 0700    P.O. 560     I.V. (mL/kg) 34.5 (0.4)     Total Intake(mL/kg) 594.5 (7.7)     Urine (mL/kg/hr) 725 (0.4) 150 (0.4)    Emesis/NG output      Drains 15     Total Output 740 150    Net -145.5 -150                  GENERAL: awake, alert, interactive, cooperative, comfortable appearing in bed answering questions appropriately  HEENT: EOMI, clear sclera, moist mucus membranes   CHEST: normal work of breathing on room air  CARDIAC: well perfused  GI: Abd soft, nondistended, appropriately tender around incisions which are clean and dry with staples in place, right sided abdominal drain has scant serosanguineous fluid in the bulb, no " bile  EXTREMITIES: MOCK, no cyanosis, no pedal edema    SKIN: Warm and dry, no rash    LABS  Results from last 7 days   Lab Units 11/11/24  0557 11/10/24  0626 11/09/24 0342   WBC 10*3/mm3 6.85 10.34 10.95*   HEMOGLOBIN g/dL 9.4* 9.1* 11.3*   HEMATOCRIT % 28.8* 29.0* 33.5*   PLATELETS 10*3/mm3 465* 530* 696*     Results from last 7 days   Lab Units 11/11/24  0557 11/10/24  0626 11/09/24 0342 11/08/24 0340 11/07/24  0628   SODIUM mmol/L 133* 134* 138 132* 133*   POTASSIUM mmol/L 4.0 4.3 5.2 5.1 4.8   CHLORIDE mmol/L 95* 95* 101 94* 95*   CO2 mmol/L 25.6 25.3 26.9 27.8 29.1*   BUN mg/dL 9 12 14 15 13   CREATININE mg/dL 0.72* 0.83 0.83 0.92 0.89   CALCIUM mg/dL 9.6 9.8 9.7 9.3 9.4   BILIRUBIN mg/dL  --   --  0.5 0.3 0.3   ALK PHOS U/L  --   --  313* 466* 106   ALT (SGPT) U/L  --   --  56* 88* 18   AST (SGOT) U/L  --   --  47* 203* 16   GLUCOSE mg/dL 163* 173* 169* 232* 338*         Pathology:  Final Diagnosis   1.  Gallbladder, cholecystectomy:               A.  Grossly disrupted gallbladder with acute hemorrhagic cholecystitis, gangrenous necrosis, and        areas suggestive of abscess formation.     IMAGING:  No new imaging    A/P: 49 y.o. male who presented to the hospital with right upper quadrant pain and apparent chronic cholecystitis, initially was thought to have gastritis or duodenitis but was noted to have normal findings on EGD.  Developed spontaneous biloma for which she underwent percutaneous drainage and antibiotic treatment targeted towards Klebsiella.  Ultimately was felt to have cholecystitis as the source of his biloma.  On 11/8/2024 he underwent laparoscopic converted to open cholecystectomy with intraoperative cholangiogram.  He also had a right upper quadrant drain placed.    Patient is doing well, AVSS.  Tolerating regular diet.  Reporting bowel function.  His pain has been controlled.   Will discontinue his PCA and switch him to oral pain medications with oxycodone, as needed Tylenol, and  scheduled Robaxin.  Discontinue right upper quadrant drain which demonstrates no evidence of bile leak.  Incisions are healing in good order.  Continue staples for 14 days postop.  Continue regular diet.  PT/OT/out of bed and ambulation as tolerated.  On Heparin for DVT prophylaxis.    Manuela Wisdom MD  General, Robotic and Endoscopic Surgery  [facility]    [address], [address]  [place], [place], [address]  P: [phone]  F: [phone]       Electronically signed by Manuela Wisdom MD at 24 120       Evan Pat MD at 24 0980              [facility] Progress Note      Patient Name: Donnie Bishop  : 1975  MRN: [id number]  Primary Care Physician:  Juju Kennedy MD  Date of admission: 10/22/2024    Subjective     Interval History:   F/u TENISHA     Review of Systems:   Denies dyspnea or nausea   No new issues noted today.  Eating breakfast.  Denies any nausea or vomiting.  No fever no chills  Objective     Vitals:   Temp:  [97.4 °F (36.3 °C)-98.9 °F (37.2 °C)] 98.1 °F (36.7 °C)  Heart Rate:  [84-90] 90  Resp:  [22-28] 22  BP: (115-119)/(67-78) 118/78  Flow (L/min) (Oxygen Therapy):  [2] 2    Intake/Output Summary (Last 24 hours) at 2024 0933  Last data filed at 2024 0500  Gross per 24 hour   Intake 594.5 ml   Output 740 ml   Net -145.5 ml       Physical Exam:    General Appearance: frail AAM comfortable on RA, speech/mentation slow  Neck: supple, no JVD  Lungs: CTA bilat no rales  Heart: RRR, normal S1 and S2  Abdomen: soft, nontender, nondistended  Extremities: no edema, cyanosis or clubbing        Scheduled Meds:     carvedilol, 6.25 mg, Oral, Q12H  cefTRIAXone, 2,000 mg, Intravenous, Q24H  heparin (porcine), 5,000 Units, Subcutaneous, Q8H  insulin glargine, 15 Units, Subcutaneous, Q12H  insulin lispro, 2-7 Units, Subcutaneous, 4x Daily AC & at Bedtime  insulin lispro, 6 Units, Subcutaneous, TID With Meals  Naloxegol Oxalate,  25 mg, Oral, QAM  pantoprazole, 40 mg, Oral, BID AC  senna-docusate sodium, 2 tablet, Oral, BID   And  polyethylene glycol, 17 g, Oral, BID  venlafaxine XR, 75 mg, Oral, Daily      IV Meds:   HYDROmorphone HCl-NaCl,   Pharmacy Consult,   sodium chloride, 30 mL/hr          Results Reviewed:   I have personally reviewed the results from the time of this admission to 11/11/2024 09:33 EST     Results from last 7 days   Lab Units 11/11/24  0557 11/10/24  0626 11/09/24  0342 11/08/24  0340 11/07/24  0628   SODIUM mmol/L 133* 134* 138 132* 133*   POTASSIUM mmol/L 4.0 4.3 5.2 5.1 4.8   CHLORIDE mmol/L 95* 95* 101 94* 95*   CO2 mmol/L 25.6 25.3 26.9 27.8 29.1*   BUN mg/dL 9 12 14 15 13   CREATININE mg/dL 0.72* 0.83 0.83 0.92 0.89   CALCIUM mg/dL 9.6 9.8 9.7 9.3 9.4   BILIRUBIN mg/dL  --   --  0.5 0.3 0.3   ALK PHOS U/L  --   --  313* 466* 106   ALT (SGPT) U/L  --   --  56* 88* 18   AST (SGOT) U/L  --   --  47* 203* 16   GLUCOSE mg/dL 163* 173* 169* 232* 338*     Estimated Creatinine Clearance: 138.5 mL/min (A) (by C-G formula based on SCr of 0.72 mg/dL (L)).  Results from last 7 days   Lab Units 11/11/24 0557 11/09/24 0342 11/08/24 0340 11/07/24 0628 11/05/24  0607   MAGNESIUM mg/dL  --   --  2.0 1.5* 1.7   PHOSPHORUS mg/dL 3.1 4.2 3.6 4.0 3.1           Results from last 7 days   Lab Units 11/11/24  0557 11/10/24  0626 11/09/24  0342 11/08/24  0340 11/07/24  0628   WBC 10*3/mm3 6.85 10.34 10.95* 8.58 6.00   HEMOGLOBIN g/dL 9.4* 9.1* 11.3* 10.7* 10.3*   PLATELETS 10*3/mm3 465* 530* 696* 694* 724*             Assessment / Plan     ASSESSMENT:  Acute kidney injury.  Multifactorial including hypovolemia, hypotension, impaired renal autoregulation due to Jardiance, Entresto, Aldactone.  Resolved.  Cr ~ 1.   Cholecystitis with large right perihepatic fluid collection.  Plan noted for IR drainage today.  On Rocephin per ID.  Transaminitis improving.  Off statin.  MRCP noted.  Heart failure reduced ejection fraction.  EF 49%.   (Improved from 35% a year ago).  Anemia.    Encephalopathy.  Mentation remains bit slow.  Diabetes mellitus type 2.  Hypotension -resolved    PLAN:  Kidney function is back to normal.  Mild hyponatremia likely secondary to excessive fluid intake in the setting of poor solute intake after surgery.  Will continue to monitor for now.  Labs in govindDom Pat MD  24  09:33 EST    [facility]  [phone]    Electronically signed by Evan Pat MD at 24 0933       Nanda Cody MD at 11/10/24 1551              Name: Donnie Bishop ADMIT: 10/22/2024   : 1975  PCP: Juju Kennedy MD    MRN: [id number] LOS: 17 days   AGE/SEX: 49 y.o. male  ROOM: [facility]     Subjective   Subjective   Chief Complaint   Patient presents with    Abdominal Pain     Patient resting in bed, he is comfortable in no apparent distress. NG tube removed and he is tolerating a diet. D/w RN - minimal output per CARLA drain (5cc overnight).    Objective   Objective   Vital Signs  Temp:  [97.4 °F (36.3 °C)-100.2 °F (37.9 °C)] 97.5 °F (36.4 °C)  Heart Rate:  [] 92  Resp:  [16-28] 28  BP: (100-119)/(62-77) 119/67  SpO2:  [92 %-98 %] 93 %  on  Flow (L/min) (Oxygen Therapy):  [2] 2;   Device (Oxygen Therapy): nasal cannula  Body mass index is 26.66 kg/m².    Physical Exam  Vitals and nursing note reviewed.   Constitutional:       Appearance: He is not diaphoretic.   HENT:      Head: Atraumatic.   Eyes:      General: No scleral icterus.  Cardiovascular:      Rate and Rhythm: Normal rate and regular rhythm.   Pulmonary:      Effort: Pulmonary effort is normal.      Breath sounds: Decreased breath sounds present. No wheezing.   Abdominal:      Palpations: Abdomen is soft.      Tenderness: There is abdominal tenderness (improved).   Musculoskeletal:         General: No swelling.   Skin:     General: Skin is warm and dry.   Neurological:      Mental Status: He is alert. Mental status is at  baseline.   Psychiatric:         Mood and Affect: Mood normal.         Behavior: Behavior normal.       Results Review  I reviewed the patient's new clinical results.    Results from last 7 days   Lab Units 11/10/24  0626 11/09/24  0342 11/08/24  0340 11/07/24  0628   WBC 10*3/mm3 10.34 10.95* 8.58 6.00   HEMOGLOBIN g/dL 9.1* 11.3* 10.7* 10.3*   PLATELETS 10*3/mm3 530* 696* 694* 724*     Results from last 7 days   Lab Units 11/10/24  0626 11/09/24  0342 11/08/24  0340 11/07/24  0628   SODIUM mmol/L 134* 138 132* 133*   POTASSIUM mmol/L 4.3 5.2 5.1 4.8   CHLORIDE mmol/L 95* 101 94* 95*   CO2 mmol/L 25.3 26.9 27.8 29.1*   BUN mg/dL 12 14 15 13   CREATININE mg/dL 0.83 0.83 0.92 0.89   GLUCOSE mg/dL 173* 169* 232* 338*   EGFR mL/min/1.73 108.6 108.6 103.2 106.4     Results from last 7 days   Lab Units 11/09/24 0342 11/08/24 0340 11/07/24 0628 11/05/24  0607   ALBUMIN g/dL 3.2* 3.2* 3.0* 2.9*   BILIRUBIN mg/dL 0.5 0.3 0.3 0.3   ALK PHOS U/L 313* 466* 106 113   AST (SGOT) U/L 47* 203* 16 15   ALT (SGPT) U/L 56* 88* 18 23     Results from last 7 days   Lab Units 11/10/24  0626 11/09/24  0342 11/08/24  0340 11/07/24  0628 11/05/24  0607 11/04/24  0653   CALCIUM mg/dL 9.8 9.7 9.3 9.4 9.0 9.2   ALBUMIN g/dL  --  3.2* 3.2* 3.0* 2.9* 3.0*   MAGNESIUM mg/dL  --   --  2.0 1.5* 1.7 1.8   PHOSPHORUS mg/dL  --  4.2 3.6 4.0 3.1 3.0         Glucose   Date/Time Value Ref Range Status   11/10/2024 1547 68 (L) 70 - 130 mg/dL Final   11/10/2024 1135 193 (H) 70 - 130 mg/dL Final   11/10/2024 0841 198 (H) 70 - 130 mg/dL Final   11/10/2024 0608 185 (H) 70 - 130 mg/dL Final   11/09/2024 2037 162 (H) 70 - 130 mg/dL Final   11/09/2024 1733 131 (H) 70 - 130 mg/dL Final   11/09/2024 1224 190 (H) 70 - 130 mg/dL Final       No radiology results for the last day    I have personally reviewed all medications:  Scheduled Medications  carvedilol, 6.25 mg, Oral, Q12H  cefTRIAXone, 2,000 mg, Intravenous, Q24H  heparin (porcine), 5,000 Units,  Subcutaneous, Q8H  insulin glargine, 15 Units, Subcutaneous, Q12H  insulin lispro, 2-7 Units, Subcutaneous, 4x Daily AC & at Bedtime  insulin lispro, 6 Units, Subcutaneous, TID With Meals  Naloxegol Oxalate, 25 mg, Oral, QAM  pantoprazole, 40 mg, Oral, BID AC  senna-docusate sodium, 2 tablet, Oral, BID   And  polyethylene glycol, 17 g, Oral, BID  venlafaxine XR, 75 mg, Oral, Daily      Infusions  HYDROmorphone HCl-NaCl,   Pharmacy Consult,   sodium chloride, 30 mL/hr        Diet  Diet: Regular/House, Gastrointestinal; Low Irritant; Fluid Consistency: Thin (IDDSI 0)      Assessment/Plan     Active Hospital Problems    Diagnosis  POA    **Epigastric abdominal pain [R10.13]  Yes    Abscess of liver [K75.0]  Yes    Klebsiella pneumoniae (k. pneumoniae) as the cause of diseases classified elsewhere [B96.1]  Yes    Hypotension [I95.9]  Yes    Transaminitis [R74.01]  Yes    Leukocytosis [D72.829]  Yes    Chronic combined systolic and diastolic heart failure [I50.42]  Yes    Calculus of gallbladder with acute on chronic cholecystitis without obstruction [K80.12]  Unknown    Anxiety [F41.9]  Yes    Multiple-type hyperlipidemia [E78.2]  Yes    Type 2 diabetes mellitus with diabetic neuropathy, with long-term current use of insulin [E11.40, Z79.4]  Not Applicable    Benign essential HTN [I10]  Yes      Resolved Hospital Problems    Diagnosis Date Resolved POA    Acute kidney injury [N17.9] 10/30/2024 No       49 y.o. male admitted with Epigastric abdominal pain.    Liver abscess/biloma: Status post IR drainage and drain placement 10/28.  Status post IR drain exchange 11/5.  Status post IR drain placement 11/6.  Klebsiella.  Continue Rocephin.  Abx through 11/15/24- oral abx plan in place if he is ready to discharge. Infectious disease has signed off- po abx plan in place.  Cholecystitis: GI evaluated and he did have a EGD earlier this admission on 10/24.  Status post open cholecystectomy and right upper quadrant drain  placement 11/8.  No filling deficits were noted on the intraoperative cholangiogram.  Surgery following. NGT removed, diet advanced. Patient remains on PCA per surgery.  Hypotension: Had improved with resuscitation and midodrine.  The midodrine has since been stopped.  Pulmonology evaluated.  TENISHA: Multifactorial and now improved.  Nephrology stated was okay for him to start GDMT for CHF at discharge as tolerated. Nephrology following. Plan follow-up in 1 week in clinic.  NICM/bigeminy: EF 49%.  On Coreg.  Home Entresto, Aldactone, and Jardiance are held.  Cardiology evaluated.   Diabetes: A1c 9.4.  Was on Jardiance metformin and insulin prior to admission.  Had hypo and hyperglycemia this admission.  Continue insulin and monitor requirements.  Adjust as needed.  Hypertension: Not grossly elevated.  Lasix held. Coreg.   PPX: Subcutaneous heparin  Disposition: SNF/TBD    Expected Discharge Date: 11/17/2024; Expected Discharge Time: 12:00 PM     Nanda Cody MD  [facility]  11/10/24  15:53 EST    Dictated portions of note using Dragon dictation software.  Copied text in this note has been reviewed by me and remains accurate as of 11/10/24    Electronically signed by Nanda Cody MD at 11/10/24 7469

## 2024-11-11 NOTE — SIGNIFICANT NOTE
11/11/24 1026   OTHER   Discipline occupational therapist   Rehab Time/Intention   Session Not Performed patient/family declined, not feeling well  (Encouraged mobilization and therapy participation, pt sitting up in the chair and declines due to being in too much pain at present. Pt with a PCA pup. RN  notified. will check back as time allows.)   Recommendation   OT - Next Appointment 11/12/24

## 2024-11-11 NOTE — PROGRESS NOTES
Name: Donnie Bishop ADMIT: 10/22/2024   : 1975  PCP: Juju Kennedy MD    MRN: [id number] LOS: 18 days   AGE/SEX: 49 y.o. male  ROOM: [facility]     Subjective   Subjective   Tolerating a diet. States his abdomen is sore.     Objective   Objective   Vital Signs  Temp:  [97.4 °F (36.3 °C)-98.1 °F (36.7 °C)] 98.1 °F (36.7 °C)  Heart Rate:  [84-90] 87  Resp:  [22-28] 22  BP: (115-124)/(67-78) 124/71  SpO2:  [90 %-100 %] 93 %  on  Flow (L/min) (Oxygen Therapy):  [2] 2;   Device (Oxygen Therapy): room air  Body mass index is 26.66 kg/m².    Physical Exam  Constitutional:       General: He is not in acute distress.     Appearance: He is ill-appearing (chronic). He is not toxic-appearing.   HENT:      Head: Normocephalic and atraumatic.   Cardiovascular:      Rate and Rhythm: Normal rate and regular rhythm.   Pulmonary:      Effort: Pulmonary effort is normal. No respiratory distress.      Breath sounds: Decreased breath sounds present.   Abdominal:      Palpations: Abdomen is soft.      Tenderness: There is no guarding or rebound.   Musculoskeletal:         General: No swelling.      Right lower leg: No edema.      Left lower leg: No edema.   Skin:     General: Skin is warm and dry.   Neurological:      General: No focal deficit present.      Mental Status: He is alert and oriented to person, place, and time.   Psychiatric:         Mood and Affect: Affect is flat.         Behavior: Behavior normal.     Results Review  I reviewed the patient's new clinical results.  Results from last 7 days   Lab Units 24  0557 11/10/24  0624  0342 24  0340   WBC 10*3/mm3 6.85 10.34 10.95* 8.58   HEMOGLOBIN g/dL 9.4* 9.1* 11.3* 10.7*   PLATELETS 10*3/mm3 465* 530* 696* 694*     Results from last 7 days   Lab Units 24  0557 11/10/24  0626 24  0342 24  0340   SODIUM mmol/L 133* 134* 138 132*   POTASSIUM mmol/L 4.0 4.3 5.2 5.1   CHLORIDE mmol/L 95* 95* 101 94*   CO2 mmol/L 25.6 25.3 26.9 27.8   BUN  mg/dL 9 12 14 15   CREATININE mg/dL 0.72* 0.83 0.83 0.92   GLUCOSE mg/dL 163* 173* 169* 232*     Lab Results   Component Value Date    ANIONGAP 12.4 11/11/2024     Estimated Creatinine Clearance: 138.5 mL/min (A) (by C-G formula based on SCr of 0.72 mg/dL (L)).   Lab Results   Component Value Date    EGFR 113.4 11/11/2024     Results from last 7 days   Lab Units 11/11/24  0557 11/09/24  0342 11/08/24  0340 11/07/24  0628 11/05/24  0607   ALBUMIN g/dL 2.9* 3.2* 3.2* 3.0* 2.9*   BILIRUBIN mg/dL  --  0.5 0.3 0.3 0.3   ALK PHOS U/L  --  313* 466* 106 113   AST (SGOT) U/L  --  47* 203* 16 15   ALT (SGPT) U/L  --  56* 88* 18 23     Results from last 7 days   Lab Units 11/11/24 0557 11/10/24  0626 11/09/24 0342 11/08/24  0340 11/07/24  0628 11/05/24  0607   CALCIUM mg/dL 9.6 9.8 9.7 9.3 9.4 9.0   ALBUMIN g/dL 2.9*  --  3.2* 3.2* 3.0* 2.9*   MAGNESIUM mg/dL  --   --   --  2.0 1.5* 1.7   PHOSPHORUS mg/dL 3.1  --  4.2 3.6 4.0 3.1       Glucose   Date/Time Value Ref Range Status   11/11/2024 1102 130 70 - 130 mg/dL Final   11/10/2024 2109 215 (H) 70 - 130 mg/dL Final   11/10/2024 1857 153 (H) 70 - 130 mg/dL Final   11/10/2024 1547 68 (L) 70 - 130 mg/dL Final   11/10/2024 1135 193 (H) 70 - 130 mg/dL Final   11/10/2024 0841 198 (H) 70 - 130 mg/dL Final   11/10/2024 0608 185 (H) 70 - 130 mg/dL Final       No radiology results for the last day    Scheduled Meds  carvedilol, 6.25 mg, Oral, Q12H  cefTRIAXone, 2,000 mg, Intravenous, Q24H  heparin (porcine), 5,000 Units, Subcutaneous, Q8H  insulin glargine, 15 Units, Subcutaneous, Q12H  insulin lispro, 2-7 Units, Subcutaneous, 4x Daily AC & at Bedtime  insulin lispro, 6 Units, Subcutaneous, TID With Meals  methocarbamol, 750 mg, Oral, 4x Daily  Naloxegol Oxalate, 25 mg, Oral, QAM  pantoprazole, 40 mg, Oral, BID AC  senna-docusate sodium, 2 tablet, Oral, BID   And  polyethylene glycol, 17 g, Oral, BID  venlafaxine XR, 75 mg, Oral, Daily    Continuous Infusions  Pharmacy Consult,    sodium chloride, 30 mL/hr    PRN Meds    acetaminophen    senna-docusate sodium **AND** polyethylene glycol **AND** bisacodyl **AND** bisacodyl    Calcium Replacement - Follow Nurse / BPA Driven Protocol    dextrose    dextrose    glucagon (human recombinant)    HYDROmorphone    hydrOXYzine    influenza vaccine    Magnesium Standard Dose Replacement - Follow Nurse / BPA Driven Protocol    melatonin    naloxone    [DISCONTINUED] Morphine **AND** naloxone    ondansetron ODT **OR** ondansetron    oxyCODONE    oxyCODONE    Pharmacy Consult    Phosphorus Replacement - Follow Nurse / BPA Driven Protocol    Potassium Replacement - Follow Nurse / BPA Driven Protocol    simethicone    sodium chloride    sodium chloride    Pharmacy Consult,   sodium chloride, 30 mL/hr    Diet  Diet: Gastrointestinal, Diabetic; Consistent Carbohydrate; Low Irritant; Fluid Consistency: Thin (IDDSI 0)       Assessment/Plan     Active Hospital Problems    Diagnosis  POA    **Epigastric abdominal pain [R10.13]  Yes    Abscess of liver [K75.0]  Yes    Klebsiella pneumoniae (k. pneumoniae) as the cause of diseases classified elsewhere [B96.1]  Yes    Hypotension [I95.9]  Yes    Transaminitis [R74.01]  Yes    Leukocytosis [D72.829]  Yes    Chronic combined systolic and diastolic heart failure [I50.42]  Yes    Calculus of gallbladder with acute on chronic cholecystitis without obstruction [K80.12]  Unknown    Anxiety [F41.9]  Yes    Multiple-type hyperlipidemia [E78.2]  Yes    Type 2 diabetes mellitus with diabetic neuropathy, with long-term current use of insulin [E11.40, Z79.4]  Not Applicable    Benign essential HTN [I10]  Yes      Resolved Hospital Problems    Diagnosis Date Resolved POA    Acute kidney injury [N17.9] 10/30/2024 No     49 y.o. male admitted with Epigastric abdominal pain.     Liver abscess/biloma: Status post IR drainage and drain placement 10/28.  Status post IR drain exchange 11/5.  Status post IR drain placement 11/6.   Klebsiella.  Continue Rocephin.  Abx through 11/15/24- oral abx plan in place if he is ready to discharge. Infectious disease has signed off- po abx plan in place.  Cholecystitis: GI evaluated and he had EGD earlier this admission on 10/24.  Status post open cholecystectomy and right upper quadrant drain placement 11/8.  No filling deficits were noted on the intraoperative cholangiogram.  Surgery following. NGT removed, diet advanced. Drain to come out. Surgery discontinuing PCA and switching to oral pain medications. Diet: Gastrointestinal, Diabetic; Consistent Carbohydrate; Low Irritant; Fluid Consistency: Thin (IDDSI 0)   Hypotension: Had improved with resuscitation and midodrine.  The midodrine has since been stopped.  Pulmonology evaluated.  TENISHA: Multifactorial and now improved.  Nephrology stated was okay for him to start GDMT for CHF at discharge as tolerated. Nephrology following. Plan follow-up in 1 week in clinic.  NICM/bigeminy: EF 49%.  On Coreg.  Home Entresto, Aldactone, and Jardiance are held.  Cardiology following.   Diabetes: A1c 9.4.  Was on Jardiance metformin and insulin prior to admission.  Had hypo and hyperglycemia this admission.  Continue insulin and monitor requirements. Reduce mealtime insulin (had hypoglycemia yesterday afternoon)  Hypertension: Not grossly elevated.  Lasix held. Coreg.    PPX: Subcutaneous heparin    Discharge  TBD  Expected Discharge Date: 11/13/2024; Expected Discharge Time: 12:00 PM    Discussed with patient and nursing staff    Jareth Bustillo MD  [facility]  11/11/24

## 2024-11-11 NOTE — PLAN OF CARE
Goal Outcome Evaluation: Pt remains in CCU in telemetry status on 2L nasal cannula. Diet advanced this shift. PCA pump still in use. Pt had a very large bowel movement this shift. CARLA drain output 10ml.

## 2024-11-11 NOTE — PROGRESS NOTES
"Nutrition Services    Patient Name:  Donnie Bishop  YOB: 1975  MRN: [id number]  Admit Date:  10/22/2024Assessment Date:  11/11/24    Pt sleeping x 2 attempts RD made to speak with pt.     NUTRITION SCREENING      Reason for Encounter Follow-up protocol   Diagnosis/Problem Epigastric abdominal pain, liver abscess/biloma s/p IR drainage and drain placement 10/28, drain exchange 11/5, and drain replacement 11/6. Pt with cholecystitis. NG removed and diet advanced.    PMH CHF, HTN, HLD, DM2   PO Diet Diet: Regular/House, Gastrointestinal; Low Irritant; Fluid Consistency: Thin (IDDSI 0)   Supplements -   PO Intake % 75% x 2 meals since diet advanced        Medications Reviewed.    Labs  Listed below, reviewed   Physical Findings 1+ edema: hands   GI Function BM 11/10   Skin Status Intact        Height  Weight  BMI  Weight Trend     Height: 170.2 cm (67\")  Weight: 77.2 kg (170 lb 3.1 oz) (11/09/24 0558)  Body mass index is 26.66 kg/m².  Other: wt fluctuating 158-188# since October  Expect some wt fluctuation with edema and hx heart failure     Wt Readings from Last 20 Encounters:   11/09/24 77.2 kg (170 lb 3.1 oz)   08/31/24 77.1 kg (170 lb)   08/09/24 77.8 kg (171 lb 9.6 oz)   07/25/24 74.8 kg (165 lb)   07/15/24 75.3 kg (166 lb)   06/05/24 76.7 kg (169 lb)   02/09/24 75.7 kg (166 lb 12.8 oz)   01/12/24 75.3 kg (166 lb)   12/26/23 71.2 kg (157 lb)   12/21/23 79.4 kg (175 lb)   12/17/23 74.8 kg (164 lb 14.5 oz)   10/05/23 74.8 kg (165 lb)   09/15/23 71.7 kg (158 lb)   09/12/23 79.5 kg (175 lb 3.2 oz)   07/12/23 74.6 kg (164 lb 6.4 oz)   02/14/22 75.9 kg (167 lb 6.4 oz)   06/04/21 75.3 kg (166 lb)   05/24/21 86.2 kg (190 lb)   11/06/20 86.2 kg (190 lb)   10/09/20 88.9 kg (196 lb)            Nutrition Problem (PES) Inadequate oral intake related to liver abscess/biloma and cholecystitis as evidence by frequent NPO/liquids this admission.       Intervention/Plan Addition of consistent CHO diet.      Results from " last 7 days   Lab Units 11/11/24  0557 11/10/24  0626 11/09/24 0342 11/08/24 0340 11/07/24  0628   SODIUM mmol/L 133* 134* 138 132* 133*   POTASSIUM mmol/L 4.0 4.3 5.2 5.1 4.8   CHLORIDE mmol/L 95* 95* 101 94* 95*   CO2 mmol/L 25.6 25.3 26.9 27.8 29.1*   BUN mg/dL 9 12 14 15 13   CREATININE mg/dL 0.72* 0.83 0.83 0.92 0.89   CALCIUM mg/dL 9.6 9.8 9.7 9.3 9.4   BILIRUBIN mg/dL  --   --  0.5 0.3 0.3   ALK PHOS U/L  --   --  313* 466* 106   ALT (SGPT) U/L  --   --  56* 88* 18   AST (SGOT) U/L  --   --  47* 203* 16   GLUCOSE mg/dL 163* 173* 169* 232* 338*     Results from last 7 days   Lab Units 11/11/24  0557 11/09/24 0342 11/08/24 0340 11/07/24  0628 11/05/24  0607   MAGNESIUM mg/dL  --   --  2.0 1.5* 1.7   PHOSPHORUS mg/dL 3.1   < > 3.6 4.0 3.1   HEMOGLOBIN g/dL 9.4*   < > 10.7* 10.3* 9.9*   HEMATOCRIT % 28.8*   < > 32.7* 31.7* 31.9*    < > = values in this interval not displayed.     Lab Results   Component Value Date    HGBA1C 9.40 (H) 10/24/2024         Electronically signed by:  Mel Adams RD  11/11/24 08:46 EST

## 2024-11-11 NOTE — PLAN OF CARE
Goal Outcome Evaluation:  Plan of Care Reviewed With: patient        Progress: improving  Outcome Evaluation: Attempted to see pt for PT this afternoon. He is agreeable, but slow to initiate movement. Pt required mod A to partially transition to EOB; however, pt c/o abdominal pain/cramping when attempting to move. Pt requested to return to bed due to pain. Pt hopeful to tolerate more activity tomorrow. Will continue to progress.

## 2024-11-11 NOTE — PROGRESS NOTES
"General Surgery Progress Note    CC: Postop day 3 laparoscopic converted to open cholecystectomy with intraoperative cholangiogram and right upper quadrant drain placement for cholecystitis with infected subcapsular biloma    S: Patient states he is feeling okay today.  Says he did not sleep last night but his pain is controlled, he is having no nausea or vomiting, tolerating a regular diet, and reports he had a bowel movement.    O:/71   Pulse 87   Temp 98.1 °F (36.7 °C) (Oral)   Resp 22   Ht 170.2 cm (67\")   Wt 77.2 kg (170 lb 3.1 oz)   SpO2 93%   BMI 26.66 kg/m²     Intake & Output (last day)         11/10 0701  11/11 0700 11/11 0701  11/12 0700    P.O. 560     I.V. (mL/kg) 34.5 (0.4)     Total Intake(mL/kg) 594.5 (7.7)     Urine (mL/kg/hr) 725 (0.4) 150 (0.4)    Emesis/NG output      Drains 15     Total Output 740 150    Net -145.5 -150                  GENERAL: awake, alert, interactive, cooperative, comfortable appearing in bed answering questions appropriately  HEENT: EOMI, clear sclera, moist mucus membranes   CHEST: normal work of breathing on room air  CARDIAC: well perfused  GI: Abd soft, nondistended, appropriately tender around incisions which are clean and dry with staples in place, right sided abdominal drain has scant serosanguineous fluid in the bulb, no bile  EXTREMITIES: MOCK, no cyanosis, no pedal edema    SKIN: Warm and dry, no rash    LABS  Results from last 7 days   Lab Units 11/11/24  0557 11/10/24  0626 11/09/24  0342   WBC 10*3/mm3 6.85 10.34 10.95*   HEMOGLOBIN g/dL 9.4* 9.1* 11.3*   HEMATOCRIT % 28.8* 29.0* 33.5*   PLATELETS 10*3/mm3 465* 530* 696*     Results from last 7 days   Lab Units 11/11/24  0557 11/10/24  0626 11/09/24 0342 11/08/24 0340 11/07/24  0628   SODIUM mmol/L 133* 134* 138 132* 133*   POTASSIUM mmol/L 4.0 4.3 5.2 5.1 4.8   CHLORIDE mmol/L 95* 95* 101 94* 95*   CO2 mmol/L 25.6 25.3 26.9 27.8 29.1*   BUN mg/dL 9 12 14 15 13   CREATININE mg/dL 0.72* 0.83 0.83 " 0.92 0.89   CALCIUM mg/dL 9.6 9.8 9.7 9.3 9.4   BILIRUBIN mg/dL  --   --  0.5 0.3 0.3   ALK PHOS U/L  --   --  313* 466* 106   ALT (SGPT) U/L  --   --  56* 88* 18   AST (SGOT) U/L  --   --  47* 203* 16   GLUCOSE mg/dL 163* 173* 169* 232* 338*         Pathology:  Final Diagnosis   1.  Gallbladder, cholecystectomy:               A.  Grossly disrupted gallbladder with acute hemorrhagic cholecystitis, gangrenous necrosis, and        areas suggestive of abscess formation.     IMAGING:  No new imaging    A/P: 49 y.o. male who presented to the hospital with right upper quadrant pain and apparent chronic cholecystitis, initially was thought to have gastritis or duodenitis but was noted to have normal findings on EGD.  Developed spontaneous biloma for which she underwent percutaneous drainage and antibiotic treatment targeted towards Klebsiella.  Ultimately was felt to have cholecystitis as the source of his biloma.  On 11/8/2024 he underwent laparoscopic converted to open cholecystectomy with intraoperative cholangiogram.  He also had a right upper quadrant drain placed.    Patient is doing well, AVSS.  Tolerating regular diet.  Reporting bowel function.  His pain has been controlled.   Will discontinue his PCA and switch him to oral pain medications with oxycodone, as needed Tylenol, and scheduled Robaxin.  Discontinue right upper quadrant drain which demonstrates no evidence of bile leak.  Incisions are healing in good order.  Continue staples for 14 days postop.  Continue regular diet.  PT/OT/out of bed and ambulation as tolerated.  On Heparin for DVT prophylaxis.    Manuela Wisdom MD  General, Robotic and Endoscopic Surgery  [facility]    [address], [address]  [place], [place], [address]  P: [phone]  F: [phone]

## 2024-11-11 NOTE — CASE MANAGEMENT/SOCIAL WORK
Continued Stay Note  [facility]     Patient Name: Donnie Bishop  MRN: [id number]  Today's Date: 11/11/2024    Admit Date: 10/22/2024  Possible rolling walker and home health needs      Discharge Plan       Row Name 11/11/24 1255       Plan    Plan Possible rolling walker and home health needs    Roadmap to Recovery Yes    Plan Comments Spoke with patient at bedside. Patient states he is walking with PT and using a rolling walker. States he will decide if he needs a roling walker closer to dc. Denies home health needs at this time.                      Expected Discharge Date and Time       Expected Discharge Date Expected Discharge Time    Nov 13, 2024               Laura Junior RN

## 2024-11-11 NOTE — THERAPY TREATMENT NOTE
Patient Name: Donnie Bishop  : 1975    MRN: [id number]                              Today's Date: 2024       Admit Date: 10/22/2024    Visit Dx:     ICD-10-CM ICD-9-CM   1. Epigastric abdominal pain  R10.13 789.06   2. Acute gastritis without hemorrhage, unspecified gastritis type  K29.00 535.00   3. Calculus of gallbladder with acute on chronic cholecystitis without obstruction  K80.12 574.00     574.10     Patient Active Problem List   Diagnosis    Multiple-type hyperlipidemia    Type 2 diabetes mellitus with diabetic neuropathy, with long-term current use of insulin    Neuropathy    Vitamin D deficiency    Benign essential HTN    Inability to attain erection    Injury of acoustic nerve    Hypogonadism in male    Anxiety    Chest pain    New onset of congestive heart failure    NSTEMI, initial episode of care    Diabetes mellitus type 1.5, managed as type 1    Chronic combined systolic and diastolic heart failure    Gastritis    Leukocytosis    Epigastric abdominal pain    Abscess of liver    Klebsiella pneumoniae (k. pneumoniae) as the cause of diseases classified elsewhere    Hypotension    Transaminitis    Calculus of gallbladder with acute on chronic cholecystitis without obstruction     Past Medical History:   Diagnosis Date    Anxiety     CHF (congestive heart failure)     Cough     Inability to attain erection     Injury of acoustic nerve     Myalgia     Type II diabetes mellitus     Venous insufficiency     Viral illness     Vitamin D deficiency      Past Surgical History:   Procedure Laterality Date    CARDIAC CATHETERIZATION N/A 2023    Procedure: Left Heart Cath;  Surgeon: Kaylee Kay MD;  Location: [facility] CATH INVASIVE LOCATION;  Service: Cardiology;  Laterality: N/A;    CARDIAC CATHETERIZATION N/A 2023    Procedure: Coronary angiography;  Surgeon: Kaylee Kay MD;  Location:  [facility] CATH INVASIVE LOCATION;  Service: Cardiology;  Laterality: N/A;    CHOLECYSTECTOMY WITH  INTRAOPERATIVE CHOLANGIOGRAM N/A 11/8/2024    Procedure: LAPAROSCOPIC CONVERTED TO OPEN CHOLECYSTECTOMY WITH INTRAOPERATIVE CHOLANGIOGRAM AND RIGHT UPPER QUADRANT DRAIN PLACEMENT;  Surgeon: Manuela Wisdom MD;  Location: [facility] MAIN OR;  Service: General;  Laterality: N/A;    ENDOSCOPY N/A 10/24/2024    Procedure: ESOPHAGOGASTRODUODENOSCOPY with biopsies;  Surgeon: Elma Wiggins MD;  Location: [facility] ENDOSCOPY;  Service: Gastroenterology;  Laterality: N/A;  pre-abdominal pain  post-normal      General Information       Row Name 11/11/24 1606          Physical Therapy Time and Intention    Document Type therapy note (daily note)  -EJ     Mode of Treatment physical therapy  -[facility] Name 11/11/24 1606          General Information    Existing Precautions/Restrictions fall  -EJ               User Key  (r) = Recorded By, (t) = Taken By, (c) = Cosigned By      Initials Name Provider Type    Kanika Ruiz, PT Physical Therapist                   Mobility       Row Name 11/11/24 1606          Bed Mobility    Supine-Sit [place] (Bed Mobility) moderate assist (50% patient effort)  -EJ     Sit-Supine [place] (Bed Mobility) verbal cues;moderate assist (50% patient effort)  -EJ     Assistive Device (Bed Mobility) head of bed elevated;bed rails  -EJ     Comment, (Bed Mobility) slow to initiate movement, did not fully reach sititng EOB, pt c/o abdominal pain/cramping w attempted movement  -[facility] Name 11/11/24 1606          Bed-Chair Transfer    Bed-Chair [place] (Transfers) unable to assess  -EJ       [facility] Name 11/11/24 1606          Sit-Stand Transfer    Sit-Stand [place] (Transfers) unable to assess  -[facility] Name 11/11/24 1606          Gait/Stairs (Locomotion)    [place] Level (Gait) unable to assess  -EJ               User Key  (r) = Recorded By, (t) = Taken By, (c) = Cosigned By      Initials Name Provider Type    Kanika Ruiz, PT Physical Therapist                    Obj/Interventions    No documentation.                  Goals/Plan    No documentation.                  Clinical Impression       Row Name 11/11/24 1609          Pain    Pretreatment Pain Rating 8/10  -EJ     Posttreatment Pain Rating 8/10  -EJ     Pain Location abdomen;flank  -EJ     Pain Side/Orientation upper;right  -EJ     Pain Management Interventions activity modification encouraged  -EJ       Row Name 11/11/24 1609          Plan of Care Review    Plan of Care Reviewed With patient  -EJ     Outcome Evaluation Attempted to see pt for PT this afternoon. He is agreeable, but slow to initiate movement. Pt required mod A to partially transition to EOB; however, pt c/o abdominal pain/cramping when attempting to move. Pt requested to return to bed due to pain. Pt hopeful to tolerate more activity tomorrow. Will continue to progress.  -EJ       Row Name 11/11/24 1609          Positioning and Restraints    Pre-Treatment Position in bed  -EJ     Post Treatment Position bed  -EJ     In Bed notified nsg;supine;call light within reach;encouraged to call for assist;exit alarm on  -EJ               User Key  (r) = Recorded By, (t) = Taken By, (c) = Cosigned By      Initials Name Provider Type    Kanika Ruiz, PT Physical Therapist                   Outcome Measures       Row Name 11/11/24 1614 11/11/24 0800       How much help from another person do you currently need...    Turning from your back to your side while in flat bed without using bedrails? 3  -EJ 3  -BB    Moving from lying on back to sitting on the side of a flat bed without bedrails? 2  -EJ 3  -BB    Moving to and from a bed to a chair (including a wheelchair)? 2  -EJ 3  -BB    Standing up from a chair using your arms (e.g., wheelchair, bedside chair)? 2  -EJ 3  -BB    Climbing 3-5 steps with a railing? 1  -EJ 3  -BB    To walk in hospital room? 2  -EJ 3  -BB    AM-PAC 6 Clicks Score (PT) 12  -EJ 18  -BB    Highest Level of Mobility Goal 4 -->  Transfer to chair/commode  -SJ 6 --> Walk 10 steps or more  -RORY              User Key  (r) = Recorded By, (t) = Taken By, (c) = Cosigned By      Initials Name Provider Type    BB Komal Duran, RN Registered Nurse    Kanika Ruiz, PT Physical Therapist                                 Physical Therapy Education       Title: PT OT SLP Therapies (Done)       Topic: Physical Therapy (Done)       Point: Mobility training (Done)       Learning Progress Summary            Patient Acceptance, E,D, DU by PC at 11/10/2024 0956    Acceptance, E, VU by  at 11/1/2024 1148    Acceptance, TB,E, NR by ST at 10/30/2024 1541    Acceptance, E,TB, VU by WS at 10/30/2024 0506    Acceptance, E, VU,NR by  at 10/29/2024 1346    Acceptance, E,TB, NR by  at 10/26/2024 1436                      Point: Home exercise program (Done)       Learning Progress Summary            Patient Acceptance, E,TB, VU by WS at 10/30/2024 0506                      Point: Body mechanics (Done)       Learning Progress Summary            Patient Acceptance, E,D, DU by PC at 11/10/2024 0956    Acceptance, TB,E, NR by ST at 10/30/2024 1541    Acceptance, E,TB, VU by  at 10/30/2024 0506    Acceptance, E,TB, NR by  at 10/26/2024 1436                      Point: Precautions (Done)       Learning Progress Summary            Patient Acceptance, E,D, DU by  at 11/10/2024 0956    Acceptance, E,TB, VU by  at 10/30/2024 0506                                      User Key       Initials Effective Dates Name Provider Type Discipline    PC 06/16/21 -  Monae Oreilly, PT Physical Therapist PT    EM 06/16/21 -  Shawna Akins, PT Physical Therapist PT    SV 07/11/23 -  Mahogany Horn, PT Physical Therapist PT    ST 09/22/22 -  Mary Weber, PT Physical Therapist PT    CS 09/22/22 -  Pam Vega, PT Physical Therapist PT    WS 05/29/24 -  Kel Benson, RN Registered Nurse Nurse                  PT Recommendation and Plan     Progress:  improving  Outcome Evaluation: Attempted to see pt for PT this afternoon. He is agreeable, but slow to initiate movement. Pt required mod A to partially transition to EOB; however, pt c/o abdominal pain/cramping when attempting to move. Pt requested to return to bed due to pain. Pt hopeful to tolerate more activity tomorrow. Will continue to progress.     Time Calculation:         PT Charges       Row Name 11/11/24 1614             Time Calculation    Start Time 1541  -EJ      Stop Time 1600  -EJ      Time Calculation (min) 19 min  -EJ      PT Received On 11/11/24  -EJ      PT - Next Appointment 11/12/24  -EJ                User Key  (r) = Recorded By, (t) = Taken By, (c) = Cosigned By      Initials Name Provider Type    Kanika Ruiz, PT Physical Therapist                  Therapy Charges for Today       Code Description Service Date Service Provider Modifiers Qty    12154438443  PT THERAPEUTIC ACT EA 15 MIN 11/11/2024 Kanika Wooten, PT GP 1            PT G-Codes  Outcome Measure Options: AM-PAC 6 Clicks Daily Activity (OT)  AM-PAC 6 Clicks Score (PT): 12  AM-PAC 6 Clicks Score (OT): 18       Kanika Wooten PT  11/11/2024

## 2024-11-11 NOTE — PROGRESS NOTES
[facility] Progress Note      Patient Name: Donnie Bishop  : 1975  MRN: [id number]  Primary Care Physician:  Juju eKnnedy MD  Date of admission: 10/22/2024    Subjective     Interval History:   F/u TENISHA     Review of Systems:   Denies dyspnea or nausea   No new issues noted today.  Eating breakfast.  Denies any nausea or vomiting.  No fever no chills  Objective     Vitals:   Temp:  [97.4 °F (36.3 °C)-98.9 °F (37.2 °C)] 98.1 °F (36.7 °C)  Heart Rate:  [84-90] 90  Resp:  [22-28] 22  BP: (115-119)/(67-78) 118/78  Flow (L/min) (Oxygen Therapy):  [2] 2    Intake/Output Summary (Last 24 hours) at 2024 0933  Last data filed at 2024 0500  Gross per 24 hour   Intake 594.5 ml   Output 740 ml   Net -145.5 ml       Physical Exam:    General Appearance: frail AAM comfortable on RA, speech/mentation slow  Neck: supple, no JVD  Lungs: CTA bilat no rales  Heart: RRR, normal S1 and S2  Abdomen: soft, nontender, nondistended  Extremities: no edema, cyanosis or clubbing        Scheduled Meds:     carvedilol, 6.25 mg, Oral, Q12H  cefTRIAXone, 2,000 mg, Intravenous, Q24H  heparin (porcine), 5,000 Units, Subcutaneous, Q8H  insulin glargine, 15 Units, Subcutaneous, Q12H  insulin lispro, 2-7 Units, Subcutaneous, 4x Daily AC & at Bedtime  insulin lispro, 6 Units, Subcutaneous, TID With Meals  Naloxegol Oxalate, 25 mg, Oral, QAM  pantoprazole, 40 mg, Oral, BID AC  senna-docusate sodium, 2 tablet, Oral, BID   And  polyethylene glycol, 17 g, Oral, BID  venlafaxine XR, 75 mg, Oral, Daily      IV Meds:   HYDROmorphone HCl-NaCl,   Pharmacy Consult,   sodium chloride, 30 mL/hr          Results Reviewed:   I have personally reviewed the results from the time of this admission to 2024 09:33 EST     Results from last 7 days   Lab Units 24  0557 11/10/24  0626 24  0342 24  0340 24  0628   SODIUM mmol/L 133* 134* 138 132* 133*   POTASSIUM mmol/L 4.0 4.3 5.2 5.1 4.8    CHLORIDE mmol/L 95* 95* 101 94* 95*   CO2 mmol/L 25.6 25.3 26.9 27.8 29.1*   BUN mg/dL 9 12 14 15 13   CREATININE mg/dL 0.72* 0.83 0.83 0.92 0.89   CALCIUM mg/dL 9.6 9.8 9.7 9.3 9.4   BILIRUBIN mg/dL  --   --  0.5 0.3 0.3   ALK PHOS U/L  --   --  313* 466* 106   ALT (SGPT) U/L  --   --  56* 88* 18   AST (SGOT) U/L  --   --  47* 203* 16   GLUCOSE mg/dL 163* 173* 169* 232* 338*     Estimated Creatinine Clearance: 138.5 mL/min (A) (by C-G formula based on SCr of 0.72 mg/dL (L)).  Results from last 7 days   Lab Units 11/11/24  0557 11/09/24  0342 11/08/24  0340 11/07/24  0628 11/05/24  0607   MAGNESIUM mg/dL  --   --  2.0 1.5* 1.7   PHOSPHORUS mg/dL 3.1 4.2 3.6 4.0 3.1           Results from last 7 days   Lab Units 11/11/24  0557 11/10/24  0626 11/09/24 0342 11/08/24  0340 11/07/24  0628   WBC 10*3/mm3 6.85 10.34 10.95* 8.58 6.00   HEMOGLOBIN g/dL 9.4* 9.1* 11.3* 10.7* 10.3*   PLATELETS 10*3/mm3 465* 530* 696* 694* 724*             Assessment / Plan     ASSESSMENT:  Acute kidney injury.  Multifactorial including hypovolemia, hypotension, impaired renal autoregulation due to Jardiance, Entresto, Aldactone.  Resolved.  Cr ~ 1.   Cholecystitis with large right perihepatic fluid collection.  Plan noted for IR drainage today.  On Rocephin per ID.  Transaminitis improving.  Off statin.  MRCP noted.  Heart failure reduced ejection fraction.  EF 49%.  (Improved from 35% a year ago).  Anemia.    Encephalopathy.  Mentation remains bit slow.  Diabetes mellitus type 2.  Hypotension -resolved    PLAN:  Kidney function is back to normal.  Mild hyponatremia likely secondary to excessive fluid intake in the setting of poor solute intake after surgery.  Will continue to monitor for now.  Labs in loli Pat MD  11/11/24  09:33 [facility]    [facility]  [phone]

## 2024-11-12 LAB
ANION GAP SERPL CALCULATED.3IONS-SCNC: 10 MMOL/L (ref 5–15)
BUN SERPL-MCNC: 12 MG/DL (ref 6–20)
BUN/CREAT SERPL: 12.4 (ref 7–25)
CALCIUM SPEC-SCNC: 9 MG/DL (ref 8.6–10.5)
CHLORIDE SERPL-SCNC: 99 MMOL/L (ref 98–107)
CO2 SERPL-SCNC: 26 MMOL/L (ref 22–29)
CREAT SERPL-MCNC: 0.97 MG/DL (ref 0.76–1.27)
DEPRECATED RDW RBC AUTO: 39.8 FL (ref 37–54)
EGFRCR SERPLBLD CKD-EPI 2021: 96.9 ML/MIN/1.73
ERYTHROCYTE [DISTWIDTH] IN BLOOD BY AUTOMATED COUNT: 12.5 % (ref 12.3–15.4)
GLUCOSE BLDC GLUCOMTR-MCNC: 120 MG/DL (ref 70–130)
GLUCOSE BLDC GLUCOMTR-MCNC: 140 MG/DL (ref 70–130)
GLUCOSE BLDC GLUCOMTR-MCNC: 140 MG/DL (ref 70–130)
GLUCOSE BLDC GLUCOMTR-MCNC: 63 MG/DL (ref 70–130)
GLUCOSE BLDC GLUCOMTR-MCNC: 93 MG/DL (ref 70–130)
GLUCOSE SERPL-MCNC: 163 MG/DL (ref 65–99)
HCT VFR BLD AUTO: 24.1 % (ref 37.5–51)
HGB BLD-MCNC: 8.1 G/DL (ref 13–17.7)
MCH RBC QN AUTO: 29.8 PG (ref 26.6–33)
MCHC RBC AUTO-ENTMCNC: 33.6 G/DL (ref 31.5–35.7)
MCV RBC AUTO: 88.6 FL (ref 79–97)
PLATELET # BLD AUTO: 404 10*3/MM3 (ref 140–450)
PMV BLD AUTO: 9.6 FL (ref 6–12)
POTASSIUM SERPL-SCNC: 3.9 MMOL/L (ref 3.5–5.2)
RBC # BLD AUTO: 2.72 10*6/MM3 (ref 4.14–5.8)
SODIUM SERPL-SCNC: 135 MMOL/L (ref 136–145)
WBC NRBC COR # BLD AUTO: 5.85 10*3/MM3 (ref 3.4–10.8)

## 2024-11-12 PROCEDURE — 25010000002 HYDROMORPHONE PER 4 MG: Performed by: STUDENT IN AN ORGANIZED HEALTH CARE EDUCATION/TRAINING PROGRAM

## 2024-11-12 PROCEDURE — 80048 BASIC METABOLIC PNL TOTAL CA: CPT | Performed by: STUDENT IN AN ORGANIZED HEALTH CARE EDUCATION/TRAINING PROGRAM

## 2024-11-12 PROCEDURE — 97530 THERAPEUTIC ACTIVITIES: CPT

## 2024-11-12 PROCEDURE — 99024 POSTOP FOLLOW-UP VISIT: CPT | Performed by: STUDENT IN AN ORGANIZED HEALTH CARE EDUCATION/TRAINING PROGRAM

## 2024-11-12 PROCEDURE — 63710000001 INSULIN GLARGINE PER 5 UNITS: Performed by: STUDENT IN AN ORGANIZED HEALTH CARE EDUCATION/TRAINING PROGRAM

## 2024-11-12 PROCEDURE — 25010000002 CEFTRIAXONE PER 250 MG: Performed by: STUDENT IN AN ORGANIZED HEALTH CARE EDUCATION/TRAINING PROGRAM

## 2024-11-12 PROCEDURE — 25010000002 HEPARIN (PORCINE) PER 1000 UNITS: Performed by: STUDENT IN AN ORGANIZED HEALTH CARE EDUCATION/TRAINING PROGRAM

## 2024-11-12 PROCEDURE — 63710000001 INSULIN LISPRO (HUMAN) PER 5 UNITS: Performed by: HOSPITALIST

## 2024-11-12 PROCEDURE — 97535 SELF CARE MNGMENT TRAINING: CPT

## 2024-11-12 PROCEDURE — 82948 REAGENT STRIP/BLOOD GLUCOSE: CPT

## 2024-11-12 PROCEDURE — 85027 COMPLETE CBC AUTOMATED: CPT | Performed by: STUDENT IN AN ORGANIZED HEALTH CARE EDUCATION/TRAINING PROGRAM

## 2024-11-12 RX ORDER — LIDOCAINE 4 G/G
2 PATCH TOPICAL
Status: DISCONTINUED | OUTPATIENT
Start: 2024-11-12 | End: 2024-11-14 | Stop reason: HOSPADM

## 2024-11-12 RX ADMIN — METHOCARBAMOL 750 MG: 750 TABLET ORAL at 20:52

## 2024-11-12 RX ADMIN — POLYETHYLENE GLYCOL 3350 17 G: 17 POWDER, FOR SOLUTION ORAL at 08:54

## 2024-11-12 RX ADMIN — PANTOPRAZOLE SODIUM 40 MG: 40 TABLET, DELAYED RELEASE ORAL at 06:11

## 2024-11-12 RX ADMIN — OXYCODONE HYDROCHLORIDE 10 MG: 5 TABLET ORAL at 21:04

## 2024-11-12 RX ADMIN — INSULIN GLARGINE 15 UNITS: 100 INJECTION, SOLUTION SUBCUTANEOUS at 20:54

## 2024-11-12 RX ADMIN — Medication 10 ML: at 20:52

## 2024-11-12 RX ADMIN — INSULIN GLARGINE 15 UNITS: 100 INJECTION, SOLUTION SUBCUTANEOUS at 08:54

## 2024-11-12 RX ADMIN — HEPARIN SODIUM 5000 UNITS: 5000 INJECTION INTRAVENOUS; SUBCUTANEOUS at 13:57

## 2024-11-12 RX ADMIN — Medication 2.5 MG: at 20:52

## 2024-11-12 RX ADMIN — NALOXEGOL OXALATE 25 MG: 25 TABLET, FILM COATED ORAL at 06:10

## 2024-11-12 RX ADMIN — INSULIN LISPRO 5 UNITS: 100 INJECTION, SOLUTION INTRAVENOUS; SUBCUTANEOUS at 11:47

## 2024-11-12 RX ADMIN — SENNOSIDES AND DOCUSATE SODIUM 2 TABLET: 50; 8.6 TABLET ORAL at 08:54

## 2024-11-12 RX ADMIN — OXYCODONE HYDROCHLORIDE 10 MG: 5 TABLET ORAL at 06:26

## 2024-11-12 RX ADMIN — VENLAFAXINE HYDROCHLORIDE 75 MG: 75 CAPSULE, EXTENDED RELEASE ORAL at 08:56

## 2024-11-12 RX ADMIN — HEPARIN SODIUM 5000 UNITS: 5000 INJECTION INTRAVENOUS; SUBCUTANEOUS at 06:10

## 2024-11-12 RX ADMIN — METHOCARBAMOL 750 MG: 750 TABLET ORAL at 17:20

## 2024-11-12 RX ADMIN — METHOCARBAMOL 750 MG: 750 TABLET ORAL at 11:47

## 2024-11-12 RX ADMIN — SENNOSIDES AND DOCUSATE SODIUM 2 TABLET: 50; 8.6 TABLET ORAL at 20:52

## 2024-11-12 RX ADMIN — PANTOPRAZOLE SODIUM 40 MG: 40 TABLET, DELAYED RELEASE ORAL at 17:21

## 2024-11-12 RX ADMIN — INSULIN LISPRO 5 UNITS: 100 INJECTION, SOLUTION INTRAVENOUS; SUBCUTANEOUS at 08:53

## 2024-11-12 RX ADMIN — CEFTRIAXONE 2000 MG: 2 INJECTION, POWDER, FOR SOLUTION INTRAMUSCULAR; INTRAVENOUS at 13:56

## 2024-11-12 RX ADMIN — HYDROMORPHONE HYDROCHLORIDE 0.25 MG: 1 INJECTION, SOLUTION INTRAMUSCULAR; INTRAVENOUS; SUBCUTANEOUS at 23:18

## 2024-11-12 RX ADMIN — OXYCODONE HYDROCHLORIDE 10 MG: 5 TABLET ORAL at 01:17

## 2024-11-12 RX ADMIN — LIDOCAINE 2 PATCH: 4 PATCH TOPICAL at 11:47

## 2024-11-12 RX ADMIN — HEPARIN SODIUM 5000 UNITS: 5000 INJECTION INTRAVENOUS; SUBCUTANEOUS at 21:04

## 2024-11-12 RX ADMIN — ACETAMINOPHEN 1000 MG: 500 TABLET ORAL at 11:48

## 2024-11-12 RX ADMIN — SIMETHICONE 80 MG: 80 TABLET, CHEWABLE ORAL at 00:20

## 2024-11-12 RX ADMIN — METHOCARBAMOL 750 MG: 750 TABLET ORAL at 08:55

## 2024-11-12 RX ADMIN — HYDROMORPHONE HYDROCHLORIDE 0.25 MG: 1 INJECTION, SOLUTION INTRAMUSCULAR; INTRAVENOUS; SUBCUTANEOUS at 04:09

## 2024-11-12 NOTE — PLAN OF CARE
Goal Outcome Evaluation:  Plan of Care Reviewed With: patient        Progress: no change     No changes/ acute events overnight. Patient still reporting ABD pain at times but it manageable with PRN narcotics. ABD surgical sites remains approximated with staples. Patient able to stand and walk to [facility] during shift without any complications. VSS and patient afebrile. Ongoing patient care.          Problem: Fall Injury Risk  Goal: Absence of Fall and Fall-Related Injury  Outcome: Progressing  Intervention: Promote Injury-Free Environment  Recent Flowsheet Documentation  Taken 11/12/2024 0220 by Jalen Salazar II RN  Safety Promotion/Fall Prevention:   activity supervised   safety round/check completed   assistive device/personal items within reach   clutter free environment maintained   fall prevention program maintained   muscle strengthening facilitated   nonskid shoes/slippers when out of bed   room organization consistent  Taken 11/12/2024 0000 by Jalen Salazar II RN  Safety Promotion/Fall Prevention:   activity supervised   safety round/check completed   assistive device/personal items within reach   clutter free environment maintained   fall prevention program maintained   muscle strengthening facilitated   nonskid shoes/slippers when out of bed   room organization consistent  Taken 11/11/2024 2200 by Jalen Salazar II, RN  Safety Promotion/Fall Prevention:   activity supervised   safety round/check completed   assistive device/personal items within reach   clutter free environment maintained   fall prevention program maintained   muscle strengthening facilitated   nonskid shoes/slippers when out of bed   room organization consistent  Taken 11/11/2024 2000 by Jalen Salazar II RN  Safety Promotion/Fall Prevention:   activity supervised   safety round/check completed   assistive device/personal items within reach   clutter free environment maintained   fall prevention program maintained   muscle strengthening  facilitated   nonskid shoes/slippers when out of bed   room organization consistent     Problem: Sepsis/Septic Shock  Goal: Optimal Coping  Outcome: Progressing  Intervention: Support Patient and Family Response  Recent Flowsheet Documentation  Taken 11/11/2024 2000 by Jalen Salazar II RN  Supportive Measures:   active listening utilized   verbalization of feelings encouraged   relaxation techniques promoted   self-care encouraged   decision-making supported  Family/Support System Care:   presence promoted   support provided   involvement promoted  Goal: Absence of Bleeding  Outcome: Progressing  Goal: Blood Glucose Level Within Target Range  Outcome: Progressing  Intervention: Optimize Glycemic Control  Recent Flowsheet Documentation  Taken 11/11/2024 2000 by Jalen Salazar II RN  Hyperglycemia Management: blood glucose monitored  Hypoglycemia Management: blood glucose monitored  Goal: Absence of Infection Signs and Symptoms  Outcome: Progressing  Intervention: Initiate Sepsis Management  Recent Flowsheet Documentation  Taken 11/12/2024 0220 by Jalen Salazar II, RN  Infection Prevention:   personal protective equipment utilized   hand hygiene promoted   rest/sleep promoted   single patient room provided  Taken 11/12/2024 0000 by Jalen Salazar II, RN  Infection Prevention:   personal protective equipment utilized   hand hygiene promoted   rest/sleep promoted   single patient room provided  Taken 11/11/2024 2200 by Jalen Salazar II, RN  Infection Prevention:   personal protective equipment utilized   hand hygiene promoted   rest/sleep promoted   single patient room provided  Taken 11/11/2024 2000 by Jalen Salazar II, RN  Infection Prevention:   personal protective equipment utilized   hand hygiene promoted   rest/sleep promoted   single patient room provided  Intervention: Promote Recovery  Recent Flowsheet Documentation  Taken 11/11/2024 2000 by Jalen Salazar II, RN  Airway/Ventilation Management:   airway patency  maintained   pulmonary hygiene promoted   calming measures promoted  Sleep/Rest Enhancement:   relaxation techniques promoted   room darkened   noise level reduced   consistent schedule promoted   awakenings minimized  Goal: Optimal Nutrition Delivery  Outcome: Progressing     Problem: Skin Injury Risk Increased  Goal: Skin Health and Integrity  Outcome: Progressing  Intervention: Optimize Skin Protection  Recent Flowsheet Documentation  Taken 11/12/2024 0220 by Jalen Salazar II, RN  Head of Bed (HOB) Positioning: HOB at 30-45 degrees  Taken 11/11/2024 2200 by Jalen Salazar II, RN  Head of Bed (HOB) Positioning: HOB at 30-45 degrees  Taken 11/11/2024 2000 by Jalen Salazar II, RN  Pressure Reduction Techniques:   frequent weight shift encouraged   heels elevated off bed   weight shift assistance provided  Head of Bed (HOB) Positioning: HOB at 30-45 degrees  Pressure Reduction Devices:   specialty bed utilized   pressure-redistributing mattress utilized   heel offloading device utilized  Skin Protection:   transparent dressing maintained   incontinence pads utilized     Problem: Pain Acute  Goal: Optimal Pain Control and Function  Outcome: Progressing  Intervention: Optimize Psychosocial Wellbeing  Recent Flowsheet Documentation  Taken 11/11/2024 2000 by Jalen Salazar II, RN  Supportive Measures:   active listening utilized   verbalization of feelings encouraged   relaxation techniques promoted   self-care encouraged   decision-making supported  Diversional Activities:   television   smartphone  Intervention: Prevent or Manage Pain  Recent Flowsheet Documentation  Taken 11/11/2024 2000 by Jalen Salazar II RN  Sensory Stimulation Regulation:   auditory stimulation minimized   care clustered   lighting decreased   quiet environment promoted   tactile stimulation minimized   visual stimulation minimized   television on  Bowel Elimination Promotion: (Bowel regimen)   adequate fluid intake promoted   other (see  comments)  Sleep/Rest Enhancement:   relaxation techniques promoted   room darkened   noise level reduced   consistent schedule promoted   awakenings minimized     Problem: Adult Inpatient Plan of Care  Goal: Plan of Care Review  Outcome: Progressing  Flowsheets (Taken 11/12/2024 0538)  Progress: no change  Plan of Care Reviewed With: patient  Goal: Patient-Specific Goal (Individualized)  Outcome: Progressing  Goal: Absence of Hospital-Acquired Illness or Injury  Outcome: Progressing  Intervention: Identify and Manage Fall Risk  Recent Flowsheet Documentation  Taken 11/12/2024 0220 by Jalen Salazar II RN  Safety Promotion/Fall Prevention:   activity supervised   safety round/check completed   assistive device/personal items within reach   clutter free environment maintained   fall prevention program maintained   muscle strengthening facilitated   nonskid shoes/slippers when out of bed   room organization consistent  Taken 11/12/2024 0000 by Jalen Salazar II, RN  Safety Promotion/Fall Prevention:   activity supervised   safety round/check completed   assistive device/personal items within reach   clutter free environment maintained   fall prevention program maintained   muscle strengthening facilitated   nonskid shoes/slippers when out of bed   room organization consistent  Taken 11/11/2024 2200 by Jalen Salazar II, RN  Safety Promotion/Fall Prevention:   activity supervised   safety round/check completed   assistive device/personal items within reach   clutter free environment maintained   fall prevention program maintained   muscle strengthening facilitated   nonskid shoes/slippers when out of bed   room organization consistent  Taken 11/11/2024 2000 by Jalen Salazar II RN  Safety Promotion/Fall Prevention:   activity supervised   safety round/check completed   assistive device/personal items within reach   clutter free environment maintained   fall prevention program maintained   muscle strengthening  facilitated   nonskid shoes/slippers when out of bed   room organization consistent  Intervention: Prevent Skin Injury  Recent Flowsheet Documentation  Taken 11/12/2024 0220 by Jalen Salazar II, RN  Body Position:   position changed independently   left   turned   heels elevated  Taken 11/12/2024 0000 by Jalen Salazar II, RN  Body Position:   position changed independently   weight shifting   turned   right  Taken 11/11/2024 2200 by Jalen Salazar II, RN  Body Position:   position changed independently   weight shifting   turned   right  Taken 11/11/2024 2000 by Jalen Salazar II, RN  Body Position:   position changed independently   weight shifting  Skin Protection:   transparent dressing maintained   incontinence pads utilized  Intervention: Prevent and Manage VTE (Venous Thromboembolism) Risk  Recent Flowsheet Documentation  Taken 11/11/2024 2000 by Jalen Salazar II, RN  VTE Prevention/Management: (Heparin SUbQ)   other (see comments)   SCDs (sequential compression devices) off   patient refused intervention  Intervention: Prevent Infection  Recent Flowsheet Documentation  Taken 11/12/2024 0220 by Jalen Salazar II, RN  Infection Prevention:   personal protective equipment utilized   hand hygiene promoted   rest/sleep promoted   single patient room provided  Taken 11/12/2024 0000 by Jalen Salazar II, RN  Infection Prevention:   personal protective equipment utilized   hand hygiene promoted   rest/sleep promoted   single patient room provided  Taken 11/11/2024 2200 by Jalen Salazar II, RN  Infection Prevention:   personal protective equipment utilized   hand hygiene promoted   rest/sleep promoted   single patient room provided  Taken 11/11/2024 2000 by Jalen Salazar II, RN  Infection Prevention:   personal protective equipment utilized   hand hygiene promoted   rest/sleep promoted   single patient room provided  Goal: Optimal Comfort and Wellbeing  Outcome: Progressing  Intervention: Provide Person-Centered  Care  Recent Flowsheet Documentation  Taken 11/12/2024 0200 by Jalen Salazar II, RN  Trust Relationship/Rapport:   care explained   choices provided   emotional support provided   empathic listening provided   questions answered   questions encouraged   reassurance provided   thoughts/feelings acknowledged  Taken 11/11/2024 2000 by Jalen Salazar II, RN  Trust Relationship/Rapport:   care explained   choices provided   emotional support provided   empathic listening provided   questions answered   questions encouraged   reassurance provided   thoughts/feelings acknowledged  Goal: Readiness for Transition of Care  Outcome: Progressing

## 2024-11-12 NOTE — PLAN OF CARE
Goal Outcome Evaluation:  Plan of Care Reviewed With: patient        Progress: no change  Outcome Evaluation: Pt participated fair in activities/ADLs this date, completed bed mobility sup A and stood w/ FWW and contd around unit before back to room and seated EOB ADLs. Pt with pain not increasing, verb'd it has improved some actually w/ mvmts. DC rec pending progression with therapy daily as pt requesting to return back to supine and declined up to chair today. May benefit from SNF/[facility].    Anticipated Discharge Disposition (OT): skilled nursing facility

## 2024-11-12 NOTE — THERAPY TREATMENT NOTE
Patient Name: Donnie Bishop  : 1975    MRN: [id number]                              Today's Date: 2024       Admit Date: 10/22/2024    Visit Dx:     ICD-10-CM ICD-9-CM   1. Epigastric abdominal pain  R10.13 789.06   2. Acute gastritis without hemorrhage, unspecified gastritis type  K29.00 535.00   3. Calculus of gallbladder with acute on chronic cholecystitis without obstruction  K80.12 574.00     574.10     Patient Active Problem List   Diagnosis    Multiple-type hyperlipidemia    Type 2 diabetes mellitus with diabetic neuropathy, with long-term current use of insulin    Neuropathy    Vitamin D deficiency    Benign essential HTN    Inability to attain erection    Injury of acoustic nerve    Hypogonadism in male    Anxiety    Chest pain    New onset of congestive heart failure    NSTEMI, initial episode of care    Diabetes mellitus type 1.5, managed as type 1    Chronic combined systolic and diastolic heart failure    Gastritis    Leukocytosis    Epigastric abdominal pain    Abscess of liver    Klebsiella pneumoniae (k. pneumoniae) as the cause of diseases classified elsewhere    Hypotension    Transaminitis    Calculus of gallbladder with acute on chronic cholecystitis without obstruction     Past Medical History:   Diagnosis Date    Anxiety     CHF (congestive heart failure)     Cough     Inability to attain erection     Injury of acoustic nerve     Myalgia     Type II diabetes mellitus     Venous insufficiency     Viral illness     Vitamin D deficiency      Past Surgical History:   Procedure Laterality Date    CARDIAC CATHETERIZATION N/A 2023    Procedure: Left Heart Cath;  Surgeon: Kaylee Kay MD;  Location: [facility] CATH INVASIVE LOCATION;  Service: Cardiology;  Laterality: N/A;    CARDIAC CATHETERIZATION N/A 2023    Procedure: Coronary angiography;  Surgeon: Kaylee Kay MD;  Location:  [facility] CATH INVASIVE LOCATION;  Service: Cardiology;  Laterality: N/A;    CHOLECYSTECTOMY WITH  INTRAOPERATIVE CHOLANGIOGRAM N/A 11/8/2024    Procedure: LAPAROSCOPIC CONVERTED TO OPEN CHOLECYSTECTOMY WITH INTRAOPERATIVE CHOLANGIOGRAM AND RIGHT UPPER QUADRANT DRAIN PLACEMENT;  Surgeon: Manuela Wisdom MD;  Location: [facility] MAIN OR;  Service: General;  Laterality: N/A;    ENDOSCOPY N/A 10/24/2024    Procedure: ESOPHAGOGASTRODUODENOSCOPY with biopsies;  Surgeon: Elma Wiggins MD;  Location: [facility] ENDOSCOPY;  Service: Gastroenterology;  Laterality: N/A;  pre-abdominal pain  post-normal      General Information       Row Name 11/12/24 1204          OT Time and Intention    Subjective Information complains of;pain  -BC     Mode of Treatment occupational therapy  -BC     Patient Effort good  -BC     Symptoms Noted During/After Treatment none  -BC     Comment Pt reports some increased pain but that he also felt like with walking the pain improved.  -BC       Row Name 11/12/24 1204          General Information    Patient Profile Reviewed yes  -BC     Existing Precautions/Restrictions fall  -BC       Row Name 11/12/24 1204          Cognition    Orientation Status (Cognition) oriented x 3  -BC       Row Name 11/12/24 1204          Safety Issues/Impairments Affecting Functional Mobility    Impairments Affecting Function (Mobility) balance;endurance/activity tolerance;strength  -BC               User Key  (r) = Recorded By, (t) = Taken By, (c) = Cosigned By      Initials Name Provider Type    BC Marcell Olivera OT Occupational Therapist                     Mobility/ADL's       Row Name 11/12/24 1205          Bed Mobility    Bed Mobility supine-sit  -BC     Supine-Sit [place] (Bed Mobility) supervision;verbal cues  -BC     Sit-Supine [place] (Bed Mobility) verbal cues;minimum assist (75% patient effort)  -BC     Bed Mobility, Safety Issues decreased use of legs for bridging/pushing  -BC     Assistive Device (Bed Mobility) head of bed elevated;bed rails  -BC     Comment, (Bed Mobility) icnreased time to  initiate mvmts, close SUP A provided for safety, up to EOB and cont'd w/ tasks, back to supine w/ min A for help w/ legs back into bed.  -BC       Row Name 11/12/24 1205          Transfers    Transfers sit-stand transfer;stand-sit transfer  -BC       Row Name 11/12/24 1205          Sit-Stand Transfer    Sit-Stand [place] (Transfers) standby assist  -BC     Assistive Device (Sit-Stand Transfers) walker, front-wheeled  -BC       Row Name 11/12/24 1205          Stand-Sit Transfer    Stand-Sit [place] (Transfers) supervision  -BC     Assistive Device (Stand-Sit Transfers) walker, front-wheeled  -BC       Row Name 11/12/24 1205          Functional Mobility    Functional Mobility- Ind. Level contact guard assist;supervision required;verbal cues required  -BC     Functional Mobility- Device walker, front-wheeled  -BC     Functional Mobility- Comment from EOB>to stand w FWW and cont'd w/ walker and close SBA/CGA only around ICU unit. Encouraged sinkside grooming but Pt declines. Back to EOB for grooming tasks.  -BC     Patient was able to Ambulate yes  -BC       Row Name 11/12/24 1205          Activities of Daily Living    BADL Assessment/Intervention lower body dressing;grooming;upper body dressing  -BC       Row Name 11/12/24 1205          Lower Body Dressing Assessment/Training    [place] Level (Lower Body Dressing) don;socks;dependent (less than 25% patient effort);doff  -BC     Position (Lower Body Dressing) edge of bed sitting  -BC     Comment, (Lower Body Dressing) pt declined to practice w/ socks today, increased assist required 2/2 pain  -BC       Row Name 11/12/24 1205          Grooming Assessment/Training    [place] Level (Grooming) grooming skills;set up  -BC     Position (Grooming) edge of bed sitting  -BC       Row Name 11/12/24 1205          Upper Body Dressing Assessment/Training    [place] Level (Upper Body Dressing) don;pajama/robe;maximum assist (25% patient effort)  -BC      Position (Upper Body Dressing) supported standing  -BC     Comment, (Upper Body Dressing) in standing w/ gown mgmt around back.  -BC               User Key  (r) = Recorded By, (t) = Taken By, (c) = Cosigned By      Initials Name Provider Type    Marcell Rhoades OT Occupational Therapist                   Obj/Interventions       Row Name 11/12/24 1207          Balance    Static Sitting Balance standby assist  -BC     Dynamic Sitting Balance standby assist  -BC     Position, Sitting Balance sitting edge of bed  -BC     Static Standing Balance supervision  -BC     Dynamic Standing Balance contact guard;supervision  -BC     Position/Device Used, Standing Balance supported;walker, rolling  -BC     Comment, Balance SBA/CGA only for standing w/ FWW Around unit.  -BC               User Key  (r) = Recorded By, (t) = Taken By, (c) = Cosigned By      Initials Name Provider Type    Marcell Rhoades, DEJAN Occupational Therapist                   Goals/Plan    No documentation.                  Clinical Impression       Row Name 11/12/24 1213          Pain Assessment    Pretreatment Pain Rating 5/10  -BC     Posttreatment Pain Rating 5/10  -BC       Row Name 11/12/24 1213          Plan of Care Review    Progress no change  -BC     Outcome Evaluation Pt participated fair in activities/ADLs this date, completed bed mobility sup A and stood w/ FWW and contd around unit before back to room and seated EOB ADLs. Pt with pain not increasing, verb'd it has improved some actually w/ mvmts. DC rec pending progression with therapy daily as pt requesting to return back to supine and declined up to chair today. May benefit from SNF/[facility].  -BC       Row Name 11/12/24 1213          Therapy Assessment/Plan (OT)    Rehab Potential (OT) fair  -BC     Criteria for Skilled Therapeutic Interventions Met (OT) yes  -BC       Row Name 11/12/24 1213          Therapy Plan Review/Discharge Plan (OT)    Anticipated Discharge Disposition (OT) skilled  nursing facility  -BC       Row Name 11/12/24 1213          Vital Signs    O2 Delivery Pre Treatment room air  -BC     O2 Delivery Post Treatment room air  -BC     Pre Patient Position Supine  -BC     Intra Patient Position Sitting  -BC     Post Patient Position Supine  -BC       Row Name 11/12/24 1213          Positioning and Restraints    Pre-Treatment Position in bed  -BC     Post Treatment Position bed  -BC     In Bed notified nsg;fowlers;call light within reach;exit alarm on  -BC               User Key  (r) = Recorded By, (t) = Taken By, (c) = Cosigned By      Initials Name Provider Type    Marcell Rhoades OT Occupational Therapist                   Outcome Measures       Row Name 11/12/24 1216          How much help from another is currently needed...    Putting on and taking off regular lower body clothing? 1  -BC     Bathing (including washing, rinsing, and drying) 2  -BC     Toileting (which includes using toilet bed pan or urinal) 3  -BC     Putting on and taking off regular upper body clothing 2  -BC     Taking care of personal grooming (such as brushing teeth) 3  -BC     Eating meals 4  -BC     AM-PAC 6 Clicks Score (OT) 15  -BC       Row Name 11/12/24 1216          Functional Assessment    Outcome Measure Options AM-PAC 6 Clicks Daily Activity (OT)  -BC               User Key  (r) = Recorded By, (t) = Taken By, (c) = Cosigned By      Initials Name Provider Type    Marcell Rhoades OT Occupational Therapist                    Occupational Therapy Education       Title: PT OT SLP Therapies (Done)       Topic: Occupational Therapy (Done)       Point: ADL training (Done)       Description:   Instruct learner(s) on proper safety adaptation and remediation techniques during self care or transfers.   Instruct in proper use of assistive devices.                  Learning Progress Summary            Patient Acceptance, E,TB, VU by WS at 10/30/2024 0506    Acceptance, E, NR,NL by CE at 10/29/2024 1250                       Point: Precautions (Done)       Description:   Instruct learner(s) on prescribed precautions during self-care and functional transfers.                  Learning Progress Summary            Patient Acceptance, E,TB, VU by  at 10/30/2024 0506    Acceptance, E, NR,NL by CE at 10/29/2024 1250                      Point: Body mechanics (Done)       Description:   Instruct learner(s) on proper positioning and spine alignment during self-care, functional mobility activities and/or exercises.                  Learning Progress Summary            Patient Acceptance, E,TB, VU by  at 10/30/2024 0506    Acceptance, E, NR,NL by CE at 10/29/2024 1250                                      User Key       Initials Effective Dates Name Provider Type Discipline    CE 10/17/22 -  Dee Cardona OT Occupational Therapist OT     05/29/24 -  Kel Benson RN Registered Nurse Nurse                  OT Recommendation and Plan  Therapy Frequency (OT): 3 times/wk  Plan of Care Review  Plan of Care Reviewed With: patient  Progress: no change  Outcome Evaluation: Pt participated fair in activities/ADLs this date, completed bed mobility sup A and stood w/ FWW and contd around unit before back to room and seated EOB ADLs. Pt with pain not increasing, verb'd it has improved some actually w/ mvmts. DC rec pending progression with therapy daily as pt requesting to return back to supine and declined up to chair today. May benefit from SNF/[facility].     Time Calculation:         Time Calculation- OT       Row Name 11/12/24 1218             Time Calculation- OT    OT Start Time 0956  -BC      OT Stop Time 1020  -BC      OT Time Calculation (min) 24 min  -BC      Total Timed Code Minutes- OT 24 minute(s)  -BC      OT Received On 11/12/24  -BC      OT - Next Appointment 11/14/24  -BC         Timed Charges    82468 - OT Therapeutic Activity Minutes 14  -BC      03285 - OT Self Care/Mgmt Minutes 10  -BC         Total Minutes    Timed  Charges Total Minutes 24  -BC       Total Minutes 24  -BC                User Key  (r) = Recorded By, (t) = Taken By, (c) = Cosigned By      Initials Name Provider Type    BC Marcell Olivera OT Occupational Therapist                  Therapy Charges for Today       Code Description Service Date Service Provider Modifiers Qty    08278972690  OT THERAPEUTIC ACT EA 15 MIN 11/12/2024 Marcell Olivera OT GO 1    [id number]  OT SELF CARE/MGMT/TRAIN EA 15 MIN 11/12/2024 Marcell Olivera OT GO 1                 Marcell Olivera OT  11/12/2024

## 2024-11-12 NOTE — CASE MANAGEMENT/SOCIAL WORK
Continued Stay Note  [facility]     Patient Name: Donnie Bishop  MRN: [id number]  Today's Date: 11/12/2024    Admit Date: 10/22/2024    Plan: plans home; refuses HH/SNF   Discharge Plan       Row Name 11/12/24 1321       Plan    Plan plans home; refuses HH/SNF    Patient/Family in Agreement with Plan yes    Plan Comments Spoke with pt bedside wtih friend in room, he is agreeable to talk in front of his friend. Pt reports he plans home but he has been living alone since him and his wife seperated. Pt reports he has a brother and friends that can assist as needed but not stay with him. Discussed HH and SNF options however pt refuses all referrals. CCP to follow. Discussed with RN.                   Discharge Codes    No documentation.                 Expected Discharge Date and Time       Expected Discharge Date Expected Discharge Time    Nov 15, 2024               RODOLFO Barnett

## 2024-11-12 NOTE — PROGRESS NOTES
Name: Donnie Bishop ADMIT: 10/22/2024   : 1975  PCP: Juju Kennedy MD    MRN: [id number] LOS: 19 days   AGE/SEX: 49 y.o. male  ROOM: [facility]     Subjective   Subjective   Tolerating a diet. No new complaints.     Objective   Objective   Vital Signs  Temp:  [97.6 °F (36.4 °C)-98.9 °F (37.2 °C)] 98.3 °F (36.8 °C)  Heart Rate:  [75-89] 75  Resp:  [16-22] 16  BP: ()/(64-71) 98/71  SpO2:  [91 %-94 %] 94 %  on   ;   Device (Oxygen Therapy): room air  Body mass index is 26.07 kg/m².    Physical Exam  Constitutional:       General: He is not in acute distress.     Appearance: He is ill-appearing (chronic). He is not toxic-appearing.   HENT:      Head: Normocephalic and atraumatic.   Cardiovascular:      Rate and Rhythm: Normal rate and regular rhythm.   Pulmonary:      Effort: Pulmonary effort is normal. No respiratory distress.      Breath sounds: Decreased breath sounds present.   Abdominal:      Palpations: Abdomen is soft.      Tenderness: There is no guarding or rebound.   Musculoskeletal:         General: No swelling.      Right lower leg: No edema.      Left lower leg: No edema.   Skin:     General: Skin is warm and dry.   Neurological:      General: No focal deficit present.      Mental Status: He is alert and oriented to person, place, and time.   Psychiatric:         Mood and Affect: Affect is flat.         Behavior: Behavior normal.     Results Review  I reviewed the patient's new clinical results.  Results from last 7 days   Lab Units 11/12/24  0456 11/11/24  0557 11/10/24  0626 11/09/24  0342   WBC 10*3/mm3 5.85 6.85 10.34 10.95*   HEMOGLOBIN g/dL 8.1* 9.4* 9.1* 11.3*   PLATELETS 10*3/mm3 404 465* 530* 696*     Results from last 7 days   Lab Units 2457 11/10/24  0624  0342   SODIUM mmol/L 135* 133* 134* 138   POTASSIUM mmol/L 3.9 4.0 4.3 5.2   CHLORIDE mmol/L 99 95* 95* 101   CO2 mmol/L 26.0 25.6 25.3 26.9   BUN mg/dL 12 9 12 14   CREATININE mg/dL 0.97 0.72* 0.83  0.83   GLUCOSE mg/dL 163* 163* 173* 169*     Lab Results   Component Value Date    ANIONGAP 10.0 11/12/2024     Estimated Creatinine Clearance: 100.5 mL/min (by C-G formula based on SCr of 0.97 mg/dL).   Lab Results   Component Value Date    EGFR 96.9 11/12/2024     Results from last 7 days   Lab Units 11/11/24  0557 11/09/24  0342 11/08/24  0340 11/07/24  0628   ALBUMIN g/dL 2.9* 3.2* 3.2* 3.0*   BILIRUBIN mg/dL  --  0.5 0.3 0.3   ALK PHOS U/L  --  313* 466* 106   AST (SGOT) U/L  --  47* 203* 16   ALT (SGPT) U/L  --  56* 88* 18     Results from last 7 days   Lab Units 11/12/24  0456 11/11/24  0557 11/10/24  0626 11/09/24  0342 11/08/24  0340 11/07/24  0628   CALCIUM mg/dL 9.0 9.6 9.8 9.7 9.3 9.4   ALBUMIN g/dL  --  2.9*  --  3.2* 3.2* 3.0*   MAGNESIUM mg/dL  --   --   --   --  2.0 1.5*   PHOSPHORUS mg/dL  --  3.1  --  4.2 3.6 4.0       Glucose   Date/Time Value Ref Range Status   11/12/2024 0752 140 (H) 70 - 130 mg/dL Final   11/11/2024 1638 119 70 - 130 mg/dL Final   11/11/2024 1102 130 70 - 130 mg/dL Final   11/10/2024 2109 215 (H) 70 - 130 mg/dL Final   11/10/2024 1857 153 (H) 70 - 130 mg/dL Final   11/10/2024 1547 68 (L) 70 - 130 mg/dL Final   11/10/2024 1135 193 (H) 70 - 130 mg/dL Final       No radiology results for the last day    Scheduled Meds  [Held by provider] carvedilol, 6.25 mg, Oral, Q12H  cefTRIAXone, 2,000 mg, Intravenous, Q24H  heparin (porcine), 5,000 Units, Subcutaneous, Q8H  insulin glargine, 15 Units, Subcutaneous, Q12H  insulin lispro, 2-7 Units, Subcutaneous, 4x Daily AC & at Bedtime  insulin lispro, 5 Units, Subcutaneous, TID With Meals  Lidocaine, 2 patch, Transdermal, Q24H  methocarbamol, 750 mg, Oral, 4x Daily  Naloxegol Oxalate, 25 mg, Oral, QAM  pantoprazole, 40 mg, Oral, BID AC  senna-docusate sodium, 2 tablet, Oral, BID   And  polyethylene glycol, 17 g, Oral, BID  venlafaxine XR, 75 mg, Oral, Daily    Continuous Infusions  Pharmacy Consult,     PRN Meds    acetaminophen     senna-docusate sodium **AND** polyethylene glycol **AND** bisacodyl **AND** bisacodyl    Calcium Replacement - Follow Nurse / BPA Driven Protocol    dextrose    dextrose    glucagon (human recombinant)    HYDROmorphone    hydrOXYzine    influenza vaccine    Magnesium Standard Dose Replacement - Follow Nurse / BPA Driven Protocol    melatonin    naloxone    [DISCONTINUED] Morphine **AND** naloxone    ondansetron ODT **OR** ondansetron    oxyCODONE    oxyCODONE    Pharmacy Consult    Phosphorus Replacement - Follow Nurse / BPA Driven Protocol    Potassium Replacement - Follow Nurse / BPA Driven Protocol    simethicone    sodium chloride    Pharmacy Consult,     Diet  Diet: Gastrointestinal, Diabetic; Consistent Carbohydrate; Low Irritant; Fluid Consistency: Thin (IDDSI 0)       Assessment/Plan     Active Hospital Problems    Diagnosis  POA    **Epigastric abdominal pain [R10.13]  Yes    Abscess of liver [K75.0]  Yes    Klebsiella pneumoniae (k. pneumoniae) as the cause of diseases classified elsewhere [B96.1]  Yes    Hypotension [I95.9]  Yes    Transaminitis [R74.01]  Yes    Leukocytosis [D72.829]  Yes    Chronic combined systolic and diastolic heart failure [I50.42]  Yes    Calculus of gallbladder with acute on chronic cholecystitis without obstruction [K80.12]  Unknown    Anxiety [F41.9]  Yes    Multiple-type hyperlipidemia [E78.2]  Yes    Type 2 diabetes mellitus with diabetic neuropathy, with long-term current use of insulin [E11.40, Z79.4]  Not Applicable    Benign essential HTN [I10]  Yes      Resolved Hospital Problems    Diagnosis Date Resolved POA    Acute kidney injury [N17.9] 10/30/2024 No     49 y.o. male admitted with Epigastric abdominal pain.     Liver abscess/biloma: Status post IR drainage and drain placement 10/28.  Status post IR drain exchange 11/5.  Status post IR drain placement 11/6 (out now).  Klebsiella.  Continue Rocephin.  Abx through 11/15/24- oral abx plan in place if he is ready to  discharge. Infectious disease has signed off- po abx plan in place.  Cholecystitis: GI evaluated and he had EGD earlier this admission on 10/24.  Status post open cholecystectomy and right upper quadrant drain placement 11/8.  No filling deficits were noted on the intraoperative cholangiogram.  Surgery following. NGT removed, diet advanced. Now on oral pain medications. Ambulate per surgery. Diet: Gastrointestinal, Diabetic; Consistent Carbohydrate; Low Irritant; Fluid Consistency: Thin (IDDSI 0)   Hypotension: Had improved with resuscitation and midodrine.  The midodrine has since been stopped.  Pulmonology evaluated. Low normal today continue to monitor.  TENISHA: Multifactorial and now improved.  Nephrology stated was okay for him to start GDMT for CHF at discharge as tolerated. Nephrology following. Plan follow-up in 1 week in clinic.  NICM/bigeminy: EF 49%.  On Coreg.  Home Entresto, Aldactone, and Jardiance are held.  Cardiology following.   Diabetes: A1c 9.4.  Was on Jardiance metformin and insulin prior to admission.  Had hypo and hyperglycemia this admission.  Continue insulin and monitor requirements. Glucoses acceptable no more hypoglycemia.  Hypertension: Low normal BPs now.  Lasix held. Coreg.    PPX: Subcutaneous heparin    Discharge  TBD  Expected Discharge Date: 11/15/2024; Expected Discharge Time:     Discussed with patient and nursing staff    Jareth Bustillo MD  [facility]  11/12/24

## 2024-11-12 NOTE — PROGRESS NOTES
"General Surgery Progress Note    CC: Postop day 4 laparoscopic converted to open cholecystectomy with intraoperative cholangiogram and right upper quadrant drain placement for cholecystitis with infected subcapsular biloma    S: Patient reports doing ok today. Says he has had some increased pain after stopping the PCA yesterday. Does feel like his muscle relaxant is helping. Reports a bowel movement last night. Denies any today. Denies NV and reports he has been eating most of his meals without difficulty. Tried to work with PT yesterday. Planning to get out of bed today.     O:BP 98/71   Pulse 75   Temp 98.3 °F (36.8 °C) (Oral)   Resp 16   Ht 170.2 cm (67\")   Wt 75.5 kg (166 lb 7.2 oz)   SpO2 94%   BMI 26.07 kg/m²     Intake & Output (last day)         11/11 0701 11/12 0700 11/12 0701 11/13 0700    P.O. 120     I.V. (mL/kg)      IV Piggyback 600     Total Intake(mL/kg) 720 (9.5)     Urine (mL/kg/hr) 150 (0.1)     Drains      Stool 0     Total Output 150     Net +570           Urine Unmeasured Occurrence 1 x 1 x    Stool Unmeasured Occurrence 1 x             GENERAL: awake, alert, interactive, cooperative, resting in bed answering questions appropriately, appears comfortable  HEENT: EOMI, clear sclera, moist mucus membranes   CHEST: normal work of breathing on room air  CARDIAC: well perfused, regular rate, no pedal edema  GI: Abd soft, nondistended, appropriately tender around incisions which are clean and dry with staples in place, right sided abdominal drain site bandaged; no signs of infection  EXTREMITIES: MOCK, no cyanosis  SKIN: Warm and dry, no rash    LABS  Results from last 7 days   Lab Units 11/12/24  0456 11/11/24  0557 11/10/24  0626   WBC 10*3/mm3 5.85 6.85 10.34   HEMOGLOBIN g/dL 8.1* 9.4* 9.1*   HEMATOCRIT % 24.1* 28.8* 29.0*   PLATELETS 10*3/mm3 404 465* 530*     Results from last 7 days   Lab Units 11/12/24  0456 11/11/24  0557 11/10/24  0626 11/09/24  0342 11/08/24  0340 11/07/24  0628 "   SODIUM mmol/L 135* 133* 134* 138 132* 133*   POTASSIUM mmol/L 3.9 4.0 4.3 5.2 5.1 4.8   CHLORIDE mmol/L 99 95* 95* 101 94* 95*   CO2 mmol/L 26.0 25.6 25.3 26.9 27.8 29.1*   BUN mg/dL 12 9 12 14 15 13   CREATININE mg/dL 0.97 0.72* 0.83 0.83 0.92 0.89   CALCIUM mg/dL 9.0 9.6 9.8 9.7 9.3 9.4   BILIRUBIN mg/dL  --   --   --  0.5 0.3 0.3   ALK PHOS U/L  --   --   --  313* 466* 106   ALT (SGPT) U/L  --   --   --  56* 88* 18   AST (SGOT) U/L  --   --   --  47* 203* 16   GLUCOSE mg/dL 163* 163* 173* 169* 232* 338*         Pathology:  Final Diagnosis   1.  Gallbladder, cholecystectomy:               A.  Grossly disrupted gallbladder with acute hemorrhagic cholecystitis, gangrenous necrosis, and        areas suggestive of abscess formation.     IMAGING:  No new imaging    A/P: 49 y.o. male who presented to the hospital with right upper quadrant pain and apparent chronic cholecystitis, initially was thought to have gastritis or duodenitis but was noted to have normal findings on EGD.  Developed spontaneous biloma for which he underwent percutaneous drainage and antibiotic treatment targeted towards Klebsiella.  Ultimately was felt to have cholecystitis as the source of his biloma.  On 11/8/2024 he underwent laparoscopic converted to open cholecystectomy with intraoperative cholangiogram with acute cholecystitis and gallbladder perforation into the liver noted.  He also had a right upper quadrant drain placed which was removed yesterday after it demonstrated no evidence of bile leak.    Patient is doing well POD4 from surgery. He remains afebrile with stable vital sings. He is tolerating his diet and demonstrating bowel function.  Incisions are healing in good order.  Abdominal exam reassuring postop.  Still working on pain control after PCA discontinued yesterday. Will continue PRN oxycodone, scheduled robaxin, and PRN dilaudid today. Add lidocaine patch and asked nurse to administer PRN tylenol for multimodal pain  control.  Continue regular diabetic diet.  Antibiotics per ID.  PT/OT/out of bed and ambulation as tolerated.  On Heparin for DVT prophylaxis.  Case management following for discharge planning.  Plan follow up in the office with me in 10 days for recheck and staple removal.    Manuela Wisdom MD  General, Robotic and Endoscopic Surgery  [facility]    [address], [address]  [place], [place], [address]  P: [phone]  F: [phone]

## 2024-11-12 NOTE — SIGNIFICANT NOTE
11/12/24 1558   OTHER   Discipline physical therapist   Rehab Time/Intention   Session Not Performed patient/family declined treatment  (attempted to see this afternoon, pt declines, states he is worn out from working w OT earlier, also states he is pain; informed RN, will follow up.)   Recommendation   PT - Next Appointment 11/13/24

## 2024-11-12 NOTE — PROGRESS NOTES
[facility] Progress Note      Patient Name: Donnie Bishop  : 1975  MRN: [id number]  Primary Care Physician:  Juju Kennedy MD  Date of admission: 10/22/2024    Subjective     Interval History:   F/u TENISHA     Review of Systems:   Denies dyspnea or nausea    Feeling overall better   Objective     Vitals:   Temp:  [97.6 °F (36.4 °C)-98.9 °F (37.2 °C)] 98.3 °F (36.8 °C)  Heart Rate:  [75-89] 75  Resp:  [16-22] 16  BP: ()/(64-71) 98/71    Intake/Output Summary (Last 24 hours) at 2024 1007  Last data filed at 2024 2200  Gross per 24 hour   Intake 720 ml   Output --   Net 720 ml       Physical Exam:    General Appearance: frail AAM comfortable on RA, speech/mentation slow  Neck: supple, no JVD  Lungs: CTA bilat no rales  Heart: RRR, normal S1 and S2  Abdomen: soft, nontender, nondistended  Extremities: no edema, cyanosis or clubbing        Scheduled Meds:     carvedilol, 6.25 mg, Oral, Q12H  cefTRIAXone, 2,000 mg, Intravenous, Q24H  heparin (porcine), 5,000 Units, Subcutaneous, Q8H  insulin glargine, 15 Units, Subcutaneous, Q12H  insulin lispro, 2-7 Units, Subcutaneous, 4x Daily AC & at Bedtime  insulin lispro, 5 Units, Subcutaneous, TID With Meals  Lidocaine, 2 patch, Transdermal, Q24H  methocarbamol, 750 mg, Oral, 4x Daily  Naloxegol Oxalate, 25 mg, Oral, QAM  pantoprazole, 40 mg, Oral, BID AC  senna-docusate sodium, 2 tablet, Oral, BID   And  polyethylene glycol, 17 g, Oral, BID  venlafaxine XR, 75 mg, Oral, Daily      IV Meds:   Pharmacy Consult,           Results Reviewed:   I have personally reviewed the results from the time of this admission to 2024 10:07 EST     Results from last 7 days   Lab Units 24  0456 24  0557 11/10/24  0626 24  0342 24  0340 24  0628   SODIUM mmol/L 135* 133* 134* 138 132* 133*   POTASSIUM mmol/L 3.9 4.0 4.3 5.2 5.1 4.8   CHLORIDE mmol/L 99 95* 95* 101 94* 95*   CO2 mmol/L 26.0 25.6 25.3 26.9 27.8  29.1*   BUN mg/dL 12 9 12 14 15 13   CREATININE mg/dL 0.97 0.72* 0.83 0.83 0.92 0.89   CALCIUM mg/dL 9.0 9.6 9.8 9.7 9.3 9.4   BILIRUBIN mg/dL  --   --   --  0.5 0.3 0.3   ALK PHOS U/L  --   --   --  313* 466* 106   ALT (SGPT) U/L  --   --   --  56* 88* 18   AST (SGOT) U/L  --   --   --  47* 203* 16   GLUCOSE mg/dL 163* 163* 173* 169* 232* 338*     Estimated Creatinine Clearance: 100.5 mL/min (by C-G formula based on SCr of 0.97 mg/dL).  Results from last 7 days   Lab Units 11/11/24  0557 11/09/24  0342 11/08/24  0340 11/07/24  0628   MAGNESIUM mg/dL  --   --  2.0 1.5*   PHOSPHORUS mg/dL 3.1 4.2 3.6 4.0           Results from last 7 days   Lab Units 11/12/24  0456 11/11/24  0557 11/10/24  0626 11/09/24  0342 11/08/24  0340   WBC 10*3/mm3 5.85 6.85 10.34 10.95* 8.58   HEMOGLOBIN g/dL 8.1* 9.4* 9.1* 11.3* 10.7*   PLATELETS 10*3/mm3 404 465* 530* 696* 694*             Assessment / Plan     ASSESSMENT:  Acute kidney injury.  Multifactorial including hypovolemia, hypotension, impaired renal autoregulation due to Jardiance, Entresto, Aldactone.  Resolved.  Cr ~ 1.   Cholecystitis with large right perihepatic fluid collection.  Plan noted for IR drainage today.  On Rocephin per ID.  Transaminitis improving.  Off statin.  MRCP noted.  Heart failure reduced ejection fraction.  EF 49%.  (Improved from 35% a year ago).  Anemia.    Encephalopathy.  Much improved  Diabetes mellitus type 2.  Hypotension -blood pressure continues to be soft    PLAN:  Kidney function is back to normal.   Volume status is acceptable.  Will hold beta-blocker for now  Encourage p.o. intake  Labs in a.m.    Evan Pat MD  11/12/24  10:07 [facility]    [facility]  [phone]

## 2024-11-13 LAB
ANION GAP SERPL CALCULATED.3IONS-SCNC: 9.6 MMOL/L (ref 5–15)
BUN SERPL-MCNC: 10 MG/DL (ref 6–20)
BUN/CREAT SERPL: 10.9 (ref 7–25)
CALCIUM SPEC-SCNC: 8.9 MG/DL (ref 8.6–10.5)
CHLORIDE SERPL-SCNC: 101 MMOL/L (ref 98–107)
CO2 SERPL-SCNC: 27.4 MMOL/L (ref 22–29)
CREAT SERPL-MCNC: 0.92 MG/DL (ref 0.76–1.27)
DEPRECATED RDW RBC AUTO: 41.7 FL (ref 37–54)
EGFRCR SERPLBLD CKD-EPI 2021: 103.2 ML/MIN/1.73
ERYTHROCYTE [DISTWIDTH] IN BLOOD BY AUTOMATED COUNT: 12.9 % (ref 12.3–15.4)
GLUCOSE BLDC GLUCOMTR-MCNC: 113 MG/DL (ref 70–130)
GLUCOSE BLDC GLUCOMTR-MCNC: 140 MG/DL (ref 70–130)
GLUCOSE BLDC GLUCOMTR-MCNC: 83 MG/DL (ref 70–130)
GLUCOSE BLDC GLUCOMTR-MCNC: 96 MG/DL (ref 70–130)
GLUCOSE SERPL-MCNC: 91 MG/DL (ref 65–99)
HCT VFR BLD AUTO: 27.1 % (ref 37.5–51)
HGB BLD-MCNC: 8.8 G/DL (ref 13–17.7)
MCH RBC QN AUTO: 29.1 PG (ref 26.6–33)
MCHC RBC AUTO-ENTMCNC: 32.5 G/DL (ref 31.5–35.7)
MCV RBC AUTO: 89.7 FL (ref 79–97)
PLATELET # BLD AUTO: 429 10*3/MM3 (ref 140–450)
PMV BLD AUTO: 10.1 FL (ref 6–12)
POTASSIUM SERPL-SCNC: 4.1 MMOL/L (ref 3.5–5.2)
RBC # BLD AUTO: 3.02 10*6/MM3 (ref 4.14–5.8)
SODIUM SERPL-SCNC: 138 MMOL/L (ref 136–145)
WBC NRBC COR # BLD AUTO: 4.97 10*3/MM3 (ref 3.4–10.8)

## 2024-11-13 PROCEDURE — 25010000002 HEPARIN (PORCINE) PER 1000 UNITS: Performed by: STUDENT IN AN ORGANIZED HEALTH CARE EDUCATION/TRAINING PROGRAM

## 2024-11-13 PROCEDURE — 82948 REAGENT STRIP/BLOOD GLUCOSE: CPT

## 2024-11-13 PROCEDURE — 63710000001 INSULIN GLARGINE PER 5 UNITS: Performed by: STUDENT IN AN ORGANIZED HEALTH CARE EDUCATION/TRAINING PROGRAM

## 2024-11-13 PROCEDURE — 99024 POSTOP FOLLOW-UP VISIT: CPT | Performed by: STUDENT IN AN ORGANIZED HEALTH CARE EDUCATION/TRAINING PROGRAM

## 2024-11-13 PROCEDURE — 97530 THERAPEUTIC ACTIVITIES: CPT

## 2024-11-13 PROCEDURE — 25010000002 HYDROMORPHONE PER 4 MG: Performed by: STUDENT IN AN ORGANIZED HEALTH CARE EDUCATION/TRAINING PROGRAM

## 2024-11-13 PROCEDURE — 80048 BASIC METABOLIC PNL TOTAL CA: CPT | Performed by: STUDENT IN AN ORGANIZED HEALTH CARE EDUCATION/TRAINING PROGRAM

## 2024-11-13 PROCEDURE — 63710000001 INSULIN LISPRO (HUMAN) PER 5 UNITS: Performed by: HOSPITALIST

## 2024-11-13 PROCEDURE — 25010000002 CEFTRIAXONE PER 250 MG: Performed by: STUDENT IN AN ORGANIZED HEALTH CARE EDUCATION/TRAINING PROGRAM

## 2024-11-13 PROCEDURE — 85027 COMPLETE CBC AUTOMATED: CPT | Performed by: STUDENT IN AN ORGANIZED HEALTH CARE EDUCATION/TRAINING PROGRAM

## 2024-11-13 RX ORDER — CARVEDILOL 6.25 MG/1
3.12 TABLET ORAL EVERY 12 HOURS SCHEDULED
Status: DISCONTINUED | OUTPATIENT
Start: 2024-11-13 | End: 2024-11-14 | Stop reason: HOSPADM

## 2024-11-13 RX ORDER — HYDROMORPHONE HYDROCHLORIDE 1 MG/ML
0.25 INJECTION, SOLUTION INTRAMUSCULAR; INTRAVENOUS; SUBCUTANEOUS EVERY 8 HOURS PRN
Status: DISCONTINUED | OUTPATIENT
Start: 2024-11-13 | End: 2024-11-13

## 2024-11-13 RX ORDER — BLOOD SUGAR DIAGNOSTIC
STRIP MISCELLANEOUS
Qty: 100 EACH | Refills: 0 | Status: CANCELLED | OUTPATIENT
Start: 2024-11-13

## 2024-11-13 RX ADMIN — OXYCODONE HYDROCHLORIDE 10 MG: 5 TABLET ORAL at 21:59

## 2024-11-13 RX ADMIN — OXYCODONE HYDROCHLORIDE 10 MG: 5 TABLET ORAL at 14:43

## 2024-11-13 RX ADMIN — OXYCODONE HYDROCHLORIDE 10 MG: 5 TABLET ORAL at 09:24

## 2024-11-13 RX ADMIN — INSULIN LISPRO 5 UNITS: 100 INJECTION, SOLUTION INTRAVENOUS; SUBCUTANEOUS at 09:19

## 2024-11-13 RX ADMIN — METHOCARBAMOL 750 MG: 750 TABLET ORAL at 12:36

## 2024-11-13 RX ADMIN — HYDROMORPHONE HYDROCHLORIDE 0.25 MG: 1 INJECTION, SOLUTION INTRAMUSCULAR; INTRAVENOUS; SUBCUTANEOUS at 05:10

## 2024-11-13 RX ADMIN — INSULIN GLARGINE 15 UNITS: 100 INJECTION, SOLUTION SUBCUTANEOUS at 09:18

## 2024-11-13 RX ADMIN — PANTOPRAZOLE SODIUM 40 MG: 40 TABLET, DELAYED RELEASE ORAL at 17:11

## 2024-11-13 RX ADMIN — HEPARIN SODIUM 5000 UNITS: 5000 INJECTION INTRAVENOUS; SUBCUTANEOUS at 22:00

## 2024-11-13 RX ADMIN — POLYETHYLENE GLYCOL 3350 17 G: 17 POWDER, FOR SOLUTION ORAL at 22:00

## 2024-11-13 RX ADMIN — LIDOCAINE 2 PATCH: 4 PATCH TOPICAL at 09:18

## 2024-11-13 RX ADMIN — METHOCARBAMOL 750 MG: 750 TABLET ORAL at 22:00

## 2024-11-13 RX ADMIN — PANTOPRAZOLE SODIUM 40 MG: 40 TABLET, DELAYED RELEASE ORAL at 06:32

## 2024-11-13 RX ADMIN — HEPARIN SODIUM 5000 UNITS: 5000 INJECTION INTRAVENOUS; SUBCUTANEOUS at 14:43

## 2024-11-13 RX ADMIN — HEPARIN SODIUM 5000 UNITS: 5000 INJECTION INTRAVENOUS; SUBCUTANEOUS at 06:32

## 2024-11-13 RX ADMIN — NALOXEGOL OXALATE 25 MG: 25 TABLET, FILM COATED ORAL at 06:32

## 2024-11-13 RX ADMIN — SENNOSIDES AND DOCUSATE SODIUM 2 TABLET: 50; 8.6 TABLET ORAL at 22:00

## 2024-11-13 RX ADMIN — METHOCARBAMOL 750 MG: 750 TABLET ORAL at 09:19

## 2024-11-13 RX ADMIN — VENLAFAXINE HYDROCHLORIDE 75 MG: 75 CAPSULE, EXTENDED RELEASE ORAL at 09:18

## 2024-11-13 RX ADMIN — CARVEDILOL 3.12 MG: 6.25 TABLET, FILM COATED ORAL at 22:00

## 2024-11-13 RX ADMIN — METHOCARBAMOL 750 MG: 750 TABLET ORAL at 17:11

## 2024-11-13 RX ADMIN — CEFTRIAXONE 2000 MG: 2 INJECTION, POWDER, FOR SOLUTION INTRAMUSCULAR; INTRAVENOUS at 14:43

## 2024-11-13 RX ADMIN — INSULIN LISPRO 5 UNITS: 100 INJECTION, SOLUTION INTRAVENOUS; SUBCUTANEOUS at 17:22

## 2024-11-13 RX ADMIN — INSULIN GLARGINE 15 UNITS: 100 INJECTION, SOLUTION SUBCUTANEOUS at 21:59

## 2024-11-13 NOTE — PLAN OF CARE
Goal Outcome Evaluation:  Plan of Care Reviewed With: patient        Progress: improving  Outcome Evaluation: Pt seen for PT this afternoon. HE is agreeable and seems to be doing better. Pt states he is ready to go home. Pt able to transition to EOB w CGA. He stood w SBA/CGA using Rwx and then able to ambulate approx 150 ft w SBA/CGA. Pt's pace is improving and no unsteadiness noted. He returned to bed at end of session w all needs in reach. Possible DC home tomorrow w family . Will continue to follow    Anticipated Discharge Disposition (PT): home with assist, home with home health

## 2024-11-13 NOTE — CONSULTS
"Diabetes Education  Assessment/Teaching    Patient Name:  Donnie Bishop  YOB: 1975  MRN: [id number]  Admit Date:  10/22/2024      Assessment Date:  11/13/2024  Flowsheet Row Most Recent Value   General Information     Referral From: Other. Meet with 50 y/o at bedside on CCU to assess needs for DM ed/DM supplies for dc.    Height 170.2 cm (67\")   Height Method Stated   Weight 73.7 kg (162 lb 7.7 oz)   Weight Method Bed scale   Diabetes History    What type of diabetes do you have? -- pt reports having Type 1.   Length of Diabetes Diagnosis -- >20 yrs since age 20 per pt.   Do you test your blood sugar at home? yes -pt reports using CGM/BG sensor outpt.   Have you had high blood sugar? (>140mg/dl) yes   Education Preferences    Barriers to Learning -- delay in answering questions/processing.   Assessment Topics    Taking Medication - Assessment Needs education/Review. pt reports using Humalog outpt (for meals.) pt was not able to recall the name of his long-acting insulin. anticipate pt will dc with long-acting insulin bid based on current plan.   Healthy Coping - Assessment Competent   Monitoring - Assessment Competent   DM Goals    Contact Plan Follow-up medical care with PCP and endo.             Flowsheet Row Most Recent Value   DM Education Needs    Meter Has own   Medication Insulin names -Humalog name. Lantus name.   Problem Solving Hypoglycemia   Healthy Coping Appropriate   Discharge Plan Home, Follow-up with MD  [-pt reports outpt DM f/u with endo NP Rosanna Rendon]   Motivation Moderate   Teaching Method Discussion   Patient Response Verbalized understanding, Needs reinforcement        Other Comments:    Electronically signed by:  Makenna Randall, RN, BSN, [facility]  11/13/24 16:37 EST  "

## 2024-11-13 NOTE — PROGRESS NOTES
"General Surgery Progress Note    CC: Postop day 5 laparoscopic converted to open cholecystectomy with intraoperative cholangiogram and right upper quadrant drain placement for cholecystitis with infected subcapsular biloma    S: Patient states he is feeling ok today. Denies NV. Tolerating regular diet. Had a bowel movement last night. Abdominal pain better controlled today. Has been up out of bed to use the restroom. Declined PT services yesterday. Wants to go home rather than rehab/SNF.    O:/68 (BP Location: Right arm, Patient Position: Lying)   Pulse 86   Temp 98.5 °F (36.9 °C) (Oral)   Resp 18   Ht 170.2 cm (67\")   Wt 73.7 kg (162 lb 7.7 oz)   SpO2 96%   BMI 25.45 kg/m²     Intake & Output (last day)         11/12 0701 11/13 0700 11/13 0701 11/14 0700    P.O. 195     I.V. (mL/kg) 40 (0.5)     IV Piggyback 100     Total Intake(mL/kg) 335 (4.5)     Urine (mL/kg/hr) 450 (0.3)     Stool      Total Output 450     Net -115           Urine Unmeasured Occurrence 1 x             GENERAL: awake, alert, interactive, cooperative  HEENT: EOMI, clear sclera, moist mucus membranes   CHEST: normal work of breathing on room air  CARDIAC: well perfused, regular rate  GI: Abd soft, nondistended, appropriately tender around incisions which are clean and dry with staples in place, lidocaine patches noted, right sided abdominal drain site bandaged; no signs of infection  EXTREMITIES: no cyanosis  SKIN: Warm and dry, no rash    LABS  Results from last 7 days   Lab Units 11/13/24  0436 11/12/24  0456 11/11/24  0557   WBC 10*3/mm3 4.97 5.85 6.85   HEMOGLOBIN g/dL 8.8* 8.1* 9.4*   HEMATOCRIT % 27.1* 24.1* 28.8*   PLATELETS 10*3/mm3 429 404 465*     Results from last 7 days   Lab Units 11/13/24  0436 11/12/24  0456 11/11/24  0557 11/10/24  0626 11/09/24  0342 11/08/24  0340 11/07/24  0628   SODIUM mmol/L 138 135* 133*   < > 138 132* 133*   POTASSIUM mmol/L 4.1 3.9 4.0   < > 5.2 5.1 4.8   CHLORIDE mmol/L 101 99 95*   < > 101 " 94* 95*   CO2 mmol/L 27.4 26.0 25.6   < > 26.9 27.8 29.1*   BUN mg/dL 10 12 9   < > 14 15 13   CREATININE mg/dL 0.92 0.97 0.72*   < > 0.83 0.92 0.89   CALCIUM mg/dL 8.9 9.0 9.6   < > 9.7 9.3 9.4   BILIRUBIN mg/dL  --   --   --   --  0.5 0.3 0.3   ALK PHOS U/L  --   --   --   --  313* 466* 106   ALT (SGPT) U/L  --   --   --   --  56* 88* 18   AST (SGOT) U/L  --   --   --   --  47* 203* 16   GLUCOSE mg/dL 91 163* 163*   < > 169* 232* 338*    < > = values in this interval not displayed.             IMAGING:  No new imaging    A/P: 49 y.o. male who presented to the hospital with right upper quadrant pain and apparent chronic cholecystitis, initially was thought to have gastritis or duodenitis but was noted to have normal findings on EGD.  Developed spontaneous biloma for which he underwent percutaneous drainage and antibiotic treatment targeted towards Klebsiella.  Ultimately was felt to have cholecystitis as the source of his biloma.  On 11/8/2024 he underwent laparoscopic converted to open cholecystectomy with intraoperative cholangiogram with acute cholecystitis and gallbladder perforation into the liver noted.  He also had a right upper quadrant drain placed which was removed ywo days ago after it demonstrated no evidence of bile leak.    Patient is doing well POD5 from surgery. AVSS. Tolerating regular diabetic diet. Having bowel function.  Pain controlled on oral meds.  Incisions are healing in good order.    Has antibiotics plan in place per ID.  Discussed path.  Stable for discharge from my standpoint. Plan follow up in the office with me in 9 days for recheck and staple removal. Counseled patient postop wound care, activity restrictions, pain control, diet, follow up, as well as on signs/symptoms concerning for need to call the office or return to ER. Patient agreeable with plan.      Manuela Wisdom MD  General, Robotic and Endoscopic Surgery  [facility]    [address]  [place], [place], [address]  P: [phone]  F: [phone]

## 2024-11-13 NOTE — PROGRESS NOTES
[facility] Progress Note      Patient Name: Donine Bishop  : 1975  MRN: [id number]  Primary Care Physician:  Juju Kennedy MD  Date of admission: 10/22/2024    Subjective     Interval History:   F/u TENISHA     Review of Systems:   Patient doing much better today.  Denies any nausea or vomiting.  No fever no chills appetite seems to be  Objective     Vitals:   Temp:  [97.7 °F (36.5 °C)-98.5 °F (36.9 °C)] 98.5 °F (36.9 °C)  Heart Rate:  [77-86] 86  Resp:  [16-26] 18  BP: (107-124)/(68-79) 111/68    Intake/Output Summary (Last 24 hours) at 2024 1116  Last data filed at 2024 0500  Gross per 24 hour   Intake 335 ml   Output 450 ml   Net -115 ml       Physical Exam:    General Appearance: frail AAM comfortable on RA, speech/mentation slow  Neck: supple, no JVD  Lungs: CTA bilat no rales  Heart: RRR, normal S1 and S2  Abdomen: soft, nontender, nondistended  Extremities: no edema, cyanosis or clubbing        Scheduled Meds:     [Held by provider] carvedilol, 6.25 mg, Oral, Q12H  cefTRIAXone, 2,000 mg, Intravenous, Q24H  heparin (porcine), 5,000 Units, Subcutaneous, Q8H  insulin glargine, 15 Units, Subcutaneous, Q12H  insulin lispro, 2-7 Units, Subcutaneous, 4x Daily AC & at Bedtime  insulin lispro, 5 Units, Subcutaneous, TID With Meals  Lidocaine, 2 patch, Transdermal, Q24H  methocarbamol, 750 mg, Oral, 4x Daily  Naloxegol Oxalate, 25 mg, Oral, QAM  pantoprazole, 40 mg, Oral, BID AC  senna-docusate sodium, 2 tablet, Oral, BID   And  polyethylene glycol, 17 g, Oral, BID  venlafaxine XR, 75 mg, Oral, Daily      IV Meds:            Results Reviewed:   I have personally reviewed the results from the time of this admission to 2024 11:16 EST     Results from last 7 days   Lab Units 24  0436 24  0456 24  0557 11/10/24  0626 24  0342 24  0340 24  0628   SODIUM mmol/L 138 135* 133*   < > 138 132* 133*   POTASSIUM mmol/L 4.1 3.9 4.0   < > 5.2  5.1 4.8   CHLORIDE mmol/L 101 99 95*   < > 101 94* 95*   CO2 mmol/L 27.4 26.0 25.6   < > 26.9 27.8 29.1*   BUN mg/dL 10 12 9   < > 14 15 13   CREATININE mg/dL 0.92 0.97 0.72*   < > 0.83 0.92 0.89   CALCIUM mg/dL 8.9 9.0 9.6   < > 9.7 9.3 9.4   BILIRUBIN mg/dL  --   --   --   --  0.5 0.3 0.3   ALK PHOS U/L  --   --   --   --  313* 466* 106   ALT (SGPT) U/L  --   --   --   --  56* 88* 18   AST (SGOT) U/L  --   --   --   --  47* 203* 16   GLUCOSE mg/dL 91 163* 163*   < > 169* 232* 338*    < > = values in this interval not displayed.     Estimated Creatinine Clearance: 103.5 mL/min (by C-G formula based on SCr of 0.92 mg/dL).  Results from last 7 days   Lab Units 11/11/24  0557 11/09/24  0342 11/08/24  0340 11/07/24  0628   MAGNESIUM mg/dL  --   --  2.0 1.5*   PHOSPHORUS mg/dL 3.1 4.2 3.6 4.0           Results from last 7 days   Lab Units 11/13/24  0436 11/12/24  0456 11/11/24  0557 11/10/24  0626 11/09/24  0342   WBC 10*3/mm3 4.97 5.85 6.85 10.34 10.95*   HEMOGLOBIN g/dL 8.8* 8.1* 9.4* 9.1* 11.3*   PLATELETS 10*3/mm3 429 404 465* 530* 696*             Assessment / Plan     ASSESSMENT:  Acute kidney injury.  Multifactorial including hypovolemia, hypotension, impaired renal autoregulation due to Jardiance, Entresto, Aldactone.  Resolved.  Cr ~ 1.   Cholecystitis with large right perihepatic fluid collection.  Plan noted for IR drainage today.  On Rocephin per ID.  Transaminitis improving.  Off statin.  MRCP noted.  Heart failure reduced ejection fraction.  EF 49%.  (Improved from 35% a year ago).  Anemia.    Encephalopathy.  Much improved  Diabetes mellitus type 2.  Hypotension -blood pressure continues to be soft    PLAN:  Kidney function is back to baseline and blood pressure has been acceptable.  Nephrology will sign off please call if you have questions or concerns    Evan Pat MD  11/13/24  11:16 [facility]    [facility]  [phone]

## 2024-11-13 NOTE — PROGRESS NOTES
Name: Donnie Bishop ADMIT: 10/22/2024   : 1975  PCP: Juju Kennedy MD    MRN: [id number] LOS: 20 days   AGE/SEX: 49 y.o. male  ROOM: [facility]     Subjective   Subjective   Tolerating a diet. No new complaints. States his pain is controlled.     Objective   Objective   Vital Signs  Temp:  [97.7 °F (36.5 °C)-98.5 °F (36.9 °C)] 98.5 °F (36.9 °C)  Heart Rate:  [77-86] 86  Resp:  [16-20] 18  BP: (107-124)/(68-79) 111/68  SpO2:  [96 %-98 %] 96 %  on   ;   Device (Oxygen Therapy): room air  Body mass index is 25.45 kg/m².    Physical Exam  Constitutional:       General: He is not in acute distress.     Appearance: He is ill-appearing (chronic). He is not toxic-appearing.   HENT:      Head: Normocephalic and atraumatic.   Cardiovascular:      Rate and Rhythm: Normal rate and regular rhythm.   Pulmonary:      Effort: Pulmonary effort is normal. No respiratory distress.      Breath sounds: Decreased breath sounds present.   Abdominal:      Palpations: Abdomen is soft.      Tenderness: There is no guarding or rebound.   Musculoskeletal:         General: No swelling.      Right lower leg: No edema.      Left lower leg: No edema.   Skin:     General: Skin is warm and dry.   Neurological:      General: No focal deficit present.      Mental Status: He is alert and oriented to person, place, and time.   Psychiatric:         Mood and Affect: Affect is flat.         Behavior: Behavior normal.     Results Review  I reviewed the patient's new clinical results.  Results from last 7 days   Lab Units 24  0436 24  0456 24  0557 11/10/24  0626   WBC 10*3/mm3 4.97 5.85 6.85 10.34   HEMOGLOBIN g/dL 8.8* 8.1* 9.4* 9.1*   PLATELETS 10*3/mm3 429 404 465* 530*     Results from last 7 days   Lab Units 24  0436 24  0456 24  0557 11/10/24  0626   SODIUM mmol/L 138 135* 133* 134*   POTASSIUM mmol/L 4.1 3.9 4.0 4.3   CHLORIDE mmol/L 101 99 95* 95*   CO2 mmol/L 27.4 26.0 25.6 25.3   BUN mg/dL 10 12 9 12    CREATININE mg/dL 0.92 0.97 0.72* 0.83   GLUCOSE mg/dL 91 163* 163* 173*     Lab Results   Component Value Date    ANIONGAP 9.6 11/13/2024     Estimated Creatinine Clearance: 103.5 mL/min (by C-G formula based on SCr of 0.92 mg/dL).   Lab Results   Component Value Date    EGFR 103.2 11/13/2024     Results from last 7 days   Lab Units 11/11/24  0557 11/09/24  0342 11/08/24  0340 11/07/24  0628   ALBUMIN g/dL 2.9* 3.2* 3.2* 3.0*   BILIRUBIN mg/dL  --  0.5 0.3 0.3   ALK PHOS U/L  --  313* 466* 106   AST (SGOT) U/L  --  47* 203* 16   ALT (SGPT) U/L  --  56* 88* 18     Results from last 7 days   Lab Units 11/13/24  0436 11/12/24  0456 11/11/24  0557 11/10/24  0626 11/09/24  0342 11/08/24  0340 11/07/24  0628   CALCIUM mg/dL 8.9 9.0 9.6 9.8 9.7 9.3 9.4   ALBUMIN g/dL  --   --  2.9*  --  3.2* 3.2* 3.0*   MAGNESIUM mg/dL  --   --   --   --   --  2.0 1.5*   PHOSPHORUS mg/dL  --   --  3.1  --  4.2 3.6 4.0       Glucose   Date/Time Value Ref Range Status   11/13/2024 1119 83 70 - 130 mg/dL Final   11/13/2024 0649 96 70 - 130 mg/dL Final   11/12/2024 2000 140 (H) 70 - 130 mg/dL Final   11/12/2024 1812 93 70 - 130 mg/dL Final   11/12/2024 1706 63 (L) 70 - 130 mg/dL Final   11/12/2024 1125 120 70 - 130 mg/dL Final   11/12/2024 0752 140 (H) 70 - 130 mg/dL Final       No radiology results for the last day    Scheduled Meds  [Held by provider] carvedilol, 3.125 mg, Oral, Q12H  cefTRIAXone, 2,000 mg, Intravenous, Q24H  heparin (porcine), 5,000 Units, Subcutaneous, Q8H  insulin glargine, 15 Units, Subcutaneous, Q12H  insulin lispro, 2-7 Units, Subcutaneous, 4x Daily AC & at Bedtime  insulin lispro, 5 Units, Subcutaneous, TID With Meals  Lidocaine, 2 patch, Transdermal, Q24H  methocarbamol, 750 mg, Oral, 4x Daily  Naloxegol Oxalate, 25 mg, Oral, QAM  pantoprazole, 40 mg, Oral, BID AC  senna-docusate sodium, 2 tablet, Oral, BID   And  polyethylene glycol, 17 g, Oral, BID  venlafaxine XR, 75 mg, Oral, Daily    Continuous Infusions      PRN Meds    acetaminophen    senna-docusate sodium **AND** polyethylene glycol **AND** bisacodyl **AND** bisacodyl    Calcium Replacement - Follow Nurse / BPA Driven Protocol    dextrose    dextrose    glucagon (human recombinant)    HYDROmorphone    hydrOXYzine    influenza vaccine    Magnesium Standard Dose Replacement - Follow Nurse / BPA Driven Protocol    melatonin    naloxone    [DISCONTINUED] Morphine **AND** naloxone    ondansetron ODT **OR** ondansetron    oxyCODONE    oxyCODONE    Phosphorus Replacement - Follow Nurse / BPA Driven Protocol    Potassium Replacement - Follow Nurse / BPA Driven Protocol    simethicone    sodium chloride       Diet  Diet: Gastrointestinal, Diabetic; Consistent Carbohydrate; Low Irritant; Fluid Consistency: Thin (IDDSI 0)       Assessment/Plan     Active Hospital Problems    Diagnosis  POA    **Epigastric abdominal pain [R10.13]  Yes    Abscess of liver [K75.0]  Yes    Klebsiella pneumoniae (k. pneumoniae) as the cause of diseases classified elsewhere [B96.1]  Yes    Hypotension [I95.9]  Yes    Transaminitis [R74.01]  Yes    Leukocytosis [D72.829]  Yes    Chronic combined systolic and diastolic heart failure [I50.42]  Yes    Calculus of gallbladder with acute on chronic cholecystitis without obstruction [K80.12]  Unknown    Anxiety [F41.9]  Yes    Multiple-type hyperlipidemia [E78.2]  Yes    Type 2 diabetes mellitus with diabetic neuropathy, with long-term current use of insulin [E11.40, Z79.4]  Not Applicable    Benign essential HTN [I10]  Yes      Resolved Hospital Problems    Diagnosis Date Resolved POA    Acute kidney injury [N17.9] 10/30/2024 No     49 y.o. male admitted with Epigastric abdominal pain.     Liver abscess/biloma: Status post IR drainage and drain placement 10/28.  Status post IR drain exchange 11/5.  Status post IR drain placement 11/6 (out now).  Klebsiella.  Continue Rocephin.  Abx through 11/15/24- oral abx plan in place if he is ready to discharge.  Infectious disease has signed off- po abx plan in place.  Cholecystitis: GI evaluated and he had EGD earlier this admission on 10/24.  Status post open cholecystectomy and right upper quadrant drain placement 11/8.  No filling deficits were noted on the intraoperative cholangiogram.  Surgery following. NGT removed, diet advanced. Now on oral pain medications. Ambulating up ad maria luisa. Diet: Gastrointestinal, Diabetic; Consistent Carbohydrate; Low Irritant; Fluid Consistency: Thin (IDDSI 0)   Hypotension: Had improved with resuscitation and midodrine.  The midodrine has since been stopped.  Pulmonology evaluated. Low normal today continue to monitor and restart carvedilol lower dose.  TENISHA: Multifactorial and now improved.  Nephrology stated was okay for him to start GDMT for CHF at discharge as tolerated. Nephrology cleared for dc. Plan follow-up in 1 week in clinic.  NICM/bigeminy: EF 49%.  Home Entresto, Aldactone, and Jardiance are held.  Cardiology signed off. Restart coreg make sure he tolerates.  Diabetes: A1c 9.4.  Was on Jardiance metformin and insulin prior to admission.  Had hypo and hyperglycemia this admission.  Continue insulin and monitor requirements. Glucoses acceptable no more hypoglycemia. Diabetes teaching may need basal/bolus at dc.  Hypertension: Low normal BPs now.  Lasix held. Resuming coreg.    PPX: Subcutaneous heparin    Discharge  Home tomorrow if tolerates coreg and have DM educator see. Refuses home health and SNF options  Expected Discharge Date: 11/14/2024; Expected Discharge Time:     Discussed with patient and nursing staff    Jareth Bustillo MD  [facility]  11/13/24

## 2024-11-13 NOTE — PLAN OF CARE
Goal Outcome Evaluation:  Plan of Care Reviewed With: patient        Progress: no change     No changes/ acute events overnight. Patient still reporting ABD pain at times but is manageable with PRN narcotics. ABD surgical sites remains approximated with staples. Patient able to stand and walk to [facility] during shift without any complications. VSS and patient afebrile. Ongoing patient care.       Problem: Fall Injury Risk  Goal: Absence of Fall and Fall-Related Injury  Outcome: Progressing  Intervention: Identify and Manage Contributors  Recent Flowsheet Documentation  Taken 11/12/2024 2000 by Jalen Salazar II RN  Self-Care Promotion:   BADL personal objects within reach   BADL personal routines maintained   independence encouraged  Intervention: Promote Injury-Free Environment  Recent Flowsheet Documentation  Taken 11/13/2024 0440 by Jalen Salazar II RN  Safety Promotion/Fall Prevention:   activity supervised   safety round/check completed   assistive device/personal items within reach   clutter free environment maintained   fall prevention program maintained   muscle strengthening facilitated   nonskid shoes/slippers when out of bed   room organization consistent  Taken 11/13/2024 0200 by Jalen Salazar II RN  Safety Promotion/Fall Prevention:   activity supervised   safety round/check completed   assistive device/personal items within reach   clutter free environment maintained   fall prevention program maintained   muscle strengthening facilitated   nonskid shoes/slippers when out of bed   room organization consistent  Taken 11/13/2024 0000 by Jalen Salazar II, RN  Safety Promotion/Fall Prevention:   activity supervised   safety round/check completed   assistive device/personal items within reach   clutter free environment maintained   fall prevention program maintained   muscle strengthening facilitated   nonskid shoes/slippers when out of bed   room organization consistent  Taken 11/12/2024 2201 by Jalen Salazar  SUKHJINDER, RN  Safety Promotion/Fall Prevention:   activity supervised   safety round/check completed   assistive device/personal items within reach   clutter free environment maintained   fall prevention program maintained   muscle strengthening facilitated   nonskid shoes/slippers when out of bed   room organization consistent  Taken 11/12/2024 2000 by Jalen Salazar II RN  Safety Promotion/Fall Prevention:   activity supervised   safety round/check completed   assistive device/personal items within reach   clutter free environment maintained   fall prevention program maintained   muscle strengthening facilitated   nonskid shoes/slippers when out of bed   room organization consistent     Problem: Sepsis/Septic Shock  Goal: Optimal Coping  Outcome: Progressing  Intervention: Support Patient and Family Response  Recent Flowsheet Documentation  Taken 11/12/2024 2000 by Jalen Salazar II RN  Supportive Measures:   active listening utilized   decision-making supported   verbalization of feelings encouraged   relaxation techniques promoted   positive reinforcement provided  Family/Support System Care:   presence promoted   involvement promoted   support provided  Goal: Absence of Bleeding  Outcome: Progressing  Goal: Blood Glucose Level Within Target Range  Outcome: Progressing  Intervention: Optimize Glycemic Control  Recent Flowsheet Documentation  Taken 11/12/2024 2000 by Jalen Salazar II RN  Hyperglycemia Management: blood glucose monitored  Hypoglycemia Management: blood glucose monitored  Goal: Absence of Infection Signs and Symptoms  Outcome: Progressing  Intervention: Initiate Sepsis Management  Recent Flowsheet Documentation  Taken 11/13/2024 0440 by Jalen Salazar II RN  Infection Prevention:   personal protective equipment utilized   hand hygiene promoted   rest/sleep promoted   single patient room provided  Taken 11/13/2024 0200 by Jalen Salazar II RN  Infection Prevention:   personal protective equipment  utilized   hand hygiene promoted   rest/sleep promoted   single patient room provided  Taken 11/13/2024 0000 by Jalen Salazar II, RN  Infection Prevention:   personal protective equipment utilized   hand hygiene promoted   rest/sleep promoted   single patient room provided  Taken 11/12/2024 2201 by Jalen Salazar II, RN  Infection Prevention:   personal protective equipment utilized   hand hygiene promoted   rest/sleep promoted   single patient room provided  Taken 11/12/2024 2000 by Jalen Salazar II, RN  Infection Prevention:   personal protective equipment utilized   hand hygiene promoted   rest/sleep promoted   single patient room provided  Intervention: Promote Recovery  Recent Flowsheet Documentation  Taken 11/13/2024 0440 by Jalen Salazar II, RN  Activity Management: activity encouraged  Taken 11/13/2024 0200 by Jalen Salazar II, RN  Activity Management:   activity encouraged   ambulated outside room  Taken 11/13/2024 0000 by Jalen Salazar II, RN  Activity Management: activity encouraged  Taken 11/12/2024 2000 by Jalen Salazar II, RN  Airway/Ventilation Management:   airway patency maintained   pulmonary hygiene promoted  Sleep/Rest Enhancement:   awakenings minimized   consistent schedule promoted   noise level reduced   regular sleep/rest pattern promoted   relaxation techniques promoted   room darkened  Goal: Optimal Nutrition Delivery  Outcome: Progressing     Problem: Skin Injury Risk Increased  Goal: Skin Health and Integrity  Outcome: Progressing  Intervention: Optimize Skin Protection  Recent Flowsheet Documentation  Taken 11/13/2024 0440 by Jalen Salazar II, RN  Activity Management: activity encouraged  Head of Bed (HOB) Positioning: HOB at 30-45 degrees  Taken 11/13/2024 0200 by Jalen Salazar II, RN  Activity Management:   activity encouraged   ambulated outside room  Head of Bed (HOB) Positioning: HOB at 30-45 degrees  Taken 11/13/2024 0000 by Jalen Salazar II, RN  Activity Management: activity  encouraged  Head of Bed (HOB) Positioning: HOB at 30-45 degrees  Taken 11/12/2024 2201 by Jalen Salazar II RN  Head of Bed (HOB) Positioning: HOB at 30-45 degrees  Taken 11/12/2024 2000 by Jalen Salazar II RN  Pressure Reduction Techniques:   frequent weight shift encouraged   heels elevated off bed   weight shift assistance provided  Head of Bed (HOB) Positioning: HOB at 30-45 degrees  Pressure Reduction Devices:   specialty bed utilized   pressure-redistributing mattress utilized  Skin Protection: incontinence pads utilized     Problem: Pain Acute  Goal: Optimal Pain Control and Function  Outcome: Progressing  Intervention: Optimize Psychosocial Wellbeing  Recent Flowsheet Documentation  Taken 11/12/2024 2000 by Jalen Salazar II RN  Supportive Measures:   active listening utilized   decision-making supported   verbalization of feelings encouraged   relaxation techniques promoted   positive reinforcement provided  Diversional Activities:   television   smartphone  Intervention: Prevent or Manage Pain  Recent Flowsheet Documentation  Taken 11/12/2024 2000 by Jalen Salazar II RN  Sensory Stimulation Regulation:   auditory stimulation minimized   care clustered   lighting decreased   quiet environment promoted   tactile stimulation minimized   television on   visual stimulation minimized  Bowel Elimination Promotion:   adequate fluid intake promoted   ambulation promoted   commode/bedpan at bedside  Sleep/Rest Enhancement:   awakenings minimized   consistent schedule promoted   noise level reduced   regular sleep/rest pattern promoted   relaxation techniques promoted   room darkened     Problem: Adult Inpatient Plan of Care  Goal: Plan of Care Review  Outcome: Progressing  Flowsheets (Taken 11/13/2024 0547)  Progress: no change  Plan of Care Reviewed With: patient  Goal: Patient-Specific Goal (Individualized)  Outcome: Progressing  Goal: Absence of Hospital-Acquired Illness or Injury  Outcome:  Progressing  Intervention: Identify and Manage Fall Risk  Recent Flowsheet Documentation  Taken 11/13/2024 0440 by Jalen Salazar II, RN  Safety Promotion/Fall Prevention:   activity supervised   safety round/check completed   assistive device/personal items within reach   clutter free environment maintained   fall prevention program maintained   muscle strengthening facilitated   nonskid shoes/slippers when out of bed   room organization consistent  Taken 11/13/2024 0200 by Jalen Salazar II, RN  Safety Promotion/Fall Prevention:   activity supervised   safety round/check completed   assistive device/personal items within reach   clutter free environment maintained   fall prevention program maintained   muscle strengthening facilitated   nonskid shoes/slippers when out of bed   room organization consistent  Taken 11/13/2024 0000 by Jalen Salazar II, RN  Safety Promotion/Fall Prevention:   activity supervised   safety round/check completed   assistive device/personal items within reach   clutter free environment maintained   fall prevention program maintained   muscle strengthening facilitated   nonskid shoes/slippers when out of bed   room organization consistent  Taken 11/12/2024 2201 by Jalen Salazar II, RN  Safety Promotion/Fall Prevention:   activity supervised   safety round/check completed   assistive device/personal items within reach   clutter free environment maintained   fall prevention program maintained   muscle strengthening facilitated   nonskid shoes/slippers when out of bed   room organization consistent  Taken 11/12/2024 2000 by Jalen Salazar II, RN  Safety Promotion/Fall Prevention:   activity supervised   safety round/check completed   assistive device/personal items within reach   clutter free environment maintained   fall prevention program maintained   muscle strengthening facilitated   nonskid shoes/slippers when out of bed   room organization consistent  Intervention: Prevent Skin  Injury  Recent Flowsheet Documentation  Taken 11/13/2024 0440 by Jalen Salazar II, RN  Body Position:   position changed independently   weight shifting  Taken 11/13/2024 0200 by Jalen Salazar II, RN  Body Position:   position changed independently   weight shifting  Taken 11/13/2024 0000 by Jalen Salazar II, RN  Body Position:   position changed independently   weight shifting  Taken 11/12/2024 2201 by Jalen Salazar II, RN  Body Position:   position changed independently   weight shifting  Taken 11/12/2024 2000 by Jalen Salazar II, RN  Body Position:   position changed independently   weight shifting  Skin Protection: incontinence pads utilized  Intervention: Prevent and Manage VTE (Venous Thromboembolism) Risk  Recent Flowsheet Documentation  Taken 11/12/2024 2000 by Jalen Salazar II, RN  VTE Prevention/Management: (heparin SUBQ)   patient refused intervention   bilateral   other (see comments)  Intervention: Prevent Infection  Recent Flowsheet Documentation  Taken 11/13/2024 0440 by Jalen Salazar II, RN  Infection Prevention:   personal protective equipment utilized   hand hygiene promoted   rest/sleep promoted   single patient room provided  Taken 11/13/2024 0200 by Jalen Salazar II, RN  Infection Prevention:   personal protective equipment utilized   hand hygiene promoted   rest/sleep promoted   single patient room provided  Taken 11/13/2024 0000 by Jalen Salazar II, RN  Infection Prevention:   personal protective equipment utilized   hand hygiene promoted   rest/sleep promoted   single patient room provided  Taken 11/12/2024 2201 by Jalen Salazar II, RN  Infection Prevention:   personal protective equipment utilized   hand hygiene promoted   rest/sleep promoted   single patient room provided  Taken 11/12/2024 2000 by Jalen Salazar II, RN  Infection Prevention:   personal protective equipment utilized   hand hygiene promoted   rest/sleep promoted   single patient room provided  Goal: Optimal Comfort and  Wellbeing  Outcome: Progressing  Intervention: Provide Person-Centered Care  Recent Flowsheet Documentation  Taken 11/12/2024 2000 by Jalen Salazar II, RN  Trust Relationship/Rapport:   care explained   choices provided   emotional support provided   empathic listening provided   questions answered   questions encouraged   reassurance provided   thoughts/feelings acknowledged  Goal: Readiness for Transition of Care  Outcome: Progressing

## 2024-11-14 ENCOUNTER — READMISSION MANAGEMENT (OUTPATIENT)
Dept: CALL CENTER | Facility: HOSPITAL | Age: 49
End: 2024-11-14
Payer: COMMERCIAL

## 2024-11-14 VITALS
TEMPERATURE: 97.7 F | RESPIRATION RATE: 18 BRPM | BODY MASS INDEX: 25.5 KG/M2 | HEART RATE: 82 BPM | OXYGEN SATURATION: 96 % | DIASTOLIC BLOOD PRESSURE: 76 MMHG | HEIGHT: 67 IN | SYSTOLIC BLOOD PRESSURE: 115 MMHG | WEIGHT: 162.48 LBS

## 2024-11-14 LAB
ANION GAP SERPL CALCULATED.3IONS-SCNC: 11 MMOL/L (ref 5–15)
BUN SERPL-MCNC: 9 MG/DL (ref 6–20)
BUN/CREAT SERPL: 12.7 (ref 7–25)
CALCIUM SPEC-SCNC: 8.9 MG/DL (ref 8.6–10.5)
CHLORIDE SERPL-SCNC: 102 MMOL/L (ref 98–107)
CO2 SERPL-SCNC: 26 MMOL/L (ref 22–29)
CREAT SERPL-MCNC: 0.71 MG/DL (ref 0.76–1.27)
DEPRECATED RDW RBC AUTO: 42.7 FL (ref 37–54)
EGFRCR SERPLBLD CKD-EPI 2021: 113.9 ML/MIN/1.73
ERYTHROCYTE [DISTWIDTH] IN BLOOD BY AUTOMATED COUNT: 13 % (ref 12.3–15.4)
GLUCOSE BLDC GLUCOMTR-MCNC: 129 MG/DL (ref 70–130)
GLUCOSE BLDC GLUCOMTR-MCNC: 146 MG/DL (ref 70–130)
GLUCOSE BLDC GLUCOMTR-MCNC: 59 MG/DL (ref 70–130)
GLUCOSE BLDC GLUCOMTR-MCNC: 94 MG/DL (ref 70–130)
GLUCOSE BLDC GLUCOMTR-MCNC: 99 MG/DL (ref 70–130)
GLUCOSE SERPL-MCNC: 106 MG/DL (ref 65–99)
HCT VFR BLD AUTO: 28.4 % (ref 37.5–51)
HGB BLD-MCNC: 9.3 G/DL (ref 13–17.7)
MCH RBC QN AUTO: 29.9 PG (ref 26.6–33)
MCHC RBC AUTO-ENTMCNC: 32.7 G/DL (ref 31.5–35.7)
MCV RBC AUTO: 91.3 FL (ref 79–97)
PLATELET # BLD AUTO: 404 10*3/MM3 (ref 140–450)
PMV BLD AUTO: 9.9 FL (ref 6–12)
POTASSIUM SERPL-SCNC: 4 MMOL/L (ref 3.5–5.2)
RBC # BLD AUTO: 3.11 10*6/MM3 (ref 4.14–5.8)
SODIUM SERPL-SCNC: 139 MMOL/L (ref 136–145)
WBC NRBC COR # BLD AUTO: 5.68 10*3/MM3 (ref 3.4–10.8)

## 2024-11-14 PROCEDURE — 63710000001 INSULIN LISPRO (HUMAN) PER 5 UNITS: Performed by: HOSPITALIST

## 2024-11-14 PROCEDURE — 85027 COMPLETE CBC AUTOMATED: CPT | Performed by: STUDENT IN AN ORGANIZED HEALTH CARE EDUCATION/TRAINING PROGRAM

## 2024-11-14 PROCEDURE — 25010000002 CEFTRIAXONE PER 250 MG: Performed by: STUDENT IN AN ORGANIZED HEALTH CARE EDUCATION/TRAINING PROGRAM

## 2024-11-14 PROCEDURE — 80048 BASIC METABOLIC PNL TOTAL CA: CPT | Performed by: STUDENT IN AN ORGANIZED HEALTH CARE EDUCATION/TRAINING PROGRAM

## 2024-11-14 PROCEDURE — 25010000002 HEPARIN (PORCINE) PER 1000 UNITS: Performed by: STUDENT IN AN ORGANIZED HEALTH CARE EDUCATION/TRAINING PROGRAM

## 2024-11-14 PROCEDURE — 63710000001 INSULIN GLARGINE PER 5 UNITS: Performed by: STUDENT IN AN ORGANIZED HEALTH CARE EDUCATION/TRAINING PROGRAM

## 2024-11-14 PROCEDURE — 82948 REAGENT STRIP/BLOOD GLUCOSE: CPT

## 2024-11-14 RX ORDER — PANTOPRAZOLE SODIUM 40 MG/1
40 TABLET, DELAYED RELEASE ORAL
Qty: 60 TABLET | Refills: 0 | Status: SHIPPED | OUTPATIENT
Start: 2024-11-14

## 2024-11-14 RX ORDER — BLOOD-GLUCOSE METER
EACH MISCELLANEOUS
Qty: 1 KIT | Refills: 0 | Status: SHIPPED | OUTPATIENT
Start: 2024-11-14

## 2024-11-14 RX ORDER — INSULIN GLARGINE 100 [IU]/ML
15 INJECTION, SOLUTION SUBCUTANEOUS DAILY
Qty: 15 ML | Refills: 0 | Status: SHIPPED | OUTPATIENT
Start: 2024-11-14

## 2024-11-14 RX ORDER — INSULIN ASPART 100 [IU]/ML
5 INJECTION, SOLUTION INTRAVENOUS; SUBCUTANEOUS
Qty: 15 ML | Refills: 0 | Status: SHIPPED | OUTPATIENT
Start: 2024-11-14

## 2024-11-14 RX ORDER — OXYCODONE HYDROCHLORIDE 5 MG/1
5 TABLET ORAL EVERY 4 HOURS PRN
Qty: 8 TABLET | Refills: 0 | Status: SHIPPED | OUTPATIENT
Start: 2024-11-14

## 2024-11-14 RX ORDER — POLYETHYLENE GLYCOL 3350 17 G/17G
17 POWDER, FOR SOLUTION ORAL 2 TIMES DAILY
Qty: 510 G | Refills: 0 | Status: SHIPPED | OUTPATIENT
Start: 2024-11-14

## 2024-11-14 RX ORDER — PEN NEEDLE, DIABETIC 30 GX3/16"
1 NEEDLE, DISPOSABLE MISCELLANEOUS 4 TIMES DAILY PRN
Qty: 100 EACH | Refills: 0 | Status: SHIPPED | OUTPATIENT
Start: 2024-11-14

## 2024-11-14 RX ORDER — ACYCLOVIR 400 MG/1
1 TABLET ORAL CONTINUOUS
Qty: 3 EACH | Refills: 1 | Status: SHIPPED | OUTPATIENT
Start: 2024-11-14

## 2024-11-14 RX ORDER — VENLAFAXINE HYDROCHLORIDE 75 MG/1
75 CAPSULE, EXTENDED RELEASE ORAL DAILY
Start: 2024-11-14

## 2024-11-14 RX ORDER — LANCETS 30 GAUGE
EACH MISCELLANEOUS
Qty: 100 EACH | Refills: 0 | Status: SHIPPED | OUTPATIENT
Start: 2024-11-14

## 2024-11-14 RX ORDER — AMOXICILLIN 250 MG
2 CAPSULE ORAL 2 TIMES DAILY
Qty: 120 TABLET | Refills: 0 | Status: SHIPPED | OUTPATIENT
Start: 2024-11-14

## 2024-11-14 RX ORDER — METHOCARBAMOL 750 MG/1
750 TABLET, FILM COATED ORAL 4 TIMES DAILY PRN
Qty: 30 TABLET | Refills: 0 | Status: SHIPPED | OUTPATIENT
Start: 2024-11-14

## 2024-11-14 RX ORDER — CARVEDILOL 3.12 MG/1
3.12 TABLET ORAL EVERY 12 HOURS SCHEDULED
Qty: 60 TABLET | Refills: 0 | Status: SHIPPED | OUTPATIENT
Start: 2024-11-14

## 2024-11-14 RX ADMIN — METHOCARBAMOL 750 MG: 750 TABLET ORAL at 11:51

## 2024-11-14 RX ADMIN — PANTOPRAZOLE SODIUM 40 MG: 40 TABLET, DELAYED RELEASE ORAL at 17:22

## 2024-11-14 RX ADMIN — NALOXEGOL OXALATE 25 MG: 25 TABLET, FILM COATED ORAL at 09:20

## 2024-11-14 RX ADMIN — INSULIN GLARGINE 15 UNITS: 100 INJECTION, SOLUTION SUBCUTANEOUS at 09:21

## 2024-11-14 RX ADMIN — PANTOPRAZOLE SODIUM 40 MG: 40 TABLET, DELAYED RELEASE ORAL at 09:19

## 2024-11-14 RX ADMIN — INSULIN LISPRO 5 UNITS: 100 INJECTION, SOLUTION INTRAVENOUS; SUBCUTANEOUS at 09:20

## 2024-11-14 RX ADMIN — INSULIN LISPRO 5 UNITS: 100 INJECTION, SOLUTION INTRAVENOUS; SUBCUTANEOUS at 11:51

## 2024-11-14 RX ADMIN — CARVEDILOL 3.12 MG: 6.25 TABLET, FILM COATED ORAL at 09:19

## 2024-11-14 RX ADMIN — METHOCARBAMOL 750 MG: 750 TABLET ORAL at 09:20

## 2024-11-14 RX ADMIN — HEPARIN SODIUM 5000 UNITS: 5000 INJECTION INTRAVENOUS; SUBCUTANEOUS at 13:52

## 2024-11-14 RX ADMIN — CEFTRIAXONE 2000 MG: 2 INJECTION, POWDER, FOR SOLUTION INTRAMUSCULAR; INTRAVENOUS at 13:52

## 2024-11-14 RX ADMIN — VENLAFAXINE HYDROCHLORIDE 75 MG: 75 CAPSULE, EXTENDED RELEASE ORAL at 09:19

## 2024-11-14 RX ADMIN — ACETAMINOPHEN 1000 MG: 500 TABLET ORAL at 00:48

## 2024-11-14 RX ADMIN — METHOCARBAMOL 750 MG: 750 TABLET ORAL at 17:22

## 2024-11-14 RX ADMIN — LIDOCAINE 2 PATCH: 4 PATCH TOPICAL at 09:16

## 2024-11-14 NOTE — PLAN OF CARE
Goal Outcome Evaluation:         Patient was stable throughout this shift. He was discharged home with his home medications from the Pharmacy. His brother took him home  by 1730hrs.

## 2024-11-14 NOTE — PROGRESS NOTES
Nutrition Services    Patient Name: Donnie Bishop  YOB: 1975  MRN: [id number]  Admission date: 10/22/2024    PROGRESS NOTE      Encounter Information: Checking in on PO intake. Possible dc home today.        PO Diet: Diet: Gastrointestinal, Diabetic; Consistent Carbohydrate; Low Irritant; Fluid Consistency: Thin (IDDSI 0)   PO Supplements: -   PO Intake:  75% x 2 meals recorded since 11/10       Current nutrition support: -   Nutrition support review: -       Labs (reviewed below): Reviewed, management per attending         GI Function:  + BM on 11/13       Nutrition Intervention Updates: Continue current diet and encourage good PO intake.        Results from last 7 days   Lab Units 11/13/24  0436 11/12/24  0456 11/11/24  0557 11/10/24  0626 11/09/24 0342 11/08/24  0340   SODIUM mmol/L 138 135* 133*   < > 138 132*   POTASSIUM mmol/L 4.1 3.9 4.0   < > 5.2 5.1   CHLORIDE mmol/L 101 99 95*   < > 101 94*   CO2 mmol/L 27.4 26.0 25.6   < > 26.9 27.8   BUN mg/dL 10 12 9   < > 14 15   CREATININE mg/dL 0.92 0.97 0.72*   < > 0.83 0.92   CALCIUM mg/dL 8.9 9.0 9.6   < > 9.7 9.3   BILIRUBIN mg/dL  --   --   --   --  0.5 0.3   ALK PHOS U/L  --   --   --   --  313* 466*   ALT (SGPT) U/L  --   --   --   --  56* 88*   AST (SGOT) U/L  --   --   --   --  47* 203*   GLUCOSE mg/dL 91 163* 163*   < > 169* 232*    < > = values in this interval not displayed.     Results from last 7 days   Lab Units 11/14/24  0735 11/12/24  0456 11/11/24  0557 11/09/24 0342 11/08/24  0340   MAGNESIUM mg/dL  --   --   --   --  2.0   PHOSPHORUS mg/dL  --   --  3.1   < > 3.6   HEMOGLOBIN g/dL 9.3*   < > 9.4*   < > 10.7*   HEMATOCRIT % 28.4*   < > 28.8*   < > 32.7*    < > = values in this interval not displayed.     COVID19   Date Value Ref Range Status   10/25/2024 Not Detected Not Detected - Ref. Range Final     Lab Results   Component Value Date    HGBA1C 9.40 (H) 10/24/2024       Wt Readings from Last 10 Encounters:   11/13/24 0600 73.7 kg  (162 lb 7.7 oz)   11/12/24 0600 75.5 kg (166 lb 7.2 oz)   11/09/24 0558 77.2 kg (170 lb 3.1 oz)   11/07/24 0500 71.6 kg (157 lb 13.6 oz)   11/06/24 1500 71.7 kg (158 lb)   11/06/24 0600 72.6 kg (160 lb 0.9 oz)   11/05/24 1103 71.7 kg (158 lb)   11/04/24 0651 72.1 kg (158 lb 14.4 oz)   11/03/24 0543 74.5 kg (164 lb 3.9 oz)   11/02/24 0600 75.9 kg (167 lb 5.3 oz)   11/01/24 0700 77.7 kg (171 lb 4.8 oz)   10/31/24 0250 78.8 kg (173 lb 11.6 oz)   10/30/24 0540 81.3 kg (179 lb 3.7 oz)   10/30/24 0332 81.3 kg (179 lb 3.7 oz)   10/29/24 0700 85.3 kg (188 lb)   10/28/24 0524 85.2 kg (187 lb 13.3 oz)   10/26/24 1125 81 kg (178 lb 9.2 oz)   10/24/24 0558 81 kg (178 lb 9.6 oz)   10/22/24 1641 76.2 kg (168 lb)   08/31/24 0027 77.1 kg (170 lb)   08/09/24 1344 77.8 kg (171 lb 9.6 oz)   07/25/24 1328 74.8 kg (165 lb)   07/15/24 1551 75.3 kg (166 lb)   06/05/24 1152 76.7 kg (169 lb)   02/09/24 1305 75.7 kg (166 lb 12.8 oz)   01/12/24 1405 75.3 kg (166 lb)   12/26/23 2316 71.2 kg (157 lb)   12/26/23 1801 79.4 kg (175 lb)   12/21/23 1219 79.4 kg (175 lb)       [facility] to follow up per protocol.    Electronically signed by:  Mel Adams RD  11/14/24 08:37 EST

## 2024-11-14 NOTE — PAYOR COMM NOTE
"Donnie Ornelas (49 y.o. Male)                              ATTENTION; CONTINUED CLINICALS CASE [id number]                            REPLY TO UR DEPT  [phone] OR CALL   ELAN GARRETT LPN           Date of Birth   1975    Social Security Number       Address   [address]  [place] [address]    Home Phone   [phone]    MRN   [id number]       Congregation   [employer]    Marital Status                               Admission Date   10/22/24    Admission Type   Emergency    Admitting Provider   James Alexandra MD    Attending Provider   Jareth Bustillo MD    Department, Room/Bed   [facility] CORONARY CARE, N339/1       Discharge Date       Discharge Disposition   Home or Self Care    Discharge Destination                                 Attending Provider: Jareth Bustillo MD    Allergies: No Known Allergies    Isolation: None   Infection: None   Code Status: CPR    Ht: 170.2 cm (67\")   Wt: 73.7 kg (162 lb 7.7 oz)    Admission Cmt: None   Principal Problem: Epigastric abdominal pain [R10.13]                   Active Insurance as of 10/22/2024       Primary Coverage       Payor Plan Insurance Group Employer/Plan Group    ANTHEM BLUE CROSS ANTHEM BLUE CROSS BLUE SHIELD PPO [id number]       Payor Plan Address Payor Plan Phone Number Payor Plan Fax Number Effective Dates    [phone]  1/1/2019 - None Entered    [place] [address]         Subscriber Name Subscriber Birth Date Member ID       DONNIE ORNELAS 1975 [id number]                     Emergency Contacts        (Rel.) Home Phone Work Phone Mobile Phone    Murphy Padilla (Brother) -- -- [phone]    Charity Ornelas (Spouse) [phone] -- [phone]    joie montero (Relative) -- -- [phone]    Daniel Ornelas (Father) -- -- [phone]              Vital Signs (last 2 days)       Date/Time Temp Temp src Pulse Resp BP Patient Position SpO2    11/14/24 1154 97.7 (36.5) Oral -- -- " 123/85 Sitting --    11/14/24 0919 -- -- 82 -- 116/70 -- --    11/14/24 0817 98 (36.7) Oral 87 18 112/74 Lying --    11/13/24 1900 98.4 (36.9) -- 79 18 109/60 Lying 96    11/13/24 1440 98.2 (36.8) Oral -- 22 114/72 Lying --    11/13/24 0837 98.5 (36.9) Oral -- 18 111/68 Lying --    11/13/24 0440 98.1 (36.7) Oral 86 16 118/79 -- 96    11/12/24 2309 98 (36.7) -- 81 20 124/75 Lying 96    11/12/24 2000 97.7 (36.5) -- 77 18 120/73 Lying 98    11/12/24 1358 98.4 (36.9) Oral 81 16 107/68 Lying 98    11/12/24 1159 98.2 (36.8) Oral 82 26 110/68 Lying 97    11/12/24 0855 -- -- 75 -- 98/71 -- --    11/12/24 0849 98.3 (36.8) Oral 79 16 107/70 Lying 94    11/12/24 0421 98.9 (37.2) -- 89 16 94/64 Lying 94          Oxygen Therapy (last 2 days)       Date/Time SpO2 Device (Oxygen Therapy) Flow (L/min) (Oxygen Therapy) Oxygen Concentration (%) ETCO2 (mmHg)    11/14/24 0800 -- room air -- -- --    11/13/24 1900 96 room air -- -- --    11/13/24 1600 -- room air -- -- --    11/13/24 1220 -- room air -- -- --    11/13/24 0916 -- room air -- -- --    11/13/24 0440 96 room air -- -- --    11/12/24 2309 96 room air -- -- --    11/12/24 2000 98 room air -- -- --    11/12/24 1358 98 room air -- -- --    11/12/24 1159 97 room air -- -- --    11/12/24 0849 94 room air -- -- --    11/12/24 0800 -- room air -- -- --    11/12/24 0421 94 room air -- -- --          Lines, Drains & Airways       Active LDAs       Name Placement date Placement time Site Days    Peripheral IV 11/12/24 0206 Right;Anterior Forearm 11/12/24  0206  Forearm  2                  Orders (last 48 hrs)        Start     Ordered    11/14/24 1141  POC Glucose Once  PROCEDURE ONCE        Comments: Complete no more than 45 minutes prior to patient eating      11/14/24 1138    11/14/24 0846  Discontinue IV  Once         11/14/24 0846    11/14/24 0830  Discharge patient  Once         11/14/24 0846    11/14/24 0810  POC Glucose Once  PROCEDURE ONCE        Comments: Complete no more  than 45 minutes prior to patient eating      11/14/24 0807    11/14/24 0000  Blood Glucose Monitoring Suppl (OneTouch Verio Flex System) w/Device kit        Note to Pharmacy: Please fill for OneTouch Verio supplies    11/14/24 0846    11/14/24 0000  glucose blood test strip        Note to Pharmacy: Please fill for OneTouch Verio supplies    11/14/24 0846    11/14/24 0000  Lancets misc        Note to Pharmacy: Please fill for OneTouch Verio supplies    11/14/24 0846    11/14/24 0000  Insulin Pen Needle (Pen Needles) 32G X 4 MM misc  4 Times Daily PRN         11/14/24 0846    11/14/24 0000  Insulin Glargine (Lantus SoloStar) 100 UNIT/ML injection pen  Daily        Note to Pharmacy: Dr Bustillo, or [facility], pls enter insulin dose for dc thank you.    11/14/24 0846    11/14/24 0000  insulin aspart (NovoLOG FlexPen) 100 UNIT/ML solution pen-injector sc pen  3 Times Daily With Meals        Note to Pharmacy: Dr Bustillo, or [facility], pls enter insulin dose for meal dose. Thank you.    11/14/24 0846    11/14/24 0000  Continuous Glucose Sensor (Dexcom G7 Sensor) misc  Continuous         11/14/24 0846    11/14/24 0000  oxyCODONE (ROXICODONE) 5 MG immediate release tablet  Every 4 Hours PRN         11/14/24 0846    11/14/24 0000  venlafaxine XR (EFFEXOR-XR) 75 MG 24 hr capsule  Daily         11/14/24 0846    11/14/24 0000  carvedilol (COREG) 3.125 MG tablet  Every 12 Hours Scheduled         11/14/24 0846    11/14/24 0000  amoxicillin-clavulanate (AUGMENTIN) 875-125 MG per tablet  2 Times Daily         11/14/24 0846    11/14/24 0000  polyethylene glycol (MIRALAX) 17 GM/SCOOP powder  2 Times Daily         11/14/24 0846    11/14/24 0000  sennosides-docusate (PERICOLACE) 8.6-50 MG per tablet  2 Times Daily         11/14/24 0846    11/14/24 0000  methocarbamol (ROBAXIN) 750 MG tablet  4 Times Daily PRN         11/14/24 0846    11/14/24 0000  pantoprazole (PROTONIX) 40 MG EC tablet  2 Times Daily Before Meals         11/14/24 0846    11/14/24  0000  Diet: Regular/House Diet, Diabetic Diets, Gastrointestinal Diets; Consistent Carbohydrate; Low Irritant         11/14/24 0846    11/14/24 0000  Activity as Tolerated         11/14/24 0846    11/14/24 0000  Call MD for problems / concerns.         11/14/24 0846    11/14/24 0000  naloxone (NARCAN) 4 MG/0.1ML nasal spray         11/14/24 0846    11/13/24 2100  carvedilol (COREG) tablet 3.125 mg  Every 12 Hours Scheduled         11/13/24 1156    11/13/24 1951  POC Glucose Once  PROCEDURE ONCE        Comments: Complete no more than 45 minutes prior to patient eating      11/13/24 1948    11/13/24 1626  POC Glucose Once  PROCEDURE ONCE        Comments: Complete no more than 45 minutes prior to patient eating      11/13/24 1624    11/13/24 1316  Code Status and Medical Interventions: CPR (Attempt to Resuscitate); Full Support  Continuous         11/13/24 1315    11/13/24 1315  Code Status and Medical Interventions: No CPR (Do Not Attempt to Resuscitate); Limited Support; No intubation (DNI)  Continuous,   Status:  Canceled         11/13/24 1315    11/13/24 1156  HYDROmorphone (DILAUDID) injection 0.25 mg  Every 8 Hours PRN,   Status:  Discontinued         11/13/24 1157    11/13/24 1156  Inpatient Diabetes Educator Consult  Once        Provider:  (Not yet assigned)    11/13/24 1156    11/13/24 1122  POC Glucose Once  PROCEDURE ONCE        Comments: Complete no more than 45 minutes prior to patient eating      11/13/24 1119    11/13/24 0651  POC Glucose Once  PROCEDURE ONCE        Comments: Complete no more than 45 minutes prior to patient eating      11/13/24 0649    11/12/24 2007  POC Glucose Once  PROCEDURE ONCE        Comments: Complete no more than 45 minutes prior to patient eating      11/12/24 2000 11/12/24 1814  POC Glucose Once  PROCEDURE ONCE        Comments: Complete no more than 45 minutes prior to patient eating      11/12/24 1812    11/12/24 1708  POC Glucose Once  PROCEDURE ONCE        Comments:  Complete no more than 45 minutes prior to patient eating      11/12/24 1706    11/12/24 1200  Lidocaine 4 % 2 patch  Every 24 Hours Scheduled         11/12/24 0939    11/11/24 1800  insulin lispro (HUMALOG/ADMELOG) injection 5 Units  3 Times Daily With Meals         11/11/24 1226    11/11/24 1245  methocarbamol (ROBAXIN) tablet 750 mg  4 Times Daily         11/11/24 1153    11/11/24 1153  acetaminophen (TYLENOL) tablet 1,000 mg  Every 6 Hours PRN         11/11/24 1153    11/11/24 1152  HYDROmorphone (DILAUDID) injection 0.25 mg  Every 4 Hours PRN,   Status:  Discontinued         11/11/24 1153    11/11/24 1152  oxyCODONE (ROXICODONE) immediate release tablet 10 mg  Every 4 Hours PRN         11/11/24 1153    11/11/24 1152  oxyCODONE (ROXICODONE) immediate release tablet 5 mg  Every 4 Hours PRN         11/11/24 1153    11/10/24 0600  CBC (No Diff)  Daily       11/09/24 1751    11/10/24 0600  Basic Metabolic Panel  Daily       11/09/24 1751    11/08/24 1800  Clear & Record PCA Settings  2x Daily PCA       11/08/24 1256    11/08/24 1257  Drain Care  Every Shift      Comments: Empty drain, record strict output, recharge, every shift and as needed.    11/08/24 1256    11/08/24 1256  naloxone (NARCAN) injection 0.1 mg  Every 5 Minutes PRN         11/08/24 1256    11/07/24 1013  Magnesium Standard Dose Replacement - Follow Nurse / BPA Driven Protocol  As Needed         11/07/24 1013    11/05/24 2100  insulin glargine (LANTUS, SEMGLEE) injection 15 Units  Every 12 Hours Scheduled         11/05/24 1006    11/05/24 1130  insulin lispro (HUMALOG/ADMELOG) injection 2-7 Units  4 Times Daily Before Meals & Nightly         11/05/24 1006    11/05/24 1003  simethicone (MYLICON) chewable tablet 80 mg  4 Times Daily PRN         11/05/24 1003    10/31/24 2100  carvedilol (COREG) tablet 6.25 mg  Every 12 Hours Scheduled,   Status:  Discontinued         10/31/24 1504    10/31/24 1345  cefTRIAXone (ROCEPHIN) 2,000 mg in sodium chloride 0.9  "% 100 mL MBP  Every 24 Hours         10/31/24 1253    10/31/24 0912  Strict Intake & Output  Every Shift       10/31/24 0911    10/31/24 0519  Daily CHG Bath While in ICU  Daily      Comments: Use for admission bath & daily bath for duration of critical care stay    10/31/24 0518    10/29/24 1430  Naloxegol Oxalate (MOVANTIK) tablet 25 mg  Every Morning         10/29/24 1237    10/29/24 1000  Incentive Spirometry  Every 4 Hours While Awake       10/29/24 0648    10/28/24 1400  heparin (porcine) 5000 UNIT/ML injection 5,000 Units  Every 8 Hours Scheduled         10/28/24 1223    10/27/24 0900  polyethylene glycol (MIRALAX) packet 17 g  2 Times Daily        Placed in \"And\" Linked Group    10/27/24 0700    10/27/24 0900  sennosides-docusate (PERICOLACE) 8.6-50 MG per tablet 2 tablet  2 Times Daily        Placed in \"And\" Linked Group    10/27/24 0700    10/27/24 0700  bisacodyl (DULCOLAX) EC tablet 5 mg  Daily PRN        Placed in \"And\" Linked Group    10/27/24 0700    10/27/24 0700  bisacodyl (DULCOLAX) suppository 10 mg  Daily PRN        Placed in \"And\" Linked Group    10/27/24 0700    10/26/24 1800  Incentive Spirometry  Every 4 Hours While Awake       10/26/24 1617    10/24/24 1730  pantoprazole (PROTONIX) EC tablet 40 mg  2 Times Daily Before Meals         10/24/24 1302    10/23/24 0900  venlafaxine XR (EFFEXOR-XR) 24 hr capsule 75 mg  Daily         10/22/24 2242    10/23/24 0830  influenza virus vacc split PF FLUZONE 0.5 mL  During Hospitalization         10/22/24 2332    10/23/24 0800  Oral Care  2 Times Daily       10/22/24 2026    10/23/24 0727  Daily Weights  Daily       10/23/24 0726    10/23/24 0725  Potassium Replacement - Follow Nurse / BPA Driven Protocol  As Needed         10/23/24 0726    10/23/24 0725  Phosphorus Replacement - Follow Nurse / BPA Driven Protocol  As Needed         10/23/24 0726    10/23/24 0725  Calcium Replacement - Follow Nurse / BPA Driven Protocol  As Needed         10/23/24 0726    " "10/23/24 0000  Vital Signs  Every 4 Hours       10/22/24 2026    10/22/24 2241  hydrOXYzine (ATARAX) tablet 10 mg  Every 8 Hours PRN         10/22/24 2242    10/22/24 2200  POC Glucose 4x Daily Before Meals & at Bedtime  4 Times Daily Before Meals & at Bedtime      Comments: Complete no more than 45 minutes prior to patient eating      10/22/24 2113    10/22/24 2113  naloxone (NARCAN) injection 0.4 mg  Every 5 Minutes PRN        Placed in \"And\" Linked Group    10/22/24 2113    10/22/24 2112  dextrose (GLUTOSE) oral gel 15 g  Every 15 Minutes PRN         10/22/24 2113    10/22/24 2112  dextrose (D50W) (25 g/50 mL) IV injection 25 g  Every 15 Minutes PRN         10/22/24 2113    10/22/24 2112  glucagon (GLUCAGEN) injection 1 mg  Every 15 Minutes PRN         10/22/24 2113    10/22/24 2025  melatonin tablet 2.5 mg  Nightly PRN         10/22/24 2026    10/22/24 2025  Intake & Output  Every Shift       10/22/24 2026    10/22/24 2024  ondansetron ODT (ZOFRAN-ODT) disintegrating tablet 4 mg  Every 6 Hours PRN        Placed in \"Or\" Linked Group    10/22/24 2026    10/22/24 2024  ondansetron (ZOFRAN) injection 4 mg  Every 6 Hours PRN        Placed in \"Or\" Linked Group    10/22/24 2026    10/22/24 1551  sodium chloride 0.9 % flush 10 mL  As Needed         10/22/24 1552    Unscheduled  Follow Hypoglycemia Standing Orders For Blood Glucose <70 & Notify Provider of Treatment  As Needed      Comments: Follow Hypoglycemia Orders As Outlined in Process Instructions (Open Order Report to View Full Instructions)  Notify Provider Any Time Hypoglycemia Treatment is Administered    10/22/24 2113    Unscheduled  Oxygen Therapy- Nasal Cannula; Titrate 1-6 LPM Per SpO2; 90 - 95%  Continuous PRN       11/08/24 1256    Unscheduled  Bladder Scan if Patient Unable to Void 4-6 Hours After Catheter Removal  As Needed         11/08/24 1256    Unscheduled  If Bladder Scan Volume is Less Than 500mL & Patient is Without Symptoms of Bladder Discomfort " / Distention Monitor Every 1-2 Hours for Spontaneous Void  As Needed       11/08/24 1256    Unscheduled  Straight Cath Every 4-6 Hours As Needed If Patient is Unable to Void After 4-6 Hours, Bladder Scan Volume is Greater Than 500mL & Patient Has Symptoms of Bladder Discomfort / Distention  As Needed       11/08/24 1256    Unscheduled  Schedule / Prompt Voiding For Patients With Urinary Incontinence  As Needed       11/08/24 1256    Unscheduled  Bladder Scan if Patient Unable to Void 4-6 Hours After Catheter Removal  As Needed         11/09/24 1319    Unscheduled  If Bladder Scan Volume is Less Than 500mL & Patient is Without Symptoms of Bladder Discomfort / Distention Monitor Every 1-2 Hours for Spontaneous Void  As Needed       11/09/24 1319    Unscheduled  Straight Cath Every 4-6 Hours As Needed If Patient is Unable to Void After 4-6 Hours, Bladder Scan Volume is Greater Than 500mL & Patient Has Symptoms of Bladder Discomfort / Distention  As Needed       11/09/24 1319    Unscheduled  Schedule / Prompt Voiding For Patients With Urinary Incontinence  As Needed       11/09/24 1319                       Shawna Espinosa MSW     Case Management     Case Management/Social Work     Signed     Date of Service: 11/12/24 1322  Creation Time: 11/12/24 1322     Signed         Continued Stay Note  [facility]     Patient Name: Donnie Bishop                    MRN: [id number]  Today's Date: 11/12/2024              Admit Date: 10/22/2024     Plan: plans home; refuses HH/SNF    Discharge Plan         Row Name 11/12/24 1321           Plan     Plan plans home; refuses HH/SNF     Patient/Family in Agreement with Plan yes     Plan Comments Spoke with pt bedside wtih friend in room, he is agreeable to talk in front of his friend. Pt reports he plans home but he has been living alone since him and his wife seperated. Pt reports he has a brother and friends that can assist as needed but not stay with him. Discussed HH  and SNF options however pt refuses all referrals. CCP to follow. Discussed with RN.                         Discharge Codes    No documentation.                      Expected Discharge Date and Time         Expected Discharge Date Expected Discharge Time     Nov 15, 2024                     RODOLFO Barnett                                                              Physician Progress Notes (last 48 hours)        Jareth Bustillo MD at 24 1157            Name: Donnie Bishop ADMIT: 10/22/2024   : 1975  PCP: Juju Kennedy MD    MRN: [id number] LOS: 20 days   AGE/SEX: 49 y.o. male  [facility]     Subjective   Subjective   Tolerating a diet. No new complaints. States his pain is controlled.    Objective   Objective   Vital Signs  Temp:  [97.7 °F (36.5 °C)-98.5 °F (36.9 °C)] 98.5 °F (36.9 °C)  Heart Rate:  [77-86] 86  Resp:  [16-20] 18  BP: (107-124)/(68-79) 111/68  SpO2:  [96 %-98 %] 96 %  on   ;   Device (Oxygen Therapy): room air  Body mass index is 25.45 kg/m².    Physical Exam  Constitutional:       General: He is not in acute distress.     Appearance: He is ill-appearing (chronic). He is not toxic-appearing.   HENT:      Head: Normocephalic and atraumatic.   Cardiovascular:      Rate and Rhythm: Normal rate and regular rhythm.   Pulmonary:      Effort: Pulmonary effort is normal. No respiratory distress.      Breath sounds: Decreased breath sounds present.   Abdominal:      Palpations: Abdomen is soft.      Tenderness: There is no guarding or rebound.   Musculoskeletal:         General: No swelling.      Right lower leg: No edema.      Left lower leg: No edema.   Skin:     General: Skin is warm and dry.   Neurological:      General: No focal deficit present.      Mental Status: He is alert and oriented to person, place, and time.   Psychiatric:         Mood and Affect: Affect is flat.         Behavior: Behavior normal.     Results Review  I reviewed the patient's new clinical  results.  Results from last 7 days   Lab Units 11/13/24 0436 11/12/24 0456 11/11/24  0557 11/10/24  0626   WBC 10*3/mm3 4.97 5.85 6.85 10.34   HEMOGLOBIN g/dL 8.8* 8.1* 9.4* 9.1*   PLATELETS 10*3/mm3 429 404 465* 530*     Results from last 7 days   Lab Units 11/13/24 0436 11/12/24 0456 11/11/24 0557 11/10/24  0626   SODIUM mmol/L 138 135* 133* 134*   POTASSIUM mmol/L 4.1 3.9 4.0 4.3   CHLORIDE mmol/L 101 99 95* 95*   CO2 mmol/L 27.4 26.0 25.6 25.3   BUN mg/dL 10 12 9 12   CREATININE mg/dL 0.92 0.97 0.72* 0.83   GLUCOSE mg/dL 91 163* 163* 173*     Lab Results   Component Value Date    ANIONGAP 9.6 11/13/2024     Estimated Creatinine Clearance: 103.5 mL/min (by C-G formula based on SCr of 0.92 mg/dL).   Lab Results   Component Value Date    EGFR 103.2 11/13/2024     Results from last 7 days   Lab Units 11/11/24  0557 11/09/24 0342 11/08/24 0340 11/07/24  0628   ALBUMIN g/dL 2.9* 3.2* 3.2* 3.0*   BILIRUBIN mg/dL  --  0.5 0.3 0.3   ALK PHOS U/L  --  313* 466* 106   AST (SGOT) U/L  --  47* 203* 16   ALT (SGPT) U/L  --  56* 88* 18     Results from last 7 days   Lab Units 11/13/24 0436 11/12/24 0456 11/11/24  0557 11/10/24  0626 11/09/24 0342 11/08/24 0340 11/07/24  0628   CALCIUM mg/dL 8.9 9.0 9.6 9.8 9.7 9.3 9.4   ALBUMIN g/dL  --   --  2.9*  --  3.2* 3.2* 3.0*   MAGNESIUM mg/dL  --   --   --   --   --  2.0 1.5*   PHOSPHORUS mg/dL  --   --  3.1  --  4.2 3.6 4.0       Glucose   Date/Time Value Ref Range Status   11/13/2024 1119 83 70 - 130 mg/dL Final   11/13/2024 0649 96 70 - 130 mg/dL Final   11/12/2024 2000 140 (H) 70 - 130 mg/dL Final   11/12/2024 1812 93 70 - 130 mg/dL Final   11/12/2024 1706 63 (L) 70 - 130 mg/dL Final   11/12/2024 1125 120 70 - 130 mg/dL Final   11/12/2024 0752 140 (H) 70 - 130 mg/dL Final       No radiology results for the last day    Scheduled Meds  [Held by provider] carvedilol, 3.125 mg, Oral, Q12H  cefTRIAXone, 2,000 mg, Intravenous, Q24H  heparin (porcine), 5,000 Units,  Subcutaneous, Q8H  insulin glargine, 15 Units, Subcutaneous, Q12H  insulin lispro, 2-7 Units, Subcutaneous, 4x Daily AC & at Bedtime  insulin lispro, 5 Units, Subcutaneous, TID With Meals  Lidocaine, 2 patch, Transdermal, Q24H  methocarbamol, 750 mg, Oral, 4x Daily  Naloxegol Oxalate, 25 mg, Oral, QAM  pantoprazole, 40 mg, Oral, BID AC  senna-docusate sodium, 2 tablet, Oral, BID   And  polyethylene glycol, 17 g, Oral, BID  venlafaxine XR, 75 mg, Oral, Daily    Continuous Infusions     PRN Meds    acetaminophen    senna-docusate sodium **AND** polyethylene glycol **AND** bisacodyl **AND** bisacodyl    Calcium Replacement - Follow Nurse / BPA Driven Protocol    dextrose    dextrose    glucagon (human recombinant)    HYDROmorphone    hydrOXYzine    influenza vaccine    Magnesium Standard Dose Replacement - Follow Nurse / BPA Driven Protocol    melatonin    naloxone    [DISCONTINUED] Morphine **AND** naloxone    ondansetron ODT **OR** ondansetron    oxyCODONE    oxyCODONE    Phosphorus Replacement - Follow Nurse / BPA Driven Protocol    Potassium Replacement - Follow Nurse / BPA Driven Protocol    simethicone    sodium chloride       Diet  Diet: Gastrointestinal, Diabetic; Consistent Carbohydrate; Low Irritant; Fluid Consistency: Thin (IDDSI 0)      Assessment/Plan     Active Hospital Problems    Diagnosis  POA    **Epigastric abdominal pain [R10.13]  Yes    Abscess of liver [K75.0]  Yes    Klebsiella pneumoniae (k. pneumoniae) as the cause of diseases classified elsewhere [B96.1]  Yes    Hypotension [I95.9]  Yes    Transaminitis [R74.01]  Yes    Leukocytosis [D72.829]  Yes    Chronic combined systolic and diastolic heart failure [I50.42]  Yes    Calculus of gallbladder with acute on chronic cholecystitis without obstruction [K80.12]  Unknown    Anxiety [F41.9]  Yes    Multiple-type hyperlipidemia [E78.2]  Yes    Type 2 diabetes mellitus with diabetic neuropathy, with long-term current use of insulin [E11.40, Z79.4]   Not Applicable    Benign essential HTN [I10]  Yes      Resolved Hospital Problems    Diagnosis Date Resolved POA    Acute kidney injury [N17.9] 10/30/2024 No     49 y.o. male admitted with Epigastric abdominal pain.     Liver abscess/biloma: Status post IR drainage and drain placement 10/28.  Status post IR drain exchange 11/5.  Status post IR drain placement 11/6 (out now).  Klebsiella.  Continue Rocephin.  Abx through 11/15/24- oral abx plan in place if he is ready to discharge. Infectious disease has signed off- po abx plan in place.  Cholecystitis: GI evaluated and he had EGD earlier this admission on 10/24.  Status post open cholecystectomy and right upper quadrant drain placement 11/8.  No filling deficits were noted on the intraoperative cholangiogram.  Surgery following. NGT removed, diet advanced. Now on oral pain medications. Ambulating up ad maria luisa. Diet: Gastrointestinal, Diabetic; Consistent Carbohydrate; Low Irritant; Fluid Consistency: Thin (IDDSI 0)   Hypotension: Had improved with resuscitation and midodrine.  The midodrine has since been stopped.  Pulmonology evaluated. Low normal today continue to monitor and restart carvedilol lower dose.  TENISHA: Multifactorial and now improved.  Nephrology stated was okay for him to start GDMT for CHF at discharge as tolerated. Nephrology cleared for dc. Plan follow-up in 1 week in clinic.  NICM/bigeminy: EF 49%.  Home Entresto, Aldactone, and Jardiance are held.  Cardiology signed off. Restart coreg make sure he tolerates.  Diabetes: A1c 9.4.  Was on Jardiance metformin and insulin prior to admission.  Had hypo and hyperglycemia this admission.  Continue insulin and monitor requirements. Glucoses acceptable no more hypoglycemia. Diabetes teaching may need basal/bolus at dc.  Hypertension: Low normal BPs now.  Lasix held. Resuming coreg.    PPX: Subcutaneous heparin    Discharge  Home tomorrow if tolerates coreg and have DM educator see. Refuses home health and SNF  options  Expected Discharge Date: 2024; Expected Discharge Time:     Discussed with patient and nursing staff    Jareth Bustillo MD  [facility]  24    Electronically signed by Jareth Bustillo MD at 24 1201       Evan Pat MD at 24 1116              [facility] Progress Note      Patient Name: Donnie Bishop  : 1975  MRN: [id number]  Primary Care Physician:  Juju Kennedy MD  Date of admission: 10/22/2024    Subjective     Interval History:   F/u TENISHA     Review of Systems:   Patient doing much better today.  Denies any nausea or vomiting.  No fever no chills appetite seems to be  Objective     Vitals:   Temp:  [97.7 °F (36.5 °C)-98.5 °F (36.9 °C)] 98.5 °F (36.9 °C)  Heart Rate:  [77-86] 86  Resp:  [16-26] 18  BP: (107-124)/(68-79) 111/68    Intake/Output Summary (Last 24 hours) at 2024 1116  Last data filed at 2024 0500  Gross per 24 hour   Intake 335 ml   Output 450 ml   Net -115 ml       Physical Exam:    General Appearance: frail AAM comfortable on RA, speech/mentation slow  Neck: supple, no JVD  Lungs: CTA bilat no rales  Heart: RRR, normal S1 and S2  Abdomen: soft, nontender, nondistended  Extremities: no edema, cyanosis or clubbing        Scheduled Meds:     [Held by provider] carvedilol, 6.25 mg, Oral, Q12H  cefTRIAXone, 2,000 mg, Intravenous, Q24H  heparin (porcine), 5,000 Units, Subcutaneous, Q8H  insulin glargine, 15 Units, Subcutaneous, Q12H  insulin lispro, 2-7 Units, Subcutaneous, 4x Daily AC & at Bedtime  insulin lispro, 5 Units, Subcutaneous, TID With Meals  Lidocaine, 2 patch, Transdermal, Q24H  methocarbamol, 750 mg, Oral, 4x Daily  Naloxegol Oxalate, 25 mg, Oral, QAM  pantoprazole, 40 mg, Oral, BID AC  senna-docusate sodium, 2 tablet, Oral, BID   And  polyethylene glycol, 17 g, Oral, BID  venlafaxine XR, 75 mg, Oral, Daily      IV Meds:            Results Reviewed:   I have personally reviewed  the results from the time of this admission to 11/13/2024 11:16 EST     Results from last 7 days   Lab Units 11/13/24  0436 11/12/24  0456 11/11/24  0557 11/10/24  0626 11/09/24  0342 11/08/24  0340 11/07/24  0628   SODIUM mmol/L 138 135* 133*   < > 138 132* 133*   POTASSIUM mmol/L 4.1 3.9 4.0   < > 5.2 5.1 4.8   CHLORIDE mmol/L 101 99 95*   < > 101 94* 95*   CO2 mmol/L 27.4 26.0 25.6   < > 26.9 27.8 29.1*   BUN mg/dL 10 12 9   < > 14 15 13   CREATININE mg/dL 0.92 0.97 0.72*   < > 0.83 0.92 0.89   CALCIUM mg/dL 8.9 9.0 9.6   < > 9.7 9.3 9.4   BILIRUBIN mg/dL  --   --   --   --  0.5 0.3 0.3   ALK PHOS U/L  --   --   --   --  313* 466* 106   ALT (SGPT) U/L  --   --   --   --  56* 88* 18   AST (SGOT) U/L  --   --   --   --  47* 203* 16   GLUCOSE mg/dL 91 163* 163*   < > 169* 232* 338*    < > = values in this interval not displayed.     Estimated Creatinine Clearance: 103.5 mL/min (by C-G formula based on SCr of 0.92 mg/dL).  Results from last 7 days   Lab Units 11/11/24 0557 11/09/24 0342 11/08/24 0340 11/07/24 0628   MAGNESIUM mg/dL  --   --  2.0 1.5*   PHOSPHORUS mg/dL 3.1 4.2 3.6 4.0           Results from last 7 days   Lab Units 11/13/24  0436 11/12/24  0456 11/11/24  0557 11/10/24  0626 11/09/24  0342   WBC 10*3/mm3 4.97 5.85 6.85 10.34 10.95*   HEMOGLOBIN g/dL 8.8* 8.1* 9.4* 9.1* 11.3*   PLATELETS 10*3/mm3 429 404 465* 530* 696*             Assessment / Plan     ASSESSMENT:  Acute kidney injury.  Multifactorial including hypovolemia, hypotension, impaired renal autoregulation due to Jardiance, Entresto, Aldactone.  Resolved.  Cr ~ 1.   Cholecystitis with large right perihepatic fluid collection.  Plan noted for IR drainage today.  On Rocephin per ID.  Transaminitis improving.  Off statin.  MRCP noted.  Heart failure reduced ejection fraction.  EF 49%.  (Improved from 35% a year ago).  Anemia.    Encephalopathy.  Much improved  Diabetes mellitus type 2.  Hypotension -blood pressure continues to be  "soft    PLAN:  Kidney function is back to baseline and blood pressure has been acceptable.  Nephrology will sign off please call if you have questions or concerns    Evan Pat MD  11/13/24  11:16 [facility]    [facility]  [phone]    Electronically signed by Evan Pat MD at 11/13/24 1116       Manuela Wisdom MD at 11/13/24 1047          General Surgery Progress Note    CC: Postop day 5 laparoscopic converted to open cholecystectomy with intraoperative cholangiogram and right upper quadrant drain placement for cholecystitis with infected subcapsular biloma    S: Patient states he is feeling ok today. Denies NV. Tolerating regular diet. Had a bowel movement last night. Abdominal pain better controlled today. Has been up out of bed to use the restroom. Declined PT services yesterday. Wants to go home rather than rehab/SNF.    O:/68 (BP Location: Right arm, Patient Position: Lying)   Pulse 86   Temp 98.5 °F (36.9 °C) (Oral)   Resp 18   Ht 170.2 cm (67\")   Wt 73.7 kg (162 lb 7.7 oz)   SpO2 96%   BMI 25.45 kg/m²     Intake & Output (last day)         11/12 0701  11/13 0700 11/13 0701  11/14 0700    P.O. 195     I.V. (mL/kg) 40 (0.5)     IV Piggyback 100     Total Intake(mL/kg) 335 (4.5)     Urine (mL/kg/hr) 450 (0.3)     Stool      Total Output 450     Net -115           Urine Unmeasured Occurrence 1 x             GENERAL: awake, alert, interactive, cooperative  HEENT: EOMI, clear sclera, moist mucus membranes   CHEST: normal work of breathing on room air  CARDIAC: well perfused, regular rate  GI: Abd soft, nondistended, appropriately tender around incisions which are clean and dry with staples in place, lidocaine patches noted, right sided abdominal drain site bandaged; no signs of infection  EXTREMITIES: no cyanosis  SKIN: Warm and dry, no rash    LABS  Results from last 7 days   Lab Units 11/13/24  0436 11/12/24  0456 11/11/24  0557   WBC 10*3/mm3 4.97 5.85 6.85 "   HEMOGLOBIN g/dL 8.8* 8.1* 9.4*   HEMATOCRIT % 27.1* 24.1* 28.8*   PLATELETS 10*3/mm3 429 404 465*     Results from last 7 days   Lab Units 11/13/24  0436 11/12/24  0456 11/11/24  0557 11/10/24  0626 11/09/24  0342 11/08/24  0340 11/07/24  0628   SODIUM mmol/L 138 135* 133*   < > 138 132* 133*   POTASSIUM mmol/L 4.1 3.9 4.0   < > 5.2 5.1 4.8   CHLORIDE mmol/L 101 99 95*   < > 101 94* 95*   CO2 mmol/L 27.4 26.0 25.6   < > 26.9 27.8 29.1*   BUN mg/dL 10 12 9   < > 14 15 13   CREATININE mg/dL 0.92 0.97 0.72*   < > 0.83 0.92 0.89   CALCIUM mg/dL 8.9 9.0 9.6   < > 9.7 9.3 9.4   BILIRUBIN mg/dL  --   --   --   --  0.5 0.3 0.3   ALK PHOS U/L  --   --   --   --  313* 466* 106   ALT (SGPT) U/L  --   --   --   --  56* 88* 18   AST (SGOT) U/L  --   --   --   --  47* 203* 16   GLUCOSE mg/dL 91 163* 163*   < > 169* 232* 338*    < > = values in this interval not displayed.             IMAGING:  No new imaging    A/P: 49 y.o. male who presented to the hospital with right upper quadrant pain and apparent chronic cholecystitis, initially was thought to have gastritis or duodenitis but was noted to have normal findings on EGD.  Developed spontaneous biloma for which he underwent percutaneous drainage and antibiotic treatment targeted towards Klebsiella.  Ultimately was felt to have cholecystitis as the source of his biloma.  On 11/8/2024 he underwent laparoscopic converted to open cholecystectomy with intraoperative cholangiogram with acute cholecystitis and gallbladder perforation into the liver noted.  He also had a right upper quadrant drain placed which was removed ywo days ago after it demonstrated no evidence of bile leak.    Patient is doing well POD5 from surgery. AVSS. Tolerating regular diabetic diet. Having bowel function.  Pain controlled on oral meds.  Incisions are healing in good order.    Has antibiotics plan in place per ID.  Discussed path.  Stable for discharge from my standpoint. Plan follow up in the office with  me in 9 days for recheck and staple removal. Counseled patient postop wound care, activity restrictions, pain control, diet, follow up, as well as on signs/symptoms concerning for need to call the office or return to ER. Patient agreeable with plan.      Manuela Wisdom MD  General, Robotic and Endoscopic Surgery  [facility]    [address], [address]  [place], [place], [address]  P: [phone]  F: [phone]       Electronically signed by Manuela Wisdom MD at 11/13/24 1052              All medication doses during the admission are shown, including meds that are no longer on order.  Scheduled Meds Sorted by Name  for Donnie Bishop as of 11/12/24 through 11/14/24    1 Day 3 Days 7 Days 10 Days < Today >   Legend:       Medications 11/12/24 11/13/24 11/14/24   atorvastatin (LIPITOR) tablet 40 mg  Dose: 40 mg  Freq: Daily Route: PO  Start: 10/23/24 0900 End: 10/27/24 1636   Admin Instructions:            baclofen (LIORESAL) tablet 5 mg  Dose: 5 mg  Freq: Once Route: PO  Start: 11/11/24 0415 End: 11/11/24 0511   Admin Instructions:            carvedilol (COREG) tablet 12.5 mg  Dose: 12.5 mg  Freq: Every 12 Hours Scheduled Route: PO  Start: 10/24/24 2100 End: 10/24/24 1903   Admin Instructions:            carvedilol (COREG) tablet 25 mg  Dose: 25 mg  Freq: Every 12 Hours Scheduled Route: PO  Start: 10/23/24 0900 End: 10/24/24 1846   Admin Instructions:            carvedilol (COREG) tablet 3.125 mg  Dose: 3.125 mg  Freq: Every 12 Hours Scheduled Route: PO  Start: 11/13/24 2100   Admin Instructions:       1010        1435   2200       0919 2100         carvedilol (COREG) tablet 3.125 mg  Dose: 3.125 mg  Freq: Every 12 Hours Scheduled Route: PO  Start: 10/30/24 0900 End: 10/31/24 1504   Admin Instructions:            carvedilol (COREG) tablet 6.25 mg  Dose: 6.25 mg  Freq: Every 12 Hours Scheduled Route: PO  Start: 10/31/24 2100 End: 11/13/24 1156   Admin Instructions:       0855   1010   2100       0900   1156   1156-D/C'd       carvedilol (COREG) tablet 6.25 mg  Dose: 6.25 mg  Freq: Every 12 Hours Scheduled Route: PO  Start: 10/24/24 2100 End: 10/30/24 0726   Admin Instructions:            cefTRIAXone (ROCEPHIN) 2,000 mg in sodium chloride 0.9 % 100 mL MBP  Dose: 2,000 mg  Freq: Every 24 Hours Route: IV  Indications of Use: INTRA-ABDOMINAL INFECTION  Last Dose: 2,000 mg (11/13/24 1443)  Start: 10/31/24 1345 End: 11/15/24 2359   Admin Instructions:       1356        1443        1345          diphenhydrAMINE (BENADRYL) injection 25 mg  Dose: 25 mg  Freq: Once Route: IV  Start: 10/27/24 1745 End: 10/27/24 1750   Admin Instructions:            famotidine (PEPCID) injection 20 mg  Dose: 20 mg  Freq: Once Route: IV  Start: 11/08/24 0651 End: 11/08/24 0705   Admin Instructions:            furosemide (LASIX) injection 40 mg  Dose: 40 mg  Freq: Once Route: IV  Start: 11/01/24 1245 End: 11/01/24 1409   Admin Instructions:            furosemide (LASIX) injection 40 mg  Dose: 40 mg  Freq: Once Route: IV  Start: 10/31/24 1600 End: 10/31/24 1649         furosemide (LASIX) injection 40 mg  Dose: 40 mg  Freq: Once Route: IV  Start: 10/30/24 1030 End: 10/30/24 1121         furosemide (LASIX) tablet 20 mg  Dose: 20 mg  Freq: Daily Route: PO  Start: 11/04/24 0900 End: 11/07/24 0930   Admin Instructions:            gadobenate dimeglumine (MULTIHANCE) injection 16 mL  Dose: 16 mL  Freq: Once in Imaging Route: IV  Start: 10/30/24 2145 End: 10/30/24 2113   Admin Instructions:            gadobenate dimeglumine (MULTIHANCE) injection 20 mL  Dose: 20 mL  Freq: Once in Imaging Route: IV  Start: 10/28/24 1200 End: 10/28/24 1104   Admin Instructions:            heparin (porcine) 5000 UNIT/ML injection 5,000 Units  Dose: 5,000 Units  Freq: Every 8 Hours Scheduled Route: SC  Indications of Use: PROPHYLAXIS OF VENOUS THROMBOEMBOLISM  Start: 10/28/24 1400    0610   1357   2104      0632   1443   2200      [phone]        heparin  (porcine) 5000 UNIT/ML injection 5,000 Units  Dose: 5,000 Units  Freq: Every 12 Hours Scheduled Route: SC  Indications of Use: PROPHYLAXIS OF VENOUS THROMBOEMBOLISM  Start: 10/25/24 2100 End: 10/27/24 1447         HYDROmorphone (DILAUDID) injection 0.5 mg  Dose: 0.5 mg  Freq: Once Route: IV  Start: 11/09/24 0645 End: 11/09/24 0554   Admin Instructions:            HYDROmorphone (DILAUDID) injection 0.5 mg  Dose: 0.5 mg  Freq: Once Route: IV  Start: 10/22/24 1806 End: 10/22/24 1836   Admin Instructions:            HYDROmorphone (DILAUDID) injection 1 mg  Dose: 1 mg  Freq: Once Route: IV  Start: 10/22/24 1640 End: 10/22/24 1637   Admin Instructions:            insulin glargine (LANTUS, SEMGLEE) injection 10 Units  Dose: 10 Units  Freq: Every 12 Hours Scheduled Route: SC  Start: 11/04/24 1400 End: 11/05/24 1006   Admin Instructions:            insulin glargine (LANTUS, SEMGLEE) injection 10 Units  Dose: 10 Units  Freq: Nightly Route: SC  Start: 10/29/24 2100 End: 11/02/24 1206   Admin Instructions:            insulin glargine (LANTUS, SEMGLEE) injection 10 Units  Dose: 10 Units  Freq: Nightly Route: SC  Start: 10/28/24 2115 End: 10/28/24 2045   Admin Instructions:            insulin glargine (LANTUS, SEMGLEE) injection 10 Units  Dose: 10 Units  Freq: Nightly Route: SC  Start: 10/24/24 2100 End: 10/27/24 0700   Admin Instructions:            insulin glargine (LANTUS, SEMGLEE) injection 14 Units  Dose: 14 Units  Freq: Nightly Route: SC  Start: 10/27/24 2100 End: 10/28/24 1219   Admin Instructions:            insulin glargine (LANTUS, SEMGLEE) injection 15 Units  Dose: 15 Units  Freq: Every 12 Hours Scheduled Route: SC  Start: 11/05/24 2100   Admin Instructions:       0854   2054       0918   2159 0921 2100         insulin glargine (LANTUS, SEMGLEE) injection 15 Units  Dose: 15 Units  Freq: Nightly Route: SC  Start: 11/02/24 2100 End: 11/03/24 0734   Admin Instructions:            insulin glargine (LANTUS, SEMGLEE)  injection 16 Units  Dose: 16 Units  Freq: Nightly Route: SC  Start: 10/28/24 2100 End: 10/28/24 2021   Admin Instructions:            insulin glargine (LANTUS, SEMGLEE) injection 21 Units  Dose: 21 Units  Freq: Nightly Route: SC  Start: 11/03/24 2100 End: 11/04/24 1311   Admin Instructions:            insulin glargine (LANTUS, SEMGLEE) injection 6 Units  Dose: 6 Units  Freq: Nightly Route: SC  Start: 10/28/24 2115 End: 10/29/24 1644   Admin Instructions:            insulin glargine (LANTUS, SEMGLEE) injection 7 Units  Dose: 7 Units  Freq: Nightly Route: SC  Start: 10/24/24 2100 End: 10/24/24 1709   Admin Instructions:            insulin lispro (HUMALOG/ADMELOG) injection 2 Units  Dose: 2 Units  Freq: 3 Times Daily With Meals Route: SC  Start: 10/26/24 1300 End: 10/27/24 0658   Admin Instructions:            insulin lispro (HUMALOG/ADMELOG) injection 2-7 Units  Dose: 2-7 Units  Freq: 4 Times Daily Before Meals & Nightly Route: SC  Start: 11/05/24 1130   Admin Instructions:       0823   (1138)   (1721)     (2003)        (0919)   (1122)   (1933)     (2134)        (6945)   (1220)   1730     2100          insulin lispro (HUMALOG/ADMELOG) injection 2-7 Units  Dose: 2-7 Units  Freq: 4 Times Daily Before Meals & Nightly Route: SC  Start: 10/22/24 2200 End: 11/02/24 1206   Admin Instructions:            insulin lispro (HUMALOG/ADMELOG) injection 2-9 Units  Dose: 2-9 Units  Freq: 4 Times Daily Before Meals & Nightly Route: SC  Start: 11/02/24 1300 End: 11/05/24 1006   Admin Instructions:            insulin lispro (HUMALOG/ADMELOG) injection 3 Units  Dose: 3 Units  Freq: 3 Times Daily With Meals Route: SC  Start: 10/27/24 0800 End: 10/27/24 1102   Admin Instructions:            insulin lispro (HUMALOG/ADMELOG) injection 3 Units  Dose: 3 Units  Freq: 3 Times Daily With Meals Route: SC  Start: 10/24/24 1945 End: 10/24/24 1846   Admin Instructions:            insulin lispro (HUMALOG/ADMELOG) injection 4 Units  Dose: 4  Units  Freq: 3 Times Daily With Meals Route: SC  Start: 10/27/24 1200 End: 11/02/24 1206   Admin Instructions:            insulin lispro (HUMALOG/ADMELOG) injection 5 Units  Dose: 5 Units  Freq: 3 Times Daily With Meals Route: SC  Start: 11/11/24 1800   Admin Instructions:       0853   1140 [phone] (2026) [C]   [id number]   1800        insulin lispro (HUMALOG/ADMELOG) injection 5 Units  Dose: 5 Units  Freq: 3 Times Daily With Meals Route: SC  Start: 11/02/24 1800 End: 11/03/24 0734   Admin Instructions:            insulin lispro (HUMALOG/ADMELOG) injection 6 Units  Dose: 6 Units  Freq: 3 Times Daily With Meals Route: SC  Start: 11/06/24 1215 End: 11/11/24 1226   Admin Instructions:            insulin lispro (HUMALOG/ADMELOG) injection 6 Units  Dose: 6 Units  Freq: 3 Times Daily With Meals Route: SC  Start: 11/04/24 1800 End: 11/05/24 1006   Admin Instructions:            insulin lispro (HUMALOG/ADMELOG) injection 7 Units  Dose: 7 Units  Freq: 3 Times Daily With Meals Route: SC  Start: 11/03/24 0830 End: 11/04/24 1311   Admin Instructions:            insulin lispro (HUMALOG/ADMELOG) injection 8 Units  Dose: 8 Units  Freq: 3 Times Daily With Meals Route: SC  Start: 11/05/24 1200 End: 11/06/24 1126   Admin Instructions:            insulin regular (humuLIN R,novoLIN R) injection 10 Units  Dose: 10 Units  Freq: 3 Times Daily Before Meals Route: SC  Start: 10/24/24 1815 End: 10/24/24 1847   Admin Instructions:            insulin regular (humuLIN R,novoLIN R) injection 5 Units  Dose: 5 Units  Freq: 3 Times Daily Before Meals Route: SC  Start: 10/25/24 0730 End: 10/26/24 1205   Admin Instructions:            iopamidol (ISOVUE-250) 51 % injection 100 mL  Dose: 100 mL  Freq: Once in Imaging Route: IA  Start: 11/05/24 1345 End: 11/05/24 1251         iopamidol (ISOVUE-300) 61 % injection 100 mL  Dose: 100 mL  Freq: Once in Imaging Route: IV  Start: 11/03/24 1600 End: 11/03/24 1508         iopamidol  (ISOVUE-300) 61 % injection 100 mL  Dose: 100 mL  Freq: Once in Imaging Route: IV  Start: 10/22/24 1831 End: 10/22/24 1822         iopamidol (ISOVUE-370) 76 % injection 100 mL  Dose: 100 mL  Freq: Once in Imaging Route: IV  Start: 10/28/24 1845 End: 10/28/24 1752         iopamidol (ISOVUE-370) 76 % injection 100 mL  Dose: 100 mL  Freq: Once in Imaging Route: IV  Start: 10/26/24 1500 End: 10/26/24 1411         ketorolac (TORADOL) injection 15 mg  Dose: 15 mg  Freq: Once Route: IV  Start: 10/22/24 1806 End: 10/22/24 1836   Admin Instructions:            lidocaine (XYLOCAINE) 1 % injection 20 mL  Dose: 20 mL  Freq: Once Route: INFILTRATION  Start: 11/06/24 1630 End: 11/06/24 1538         lidocaine (XYLOCAINE) 1 % injection 20 mL  Dose: 20 mL  Freq: Once Route: SC  Start: 10/28/24 1400 End: 10/28/24 1300         Lidocaine 4 % 2 patch  Dose: 2 patch  Freq: Every 24 Hours Scheduled Route: TD  Start: 11/12/24 1200   Admin Instructions:       1147   2311       0918   2200       0916 [C]   2116         magnesium sulfate 2g/50 mL (PREMIX) infusion  Dose: 2 g  Freq: Every 2 Hours Route: IV  Start: 11/07/24 1215 End: 11/07/24 1844         magnesium sulfate 2g/50 mL (PREMIX) infusion  Dose: 2 g  Freq: Once Route: IV  Start: 10/31/24 0745 End: 10/31/24 0957         magnesium sulfate 2g/50 mL (PREMIX) infusion  Dose: 2 g  Freq: Once Route: IV  Start: 10/27/24 1745 End: 10/27/24 1751         methocarbamol (ROBAXIN) tablet 750 mg  Dose: 750 mg  Freq: 4 Times Daily Route: PO  Start: 11/11/24 1245    0855   1147   1720     2052        0919   1236   1711     2200        0920   1151   1800     2100          midodrine (PROAMATINE) tablet 10 mg  Dose: 10 mg  Freq: 3 Times Daily Before Meals Route: PO  Start: 10/25/24 0300 End: 10/27/24 0658         midodrine (PROAMATINE) tablet 2.5 mg  Dose: 2.5 mg  Freq: 3 Times Daily Before Meals Route: PO  Start: 10/28/24 1730 End: 10/30/24 0939         midodrine (PROAMATINE) tablet 5 mg  Dose: 5  mg  Freq: 3 Times Daily Before Meals Route: PO  Start: 10/27/24 0745 End: 10/28/24 1218         morphine injection 2 mg  Dose: 2 mg  Freq: Once Route: IV  Start: 11/04/24 2215 End: 11/04/24 2123   Admin Instructions:            morphine injection 2 mg  Dose: 2 mg  Freq: Once Route: IV  Start: 10/23/24 0600 End: 10/23/24 0510   Admin Instructions:            mupirocin (BACTROBAN) 2 % nasal ointment 1 Application  Dose: 1 Application  Freq: 2 Times Daily Route: EACH NARE  Start: 10/31/24 0615 End: 11/05/24 0859   Admin Instructions:            mupirocin (BACTROBAN) 2 % nasal ointment 1 Application  Dose: 1 Application  Freq: 2 Times Daily Route: EACH NARE  Start: 10/25/24 0830 End: 10/30/24 0859   Admin Instructions:            Naloxegol Oxalate (MOVANTIK) tablet 25 mg  Dose: 25 mg  Freq: Every Morning Route: PO  Start: 10/29/24 1430   Admin Instructions:      Order specific questions:       0610        0632        0920          ondansetron (ZOFRAN) injection 4 mg  Dose: 4 mg  Freq: Once Route: IV  Start: 10/22/24 1640 End: 10/22/24 1637   Admin Instructions:            pantoprazole (PROTONIX) EC tablet 40 mg  Dose: 40 mg  Freq: 2 Times Daily Before Meals Route: PO  Start: 10/24/24 1730   Admin Instructions:       0611   1721       0632   1711       0919   1730         pantoprazole (PROTONIX) injection 40 mg  Dose: 40 mg  Freq: 2 Times Daily Before Meals Route: IV  Indications of Use: GASTROESOPHAGEAL REFLUX DISEASE  Indications Comment: gastritis vs. PUD on imaging  Start: 10/23/24 0730 End: 10/24/24 1302   Admin Instructions:            pantoprazole (PROTONIX) injection 40 mg  Dose: 40 mg  Freq: Once Route: IV  Indications of Use: GASTROESOPHAGEAL REFLUX DISEASE  Start: 10/22/24 2002 End: 10/22/24 1954   Admin Instructions:            perflutren (DEFINITY) 8.476 mg in Sodium Chloride (PF) 0.9 % 10 mL injection  Dose: 2 mL  Freq: Once in Imaging Route: IV  Start: 10/26/24 1215 End: 10/26/24 1126   Admin Instructions:             piperacillin-tazobactam (ZOSYN) 3.375 g IVPB in 100 mL NS MBP (CD)  Dose: 3.375 g  Freq: Every 8 Hours Route: IV  Indications of Use: INTRA-ABDOMINAL INFECTION  Start: 10/23/24 2100 End: 10/31/24 1253         piperacillin-tazobactam (ZOSYN) 3.375 g IVPB in 100 mL NS MBP (CD)  Dose: 3.375 g  Freq: Once Route: IV  Start: 10/23/24 1500 End: 10/23/24 1656          sennosides-docusate (PERICOLACE) 8.6-50 MG per tablet 2 tablet  Dose: 2 tablet  Freq: 2 Times Daily Route: PO  Start: 10/27/24 0900   Admin Instructions:       0854   2052       (0920)   2200       (1014)   2100         And   polyethylene glycol (MIRALAX) packet 17 g  Dose: 17 g  Freq: 2 Times Daily Route: PO  Start: 10/27/24 0900   Admin Instructions:       0854   (2053)       (0920)   2200       (1015)   2100         And   bisacodyl (DULCOLAX) EC tablet 5 mg  Dose: 5 mg  Freq: Daily PRN Route: PO  PRN Reason: Constipation  PRN Comment: Use if polyethylene glycol is ineffective  Start: 10/27/24 0700   Admin Instructions:            And   bisacodyl (DULCOLAX) suppository 10 mg  Dose: 10 mg  Freq: Daily PRN Route: RE  PRN Reason: Constipation  PRN Comment: Use if bisacodyl oral is ineffective  Start: 10/27/24 0700   Admin Instructions:            potassium & sodium phosphates (PHOS-NAK) 280-160-250 MG packet 2 packet  Dose: 2 packet  Freq: Once Route: PO  Start: 10/30/24 0930 End: 10/30/24 0852   Admin Instructions:            potassium chloride (K-DUR,KLOR-CON) ER tablet 40 mEq  Dose: 40 mEq  Freq: Every 4 Hours Route: PO  Start: 10/30/24 0930 End: 10/30/24 1712   Admin Instructions:            potassium phosphate 15 mmol in 0.9% normal saline 250 mL IVPB  Dose: 15 mmol  Freq: Once Route: IV  Start: 10/28/24 0015 End: 10/28/24 0337         prochlorperazine (COMPAZINE) injection 10 mg  Dose: 10 mg  Freq: Once Route: IV  Start: 10/27/24 1745 End: 10/27/24 1750   Admin Instructions:            sacubitril-valsartan (ENTRESTO) 24-26 MG tablet 1  tablet  Dose: 1 tablet  Freq: 2 Times Daily Route: PO  Start: 10/23/24 0900 End: 10/24/24 1804   Order specific questions:             sennosides-docusate (PERICOLACE) 8.6-50 MG per tablet 2 tablet  Dose: 2 tablet  Freq: 2 Times Daily Route: PO  Start: 10/22/24 2100 End: 10/27/24 0700   Admin Instructions:            And   polyethylene glycol (MIRALAX) packet 17 g  Dose: 17 g  Freq: Daily PRN Route: PO  PRN Reason: Constipation  PRN Comment: Use if senna-docusate is ineffective  Start: 10/22/24 2026 End: 10/27/24 0700   Admin Instructions:            And   bisacodyl (DULCOLAX) EC tablet 5 mg  Dose: 5 mg  Freq: Daily PRN Route: PO  PRN Reason: Constipation  PRN Comment: Use if polyethylene glycol is ineffective  Start: 10/22/24 2026 End: 10/27/24 0700   Admin Instructions:            And   bisacodyl (DULCOLAX) suppository 10 mg  Dose: 10 mg  Freq: Daily PRN Route: RE  PRN Reason: Constipation  PRN Comment: Use if bisacodyl oral is ineffective  Start: 10/22/24 2026 End: 10/27/24 0700   Admin Instructions:            sincalide (KINEVAC) 1.6 mcg in sodium chloride 0.9 % 101.6 mL IVPB  Dose: 0.02 mcg/kg  Weight Dosing Info: 81 kg  Freq: Once Route: IV  Start: 10/25/24 0800 End: 10/25/24 0745         sodium chloride 0.9 % bolus 500 mL  Dose: 500 mL  Freq: Once Route: IV  Last Dose: 500 mL (10/25/24 1055)  Start: 10/25/24 1045 End: 10/25/24 1255         sodium chloride 0.9 % bolus 500 mL  Dose: 500 mL  Freq: Once Route: IV  Last Dose: 500 mL (10/25/24 0225)  Start: 10/25/24 0230 End: 10/25/24 0255         sodium chloride 0.9 % bolus 500 mL  Dose: 500 mL  Freq: Once Route: IV  Last Dose: 500 mL (10/24/24 1122)  Start: 10/24/24 1115 End: 10/24/24 1152         sodium chloride 0.9 % bolus 500 mL  Dose: 500 mL  Freq: Once Route: IV  Last Dose: 500 mL (10/24/24 0239)  Start: 10/24/24 0315 End: 10/24/24 0309         sodium chloride 0.9 % flush 3 mL  Dose: 3 mL  Freq: Every 12 Hours Scheduled Route: IV  Start: 11/08/24 0900 End:  11/08/24 1016         spironolactone (ALDACTONE) tablet 25 mg  Dose: 25 mg  Freq: Daily Route: PO  Start: 10/23/24 0900 End: 10/24/24 1804   Admin Instructions:            sucralfate (CARAFATE) tablet 1 g  Dose: 1 g  Freq: 3 Times Daily Before Meals Route: PO  Start: 10/23/24 1730 End: 10/24/24 1302   Admin Instructions:            technetium Tc 99m mebrofenin (CHOLETEC) injection 1 dose  Dose: 1 dose  Freq: Once in Imaging Route: IV  Start: 10/25/24 0745 End: 10/25/24 0648   Order specific questions:            torsemide (DEMADEX) tablet 20 mg  Dose: 20 mg  Freq: Daily Route: PO  Start: 11/04/24 0945 End: 11/04/24 0855   Admin Instructions:            venlafaxine XR (EFFEXOR-XR) 24 hr capsule 75 mg  Dose: 75 mg  Freq: Daily Route: PO  Start: 10/23/24 0900   Admin Instructions:       0856        0918        0919                      Continuous Meds Sorted by Name  for Donnie Bishop as of 11/12/24 through 11/14/24  Legend:       Medications 11/12/24 11/13/24 11/14/24   HYDROmorphone (DILAUDID) PCA 0.2 mg/ml 50 mL syringe  Nurse Loading Dose: 0.2 mg  Patient Bolus Dose: 0.1 mg  Patient Bolus Lockout Interval: 6 Minutes  Basal Rate: 0 mg/hr  One Hour Dose Limit: 1 mg  Freq: Continuous Route: IV  Last Dose: Stopped (11/11/24 1202)  Start: 11/09/24 0700 End: 11/11/24 1153   Admin Instructions:            HYDROmorphone (DILAUDID) PCA 0.2 mg/ml 50 mL syringe  Nurse Loading Dose: 0.2 mg  Patient Bolus Dose: 0.1 mg  Patient Bolus Lockout Interval: 8 Minutes  Basal Rate: 0 mg/hr  One Hour Dose Limit: 0.75 mg  Freq: Continuous Route: IV  Start: 11/08/24 1345 End: 11/09/24 0602   Admin Instructions:            lactated ringers infusion  Rate: 9 mL/hr Dose: 9 mL/hr  Freq: Continuous Route: IV  Last Dose: 9 mL/hr (11/08/24 0730)  Start: 11/08/24 0651 End: 11/08/24 1256         lactated ringers infusion  Rate: 75 mL/hr Dose: 75 mL/hr  Freq: Continuous Route: IV  Last Dose: 75 mL/hr (10/28/24 1635)  Start: 10/28/24 1100 End: 10/29/24  1634         Pharmacy Consult  Freq: Continuous PRN Route: XX  PRN Reason: Consult  Start: 11/08/24 1256 End: 11/12/24 1119   Admin Instructions:      Order specific questions:       1119-D/C'd          Pharmacy to Dose Zosyn  Freq: Continuous PRN Route: XX  PRN Reason: Consult  Indications of Use: INTRA-ABDOMINAL INFECTION  Start: 10/23/24 1403 End: 10/23/24 1412         sodium chloride 0.45 % 925 mL with sodium bicarbonate 8.4 % 75 mEq infusion  Rate: 75 mL/hr  Freq: Continuous Route: IV  Start: 10/29/24 0745 End: 10/30/24 0812         sodium chloride 0.9 % infusion  Rate: 75 mL/hr Dose: 75 mL/hr  Freq: Continuous Route: IV  Last Dose: 75 mL/hr (11/09/24 0355)  Start: 11/08/24 1445 End: 11/09/24 1359         sodium chloride 0.9 % infusion  Rate: 30 mL/hr Dose: 30 mL/hr  Freq: Continuous PRN Route: IV  PRN Comment: To maintain IV patency while on PCA pump if no other IV fluid present  Start: 11/08/24 1256 End: 11/11/24 1255         sodium chloride 0.9 % infusion  Rate: 75 mL/hr Dose: 75 mL/hr  Freq: Continuous Route: IV  Last Dose: 75 mL/hr (11/06/24 0850)  Start: 11/06/24 0945 End: 11/06/24 2149   Admin Instructions:            sodium chloride 0.9 % infusion  Rate: 75 mL/hr Dose: 75 mL/hr  Freq: Continuous Route: IV  Last Dose: Stopped (10/28/24 0008)  Start: 10/27/24 1730 End: 10/28/24 0026         sodium chloride 0.9 % infusion  Rate: 100 mL/hr Dose: 100 mL/hr  Freq: Continuous Route: IV  Last Dose: 100 mL/hr (10/27/24 1614)  Start: 10/26/24 1900 End: 10/27/24 1824         sodium chloride 0.9 % infusion  Rate: 100 mL/hr Dose: 100 mL/hr  Freq: Continuous Route: IV  Last Dose: Stopped (10/25/24 2300)  Start: 10/25/24 1045 End: 10/25/24 2254         sodium chloride 0.9 % infusion  Rate: 100 mL/hr Dose: 100 mL/hr  Freq: Continuous Route: IV  Last Dose: 100 mL/hr (10/24/24 2009)  Start: 10/24/24 1945 End: 10/25/24 0808         sodium chloride 0.9 % infusion  Rate: 100 mL/hr Dose: 100 mL/hr  Freq: Continuous Route:  IV  Last Dose: Stopped (10/24/24 1420)  Start: 10/23/24 1700 End: 10/24/24 1409                     PRN Meds Sorted by Name  for Donnie Bishop as of 11/12/24 through 11/14/24  Legend:       Medications 11/12/24 11/13/24 11/14/24   acetaminophen (TYLENOL) tablet 1,000 mg  Dose: 1,000 mg  Freq: Every 6 Hours PRN Route: PO  PRN Reason: Mild Pain  Start: 11/11/24 1153   Admin Instructions:       1148         0048           sennosides-docusate (PERICOLACE) 8.6-50 MG per tablet 2 tablet  Dose: 2 tablet  Freq: 2 Times Daily Route: PO  Start: 10/27/24 0900   Admin Instructions:       0854   2052       (0920)   2200       (1014)   2100         And   polyethylene glycol (MIRALAX) packet 17 g  Dose: 17 g  Freq: 2 Times Daily Route: PO  Start: 10/27/24 0900   Admin Instructions:       0854   (2053)       (0920)   2200       (1015)   2100         And   bisacodyl (DULCOLAX) EC tablet 5 mg  Dose: 5 mg  Freq: Daily PRN Route: PO  PRN Reason: Constipation  PRN Comment: Use if polyethylene glycol is ineffective  Start: 10/27/24 0700   Admin Instructions:            And   bisacodyl (DULCOLAX) suppository 10 mg  Dose: 10 mg  Freq: Daily PRN Route: RE  PRN Reason: Constipation  PRN Comment: Use if bisacodyl oral is ineffective  Start: 10/27/24 0700   Admin Instructions:             sennosides-docusate (PERICOLACE) 8.6-50 MG per tablet 2 tablet  Dose: 2 tablet  Freq: 2 Times Daily Route: PO  Start: 10/22/24 2100 End: 10/27/24 0700   Admin Instructions:            And   polyethylene glycol (MIRALAX) packet 17 g  Dose: 17 g  Freq: Daily PRN Route: PO  PRN Reason: Constipation  PRN Comment: Use if senna-docusate is ineffective  Start: 10/22/24 2026 End: 10/27/24 0700   Admin Instructions:            And   bisacodyl (DULCOLAX) EC tablet 5 mg  Dose: 5 mg  Freq: Daily PRN Route: PO  PRN Reason: Constipation  PRN Comment: Use if polyethylene glycol is ineffective  Start: 10/22/24 2026 End: 10/27/24 0700   Admin Instructions:            And    bisacodyl (DULCOLAX) suppository 10 mg  Dose: 10 mg  Freq: Daily PRN Route: RE  PRN Reason: Constipation  PRN Comment: Use if bisacodyl oral is ineffective  Start: 10/22/24 2026 End: 10/27/24 0700   Admin Instructions:            bupivacaine 0.25%-EPINEPHrine 1:[id number] (MARCAINE w/EPI) injection  Freq: As Needed  Start: 11/08/24 0840 End: 11/08/24 1009         bupivacaine liposome (EXPAREL) 1.3 % injection  Freq: As Needed  Start: 11/08/24 0940 End: 11/08/24 1009         Calcium Replacement - Follow Nurse / BPA Driven Protocol  Freq: As Needed Route: XX  PRN Reason: Other  Start: 10/23/24 0725   Admin Instructions:            dextrose (D50W) (25 g/50 mL) IV injection 25 g  Dose: 25 g  Freq: Every 15 Minutes PRN Route: IV  PRN Reason: Low Blood Sugar  PRN Comment: Blood Sugar Less Than 70  Start: 10/22/24 2112   Admin Instructions:            dextrose (GLUTOSE) oral gel 15 g  Dose: 15 g  Freq: Every 15 Minutes PRN Route: PO  PRN Reason: Low Blood Sugar  PRN Comment: Blood sugar less than 70  Start: 10/22/24 2112   Admin Instructions:       (1955) [C]            diphenhydrAMINE (BENADRYL) injection 12.5 mg  Dose: 12.5 mg  Freq: Every 15 Minutes PRN Route: IV  PRN Reason: Itching  PRN Comment: May repeat x 1  Start: 11/08/24 1021 End: 11/08/24 1252   Admin Instructions:             droperidol (INAPSINE) injection 0.625 mg  Dose: 0.625 mg  Freq: Every 20 Minutes PRN Route: IV  PRN Reasons: Nausea,Vomiting  Indications of Use: NAUSEA AND VOMITING  Start: 11/08/24 1021 End: 11/08/24 1252   Admin Instructions:            Or   droperidol (INAPSINE) injection 0.625 mg  Dose: 0.625 mg  Freq: Every 20 Minutes PRN Route: IM  PRN Reasons: Nausea,Vomiting  Start: 11/08/24 1021 End: 11/08/24 1252   Admin Instructions:            ePHEDrine injection 5 mg  Dose: 5 mg  Freq: Once As Needed Route: IV  PRN Comment: symptomatic hypotension - Notify attending anesthesiologist if this needs to be given  Start: 11/08/24 1021 End: 11/08/24  1252   Admin Instructions:            fentaNYL citrate (PF) (SUBLIMAZE) injection  Freq: As Needed Route: IV  Start: 11/06/24 1533 End: 11/06/24 1537         fentaNYL citrate (PF) (SUBLIMAZE) injection  Freq: As Needed Route: IV  Start: 11/05/24 1218 End: 11/05/24 1231         fentaNYL citrate (PF) (SUBLIMAZE) injection  Freq: As Needed Route: IV  Start: 10/28/24 1242 End: 10/28/24 1242         fentaNYL citrate (PF) (SUBLIMAZE) injection 50 mcg  Dose: 50 mcg  Freq: Every 5 Minutes PRN Route: IV  PRN Reason: Severe Pain  Start: 11/08/24 1021 End: 11/08/24 1252   Admin Instructions:            fentaNYL citrate (PF) (SUBLIMAZE) injection 50 mcg  Dose: 50 mcg  Freq: Once As Needed Route: IV  PRN Reason: Severe Pain  Start: 11/08/24 0649 End: 11/08/24 0706   Admin Instructions:            flumazenil (ROMAZICON) injection 0.2 mg  Dose: 0.2 mg  Freq: As Needed Route: IV  PRN Comment: for benzodiazepine induced unresponsiveness or sedation  Start: 11/08/24 1021 End: 11/08/24 1252   Admin Instructions:            glucagon (GLUCAGEN) injection 1 mg  Dose: 1 mg  Freq: Every 15 Minutes PRN Route: IM  PRN Reason: Low Blood Sugar  PRN Comment: Blood Glucose Less Than 70  Start: 10/22/24 2112   Admin Instructions:            hydrALAZINE (APRESOLINE) injection 5 mg  Dose: 5 mg  Freq: Every 10 Minutes PRN Route: IV  PRN Reason: High Blood Pressure  PRN Comment: for systolic blood pressure greater than 180 mmHg or diastolic blood pressure greater than 105 mmHg  Start: 11/08/24 1021 End: 11/08/24 1252   Admin Instructions:            HYDROcodone-acetaminophen (NORCO) 5-325 MG per tablet 1 tablet  Dose: 1 tablet  Freq: Once As Needed Route: PO  PRN Reason: Moderate Pain  Start: 11/08/24 1021 End: 11/08/24 1252   Admin Instructions:            HYDROcodone-acetaminophen (NORCO) 5-325 MG per tablet 1 tablet  Dose: 1 tablet  Freq: Every 6 Hours PRN Route: PO  PRN Reason: Severe Pain  Start: 10/23/24 2036 End: 10/27/24 0659   Admin  Instructions:            HYDROcodone-acetaminophen (NORCO) 5-325 MG per tablet 1 tablet  Dose: 1 tablet  Freq: Every 4 Hours PRN Route: PO  PRN Reason: Severe Pain  Start: 10/22/24 2025 End: 10/23/24 2036   Admin Instructions:            HYDROmorphone (DILAUDID) injection 0.25 mg  Dose: 0.25 mg  Freq: Every 8 Hours PRN Route: IV  PRN Reason: Severe Pain  Start: 11/13/24 1156 End: 11/13/24 1237   Admin Instructions:        1237-D/C'd         HYDROmorphone (DILAUDID) injection 0.25 mg  Dose: 0.25 mg  Freq: Every 4 Hours PRN Route: IV  PRN Reason: Severe Pain  Start: 11/11/24 1152 End: 11/13/24 1157   Admin Instructions:       0409   2318       0510   1157-D/C'd        HYDROmorphone (DILAUDID) injection 0.5 mg  Dose: 0.5 mg  Freq: Every 5 Minutes PRN Route: IV  PRN Reason: Moderate Pain  Start: 11/08/24 1021 End: 11/08/24 1252   Admin Instructions:            HYDROmorphone (DILAUDID) injection 0.5 mg  Dose: 0.5 mg  Freq: Once As Needed Route: IV  PRN Reason: Severe Pain  Start: 11/06/24 2042 End: 11/06/24 2119   Admin Instructions:            HYDROmorphone (DILAUDID) injection 0.5 mg  Dose: 0.5 mg  Freq: Every 2 Hours PRN Route: IV  PRN Reason: Severe Pain  Start: 10/26/24 1828 End: 10/28/24 2039   Admin Instructions:            HYDROmorphone (DILAUDID) injection 0.5 mg  Dose: 0.5 mg  Freq: Every 4 Hours PRN Route: IV  PRN Reason: Severe Pain  Start: 10/23/24 1025 End: 10/26/24 1114   Admin Instructions:            hydrOXYzine (ATARAX) tablet 10 mg  Dose: 10 mg  Freq: Every 8 Hours PRN Route: PO  PRN Reason: Anxiety  Start: 10/22/24 2241   Admin Instructions:            influenza virus vacc split PF FLUZONE 0.5 mL  Dose: 0.5 mL  Freq: During Hospitalization Route: IM  PRN Reason: Immunization  Start: 10/23/24 0830   Admin Instructions:            iopamidol (ISOVUE-300) 61 % injection  Freq: As Needed  Start: 11/08/24 0914 End: 11/08/24 1009         ipratropium-albuterol (DUO-NEB) nebulizer solution 3 mL  Dose: 3  mL  Freq: Once As Needed Route: NEBULIZATION  PRN Reasons: Wheezing,Shortness of Air  PRN Comment: bronchospasm  Start: 11/08/24 1021 End: 11/08/24 1252   Admin Instructions:            labetalol (NORMODYNE,TRANDATE) injection 5 mg  Dose: 5 mg  Freq: Every 5 Minutes PRN Route: IV  PRN Reason: High Blood Pressure  PRN Comment: for systolic blood pressure greater than 180 mmHg or diastolic blood pressure greater than 105 mmHg  Start: 11/08/24 1021 End: 11/08/24 1252   Admin Instructions:            lidocaine (XYLOCAINE) 1 % injection 0.5 mL  Dose: 0.5 mL  Freq: Once As Needed Route: ID  PRN Comment: IV Start  Start: 11/08/24 0649 End: 11/08/24 1016         Lidocaine HCl gel (XYLOCAINE) urethral/mucosal syringe  Freq: As Needed  Start: 11/05/24 1208 End: 11/05/24 1208         lidocaine PF 1% (XYLOCAINE) injection  Freq: As Needed  Start: 11/05/24 1243 End: 11/05/24 1243         Magnesium Standard Dose Replacement - Follow Nurse / BPA Driven Protocol  Freq: As Needed Route: XX  PRN Reason: Other  Start: 11/07/24 1013   Admin Instructions:            Magnesium Standard Dose Replacement - Follow Nurse / BPA Driven Protocol  Freq: As Needed Route: XX  PRN Reason: Other  Start: 10/23/24 0725 End: 10/31/24 0650   Admin Instructions:            melatonin tablet 2.5 mg  Dose: 2.5 mg  Freq: Nightly PRN Route: PO  PRN Reason: Sleep  Start: 10/22/24 2025    2052            midazolam (VERSED) injection  Freq: As Needed Route: IV  Start: 11/06/24 1533 End: 11/06/24 1533         midazolam (VERSED) injection  Freq: As Needed Route: IV  Start: 11/05/24 1218 End: 11/05/24 1218         midazolam (VERSED) injection  Freq: As Needed Route: IV  Start: 10/28/24 1241 End: 10/28/24 1241         midazolam (VERSED) injection 1 mg  Dose: 1 mg  Freq: Every 5 Minutes PRN Route: IV  PRN Comment: Anxiety prophylaxis, Pre-op comfort  Start: 11/08/24 0649 End: 11/08/24 1016   Admin Instructions:             morphine injection 1 mg  Dose: 1 mg  Freq:  Every 4 Hours PRN Route: IV  PRN Reason: Moderate Pain  Start: 10/22/24 2113 End: 10/23/24 1025   Admin Instructions:            And   naloxone (NARCAN) injection 0.4 mg  Dose: 0.4 mg  Freq: Every 5 Minutes PRN Route: IV  PRN Reason: Respiratory Depression  Start: 10/22/24 2113   Admin Instructions:            naloxone (NARCAN) injection 0.1 mg  Dose: 0.1 mg  Freq: Every 5 Minutes PRN Route: IV  PRN Reasons: Opioid Reversal,Respiratory Depression  PRN Comment: see administration instructions  Start: 11/08/24 1256   Admin Instructions:            naloxone (NARCAN) injection 0.2 mg  Dose: 0.2 mg  Freq: As Needed Route: IV  PRN Reasons: Opioid Reversal,Respiratory Depression  PRN Comment: unresponsiveness, decrease oxygen saturation  Indications of Use: ACUTE RESPIRATORY FAILURE,OPIOID-INDUCED RESPIRATORY DEPRESSION  Start: 11/08/24 1021 End: 11/08/24 1252   Admin Instructions:            ondansetron (ZOFRAN) injection 4 mg  Dose: 4 mg  Freq: Once As Needed Route: IV  PRN Reasons: Nausea,Vomiting  Indications of Use: POSTOPERATIVE NAUSEA AND VOMITING  Start: 11/08/24 1021 End: 11/08/24 1252   Admin Instructions:             ondansetron ODT (ZOFRAN-ODT) disintegrating tablet 4 mg  Dose: 4 mg  Freq: Every 6 Hours PRN Route: PO  PRN Reasons: Nausea,Vomiting  Start: 10/22/24 2024   Admin Instructions:            Or   ondansetron (ZOFRAN) injection 4 mg  Dose: 4 mg  Freq: Every 6 Hours PRN Route: IV  PRN Reasons: Nausea,Vomiting  Start: 10/22/24 2024   Admin Instructions:            oxyCODONE (ROXICODONE) immediate release tablet 10 mg  Dose: 10 mg  Freq: Every 4 Hours PRN Route: PO  PRN Reason: Severe Pain  Start: 11/11/24 1152 End: 11/16/24 1151   Admin Instructions:       0117   0626   2104      0924   1443   2159         oxyCODONE (ROXICODONE) immediate release tablet 5 mg  Dose: 5 mg  Freq: Every 4 Hours PRN Route: PO  PRN Reason: Moderate Pain  Start: 11/11/24 1152 End: 11/16/24 1151   Admin Instructions:             oxyCODONE (ROXICODONE) immediate release tablet 5 mg  Dose: 5 mg  Freq: Every 4 Hours PRN Route: PO  PRN Reason: Moderate Pain  Start: 11/07/24 1012 End: 11/08/24 1330   Admin Instructions:            oxyCODONE (ROXICODONE) immediate release tablet 5 mg  Dose: 5 mg  Freq: Every 4 Hours PRN Route: PO  PRN Reason: Moderate Pain  Start: 10/27/24 0659 End: 11/07/24 1012   Admin Instructions:            oxyCODONE-acetaminophen (PERCOCET) 7.5-325 MG per tablet 1 tablet  Dose: 1 tablet  Freq: Every 4 Hours PRN Route: PO  PRN Reason: Severe Pain  Start: 11/08/24 1021 End: 11/08/24 1252   Admin Instructions:            Pharmacy Consult  Freq: Continuous PRN Route: XX  PRN Reason: Consult  Start: 11/08/24 1256 End: 11/12/24 1119   Admin Instructions:      Order specific questions:       1119-D/C'd          Pharmacy to Dose Zosyn  Freq: Continuous PRN Route: XX  PRN Reason: Consult  Indications of Use: INTRA-ABDOMINAL INFECTION  Start: 10/23/24 1403 End: 10/23/24 1412         Phosphorus Replacement - Follow Nurse / BPA Driven Protocol  Freq: As Needed Route: XX  PRN Reason: Other  Start: 10/23/24 0725   Admin Instructions:            Potassium Replacement - Follow Nurse / BPA Driven Protocol  Freq: As Needed Route: XX  PRN Reason: Other  Start: 10/23/24 0725   Admin Instructions:             promethazine (PHENERGAN) suppository 25 mg  Dose: 25 mg  Freq: Once As Needed Route: RE  PRN Reasons: Nausea,Vomiting  Start: 11/08/24 1021 End: 11/08/24 1252   Admin Instructions:            Or   promethazine (PHENERGAN) tablet 25 mg  Dose: 25 mg  Freq: Once As Needed Route: PO  PRN Reasons: Nausea,Vomiting  Start: 11/08/24 1021 End: 11/08/24 1252   Admin Instructions:            simethicone (MYLICON) chewable tablet 80 mg  Dose: 80 mg  Freq: 4 Times Daily PRN Route: PO  PRN Reason: Flatulence  Start: 11/05/24 1003    0020            sodium chloride (NS) irrigation solution  Freq: As Needed  Start: 11/08/24 0841 End: 11/08/24 1009          sodium chloride 0.9 % flush 10 mL  Dose: 10 mL  Freq: As Needed Route: IV  PRN Reason: Line Care  Start: 10/22/24 1551    2052            sodium chloride 0.9 % flush 3-10 mL  Dose: 3-10 mL  Freq: As Needed Route: IV  PRN Reason: Line Care  Start: 11/08/24 0649 End: 11/08/24 1016         sodium chloride 0.9 % infusion  Rate: 30 mL/hr Dose: 30 mL/hr  Freq: Continuous PRN Route: IV  PRN Comment: To maintain IV patency while on PCA pump if no other IV fluid present  Start: 11/08/24 1256 End: 11/11/24 1255         sodium chloride 0.9 % solution  Freq: As Needed  Start: 11/08/24 0840 End: 11/08/24 1009

## 2024-11-14 NOTE — DISCHARGE SUMMARY
Date of Discharge:  11/14/2024    PCP: Juju Kennedy MD    Discharge Diagnosis:   Active Hospital Problems    Diagnosis  POA    **Epigastric abdominal pain [R10.13]  Yes    Abscess of liver [K75.0]  Yes    Klebsiella pneumoniae (k. pneumoniae) as the cause of diseases classified elsewhere [B96.1]  Yes    Hypotension [I95.9]  Yes    Transaminitis [R74.01]  Yes    Leukocytosis [D72.829]  Yes    Chronic combined systolic and diastolic heart failure [I50.42]  Yes    Calculus of gallbladder with acute on chronic cholecystitis without obstruction [K80.12]  Unknown    Anxiety [F41.9]  Yes    Multiple-type hyperlipidemia [E78.2]  Yes    Type 2 diabetes mellitus with diabetic neuropathy, with long-term current use of insulin [E11.40, Z79.4]  Not Applicable    Benign essential HTN [I10]  Yes      Resolved Hospital Problems    Diagnosis Date Resolved POA    Acute kidney injury [N17.9] 10/30/2024 No     Procedure(s):  LAPAROSCOPIC CONVERTED TO OPEN CHOLECYSTECTOMY WITH INTRAOPERATIVE CHOLANGIOGRAM AND RIGHT UPPER QUADRANT DRAIN PLACEMENT     Consults       Date and Time Order Name Status Description    10/31/2024  6:02 PM Inpatient Infectious Diseases Consult Completed     10/31/2024  9:29 AM Inpatient Gastroenterology Consult Completed     10/27/2024  4:26 PM Inpatient Neurology Consult Stroke Completed     10/25/2024  5:06 PM Inpatient Cardiology Consult      10/25/2024 10:33 AM Inpatient General Surgery Consult Completed     10/25/2024  1:53 AM Inpatient Pulmonology Consult Completed     10/24/2024  4:52 PM Inpatient Nephrology Consult Completed     10/22/2024  8:26 PM Inpatient Gastroenterology Consult Completed     10/22/2024  7:10 PM [facility] (on-call MD unless specified) Details            Hospital Course  49 y.o. male presented to the hospital with right upper quadrant pain and initially felt to have gastritis or duodenitis. An EGD was unremarkable. He had persistent abdominal pain and CT scan showed large subcapsular  liver fluid collection biloma and underwent percutaneous drainage (multiple IR drains) he was treated with antibiotics (cultures grew Klebsiella) and ultimately diagnosed with cholecystitis as a source of his biloma. On 11/8/2024 he underwent laparoscopic converted to open cholecystectomy with IOC and found to have acute cholecystitis and gallbladder perforation into the liver. His postop course was unremarkable and ID recommends antibiotics through the 15th (Augmentin at discharge). He will follow-up with surgery in about a week.    During the hospital course he developed acute kidney injury that resolved (nephrology felt was multifactorial due to hypovolemia, hypotension, impaired renal autoregulation due to Jardiance, Entresto, Aldactone), hyponatremia, lactic acidosis and hypotension. He had uncontrolled diabetes with high and low blood sugars he is currently on mealtime and long-acting insulin and a sliding scale. He was seen by multiple consultants including nephrology, infectious diseases, pulmonology, of course GI and general surgery, and cardiology.    He had hypotension and was put on midodrine for a time but that has since been stopped. He has a history of nonischemic cardiomyopathy. EF here is 49% which is improved from previous. Cardiac meds were held for hypotension (Entresto, Jardiance, spironolactone, carvedilol). Nephrology cleared him to restart GDMT for CHF at discharge as tolerated. Carvedilol has been started as of yesterday. Will also restart Jardiance and lipitor at discharge. He had increased LFTs last week but they improved almost to normal recommend follow-up LFTs with PCP. He will need close follow-up with cardiology and PCP to continue/titrate GDMT.     I discussed the patient's findings and my recommendations with patient and nursing staff and pharmacy regarding some GDMT meds. Patient denies any pain or shortness of breath today and is looking forward to going home. He refused home  health and SNF referrals. He ambulated with  feet yesterday.    Results for orders placed during the hospital encounter of 10/22/24    Adult Transthoracic Echo Complete W/ Cont if Necessary Per Protocol    Interpretation Summary    Left ventricular systolic function is low normal. Calculated left ventricular EF = 49.7%    The following left ventricular wall segments are hypokinetic: apical septal and apex hypokinetic.    Left ventricular diastolic function was normal.    Estimated right ventricular systolic pressure from tricuspid regurgitation is mildly elevated (35-45 mmHg). Calculated right ventricular systolic pressure from tricuspid regurgitation is 40 mmHg.    Mild mitral valve regurgitation is present.       Temp:  [98 °F (36.7 °C)-98.4 °F (36.9 °C)] 98 °F (36.7 °C)  Heart Rate:  [79-87] 82  Resp:  [18-22] 18  BP: (109-116)/(60-74) 116/70  Body mass index is 25.45 kg/m².    Physical Exam  Constitutional:       General: He is not in acute distress.     Appearance: He is not toxic-appearing.   HENT:      Head: Normocephalic and atraumatic.   Cardiovascular:      Rate and Rhythm: Normal rate and regular rhythm.   Pulmonary:      Effort: Pulmonary effort is normal. No respiratory distress.      Breath sounds: Normal breath sounds. No wheezing or rhonchi.   Abdominal:      General: Bowel sounds are normal.      Palpations: Abdomen is soft.      Tenderness: There is no abdominal tenderness. There is no guarding or rebound.   Musculoskeletal:         General: No swelling.   Skin:     General: Skin is warm and dry.   Neurological:      General: No focal deficit present.      Mental Status: He is alert and oriented to person, place, and time.   Psychiatric:         Mood and Affect: Mood normal.         Behavior: Behavior normal.       Disposition: Home or Self Care       Discharge Medications        New Medications        Instructions Start Date   amoxicillin-clavulanate 875-125 MG per tablet  Commonly known as:  AUGMENTIN   1 tablet, Oral, 2 Times Daily      Dexcom G7 Sensor misc   1 each, Not Applicable, Continuous, Use to monitor blood glucose levels continuously.  Change sensors every 10 days      glucose blood test strip   Use to test blood glucose up to four times daily as needed. Formulary Compliance Approval. Diagnosis: Type 1 Diabetes      Lancets misc   Use to test blood glucose up to four times daily as needed. Formulary Compliance Approval. Diagnosis: Type 1 Diabetes      Lantus SoloStar 100 UNIT/ML injection pen  Generic drug: Insulin Glargine   15 Units, Subcutaneous, Daily, Formulary Compliance Approval      methocarbamol 750 MG tablet  Commonly known as: ROBAXIN   750 mg, Oral, 4 Times Daily PRN      naloxone 4 MG/0.1ML nasal spray  Commonly known as: NARCAN   Call 911. Don't prime. [place] in 1 nostril for overdose. Repeat in 2-3 minutes in other nostril if no or minimal breathing/responsiveness.      OneTouch Verio Flex System w/Device kit   Use to test blood glucose up to four times daily as needed. Formulary Compliance Approval. Diagnosis: Type 1 Diabetes      oxyCODONE 5 MG immediate release tablet  Commonly known as: ROXICODONE   5 mg, Oral, Every 4 Hours PRN      pantoprazole 40 MG EC tablet  Commonly known as: PROTONIX   40 mg, Oral, 2 Times Daily Before Meals      Pen Needles 32G X 4 MM misc   1 each, Subcutaneous, 4 Times Daily PRN, Formulary Compliance Approval      polyethylene glycol 17 GM/SCOOP powder  Commonly known as: MIRALAX   Mix 17 g in 4-8 oz of liquid and take by mouth 2 (Two) Times a Day.      sennosides-docusate 8.6-50 MG per tablet  Commonly known as: PERICOLACE   2 tablets, Oral, 2 Times Daily, stop if having diarrhea             Changes to Medications        Instructions Start Date   carvedilol 3.125 MG tablet  Commonly known as: COREG  What changed:   medication strength  how much to take   3.125 mg, Oral, Every 12 Hours Scheduled      NovoLOG FlexPen 100 UNIT/ML solution  pen-injector sc pen  Generic drug: insulin aspart  What changed: See the new instructions.   5 Units, Subcutaneous, 3 Times Daily With Meals, Formulary Compliance Approval      venlafaxine XR 75 MG 24 hr capsule  Commonly known as: EFFEXOR-XR  What changed:   medication strength  how much to take   75 mg, Oral, Daily             Continue These Medications        Instructions Start Date   atorvastatin 40 MG tablet  Commonly known as: LIPITOR   40 mg, Oral, Daily      empagliflozin 10 MG tablet tablet  Commonly known as: JARDIANCE   10 mg, Oral, Daily      hydrOXYzine 10 MG tablet  Commonly known as: ATARAX   10 mg, Oral, Every 8 Hours PRN, for anxiety      melatonin 5 MG tablet tablet   5 mg, Oral, Nightly PRN      ondansetron 8 MG tablet  Commonly known as: ZOFRAN   8 mg, Oral, Every 8 Hours PRN      vitamin D 1.25 MG (68599 UT) capsule capsule  Commonly known as: ERGOCALCIFEROL   50,000 Units, Weekly             Stop These Medications      metFORMIN 1000 MG tablet  Commonly known as: GLUCOPHAGE     sacubitril-valsartan 24-26 MG tablet  Commonly known as: ENTRESTO     spironolactone 25 MG tablet  Commonly known as: ALDACTONE             Diet Instructions       Diet: Regular/House Diet, Diabetic Diets, Gastrointestinal Diets; Consistent Carbohydrate; Low Irritant      Discharge Diet:  Regular/House Diet  Diabetic Diets  Gastrointestinal Diets       Diabetic Diet: Consistent Carbohydrate    Gastrointestinal Diet: Low Irritant    Texture: Regular Texture (IDDSI 7)    Fluid Consistency: Thin (IDDSI 0)           Activity Instructions       Activity as Tolerated             Additional Instructions for the Follow-ups that You Need to Schedule       Call MD for problems / concerns.   As directed             Follow-up Information       Juju Kennedy MD Follow up in 1 week(s).    Specialty: Family Medicine  Why: After hospital follow up  Contact information:  [address] [facility] [address]  [facility]  [address]  [phone]               Manuela Wisdom MD Follow up.    Specialty: General Surgery  Contact information:  [address]  [address]  [place] [id number]  [phone]               Kaylee Kay MD .    Specialty: Cardiology  Contact information:  [address]  [address]  [place] [id number]  [phone]                            Future Appointments   Date Time Provider Department Center   2/10/2025  2:00 PM Juju Kennedy MD [id number] MD [place]  [facility]  11/14/24    Discharge time spent greater than 30 minutes.

## 2024-11-14 NOTE — DISCHARGE INSTRUCTIONS
Manuela Wisdom MD  [address]  [place] [address]  [phone]    Discharge Instructions: Gall Bladder Surgery    Go home, rest and take it easy today; however, you should get up and move about several times a day to reduce the risk of developing a blood clot in your legs.   Eat and drink a regular diabetic diet.   Your incisions are closed with staples. Leave them in place until follow up with your surgeon.   You may notice some bleeding/drainage on your outer dressings. A little bloody drainage is normal. If the bleeding/drainage is such that the bandage cannot absorb it, remove the dressing, apply clean gauze and apply firm pressure for a full 15 minutes. If the bleeding continues, please call me.   You may shower 24 hours after surgery. No tub baths or swimming for at least two weeks and until your incisions are completely healed.   You have received a prescription for a narcotic pain medicine, as you will have some pain following surgery.  You will not be totally pain free, but your pain medicine should make the pain tolerable. Please take your pain medicine as prescribed and always take your pills with food to prevent nausea. If you are having severe pain that cannot be controlled by the pain medicine, please contact me.   Narcotic pain medicine can cause constipation. Take an over the counter stool softener, like Miaralax or docusate, to prevent constipation while taking narcotics.  No driving for 24 hours and for as long as you are taking your prescription pain medicine.   You will need to call the office at [phone] to schedule a follow-up appointment in 1 week.   Remember to contact me for any of the following:   Fever greater than 101 degrees  Severe pain that cannot be controlled by taking your pain pills  Severe nausea or vomiting that cannot be controlled by taking your nausea pills  Significant bleeding of your incisions  Drainage that has a bad smell or is yellow or green  in appearance  Any other questions or concerns

## 2024-11-15 ENCOUNTER — TRANSITIONAL CARE MANAGEMENT TELEPHONE ENCOUNTER (OUTPATIENT)
Dept: CALL CENTER | Facility: HOSPITAL | Age: 49
End: 2024-11-15
Payer: COMMERCIAL

## 2024-11-15 NOTE — OUTREACH NOTE
Call Center TCM Note      Flowsheet Row Responses   [facility], [place], operated by [facility] patient discharged from? [place]   Does the patient have one of the following disease processes/diagnoses(primary or secondary)? General Surgery   TCM attempt successful? Yes  [VR for Charity Bishop, wife]   Call start time 0921   Call end time 0932   Meds reviewed with patient/caregiver? Yes   Is the patient having any side effects they believe may be caused by any medication additions or changes? No   Does the patient have all medications related to this admission filled (includes all antibiotics, pain medications, etc.) Yes   Prescription comments Pt has new glargine and novolog ordered,  pt has not yet set up his CGM, discussed importance of monitoring BG while on insulin.  Pt will set up device today.  Pt taking atbs as prescribed.   Is the patient taking all medications as directed (includes completed medication regime)? Yes   Medication comments Pt reports still having some expected surgical soreness and taking oxycodone as ordered.  Pt questioning obtainining rfi   Comments Discussed need for compliance with PCP and specialists as noted   Does the patient have an appointment with their PCP within 7-14 days of discharge? No appointments available   Nursing Interventions Routed TCM call to PCP office   What is the Home health agency?  denies need for HH   Has home health visited the patient within 72 hours of discharge? N/A   Psychosocial issues? No   Did the patient receive a copy of their discharge instructions? Yes   Nursing interventions Reviewed instructions with patient  [this RN uanble to view AVS but pt reports he has a copy of instructions]   What is the patient's perception of their health status since discharge? Same  [Pt reports expected soreness, taking meds as ordered .  Discussed need for BG and BP monitoring, encouraged to bring readings to f/u appt. Reviewed s/s infection with pt]   Nursing interventions Nurse provided  patient education  [post op instructions reviewed]   Is the patient /caregiver able to teach back basic post-op care? Keep incision areas clean,dry and protected   Is the patient/caregiver able to teach back signs and symptoms of incisional infection? Increased redness, swelling or pain at the incisonal site, Increased drainage or bleeding, Incisional warmth, Pus or odor from incision, Fever  [denies issues with site]   Is the patient/caregiver able to teach back steps to recovery at home? Rest and rebuild strength, gradually increase activity   Is the patient/caregiver able to teach back the hierarchy of who to call/visit for symptoms/problems? PCP, Specialist, Home health nurse, Urgent Care, ED, 911 Yes   TCM call completed? Yes   Call end time 0932            Emi Kelly RN    11/15/2024, 09:43 EST

## 2024-11-15 NOTE — OUTREACH NOTE
Prep Survey      Flowsheet Row Responses   [facility] patient discharged from? [facility]   Is LACE score < 7 ? No   Eligibility [facility]   Date of Admission 10/22/24   Date of Discharge 11/14/24   Discharge Disposition Home or Self Care   Discharge diagnosis Epigastric abdominal pain, LAPAROSCOPIC CONVERTED TO OPEN CHOLECYSTECTOMY WITH INTRAOPERATIVE CHOLANGIOGRAM AND RIGHT UPPER QUADRANT DRAIN PLACEMENT   Does the patient have one of the following disease processes/diagnoses(primary or secondary)? General Surgery   Does the patient have Home health ordered? No   Is there a DME ordered? No   Prep survey completed? Yes            Dayan MORALES - Registered Nurse

## 2024-11-15 NOTE — PAYOR COMM NOTE
"Donnie Ornelas (49 y.o. Male)                      ATTENTION; DISCHARGE CASE [id number]                     REPLY TO UR DEPT  [phone]               Date of Birth   1975    Social Security Number       Address   [address] [place] [address] [address] [place] [address]    Home Phone   [phone]    MRN   [id number]       Mu-ism   Orthodox    Marital Status                               Admission Date   10/22/24    Admission Type   Emergency    Admitting Provider   James Alexandra MD    Attending Provider       Department, Room/Bed   [facility], N339/1       Discharge Date   11/14/2024    Discharge Disposition   Home or Self Care    Discharge Destination                                 Attending Provider: (none)   Allergies: No Known Allergies    Isolation: None   Infection: None   Code Status: Prior    Ht: 170.2 cm (67\")   Wt: 73.7 kg (162 lb 7.7 oz)    Admission Cmt: None   Principal Problem: Epigastric abdominal pain [R10.13]                   Active Insurance as of 10/22/2024       Primary Coverage       Payor Plan Insurance Group Employer/Plan Group    [employer]HEM BLUE CROSS ANTHEM BLUE CROSS BLUE SHIELD PPO [id number]       Payor Plan Address Payor Plan Phone Number Payor Plan Fax Number Effective Dates    [address] [phone]  1/1/2019 - None Entered    [facility] [id number]         Subscriber Name Subscriber Birth Date Member ID       DONNIE ORNELAS 1975 [id number]                     Emergency Contacts        (Rel.) Home Phone Work Phone Mobile Phone    Murphy Padilla (Brother) -- -- [phone]    Charity Ornelas (Spouse) [phone] -- [phone]    joie montero (Relative) -- -- [phone]    Daniel Ornelas (Father) -- -- [phone]                 Discharge Summary        Jareth Bustillo MD at 11/14/24 0906          Date of Discharge:  11/14/2024    PCP: Juju Kennedy MD    Discharge Diagnosis:   Active Hospital Problems    Diagnosis  POA    " **Epigastric abdominal pain [R10.13]  Yes    Abscess of liver [K75.0]  Yes    Klebsiella pneumoniae (k. pneumoniae) as the cause of diseases classified elsewhere [B96.1]  Yes    Hypotension [I95.9]  Yes    Transaminitis [R74.01]  Yes    Leukocytosis [D72.829]  Yes    Chronic combined systolic and diastolic heart failure [I50.42]  Yes    Calculus of gallbladder with acute on chronic cholecystitis without obstruction [K80.12]  Unknown    Anxiety [F41.9]  Yes    Multiple-type hyperlipidemia [E78.2]  Yes    Type 2 diabetes mellitus with diabetic neuropathy, with long-term current use of insulin [E11.40, Z79.4]  Not Applicable    Benign essential HTN [I10]  Yes      Resolved Hospital Problems    Diagnosis Date Resolved POA    Acute kidney injury [N17.9] 10/30/2024 No     Procedure(s):  LAPAROSCOPIC CONVERTED TO OPEN CHOLECYSTECTOMY WITH INTRAOPERATIVE CHOLANGIOGRAM AND RIGHT UPPER QUADRANT DRAIN PLACEMENT     Consults       Date and Time Order Name Status Description    10/31/2024  6:02 PM Inpatient Infectious Diseases Consult Completed     10/31/2024  9:29 AM Inpatient Gastroenterology Consult Completed     10/27/2024  4:26 PM Inpatient Neurology Consult Stroke Completed     10/25/2024  5:06 PM Inpatient Cardiology Consult      10/25/2024 10:33 AM Inpatient General Surgery Consult Completed     10/25/2024  1:53 AM Inpatient Pulmonology Consult Completed     10/24/2024  4:52 PM Inpatient Nephrology Consult Completed     10/22/2024  8:26 PM Inpatient Gastroenterology Consult Completed     10/22/2024  7:10 PM [facility] (on-call MD unless specified) Details            Hospital Course  49 y.o. male presented to the hospital with right upper quadrant pain and initially felt to have gastritis or duodenitis. An EGD was unremarkable. He had persistent abdominal pain and CT scan showed large subcapsular liver fluid collection biloma and underwent percutaneous drainage (multiple IR drains) he was treated with antibiotics (cultures grew  Klebsiella) and ultimately diagnosed with cholecystitis as a source of his biloma. On 11/8/2024 he underwent laparoscopic converted to open cholecystectomy with IOC and found to have acute cholecystitis and gallbladder perforation into the liver. His postop course was unremarkable and ID recommends antibiotics through the 15th (Augmentin at discharge). He will follow-up with surgery in about a week.    During the hospital course he developed acute kidney injury that resolved (nephrology felt was multifactorial due to hypovolemia, hypotension, impaired renal autoregulation due to Jardiance, Entresto, Aldactone), hyponatremia, lactic acidosis and hypotension. He had uncontrolled diabetes with high and low blood sugars he is currently on mealtime and long-acting insulin and a sliding scale. He was seen by multiple consultants including nephrology, infectious diseases, pulmonology, of course GI and general surgery, and cardiology.    He had hypotension and was put on midodrine for a time but that has since been stopped. He has a history of nonischemic cardiomyopathy. EF here is 49% which is improved from previous. Cardiac meds were held for hypotension (Entresto, Jardiance, spironolactone, carvedilol). Nephrology cleared him to restart GDMT for CHF at discharge as tolerated. Carvedilol has been started as of yesterday. Will also restart Jardiance and lipitor at discharge. He had increased LFTs last week but they improved almost to normal recommend follow-up LFTs with PCP. He will need close follow-up with cardiology and PCP to continue/titrate GDMT.     I discussed the patient's findings and my recommendations with patient and nursing staff and pharmacy regarding some GDMT meds. Patient denies any pain or shortness of breath today and is looking forward to going home. He refused home health and SNF referrals. He ambulated with  feet yesterday.    Results for orders placed during the hospital encounter of  10/22/24    Adult Transthoracic Echo Complete W/ Cont if Necessary Per Protocol    Interpretation Summary    Left ventricular systolic function is low normal. Calculated left ventricular EF = 49.7%    The following left ventricular wall segments are hypokinetic: apical septal and apex hypokinetic.    Left ventricular diastolic function was normal.    Estimated right ventricular systolic pressure from tricuspid regurgitation is mildly elevated (35-45 mmHg). Calculated right ventricular systolic pressure from tricuspid regurgitation is 40 mmHg.    Mild mitral valve regurgitation is present.       Temp:  [98 °F (36.7 °C)-98.4 °F (36.9 °C)] 98 °F (36.7 °C)  Heart Rate:  [79-87] 82  Resp:  [18-22] 18  BP: (109-116)/(60-74) 116/70  Body mass index is 25.45 kg/m².    Physical Exam  Constitutional:       General: He is not in acute distress.     Appearance: He is not toxic-appearing.   HENT:      Head: Normocephalic and atraumatic.   Cardiovascular:      Rate and Rhythm: Normal rate and regular rhythm.   Pulmonary:      Effort: Pulmonary effort is normal. No respiratory distress.      Breath sounds: Normal breath sounds. No wheezing or rhonchi.   Abdominal:      General: Bowel sounds are normal.      Palpations: Abdomen is soft.      Tenderness: There is no abdominal tenderness. There is no guarding or rebound.   Musculoskeletal:         General: No swelling.   Skin:     General: Skin is warm and dry.   Neurological:      General: No focal deficit present.      Mental Status: He is alert and oriented to person, place, and time.   Psychiatric:         Mood and Affect: Mood normal.         Behavior: Behavior normal.       Disposition: Home or Self Care       Discharge Medications        New Medications        Instructions Start Date   amoxicillin-clavulanate 875-125 MG per tablet  Commonly known as: AUGMENTIN   1 tablet, Oral, 2 Times Daily      Dexcom G7 Sensor misc   1 each, Not Applicable, Continuous, Use to monitor blood  glucose levels continuously.  Change sensors every 10 days      glucose blood test strip   Use to test blood glucose up to four times daily as needed. Formulary Compliance Approval. Diagnosis: Type 1 Diabetes      Lancets misc   Use to test blood glucose up to four times daily as needed. Formulary Compliance Approval. Diagnosis: Type 1 Diabetes      Lantus SoloStar 100 UNIT/ML injection pen  Generic drug: Insulin Glargine   15 Units, Subcutaneous, Daily, Formulary Compliance Approval      methocarbamol 750 MG tablet  Commonly known as: ROBAXIN   750 mg, Oral, 4 Times Daily PRN      naloxone 4 MG/0.1ML nasal spray  Commonly known as: NARCAN   Call 911. Don't prime. [place] in 1 nostril for overdose. Repeat in 2-3 minutes in other nostril if no or minimal breathing/responsiveness.      OneTouch Verio Flex System w/Device kit   Use to test blood glucose up to four times daily as needed. Formulary Compliance Approval. Diagnosis: Type 1 Diabetes      oxyCODONE 5 MG immediate release tablet  Commonly known as: ROXICODONE   5 mg, Oral, Every 4 Hours PRN      pantoprazole 40 MG EC tablet  Commonly known as: PROTONIX   40 mg, Oral, 2 Times Daily Before Meals      Pen Needles 32G X 4 MM misc   1 each, Subcutaneous, 4 Times Daily PRN, Formulary Compliance Approval      polyethylene glycol 17 GM/SCOOP powder  Commonly known as: MIRALAX   Mix 17 g in 4-8 oz of liquid and take by mouth 2 (Two) Times a Day.      sennosides-docusate 8.6-50 MG per tablet  Commonly known as: PERICOLACE   2 tablets, Oral, 2 Times Daily, stop if having diarrhea             Changes to Medications        Instructions Start Date   carvedilol 3.125 MG tablet  Commonly known as: COREG  What changed:   medication strength  how much to take   3.125 mg, Oral, Every 12 Hours Scheduled      NovoLOG FlexPen 100 UNIT/ML solution pen-injector sc pen  Generic drug: insulin aspart  What changed: See the new instructions.   5 Units, Subcutaneous, 3 Times Daily With  Meals, Formulary Compliance Approval      venlafaxine XR 75 MG 24 hr capsule  Commonly known as: EFFEXOR-XR  What changed:   medication strength  how much to take   75 mg, Oral, Daily             Continue These Medications        Instructions Start Date   atorvastatin 40 MG tablet  Commonly known as: LIPITOR   40 mg, Oral, Daily      empagliflozin 10 MG tablet tablet  Commonly known as: JARDIANCE   10 mg, Oral, Daily      hydrOXYzine 10 MG tablet  Commonly known as: ATARAX   10 mg, Oral, Every 8 Hours PRN, for anxiety      melatonin 5 MG tablet tablet   5 mg, Oral, Nightly PRN      ondansetron 8 MG tablet  Commonly known as: ZOFRAN   8 mg, Oral, Every 8 Hours PRN      vitamin D 1.25 MG (26870 UT) capsule capsule  Commonly known as: ERGOCALCIFEROL   50,000 Units, Weekly             Stop These Medications      metFORMIN 1000 MG tablet  Commonly known as: GLUCOPHAGE     sacubitril-valsartan 24-26 MG tablet  Commonly known as: ENTRESTO     spironolactone 25 MG tablet  Commonly known as: ALDACTONE             Diet Instructions       Diet: Regular/House Diet, Diabetic Diets, Gastrointestinal Diets; Consistent Carbohydrate; Low Irritant      Discharge Diet:  Regular/House Diet  Diabetic Diets  Gastrointestinal Diets       Diabetic Diet: Consistent Carbohydrate    Gastrointestinal Diet: Low Irritant    Texture: Regular Texture (IDDSI 7)    Fluid Consistency: Thin (IDDSI 0)           Activity Instructions       Activity as Tolerated             Additional Instructions for the Follow-ups that You Need to Schedule       Call MD for problems / concerns.   As directed             Follow-up Information       Juju Kennedy MD Follow up in 1 week(s).    Specialty: Family Medicine  Why: After hospital follow up  Contact information:  [address] [facility] [address]  [place] [id number] [phone]               Manuela Wisdom MD Follow up.    Specialty: General Surgery  Contact information:  [address] [facility]  [address]  [place] [address]  [phone]               Kaylee Kay MD .    Specialty: Cardiology  Contact information:  [address] [facility]  [address]  [place] [address]  [phone]                            Future Appointments   Date Time Provider Department Center   2/10/2025  2:00 PM Juju Kennedy MD [id number]        Jareth Bustillo MD  [facility]  11/14/24    Discharge time spent greater than 30 minutes.    Electronically signed by Jareth Bustillo MD at 11/14/24 0938

## 2024-11-20 ENCOUNTER — TELEPHONE (OUTPATIENT)
Dept: FAMILY MEDICINE CLINIC | Facility: CLINIC | Age: 49
End: 2024-11-20
Payer: COMMERCIAL

## 2024-11-20 NOTE — TELEPHONE ENCOUNTER
Caller: Estefany Bishop    Relationship: Emergency Contact    Best call back number: [phone]    What is the best time to reach you: ANYTIME     Who are you requesting to speak with (clinical staff, provider,  specific staff member): TY    Do you know the name of the person who called:  ESTEFANY     What was the call regarding: RA PAPERWORK    Is it okay if the provider responds through MyChart: NO

## 2024-11-21 NOTE — TELEPHONE ENCOUNTER
Ling Nash and she states that the hospital covered pt on fmla until 11/20/24.  She is wondering if Dr. Kennedy can fill out tomorrow at his OV appt. on 11/22/24?    Charity is emailing and I will place on AH desk.      We will call Charity when the paperwork is completed and faxed.  She will also like a copy mailed to her home.    [facility] NITA

## 2024-11-22 ENCOUNTER — OFFICE VISIT (OUTPATIENT)
Dept: FAMILY MEDICINE CLINIC | Facility: CLINIC | Age: 49
End: 2024-11-22
Payer: COMMERCIAL

## 2024-11-22 VITALS
HEART RATE: 86 BPM | SYSTOLIC BLOOD PRESSURE: 130 MMHG | OXYGEN SATURATION: 99 % | RESPIRATION RATE: 16 BRPM | WEIGHT: 163.8 LBS | BODY MASS INDEX: 25.71 KG/M2 | DIASTOLIC BLOOD PRESSURE: 80 MMHG | HEIGHT: 67 IN | TEMPERATURE: 98 F

## 2024-11-22 DIAGNOSIS — I10 BENIGN ESSENTIAL HTN: ICD-10-CM

## 2024-11-22 DIAGNOSIS — Z09 HOSPITAL DISCHARGE FOLLOW-UP: Primary | ICD-10-CM

## 2024-11-22 DIAGNOSIS — Z79.4 TYPE 2 DIABETES MELLITUS WITH DIABETIC NEUROPATHY, WITH LONG-TERM CURRENT USE OF INSULIN: ICD-10-CM

## 2024-11-22 DIAGNOSIS — R74.8 ELEVATED LIVER ENZYMES: ICD-10-CM

## 2024-11-22 DIAGNOSIS — E11.40 TYPE 2 DIABETES MELLITUS WITH DIABETIC NEUROPATHY, WITH LONG-TERM CURRENT USE OF INSULIN: ICD-10-CM

## 2024-11-22 RX ORDER — BLOOD SUGAR DIAGNOSTIC
1 STRIP MISCELLANEOUS 4 TIMES DAILY
COMMUNITY
Start: 2024-10-15

## 2024-11-22 NOTE — PROGRESS NOTES
Transitional Care Follow Up Visit  Subjective     Donnie Bishop is a 49 y.o. male who presents for a transitional care management visit.    Within 48 business hours after discharge our office contacted him via telephone to coordinate his care and needs.      I reviewed and discussed the details of that call along with the discharge summary, hospital problems, inpatient lab results, inpatient diagnostic studies, and consultation reports with Donnie.     Current outpatient and discharge medications have been reconciled for the patient.  Reviewed by: Alexandra Ward MD          11/14/2024     7:09 PM   Date of TCM Phone Call   [facility]   Date of Admission 10/22/2024   Date of Discharge 11/14/2024   Discharge Disposition Home or Self Care     Risk for Readmission (LACE) Score: 16 (11/14/2024  6:00 AM)      History of Present Illness   Pt presents today for follow up and had recent surgery.  He is here for hospital follow up and [place] paperwork.  Still has some pain  will be seeing sx next week..  DM sugars are high. Elevated lfts- needs repeat today.   HTN- no CP or HA      Course During Hospital Stay:    Hospital Course  49 y.o. male presented to the hospital with right upper quadrant pain and initially felt to have gastritis or duodenitis. An EGD was unremarkable. He had persistent abdominal pain and CT scan showed large subcapsular liver fluid collection biloma and underwent percutaneous drainage (multiple IR drains) he was treated with antibiotics (cultures grew Klebsiella) and ultimately diagnosed with cholecystitis as a source of his biloma. On 11/8/2024 he underwent laparoscopic converted to open cholecystectomy with IOC and found to have acute cholecystitis and gallbladder perforation into the liver. His postop course was unremarkable and ID recommends antibiotics through the 15th (Augmentin at discharge). He will follow-up with surgery in about a week.     During the hospital course he  "developed acute kidney injury that resolved (nephrology felt was multifactorial due to hypovolemia, hypotension, impaired renal autoregulation due to Jardiance, Entresto, Aldactone), hyponatremia, lactic acidosis and hypotension. He had uncontrolled diabetes with high and low blood sugars he is currently on mealtime and long-acting insulin and a sliding scale. He was seen by multiple consultants including nephrology, infectious diseases, pulmonology, of course GI and general surgery, and cardiology.     He had hypotension and was put on midodrine for a time but that has since been stopped. He has a history of nonischemic cardiomyopathy. EF here is 49% which is improved from previous. Cardiac meds were held for hypotension (Entresto, Jardiance, spironolactone, carvedilol). Nephrology cleared him to restart GDMT for CHF at discharge as tolerated. Carvedilol has been started as of yesterday. Will also restart Jardiance and lipitor at discharge. He had increased LFTs last week but they improved almost to normal recommend follow-up LFTs with PCP. He will need close follow-up with cardiology and PCP to continue/titrate GDMT.      I discussed the patient's findings and my recommendations with patient and nursing staff and pharmacy regarding some GDMT meds. Patient denies any pain or shortness of breath today and is looking forward to going home. He refused home health and SNF referrals. He ambulated with  feet yesterday.     The following portions of the patient's history were reviewed and updated as appropriate: allergies, current medications, past family history, past medical history, past social history, past surgical history, and problem list.    Review of Systems    Objective   /80 (BP Location: Right arm, Patient Position: Sitting)   Pulse 86   Temp 98 °F (36.7 °C)   Resp 16   Ht 170.2 cm (67\")   Wt 74.3 kg (163 lb 12.8 oz)   SpO2 99%   BMI 25.65 kg/m²   Physical Exam  Vitals and nursing note " reviewed.   Constitutional:       Appearance: Normal appearance. He is well-developed.   Cardiovascular:      Rate and Rhythm: Normal rate and regular rhythm.      Heart sounds: Normal heart sounds. No murmur heard.  Pulmonary:      Effort: Pulmonary effort is normal. No respiratory distress.      Breath sounds: Normal breath sounds. No stridor. No wheezing or rhonchi.   Abdominal:      General: Abdomen is flat.      Palpations: Abdomen is soft.   Neurological:      General: No focal deficit present.      Mental Status: He is alert and oriented to person, place, and time. He is not disoriented.   Psychiatric:         Mood and Affect: Mood normal.         Behavior: Behavior normal.         Assessment & Plan   Diagnoses and all orders for this visit:    1. Hospital discharge follow-up (Primary)    2. Type 2 diabetes mellitus with diabetic neuropathy, with long-term current use of insulin    3. Elevated liver enzymes  -     Comprehensive Metabolic Panel    4. Benign essential HTN  -     Comprehensive Metabolic Panel        I requested and reviewed all records, labs, and diagnostics from the hospital with patient and family.  Patient is to follow-up with specialists as discussed and directed.  Follow-up with specialist.      Will get fmla filled out.  May need additional paperwork filled by surgeon.    Consider endocrinologist for uncontrolled diabetes.

## 2024-11-23 LAB
ALBUMIN SERPL-MCNC: 3.5 G/DL (ref 3.5–5.2)
ALBUMIN/GLOB SERPL: 1.2 G/DL
ALP SERPL-CCNC: 99 U/L (ref 39–117)
ALT SERPL-CCNC: 22 U/L (ref 1–41)
AST SERPL-CCNC: 24 U/L (ref 1–40)
BILIRUB SERPL-MCNC: 0.3 MG/DL (ref 0–1.2)
BUN SERPL-MCNC: 9 MG/DL (ref 6–20)
BUN/CREAT SERPL: 9.7 (ref 7–25)
CALCIUM SERPL-MCNC: 9.2 MG/DL (ref 8.6–10.5)
CHLORIDE SERPL-SCNC: 103 MMOL/L (ref 98–107)
CO2 SERPL-SCNC: 25.4 MMOL/L (ref 22–29)
CREAT SERPL-MCNC: 0.93 MG/DL (ref 0.76–1.27)
EGFRCR SERPLBLD CKD-EPI 2021: 101.9 ML/MIN/1.73
GLOBULIN SER CALC-MCNC: 3 GM/DL
GLUCOSE SERPL-MCNC: 127 MG/DL (ref 65–99)
POTASSIUM SERPL-SCNC: 4.8 MMOL/L (ref 3.5–5.2)
PROT SERPL-MCNC: 6.5 G/DL (ref 6–8.5)
SODIUM SERPL-SCNC: 137 MMOL/L (ref 136–145)

## 2024-11-26 ENCOUNTER — TELEPHONE (OUTPATIENT)
Dept: FAMILY MEDICINE CLINIC | Facility: CLINIC | Age: 49
End: 2024-11-26
Payer: COMMERCIAL

## 2024-11-26 NOTE — TELEPHONE ENCOUNTER
[facility]     Relationship: Self    Best call back number: [phone]   FAX NUMBER: [phone]  REFERENCE/CLAIM NUMBER: [id number]    What was the call regarding: [facility] STATES THAT  THE RECEIVED DISABILITY FORM, BUT THEY ARE NEED OFFICES NOTES FROM 11/22.

## 2024-12-02 NOTE — TELEPHONE ENCOUNTER
DELETE AFTER REVIEWING: Send this encounter to the appropriate pool. See your Call Action Grid or Workflows for direction.    Caller: Charity Bishop    Relationship: Emergency Contact    Best call back number: [phone]    What form or medical record are you requesting: NEEDING CLARIFICATION ON RETURN TO WORK DATES ON [facility] PAPERWORK

## 2024-12-03 ENCOUNTER — READMISSION MANAGEMENT (OUTPATIENT)
Dept: CALL CENTER | Facility: HOSPITAL | Age: 49
End: 2024-12-03
Payer: COMMERCIAL

## 2024-12-03 NOTE — OUTREACH NOTE
General Surgery Week 3 Survey      Flowsheet Row Responses   [facility] patient discharged from? [place]   Does the patient have one of the following disease processes/diagnoses(primary or secondary)? General Surgery   Week 3 attempt successful? No  [Brother, Murphy, talked with patient a few days ago. States patient was doing well then.]   Unsuccessful attempts Attempt 1            Samia SANDERS - Registered Nurse

## 2024-12-04 ENCOUNTER — TELEPHONE (OUTPATIENT)
Dept: FAMILY MEDICINE CLINIC | Facility: CLINIC | Age: 49
End: 2024-12-04
Payer: COMMERCIAL

## 2024-12-04 NOTE — TELEPHONE ENCOUNTER
Pt's wife called back stating she called the NP they were told was to remove staples and they do not have an appt.  Can he come here to have them removed? 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 (This date was chosen by pt's wife.) He had an abd open freddie.  His sutures are being removed on 12/6/24.  Can you advise if he needs an appt-should his paperwork be adjusted?    C KYLEA

## 2024-12-04 NOTE — TELEPHONE ENCOUNTER
PATIENT'S WIFE ESTEFANY CALLED IN REGARDS TO RETURN TO WORK DATE FROM HAVING HIS GALLBLADDER REMOVED.     THE DATE OF 12/16/24 WAS A DATE SHE PICKED OUT BECAUSE SHE DID NOT NOW  HOW LONG HE WOULD NEED TO BE OFF WORK.  THEY DID NOT GET ANY GUIDANCE AS TO HOW LONG HE SHOULD BE OFF.     HIS SURGEON WAS ANGELIA IZQUIERDO     HE WILL BE GETTING STAPLES OUT 12/6/24 WITH MICHELLE GUNN AT [address]. WHICH IS NOT LISTED ON HIS CHART     SHE IS NEEDING GUIDANCE AS TO WHAT NEEDS TO BE DONE NEXT ABOUT AND WHO TO SEE ABOUT GETTING STAPLES REMOVED. STAPLES HAVE BEEN IN SINCE HIS SURGERY 11/15/24 OR SOONER      PLEASE CALL NUMBER [phone]

## 2024-12-04 NOTE — TELEPHONE ENCOUNTER
Pt's wife notified and will call surgeon. She will call back if she needs paperwork extended. [facility] NITA

## 2024-12-05 ENCOUNTER — OFFICE VISIT (OUTPATIENT)
Dept: SURGERY | Facility: CLINIC | Age: 49
End: 2024-12-05
Payer: COMMERCIAL

## 2024-12-05 VITALS
SYSTOLIC BLOOD PRESSURE: 120 MMHG | DIASTOLIC BLOOD PRESSURE: 60 MMHG | WEIGHT: 165.6 LBS | TEMPERATURE: 97.9 F | HEART RATE: 97 BPM | OXYGEN SATURATION: 98 % | RESPIRATION RATE: 18 BRPM | HEIGHT: 67 IN | BODY MASS INDEX: 25.99 KG/M2

## 2024-12-05 DIAGNOSIS — Z90.49 STATUS POST CHOLECYSTECTOMY: Primary | ICD-10-CM

## 2024-12-05 DIAGNOSIS — K75.0 ABSCESS OF LIVER: ICD-10-CM

## 2024-12-05 PROCEDURE — 99024 POSTOP FOLLOW-UP VISIT: CPT | Performed by: STUDENT IN AN ORGANIZED HEALTH CARE EDUCATION/TRAINING PROGRAM

## 2024-12-05 NOTE — PROGRESS NOTES
General Surgery Office Follow Up Note     History of Present Illness:    Donnie Bishop is a 49 y.o. male who presents for follow up from recent hospitalization and surgery.  He was admitted 10/22/2024 to 11/14/2024 for right upper quadrant pain.  He was found to have a subcapsular liver biloma secondary to acute cholecystitis for which he underwent percutaneous drainage followed by laparoscopic converted to open cholecystectomy with intraoperative cholangiogram performed on 11/8/2024.  IOC negative.  Postoperatively he recovered and was discharged on Augmentin per infectious disease.      The patient states he is doing well postoperatively.  He does report some abdominal soreness along his right upper quadrant incision but feels like overall he is getting better as he is able to transition from lying to sitting and sitting to standing much more easily than he was before.  He has been walking a lot and feels like that is helping him get his strength back.  He has been eating regular food.  He denies nausea, vomiting, diarrhea, constipation, fever, chills, or drainage from his incisions.  He is anxious about getting his staples out today.    Allergies:  No Known Allergies    Meds:    Current Outpatient Medications:     atorvastatin (LIPITOR) 40 MG tablet, Take 1 tablet by mouth Daily., Disp: 90 tablet, Rfl: 3    Blood Glucose Monitoring Suppl (OneTouch Verio Flex System) w/Device kit, Use to test blood glucose up to four times daily as needed. Formulary Compliance Approval. Diagnosis: Type 1 Diabetes, Disp: 1 kit, Rfl: 0    carvedilol (COREG) 3.125 MG tablet, Take 1 tablet by mouth Every 12 (Twelve) Hours., Disp: 60 tablet, Rfl: 0    Continuous Glucose Sensor (Dexcom G7 Sensor) misc, Use 1 each Continuous. Use to monitor blood glucose levels continuously.  Change sensors every 10 days, Disp: 3 each, Rfl: 1    empagliflozin (JARDIANCE) 10 MG tablet tablet, Take 1 tablet by mouth Daily., Disp: 90 tablet, Rfl: 1    glucose  blood (OneTouch Verio) test strip, 1 each by Other route 4 (Four) Times a Day., Disp: , Rfl:     glucose blood test strip, Use to test blood glucose up to four times daily as needed. Formulary Compliance Approval. Diagnosis: Type 1 Diabetes, Disp: 100 each, Rfl: 0    hydrOXYzine (ATARAX) 10 MG tablet, TAKE 1 TABLET BY MOUTH EVERY 8 HOURS AS NEEDED FOR ANXIETY., Disp: 270 tablet, Rfl: 1    insulin aspart (NovoLOG FlexPen) 100 UNIT/ML solution pen-injector sc pen, Inject 5 Units under the skin into the appropriate area as directed 3 (Three) Times a Day With Meals. Formulary Compliance Approval, Disp: 15 mL, Rfl: 0    Insulin Glargine (Lantus SoloStar) 100 UNIT/ML injection pen, Inject 15 Units under the skin into the appropriate area as directed Daily. Formulary Compliance Approval, Disp: 15 mL, Rfl: 0    Insulin Pen Needle (Pen Needles) 32G X 4 MM misc, Inject 1 each under the skin into the appropriate area as directed 4 (Four) Times a Day As Needed (for insulin injections). Formulary Compliance Approval, Disp: 100 each, Rfl: 0    Lancets misc, Use to test blood glucose up to four times daily as needed. Formulary Compliance Approval. Diagnosis: Type 1 Diabetes, Disp: 100 each, Rfl: 0    melatonin 5 MG tablet tablet, Take 1 tablet by mouth At Night As Needed (insomnia)., Disp: 90 tablet, Rfl: 1    methocarbamol (ROBAXIN) 750 MG tablet, Take 1 tablet by mouth 4 (Four) Times a Day As Needed for Muscle Spasms., Disp: 30 tablet, Rfl: 0    ondansetron (ZOFRAN) 8 MG tablet, Take 1 tablet by mouth Every 8 (Eight) Hours As Needed for Nausea or Vomiting., Disp: 8 tablet, Rfl: 0    oxyCODONE (ROXICODONE) 5 MG immediate release tablet, Take 1 tablet by mouth Every 4 (Four) Hours As Needed for Moderate Pain., Disp: 8 tablet, Rfl: 0    pantoprazole (PROTONIX) 40 MG EC tablet, Take 1 tablet by mouth 2 (Two) Times a Day Before Meals., Disp: 60 tablet, Rfl: 0    venlafaxine XR (EFFEXOR-XR) 75 MG 24 hr capsule, Take 1 capsule by mouth  "Daily., Disp: , Rfl:     vitamin D (ERGOCALCIFEROL) 1.25 MG (86780 UT) capsule capsule, Take 1 capsule by mouth 1 (One) Time Per Week., Disp: , Rfl:     naloxone (NARCAN) 4 MG/0.1ML nasal spray, Call 911. Don't prime. [place] in 1 nostril for overdose. Repeat in 2-3 minutes in other nostril if no or minimal breathing/responsiveness. (Patient not taking: Reported on 12/5/2024), Disp: 2 each, Rfl: 0    polyethylene glycol (MIRALAX) 17 GM/SCOOP powder, Mix 17 g in 4-8 oz of liquid and take by mouth 2 (Two) Times a Day. (Patient not taking: Reported on 12/5/2024), Disp: 510 g, Rfl: 0    sennosides-docusate (PERICOLACE) 8.6-50 MG per tablet, Take 2 tablets by mouth 2 (Two) Times a Day. stop if having diarrhea (Patient not taking: Reported on 12/5/2024), Disp: 120 tablet, Rfl: 0    Physical Exam:   /60 (BP Location: Right arm, Patient Position: Sitting, Cuff Size: Adult)   Pulse 97   Temp 97.9 °F (36.6 °C) (Oral)   Resp 18   Ht 170.2 cm (67.01\")   Wt 75.1 kg (165 lb 9.6 oz)   SpO2 98%   BMI 25.93 kg/m²   Constitutional: Well-developed well-appearing  Eyes: Conjunctiva normal, sclera nonicteric  HENT: Hearing grossly normal, oral mucosa moist  Respiratory: Normal inspiratory effort on room air  Cardiovascular: No peripheral edema, well-perfused  Gastrointestinal: Abd soft, minimally tender with removal of staples from his incisions, no rebound or guarding, incisions are well-approximated without signs of infection  Musculoskeletal: Symmetric strength, normal gait  Psychiatric: Normal mood and affect  Skin:  Warm, dry, no rash on visualized skin surfaces    Path:  11/8/24 Cholecystectomy:  Final Diagnosis   1.  Gallbladder, cholecystectomy:               A.  Grossly disrupted gallbladder with acute hemorrhagic cholecystitis, gangrenous necrosis, and        areas suggestive of abscess formation.     Assessment/Plan:  49 y.o. male presenting status post recent percutaneous IR drainage of subcapsular liver biloma " followed by laparoscopic converted to open cholecystectomy for acute cholecystitis, surgery performed on 11/8/2024    Patient doing well, with expected postop course.  Tolerating regular diet, having bowel function.  Incisions healing in good order.  Staples removed in the office today.  Steri-Strips placed.  Patient may shower over these.  They will fall off on their own and he may remove them in 1 week if they are still present.  Continue weight lifting restriction to 20 pounds for 2 more weeks.  Avoid activities that cause pain or strain at incision sites.   Counseled on signs and symptoms concerning for need to call the office.  Follow up in 1-2 weeks for recheck.  Patient voiced understanding and is agreeable.    Manuela Wisdom M.D.  General, Robotic and Endoscopic Surgery  [facility], [place], [facility]    [address] [place] [address], [address]  [place], [place], [address]  P: [phone]  F: [phone]

## 2024-12-06 ENCOUNTER — READMISSION MANAGEMENT (OUTPATIENT)
Dept: CALL CENTER | Facility: HOSPITAL | Age: 49
End: 2024-12-06
Payer: COMMERCIAL

## 2024-12-06 NOTE — OUTREACH NOTE
Medical Week 3 Survey      Flowsheet Row Responses   [facility] facility patient discharged from? [place]   Does the patient have one of the following disease processes/diagnoses(primary or secondary)? General Surgery            Tsering NIEVES - Registered Nurse

## 2024-12-06 NOTE — TELEPHONE ENCOUNTER
Patients spouse called back and stated he has had his solange removed. Dr Wisdom stated his fmla needs to be extended to 12/23 and then he needs to be on light dudy for 4 weeks.

## 2024-12-06 NOTE — OUTREACH NOTE
General Surgery Week 3 Survey      Flowsheet Row Responses   [facility] patient discharged from? [place]   Does the patient have one of the following disease processes/diagnoses(primary or secondary)? General Surgery   Week 3 attempt successful? No   Unsuccessful attempts Attempt 2            Tsering Coppola Registered Nurse

## 2024-12-09 ENCOUNTER — TELEPHONE (OUTPATIENT)
Dept: SURGERY | Facility: CLINIC | Age: 49
End: 2024-12-09
Payer: COMMERCIAL

## 2024-12-09 NOTE — TELEPHONE ENCOUNTER
Patient's wife called stating that Dr. Wisdom did not want patient to return to work for another week and that when he did return to work he would have work restrictions in place for some period of time.  The PCP is the one originally filing the paperwork for the employer but he refuses to update the paperwork since Dr. Wisdom is the one stating additional time is needed and that restrictions are in place.  He wanted the patient to follow up with Dr. Wisdom's office.  The paperwork is in Media tab of the chart.  The patient will need to have additional documentation provided by Dr. Wisdom as to the length of time off work and what type/duration of work restrictions when able to return to work.  Please advise.  Patient wife's contact number for information is [phone].

## 2024-12-12 ENCOUNTER — OFFICE VISIT (OUTPATIENT)
Dept: SURGERY | Facility: CLINIC | Age: 49
End: 2024-12-12
Payer: COMMERCIAL

## 2024-12-12 VITALS
BODY MASS INDEX: 25.52 KG/M2 | OXYGEN SATURATION: 100 % | WEIGHT: 162.6 LBS | HEART RATE: 77 BPM | DIASTOLIC BLOOD PRESSURE: 86 MMHG | SYSTOLIC BLOOD PRESSURE: 120 MMHG | TEMPERATURE: 98.2 F | RESPIRATION RATE: 16 BRPM | HEIGHT: 67 IN

## 2024-12-12 DIAGNOSIS — Z90.49 STATUS POST CHOLECYSTECTOMY: Primary | ICD-10-CM

## 2024-12-12 DIAGNOSIS — K75.0 ABSCESS OF LIVER: ICD-10-CM

## 2024-12-12 PROCEDURE — 99024 POSTOP FOLLOW-UP VISIT: CPT | Performed by: STUDENT IN AN ORGANIZED HEALTH CARE EDUCATION/TRAINING PROGRAM

## 2024-12-12 NOTE — PROGRESS NOTES
General Surgery Office Follow Up Note     History of Present Illness:    Donnie Bishop is a 49 y.o. male who presents for follow up from recent hospitalization and surgery.  He was admitted 10/22/2024 to 11/14/2024 for right upper quadrant pain.  He was found to have a subcapsular liver biloma secondary to acute cholecystitis for which he underwent percutaneous drainage followed by laparoscopic converted to open cholecystectomy with intraoperative cholangiogram performed on 11/8/2024.  IOC negative.  Postoperatively he recovered and was discharged on Augmentin per infectious disease.  He last saw me in the office on 12/5/2020 and was doing well at that time.  He had his staples removed.    Today he states he is doing okay.  He states that he has had a couple of episodes of intermittent nausea and nonbloody vomiting which are not associated with eating and which she states have happened to him in the past related to nerves and anxiety.  He has been taking emetrol over the counter and states this controls his symptoms.  He denies any blood in his emesis.  He has had some increased loose stools which are new since his cholecystectomy and happen more frequently in the evenings.  He has been eating some healthy foods and some unhealthy snacks.  He eats 2 meals a day and has a snack usually.  Has been trying to drink water and avoid sodas.  He denies any abdominal pain and states that he is worried about palpating a swelling at the lateral aspect of his incision.  He has not had any fever, chills, or drainage from his incisions.  He has a lot of questions about how gallbladder disease occurs which I answered.    Allergies:  No Known Allergies    Meds:    Current Outpatient Medications:     Blood Glucose Monitoring Suppl (OneTouch Verio Flex System) w/Device kit, Use to test blood glucose up to four times daily as needed. Formulary Compliance Approval. Diagnosis: Type 1 Diabetes, Disp: 1 kit, Rfl: 0    carvedilol (COREG)  3.125 MG tablet, Take 1 tablet by mouth Every 12 (Twelve) Hours., Disp: 60 tablet, Rfl: 0    Continuous Glucose Sensor (Dexcom G7 Sensor) misc, Use 1 each Continuous. Use to monitor blood glucose levels continuously.  Change sensors every 10 days, Disp: 3 each, Rfl: 1    empagliflozin (JARDIANCE) 10 MG tablet tablet, Take 1 tablet by mouth Daily., Disp: 90 tablet, Rfl: 1    glucose blood (OneTouch Verio) test strip, 1 each by Other route 4 (Four) Times a Day., Disp: , Rfl:     glucose blood test strip, Use to test blood glucose up to four times daily as needed. Formulary Compliance Approval. Diagnosis: Type 1 Diabetes, Disp: 100 each, Rfl: 0    hydrOXYzine (ATARAX) 10 MG tablet, TAKE 1 TABLET BY MOUTH EVERY 8 HOURS AS NEEDED FOR ANXIETY., Disp: 270 tablet, Rfl: 1    insulin aspart (NovoLOG FlexPen) 100 UNIT/ML solution pen-injector sc pen, Inject 5 Units under the skin into the appropriate area as directed 3 (Three) Times a Day With Meals. Formulary Compliance Approval, Disp: 15 mL, Rfl: 0    Insulin Glargine (Lantus SoloStar) 100 UNIT/ML injection pen, Inject 15 Units under the skin into the appropriate area as directed Daily. Formulary Compliance Approval, Disp: 15 mL, Rfl: 0    Insulin Pen Needle (Pen Needles) 32G X 4 MM misc, Inject 1 each under the skin into the appropriate area as directed 4 (Four) Times a Day As Needed (for insulin injections). Formulary Compliance Approval, Disp: 100 each, Rfl: 0    Lancets misc, Use to test blood glucose up to four times daily as needed. Formulary Compliance Approval. Diagnosis: Type 1 Diabetes, Disp: 100 each, Rfl: 0    melatonin 5 MG tablet tablet, Take 1 tablet by mouth At Night As Needed (insomnia)., Disp: 90 tablet, Rfl: 1    methocarbamol (ROBAXIN) 750 MG tablet, Take 1 tablet by mouth 4 (Four) Times a Day As Needed for Muscle Spasms., Disp: 30 tablet, Rfl: 0    ondansetron (ZOFRAN) 8 MG tablet, Take 1 tablet by mouth Every 8 (Eight) Hours As Needed for Nausea or  "Vomiting., Disp: 8 tablet, Rfl: 0    pantoprazole (PROTONIX) 40 MG EC tablet, Take 1 tablet by mouth 2 (Two) Times a Day Before Meals., Disp: 60 tablet, Rfl: 0    venlafaxine XR (EFFEXOR-XR) 75 MG 24 hr capsule, Take 1 capsule by mouth Daily., Disp: , Rfl:     atorvastatin (LIPITOR) 40 MG tablet, Take 1 tablet by mouth Daily. (Patient not taking: Reported on 12/12/2024), Disp: 90 tablet, Rfl: 3    naloxone (NARCAN) 4 MG/0.1ML nasal spray, Call 911. Don't prime. [employer] in 1 nostril for overdose. Repeat in 2-3 minutes in other nostril if no or minimal breathing/responsiveness. (Patient not taking: Reported on 12/12/2024), Disp: 2 each, Rfl: 0    vitamin D (ERGOCALCIFEROL) 1.25 MG (10869 UT) capsule capsule, Take 1 capsule by mouth 1 (One) Time Per Week. (Patient not taking: Reported on 12/12/2024), Disp: , Rfl:     Physical Exam:   /86 (BP Location: Left arm, Patient Position: Sitting, Cuff Size: Adult)   Pulse 77   Temp 98.2 °F (36.8 °C) (Oral)   Resp 16   Ht 170.2 cm (67.01\")   Wt 73.8 kg (162 lb 9.6 oz)   SpO2 100%   BMI 25.46 kg/m²   Constitutional: Well-developed, well-appearing  Eyes: Conjunctiva normal, sclera nonicteric, right ptosis  HENT: Hearing grossly normal, oral mucosa moist  Respiratory: Normal inspiratory effort on room air  Cardiovascular: No peripheral edema, well-perfused  Gastrointestinal: Abd soft, nontender, no rebound or guarding, incisions are clean, dry, and well-approximated, the lateral aspect of his subcostal incision has mild induration consistent with normal scar tissue without any signs of infection or hernia  Musculoskeletal: Symmetric strength, normal gait  Psychiatric: Normal mood and affect  Skin:  Warm, dry, no rash on visualized skin surfaces, no jaundice    Path:  11/8/24 Cholecystectomy:  Final Diagnosis   1.  Gallbladder, cholecystectomy:               A.  Grossly disrupted gallbladder with acute hemorrhagic cholecystitis, gangrenous necrosis, and        areas " suggestive of abscess formation.     Assessment/Plan:  49 y.o. male presenting status post recent percutaneous IR drainage of subcapsular liver biloma followed by laparoscopic converted to open cholecystectomy for acute cholecystitis, surgery performed on 11/8/2024    Patient is AVSS with a reassuring postoperative abdominal exam without tenderness and with incisions healing in good order with typical scar tissue palpable.  I do not have any suspicion for recurrent liver abscess.  He has had some new nausea and vomiting in the last week which he states has been a chronic issue for him in the past and he has no nausea today.  He does have some diarrhea which I discussed with him can be expected following cholecystectomy.  For his diarrhea, I recommend avoidance of fried/fatty foods and food triggers, adding psyllium fiber, and taking imodium as needed. If his symptoms worsen, I would like him to follow up for recheck. Otherwise he is stable to follow up as needed.  Patient voiced understanding and is agreeable.    Manuela Wisdom M.D.  General, Robotic and Endoscopic Surgery  [facility]    [address], [address]  [place], [place], [address]  P: [phone]  F: [phone]

## 2024-12-13 RX ORDER — LOPERAMIDE HYDROCHLORIDE 2 MG/1
2 TABLET ORAL 2 TIMES DAILY PRN
Qty: 60 TABLET | Refills: 0 | Status: SHIPPED | OUTPATIENT
Start: 2024-12-13 | End: 2025-01-12

## 2025-02-05 ENCOUNTER — APPOINTMENT (OUTPATIENT)
Dept: GENERAL RADIOLOGY | Facility: HOSPITAL | Age: 50
End: 2025-02-05
Payer: COMMERCIAL

## 2025-02-05 ENCOUNTER — HOSPITAL ENCOUNTER (EMERGENCY)
Facility: HOSPITAL | Age: 50
Discharge: HOME | End: 2025-02-05
Attending: EMERGENCY MEDICINE | Admitting: EMERGENCY MEDICINE
Payer: COMMERCIAL

## 2025-02-05 VITALS
TEMPERATURE: 99.9 F | SYSTOLIC BLOOD PRESSURE: 145 MMHG | RESPIRATION RATE: 18 BRPM | OXYGEN SATURATION: 97 % | DIASTOLIC BLOOD PRESSURE: 86 MMHG | HEART RATE: 94 BPM

## 2025-02-05 DIAGNOSIS — J10.1 INFLUENZA A: Primary | ICD-10-CM

## 2025-02-05 LAB
ALBUMIN SERPL-MCNC: 4.3 G/DL (ref 3.5–5.2)
ALBUMIN SERPL-MCNC: 4.5 G/DL (ref 3.5–5.2)
ALBUMIN/GLOB SERPL: 1.3 G/DL
ALBUMIN/GLOB SERPL: 1.5 G/DL
ALP SERPL-CCNC: 63 U/L (ref 39–117)
ALP SERPL-CCNC: 70 U/L (ref 39–117)
ALT SERPL W P-5'-P-CCNC: 14 U/L (ref 1–41)
ALT SERPL W P-5'-P-CCNC: 19 U/L (ref 1–41)
ANION GAP SERPL CALCULATED.3IONS-SCNC: 20 MMOL/L (ref 5–15)
ANION GAP SERPL CALCULATED.3IONS-SCNC: 20 MMOL/L (ref 5–15)
AST SERPL-CCNC: 19 U/L (ref 1–40)
AST SERPL-CCNC: 24 U/L (ref 1–40)
B PARAPERT DNA SPEC QL NAA+PROBE: NOT DETECTED
B PERT DNA SPEC QL NAA+PROBE: NOT DETECTED
BASOPHILS # BLD AUTO: 0.03 10*3/MM3 (ref 0–0.2)
BASOPHILS NFR BLD AUTO: 0.4 % (ref 0–1.5)
BILIRUB SERPL-MCNC: 1.2 MG/DL (ref 0–1.2)
BILIRUB SERPL-MCNC: 1.3 MG/DL (ref 0–1.2)
BUN SERPL-MCNC: 10 MG/DL (ref 6–20)
BUN SERPL-MCNC: 11 MG/DL (ref 6–20)
BUN/CREAT SERPL: 10.1 (ref 7–25)
BUN/CREAT SERPL: 9.1 (ref 7–25)
C PNEUM DNA NPH QL NAA+NON-PROBE: NOT DETECTED
CALCIUM SPEC-SCNC: 9.5 MG/DL (ref 8.6–10.5)
CALCIUM SPEC-SCNC: 9.6 MG/DL (ref 8.6–10.5)
CHLORIDE SERPL-SCNC: 96 MMOL/L (ref 98–107)
CHLORIDE SERPL-SCNC: 97 MMOL/L (ref 98–107)
CO2 SERPL-SCNC: 15 MMOL/L (ref 22–29)
CO2 SERPL-SCNC: 19 MMOL/L (ref 22–29)
CREAT SERPL-MCNC: 1.09 MG/DL (ref 0.76–1.27)
CREAT SERPL-MCNC: 1.1 MG/DL (ref 0.76–1.27)
DEPRECATED RDW RBC AUTO: 42.5 FL (ref 37–54)
EGFRCR SERPLBLD CKD-EPI 2021: 83.3 ML/MIN/1.73
EGFRCR SERPLBLD CKD-EPI 2021: 84.2 ML/MIN/1.73
EOSINOPHIL # BLD AUTO: 0 10*3/MM3 (ref 0–0.4)
EOSINOPHIL NFR BLD AUTO: 0 % (ref 0.3–6.2)
ERYTHROCYTE [DISTWIDTH] IN BLOOD BY AUTOMATED COUNT: 12.7 % (ref 12.3–15.4)
FLUAV H3 RNA NPH QL NAA+PROBE: DETECTED
FLUBV RNA ISLT QL NAA+PROBE: NOT DETECTED
GLOBULIN UR ELPH-MCNC: 2.9 GM/DL
GLOBULIN UR ELPH-MCNC: 3.5 GM/DL
GLUCOSE SERPL-MCNC: 137 MG/DL (ref 65–99)
GLUCOSE SERPL-MCNC: 139 MG/DL (ref 65–99)
HADV DNA SPEC NAA+PROBE: NOT DETECTED
HCOV 229E RNA SPEC QL NAA+PROBE: NOT DETECTED
HCOV HKU1 RNA SPEC QL NAA+PROBE: NOT DETECTED
HCOV NL63 RNA SPEC QL NAA+PROBE: NOT DETECTED
HCOV OC43 RNA SPEC QL NAA+PROBE: NOT DETECTED
HCT VFR BLD AUTO: 40.7 % (ref 37.5–51)
HGB BLD-MCNC: 12.8 G/DL (ref 13–17.7)
HMPV RNA NPH QL NAA+NON-PROBE: NOT DETECTED
HPIV1 RNA ISLT QL NAA+PROBE: NOT DETECTED
HPIV2 RNA SPEC QL NAA+PROBE: NOT DETECTED
HPIV3 RNA NPH QL NAA+PROBE: NOT DETECTED
HPIV4 P GENE NPH QL NAA+PROBE: NOT DETECTED
IMM GRANULOCYTES # BLD AUTO: 0.03 10*3/MM3 (ref 0–0.05)
IMM GRANULOCYTES NFR BLD AUTO: 0.4 % (ref 0–0.5)
LYMPHOCYTES # BLD AUTO: 0.31 10*3/MM3 (ref 0.7–3.1)
LYMPHOCYTES NFR BLD AUTO: 3.9 % (ref 19.6–45.3)
M PNEUMO IGG SER IA-ACNC: NOT DETECTED
MCH RBC QN AUTO: 29 PG (ref 26.6–33)
MCHC RBC AUTO-ENTMCNC: 31.4 G/DL (ref 31.5–35.7)
MCV RBC AUTO: 92.3 FL (ref 79–97)
MONOCYTES # BLD AUTO: 0.43 10*3/MM3 (ref 0.1–0.9)
MONOCYTES NFR BLD AUTO: 5.3 % (ref 5–12)
NEUTROPHILS NFR BLD AUTO: 7.25 10*3/MM3 (ref 1.7–7)
NEUTROPHILS NFR BLD AUTO: 90 % (ref 42.7–76)
NRBC BLD AUTO-RTO: 0 /100 WBC (ref 0–0.2)
PLATELET # BLD AUTO: 268 10*3/MM3 (ref 140–450)
PMV BLD AUTO: 9.9 FL (ref 6–12)
POTASSIUM SERPL-SCNC: 5.1 MMOL/L (ref 3.5–5.2)
POTASSIUM SERPL-SCNC: 5.8 MMOL/L (ref 3.5–5.2)
PROT SERPL-MCNC: 7.2 G/DL (ref 6–8.5)
PROT SERPL-MCNC: 8 G/DL (ref 6–8.5)
RBC # BLD AUTO: 4.41 10*6/MM3 (ref 4.14–5.8)
RHINOVIRUS RNA SPEC NAA+PROBE: NOT DETECTED
RSV RNA NPH QL NAA+NON-PROBE: NOT DETECTED
SARS-COV-2 RNA NPH QL NAA+NON-PROBE: NOT DETECTED
SODIUM SERPL-SCNC: 131 MMOL/L (ref 136–145)
SODIUM SERPL-SCNC: 136 MMOL/L (ref 136–145)
WBC NRBC COR # BLD AUTO: 8.05 10*3/MM3 (ref 3.4–10.8)

## 2025-02-05 PROCEDURE — 63710000001 ONDANSETRON ODT 4 MG TABLET DISPERSIBLE: Performed by: EMERGENCY MEDICINE

## 2025-02-05 PROCEDURE — 80053 COMPREHEN METABOLIC PANEL: CPT | Performed by: EMERGENCY MEDICINE

## 2025-02-05 PROCEDURE — 71045 X-RAY EXAM CHEST 1 VIEW: CPT

## 2025-02-05 PROCEDURE — 85025 COMPLETE CBC W/AUTO DIFF WBC: CPT | Performed by: EMERGENCY MEDICINE

## 2025-02-05 PROCEDURE — 0202U NFCT DS 22 TRGT SARS-COV-2: CPT

## 2025-02-05 PROCEDURE — 99283 EMERGENCY DEPT VISIT LOW MDM: CPT

## 2025-02-05 RX ORDER — ACETAMINOPHEN 500 MG
1000 TABLET ORAL ONCE
Status: COMPLETED | OUTPATIENT
Start: 2025-02-05 | End: 2025-02-05

## 2025-02-05 RX ORDER — ONDANSETRON 4 MG/1
4 TABLET, ORALLY DISINTEGRATING ORAL ONCE
Status: COMPLETED | OUTPATIENT
Start: 2025-02-05 | End: 2025-02-05

## 2025-02-05 RX ORDER — ONDANSETRON 2 MG/ML
4 INJECTION INTRAMUSCULAR; INTRAVENOUS ONCE
Status: DISCONTINUED | OUTPATIENT
Start: 2025-02-05 | End: 2025-02-05 | Stop reason: HOSPADM

## 2025-02-05 RX ORDER — OSELTAMIVIR PHOSPHATE 75 MG/1
75 CAPSULE ORAL 2 TIMES DAILY
Qty: 10 CAPSULE | Refills: 0 | Status: SHIPPED | OUTPATIENT
Start: 2025-02-05 | End: 2025-02-10

## 2025-02-05 RX ADMIN — ONDANSETRON 4 MG: 4 TABLET, ORALLY DISINTEGRATING ORAL at 13:03

## 2025-02-05 RX ADMIN — ACETAMINOPHEN 1000 MG: 500 TABLET, FILM COATED ORAL at 12:20

## 2025-02-05 NOTE — ED PROVIDER NOTES
EMERGENCY DEPARTMENT ENCOUNTER  Room Number:  13/13  PCP: Juju Kennedy MD  Independent Historians: Patient  Date of encounter:  2/5/2025  Patient Care Team:  Juju Kennedy MD as PCP - General (Family Medicine)     HPI:  Chief Complaint: had concerns including Fever and Generalized Body Aches.     A complete HPI/ROS/PMH/PSH/SH/FH are unobtainable due to: None    Chronic or social conditions impacting patient care (Social Determinants of Health): None      Context: Donnie Bishop is a 49 y.o. male with a medical history of hyperlipidemia, diabetes, hypertension, CHF who presents to the ED c/o acute viral syndrome.  Patient has had 2 days of myalgias, cough, headache, nausea and vomiting.  No sick contacts he is aware.  He did not receive a flu shot last year.  He has been compliant with medical therapy at home.  No leg swelling.    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 pneumoniae) as the cause of diseases classified elsewhere 10/30/2024    Hypotension 10/30/2024    Transaminitis 10/30/2024    Calculus of gallbladder with acute on chronic cholecystitis without obstruction 10/22/2024    Hospital discharge follow-up 11/22/2024    Elevated  liver enzymes 11/22/2024     Resolved Ambulatory Problems     Diagnosis Date Noted    Acute kidney injury 10/30/2024     Past Medical History:   Diagnosis Date    CHF (congestive heart failure)     Cough     Myalgia     Type II diabetes mellitus     Venous insufficiency     Viral illness        PAST SURGICAL HISTORY  Past Surgical History:   Procedure Laterality Date    CARDIAC CATHETERIZATION N/A 9/13/2023    Procedure: Left Heart Cath;  Surgeon: Kaylee Kay MD;  Location: [facility] CATH INVASIVE LOCATION;  Service: Cardiology;  Laterality: N/A;    CARDIAC CATHETERIZATION N/A 9/13/2023    Procedure: Coronary angiography;  Surgeon: Kaylee Kay MD;  Location: [facility] CATH INVASIVE LOCATION;  Service: Cardiology;  Laterality: N/A;    CHOLECYSTECTOMY WITH INTRAOPERATIVE CHOLANGIOGRAM N/A 11/8/2024    Procedure: LAPAROSCOPIC CONVERTED TO OPEN CHOLECYSTECTOMY WITH INTRAOPERATIVE CHOLANGIOGRAM AND RIGHT UPPER QUADRANT DRAIN PLACEMENT;  Surgeon: Manuela Wisdom MD;  Location: [facility] OR;  Service: General;  Laterality: N/A;    ENDOSCOPY N/A 10/24/2024    Procedure: ESOPHAGOGASTRODUODENOSCOPY with biopsies;  Surgeon: Elma Wiggins MD;  Location: [facility] ENDOSCOPY;  Service: Gastroenterology;  Laterality: N/A;  pre-abdominal pain  post-normal       FAMILY HISTORY  Family History   Problem Relation Age of Onset    Diabetes Mother        SOCIAL HISTORY  Social History     Socioeconomic History    Marital status:    Tobacco Use    Smoking status: Never     Passive exposure: Never    Smokeless tobacco: Never   Vaping Use    Vaping status: Never Used   Substance and Sexual Activity    Alcohol use: Never    Drug use: Never    Sexual activity: Defer       ALLERGIES  Patient has no known allergies.    REVIEW OF SYSTEMS  Review of Systems  Included in HPI  All systems reviewed and negative except for those discussed in HPI.    PHYSICAL EXAM    I have reviewed the triage vital signs and nursing notes.    ED Triage  Vitals   Temp Heart Rate Resp BP SpO2   02/05/25 1028 02/05/25 1028 02/05/25 1029 02/05/25 1040 02/05/25 1028   (!) 101.5 °F (38.6 °C) 112 16 113/84 97 %      Temp src Heart Rate Source Patient Position BP Location FiO2 (%)   02/05/25 1028 -- 02/05/25 1040 -- --   Tympanic  Sitting         Physical Exam  Vitals and nursing note reviewed.   Constitutional:       General: He is not in acute distress.     Appearance: Normal appearance. He is not ill-appearing, toxic-appearing or diaphoretic.   HENT:      Head: Normocephalic and atraumatic.      Nose: Nose normal.      Mouth/Throat:      Mouth: Mucous membranes are moist. Mucous membranes are dry.   Eyes:      Extraocular Movements: Extraocular movements intact.      Conjunctiva/sclera: Conjunctivae normal.      Pupils: Pupils are equal, round, and reactive to light.   Cardiovascular:      Rate and Rhythm: Normal rate and regular rhythm.      Pulses: Normal pulses.      Heart sounds: Normal heart sounds. No murmur heard.     No friction rub. No gallop.   Pulmonary:      Effort: Pulmonary effort is normal. No respiratory distress.      Breath sounds: Normal breath sounds. No stridor. No wheezing, rhonchi or rales.   Abdominal:      General: Abdomen is flat. There is no distension.      Palpations: Abdomen is soft.      Tenderness: There is no abdominal tenderness. There is no guarding or rebound.   Musculoskeletal:      Cervical back: Normal range of motion and neck supple.   Skin:     General: Skin is warm and dry.      Capillary Refill: Capillary refill takes less than 2 seconds.   Neurological:      General: No focal deficit present.      Mental Status: He is alert and oriented to person, place, and time. Mental status is at baseline.   Psychiatric:         Mood and Affect: Mood normal.         Behavior: Behavior normal.         Thought Content: Thought content normal.         Judgment: Judgment normal.         LAB RESULTS  Recent Results (from the past 24 hours)    Respiratory Panel PCR w/COVID-19(SARS-CoV-2) [facility]ONIA/LISANDRA/VALORIE/PAD/COR/ROSS In-House, NP Swab in UTM/VTM, 2 HR TAT - Swab, Nasopharynx    Collection Time: 02/05/25 10:45 AM    Specimen: Nasopharynx; Swab   Result Value Ref Range    ADENOVIRUS, PCR Not Detected Not Detected    Coronavirus 229E Not Detected Not Detected    Coronavirus HKU1 Not Detected Not Detected    Coronavirus NL63 Not Detected Not Detected    Coronavirus OC43 Not Detected Not Detected    COVID19 Not Detected Not Detected - Ref. Range    Human Metapneumovirus Not Detected Not Detected    Human Rhinovirus/Enterovirus Not Detected Not Detected    Influenza A H3 Detected (A) Not Detected    Influenza B PCR Not Detected Not Detected    Parainfluenza Virus 1 Not Detected Not Detected    Parainfluenza Virus 2 Not Detected Not Detected    Parainfluenza Virus 3 Not Detected Not Detected    Parainfluenza Virus 4 Not Detected Not Detected    RSV, PCR Not Detected Not Detected    Bordetella pertussis pcr Not Detected Not Detected    Bordetella parapertussis PCR Not Detected Not Detected    Chlamydophila pneumoniae PCR Not Detected Not Detected    Mycoplasma pneumo by PCR Not Detected Not Detected   Comprehensive Metabolic Panel    Collection Time: 02/05/25 12:51 PM    Specimen: Arm, Right; Blood   Result Value Ref Range    Glucose 137 (H) 65 - 99 mg/dL    BUN 10 6 - 20 mg/dL    Creatinine 1.10 0.76 - 1.27 mg/dL    Sodium 131 (L) 136 - 145 mmol/L    Potassium 5.8 (H) 3.5 - 5.2 mmol/L    Chloride 96 (L) 98 - 107 mmol/L    CO2 15.0 (L) 22.0 - 29.0 mmol/L    Calcium 9.6 8.6 - 10.5 mg/dL    Total Protein 8.0 6.0 - 8.5 g/dL    Albumin 4.5 3.5 - 5.2 g/dL    ALT (SGPT) 19 1 - 41 U/L    AST (SGOT) 24 1 - 40 U/L    Alkaline Phosphatase 70 39 - 117 U/L    Total Bilirubin 1.3 (H) 0.0 - 1.2 mg/dL    Globulin 3.5 gm/dL    A/G Ratio 1.3 g/dL    BUN/Creatinine Ratio 9.1 7.0 - 25.0    Anion Gap 20.0 (H) 5.0 - 15.0 mmol/L    eGFR 83.3 >60.0 mL/min/1.73   CBC Auto Differential     Collection Time: 02/05/25  2:19 PM    Specimen: Arm, Right; Blood   Result Value Ref Range    WBC 8.05 3.40 - 10.80 10*3/mm3    RBC 4.41 4.14 - 5.80 10*6/mm3    Hemoglobin 12.8 (L) 13.0 - 17.7 g/dL    Hematocrit 40.7 37.5 - 51.0 %    MCV 92.3 79.0 - 97.0 fL    MCH 29.0 26.6 - 33.0 pg    MCHC 31.4 (L) 31.5 - 35.7 g/dL    RDW 12.7 12.3 - 15.4 %    RDW-SD 42.5 37.0 - 54.0 fl    MPV 9.9 6.0 - 12.0 fL    Platelets 268 140 - 450 10*3/mm3    Neutrophil % 90.0 (H) 42.7 - 76.0 %    Lymphocyte % 3.9 (L) 19.6 - 45.3 %    Monocyte % 5.3 5.0 - 12.0 %    Eosinophil % 0.0 (L) 0.3 - 6.2 %    Basophil % 0.4 0.0 - 1.5 %    Immature Grans % 0.4 0.0 - 0.5 %    Neutrophils, Absolute 7.25 (H) 1.70 - 7.00 10*3/mm3    Lymphocytes, Absolute 0.31 (L) 0.70 - 3.10 10*3/mm3    Monocytes, Absolute 0.43 0.10 - 0.90 10*3/mm3    Eosinophils, Absolute 0.00 0.00 - 0.40 10*3/mm3    Basophils, Absolute 0.03 0.00 - 0.20 10*3/mm3    Immature Grans, Absolute 0.03 0.00 - 0.05 10*3/mm3    nRBC 0.0 0.0 - 0.2 /100 WBC   Comprehensive Metabolic Panel    Collection Time: 02/05/25  2:19 PM    Specimen: Arm, Right; Blood   Result Value Ref Range    Glucose 139 (H) 65 - 99 mg/dL    BUN 11 6 - 20 mg/dL    Creatinine 1.09 0.76 - 1.27 mg/dL    Sodium 136 136 - 145 mmol/L    Potassium 5.1 3.5 - 5.2 mmol/L    Chloride 97 (L) 98 - 107 mmol/L    CO2 19.0 (L) 22.0 - 29.0 mmol/L    Calcium 9.5 8.6 - 10.5 mg/dL    Total Protein 7.2 6.0 - 8.5 g/dL    Albumin 4.3 3.5 - 5.2 g/dL    ALT (SGPT) 14 1 - 41 U/L    AST (SGOT) 19 1 - 40 U/L    Alkaline Phosphatase 63 39 - 117 U/L    Total Bilirubin 1.2 0.0 - 1.2 mg/dL    Globulin 2.9 gm/dL    A/G Ratio 1.5 g/dL    BUN/Creatinine Ratio 10.1 7.0 - 25.0    Anion Gap 20.0 (H) 5.0 - 15.0 mmol/L    eGFR 84.2 >60.0 mL/min/1.73       RADIOLOGY  XR Chest 1 View    Result Date: 2/5/2025  AP CHEST  HISTORY: Fever, body aches, dyspnea  COMPARISON: 11/4/2024  FINDINGS: There is some hazy lung opacity present in both upper zones which may indicate  a bilateral pneumonia. Heart size stable. Visualized osseous structures are unremarkable      Hazy lung opacity in both upper zones may indicate bilateral pneumonia given clinical history. Follow-up chest x-ray is suggested in 4 to 6 weeks to ensure clearing    This report was finalized on 2/5/2025 12:49 PM by Dr. Martin Benson M.D on Workstation: [id number]       MEDICATIONS GIVEN IN ER  Medications   ondansetron (ZOFRAN) injection 4 mg (4 mg Intravenous Not Given 2/5/25 1259)   acetaminophen (TYLENOL) tablet 1,000 mg (1,000 mg Oral Given 2/5/25 1220)   ondansetron ODT (ZOFRAN-ODT) disintegrating tablet 4 mg (4 mg Oral Given 2/5/25 1303)       ORDERS PLACED DURING THIS VISIT:  Orders Placed This Encounter   Procedures    Respiratory Panel PCR w/COVID-19(SARS-CoV-2) [facility]/LISANDRA/VALORIE/PAD/COR/ROSS In-House, NP Swab in UTM/VTM, 2 HR TAT - Swab, Nasopharynx    XR Chest 1 View    Comprehensive Metabolic Panel    CBC Auto Differential    Comprehensive Metabolic Panel    Walking O2  Misc Nursing Order (Specify)    CBC & Differential       OUTPATIENT MEDICATION MANAGEMENT:  Current Facility-Administered Medications Ordered in Epic   Medication Dose Route Frequency Provider Last Rate Last Admin    ondansetron (ZOFRAN) injection 4 mg  4 mg Intravenous Once Jayme Kumar MD         Current Outpatient Medications Ordered in Epic   Medication Sig Dispense Refill    atorvastatin (LIPITOR) 40 MG tablet Take 1 tablet by mouth Daily. 90 tablet 3    azithromycin (Zithromax Z-Jase) 250 MG tablet Take 2 tablets by mouth on day 1, then 1 tablet daily on days 2-5 6 tablet 0    benzonatate (TESSALON) 200 MG capsule Take 1 capsule by mouth 3 (Three) Times a Day As Needed for Cough. 21 capsule 0    Blood Glucose Monitoring Suppl (OneTouch Verio Flex System) w/Device kit Use to test blood glucose up to four times daily as needed. Formulary Compliance Approval. Diagnosis: Type 1 Diabetes 1 kit 0    carvedilol (COREG) 25 MG tablet Take 1  tablet by mouth 2 (Two) Times a Day With Meals.      carvedilol (COREG) 3.125 MG tablet Take 1 tablet by mouth Every 12 (Twelve) Hours. 60 tablet 0    cetirizine (zyrTEC) 10 MG tablet Take 1 tablet by mouth Daily. 30 tablet 0    Continuous Glucose Sensor (Dexcom G7 Sensor) misc Use 1 each Continuous. Use to monitor blood glucose levels continuously.  Change sensors every 10 days 3 each 1    empagliflozin (JARDIANCE) 10 MG tablet tablet Take 1 tablet by mouth Daily. 90 tablet 1    empagliflozin (JARDIANCE) 25 MG tablet tablet Take 1 tablet by mouth Daily.      fluticasone (FLONASE) 50 MCG/ACT nasal spray Administer 2 sprays into the nostril(s) as directed by provider Daily. 16.5 g 0    glucose blood (OneTouch Verio) test strip 1 each by Other route 4 (Four) Times a Day.      glucose blood test strip Use to test blood glucose up to four times daily as needed. Formulary Compliance Approval. Diagnosis: Type 1 Diabetes 100 each 0    hydrOXYzine (ATARAX) 10 MG tablet TAKE 1 TABLET BY MOUTH EVERY 8 HOURS AS NEEDED FOR ANXIETY. 270 tablet 1    insulin aspart (NovoLOG FlexPen) 100 UNIT/ML solution pen-injector sc pen Inject 5 Units under the skin into the appropriate area as directed 3 (Three) Times a Day With Meals. Formulary Compliance Approval 15 mL 0    insulin aspart (NovoLOG FlexPen) 100 UNIT/ML solution pen-injector sc pen Inject 10 Units under the skin into the appropriate area as directed 3 (Three) Times a Day.      Insulin Glargine (Lantus SoloStar) 100 UNIT/ML injection pen Inject 15 Units under the skin into the appropriate area as directed Daily. Formulary Compliance Approval 15 mL 0    insulin NPH (humuLIN N,novoLIN N) 100 UNIT/ML injection Inject 12 Units under the skin into the appropriate area as directed 2 (Two) Times a Day Before Meals.      Insulin Pen Needle (Pen Needles) 32G X 4 MM misc Inject 1 each under the skin into the appropriate area as directed 4 (Four) Times a Day As Needed (for insulin  injections). Formulary Compliance Approval 100 each 0    Lancets misc Use to test blood glucose up to four times daily as needed. Formulary Compliance Approval. Diagnosis: Type 1 Diabetes 100 each 0    melatonin 5 MG tablet tablet Take 1 tablet by mouth At Night As Needed (insomnia). 90 tablet 1    methocarbamol (ROBAXIN) 750 MG tablet Take 1 tablet by mouth 4 (Four) Times a Day As Needed for Muscle Spasms. 30 tablet 0    naloxone (NARCAN) 4 MG/0.1ML nasal spray Call 911. Don't prime. [place] in 1 nostril for overdose. Repeat in 2-3 minutes in other nostril if no or minimal breathing/responsiveness. 2 each 0    ondansetron (ZOFRAN) 8 MG tablet Take 1 tablet by mouth Every 8 (Eight) Hours As Needed for Nausea or Vomiting. 8 tablet 0    oseltamivir (Tamiflu) 75 MG capsule Take 1 capsule by mouth 2 (Two) Times a Day for 5 days. 10 capsule 0    pantoprazole (PROTONIX) 40 MG EC tablet Take 1 tablet by mouth 2 (Two) Times a Day Before Meals. 60 tablet 0    sacubitril-valsartan (Entresto) 24-26 MG tablet Take 1 tablet by mouth 2 (Two) Times a Day.      venlafaxine XR (EFFEXOR-XR) 75 MG 24 hr capsule Take 1 capsule by mouth Daily.      vitamin D (ERGOCALCIFEROL) 1.25 MG (10077 UT) capsule capsule Take 1 capsule by mouth 1 (One) Time Per Week.         PROCEDURES  Procedures    PROGRESS, DATA ANALYSIS, CONSULTS, AND MEDICAL DECISION MAKING  All labs have been independently interpreted by me.  All radiology studies have been reviewed by me. All EKG's have been independently viewed and interpreted by me.  Discussion below represents my analysis of pertinent findings related to patient's condition, differential diagnosis, treatment plan and final disposition.    Differential diagnosis includes but is not limited to, COVID, fluid overload, electrolyte derangement.    Clinical Scores:                   ED Course as of 02/05/25 1516   Wed Feb 05, 2025   1157 Influenza A H3(!): Detected [AB]   1226 Oxygen saturation of 93% and above  [AB]   1253 XR Chest 1 View  My independent interpretation of the imaging study is no pulmonary edema [AB]   1356 Potassium(!): 5.8  Sample seems to have been hemolyzed.  Will recollect. [AB]   1451 Potassium: 5.1 [AB]   1451 Anion Gap(!): 20.0 [AB]   1451 CO2(!): 19.0 [AB]   1451 WBC: 8.05 [AB]   1452 Hemoglobin(!): 12.8 [AB]   1514 Updated patient on results.  Patient reassured and feeling improved after IV fluids.  Patient agreeable receiving Tamiflu prescription.  We discussed getting plenty of rest, drinking plenty of fluids.  Patient to follow-up with PCP.  Return precautions discussed. [AB]      ED Course User Index  [AB] Jayme Kumar MD             AS OF 15:16 EST VITALS:    BP - 145/86  HR - 94  TEMP - 99.9 °F (37.7 °C)  O2 SATS - 97%    COMPLEXITY OF CARE  Admission was considered but after careful review of the patient's presentation, physical examination, diagnostic results, and response to treatment the patient may be safely discharged with outpatient follow-up.      DIAGNOSIS  Final diagnoses:   Influenza A         DISPOSITION  ED Disposition       ED Disposition   Discharge    Condition   Stable    Comment   --                Please note that portions of this document were completed with a voice recognition program.    Note Disclaimer: At [facility], we believe that sharing information builds trust and better relationships. You are receiving this note because you recently visited [facility]. It is possible you will see health information before a provider has talked with you about it. This kind of information can be easy to misunderstand. To help you fully understand what it means for your health, we urge you to discuss this note with your provider.         Jayme Kumar MD  02/05/25 5825

## 2025-02-10 ENCOUNTER — OFFICE VISIT (OUTPATIENT)
Dept: FAMILY MEDICINE CLINIC | Facility: CLINIC | Age: 50
End: 2025-02-10
Payer: COMMERCIAL

## 2025-02-10 VITALS
HEART RATE: 90 BPM | HEIGHT: 67 IN | BODY MASS INDEX: 25.39 KG/M2 | DIASTOLIC BLOOD PRESSURE: 70 MMHG | OXYGEN SATURATION: 98 % | WEIGHT: 161.8 LBS | SYSTOLIC BLOOD PRESSURE: 138 MMHG

## 2025-02-10 DIAGNOSIS — E78.2 MULTIPLE-TYPE HYPERLIPIDEMIA: Primary | ICD-10-CM

## 2025-02-10 DIAGNOSIS — Z79.4 TYPE 2 DIABETES MELLITUS WITH DIABETIC NEUROPATHY, WITH LONG-TERM CURRENT USE OF INSULIN: ICD-10-CM

## 2025-02-10 DIAGNOSIS — F41.9 ANXIETY: ICD-10-CM

## 2025-02-10 DIAGNOSIS — E13.9 DIABETES MELLITUS TYPE 1.5, MANAGED AS TYPE 1: ICD-10-CM

## 2025-02-10 DIAGNOSIS — I50.9 NEW ONSET OF CONGESTIVE HEART FAILURE: ICD-10-CM

## 2025-02-10 DIAGNOSIS — E11.40 TYPE 2 DIABETES MELLITUS WITH DIABETIC NEUROPATHY, WITH LONG-TERM CURRENT USE OF INSULIN: ICD-10-CM

## 2025-02-10 DIAGNOSIS — I10 BENIGN ESSENTIAL HTN: ICD-10-CM

## 2025-02-10 PROCEDURE — 99214 OFFICE O/P EST MOD 30 MIN: CPT | Performed by: FAMILY MEDICINE

## 2025-02-10 RX ORDER — CARVEDILOL 25 MG/1
25 TABLET ORAL 2 TIMES DAILY WITH MEALS
Qty: 180 TABLET | Refills: 1 | Status: SHIPPED | OUTPATIENT
Start: 2025-02-10

## 2025-02-10 RX ORDER — ATORVASTATIN CALCIUM 40 MG/1
40 TABLET, FILM COATED ORAL DAILY
Qty: 90 TABLET | Refills: 3 | Status: SHIPPED | OUTPATIENT
Start: 2025-02-10

## 2025-02-10 RX ORDER — SACUBITRIL AND VALSARTAN 24; 26 MG/1; MG/1
1 TABLET, FILM COATED ORAL 2 TIMES DAILY
Qty: 60 TABLET | Refills: 5 | Status: SHIPPED | OUTPATIENT
Start: 2025-02-10

## 2025-02-10 RX ORDER — FUROSEMIDE 20 MG/1
20 TABLET ORAL 2 TIMES DAILY
Qty: 10 TABLET | Refills: 0 | Status: SHIPPED | OUTPATIENT
Start: 2025-02-10

## 2025-02-10 NOTE — PROGRESS NOTES
Subjective   Donnie Bishop is a 49 y.o. male.     Chief Complaint   Patient presents with    Blood Pressure Check     Anxiety- He has barely had any anxiety since the last time he saw me. Moods are doing well. Sleeping well and energy is better. His work has noticed how much better he has been. He still thinks this is going well with no panic attacks in a long time.      Htn- well controlled now that anxiety is controlled.      Hld- taking meds, labs utd, no SE.      Dm2- taking meds, BS fasting have been running 100-120. Only has a low bs with physical activity. Following with endocrine and reviewed last note. A1C 2 weeks ago was 10.     Chf- pt is a poor historian and I cannot see where he has seen a cardiologist outside of the hospital. I can see in a past hospital follow up he was supposed to go to cardiology. Legs are not swelling.     Since having the flu he is having some mild soa. Has not gained weight. Lungs sound wet on exam. He feels his flu is slowly getting better. Has been drinking lots of fluids.     Reviewed recent labs.     The following portions of the patient's history were reviewed and updated as appropriate: allergies, current medications, past family history, past medical history, past social history, past surgical history and problem list.    Past Medical History:   Diagnosis Date    Anxiety     CHF (congestive heart failure)     Cough     Inability to attain erection     Injury of acoustic nerve     Myalgia     Type II diabetes mellitus     Venous insufficiency     Viral illness     Vitamin D deficiency        Past Surgical History:   Procedure Laterality Date    CARDIAC CATHETERIZATION N/A 9/13/2023    Procedure: Left Heart Cath;  Surgeon: Kaylee Kay MD;  Location: [facility] INVASIVE LOCATION;  Service: Cardiology;  Laterality: N/A;    CARDIAC CATHETERIZATION N/A 9/13/2023    Procedure: Coronary angiography;  Surgeon: Kaylee Kay MD;  Location: [facility] CATH INVASIVE LOCATION;  Service:  "Cardiology;  Laterality: N/A;    CHOLECYSTECTOMY WITH INTRAOPERATIVE CHOLANGIOGRAM N/A 11/8/2024    Procedure: LAPAROSCOPIC CONVERTED TO OPEN CHOLECYSTECTOMY WITH INTRAOPERATIVE CHOLANGIOGRAM AND RIGHT UPPER QUADRANT DRAIN PLACEMENT;  Surgeon: Manuela Wisdom MD;  Location: [facility] OR;  Service: General;  Laterality: N/A;    ENDOSCOPY N/A 10/24/2024    Procedure: ESOPHAGOGASTRODUODENOSCOPY with biopsies;  Surgeon: Elma Wiggins MD;  Location: [facility] ENDOSCOPY;  Service: Gastroenterology;  Laterality: N/A;  pre-abdominal pain  post-normal       Family History   Problem Relation Age of Onset    Diabetes Mother        Social History     Socioeconomic History    Marital status:    Tobacco Use    Smoking status: Never     Passive exposure: Never    Smokeless tobacco: Never   Vaping Use    Vaping status: Never Used   Substance and Sexual Activity    Alcohol use: Never    Drug use: Never    Sexual activity: Defer       Review of Systems   Constitutional:  Negative for fever.   Cardiovascular:  Negative for leg swelling.       Objective   Visit Vitals  /70 (BP Location: Left arm, Patient Position: Sitting, Cuff Size: Adult)   Pulse 90   Ht 170.2 cm (67.01\")   Wt 73.4 kg (161 lb 12.8 oz)   SpO2 98%   BMI 25.34 kg/m²     Body mass index is 25.34 kg/m².  Physical Exam  Constitutional:       Appearance: Normal appearance. He is well-developed.   Cardiovascular:      Rate and Rhythm: Normal rate and regular rhythm.      Heart sounds: Normal heart sounds.   Pulmonary:      Effort: Pulmonary effort is normal.      Breath sounds: No stridor. Rhonchi present. No wheezing.   Musculoskeletal:         General: No swelling. Normal range of motion.   Skin:     General: Skin is warm and dry.      Findings: No rash.   Neurological:      General: No focal deficit present.      Mental Status: He is alert and oriented to person, place, and time.   Psychiatric:         Mood and Affect: Mood normal.         Behavior: " Behavior normal.           Assessment & Plan   Diagnoses and all orders for this visit:    1. Multiple-type hyperlipidemia (Primary)  -     atorvastatin (LIPITOR) 40 MG tablet; Take 1 tablet by mouth Daily.  Dispense: 90 tablet; Refill: 3    2. Benign essential HTN    3. New onset of congestive heart failure  -     Ambulatory Referral to Cardiology  -     furosemide (Lasix) 20 MG tablet; Take 1 tablet by mouth 2 (Two) Times a Day.  Dispense: 10 tablet; Refill: 0  -     Comprehensive Metabolic Panel; Future  -     sacubitril-valsartan (Entresto) 24-26 MG tablet; Take 1 tablet by mouth 2 (Two) Times a Day.  Dispense: 60 tablet; Refill: 5  -     carvedilol (COREG) 25 MG tablet; Take 1 tablet by mouth 2 (Two) Times a Day With Meals.  Dispense: 180 tablet; Refill: 1    4. Type 2 diabetes mellitus with diabetic neuropathy, with long-term current use of insulin    5. Diabetes mellitus type 1.5, managed as type 1    6. Anxiety                 Pt will take lasix and f/u on Friday for blood work. Er warnings given.

## 2025-04-22 ENCOUNTER — OFFICE VISIT (OUTPATIENT)
Dept: FAMILY MEDICINE CLINIC | Facility: CLINIC | Age: 50
End: 2025-04-22
Payer: COMMERCIAL

## 2025-04-22 VITALS
HEIGHT: 67 IN | SYSTOLIC BLOOD PRESSURE: 112 MMHG | TEMPERATURE: 97.4 F | HEART RATE: 98 BPM | BODY MASS INDEX: 25.99 KG/M2 | OXYGEN SATURATION: 96 % | WEIGHT: 165.6 LBS | DIASTOLIC BLOOD PRESSURE: 82 MMHG

## 2025-04-22 DIAGNOSIS — R05.1 ACUTE COUGH: ICD-10-CM

## 2025-04-22 DIAGNOSIS — J01.40 ACUTE NON-RECURRENT PANSINUSITIS: ICD-10-CM

## 2025-04-22 DIAGNOSIS — D64.9 ANEMIA, UNSPECIFIED TYPE: ICD-10-CM

## 2025-04-22 DIAGNOSIS — J01.10 ACUTE FRONTAL SINUSITIS, RECURRENCE NOT SPECIFIED: Primary | ICD-10-CM

## 2025-04-22 DIAGNOSIS — R53.81 MALAISE: ICD-10-CM

## 2025-04-22 DIAGNOSIS — I50.9 NEW ONSET OF CONGESTIVE HEART FAILURE: ICD-10-CM

## 2025-04-22 LAB
EXPIRATION DATE: NORMAL
FLUAV AG UPPER RESP QL IA.RAPID: NOT DETECTED
FLUBV AG UPPER RESP QL IA.RAPID: NOT DETECTED
INTERNAL CONTROL: NORMAL
Lab: NORMAL
SARS-COV-2 AG UPPER RESP QL IA.RAPID: NOT DETECTED

## 2025-04-22 PROCEDURE — 99213 OFFICE O/P EST LOW 20 MIN: CPT | Performed by: NURSE PRACTITIONER

## 2025-04-22 PROCEDURE — 87428 SARSCOV & INF VIR A&B AG IA: CPT | Performed by: NURSE PRACTITIONER

## 2025-04-22 RX ORDER — FERROUS SULFATE 325(65) MG
325 TABLET ORAL
Qty: 90 TABLET | Refills: 1 | Status: SHIPPED | OUTPATIENT
Start: 2025-04-22

## 2025-04-22 RX ORDER — BENZONATATE 200 MG/1
200 CAPSULE ORAL 3 TIMES DAILY PRN
Qty: 21 CAPSULE | Refills: 0 | Status: SHIPPED | OUTPATIENT
Start: 2025-04-22

## 2025-04-22 RX ORDER — DEXTROMETHORPHAN HYDROBROMIDE AND PROMETHAZINE HYDROCHLORIDE 15; 6.25 MG/5ML; MG/5ML
5 SYRUP ORAL 4 TIMES DAILY PRN
Qty: 180 ML | Refills: 0 | Status: SHIPPED | OUTPATIENT
Start: 2025-04-22

## 2025-04-22 RX ORDER — AZITHROMYCIN 250 MG/1
TABLET, FILM COATED ORAL
Qty: 6 TABLET | Refills: 0 | Status: SHIPPED | OUTPATIENT
Start: 2025-04-22 | End: 2025-04-22

## 2025-04-22 NOTE — LETTER
April 22, 2025     Patient: Donnie Bishop   YOB: 1975   Date of Visit: 4/22/2025       To Whom It May Concern:    It is my medical opinion that Donnie Bishop may return to work in three days, he may return on Friday 4/25/25.            Sincerely,        MICHELLE Madsen    CC: No Recipients

## 2025-04-22 NOTE — PROGRESS NOTES
"Chief Complaint  Cough (Prod. Cough//All symptoms for 1 1/2 weeks), Fatigue, and Nasal Congestion    Subjective        Donnie Bishop presents to [facility]  History of Present Illness  Patient is here to discuss symptoms that started over a week ago. He thought it was a cold and OTC cold and flu meds weren't helping. He began to feel very weak. He is still experiencing weakness, chills, hot flashes, nasal irritation and drainage. He feels he full in his chest. He is experiencing some shortness of breath both now and some before he began to feel sick a week ago.  Patient defers xray today.    Patient is concerned about his inability to fight off illnesses. He is wondering if his weight loss and poor appetite are part of the problem.   Objective   Vital Signs:  /82 (BP Location: Left arm, Patient Position: Sitting, Cuff Size: Adult)   Pulse 98   Temp 97.4 °F (36.3 °C) (Temporal)   Ht 170.2 cm (67\")   Wt 75.1 kg (165 lb 9.6 oz)   SpO2 96%   BMI 25.94 kg/m²   Estimated body mass index is 25.94 kg/m² as calculated from the following:    Height as of this encounter: 170.2 cm (67\").    Weight as of this encounter: 75.1 kg (165 lb 9.6 oz).            Physical Exam  Constitutional:       Appearance: Normal appearance.   HENT:      Head: Normocephalic.   Cardiovascular:      Rate and Rhythm: Normal rate and regular rhythm.      Heart sounds: Normal heart sounds.   Pulmonary:      Effort: Pulmonary effort is normal.      Breath sounds: Examination of the right-middle field reveals decreased breath sounds. Examination of the right-lower field reveals decreased breath sounds.   Musculoskeletal:         General: Normal range of motion.   Skin:     General: Skin is warm and dry.   Neurological:      General: No focal deficit present.      Mental Status: He is alert and oriented to person, place, and time.   Psychiatric:         Mood and Affect: Mood normal.        Result Review :           "      Assessment and Plan   Diagnoses and all orders for this visit:    1. Acute frontal sinusitis, recurrence not specified (Primary)  -     Discontinue: azithromycin (Zithromax Z-Jase) 250 MG tablet; Take 2 tablets by mouth on day 1, then 1 tablet daily on days 2-5  Dispense: 6 tablet; Refill: 0  -     POCT SARS-CoV-2 Antigen DALLIN + Flu  -     amoxicillin-clavulanate (AUGMENTIN) 875-125 MG per tablet; Take 1 tablet by mouth 2 (Two) Times a Day.  Dispense: 14 tablet; Refill: 0    2. Acute cough  -     promethazine-dextromethorphan (PROMETHAZINE-DM) 6.25-15 MG/5ML syrup; Take 5 mL by mouth 4 (Four) Times a Day As Needed for Cough.  Dispense: 180 mL; Refill: 0  -     POCT SARS-CoV-2 Antigen DALLIN + Flu    3. Malaise    4. Acute non-recurrent pansinusitis  -     benzonatate (TESSALON) 200 MG capsule; Take 1 capsule by mouth 3 (Three) Times a Day As Needed for Cough.  Dispense: 21 capsule; Refill: 0    5. Anemia, unspecified type  -     ferrous sulfate 325 (65 FE) MG tablet; Take 1 tablet by mouth Daily With Breakfast.  Dispense: 90 tablet; Refill: 1    6. New onset of congestive heart failure  -     Cancel: Ambulatory Referral to Cardiology; Future  -     Ambulatory Referral to Cardiology; Future             Follow Up   Return if symptoms worsen or fail to improve.  Patient was given instructions and counseling regarding his condition or for health maintenance advice. Please see specific information pulled into the AVS if appropriate.

## (undated) DEVICE — LN SMPL CO2 SHTRM SD STREAM W/M LUER

## (undated) DEVICE — ADHS SKIN SURG TISS VISC PREMIERPRO EXOFIN HI/VISC FAST/DRY

## (undated) DEVICE — SOL NACL 0.9PCT 1000ML

## (undated) DEVICE — JACKSON-PRATT 100CC BULB RESERVOIR: Brand: CARDINAL HEALTH

## (undated) DEVICE — COVER,C-ARM,41X74: Brand: MEDLINE

## (undated) DEVICE — RADIFOCUS OPTITORQUE ANGIOGRAPHIC CATHETER: Brand: OPTITORQUE

## (undated) DEVICE — ENDOPOUCH RETRIEVER SPECIMEN RETRIEVAL BAGS: Brand: ENDOPOUCH RETRIEVER

## (undated) DEVICE — FRCP BX RADJAW4 NDL 2.8 240CM LG OG BX40

## (undated) DEVICE — GLV SURG BIOGEL M LTX PF 6 1/2

## (undated) DEVICE — ENDOPATH XCEL BLADELESS TROCARS WITH STABILITY SLEEVES: Brand: ENDOPATH XCEL

## (undated) DEVICE — PK LAP CHOLE BG

## (undated) DEVICE — CATH DIAG IMPULSE FR4 5F 100CM

## (undated) DEVICE — Device

## (undated) DEVICE — KT MANIFLD CARDIAC

## (undated) DEVICE — 3M™ STERI-DRAPE™ INSTRUMENT POUCH 1018L: Brand: STERI-DRAPE™

## (undated) DEVICE — SYR LL TP 10ML STRL

## (undated) DEVICE — ADAPT CLN BIOGUARD AIR/H2O DISP

## (undated) DEVICE — DISPOSABLE MONOPOLAR ENDOSCOPIC CORD 10 FT. (3M): Brand: KIRWAN

## (undated) DEVICE — ENDOPATH XCEL UNIVERSAL TROCAR STABLILITY SLEEVES: Brand: ENDOPATH XCEL

## (undated) DEVICE — LAPAROVUE VISIBILITY SYSTEM LAPAROSCOPIC SOLUTIONS: Brand: LAPAROVUE

## (undated) DEVICE — SYR LUERLOK 20CC BX/50

## (undated) DEVICE — STPCK 3WY D201 DISCOFIX

## (undated) DEVICE — SUT VIC 0 UR6 27IN VCP603H

## (undated) DEVICE — SENSR O2 OXIMAX FNGR A/ 18IN NONSTR

## (undated) DEVICE — TUBING, SUCTION, 1/4" X 10', STRAIGHT: Brand: MEDLINE

## (undated) DEVICE — LAPAROSCOPIC SMOKE FILTRATION SYSTEM: Brand: PALL LAPAROSHIELD® PLUS LAPAROSCOPIC SMOKE FILTRATION SYSTEM

## (undated) DEVICE — BLCK/BITE BLOX W/DENTL/RIM W/STRAP 54F

## (undated) DEVICE — KT ORCA ORCAPOD DISP STRL

## (undated) DEVICE — APPL CHLORAPREP HI/LITE 26ML ORNG

## (undated) DEVICE — PK CATH CARD 40

## (undated) DEVICE — SUT MNCRYL PLS ANTIB UD 4/0 PS2 18IN

## (undated) DEVICE — MSK PROC CURAPLEX O2 2/ADAPT 7FT

## (undated) DEVICE — GLIDESHEATH SLENDER STAINLESS STEEL KIT: Brand: GLIDESHEATH SLENDER

## (undated) DEVICE — CATH DIAG IMPULSE FL3.5 5F 100CM

## (undated) DEVICE — GW EMR FIX EXCHG J STD .035 3MM 260CM

## (undated) DEVICE — UNDRGLV SURG BIOGEL PUNCTUREINDICATION SZ7 PF STRL